# Patient Record
Sex: FEMALE | Race: WHITE | NOT HISPANIC OR LATINO | Employment: FULL TIME | URBAN - METROPOLITAN AREA
[De-identification: names, ages, dates, MRNs, and addresses within clinical notes are randomized per-mention and may not be internally consistent; named-entity substitution may affect disease eponyms.]

---

## 2017-01-03 RX ORDER — DIAZEPAM 5 MG/1
TABLET ORAL
Qty: 30 TABLET | Refills: 0 | Status: SHIPPED | OUTPATIENT
Start: 2017-01-03 | End: 2019-03-28

## 2017-01-09 ENCOUNTER — PATIENT MESSAGE (OUTPATIENT)
Dept: INTERNAL MEDICINE | Facility: CLINIC | Age: 27
End: 2017-01-09

## 2019-03-28 ENCOUNTER — LAB VISIT (OUTPATIENT)
Dept: LAB | Facility: HOSPITAL | Age: 29
End: 2019-03-28
Attending: ALLERGY & IMMUNOLOGY
Payer: COMMERCIAL

## 2019-03-28 ENCOUNTER — OFFICE VISIT (OUTPATIENT)
Dept: ALLERGY | Facility: CLINIC | Age: 29
End: 2019-03-28
Payer: COMMERCIAL

## 2019-03-28 VITALS
DIASTOLIC BLOOD PRESSURE: 74 MMHG | SYSTOLIC BLOOD PRESSURE: 108 MMHG | BODY MASS INDEX: 30.81 KG/M2 | WEIGHT: 184.94 LBS | HEIGHT: 65 IN

## 2019-03-28 DIAGNOSIS — L50.8 CHRONIC URTICARIA: Primary | ICD-10-CM

## 2019-03-28 DIAGNOSIS — L50.8 CHRONIC URTICARIA: ICD-10-CM

## 2019-03-28 LAB
ALBUMIN SERPL BCP-MCNC: 4.2 G/DL (ref 3.5–5.2)
ALP SERPL-CCNC: 60 U/L (ref 55–135)
ALT SERPL W/O P-5'-P-CCNC: 12 U/L (ref 10–44)
ANION GAP SERPL CALC-SCNC: 8 MMOL/L (ref 8–16)
AST SERPL-CCNC: 18 U/L (ref 10–40)
BASOPHILS # BLD AUTO: 0.01 K/UL (ref 0–0.2)
BASOPHILS NFR BLD: 0.2 % (ref 0–1.9)
BILIRUB SERPL-MCNC: 0.4 MG/DL (ref 0.1–1)
BUN SERPL-MCNC: 12 MG/DL (ref 6–20)
CALCIUM SERPL-MCNC: 9.6 MG/DL (ref 8.7–10.5)
CHLORIDE SERPL-SCNC: 103 MMOL/L (ref 95–110)
CO2 SERPL-SCNC: 25 MMOL/L (ref 23–29)
CREAT SERPL-MCNC: 0.8 MG/DL (ref 0.5–1.4)
DIFFERENTIAL METHOD: ABNORMAL
EOSINOPHIL # BLD AUTO: 0.1 K/UL (ref 0–0.5)
EOSINOPHIL NFR BLD: 1.8 % (ref 0–8)
ERYTHROCYTE [DISTWIDTH] IN BLOOD BY AUTOMATED COUNT: 12.3 % (ref 11.5–14.5)
EST. GFR  (AFRICAN AMERICAN): >60 ML/MIN/1.73 M^2
EST. GFR  (NON AFRICAN AMERICAN): >60 ML/MIN/1.73 M^2
GLUCOSE SERPL-MCNC: 76 MG/DL (ref 70–110)
HCT VFR BLD AUTO: 38.7 % (ref 37–48.5)
HGB BLD-MCNC: 13.2 G/DL (ref 12–16)
LYMPHOCYTES # BLD AUTO: 1.5 K/UL (ref 1–4.8)
LYMPHOCYTES NFR BLD: 30.8 % (ref 18–48)
MCH RBC QN AUTO: 33.2 PG (ref 27–31)
MCHC RBC AUTO-ENTMCNC: 34.1 G/DL (ref 32–36)
MCV RBC AUTO: 98 FL (ref 82–98)
MONOCYTES # BLD AUTO: 0.3 K/UL (ref 0.3–1)
MONOCYTES NFR BLD: 6.2 % (ref 4–15)
NEUTROPHILS # BLD AUTO: 3 K/UL (ref 1.8–7.7)
NEUTROPHILS NFR BLD: 60.8 % (ref 38–73)
PLATELET # BLD AUTO: 207 K/UL (ref 150–350)
PMV BLD AUTO: 9.7 FL (ref 9.2–12.9)
POTASSIUM SERPL-SCNC: 3.9 MMOL/L (ref 3.5–5.1)
PROT SERPL-MCNC: 7.5 G/DL (ref 6–8.4)
RBC # BLD AUTO: 3.97 M/UL (ref 4–5.4)
SODIUM SERPL-SCNC: 136 MMOL/L (ref 136–145)
THYROGLOB AB SERPL IA-ACNC: <4 IU/ML (ref 0–3.9)
THYROPEROXIDASE IGG SERPL-ACNC: <6 IU/ML
TSH SERPL DL<=0.005 MIU/L-ACNC: 1.19 UIU/ML (ref 0.4–4)
WBC # BLD AUTO: 5 K/UL (ref 3.9–12.7)

## 2019-03-28 PROCEDURE — 3008F PR BODY MASS INDEX (BMI) DOCUMENTED: ICD-10-PCS | Mod: CPTII,S$GLB,, | Performed by: ALLERGY & IMMUNOLOGY

## 2019-03-28 PROCEDURE — 36415 COLL VENOUS BLD VENIPUNCTURE: CPT

## 2019-03-28 PROCEDURE — 86376 MICROSOMAL ANTIBODY EACH: CPT

## 2019-03-28 PROCEDURE — 99999 PR PBB SHADOW E&M-EST. PATIENT-LVL III: ICD-10-PCS | Mod: PBBFAC,,, | Performed by: ALLERGY & IMMUNOLOGY

## 2019-03-28 PROCEDURE — 86800 THYROGLOBULIN ANTIBODY: CPT

## 2019-03-28 PROCEDURE — 80053 COMPREHEN METABOLIC PANEL: CPT

## 2019-03-28 PROCEDURE — 86003 ALLG SPEC IGE CRUDE XTRC EA: CPT | Mod: 59

## 2019-03-28 PROCEDURE — 84443 ASSAY THYROID STIM HORMONE: CPT

## 2019-03-28 PROCEDURE — 3008F BODY MASS INDEX DOCD: CPT | Mod: CPTII,S$GLB,, | Performed by: ALLERGY & IMMUNOLOGY

## 2019-03-28 PROCEDURE — 99204 PR OFFICE/OUTPT VISIT, NEW, LEVL IV, 45-59 MIN: ICD-10-PCS | Mod: S$GLB,,, | Performed by: ALLERGY & IMMUNOLOGY

## 2019-03-28 PROCEDURE — 86003 ALLG SPEC IGE CRUDE XTRC EA: CPT

## 2019-03-28 PROCEDURE — 99999 PR PBB SHADOW E&M-EST. PATIENT-LVL III: CPT | Mod: PBBFAC,,, | Performed by: ALLERGY & IMMUNOLOGY

## 2019-03-28 PROCEDURE — 99204 OFFICE O/P NEW MOD 45 MIN: CPT | Mod: S$GLB,,, | Performed by: ALLERGY & IMMUNOLOGY

## 2019-03-28 PROCEDURE — 85025 COMPLETE CBC W/AUTO DIFF WBC: CPT

## 2019-03-28 RX ORDER — EPINEPHRINE 0.3 MG/.3ML
1 INJECTION SUBCUTANEOUS ONCE
Qty: 2 DEVICE | Refills: 2 | Status: SHIPPED | OUTPATIENT
Start: 2019-03-28 | End: 2019-03-28

## 2019-03-28 RX ORDER — EPINEPHRINE 0.3 MG/.3ML
1 INJECTION SUBCUTANEOUS ONCE
Qty: 2 DEVICE | Refills: 2 | Status: SHIPPED | OUTPATIENT
Start: 2019-03-28 | End: 2019-11-11

## 2019-03-28 RX ORDER — CETIRIZINE HYDROCHLORIDE 10 MG/1
20 TABLET ORAL 2 TIMES DAILY
Qty: 120 TABLET | Refills: 6 | Status: ON HOLD | OUTPATIENT
Start: 2019-03-28 | End: 2019-11-01

## 2019-03-28 NOTE — PROGRESS NOTES
Subjective:       Patient ID: Antoinette Love is a 28 y.o. female.    Chief Complaint:  Urticaria (10 years)      HPI:   Antoinette Love is here for evaluation of chronic urticaria. Patient's symptoms include urticaria and angioedema. Hives are described as a red, raised, itchy and spreading skin rash that occurs on the entire body. The patient has had these symptoms for 10 years. Possible triggers include maybe worse in bed. Is also dermatographic. alleve w cramps. Each individual hive lasts less than 24 hours. These lesions are pruritic and not painful. They heal without scarring. The patient has tried the following medications for control of these symptoms: over-the-counter antihistamines. These medications offer fair relief of symptoms.  There was a distant episode w concern of poss throat tightness while in hosp w c diff infection. She had eaten beef broth a few minutes prior. Had similar sx's later w sausage. Tolerates beef hamburger and ground beef w/o problem though. Has been rx'd epipen The patient has not required Emergency Room evaluation and treatment for these symptoms. Skin biopsy has not been performed.   No AR. Other than episodic w cat exposure  Hx RAD vs asthma    Family Atopy History: no history of atopy      No SLE or thyroid dysfunction      Environmental History: Pets in the home: dogs (2).  [place]: hardwood floors  + smokes out of house x 10 yrs. 1 pack x 3 weeks      Past Medical History:   Diagnosis Date    DVT (deep venous thrombosis)     Factor V Leiden     Pulmonary embolus     Recurrent upper respiratory infection (URI)     TIA (transient ischemic attack)        Family History   Problem Relation Age of Onset    Allergies Mother         azithromycin       Review of Systems   Constitutional: Negative for activity change, fatigue and fever.   HENT: Negative for congestion, postnasal drip, rhinorrhea, sinus pressure and sneezing.    Eyes: Negative for discharge, redness  and itching.   Respiratory: Negative for cough, shortness of breath and wheezing.    Cardiovascular: Negative for chest pain.   Gastrointestinal: Negative for diarrhea, nausea and vomiting.   Genitourinary: Negative for dysuria.   Musculoskeletal: Negative for arthralgias and joint swelling.   Skin: Negative for rash.        urticaria   Neurological: Negative for headaches.   Hematological: Does not bruise/bleed easily.   Psychiatric/Behavioral: Negative for behavioral problems and sleep disturbance.          Objective:   Physical Exam   Constitutional: She is oriented to person, place, and time. She appears well-developed and well-nourished. No distress.   HENT:   Head: Normocephalic.   Right Ear: Tympanic membrane and external ear normal.   Left Ear: Tympanic membrane and external ear normal.   Nose: No mucosal edema, rhinorrhea, sinus tenderness or septal deviation. Right sinus exhibits no maxillary sinus tenderness and no frontal sinus tenderness. Left sinus exhibits no maxillary sinus tenderness and no frontal sinus tenderness.   Mouth/Throat: Uvula is midline, oropharynx is clear and moist and mucous membranes are normal. No uvula swelling.   Eyes: Conjunctivae are normal. Right eye exhibits no discharge. Left eye exhibits no discharge.   Neck: Normal range of motion. Neck supple.   Cardiovascular: Normal rate and regular rhythm.   Pulmonary/Chest: Effort normal and breath sounds normal. No respiratory distress. She has no wheezes.   Abdominal: Soft. Bowel sounds are normal. There is no tenderness.   Musculoskeletal: Normal range of motion. She exhibits no edema or tenderness.   Lymphadenopathy:     She has no cervical adenopathy.   Neurological: She is alert and oriented to person, place, and time.   Skin: Skin is warm. No rash noted. No erythema.   Few urticarial lesions on neck   Psychiatric: She has a normal mood and affect. Her behavior is normal. Judgment and thought content normal.   Nursing note and  vitals reviewed.          Assessment:       1. Chronic urticaria         Plan:       Antoinette was seen today for urticaria.    Diagnoses and all orders for this visit:    Chronic urticaria  -     D. farinae IgE; Future  -     D. pteronyssinus IgE; Future  -     CBC auto differential; Future  -     Anti-thyroglobulin antibody; Future  -     Thyroid peroxidase antibody; Future  -     TSH; Future  -     Comprehensive metabolic panel; Future    Other orders  -     EPINEPHrine (EPIPEN 2-VAN) 0.3 mg/0.3 mL AtIn; Inject 0.3 mLs (0.3 mg total) into the muscle once. for 1 dose. Low suspicion of anaphylaxis risk, though reports 2 poss prev episodes w some suspicion  -     cetirizine (ZYRTEC) 10 MG tablet; Take 2 tablets (20 mg total) by mouth 2 (two) times daily. For chronic urticaria  -     ranitidine (ZANTAC) 150 MG tablet; Take 1 tablet (150 mg total) by mouth 2 (two) times daily.    fu 3 weeks. If no improvement, consider omalizumab

## 2019-04-01 LAB
D FARINAE IGE QN: <0.35 KU/L
D PTERONYSS IGE QN: <0.35 KU/L
DEPRECATED D FARINAE IGE RAST QL: NORMAL
DEPRECATED D PTERONYSS IGE RAST QL: NORMAL

## 2019-06-05 DIAGNOSIS — M79.89 PAIN AND SWELLING OF LOWER EXTREMITY, RIGHT: Primary | ICD-10-CM

## 2019-06-05 DIAGNOSIS — M79.604 PAIN AND SWELLING OF LOWER EXTREMITY, RIGHT: Primary | ICD-10-CM

## 2019-06-27 DIAGNOSIS — L03.114 CELLULITIS OF LEFT UPPER EXTREMITY: Primary | ICD-10-CM

## 2019-06-27 RX ORDER — SULFAMETHOXAZOLE AND TRIMETHOPRIM 800; 160 MG/1; MG/1
1 TABLET ORAL 2 TIMES DAILY
Qty: 20 TABLET | Refills: 0 | Status: SHIPPED | OUTPATIENT
Start: 2019-06-27 | End: 2019-07-07

## 2019-06-29 ENCOUNTER — HOSPITAL ENCOUNTER (EMERGENCY)
Facility: HOSPITAL | Age: 29
Discharge: HOME | End: 2019-06-29
Attending: EMERGENCY MEDICINE
Payer: COMMERCIAL

## 2019-06-29 VITALS
RESPIRATION RATE: 18 BRPM | DIASTOLIC BLOOD PRESSURE: 83 MMHG | BODY MASS INDEX: 28.32 KG/M2 | TEMPERATURE: 99 F | SYSTOLIC BLOOD PRESSURE: 131 MMHG | WEIGHT: 170 LBS | HEIGHT: 65 IN | OXYGEN SATURATION: 98 % | HEART RATE: 89 BPM

## 2019-06-29 DIAGNOSIS — L03.114 CELLULITIS OF LEFT UPPER EXTREMITY: ICD-10-CM

## 2019-06-29 DIAGNOSIS — J11.1 INFLUENZA: Primary | ICD-10-CM

## 2019-06-29 LAB
B-HCG UR QL: NEGATIVE
CTP QC/QA: YES

## 2019-06-29 PROCEDURE — 25000003 PHARM REV CODE 250: Performed by: NURSE PRACTITIONER

## 2019-06-29 PROCEDURE — 81025 URINE PREGNANCY TEST: CPT | Performed by: NURSE PRACTITIONER

## 2019-06-29 PROCEDURE — 99284 EMERGENCY DEPT VISIT MOD MDM: CPT

## 2019-06-29 RX ORDER — CEPHALEXIN 500 MG/1
500 CAPSULE ORAL
Status: COMPLETED | OUTPATIENT
Start: 2019-06-29 | End: 2019-06-29

## 2019-06-29 RX ORDER — CEPHALEXIN 250 MG/1
250 CAPSULE ORAL EVERY 6 HOURS
Qty: 28 CAPSULE | Refills: 0 | Status: SHIPPED | OUTPATIENT
Start: 2019-06-29 | End: 2019-07-06

## 2019-06-29 RX ORDER — CEPHALEXIN 250 MG/1
500 CAPSULE ORAL EVERY 6 HOURS
Qty: 56 CAPSULE | Refills: 0 | Status: SHIPPED | OUTPATIENT
Start: 2019-06-29 | End: 2019-06-29 | Stop reason: ALTCHOICE

## 2019-06-29 RX ORDER — HYDROCODONE BITARTRATE AND ACETAMINOPHEN 5; 325 MG/1; MG/1
1 TABLET ORAL EVERY 6 HOURS PRN
Qty: 12 TABLET | Refills: 0 | Status: ON HOLD | OUTPATIENT
Start: 2019-06-29 | End: 2019-07-21 | Stop reason: SDUPTHER

## 2019-06-29 RX ADMIN — CEPHALEXIN 500 MG: 500 CAPSULE ORAL at 05:06

## 2019-06-29 NOTE — ED TRIAGE NOTES
"Pt arrived to ER with complaints of " I went to urgent care yesterday and I got a shot of Rocephin and I have been on Bactrim since Thursday (abscess left elbow crease)". Pt was advised to return for further evaluation at urgent care and was sent here due to "it grew outside the lines they buddy and the antibiotics must not be working". Pt rates the pain as 6/10with movement of the LUE.   "

## 2019-06-29 NOTE — DISCHARGE INSTRUCTIONS
Return for pain with joint movement, increase in redness, warmth or pus drainage.  Take antibiotics as prescribed.  Continue all other treatments and medications.

## 2019-06-30 NOTE — ED PROVIDER NOTES
Encounter Date: 6/29/2019       History     Chief Complaint   Patient presents with    Abscess     Abscess to left arm.  Started on bactrim, but redness and swelling are worse today.  Diagnosed with flu yesterday.     Chief complaint:  Abscess    History of present illness:  Patient is a 29-year-old female with a recent diagnosis of influenza who states that she had a small area of redness on her left arm that is worsening.  She is currently taking Bactrim DS for this area which was believed to be an abscess.  Patient denies fever or chills. She states she was tested for influenza secondary to a cough only.  The area is circled with ink and patient reports that the redness has decreased but the firmness around it has increased.    The history is provided by the patient and medical records. No  was used.     Review of patient's allergies indicates:   Allergen Reactions    Azithromycin Hives    Toradol [ketorolac] Hives     Itching and hives    Xarelto [rivaroxaban]      Gallbladder swelling and disfunction     Past Medical History:   Diagnosis Date    DVT (deep venous thrombosis)     Factor V Leiden     Pulmonary embolus     Recurrent upper respiratory infection (URI)     TIA (transient ischemic attack)      Past Surgical History:   Procedure Laterality Date    COLONOSCOPY N/A 12/18/2015    Performed by Lefty Lee [facility] at [facility] ([facility] F[facility])    IVC FILTER RETRIEVAL      tumor from breast      left    WISDOM TOOTH EXTRACTION       Family History   Problem Relation Age of Onset    Allergies Mother         azithromycin     Social History     Tobacco Use    Smoking status: Never Smoker   Substance Use Topics    Alcohol use: No    Drug use: No     Review of Systems   Constitutional: Negative for chills, fatigue and fever.   HENT: Negative for congestion, ear discharge, ear pain, postnasal drip, rhinorrhea, sinus pressure, sneezing, sore throat and voice change.    Eyes: Negative for  discharge and itching.   Respiratory: Negative for cough, shortness of breath and wheezing.    Cardiovascular: Negative for chest pain, palpitations and leg swelling.   Gastrointestinal: Negative for abdominal pain, constipation, diarrhea, nausea and vomiting.   Endocrine: Negative for polydipsia, polyphagia and polyuria.   Genitourinary: Negative for dysuria, frequency, hematuria, urgency, vaginal bleeding, vaginal discharge and vaginal pain.   Musculoskeletal: Negative for arthralgias and myalgias.   Skin: Negative for rash and wound.        abscess   Neurological: Negative for dizziness, seizures, syncope, weakness and numbness.   Hematological: Negative for adenopathy. Does not bruise/bleed easily.   Psychiatric/Behavioral: Negative for self-injury and suicidal ideas. The patient is not nervous/anxious.        Physical Exam     Initial Vitals [06/29/19 1720]   BP Pulse Resp Temp SpO2   131/83 89 18 99 °F (37.2 °C) 98 %      MAP       --         Physical Exam    Nursing note and vitals reviewed.  Constitutional: She appears well-developed and well-nourished.   HENT:   Head: Normocephalic and atraumatic.   Right Ear: External ear normal.   Left Ear: External ear normal.   Nose: Nose normal.   Eyes: Conjunctivae and EOM are normal. Pupils are equal, round, and reactive to light. Right eye exhibits no discharge. Left eye exhibits no discharge.   Neck: Normal range of motion.   Abdominal: She exhibits no distension.   Musculoskeletal: Normal range of motion.        Arms:  NO pain with movement of the left elbow, distal psm intact   Neurological: She is alert and oriented to person, place, and time.   Skin: Skin is dry. Capillary refill takes less than 2 seconds.             ED Course   Procedures  Labs Reviewed   POCT URINE PREGNANCY          Imaging Results    None          Medical Decision Making:   Initial Assessment:   29-year-old female with reddened indurated painful spot to the left lateral elbow  Differential  Diagnosis:   Abscess, cellulitis, necrotizing fasciitis.  ED Management:  Following physical examination I used a bedside ultrasound to identify collection or drainable fluid.  There was none.  Dr. Burns was consulted, he looked at the photograph of the affected area and recommended the use of Keflex.  Pt was given 500mg in ED and will take 250mg po qid x7d. Norco was provided for pain.  Pt to return for worsening otherwise f/u with pcp.   Medication choices were made after reviewing allergies, medications, history, available laboratories.                    ED Course as of Jun 30 0029   Sat Jun 29, 2019   1752 Preg Test, Ur: Negative [VC]      ED Course User Index  [VC] John Taveras DNP     Clinical Impression:       ICD-10-CM ICD-9-CM   1. Influenza J11.1 487.1   2. Cellulitis of left upper extremity L03.114 682.3         Disposition:   Disposition: Discharged  Condition: Stable                        John Taveras DNP  06/30/19 0034

## 2019-07-20 ENCOUNTER — HOSPITAL ENCOUNTER (OUTPATIENT)
Facility: HOSPITAL | Age: 29
Discharge: HOME | End: 2019-07-21
Attending: EMERGENCY MEDICINE | Admitting: EMERGENCY MEDICINE
Payer: COMMERCIAL

## 2019-07-20 DIAGNOSIS — D68.51 FACTOR V LEIDEN MUTATION: Primary | ICD-10-CM

## 2019-07-20 DIAGNOSIS — I82.409 DVT (DEEP VENOUS THROMBOSIS): ICD-10-CM

## 2019-07-20 DIAGNOSIS — Z86.718 HISTORY OF DVT (DEEP VEIN THROMBOSIS): ICD-10-CM

## 2019-07-20 LAB
ALBUMIN SERPL BCP-MCNC: 4.4 G/DL (ref 3.5–5.2)
ALP SERPL-CCNC: 84 U/L (ref 55–135)
ALT SERPL W/O P-5'-P-CCNC: 14 U/L (ref 10–44)
ANION GAP SERPL CALC-SCNC: 12 MMOL/L (ref 8–16)
APTT BLDCRRT: 24.2 SEC (ref 21–32)
AST SERPL-CCNC: 23 U/L (ref 10–40)
B-HCG UR QL: NEGATIVE
BACTERIA #/AREA URNS AUTO: NORMAL /HPF
BASOPHILS # BLD AUTO: 0.02 K/UL (ref 0–0.2)
BASOPHILS NFR BLD: 0.6 % (ref 0–1.9)
BILIRUB SERPL-MCNC: 0.4 MG/DL (ref 0.1–1)
BILIRUB UR QL STRIP: ABNORMAL
BUN SERPL-MCNC: 18 MG/DL (ref 6–20)
CALCIUM SERPL-MCNC: 9.3 MG/DL (ref 8.7–10.5)
CHLORIDE SERPL-SCNC: 104 MMOL/L (ref 95–110)
CLARITY UR REFRACT.AUTO: ABNORMAL
CO2 SERPL-SCNC: 22 MMOL/L (ref 23–29)
COLOR UR AUTO: ABNORMAL
CREAT SERPL-MCNC: 0.8 MG/DL (ref 0.5–1.4)
CTP QC/QA: YES
DIFFERENTIAL METHOD: ABNORMAL
EOSINOPHIL # BLD AUTO: 0 K/UL (ref 0–0.5)
EOSINOPHIL NFR BLD: 0.6 % (ref 0–8)
ERYTHROCYTE [DISTWIDTH] IN BLOOD BY AUTOMATED COUNT: 12.6 % (ref 11.5–14.5)
EST. GFR  (AFRICAN AMERICAN): >60 ML/MIN/1.73 M^2
EST. GFR  (NON AFRICAN AMERICAN): >60 ML/MIN/1.73 M^2
GLUCOSE SERPL-MCNC: 71 MG/DL (ref 70–110)
GLUCOSE UR QL STRIP: NEGATIVE
HCT VFR BLD AUTO: 38.3 % (ref 37–48.5)
HGB BLD-MCNC: 13.1 G/DL (ref 12–16)
HGB UR QL STRIP: NEGATIVE
HYALINE CASTS UR QL AUTO: 0 /LPF
IMM GRANULOCYTES # BLD AUTO: 0 K/UL (ref 0–0.04)
IMM GRANULOCYTES NFR BLD AUTO: 0 % (ref 0–0.5)
INR PPP: 0.9 (ref 0.8–1.2)
KETONES UR QL STRIP: ABNORMAL
LEUKOCYTE ESTERASE UR QL STRIP: NEGATIVE
LYMPHOCYTES # BLD AUTO: 1.1 K/UL (ref 1–4.8)
LYMPHOCYTES NFR BLD: 36 % (ref 18–48)
MCH RBC QN AUTO: 31.7 PG (ref 27–31)
MCHC RBC AUTO-ENTMCNC: 34.2 G/DL (ref 32–36)
MCV RBC AUTO: 93 FL (ref 82–98)
MICROSCOPIC COMMENT: NORMAL
MONOCYTES # BLD AUTO: 0.2 K/UL (ref 0.3–1)
MONOCYTES NFR BLD: 7.3 % (ref 4–15)
NEUTROPHILS # BLD AUTO: 1.7 K/UL (ref 1.8–7.7)
NEUTROPHILS NFR BLD: 55.5 % (ref 38–73)
NITRITE UR QL STRIP: NEGATIVE
NRBC BLD-RTO: 0 /100 WBC
PH UR STRIP: 5 [PH] (ref 5–8)
PLATELET # BLD AUTO: 145 K/UL (ref 150–350)
PMV BLD AUTO: 9.5 FL (ref 9.2–12.9)
POTASSIUM SERPL-SCNC: 4 MMOL/L (ref 3.5–5.1)
PROT SERPL-MCNC: 8.2 G/DL (ref 6–8.4)
PROT UR QL STRIP: ABNORMAL
PROTHROMBIN TIME: 9.9 SEC (ref 9–12.5)
RBC # BLD AUTO: 4.13 M/UL (ref 4–5.4)
RBC #/AREA URNS AUTO: 3 /HPF (ref 0–4)
SODIUM SERPL-SCNC: 138 MMOL/L (ref 136–145)
SP GR UR STRIP: >=1.03 (ref 1–1.03)
SQUAMOUS #/AREA URNS AUTO: 2 /HPF
URN SPEC COLLECT METH UR: ABNORMAL
WBC # BLD AUTO: 3.14 K/UL (ref 3.9–12.7)
WBC #/AREA URNS AUTO: 0 /HPF (ref 0–5)

## 2019-07-20 PROCEDURE — 99285 EMERGENCY DEPT VISIT HI MDM: CPT | Mod: 25

## 2019-07-20 PROCEDURE — 63600175 PHARM REV CODE 636 W HCPCS: Performed by: STUDENT IN AN ORGANIZED HEALTH CARE EDUCATION/TRAINING PROGRAM

## 2019-07-20 PROCEDURE — 81025 URINE PREGNANCY TEST: CPT | Performed by: EMERGENCY MEDICINE

## 2019-07-20 PROCEDURE — 85730 THROMBOPLASTIN TIME PARTIAL: CPT

## 2019-07-20 PROCEDURE — 99284 EMERGENCY DEPT VISIT MOD MDM: CPT | Mod: ,,, | Performed by: EMERGENCY MEDICINE

## 2019-07-20 PROCEDURE — 99284 PR EMERGENCY DEPT VISIT,LEVEL IV: ICD-10-PCS | Mod: ,,, | Performed by: EMERGENCY MEDICINE

## 2019-07-20 PROCEDURE — 80053 COMPREHEN METABOLIC PANEL: CPT

## 2019-07-20 PROCEDURE — 81001 URINALYSIS AUTO W/SCOPE: CPT

## 2019-07-20 PROCEDURE — 96375 TX/PRO/DX INJ NEW DRUG ADDON: CPT

## 2019-07-20 PROCEDURE — 85025 COMPLETE CBC W/AUTO DIFF WBC: CPT

## 2019-07-20 PROCEDURE — 96374 THER/PROPH/DIAG INJ IV PUSH: CPT

## 2019-07-20 PROCEDURE — 85610 PROTHROMBIN TIME: CPT

## 2019-07-20 RX ORDER — ONDANSETRON 2 MG/ML
4 INJECTION INTRAMUSCULAR; INTRAVENOUS
Status: COMPLETED | OUTPATIENT
Start: 2019-07-20 | End: 2019-07-20

## 2019-07-20 RX ORDER — MORPHINE SULFATE 4 MG/ML
4 INJECTION, SOLUTION INTRAMUSCULAR; INTRAVENOUS
Status: COMPLETED | OUTPATIENT
Start: 2019-07-20 | End: 2019-07-20

## 2019-07-20 RX ADMIN — MORPHINE SULFATE 4 MG: 4 INJECTION INTRAVENOUS at 08:07

## 2019-07-20 RX ADMIN — ONDANSETRON 4 MG: 2 INJECTION INTRAMUSCULAR; INTRAVENOUS at 08:07

## 2019-07-21 VITALS
OXYGEN SATURATION: 99 % | BODY MASS INDEX: 28.87 KG/M2 | WEIGHT: 173.31 LBS | HEART RATE: 63 BPM | SYSTOLIC BLOOD PRESSURE: 106 MMHG | TEMPERATURE: 98 F | RESPIRATION RATE: 18 BRPM | DIASTOLIC BLOOD PRESSURE: 65 MMHG | HEIGHT: 65 IN

## 2019-07-21 PROBLEM — I82.419 ACUTE DEEP VEIN THROMBOSIS (DVT) OF FEMORAL VEIN: Status: ACTIVE | Noted: 2019-07-21

## 2019-07-21 PROBLEM — I82.409 DVT (DEEP VENOUS THROMBOSIS): Status: ACTIVE | Noted: 2019-07-21

## 2019-07-21 PROCEDURE — 99205 OFFICE O/P NEW HI 60 MIN: CPT | Mod: ,,, | Performed by: INTERNAL MEDICINE

## 2019-07-21 PROCEDURE — G0378 HOSPITAL OBSERVATION PER HR: HCPCS

## 2019-07-21 PROCEDURE — 25000003 PHARM REV CODE 250: Performed by: HOSPITALIST

## 2019-07-21 PROCEDURE — 99219 PR INITIAL OBSERVATION CARE,LEVL II: CPT | Mod: ,,, | Performed by: HOSPITALIST

## 2019-07-21 PROCEDURE — 99217 PR OBSERVATION CARE DISCHARGE: CPT | Mod: ,,, | Performed by: HOSPITALIST

## 2019-07-21 PROCEDURE — 63600175 PHARM REV CODE 636 W HCPCS: Performed by: HOSPITALIST

## 2019-07-21 PROCEDURE — 99217 PR OBSERVATION CARE DISCHARGE: ICD-10-PCS | Mod: ,,, | Performed by: HOSPITALIST

## 2019-07-21 PROCEDURE — 99219 PR INITIAL OBSERVATION CARE,LEVL II: ICD-10-PCS | Mod: ,,, | Performed by: HOSPITALIST

## 2019-07-21 PROCEDURE — 99205 PR OFFICE/OUTPT VISIT, NEW, LEVL V, 60-74 MIN: ICD-10-PCS | Mod: ,,, | Performed by: INTERNAL MEDICINE

## 2019-07-21 RX ORDER — ENOXAPARIN SODIUM 150 MG/ML
1.5 INJECTION SUBCUTANEOUS
Status: DISCONTINUED | OUTPATIENT
Start: 2019-07-21 | End: 2019-07-21 | Stop reason: HOSPADM

## 2019-07-21 RX ORDER — MORPHINE SULFATE 2 MG/ML
4 INJECTION, SOLUTION INTRAMUSCULAR; INTRAVENOUS EVERY 4 HOURS PRN
Status: DISCONTINUED | OUTPATIENT
Start: 2019-07-21 | End: 2019-07-21 | Stop reason: HOSPADM

## 2019-07-21 RX ORDER — CETIRIZINE HYDROCHLORIDE 10 MG/1
20 TABLET ORAL 2 TIMES DAILY
Status: DISCONTINUED | OUTPATIENT
Start: 2019-07-21 | End: 2019-07-21 | Stop reason: HOSPADM

## 2019-07-21 RX ORDER — ENOXAPARIN SODIUM 150 MG/ML
1.5 INJECTION SUBCUTANEOUS DAILY
Qty: 24 ML | Refills: 3 | Status: SHIPPED | OUTPATIENT
Start: 2019-07-21 | End: 2019-08-20

## 2019-07-21 RX ORDER — ZOLPIDEM TARTRATE 5 MG/1
5 TABLET ORAL NIGHTLY PRN
Status: DISCONTINUED | OUTPATIENT
Start: 2019-07-21 | End: 2019-07-21 | Stop reason: HOSPADM

## 2019-07-21 RX ORDER — HYDROCODONE BITARTRATE AND ACETAMINOPHEN 5; 325 MG/1; MG/1
1 TABLET ORAL EVERY 6 HOURS PRN
Status: DISCONTINUED | OUTPATIENT
Start: 2019-07-21 | End: 2019-07-21 | Stop reason: HOSPADM

## 2019-07-21 RX ORDER — SODIUM CHLORIDE 0.9 % (FLUSH) 0.9 %
10 SYRINGE (ML) INJECTION
Status: DISCONTINUED | OUTPATIENT
Start: 2019-07-21 | End: 2019-07-21 | Stop reason: HOSPADM

## 2019-07-21 RX ORDER — WARFARIN 2.5 MG/1
5 TABLET ORAL DAILY
Status: DISCONTINUED | OUTPATIENT
Start: 2019-07-21 | End: 2019-07-21 | Stop reason: HOSPADM

## 2019-07-21 RX ORDER — HYDROCODONE BITARTRATE AND ACETAMINOPHEN 5; 325 MG/1; MG/1
1 TABLET ORAL EVERY 6 HOURS PRN
Qty: 25 TABLET | Refills: 0 | Status: SHIPPED | OUTPATIENT
Start: 2019-07-21 | End: 2019-07-24

## 2019-07-21 RX ORDER — MORPHINE SULFATE 2 MG/ML
2 INJECTION, SOLUTION INTRAMUSCULAR; INTRAVENOUS EVERY 4 HOURS PRN
Status: DISCONTINUED | OUTPATIENT
Start: 2019-07-21 | End: 2019-07-21

## 2019-07-21 RX ORDER — MORPHINE SULFATE 4 MG/ML
4 INJECTION, SOLUTION INTRAMUSCULAR; INTRAVENOUS EVERY 4 HOURS PRN
Status: DISCONTINUED | OUTPATIENT
Start: 2019-07-21 | End: 2019-07-21

## 2019-07-21 RX ORDER — GUAIFENESIN 600 MG/1
600 TABLET, EXTENDED RELEASE ORAL 2 TIMES DAILY
Status: DISCONTINUED | OUTPATIENT
Start: 2019-07-21 | End: 2019-07-21 | Stop reason: HOSPADM

## 2019-07-21 RX ORDER — POLYETHYLENE GLYCOL 3350 17 G/17G
17 POWDER, FOR SOLUTION ORAL 2 TIMES DAILY PRN
Status: DISCONTINUED | OUTPATIENT
Start: 2019-07-21 | End: 2019-07-21 | Stop reason: HOSPADM

## 2019-07-21 RX ORDER — FAMOTIDINE 20 MG/1
20 TABLET, FILM COATED ORAL 2 TIMES DAILY
Status: DISCONTINUED | OUTPATIENT
Start: 2019-07-21 | End: 2019-07-21 | Stop reason: HOSPADM

## 2019-07-21 RX ADMIN — ENOXAPARIN SODIUM 120 MG: 150 INJECTION SUBCUTANEOUS at 03:07

## 2019-07-21 RX ADMIN — HYDROCODONE BITARTRATE AND ACETAMINOPHEN 1 TABLET: 5; 325 TABLET ORAL at 02:07

## 2019-07-21 RX ADMIN — HYDROCODONE BITARTRATE AND ACETAMINOPHEN 1 TABLET: 5; 325 TABLET ORAL at 05:07

## 2019-07-21 RX ADMIN — GUAIFENESIN 600 MG: 600 TABLET, EXTENDED RELEASE ORAL at 09:07

## 2019-07-21 RX ADMIN — WARFARIN SODIUM 5 MG: 2.5 TABLET ORAL at 04:07

## 2019-07-21 RX ADMIN — FAMOTIDINE 20 MG: 20 TABLET, FILM COATED ORAL at 08:07

## 2019-07-21 RX ADMIN — MORPHINE SULFATE 4 MG: 2 INJECTION, SOLUTION INTRAMUSCULAR; INTRAVENOUS at 01:07

## 2019-07-21 RX ADMIN — WARFARIN SODIUM 5 MG: 2.5 TABLET ORAL at 02:07

## 2019-07-21 RX ADMIN — CETIRIZINE HYDROCHLORIDE 20 MG: 10 TABLET, FILM COATED ORAL at 08:07

## 2019-07-21 RX ADMIN — MORPHINE SULFATE 4 MG: 4 INJECTION INTRAVENOUS at 05:07

## 2019-07-21 RX ADMIN — HYDROCODONE BITARTRATE AND ACETAMINOPHEN 1 TABLET: 5; 325 TABLET ORAL at 08:07

## 2019-07-21 NOTE — PLAN OF CARE
Problem: Adult Inpatient Plan of Care  Goal: Plan of Care Review  Outcome: Ongoing (interventions implemented as appropriate)  Pt is AAOx4 and ambulates independently. VSS and pt is afebrile. Pt c/o pain and had IV Morphine and PO Norco in the E.D. Bilateral LE US showed dvt's to both LE. Warfarin and Lovenox administered in the E.D. Additional RLE US to occur in a.m. Significant other @ bedside. Pt remained safe and free of injury through the night. Bed in low and locked position, bed rails up x2, call bell in reach, non skid socks worn out of bed. Will continue to monitor.

## 2019-07-21 NOTE — HPI
29F with Factor V Leiden and recurrent DVTs despite various anticoagulants presents c/o pain and swelling in BLE.  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  She has an IVC filter.  She has apparently had clots on coumadin, Lovenox, and did not tolerate Xarelto, which she claims gave her biliary colic.  Since moving here she has not established with a hematologist.  Her present symptoms began about 4 days ago and have progressively worsened to the point that they have become unbearable.  The pain is worse in the inguinal area and is burning in quality, worsened by movement.  She denies any violaceous discoloration of the lower extremities but does have a bruise on her left knee from bumping against a surface a few days ago.  She does note a petechial-appearing rash on her forelegs which is new.  She reports compliance with her Eliquis and her last dose was Friday evening.

## 2019-07-21 NOTE — ED TRIAGE NOTES
Antoinette Love, a 29 y.o. female presents to the ED w/ complaint of pain to right groin. Pt reports having right leg pain similar to previous DVTs started a few weeks ago but has since subsided. Pt was scheduled for ultrasound but did not go because pain went away. Pt reports pain and swelling to groin that began earlier today.    Triage note:  Chief Complaint   Patient presents with    Leg Pain     Patient reports right leg pain. Reports swelling, discoloration, and pain in leg. States that she is concerned for possible blood clot. States that she has history of blood closts.      Review of patient's allergies indicates:   Allergen Reactions    Azithromycin Hives    Toradol [ketorolac] Hives     Itching and hives    Xarelto [rivaroxaban]      Gallbladder swelling and disfunction     Past Medical History:   Diagnosis Date    DVT (deep venous thrombosis)     Factor V Leiden     Pulmonary embolus     Recurrent upper respiratory infection (URI)     TIA (transient ischemic attack)       Adult Physical Assessment  LOC: Antoinette Love, 29 y.o. female verified via two identifiers.  The patient is awake, alert, oriented and speaking appropriately at this time.  APPEARANCE: Patient resting comfortably and appears to be in no acute distress at this time. Patient is clean and well groomed, patient's clothing is properly fastened.  SKIN:The skin is warm and dry, color consistent with ethnicity, patient has normal skin turgor and moist mucus membranes, skin intact, no breakdown or brusing noted.  MUSCULOSKELETAL: Patient moving all extremities well, no obvious deformities noted. Swelling noted to right thigh.  RESPIRATORY: Airway is open and patent, respirations are spontaneous, patient has a normal effort and rate, no accessory muscle use noted.  CARDIAC: Patient has a normal rate and rhythm, no periphreal edema noted in any extremity, capillary refill < 3 seconds in all extremities  ABDOMEN: Soft and  non tender to palpation, no abdominal distention noted. Bowel sounds present in all four quadrants.  NEUROLOGIC: Eyes open spontaneously, behavior appropriate to situation, follows commands, facial expression symmetrical, bilateral hand grasp equal and even, purposeful motor response noted, normal sensation in all extremities when touched with a finger.

## 2019-07-21 NOTE — CONSULTS
Consult Note    Inpatient consult to Hematology/Oncology  Consult performed by: Earnestine Garcia MD  Consult ordered by: Ruiz Linder MD        SUBJECTIVE:     History of Present Illness:  Antoinette Love is a 29-year-old female with Homoxygous Factor V Leiden mutation, and recurrent DVTs, IVC filter placement, miscarriage in 2013 at 11 weeks, TIA in 2013, who presented 7/21/19 c/o pain and swelling in BLE while on Eliquis.  Found to have 'partial, nonocclusive thrombus in the right common femoral vein, left common femoral vein, and left external iliac vein. Superficial thrombophlebitis of the bilateral greater saphenous veins.' Hematology consulted for further recommendations on anti-coagulation.    Hematologic History  The patient has had at least 11 DVTs and 3-4 PEs.  -She was on Coumadin for years and stated she came off when she had a DVT despite a therapeutic INR.  -She was then placed on Xarelto buts states she only took 1 dose and shortly after developed gallbladder pain. When she was evaluated she was told she had gallbladder inflammation.  -IVC filter was placed in 2014. 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 She had a DVT in 2016 while on Eliquis  She was followed by a Hematologist in [place], and he opted to take her off of anti-coagulation in 2018 because stated that she had an IVC filter and did not need to be anti-coagulated.  -She noted symptoms of DVT last week, so she self resumed a previous Eliquis prescription that she had    The patient was seen today. She endorses right groin pain. No significant swelling. No dyspnea or chest pain. Her mother had breast cancer and VTE. He father had PE and DVT after traumatic hit to leg by shopping cart. Her sister is also homozygous for factor V Leiden but has never had VTE. The patient had  miscarriage at 11-12 weeks while on Coumadin in 2013. She had a TIA in 2013, no history of coronary artery disease. She has regular menstruations, is not on OCPs. No bleeding, but does bruise easily. No recent surgery or prolonged travel. She is a nurse at [employer], her  works in the [employer], and they may be moving to [place]. She smokes occasionally.    Review of patient's allergies indicates:   Allergen Reactions    Azithromycin Hives    Toradol [ketorolac] Hives     Itching and hives    Xarelto [rivaroxaban]      Gallbladder swelling and disfunction     Past Medical History:   Diagnosis Date    DVT (deep venous thrombosis)     Factor V Leiden     Pulmonary embolus     Recurrent upper respiratory infection (URI)     TIA (transient ischemic attack)      Past Surgical History:   Procedure Laterality Date    COLONOSCOPY N/A 12/18/2015    [facility]formed [facility] at [facility] ([facility])    IVC FILTER RETRIEVAL      tumor from breast      left    WISDOM TOOTH EXTRACTION       Family History   Problem Relation Age of Onset    Allergies Mother         azithromycin     Social History     Tobacco Use    Smoking status: Never Smoker   Substance Use Topics    Alcohol use: No    Drug use: No     Review of Systems   Constitutional: Negative for chills, fever, malaise/fatigue and weight loss.   HENT: Negative for nosebleeds and sore throat.    Eyes: Negative for blurred vision and double vision.   Respiratory: Negative for cough, hemoptysis and shortness of breath.    Cardiovascular: Negative for chest pain and leg swelling.   Gastrointestinal: Negative for abdominal pain, blood in stool, melena, nausea and vomiting.   Genitourinary: Negative for hematuria.   Musculoskeletal: Positive for joint pain and myalgias.   Skin: Negative for rash.   Neurological: Negative for dizziness, sensory change and headaches.   Endo/Heme/Allergies: Bruises/bleeds easily.     OBJECTIVE:     Vital Signs:  Temp:  [97.9 °F (36.6  °C)]   Pulse:  [57-76]   Resp:  [14-19]   BP: ()/(54-84)   SpO2:  [98 %-100 %]     Physical Exam   Constitutional: She is oriented to person, place, and time. She appears well-developed and well-nourished.   Eyes: EOM are normal.   Neck: Normal range of motion.   Cardiovascular: Normal rate and regular rhythm.   Pulmonary/Chest: Effort normal. No respiratory distress.   Abdominal: Soft. She exhibits no distension. There is no tenderness.   Musculoskeletal: She exhibits edema (non-pitting edema bilaterally).   Neurological: She is alert and oriented to person, place, and time.   Skin: Skin is warm and dry.   Psychiatric: She has a normal mood and affect. Her behavior is normal.     Laboratory:  CBC:   Recent Labs   Lab 07/20/19 1925   WBC 3.14*   RBC 4.13   HGB 13.1   HCT 38.3   *   MCV 93   MCH 31.7*   MCHC 34.2     CMP:   Recent Labs   Lab 07/20/19 1925   GLU 71   CALCIUM 9.3   ALBUMIN 4.4   PROT 8.2      K 4.0   CO2 22*      BUN 18   CREATININE 0.8   ALKPHOS 84   ALT 14   AST 23   BILITOT 0.4       Diagnostic Results:  EXAMINATION:  US LOWER EXTREMITY VEINS BILATERAL    CLINICAL HISTORY:  Personal history of other venous thrombosis and embolism    TECHNIQUE:  Duplex and color flow Doppler and dynamic compression was performed of the bilateral lower extremity veins was performed.    COMPARISON:  Ultrasound lower extremity 03/18/2016    FINDINGS:  Right thigh veins: There is partial, nonocclusive thrombus in the right common femoral vein. There is thrombus in the right greater saphenous vein.  The femoral, popliteal, and deep femoral veins are patent and free of thrombus. The veins are normally compressible and have normal phasic flow and augmentation response.    Right calf veins: The visualized calf veins are patent.    Left thigh veins: There is partial, nonocclusive thrombus in the left common femoral vein, as well as the distal left external iliac vein.  There is thrombus in the left  greater saphenous vein.  The femoral, popliteal, and deep femoral veins are patent and free of thrombus. The veins are normally compressible and have normal phasic flow and augmentation response.    Left calf veins: The visualized calf veins are patent.    Miscellaneous: None      Impression       Partial, nonocclusive thrombus in the right common femoral vein, left common femoral vein, and left external iliac vein.    Superficial thrombophlebitis of the bilateral greater saphenous veins.         ASSESSMENT/PLAN:1)   1) Homozygous factor V Leiden  -Patient has had at least 10 DVTs and 3 PEs. She was on Coumadin for years and stated she came off when she had a dvt despite a therapeutic INR. She was then placed on Xarelto buts states she only took 1 dose and shortly after developed gallbladder pain. When she was evaluated she was told she had gallbladder inflammation.  -From 2015 to 2018 patient was on Eliquis, had DVT in 2016 wile on Eliquis. She self resumed Eliquis last week (from old script) d/t new DVT symptoms, but had been off all anti-coagulation since 2018 prior.    2) Partial, nonocclusive thrombus in the right common femoral vein, left common femoral vein, and left external iliac vein. Superficial thrombophlebitis of the bilateral greater saphenous veins.    3) History of IVC filter (2014)    4) H/o TIA in 2013    5) H/o miscarriage at 11 weeks in 2013    Recommendations  -Recommend Lovenox 1.5mg Qd given patient's reported history of VTE while on Coumadin (therapeutic INR), and while on Eliquis  -Check Xa level 4-6 hours after 4th Lovenox dose  -Recommend IR evaluation to see if IVC filter can be removed. Consider repeat CT chest/abdomen to evaluate, but would discuss with IR what imaging is best.  -Will arrange follow-up in Hematology clinic    The following was staffed and discussed with supervising physician Dr. Yeboah. Please contact Hematology Consult Fellow with any additional questions.    Earnestine Garcia  MD  Hematology/Oncology fellow

## 2019-07-21 NOTE — ED PROVIDER NOTES
Encounter Date: 7/20/2019    SCRIBE #1 NOTE: I, Emma Contreras, am scribing for, and in the presence of,  Dr. Lyons. I have scribed the following portions of the note - the Resident attestation.       History     Chief Complaint   Patient presents with    Leg Pain     Patient reports right leg pain. Reports swelling, discoloration, and pain in leg. States that she is concerned for possible blood clot. States that she has history of blood closts.      HPI   28 y/o female presents to the ED with C.C of RLE pain and swelling. Pt states that she has noticed the pain over the last week however it is getting more severe and is consistent with Pt she has had when she had DVT. She denies any SOB, hemoptysis or chest pain.   Review of patient's allergies indicates:   Allergen Reactions    Azithromycin Hives    Toradol [ketorolac] Hives     Itching and hives    Xarelto [rivaroxaban]      Gallbladder swelling and disfunction     Past Medical History:   Diagnosis Date    DVT (deep venous thrombosis)     Factor V Leiden     Pulmonary embolus     Recurrent upper respiratory infection (URI)     TIA (transient ischemic attack)      Past Surgical History:   Procedure Laterality Date    COLONOSCOPY N/A 12/18/2015    Performed by Lefty Lee MD at [facility] (4TH FLR)    IVC FILTER RETRIEVAL      tumor from breast      left    WISDOM TOOTH EXTRACTION       Family History   Problem Relation Age of Onset    Allergies Mother         azithromycin     Social History     Tobacco Use    Smoking status: Never Smoker   Substance Use Topics    Alcohol use: No    Drug use: No     Review of Systems   Constitutional: Negative for fever.   HENT: Negative for sore throat.    Respiratory: Negative for cough, shortness of breath and stridor.    Cardiovascular: Negative for chest pain.   Gastrointestinal: Negative for nausea.   Genitourinary: Negative for dysuria.   Musculoskeletal: Negative for back pain.   Skin: Positive for color  change. Negative for rash.   Neurological: Negative for weakness and headaches.   Hematological: Does not bruise/bleed easily.       Physical Exam     Initial Vitals [07/20/19 1903]   BP Pulse Resp Temp SpO2   122/81 81 18 98 °F (36.7 °C) 100 %      MAP       --         Physical Exam    Nursing note and vitals reviewed.  Constitutional: She appears well-developed and well-nourished. No distress.   HENT:   Head: Normocephalic and atraumatic.   Mouth/Throat: Oropharynx is clear and moist. No oropharyngeal exudate.   Eyes: Conjunctivae and EOM are normal. Pupils are equal, round, and reactive to light.   Neck: Normal range of motion. Neck supple. No tracheal deviation present. No JVD present.   Cardiovascular: Normal rate, regular rhythm, normal heart sounds and intact distal pulses.   Pulmonary/Chest: Breath sounds normal. No stridor. No respiratory distress. She has no wheezes. She has no rales. She exhibits no tenderness.   Abdominal: Soft. Bowel sounds are normal. She exhibits no distension. There is no tenderness. There is no rebound and no guarding.   Musculoskeletal: Normal range of motion. She exhibits edema (b/l lower extremities). She exhibits no tenderness.   Neurological: She is alert and oriented to person, place, and time. She has normal strength. No cranial nerve deficit.   Skin: Skin is warm and dry.         ED Course   Procedures  Labs Reviewed   POCT URINE PREGNANCY - Abnormal; Notable for the following components:       Result Value    POC Preg Test, Ur Negative (*)     All other components within normal limits   CBC W/ AUTO DIFFERENTIAL   COMPREHENSIVE METABOLIC PANEL   PROTIME-INR   APTT   URINALYSIS          Imaging Results    None          Medical Decision Making:   History:   Old Records Summarized: records from previous admission(s).  Triage vitals: temp 98, P 81, RR 18, /81, Spo2 100%.   Physical exam findings:b/l lower extremity swelling and redness.   DDx: DVT, cellulitis, muscle  "strain, dependent edema.   Workup: CBC, CMP, UPT, aPTT, INR and DVT US  Dispo pending work up.    Tiago Feliz MD PGY2  "7/20/2019" 8:09PM    Update:  CBC and CMP showed no acute abnormalities. DVT showed b/l femoral DVTs. IM agreed with admission. Pt VSS.     Tiago Feliz MD PGY2  "7/21/2019" 3:00AM                 Scribe Attestation:   Scribe #1: I performed the above scribed service and the documentation accurately describes the services I performed. I attest to the accuracy of the note.    Attending Attestation:   Physician Attestation Statement for Resident:  As the supervising MD   Physician Attestation Statement: I have personally seen and examined this patient.   I agree with the above history. -: Right thigh pain. Hx of Factor V Leiden mutation. Multiple DVTs in the past. Pain feels similar to DVT in the past. Could also be a thigh strain. Will conduct an ultra sound and obtain lab work.   As the supervising MD I agree with the above PE.    As the supervising MD I agree with the above treatment, course, plan, and disposition.                       Clinical Impression:       ICD-10-CM ICD-9-CM   1. History of DVT (deep vein thrombosis) Z86.718 V12.51                                Tiago Feliz MD  Resident  07/21/19 0543    "

## 2019-07-21 NOTE — ASSESSMENT & PLAN NOTE
I discussed with patient that the DOACs are not approved for use in congenital coagulopathies and no studies exist on their efficacy, but they are used off-label due to their ease of use, and occasionally result in treatment failures.  Given her history of multiple recurrent thrombi on anticoagulation her best options for success will likely be either coumadin or lovenox, each with its pros and cons which we briefly discussed and she was already largely familiar with.  She opted to go back to coumadin largely based on the cost of Lovenox.  Since she has an IVC filter she doesn't need to necessarily be on bridging therapy with Lovenox until at goal INR, but I suggested probably giving her a few days of it just to get things going.  I ordered 1.5mg/kg/day which can be dispensed for 5 days after discharge or so.  She will need referral to Coumadin Clinic for INR monitoring and dose titration.  I also discussed having a referral to Hematology put in for her at discharge for long-term f/u of her anticoagulation and she is pleased with this.  Coumadin started at 5mg daily; first doses of this and the lovenox given early this am.

## 2019-07-21 NOTE — H&P
[facility] Hospital Medicine  History & Physical    Patient Name: Antoinette Love  MRN: [id number]  Admission Date: 7/20/2019  Attending Physician: Ruiz Linder MD   Primary Care Provider: Alberto Holguin MD    [facility] Medicine Team: [facility] HOSP MED X Adriel Pedraza MD     Patient information was obtained from patient, past medical records and ER records.     Subjective:     Principal Problem:Acute deep vein thrombosis (DVT) of femoral vein    Chief Complaint:   Chief Complaint   Patient presents with    Leg Pain     Patient reports right leg pain. Reports swelling, discoloration, and pain in leg. States that she is concerned for possible blood clot. States that she has history of blood closts.         HPI: 29F with Factor V Leiden and recurrent DVTs despite various anticoagulants presents c/o pain and swelling in BLE.  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  She has an IVC filter.  She has apparently had clots on coumadin, Lovenox, and did not tolerate Xarelto, which she claims gave her biliary colic.  Since moving here she has not established with a hematologist.  Her present symptoms began about 4 days ago and have progressively worsened to the point that they have become unbearable.  The pain is worse in the inguinal area and is burning in quality, worsened by movement.  She denies any violaceous discoloration of the lower extremities but does have a bruise on her left knee from bumping against a surface a few days ago.  She does note a petechial-appearing rash on her forelegs which is new.  She reports compliance with her Eliquis and her last dose was Friday evening.    Past Medical History:   Diagnosis Date    DVT (deep venous thrombosis)     Factor V Leiden     Pulmonary embolus     Recurrent upper respiratory infection (URI)     TIA (transient ischemic attack)        Past Surgical  History:   Procedure Laterality Date    COLONOSCOPY N/A 12/18/2015    Performed by Lefty Lee MD at [facility] (4TH FLR)    IVC FILTER RETRIEVAL      tumor from breast      left    WISDOM TOOTH EXTRACTION         Review of patient's allergies indicates:   Allergen Reactions    Azithromycin Hives    Toradol [ketorolac] Hives     Itching and hives    Xarelto [rivaroxaban]      Gallbladder swelling and disfunction       No current facility-administered medications on file prior to encounter.      Current Outpatient Medications on File Prior to Encounter   Medication Sig    cetirizine (ZYRTEC) 10 MG tablet Take 2 tablets (20 mg total) by mouth 2 (two) times daily. For chronic urticaria    EPINEPHrine (EPIPEN 2-VAN) 0.3 mg/0.3 mL AtIn Inject 0.3 mLs (0.3 mg total) into the muscle once. for 1 dose    HYDROcodone-acetaminophen (NORCO) 5-325 mg per tablet Take 1 tablet by mouth every 6 (six) hours as needed.    m-vit,tx,iron,mins/calc/folic (MULTIVITAMIN) Tab     ranitidine (ZANTAC) 150 MG tablet Take 1 tablet (150 mg total) by mouth 2 (two) times daily.     Family History     Problem Relation (Age of Onset)    Allergies Mother        Tobacco Use    Smoking status: Never Smoker   Substance and Sexual Activity    Alcohol use: No    Drug use: No    Sexual activity: Not on file     Review of Systems   Constitutional: Negative for fatigue and fever.   HENT: Negative for congestion and nosebleeds.    Eyes: Negative for photophobia and visual disturbance.   Respiratory: Negative for cough and shortness of breath.    Cardiovascular: Positive for leg swelling. Negative for chest pain.   Gastrointestinal: Negative for abdominal pain, nausea and vomiting.   Endocrine: Negative for polydipsia and polyuria.   Genitourinary: Negative for dysuria and hematuria.   Musculoskeletal: Negative for arthralgias and myalgias.   Skin: Positive for color change and rash.   Neurological: Negative for dizziness, syncope and  light-headedness.   Hematological: Bruises/bleeds easily.     Objective:     Vital Signs (Most Recent):  Temp: 97.9 °F (36.6 °C) (07/21/19 0537)  Pulse: 61 (07/21/19 0537)  Resp: 18 (07/21/19 0537)  BP: (!) 108/56 (07/21/19 0537)  SpO2: 100 % (07/21/19 0537) Vital Signs (24h Range):  Temp:  [97.9 °F (36.6 °C)-98 °F (36.7 °C)] 97.9 °F (36.6 °C)  Pulse:  [57-81] 61  Resp:  [10-19] 18  SpO2:  [98 %-100 %] 100 %  BP: ()/(54-84) 108/56     Weight: 78.6 kg (173 lb 4.5 oz)  Body mass index is 28.84 kg/m².    Physical Exam   Constitutional: She is oriented to person, place, and time. She appears well-developed and well-nourished.   Appears moderately uncomfortable   HENT:   Head: Normocephalic and atraumatic.   Mouth/Throat: Oropharynx is clear and moist.   Eyes: Pupils are equal, round, and reactive to light. Conjunctivae and EOM are normal. No scleral icterus.   Neck: Normal range of motion. Neck supple. No JVD present.   Cardiovascular: Normal rate, regular rhythm, normal heart sounds and intact distal pulses. Exam reveals no gallop and no friction rub.   No murmur heard.  Pulmonary/Chest: Effort normal and breath sounds normal.   Abdominal: Soft. Bowel sounds are normal. She exhibits no distension. There is no tenderness. There is no guarding.   Musculoskeletal: She exhibits edema (moderate BLE nonpitting edema) and tenderness.   Linda's sign positive   Lymphadenopathy:     She has no cervical adenopathy.   Neurological: She is alert and oriented to person, place, and time. No cranial nerve deficit.   Skin: Skin is warm and dry. Rash noted.   Petechial rash on bilateral forelegs suggestive of razor folliculitis, more apparent with venous congestion.  No phlegmasia cerulea dolens.   Nursing note and vitals reviewed.        CRANIAL NERVES     CN III, IV, VI   Pupils are equal, round, and reactive to light.  Extraocular motions are normal.        Significant Labs:   CBC:   Recent Labs   Lab 07/20/19  1925   WBC 3.14*    HGB 13.1   HCT 38.3   *     CMP:   Recent Labs   Lab 07/20/19 1925      K 4.0      CO2 22*   GLU 71   BUN 18   CREATININE 0.8   CALCIUM 9.3   PROT 8.2   ALBUMIN 4.4   BILITOT 0.4   ALKPHOS 84   AST 23   ALT 14   ANIONGAP 12   EGFRNONAA >60.0     Coagulation:   Recent Labs   Lab 07/20/19 1925   INR 0.9   APTT 24.2       Significant Imaging: Right thigh veins: There is partial, nonocclusive thrombus in the right common femoral vein. There is thrombus in the right greater saphenous vein.  The femoral, popliteal, and deep femoral veins are patent and free of thrombus. The veins are normally compressible and have normal phasic flow and augmentation response.    Assessment/Plan:     * Acute deep vein thrombosis (DVT) of femoral vein  I discussed with patient that the DOACs are not approved for use in congenital coagulopathies and no studies exist on their efficacy, but they are used off-label due to their ease of use, and occasionally result in treatment failures.  Given her history of multiple recurrent thrombi on anticoagulation her best options for success will likely be either coumadin or lovenox, each with its pros and cons which we briefly discussed and she was already largely familiar with.  She opted to go back to coumadin largely based on the cost of Lovenox.  Since she has an IVC filter she doesn't need to necessarily be on bridging therapy with Lovenox until at goal INR, but I suggested probably giving her a few days of it just to get things going.  I ordered 1.5mg/kg/day which can be dispensed for 5 days after discharge or so.  She will need referral to Coumadin Clinic for INR monitoring and dose titration.  I also discussed having a referral to Hematology put in for her at discharge for long-term f/u of her anticoagulation and she is pleased with this.  Coumadin started at 5mg daily; first doses of this and the lovenox given early this am.      Factor V Leiden  mutation  Hematology/Oncology referral at discharge.        VTE Risk Mitigation (From admission, onward)        Ordered     enoxaparin injection 120 mg  Every 24 hours (non-standard times)      07/21/19 0218     warfarin (COUMADIN) tablet 5 mg  Daily      07/21/19 0218     IP VTE HIGH RISK PATIENT  Once      07/21/19 0218     Reason for No Pharmacological VTE Prophylaxis  Once      07/21/19 0218             Adriel Pedraza MD  Department of Hospital Medicine   [facility]

## 2019-07-21 NOTE — PLAN OF CARE
Problem: Adult Inpatient Plan of Care  Goal: Plan of Care Review  Plan of care reviewed with patient and family.  FAll precautions maintained, side rails up x2, call light in reach, bed in low position and locked, nonskid socks on.  Patient requesting pain meds during the shift and getting some relief.  Patient complains of cough and getting guaifenesin  2 times daily.  Friend at the bedside.

## 2019-07-21 NOTE — ED NOTES
Patient states she would like medicine for her pain. MD notified. Bed placed in low/locked position, side rails up x 2, call light within reach, plan of care provided to patient/family, alarms set and turned on for monitoring.  Awaiting additional orders and/or results; will continue to monitor.

## 2019-07-21 NOTE — SUBJECTIVE & OBJECTIVE
Past Medical History:   Diagnosis Date    DVT (deep venous thrombosis)     Factor V Leiden     Pulmonary embolus     Recurrent upper respiratory infection (URI)     TIA (transient ischemic attack)        Past Surgical History:   Procedure Laterality Date    COLONOSCOPY N/A 12/18/2015    Performed by [facility] at [facility] (4TH FLR)    IVC FILTER RETRIEVAL      tumor from breast      left    WISDOM TOOTH EXTRACTION         Review of patient's allergies indicates:   Allergen Reactions    Azithromycin Hives    Toradol [ketorolac] Hives     Itching and hives    Xarelto [rivaroxaban]      Gallbladder swelling and disfunction       No current facility-administered medications on file prior to encounter.      Current Outpatient Medications on File Prior to Encounter   Medication Sig    cetirizine (ZYRTEC) 10 MG tablet Take 2 tablets (20 mg total) by mouth 2 (two) times daily. For chronic urticaria    EPINEPHrine (EPIPEN 2-VAN) 0.3 mg/0.3 mL AtIn Inject 0.3 mLs (0.3 mg total) into the muscle once. for 1 dose    HYDROcodone-acetaminophen (NORCO) 5-325 mg per tablet Take 1 tablet by mouth every 6 (six) hours as needed.    m-vit,tx,iron,mins/calc/folic (MULTIVITAMIN) Tab     ranitidine (ZANTAC) 150 MG tablet Take 1 tablet (150 mg total) by mouth 2 (two) times daily.     Family History     Problem Relation (Age of Onset)    Allergies Mother        Tobacco Use    Smoking status: Never Smoker   Substance and Sexual Activity    Alcohol use: No    Drug use: No    Sexual activity: Not on file     Review of Systems   Constitutional: Negative for fatigue and fever.   HENT: Negative for congestion and nosebleeds.    Eyes: Negative for photophobia and visual disturbance.   Respiratory: Negative for cough and shortness of breath.    Cardiovascular: Positive for leg swelling. Negative for chest pain.   Gastrointestinal: Negative for abdominal pain, nausea and vomiting.   Endocrine: Negative for polydipsia and  polyuria.   Genitourinary: Negative for dysuria and hematuria.   Musculoskeletal: Negative for arthralgias and myalgias.   Skin: Positive for color change and rash.   Neurological: Negative for dizziness, syncope and light-headedness.   Hematological: Bruises/bleeds easily.     Objective:     Vital Signs (Most Recent):  Temp: 97.9 °F (36.6 °C) (07/21/19 0537)  Pulse: 61 (07/21/19 0537)  Resp: 18 (07/21/19 0537)  BP: (!) 108/56 (07/21/19 0537)  SpO2: 100 % (07/21/19 0537) Vital Signs (24h Range):  Temp:  [97.9 °F (36.6 °C)-98 °F (36.7 °C)] 97.9 °F (36.6 °C)  Pulse:  [57-81] 61  Resp:  [10-19] 18  SpO2:  [98 %-100 %] 100 %  BP: ()/(54-84) 108/56     Weight: 78.6 kg (173 lb 4.5 oz)  Body mass index is 28.84 kg/m².    Physical Exam   Constitutional: She is oriented to person, place, and time. She appears well-developed and well-nourished.   Appears moderately uncomfortable   HENT:   Head: Normocephalic and atraumatic.   Mouth/Throat: Oropharynx is clear and moist.   Eyes: Pupils are equal, round, and reactive to light. Conjunctivae and EOM are normal. No scleral icterus.   Neck: Normal range of motion. Neck supple. No JVD present.   Cardiovascular: Normal rate, regular rhythm, normal heart sounds and intact distal pulses. Exam reveals no gallop and no friction rub.   No murmur heard.  Pulmonary/Chest: Effort normal and breath sounds normal.   Abdominal: Soft. Bowel sounds are normal. She exhibits no distension. There is no tenderness. There is no guarding.   Musculoskeletal: She exhibits edema (moderate BLE nonpitting edema) and tenderness.   Linda's sign positive   Lymphadenopathy:     She has no cervical adenopathy.   Neurological: She is alert and oriented to person, place, and time. No cranial nerve deficit.   Skin: Skin is warm and dry. Rash noted.   Petechial rash on bilateral forelegs suggestive of razor folliculitis, more apparent with venous congestion.  No phlegmasia cerulea dolens.   Nursing note and  vitals reviewed.        CRANIAL NERVES     CN III, IV, VI   Pupils are equal, round, and reactive to light.  Extraocular motions are normal.        Significant Labs:   CBC:   Recent Labs   Lab 07/20/19 1925   WBC 3.14*   HGB 13.1   HCT 38.3   *     CMP:   Recent Labs   Lab 07/20/19 1925      K 4.0      CO2 22*   GLU 71   BUN 18   CREATININE 0.8   CALCIUM 9.3   PROT 8.2   ALBUMIN 4.4   BILITOT 0.4   ALKPHOS 84   AST 23   ALT 14   ANIONGAP 12   EGFRNONAA >60.0     Coagulation:   Recent Labs   Lab 07/20/19 1925   INR 0.9   APTT 24.2       Significant Imaging: Right thigh veins: There is partial, nonocclusive thrombus in the right common femoral vein. There is thrombus in the right greater saphenous vein.  The femoral, popliteal, and deep femoral veins are patent and free of thrombus. The veins are normally compressible and have normal phasic flow and augmentation response.

## 2019-07-21 NOTE — NURSING
Patient is discharged home, all discharge orders given, verbalized understanding of all discharge orders.  Road Test:  O;  No oxygen in use pulse ox 100%, A;  Ambulates without difficulty, D:  Left hand iv discontinued, T;  Last bowel movement 7/20/19, voiding without difficulty, E:  Tolerating a regular diet without difficulty, S:  Need minimal assistance with adl's , T:  Lovenox teaching done, patient verbalized understanding of how to administer the injections.

## 2019-07-22 NOTE — DISCHARGE SUMMARY
Discharge Summary  Hospital Medicine    Patient Name: Antoinette Love  MRN:  [id number]  Hospital Medicine Team: [facility] Ruiz Linder MD  Date of Admission:  7/20/2019     Date of Discharge:  07/22/2019  Length of Stay:  LOS: 0 days   Principal Problem:  Acute deep vein thrombosis (DVT) of femoral vein     Active Hospital Problems    Diagnosis  POA    *Acute deep vein thrombosis (DVT) of femoral vein [I82.419]  Yes    Factor V Leiden mutation [D68.51]  Yes      Resolved Hospital Problems   No resolved problems to display.       History of Present Illness:      29F with Factor V Leiden and recurrent DVTs despite various anticoagulants presents c/o pain and swelling in BLE.  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  She has an IVC filter.  She has apparently had clots on coumadin, Lovenox, and did not tolerate Xarelto, which she claims gave her biliary colic.  Since moving here she has not established with a hematologist.  Her present symptoms began about 4 days ago and have progressively worsened to the point that they have become unbearable.  The pain is worse in the inguinal area and is burning in quality, worsened by movement.  She denies any violaceous discoloration of the lower extremities but does have a bruise on her left knee from bumping against a surface a few days ago.  She does note a petechial-appearing rash on her forelegs which is new.  She reports compliance with her Eliquis and her last dose was Friday evening.      Hospital Course of Principle Problem Addressed:       Acute deep vein thrombosis (DVT) of femoral vein  H/o IVC filter placement  B/L DVTs; started on lovenox BID and coumadin; heme/onc was consulted and suggest just lovenox 1.5 mg/kg daily; patient to follow up with them as outpatient and needs to work on removing IVC filter at some point        Factor V Leiden mutation  Hematology/Oncology  referral at discharge.          Other Medical Problems Addressed in the Hospital:         Significant Diagnostic Tests/Imaging:     Recent Labs   Lab 07/20/19 1925   WBC 3.14*   HGB 13.1   HCT 38.3   *     Recent Labs   Lab 07/20/19 1925      K 4.0      CO2 22*   BUN 18   CREATININE 0.8   CALCIUM 9.3   PROT 8.2   BILITOT 0.4   ALKPHOS 84   ALT 14   AST 23         Special Treatments/Procedures:         Discharge Medications:      Discharge Medication List as of 7/21/2019  4:21 PM      START taking these medications    Details   enoxaparin (LOVENOX) 120 mg/0.8 mL Syrg Inject 0.8 mLs (120 mg total) into the skin once daily., Starting Sun 7/21/2019, Until Tue 8/20/2019, Normal         CONTINUE these medications which have CHANGED    Details   HYDROcodone-acetaminophen (NORCO) 5-325 mg per tablet Take 1 tablet by mouth every 6 (six) hours as needed., Starting Sun 7/21/2019, Print         CONTINUE these medications which have NOT CHANGED    Details   cetirizine (ZYRTEC) 10 MG tablet Take 2 tablets (20 mg total) by mouth 2 (two) times daily. For chronic urticaria, Starting u 3/28/2019, Until Fri 3/27/2020, Normal      EPINEPHrine (EPIPEN 2-VAN) 0.3 mg/0.3 mL AtIn Inject 0.3 mLs (0.3 mg total) into the muscle once. for 1 dose, Starting Thu 3/28/2019, Normal      m-vit,tx,iron,mins/calc/folic (MULTIVITAMIN) Tab Historical Med      ranitidine (ZANTAC) 150 MG tablet Take 1 tablet (150 mg total) by mouth 2 (two) times daily., Starting Thu 3/28/2019, Until Fri 3/27/2020, Normal             Discharge Diet:2 gram sodium diet    Activity: activity as tolerated    Discharge Condition: Good    Disposition: Home or Self Care    Time spent on the discharge of the patient including review of hospital course with the patient. reviewing discharge medications and arranging follow-up care 35 minutes.  Patient was seen and examined on the date of discharge and determined to be suitable for discharge.    Future  Appointments   Date Time Provider Department Center   7/24/2019  1:20 PM Alberto Holguin MD [facility]   8/22/2019  3:00 PM Earnestine Garcia MD [facility]       Ruiz Linder MD  Medical Director [facility]  Spectra:  [phone]  Pager: [phone]

## 2019-07-24 ENCOUNTER — OFFICE VISIT (OUTPATIENT)
Dept: INTERNAL MEDICINE | Facility: CLINIC | Age: 29
End: 2019-07-24
Attending: INTERNAL MEDICINE
Payer: COMMERCIAL

## 2019-07-24 VITALS
OXYGEN SATURATION: 99 % | BODY MASS INDEX: 29.2 KG/M2 | HEIGHT: 65 IN | HEART RATE: 100 BPM | SYSTOLIC BLOOD PRESSURE: 114 MMHG | WEIGHT: 175.25 LBS | DIASTOLIC BLOOD PRESSURE: 80 MMHG

## 2019-07-24 DIAGNOSIS — D68.51 FACTOR V LEIDEN MUTATION: ICD-10-CM

## 2019-07-24 DIAGNOSIS — R91.8 MULTIPLE PULMONARY NODULES: ICD-10-CM

## 2019-07-24 DIAGNOSIS — Z00.00 ANNUAL PHYSICAL EXAM: Primary | ICD-10-CM

## 2019-07-24 DIAGNOSIS — I82.402: ICD-10-CM

## 2019-07-24 PROCEDURE — 3008F BODY MASS INDEX DOCD: CPT | Mod: CPTII,S$GLB,, | Performed by: INTERNAL MEDICINE

## 2019-07-24 PROCEDURE — 99214 PR OFFICE/OUTPT VISIT, EST, LEVL IV, 30-39 MIN: ICD-10-PCS | Mod: S$GLB,,, | Performed by: INTERNAL MEDICINE

## 2019-07-24 PROCEDURE — 3008F PR BODY MASS INDEX (BMI) DOCUMENTED: ICD-10-PCS | Mod: CPTII,S$GLB,, | Performed by: INTERNAL MEDICINE

## 2019-07-24 PROCEDURE — 99999 PR PBB SHADOW E&M-EST. PATIENT-LVL III: CPT | Mod: PBBFAC,,, | Performed by: INTERNAL MEDICINE

## 2019-07-24 PROCEDURE — 99999 PR PBB SHADOW E&M-EST. PATIENT-LVL III: ICD-10-PCS | Mod: PBBFAC,,, | Performed by: INTERNAL MEDICINE

## 2019-07-24 PROCEDURE — 99214 OFFICE O/P EST MOD 30 MIN: CPT | Mod: S$GLB,,, | Performed by: INTERNAL MEDICINE

## 2019-07-24 RX ORDER — DICLOFENAC SODIUM 10 MG/G
GEL TOPICAL
COMMUNITY
Start: 2017-05-04 | End: 2019-08-30

## 2019-07-24 NOTE — PROGRESS NOTES
Subjective:       Patient ID: Antoinette Love is a 29 y.o. female.    Chief Complaint: Hospital Follow Up and Deep Vein Thrombosis    Here for hospital f/u    28 yo F with PMHx of factor V Leiden wit recurrent DVT/PE was recently hospitalized with   Recently diagnosed with bilateral DVT of LE. She reports moving back to [place] 01/2019 after moving from [place] to [place]. She states there she was taken off all meds and did not have any problems with recurrent DVT or PE. She reports several year hx of intermittent CP and FERNANDO. Some days she has symptoms and sometimes she does not. This pattern has continued. She was placed on lovenox injection and has been adherent with this. She reports intermittent LE swelling since recent recurrence of DVT. Improves overnight but reports gets three times the size if on her feet a lot. Hx of IVC placed in 2013. She is concerned her filter could be impeding the blood flow contributing to her bilateral femoral clots. She reports the filter continues to cause pain when she bends in certain positions. She is no longer on chronic opiate regimen.           Review of Systems   Constitutional: Positive for activity change. Negative for unexpected weight change.   HENT: Negative for hearing loss, rhinorrhea and trouble swallowing.    Eyes: Negative for discharge and visual disturbance.   Respiratory: Positive for chest tightness. Negative for wheezing.    Cardiovascular: Positive for chest pain. Negative for palpitations.   Gastrointestinal: Negative for blood in stool, constipation, diarrhea and vomiting.   Endocrine: Negative for polydipsia and polyuria.   Genitourinary: Negative for difficulty urinating, dysuria, hematuria and menstrual problem.   Musculoskeletal: Negative for arthralgias, joint swelling and neck pain.   Neurological: Negative for weakness and headaches.   Psychiatric/Behavioral: Negative for confusion and dysphoric mood.       Objective:      Vitals:     "07/24/19 1341   BP: 114/80   Pulse: 100   SpO2: 99%   Weight: 79.5 kg (175 lb 4.3 oz)   Height: 5' 5" (1.651 m)      Physical Exam   Constitutional: She is oriented to person, place, and time. She appears well-developed and well-nourished. No distress.   HENT:   Head: Normocephalic and atraumatic.   Mouth/Throat: Oropharynx is clear and moist. No oropharyngeal exudate.   Eyes: Pupils are equal, round, and reactive to light. Conjunctivae and EOM are normal. No scleral icterus.   Neck: No thyromegaly present.   Cardiovascular: Normal rate, regular rhythm and normal heart sounds.   No murmur heard.  Pulmonary/Chest: Effort normal and breath sounds normal. She has no wheezes. She has no rales.   Abdominal: Soft. She exhibits no distension. There is no tenderness.   Musculoskeletal: She exhibits no edema or tenderness.   Lymphadenopathy:     She has no cervical adenopathy.   Neurological: She is alert and oriented to person, place, and time.   Skin: Skin is warm and dry.   Psychiatric: She has a normal mood and affect. Her behavior is normal.       Assessment:       1. Annual physical exam    2. Recurrent acute deep vein thrombosis of left lower extremity    3. Factor V Leiden mutation    4. Multiple pulmonary nodules        Plan:       Antoinette was seen today for hospital follow up and deep vein thrombosis.    Diagnoses and all orders for this visit:    Annual physical exam  -     Lipid panel; Future    Recurrent acute deep vein thrombosis of left lower extremity   Has f/u with heme. We discussed her potential for PE and her chronic intermittent CP and SOB and her HR of 100 today. Filter in place to prevent PE but discussed low threshold to go to ED     Factor V Leiden mutation   Per above    Multiple pulmonary nodules   Will get old records to see if these were viewed in [place] before we get her scan [place].         Alberto Thornton MD  Internal Medicine-[facility]        Side effects of medication(s) were discussed " in detail and patient voiced understanding.  Patient will call back for any issues or complications.

## 2019-07-30 ENCOUNTER — HOSPITAL ENCOUNTER (OUTPATIENT)
Dept: PULMONOLOGY | Facility: OTHER | Age: 29
Discharge: HOME | End: 2019-07-30
Attending: INTERNAL MEDICINE
Payer: COMMERCIAL

## 2019-07-30 ENCOUNTER — OFFICE VISIT (OUTPATIENT)
Dept: INTERNAL MEDICINE | Facility: CLINIC | Age: 29
End: 2019-07-30
Attending: INTERNAL MEDICINE
Payer: COMMERCIAL

## 2019-07-30 VITALS
HEART RATE: 76 BPM | BODY MASS INDEX: 29.05 KG/M2 | DIASTOLIC BLOOD PRESSURE: 66 MMHG | OXYGEN SATURATION: 99 % | WEIGHT: 174.38 LBS | SYSTOLIC BLOOD PRESSURE: 120 MMHG | HEIGHT: 65 IN

## 2019-07-30 VITALS — BODY MASS INDEX: 28.66 KG/M2 | WEIGHT: 172 LBS | HEIGHT: 65 IN

## 2019-07-30 DIAGNOSIS — R91.8 MULTIPLE PULMONARY NODULES: ICD-10-CM

## 2019-07-30 DIAGNOSIS — I82.402: ICD-10-CM

## 2019-07-30 DIAGNOSIS — R09.02 OXYGEN DESATURATION: ICD-10-CM

## 2019-07-30 DIAGNOSIS — D68.51 FACTOR V LEIDEN MUTATION: ICD-10-CM

## 2019-07-30 DIAGNOSIS — F41.9 ANXIETY: Primary | ICD-10-CM

## 2019-07-30 PROCEDURE — 3008F PR BODY MASS INDEX (BMI) DOCUMENTED: ICD-10-PCS | Mod: CPTII,S$GLB,, | Performed by: INTERNAL MEDICINE

## 2019-07-30 PROCEDURE — 99214 PR OFFICE/OUTPT VISIT, EST, LEVL IV, 30-39 MIN: ICD-10-PCS | Mod: S$GLB,,, | Performed by: INTERNAL MEDICINE

## 2019-07-30 PROCEDURE — 99999 PR PBB SHADOW E&M-EST. PATIENT-LVL IV: CPT | Mod: PBBFAC,,, | Performed by: INTERNAL MEDICINE

## 2019-07-30 PROCEDURE — 3008F BODY MASS INDEX DOCD: CPT | Mod: CPTII,S$GLB,, | Performed by: INTERNAL MEDICINE

## 2019-07-30 PROCEDURE — 99999 PR PBB SHADOW E&M-EST. PATIENT-LVL IV: ICD-10-PCS | Mod: PBBFAC,,, | Performed by: INTERNAL MEDICINE

## 2019-07-30 PROCEDURE — 94618 PULMONARY STRESS TESTING: CPT

## 2019-07-30 PROCEDURE — 99214 OFFICE O/P EST MOD 30 MIN: CPT | Mod: S$GLB,,, | Performed by: INTERNAL MEDICINE

## 2019-07-30 NOTE — PROGRESS NOTES
"Subjective:       Patient ID: Antoinette Love is a 29 y.o. female.    Chief Complaint: Anxiety    Here for follow up    Continued intermittent Cp, chest tightness, SOB, FERNANDO, PND, orthopnea. She reports symptoms are not consistent, can occur at rest and with exertion. She admits to increase stress and anxiety and difficulty sleeping due to breathing symptoms as above as well as excessive worry and ruminating thoughts. She needs a work note to return to work. She is not having and problems with anticoagulation or bleeding.      Review of Systems   Constitutional: Negative for chills, fatigue, fever and unexpected weight change.   HENT: Negative for ear pain, hearing loss, postnasal drip, tinnitus, trouble swallowing and voice change.    Respiratory: Negative for cough, chest tightness, shortness of breath and wheezing.    Cardiovascular: Negative for chest pain, palpitations and leg swelling.   Gastrointestinal: Negative for abdominal pain, blood in stool, diarrhea, nausea and vomiting.   Endocrine: Negative for polydipsia, polyphagia and polyuria.   Genitourinary: Negative for difficulty urinating, dysuria, hematuria and vaginal bleeding.   Skin: Negative for rash.   Allergic/Immunologic: Negative for food allergies.   Neurological: Negative for dizziness, numbness and headaches.   Hematological: Does not bruise/bleed easily.   Psychiatric/Behavioral: The patient is not nervous/anxious.        Objective:      Vitals:    07/30/19 1327   BP: 120/66   Pulse: 76   SpO2: 99%   Weight: 79.1 kg (174 lb 6.1 oz)   Height: 5' 5" (1.651 m)      Physical Exam   Constitutional: She is oriented to person, place, and time. She appears well-developed and well-nourished. No distress.   HENT:   Head: Normocephalic and atraumatic.   Mouth/Throat: Oropharynx is clear and moist. No oropharyngeal exudate.   Eyes: Pupils are equal, round, and reactive to light. Conjunctivae and EOM are normal. No scleral icterus.   Neck: No " thyromegaly present.   Cardiovascular: Normal rate, regular rhythm and normal heart sounds.   No murmur heard.  Pulmonary/Chest: Effort normal and breath sounds normal. She has no wheezes. She has no rales.   Abdominal: Soft. She exhibits no distension. There is no tenderness.   Musculoskeletal: She exhibits no edema or tenderness.   Lymphadenopathy:     She has no cervical adenopathy.   Neurological: She is alert and oriented to person, place, and time.   Skin: Skin is warm and dry.   Psychiatric: She has a normal mood and affect. Her behavior is normal.       Assessment:       1. Anxiety    2. Recurrent acute deep vein thrombosis of left lower extremity    3. Factor V Leiden mutation    4. Multiple pulmonary nodules    5. Oxygen desaturation        Plan:       Antoinette was seen today for anxiety.    Diagnoses and all orders for this visit:    Anxiety  -     Ambulatory Referral to Psychiatry  -     Ambulatory referral to Psychiatry  -     Ambulatory consult to Psychiatry  -     Ambulatory referral to Psychiatry  -     Ambulatory referral to Psychiatry    Recurrent acute deep vein thrombosis of left lower extremity  -     Six Minute Walk Test to qualify for Home Oxygen; Future    Factor V Leiden mutation  -     Six Minute Walk Test to qualify for Home Oxygen; Future    Multiple pulmonary nodules   She walked 600-700 ft in office with increase in pulse from resting HR 65 to 103 with desaturation from 98% to 82%. She is scheduled for formal 6 minute walk this afternoon. Will get oxygen, needs FMLA, and pulmonary f/u  -     Six Minute Walk Test to qualify for Home Oxygen; Future           Alberto Thornton MD  Internal Medicine-[facility]        Side effects of medication(s) were discussed in detail and patient voiced understanding.  Patient will call back for any issues or complications.

## 2019-07-31 ENCOUNTER — TELEPHONE (OUTPATIENT)
Dept: INTERNAL MEDICINE | Facility: CLINIC | Age: 29
End: 2019-07-31

## 2019-07-31 ENCOUNTER — PATIENT MESSAGE (OUTPATIENT)
Dept: INTERNAL MEDICINE | Facility: CLINIC | Age: 29
End: 2019-07-31

## 2019-07-31 RX ORDER — DIAZEPAM 5 MG/1
5 TABLET ORAL EVERY 12 HOURS PRN
Qty: 30 TABLET | Refills: 0 | Status: SHIPPED | OUTPATIENT
Start: 2019-07-31 | End: 2019-09-20 | Stop reason: SDUPTHER

## 2019-08-06 ENCOUNTER — PATIENT OUTREACH (OUTPATIENT)
Dept: ADMINISTRATIVE | Facility: OTHER | Age: 29
End: 2019-08-06

## 2019-08-08 ENCOUNTER — OFFICE VISIT (OUTPATIENT)
Dept: PULMONOLOGY | Facility: CLINIC | Age: 29
End: 2019-08-08
Payer: COMMERCIAL

## 2019-08-08 VITALS
TEMPERATURE: 98 F | HEART RATE: 72 BPM | OXYGEN SATURATION: 100 % | DIASTOLIC BLOOD PRESSURE: 82 MMHG | SYSTOLIC BLOOD PRESSURE: 116 MMHG | WEIGHT: 178.13 LBS | HEIGHT: 65 IN | BODY MASS INDEX: 29.68 KG/M2

## 2019-08-08 DIAGNOSIS — L50.8 RECURRENT URTICARIA: ICD-10-CM

## 2019-08-08 DIAGNOSIS — D68.51 FACTOR V LEIDEN MUTATION: ICD-10-CM

## 2019-08-08 DIAGNOSIS — R91.1 PULMONARY NODULE: ICD-10-CM

## 2019-08-08 DIAGNOSIS — J96.11 CHRONIC RESPIRATORY FAILURE WITH HYPOXIA: ICD-10-CM

## 2019-08-08 DIAGNOSIS — I82.409 RECURRENT DEEP VEIN THROMBOSIS (DVT): ICD-10-CM

## 2019-08-08 DIAGNOSIS — J45.30 MILD PERSISTENT REACTIVE AIRWAY DISEASE WITHOUT COMPLICATION: ICD-10-CM

## 2019-08-08 DIAGNOSIS — R06.09 DYSPNEA ON EXERTION: Primary | ICD-10-CM

## 2019-08-08 DIAGNOSIS — Z86.711 HISTORY OF PULMONARY EMBOLISM: ICD-10-CM

## 2019-08-08 PROCEDURE — 3008F BODY MASS INDEX DOCD: CPT | Mod: CPTII,S$GLB,, | Performed by: NURSE PRACTITIONER

## 2019-08-08 PROCEDURE — 99999 PR PBB SHADOW E&M-EST. PATIENT-LVL III: CPT | Mod: PBBFAC,,, | Performed by: NURSE PRACTITIONER

## 2019-08-08 PROCEDURE — 99203 OFFICE O/P NEW LOW 30 MIN: CPT | Mod: S$GLB,,, | Performed by: NURSE PRACTITIONER

## 2019-08-08 PROCEDURE — 99999 PR PBB SHADOW E&M-EST. PATIENT-LVL III: ICD-10-PCS | Mod: PBBFAC,,, | Performed by: NURSE PRACTITIONER

## 2019-08-08 PROCEDURE — 99203 PR OFFICE/OUTPT VISIT, NEW, LEVL III, 30-44 MIN: ICD-10-PCS | Mod: S$GLB,,, | Performed by: NURSE PRACTITIONER

## 2019-08-08 PROCEDURE — 3008F PR BODY MASS INDEX (BMI) DOCUMENTED: ICD-10-PCS | Mod: CPTII,S$GLB,, | Performed by: NURSE PRACTITIONER

## 2019-08-08 RX ORDER — MONTELUKAST SODIUM 10 MG/1
10 TABLET ORAL NIGHTLY
Qty: 90 TABLET | Refills: 1 | Status: SHIPPED | OUTPATIENT
Start: 2019-08-08 | End: 2019-09-07

## 2019-08-08 RX ORDER — ALBUTEROL SULFATE 90 UG/1
2 AEROSOL, METERED RESPIRATORY (INHALATION) EVERY 6 HOURS PRN
Qty: 18 G | Refills: 0 | Status: SHIPPED | OUTPATIENT
Start: 2019-08-08 | End: 2019-09-06 | Stop reason: SDUPTHER

## 2019-08-08 NOTE — LETTER
August 9, 2019      Alberto Holguin MD  [address]  [address]  [facility] [address]           [facility]  [address] [facility] [address]  [place] [address]  Phone: [phone]  Fax: [phone]          Patient: Antoinette Love   MR Number: [id number]   YOB: 1990   Date of Visit: 8/8/2019       Dear Dr. Alberto Holguin:    Thank you for referring Antoinette Love to me for evaluation. Attached you will find relevant portions of my assessment and plan of care.    If you have questions, please do not hesitate to call me. I look forward to following Antoinette Love along with you.    Sincerely,    Magi Blood, NP    Enclosure  CC:  No Recipients    If you would like to receive this communication electronically, please contact [email or web address] or [phone] to request more information on iris[facility] Link access.    For providers and/or their staff who would like to refer a patient to [facility], please contact us through our one-stop-shop provider referral line, [facility] [place], at [phone].    If you feel you have received this communication in error or would no longer like to receive these types of communications, please e-mail [email or web address]

## 2019-08-08 NOTE — PROGRESS NOTES
Antoinette Love  was seen as a new patient at the request of Alberto Amaya MD for the evaluation of  Dyspnea on exertion.    CHIEF COMPLAINT:    Chief Complaint   Patient presents with    Shortness of Breath       HISTORY OF PRESENT ILLNESS: Antoinette Love is a 29 y.o. female with Factor V Leiden deficiency, recurrent DVT's and PE's since age 20 yo following ankle fracture s/p IVC filter March 2013 with recurrent DVT after discontinuation of her anticoagulant  is here for pulmonary evaluation. Patient with symptoms of dyspnea with findings of hypoxia on exertion SpO2 80s. She has not yet received her oxygen. She is using albuterol about once daily which helps relieve dyspnea. No history of childhood asthma. Has pet dogs. No wheezing, usually does not have a cough, no reflux. Hx recurrent urticaria, dermatographia.     Past treatments: coumadin, xarelto and eliquis, now currently on enoxaparin plan for life      PAST MEDICAL HISTORY:    Active Ambulatory Problems     Diagnosis Date Noted    Factor V Leiden mutation 03/18/2016    Recurrent acute deep vein thrombosis of left lower extremity 03/18/2016    Multiple pulmonary nodules 03/22/2016    Acute deep vein thrombosis (DVT) of femoral vein 07/21/2019     Resolved Ambulatory Problems     Diagnosis Date Noted    BRBPR (bright red blood per rectum) 12/18/2015     Past Medical History:   Diagnosis Date    DVT (deep venous thrombosis)     Factor V Leiden     Pulmonary embolus     Recurrent upper respiratory infection (URI)     TIA (transient ischemic attack)                 PAST SURGICAL HISTORY:    Past Surgical History:   Procedure Laterality Date    COLONOSCOPY N/A 12/18/2015    Performed by [facility]ef[facility] at [facility] (4[facility])    IVC FILTER RETRIEVAL      tumor from breast      left    WISDOM TOOTH EXTRACTION           FAMILY HISTORY:                Family History   Problem Relation Age of Onset    Allergies Mother          azithromycin       SOCIAL HISTORY:          Tobacco:   Social History     Tobacco Use   Smoking Status Never Smoker       alcohol use:    Social History     Substance and Sexual Activity   Alcohol Use No    Frequency: Monthly or less    Drinks per session: 3 or 4    Binge frequency: Never               Occupation: works telemetry [employer]    ALLERGIES:    Review of patient's allergies indicates:   Allergen Reactions    Azithromycin Hives    Toradol [ketorolac] Hives     Itching and hives    Xarelto [rivaroxaban]      Gallbladder swelling and disfunction       CURRENT MEDICATIONS:    Current Outpatient Medications   Medication Sig Dispense Refill    cetirizine (ZYRTEC) 10 MG tablet Take 2 tablets (20 mg total) by mouth 2 (two) times daily. For chronic urticaria 120 tablet 6    diazePAM (VALIUM) 5 MG tablet Take 1 tablet (5 mg total) by mouth every 12 (twelve) hours as needed for Anxiety. 30 tablet 0    diclofenac sodium (VOLTAREN) 1 % Gel Apply topically.      enoxaparin (LOVENOX) 120 mg/0.8 mL Syrg Inject 0.8 mLs (120 mg total) into the skin once daily. 24 mL 3    m-vit,tx,iron,mins/calc/folic (MULTIVITAMIN) Tab       albuterol (VENTOLIN HFA) 90 mcg/actuation inhaler Inhale 2 puffs into the lungs every 6 (six) hours as needed for Wheezing. Rescue 18 g 0    EPINEPHrine (EPIPEN 2-VAN) 0.3 mg/0.3 mL AtIn Inject 0.3 mLs (0.3 mg total) into the muscle once. for 1 dose 2 Device 2    montelukast (SINGULAIR) 10 mg tablet Take 1 tablet (10 mg total) by mouth every evening. 90 tablet 1     No current facility-administered medications for this visit.                   REVIEW OF SYSTEMS:   Review of Systems   Constitutional: Negative for fever and chills.   HENT: Positive for postnasal drip (x 1 week) and congestion (x 1 week).    Respiratory: Positive for cough (recently within a week associate with nasal congestion and PND, no chronic cough), chest tightness (albuterol helps) and dyspnea on extertion. Negative  "for wheezing.    Cardiovascular: Positive for chest pain ("sore", has had workup, appears chest wall, improve with applying pressure to area started after placement IVC filter), palpitations (when dyspneic) and leg swelling (ankle swell after standing prolong time per pt).   Musculoskeletal: Negative for gait problem.   Skin:        Recurrent hives, dermatographia   Gastrointestinal: Negative for acid reflux.   Neurological: Negative for dizziness and headaches.        PHYSICAL EXAM:  Vitals:    08/08/19 0918   BP: 116/82   Pulse: 72   Temp: 97.9 °F (36.6 °C)   SpO2: 100%   Weight: 80.8 kg (178 lb 2.1 oz)   Height: 5' 5" (1.651 m)   PainSc:   5   PainLoc: Chest     Body mass index is 29.64 kg/m².   Physical Exam   Constitutional: She is oriented to person, place, and time. She appears well-developed. No distress.   HENT:   Head: Normocephalic.   Mouth/Throat: Oropharynx is clear and moist. Mallampati Score: I.   Nasal congestion   Neck: Neck supple.   Cardiovascular: Normal rate, regular rhythm and normal heart sounds.   Pulmonary/Chest: Normal expansion, effort normal and breath sounds normal.   Chest wall mid-sternal pain relieved with applying pressure   Abdominal: Soft. There is no tenderness.   Reports history spleenomegally?    Musculoskeletal: She exhibits no edema.   Neurological: She is alert and oriented to person, place, and time. Gait normal.   Skin: Skin is warm. She is not diaphoretic.   Dermatographia, recurrent hives per patient, has been seen by allergy   Psychiatric: She has a normal mood and affect. Her behavior is normal.          DATA :    PFT: NA    VQ scan 4/27/2016: no PE    CXR:none available since 2015    CT chest 3/17/2016:1.  No evidence for pulmonary embolism or other acute CT findings in the chest to explain this patient's symptoms.  2.  Stable pleural based nodule in the lateral basal segment of the left lower lobe, for which a single 1 year followup is recommended.  3. "  Splenomegaly.      ECHO: NA    Walk test 7/30/2019: 100%/85%/99% 647 m    Labs:  Eos:0  IgE:NA  Lab Results   Component Value Date    TSH 1.185 03/28/2019     Lab Results   Component Value Date    WBC 3.14 (L) 07/20/2019    HGB 13.1 07/20/2019    HCT 38.3 07/20/2019    MCV 93 07/20/2019     (L) 07/20/2019     BMP  Lab Results   Component Value Date     07/20/2019    K 4.0 07/20/2019     07/20/2019    CO2 22 (L) 07/20/2019    BUN 18 07/20/2019    CREATININE 0.8 07/20/2019    CALCIUM 9.3 07/20/2019    ANIONGAP 12 07/20/2019    ESTGFRAFRICA >60.0 07/20/2019    EGFRNONAA >60.0 07/20/2019       ASSESSMENT    ICD-10-CM ICD-9-CM    1. Dyspnea on exertion R06.09 786.09 Transthoracic echo (TTE) 2D with Color Flow      Complete PFT with bronchodilator      IgE      Six Minute Walk Test to qualify for Home Oxygen   2. Factor V Leiden mutation D68.51 289.81    3. Pulmonary nodule R91.1 793.11    4. Recurrent deep vein thrombosis (DVT) I82.409 453.40 IgE      Six Minute Walk Test to qualify for Home Oxygen   5. History of pulmonary embolism Z86.711 V12.55 IgE      Six Minute Walk Test to qualify for Home Oxygen   6. Chronic respiratory failure with hypoxia J96.11 518.83 Six Minute Walk Test to qualify for Home Oxygen     799.02    7. Mild persistent reactive airway disease without complication J45.30 493.90 albuterol (VENTOLIN HFA) 90 mcg/actuation inhaler      montelukast (SINGULAIR) 10 mg tablet   8. Recurrent urticaria L50.8 708.8 montelukast (SINGULAIR) 10 mg tablet       PLAN:    Problem List Items Addressed This Visit     Unprioritized              Recurrent urticaria    Overview     Trial singulair, assess for atopy           Relevant Medications    montelukast (SINGULAIR) 10 mg tablet    Recurrent deep vein thrombosis (DVT)    Overview     Provoked and unprovoked, on lovenox           Relevant Orders    IgE    Six Minute Walk Test to qualify for Home Oxygen    Reactive airway disease without  complication    Overview     On albuterol prn, addition singulair           Relevant Medications    albuterol (VENTOLIN HFA) 90 mcg/actuation inhaler    montelukast (SINGULAIR) 10 mg tablet    Multiple pulmonary nodules    Overview     Small sub- centimeter- likely benign           History of pulmonary embolism    Overview     Provoked and unprovoked, on lovenox           Relevant Orders    IgE    Six Minute Walk Test to qualify for Home Oxygen    Factor V Leiden mutation    Overview     Follow by Hematology           Dyspnea on exertion - Primary    Overview     Possibly multifactorial- PE?, pulmonary HTN secondary to PE?, RAD, obtain ECHO and pulmonary studies           Relevant Orders    Transthoracic echo (TTE) 2D with Color Flow    Complete PFT with bronchodilator    IgE    Six Minute Walk Test to qualify for Home Oxygen    Chronic respiratory failure with hypoxia    Overview     Repeat walk test to qualify for oxygen           Relevant Orders    Six Minute Walk Test to qualify for Home Oxygen                Thank you for allowing me the opportunity to participate in the care of your patient.    Patient will Follow up follow up pending test results .     Please cc note to  Alberto Amaya MD.

## 2019-08-09 PROBLEM — I82.409 RECURRENT DEEP VEIN THROMBOSIS (DVT): Status: ACTIVE | Noted: 2019-08-09

## 2019-08-09 PROBLEM — J45.909 REACTIVE AIRWAY DISEASE WITHOUT COMPLICATION: Status: ACTIVE | Noted: 2019-08-09

## 2019-08-09 PROBLEM — J96.11 CHRONIC RESPIRATORY FAILURE WITH HYPOXIA: Status: ACTIVE | Noted: 2019-08-09

## 2019-08-09 PROBLEM — Z86.711 HISTORY OF PULMONARY EMBOLISM: Status: ACTIVE | Noted: 2019-08-09

## 2019-08-09 PROBLEM — R06.09 DYSPNEA ON EXERTION: Status: ACTIVE | Noted: 2019-08-09

## 2019-08-09 PROBLEM — L50.8 RECURRENT URTICARIA: Status: ACTIVE | Noted: 2019-08-09

## 2019-08-12 ENCOUNTER — PATIENT MESSAGE (OUTPATIENT)
Dept: INTERNAL MEDICINE | Facility: CLINIC | Age: 29
End: 2019-08-12

## 2019-08-13 ENCOUNTER — PATIENT MESSAGE (OUTPATIENT)
Dept: PULMONOLOGY | Facility: CLINIC | Age: 29
End: 2019-08-13

## 2019-08-16 ENCOUNTER — HOSPITAL ENCOUNTER (OUTPATIENT)
Dept: RESPIRATORY THERAPY | Facility: HOSPITAL | Age: 29
Discharge: HOME | End: 2019-08-16
Attending: NURSE PRACTITIONER
Payer: COMMERCIAL

## 2019-08-16 ENCOUNTER — TELEPHONE (OUTPATIENT)
Dept: PULMONOLOGY | Facility: CLINIC | Age: 29
End: 2019-08-16

## 2019-08-16 VITALS — HEART RATE: 77 BPM | RESPIRATION RATE: 16 BRPM | OXYGEN SATURATION: 100 %

## 2019-08-16 DIAGNOSIS — R09.02 HYPOXIA: Primary | ICD-10-CM

## 2019-08-16 DIAGNOSIS — R09.02 HYPOXIA: ICD-10-CM

## 2019-08-16 DIAGNOSIS — R06.09 DYSPNEA ON EXERTION: ICD-10-CM

## 2019-08-16 LAB
BRPFT: ABNORMAL
DLCO ADJ PRE: 20.43 ML/(MIN*MMHG) (ref 21.85–33.32)
DLCO SINGLE BREATH LLN: 21.85
DLCO SINGLE BREATH PRE REF: 74 %
DLCO SINGLE BREATH REF: 27.58
DLCOC SBVA LLN: 3.87
DLCOC SBVA PRE REF: 78.6 %
DLCOC SBVA REF: 5.41
DLCOC SINGLE BREATH LLN: 21.85
DLCOC SINGLE BREATH PRE REF: 74 %
DLCOC SINGLE BREATH REF: 27.58
DLCOVA LLN: 3.87
DLCOVA PRE REF: 78.6 %
DLCOVA PRE: 4.25 ML/(MIN*MMHG*L) (ref 3.87–6.95)
DLCOVA REF: 5.41
DLVAADJ PRE: 4.25 ML/(MIN*MMHG*L) (ref 3.87–6.95)
FEF 25 75 CHG: 26.3 %
FEF 25 75 LLN: 2.32
FEF 25 75 POST REF: 111.1 %
FEF 25 75 PRE REF: 88 %
FEF 25 75 REF: 3.61
FET100 CHG: -3.1 %
FEV1 CHG: 3.8 %
FEV1 FVC CHG: 10.1 %
FEV1 FVC LLN: 73
FEV1 FVC POST REF: 103.7 %
FEV1 FVC PRE REF: 94.2 %
FEV1 FVC REF: 85
FEV1 LLN: 2.65
FEV1 POST REF: 105.2 %
FEV1 PRE REF: 101.3 %
FEV1 REF: 3.3
FVC CHG: -5.7 %
FVC LLN: 3.14
FVC POST REF: 100.9 %
FVC PRE REF: 107 %
FVC REF: 3.92
IVC PRE: 3.72 L (ref 3.14–4.71)
IVC SINGLE BREATH LLN: 3.14
IVC SINGLE BREATH PRE REF: 94.9 %
IVC SINGLE BREATH REF: 3.92
MVV LLN: 107
MVV REF: 126
PEF CHG: 2.7 %
PEF LLN: 5.39
PEF POST REF: 98.8 %
PEF PRE REF: 96.2 %
PEF REF: 7.15
POST FEF 25 75: 4.01 L/S (ref 2.32–4.9)
POST FET 100: 7.07 SEC
POST FEV1 FVC: 87.69 % (ref 73.07–96.01)
POST FEV1: 3.47 L (ref 2.65–3.95)
POST FVC: 3.96 L (ref 3.14–4.71)
POST PEF: 7.07 L/S (ref 5.39–8.92)
PRE DLCO: 20.43 ML/(MIN*MMHG) (ref 21.85–33.32)
PRE FEF 25 75: 3.18 L/S (ref 2.32–4.9)
PRE FET 100: 7.3 SEC
PRE FEV1 FVC: 79.65 % (ref 73.07–96.01)
PRE FEV1: 3.34 L (ref 2.65–3.95)
PRE FVC: 4.2 L (ref 3.14–4.71)
PRE PEF: 6.88 L/S (ref 5.39–8.92)
VA PRE: 4.81 L (ref 4.95–4.95)
VA SINGLE BREATH LLN: 4.95
VA SINGLE BREATH PRE REF: 97.1 %
VA SINGLE BREATH REF: 4.95

## 2019-08-16 PROCEDURE — 94729 DIFFUSING CAPACITY: CPT | Mod: 26,,, | Performed by: INTERNAL MEDICINE

## 2019-08-16 PROCEDURE — 94060 PR EVAL OF BRONCHOSPASM: ICD-10-PCS | Mod: 26,59,, | Performed by: INTERNAL MEDICINE

## 2019-08-16 PROCEDURE — 94618 PULMONARY STRESS TESTING: CPT

## 2019-08-16 PROCEDURE — 94060 EVALUATION OF WHEEZING: CPT | Mod: 26,59,, | Performed by: INTERNAL MEDICINE

## 2019-08-16 PROCEDURE — 94729 PR C02/MEMBANE DIFFUSE CAPACITY: ICD-10-PCS | Mod: 26,,, | Performed by: INTERNAL MEDICINE

## 2019-08-16 PROCEDURE — 94375 RESPIRATORY FLOW VOLUME LOOP: CPT

## 2019-08-16 PROCEDURE — 94729 DIFFUSING CAPACITY: CPT

## 2019-08-16 PROCEDURE — 94618 PULMONARY STRESS TESTING: CPT | Mod: 26,,, | Performed by: INTERNAL MEDICINE

## 2019-08-16 PROCEDURE — 94618 PULMONARY STRESS TESTING: ICD-10-PCS | Mod: 26,,, | Performed by: INTERNAL MEDICINE

## 2019-08-16 PROCEDURE — 25000242 PHARM REV CODE 250 ALT 637 W/ HCPCS: Performed by: NURSE PRACTITIONER

## 2019-08-16 RX ORDER — ALBUTEROL SULFATE 2.5 MG/.5ML
2.5 SOLUTION RESPIRATORY (INHALATION) EVERY 4 HOURS PRN
Status: DISCONTINUED | OUTPATIENT
Start: 2019-08-16 | End: 2019-08-17 | Stop reason: HOSPADM

## 2019-08-16 RX ADMIN — ALBUTEROL SULFATE 2.5 MG: 2.5 SOLUTION RESPIRATORY (INHALATION) at 11:08

## 2019-08-20 NOTE — TELEPHONE ENCOUNTER
Please ask pt which DME company she is working with. Our documented desaturation with minimal exertion on RA should be enough to get her her oxygen. Please check on this.

## 2019-08-21 NOTE — PROGRESS NOTES
PATIENT: Antoinette Love  MRN: [id number]  DATE: 8/22/2019      Diagnosis:   1. Recurrent deep vein thrombosis (DVT)    2. Acute deep vein thrombosis (DVT) of femoral vein of both lower extremities    3. Factor V Leiden mutation        Chief Complaint: Edema (pt has swelling in left leg)    Subjective:   Antoinette Love is a 29-year-old female with Homozygous Factor V Leiden mutation, and recurrent DVTs, IVC filter placement, miscarriage in 2013 at 11 weeks, TIA in 2013, who presents to Hematology Clinic for follow-up of VTE.     Hematologic History  The patient has had at least 11 DVTs and 3-4 PEs.  -She was on Coumadin for years and stated she came off when she had a DVT despite a therapeutic INR.  -She was then placed on Xarelto buts states she only took 1 dose and shortly after developed gallbladder pain. When she was evaluated she was told she had gallbladder inflammation.  -IVC filter was placed in 2014. 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 She had a DVT in 2016 while on Eliquis  She was followed by a Hematologist in [place], and he opted to take her off of anti-coagulation in 2018 because stated that she had an IVC filter and did not need to be anti-coagulated.  -She noted symptoms of DVT last week, so she self resumed a previous Eliquis prescription that she had  -Presented 7/21/19 c/o pain and swelling found to have 'partial, nonocclusive thrombus in the right common femoral vein, left common femoral vein, and left external iliac vein. Superficial thrombophlebitis of the bilateral greater saphenous veins.' Was started on Lovenox.     Interval History  Patient seen today, worsening swelling and pain of left lower extremity, worse with ambulation. Right lower extremity pain and swelling improved. No dyspnea. Has chronic chest pain,  unchanged. Occasional nosebleeds. Having regular menstruations. Not missed any doses of Lovenox. Has been on it since 7/21/19. Spends most of day in bed d/t pain.    Past Medical History:   Past Medical History:   Diagnosis Date    DVT (deep venous thrombosis)     Factor V Leiden     Pulmonary embolus     Recurrent upper respiratory infection (URI)     TIA (transient ischemic attack)        Past Surgical HIstory:   Past Surgical History:   Procedure Laterality Date    COLONOSCOPY N/A 12/18/2015    Performed by Lefty Lee MD at [facility] ([facility] FLR)    IVC FILTER RETRIEVAL      tumor from breast      left    WISDOM TOOTH EXTRACTION         Family History:   Family History   Problem Relation Age of Onset    Allergies Mother         azithromycin       Social History:  reports that she has never smoked. She does not have any smokeless tobacco history on file. She reports that she does not drink alcohol or use drugs.    Allergies:  Review of patient's allergies indicates:   Allergen Reactions    Azithromycin Hives    Toradol [ketorolac] Hives     Itching and hives    Xarelto [rivaroxaban]      Gallbladder swelling and disfunction       Medications:  Current Outpatient Medications   Medication Sig Dispense Refill    albuterol (VENTOLIN HFA) 90 mcg/actuation inhaler Inhale 2 puffs into the lungs every 6 (six) hours as needed for Wheezing. Rescue 18 g 0    cetirizine (ZYRTEC) 10 MG tablet Take 2 tablets (20 mg total) by mouth 2 (two) times daily. For chronic urticaria 120 tablet 6    diazePAM (VALIUM) 5 MG tablet Take 1 tablet (5 mg total) by mouth every 12 (twelve) hours as needed for Anxiety. 30 tablet 0    diclofenac sodium (VOLTAREN) 1 % Gel Apply topically.      m-vit,tx,iron,mins/calc/folic (MULTIVITAMIN) Tab       montelukast (SINGULAIR) 10 mg tablet Take 1 tablet (10 mg total) by mouth every evening. 90 tablet 1    enoxaparin (LOVENOX) 80 mg/0.8 mL Syrg Inject 0.8 mLs (80 mg total) into the skin 2  (two) times daily. 50 mL 6    EPINEPHrine (EPIPEN 2-VAN) 0.3 mg/0.3 mL AtIn Inject 0.3 mLs (0.3 mg total) into the muscle once. for 1 dose 2 Device 2     No current facility-administered medications for this visit.        Review of Systems   Constitutional: Negative for chills, fatigue, fever and unexpected weight change.   HENT: Positive for nosebleeds. Negative for mouth sores.    Respiratory: Negative for cough, chest tightness and shortness of breath.    Cardiovascular: Positive for chest pain and leg swelling. Negative for palpitations.   Gastrointestinal: Negative for abdominal pain, diarrhea, nausea and vomiting.   Endocrine: Negative for cold intolerance and heat intolerance.   Genitourinary: Negative for dysuria and hematuria.   Musculoskeletal: Negative for arthralgias, back pain and joint swelling.   Skin: Negative for rash.   Allergic/Immunologic: Negative for environmental allergies.   Neurological: Negative for light-headedness and numbness.   Hematological: Negative for adenopathy. Does not bruise/bleed easily.       ECOG Performance Status: 1   Objective:      Vitals:   Vitals:    08/22/19 1515   BP: 122/76   Pulse: 85   Resp: 18   Temp: 98.8 °F (37.1 °C)   SpO2: 98%   Weight: 78.5 kg (173 lb 1 oz)     BMI: Body mass index is 28.8 kg/m².    Physical Exam   Constitutional: She is oriented to person, place, and time. She appears well-developed and well-nourished.   Eyes: EOM are normal.   Neck: Normal range of motion.   Cardiovascular: Normal rate and regular rhythm.   Pulmonary/Chest: Effort normal. No respiratory distress.   Abdominal: Soft. She exhibits no distension. There is no tenderness.   Musculoskeletal: She exhibits edema (left lower extremity) and tenderness.   Neurological: She is alert and oriented to person, place, and time.   Skin: Skin is warm and dry.   Psychiatric: She has a normal mood and affect. Her behavior is normal.       Laboratory Data:  Hospital Outpatient Visit on  08/16/2019   Component Date Value Ref Range Status    Interpretation 08/16/2019    Final                    Value:1. Spirometry is normal.  There is no significant change in airflow after bronchodilator administration.  2. Diffusion capacity is borderline reduced.  This interpretation of DLCO assumes normal hemoglobin level.      Post FVC 08/16/2019 3.96  3.14 - 4.71 L Final    Post FEV1 08/16/2019 3.47  2.65 - 3.95 L Final    Post FEV1 FVC 08/16/2019 87.69  73.07 - 96.01 % Final    Post FEF 25 75 08/16/2019 4.01  2.32 - 4.90 L/s Final    Post PEF 08/16/2019 7.07  5.39 - 8.92 L/s Final    Post  08/16/2019 7.07  sec Final    Pre DLCO 08/16/2019 20.43* 21.85 - 33.32 ml/(min*mmHg) Final    DLCO ADJ PRE 08/16/2019 20.43* 21.85 - 33.32 ml/(min*mmHg) Final    DLCOVA PRE 08/16/2019 4.25  3.87 - 6.95 ml/(min*mmHg*L) Final    DLVAAdj PRE 08/16/2019 4.25  3.87 - 6.95 ml/(min*mmHg*L) Final    VA PRE 08/16/2019 4.81* 4.95 - 4.95 L Final    IVC PRE 08/16/2019 3.72  3.14 - 4.71 L Final    Pre FVC 08/16/2019 4.20  3.14 - 4.71 L Final    Pre FEV1 08/16/2019 3.34  2.65 - 3.95 L Final    Pre FEV1 FVC 08/16/2019 79.65  73.07 - 96.01 % Final    Pre FEF 25 75 08/16/2019 3.18  2.32 - 4.90 L/s Final    Pre PEF 08/16/2019 6.88  5.39 - 8.92 L/s Final    Pre  08/16/2019 7.30  sec Final    FVC Ref 08/16/2019 3.92   Final    FVC LLN 08/16/2019 3.14   Final    FVC Pre Ref 08/16/2019 107.0  % Final    FVC Post Ref 08/16/2019 100.9  % Final    FVC Chg 08/16/2019 -5.7  % Final    FEV1 Ref 08/16/2019 3.30   Final    FEV1 LLN 08/16/2019 2.65   Final    FEV1 Pre Ref 08/16/2019 101.3  % Final    FEV1 Post Ref 08/16/2019 105.2  % Final    FEV1 Chg 08/16/2019 3.8  % Final    FEV1 FVC Ref 08/16/2019 85   Final    FEV1 FVC LLN 08/16/2019 73   Final    FEV1 FVC Pre Ref 08/16/2019 94.2  % Final    FEV1 FVC Post Ref 08/16/2019 103.7  % Final    FEV1 FVC Chg 08/16/2019 10.1  % Final    FEF 25 75 Ref 08/16/2019  3.61   Final    FEF 25 75 LLN 08/16/2019 2.32   Final    FEF 25 75 Pre Ref 08/16/2019 88.0  % Final    FEF 25 75 Post Ref 08/16/2019 111.1  % Final    FEF 25 75 Chg 08/16/2019 26.3  % Final    PEF Ref 08/16/2019 7.15   Final    PEF LLN 08/16/2019 5.39   Final    PEF Pre Ref 08/16/2019 96.2  % Final    PEF Post Ref 08/16/2019 98.8  % Final    PEF Chg 08/16/2019 2.7  % Final    BNL862 Chg 08/16/2019 -3.1  % Final    MVV Ref 08/16/2019 126   Final    MVV LLN 08/16/2019 107   Final    DLCO Single Breath Ref 08/16/2019 27.58   Final    DLCO Single Breath LLN 08/16/2019 21.85   Final    DLCO Single Breath Pre Ref 08/16/2019 74.0  % Final    DLCOc Single Breath Ref 08/16/2019 27.58   Final    DLCOc Single Breath LLN 08/16/2019 21.85   Final    DLCOc Single Breath Pre Ref 08/16/2019 74.0  % Final    DLCOVA Ref 08/16/2019 5.41   Final    DLCOVA LLN 08/16/2019 3.87   Final    DLCOVA Pre Ref 08/16/2019 78.6  % Final    DLCOc SBVA Ref 08/16/2019 5.41   Final    DLCOc SBVA LLN 08/16/2019 3.87   Final    DLCOc SBVA Pre Ref 08/16/2019 78.6  % Final    VA Single Breath Ref 08/16/2019 4.95   Final    VA Single Breath LLN 08/16/2019 4.95   Final    VA Single Breath Pre Ref 08/16/2019 97.1  % Final    IVC Single Breath Ref 08/16/2019 3.92   Final    IVC Single Breath LLN 08/16/2019 3.14   Final    IVC Single Breath Pre Ref 08/16/2019 94.9  % Final     Assessment:       1. Recurrent deep vein thrombosis (DVT)    2. Acute deep vein thrombosis (DVT) of femoral vein of both lower extremities    3. Factor V Leiden mutation           Plan:   Homozygous Factor V Leiden mutation, and recurrent DVTs, IVC filter placement  -Continue Lovenox, change to BID dosing  -Patient to bring in factor V record  -Check APLS labs: glycoprotein and anticardiolipin now, lupus AC/DRVVT when acute VTE resolved  -CBC with labs  -Xa level 4 hours after 9AM dose to be done at [facility] 8/26    Follow-up: U/S of left lower extremity  ASAP, labs at [facility] a 1PM on 8/26 (CBC, glycoprotein, anticardiolipin, Xa level). RTC for MD visit in 4-6 weeks    The following was staffed and discussed with supervising physician Dr. Billy.    Earnestine Garcia MD  Hematology/Oncology fellow

## 2019-08-22 ENCOUNTER — OFFICE VISIT (OUTPATIENT)
Dept: HEMATOLOGY/ONCOLOGY | Facility: CLINIC | Age: 29
End: 2019-08-22
Payer: COMMERCIAL

## 2019-08-22 VITALS
TEMPERATURE: 99 F | HEART RATE: 85 BPM | BODY MASS INDEX: 28.8 KG/M2 | RESPIRATION RATE: 18 BRPM | WEIGHT: 173.06 LBS | OXYGEN SATURATION: 98 % | DIASTOLIC BLOOD PRESSURE: 76 MMHG | SYSTOLIC BLOOD PRESSURE: 122 MMHG

## 2019-08-22 DIAGNOSIS — I82.413 ACUTE DEEP VEIN THROMBOSIS (DVT) OF FEMORAL VEIN OF BOTH LOWER EXTREMITIES: ICD-10-CM

## 2019-08-22 DIAGNOSIS — I82.409 RECURRENT DEEP VEIN THROMBOSIS (DVT): Primary | ICD-10-CM

## 2019-08-22 DIAGNOSIS — D68.51 FACTOR V LEIDEN MUTATION: ICD-10-CM

## 2019-08-22 PROCEDURE — 3008F BODY MASS INDEX DOCD: CPT | Mod: CPTII,S$GLB,, | Performed by: STUDENT IN AN ORGANIZED HEALTH CARE EDUCATION/TRAINING PROGRAM

## 2019-08-22 PROCEDURE — 3008F PR BODY MASS INDEX (BMI) DOCUMENTED: ICD-10-PCS | Mod: CPTII,S$GLB,, | Performed by: STUDENT IN AN ORGANIZED HEALTH CARE EDUCATION/TRAINING PROGRAM

## 2019-08-22 PROCEDURE — 99999 PR PBB SHADOW E&M-EST. PATIENT-LVL III: CPT | Mod: PBBFAC,,, | Performed by: STUDENT IN AN ORGANIZED HEALTH CARE EDUCATION/TRAINING PROGRAM

## 2019-08-22 PROCEDURE — 99214 PR OFFICE/OUTPT VISIT, EST, LEVL IV, 30-39 MIN: ICD-10-PCS | Mod: S$GLB,,, | Performed by: STUDENT IN AN ORGANIZED HEALTH CARE EDUCATION/TRAINING PROGRAM

## 2019-08-22 PROCEDURE — 99999 PR PBB SHADOW E&M-EST. PATIENT-LVL III: ICD-10-PCS | Mod: PBBFAC,,, | Performed by: STUDENT IN AN ORGANIZED HEALTH CARE EDUCATION/TRAINING PROGRAM

## 2019-08-22 PROCEDURE — 99214 OFFICE O/P EST MOD 30 MIN: CPT | Mod: S$GLB,,, | Performed by: STUDENT IN AN ORGANIZED HEALTH CARE EDUCATION/TRAINING PROGRAM

## 2019-08-22 RX ORDER — ENOXAPARIN SODIUM 100 MG/ML
1 INJECTION SUBCUTANEOUS 2 TIMES DAILY
Qty: 50 ML | Refills: 6 | Status: ON HOLD | OUTPATIENT
Start: 2019-08-22 | End: 2019-09-04 | Stop reason: SDUPTHER

## 2019-08-22 RX ORDER — ENOXAPARIN SODIUM 150 MG/ML
INJECTION SUBCUTANEOUS
COMMUNITY
Start: 2019-07-21 | End: 2019-08-22

## 2019-08-22 NOTE — Clinical Note
U/S of left lower extremity ASAP, [place] [facility] [place] (CBC, glycoprotein, anticardiolipin, Xa level). RTC for MD visit in 6 weeks 10/3/19. Thanks.

## 2019-08-25 ENCOUNTER — PATIENT MESSAGE (OUTPATIENT)
Dept: INTERNAL MEDICINE | Facility: CLINIC | Age: 29
End: 2019-08-25

## 2019-08-26 ENCOUNTER — PATIENT MESSAGE (OUTPATIENT)
Dept: HEMATOLOGY/ONCOLOGY | Facility: CLINIC | Age: 29
End: 2019-08-26

## 2019-08-27 DIAGNOSIS — I82.413 ACUTE DEEP VEIN THROMBOSIS (DVT) OF FEMORAL VEIN OF BOTH LOWER EXTREMITIES: Primary | ICD-10-CM

## 2019-08-30 ENCOUNTER — HOSPITAL ENCOUNTER (OUTPATIENT)
Dept: RADIOLOGY | Facility: HOSPITAL | Age: 29
Discharge: HOME | End: 2019-08-30
Attending: STUDENT IN AN ORGANIZED HEALTH CARE EDUCATION/TRAINING PROGRAM
Payer: COMMERCIAL

## 2019-08-30 ENCOUNTER — HOSPITAL ENCOUNTER (INPATIENT)
Facility: HOSPITAL | Age: 29
LOS: 5 days | Discharge: HOME | DRG: 300 | End: 2019-09-04
Attending: EMERGENCY MEDICINE | Admitting: EMERGENCY MEDICINE
Payer: COMMERCIAL

## 2019-08-30 DIAGNOSIS — I82.409 DVT (DEEP VENOUS THROMBOSIS): ICD-10-CM

## 2019-08-30 DIAGNOSIS — I82.413 ACUTE DEEP VEIN THROMBOSIS (DVT) OF FEMORAL VEIN OF BOTH LOWER EXTREMITIES: ICD-10-CM

## 2019-08-30 DIAGNOSIS — I82.402: ICD-10-CM

## 2019-08-30 DIAGNOSIS — D68.51 FACTOR V LEIDEN MUTATION: Primary | ICD-10-CM

## 2019-08-30 DIAGNOSIS — I82.409 RECURRENT DEEP VEIN THROMBOSIS (DVT): ICD-10-CM

## 2019-08-30 LAB
ALBUMIN SERPL BCP-MCNC: 4 G/DL (ref 3.5–5.2)
ALP SERPL-CCNC: 67 U/L (ref 55–135)
ALT SERPL W/O P-5'-P-CCNC: 9 U/L (ref 10–44)
ANION GAP SERPL CALC-SCNC: 11 MMOL/L (ref 8–16)
APTT BLDCRRT: 30.8 SEC (ref 21–32)
APTT BLDCRRT: 51.5 SEC (ref 21–32)
AST SERPL-CCNC: 11 U/L (ref 10–40)
BASOPHILS # BLD AUTO: 0.01 K/UL (ref 0–0.2)
BASOPHILS # BLD AUTO: 0.01 K/UL (ref 0–0.2)
BASOPHILS NFR BLD: 0.2 % (ref 0–1.9)
BASOPHILS NFR BLD: 0.2 % (ref 0–1.9)
BILIRUB SERPL-MCNC: 0.4 MG/DL (ref 0.1–1)
BUN SERPL-MCNC: 10 MG/DL (ref 6–20)
CALCIUM SERPL-MCNC: 9.7 MG/DL (ref 8.7–10.5)
CHLORIDE SERPL-SCNC: 105 MMOL/L (ref 95–110)
CO2 SERPL-SCNC: 22 MMOL/L (ref 23–29)
CREAT SERPL-MCNC: 0.7 MG/DL (ref 0.5–1.4)
DIFFERENTIAL METHOD: ABNORMAL
DIFFERENTIAL METHOD: ABNORMAL
EOSINOPHIL # BLD AUTO: 0.2 K/UL (ref 0–0.5)
EOSINOPHIL # BLD AUTO: 0.2 K/UL (ref 0–0.5)
EOSINOPHIL NFR BLD: 4.5 % (ref 0–8)
EOSINOPHIL NFR BLD: 4.6 % (ref 0–8)
ERYTHROCYTE [DISTWIDTH] IN BLOOD BY AUTOMATED COUNT: 12.6 % (ref 11.5–14.5)
ERYTHROCYTE [DISTWIDTH] IN BLOOD BY AUTOMATED COUNT: 12.6 % (ref 11.5–14.5)
EST. GFR  (AFRICAN AMERICAN): >60 ML/MIN/1.73 M^2
EST. GFR  (NON AFRICAN AMERICAN): >60 ML/MIN/1.73 M^2
GLUCOSE SERPL-MCNC: 76 MG/DL (ref 70–110)
HCT VFR BLD AUTO: 33.3 % (ref 37–48.5)
HCT VFR BLD AUTO: 34.6 % (ref 37–48.5)
HGB BLD-MCNC: 11.6 G/DL (ref 12–16)
HGB BLD-MCNC: 11.7 G/DL (ref 12–16)
INR PPP: 1 (ref 0.8–1.2)
INR PPP: 1 (ref 0.8–1.2)
LYMPHOCYTES # BLD AUTO: 1.4 K/UL (ref 1–4.8)
LYMPHOCYTES # BLD AUTO: 1.4 K/UL (ref 1–4.8)
LYMPHOCYTES NFR BLD: 30.6 % (ref 18–48)
LYMPHOCYTES NFR BLD: 31 % (ref 18–48)
MCH RBC QN AUTO: 31.6 PG (ref 27–31)
MCH RBC QN AUTO: 32.4 PG (ref 27–31)
MCHC RBC AUTO-ENTMCNC: 33.8 G/DL (ref 32–36)
MCHC RBC AUTO-ENTMCNC: 34.8 G/DL (ref 32–36)
MCV RBC AUTO: 93 FL (ref 82–98)
MCV RBC AUTO: 94 FL (ref 82–98)
MONOCYTES # BLD AUTO: 0.3 K/UL (ref 0.3–1)
MONOCYTES # BLD AUTO: 0.3 K/UL (ref 0.3–1)
MONOCYTES NFR BLD: 6.1 % (ref 4–15)
MONOCYTES NFR BLD: 6.7 % (ref 4–15)
NEUTROPHILS # BLD AUTO: 2.6 K/UL (ref 1.8–7.7)
NEUTROPHILS # BLD AUTO: 2.7 K/UL (ref 1.8–7.7)
NEUTROPHILS NFR BLD: 57.8 % (ref 38–73)
NEUTROPHILS NFR BLD: 58.7 % (ref 38–73)
PLATELET # BLD AUTO: 167 K/UL (ref 150–350)
PLATELET # BLD AUTO: 183 K/UL (ref 150–350)
PMV BLD AUTO: 8.9 FL (ref 9.2–12.9)
PMV BLD AUTO: 9.1 FL (ref 9.2–12.9)
POTASSIUM SERPL-SCNC: 4.1 MMOL/L (ref 3.5–5.1)
PROT SERPL-MCNC: 7.8 G/DL (ref 6–8.4)
PROTHROMBIN TIME: 10.2 SEC (ref 9–12.5)
PROTHROMBIN TIME: 10.5 SEC (ref 9–12.5)
RBC # BLD AUTO: 3.58 M/UL (ref 4–5.4)
RBC # BLD AUTO: 3.7 M/UL (ref 4–5.4)
SODIUM SERPL-SCNC: 138 MMOL/L (ref 136–145)
WBC # BLD AUTO: 4.48 K/UL (ref 3.9–12.7)
WBC # BLD AUTO: 4.61 K/UL (ref 3.9–12.7)

## 2019-08-30 PROCEDURE — 93971 EXTREMITY STUDY: CPT | Mod: 26,LT,, | Performed by: RADIOLOGY

## 2019-08-30 PROCEDURE — 85520 HEPARIN ASSAY: CPT

## 2019-08-30 PROCEDURE — 93005 ELECTROCARDIOGRAM TRACING: CPT

## 2019-08-30 PROCEDURE — 11000001 HC ACUTE MED/SURG PRIVATE ROOM

## 2019-08-30 PROCEDURE — 85025 COMPLETE CBC W/AUTO DIFF WBC: CPT

## 2019-08-30 PROCEDURE — 96374 THER/PROPH/DIAG INJ IV PUSH: CPT

## 2019-08-30 PROCEDURE — 85610 PROTHROMBIN TIME: CPT | Mod: 91

## 2019-08-30 PROCEDURE — 93971 EXTREMITY STUDY: CPT | Mod: TC,LT

## 2019-08-30 PROCEDURE — 93010 EKG 12-LEAD: ICD-10-PCS | Mod: ,,, | Performed by: INTERNAL MEDICINE

## 2019-08-30 PROCEDURE — 63600175 PHARM REV CODE 636 W HCPCS: Performed by: EMERGENCY MEDICINE

## 2019-08-30 PROCEDURE — 93971 US LOWER EXTREMITY VEINS LEFT: ICD-10-PCS | Mod: 26,LT,, | Performed by: RADIOLOGY

## 2019-08-30 PROCEDURE — 93010 ELECTROCARDIOGRAM REPORT: CPT | Mod: ,,, | Performed by: INTERNAL MEDICINE

## 2019-08-30 PROCEDURE — 85730 THROMBOPLASTIN TIME PARTIAL: CPT | Mod: 91

## 2019-08-30 PROCEDURE — 96375 TX/PRO/DX INJ NEW DRUG ADDON: CPT

## 2019-08-30 PROCEDURE — 99291 CRITICAL CARE FIRST HOUR: CPT | Mod: 25

## 2019-08-30 PROCEDURE — 86147 CARDIOLIPIN ANTIBODY EA IG: CPT | Mod: 59

## 2019-08-30 PROCEDURE — 80053 COMPREHEN METABOLIC PANEL: CPT

## 2019-08-30 PROCEDURE — 36415 COLL VENOUS BLD VENIPUNCTURE: CPT

## 2019-08-30 RX ORDER — ONDANSETRON 2 MG/ML
8 INJECTION INTRAMUSCULAR; INTRAVENOUS EVERY 8 HOURS PRN
Status: DISCONTINUED | OUTPATIENT
Start: 2019-08-30 | End: 2019-09-04 | Stop reason: HOSPADM

## 2019-08-30 RX ORDER — FENTANYL CITRATE 50 UG/ML
25 INJECTION, SOLUTION INTRAMUSCULAR; INTRAVENOUS
Status: COMPLETED | OUTPATIENT
Start: 2019-08-30 | End: 2019-08-30

## 2019-08-30 RX ORDER — SODIUM CHLORIDE 0.9 % (FLUSH) 0.9 %
10 SYRINGE (ML) INJECTION
Status: DISCONTINUED | OUTPATIENT
Start: 2019-08-30 | End: 2019-09-04 | Stop reason: HOSPADM

## 2019-08-30 RX ORDER — SODIUM CHLORIDE 9 MG/ML
INJECTION, SOLUTION INTRAVENOUS CONTINUOUS
Status: DISCONTINUED | OUTPATIENT
Start: 2019-08-30 | End: 2019-08-31

## 2019-08-30 RX ORDER — MORPHINE SULFATE 10 MG/ML
5 INJECTION INTRAMUSCULAR; INTRAVENOUS; SUBCUTANEOUS EVERY 4 HOURS PRN
Status: COMPLETED | OUTPATIENT
Start: 2019-08-31 | End: 2019-09-01

## 2019-08-30 RX ORDER — OXYCODONE AND ACETAMINOPHEN 5; 325 MG/1; MG/1
1 TABLET ORAL EVERY 6 HOURS PRN
Status: DISCONTINUED | OUTPATIENT
Start: 2019-08-30 | End: 2019-09-02

## 2019-08-30 RX ORDER — OXYCODONE AND ACETAMINOPHEN 10; 325 MG/1; MG/1
1 TABLET ORAL EVERY 6 HOURS PRN
Status: DISCONTINUED | OUTPATIENT
Start: 2019-08-31 | End: 2019-09-02

## 2019-08-30 RX ORDER — ONDANSETRON 2 MG/ML
4 INJECTION INTRAMUSCULAR; INTRAVENOUS EVERY 8 HOURS PRN
Status: DISCONTINUED | OUTPATIENT
Start: 2019-08-30 | End: 2019-08-30

## 2019-08-30 RX ORDER — HEPARIN SODIUM,PORCINE/D5W 25000/250
18 INTRAVENOUS SOLUTION INTRAVENOUS CONTINUOUS
Status: DISCONTINUED | OUTPATIENT
Start: 2019-08-30 | End: 2019-08-30 | Stop reason: SDUPTHER

## 2019-08-30 RX ORDER — FAMOTIDINE 20 MG/1
20 TABLET, FILM COATED ORAL DAILY
Status: DISCONTINUED | OUTPATIENT
Start: 2019-08-31 | End: 2019-09-04 | Stop reason: HOSPADM

## 2019-08-30 RX ORDER — HEPARIN SODIUM,PORCINE/D5W 25000/250
18 INTRAVENOUS SOLUTION INTRAVENOUS CONTINUOUS
Status: DISCONTINUED | OUTPATIENT
Start: 2019-08-30 | End: 2019-09-04

## 2019-08-30 RX ORDER — ACETAMINOPHEN 325 MG/1
650 TABLET ORAL EVERY 8 HOURS PRN
Status: DISCONTINUED | OUTPATIENT
Start: 2019-08-30 | End: 2019-09-04 | Stop reason: HOSPADM

## 2019-08-30 RX ADMIN — HEPARIN SODIUM 21.39 UNITS/KG/HR: 10000 INJECTION, SOLUTION INTRAVENOUS at 11:08

## 2019-08-30 RX ADMIN — HEPARIN SODIUM 18 UNITS/KG/HR: 10000 INJECTION, SOLUTION INTRAVENOUS at 09:08

## 2019-08-30 RX ADMIN — FENTANYL CITRATE 25 MCG: 50 INJECTION, SOLUTION INTRAMUSCULAR; INTRAVENOUS at 08:08

## 2019-08-30 NOTE — ED PROVIDER NOTES
"Encounter Date: 8/30/2019    SCRIBE #1 NOTE: I, Keila Alatorre, am scribing for, and in the presence of,  Shante Carter MD. I have scribed the following portions of the note - Other sections scribed: HPI, ROS, PE.       History     Chief Complaint   Patient presents with    Leg Swelling     Pt c/o left leg pain/swelling x3 days. Reports had ultrasound done today and was told she has a dvt from "illiac down to ankle." Hx DVT takes Lovonox daily. Denies CP and SOB     Ms. Antoinette Love is a 29 year old female with a PMHx of DVT, Pulmonary Embolism, and Factor V Leiden who presents to the ED with c/o left leg swelling that started 3 or 4 days ago. The patient states that she had an US done today and she was told that she has a DVT from her iliac down to her ankle. The patient reports that her hematologist (Dr. Jeremy Billy) referred her to come to the ED for reevaluation. The patient states that she can't ambulate. The patient notes that she's been taking 120 mg Lovenox every night since 7/20/2019. The patient adds that her last dose of Lovenox was last night. The patient reports that her Lovenox prescription is about to change to 80 mg twice daily. The patient states that she hasn't had a clot in 2 or 3 years. The patient reports that her first blood clot was when she was 21 years olds and it was a DVT behind her knee. The patient adds that she was taking Coumadin for 6 months after her first DVT and then experienced a Pulmonary Embolism. The patient states that she took Xarelto in 2013 and experienced a problem with her gallbladder. The patient notes that her last blood clot was in March 2015 and she was taking Eloquis at the time. The patient reports that she's been on a Heparin drip in the past for a Pulmonary Embolism, but was never sent home with it. The patient reports a FMHx of blood clots (Mother and Father). 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 The patient denies chest " pain and SOB. No other associated symptoms. No alleviating factors.    The history is provided by the patient. No  was used.     Review of patient's allergies indicates:   Allergen Reactions    Azithromycin Hives    Toradol [ketorolac] Hives     Itching and hives    Xarelto [rivaroxaban]      Gallbladder swelling and disfunction     Past Medical History:   Diagnosis Date    DVT (deep venous thrombosis)     Factor V Leiden     Pulmonary embolus     Recurrent upper respiratory infection (URI)     TIA (transient ischemic attack)      Past Surgical History:   Procedure Laterality Date    COLONOSCOPY N/A 12/18/2015    Performed [facility] at [facility] ([facility] F[facility])    IVC FILTER RETRIEVAL      tumor from breast      left    WISDOM TOOTH EXTRACTION       Family History   Problem Relation Age of Onset    Allergies Mother         azithromycin     Social History     Tobacco Use    Smoking status: Never Smoker   Substance Use Topics    Alcohol use: No     Frequency: Monthly or less     Drinks per session: 3 or 4     Binge frequency: Never    Drug use: No     Review of Systems   Cardiovascular: Positive for leg swelling.   All other systems reviewed and are negative.      Physical Exam     Initial Vitals [08/30/19 1837]   BP Pulse Resp Temp SpO2   123/75 77 16 98.7 °F (37.1 °C) 100 %      MAP       --         Physical Exam    Nursing note and vitals reviewed.  Constitutional: She is not diaphoretic. No distress.   HENT:   Head: Normocephalic and atraumatic.   Mouth/Throat: Oropharynx is clear and moist.   Eyes: EOM are normal. Pupils are equal, round, and reactive to light. No scleral icterus.   Neck: Normal range of motion. Neck supple. No JVD present.   Cardiovascular: Normal rate, regular rhythm and intact distal pulses.   Pulses:       Dorsalis pedis pulses are 2+ on the right side, and 2+ on the left side.   Pulmonary/Chest: Breath sounds normal. No stridor. No respiratory distress.    Abdominal: Soft. Bowel sounds are normal. She exhibits no distension. There is no tenderness.   Musculoskeletal: She exhibits no edema or tenderness.   Entire left lower extremity is moderately and diffusely swollen compared to right lower extremity.   Good perfusion.   Neurological: She is alert and oriented to person, place, and time. She has normal strength. No cranial nerve deficit or sensory deficit.   Skin: Skin is warm and dry.   Psychiatric: She has a normal mood and affect.         ED Course   ED US Procedure Guidance  Date/Time: 8/30/2019 8:43 PM  Performed by: Caitlyn Villaseñor MD  Authorized by: Shante Carter MD   Comments: RUE 18g to AC placed under sono guidance.    Critical Care  Date/Time: 8/30/2019 9:18 PM  Performed by: Shante Carter MD  Authorized by: Shante Carter MD   Direct patient critical care time: 10 minutes  Additional history critical care time: 5 minutes  Ordering / reviewing critical care time: 5 minutes  Documentation critical care time: 5 minutes  Consulting other physicians critical care time: 5 minutes  Total critical care time (exclusive of procedural time) : 30 minutes        Labs Reviewed   CBC W/ AUTO DIFFERENTIAL - Abnormal; Notable for the following components:       Result Value    RBC 3.58 (*)     Hemoglobin 11.6 (*)     Hematocrit 33.3 (*)     Mean Corpuscular Hemoglobin 32.4 (*)     MPV 9.1 (*)     All other components within normal limits    Narrative:     (if patient is on warfarin prior to heparin therapy)   COMPREHENSIVE METABOLIC PANEL - Abnormal; Notable for the following components:    CO2 22 (*)     ALT 9 (*)     All other components within normal limits    Narrative:     (if patient is on warfarin prior to heparin therapy)   APTT    Narrative:     (if patient is on warfarin prior to heparin therapy)   PROTIME-INR    Narrative:     (if patient is on warfarin prior to heparin therapy)   ANTIPHOSPHOLIPID AB (ANTICARDIOLIPIN)   ANTI-XA HEPARIN  MONITORING          Imaging Results          X-Ray Chest AP Portable (Final result)  Result time 08/30/19 19:35:05    Final result by Kyle Rojas MD (08/30/19 19:35:05)                 Impression:      No acute intrathoracic process identified.      Electronically signed by: Kyle Rojas MD  Date:    08/30/2019  Time:    19:35             Narrative:    EXAMINATION:  XR CHEST AP PORTABLE    CLINICAL HISTORY:  Acute embolism and thrombosis of unspecified deep veins of unspecified lower extremity    TECHNIQUE:  Single frontal view of the chest was performed.    COMPARISON:  September 2015.    FINDINGS:  Cardiac silhouette is normal in size.  Lungs are symmetrically expanded.  No evidence of focal consolidative process, pneumothorax, or significant effusion.  No acute osseous abnormality identified.                                 Medical Decision Making:   Clinical Tests:   Lab Tests: Ordered and Reviewed  Radiological Study: Ordered and Reviewed  Medical Tests: Ordered and Reviewed  ED Management:  Previous records reviewed.  Patient is hemodynamically stable. Multiple attempts required for successful peripheral IV placement. ultrasound-guided IV placed by Dr. Villaseñor.   Labs pending.   Given DVT despite Lovenox 120 mg at night, decision made to start heparin infusion.  Discussed with admitting hospital medicine physician, Dr. Mendoza. Also discussed with Dr. Tomlin, Heme/Onc on call. He will see pt in am. Agrees w plan for heparin infusion now and may plan to restart lovenox bid after symptoms improve/resolve. Anti x-a ordered, discussed order w lab.   Ms Love has been stable during her timei n the ER. Is stable for admission to the floor                       Clinical Impression:       ICD-10-CM ICD-9-CM   1. DVT (deep venous thrombosis) I82.409 453.40       Scribe attestation: Shante MARTINEZ MD, personally performed the services described in this documentation. All medical record entries made by  the scribe were at my direction and in my presence.  I have reviewed the chart and agree that the record reflects my personal performance and is accurate and complete.                         Shante Carter MD  08/30/19 2124

## 2019-08-30 NOTE — ED TRIAGE NOTES
Pt arrived to the ED via POV from home with c/o leg swelling. Pt reports for approx the past x4 days been experiencing left leg swelling and pain. Has hx of DVTs and factor five clotting disorder . Denies chest pain/discomfort, SOB, dizziness/weakness, numbness/tingling, headaches, blurry vision. On admit to ED bed, pt AAOx4, NAD noted, VSS. Pt placed on cardiac monitor, pulse ox and BP cuff. Swelling noted to left leg.

## 2019-08-31 LAB
ALBUMIN SERPL BCP-MCNC: 3 G/DL (ref 3.5–5.2)
ALP SERPL-CCNC: 57 U/L (ref 55–135)
ALT SERPL W/O P-5'-P-CCNC: 7 U/L (ref 10–44)
ANION GAP SERPL CALC-SCNC: 8 MMOL/L (ref 8–16)
APTT BLDCRRT: 55.2 SEC (ref 21–32)
APTT BLDCRRT: 58.7 SEC (ref 21–32)
AST SERPL-CCNC: 11 U/L (ref 10–40)
BASOPHILS # BLD AUTO: 0.01 K/UL (ref 0–0.2)
BASOPHILS # BLD AUTO: 0.01 K/UL (ref 0–0.2)
BASOPHILS NFR BLD: 0.3 % (ref 0–1.9)
BASOPHILS NFR BLD: 0.3 % (ref 0–1.9)
BILIRUB SERPL-MCNC: 0.3 MG/DL (ref 0.1–1)
BUN SERPL-MCNC: 11 MG/DL (ref 6–20)
CALCIUM SERPL-MCNC: 8.6 MG/DL (ref 8.7–10.5)
CHLORIDE SERPL-SCNC: 107 MMOL/L (ref 95–110)
CO2 SERPL-SCNC: 21 MMOL/L (ref 23–29)
CREAT SERPL-MCNC: 0.6 MG/DL (ref 0.5–1.4)
DIFFERENTIAL METHOD: ABNORMAL
DIFFERENTIAL METHOD: ABNORMAL
EOSINOPHIL # BLD AUTO: 0.2 K/UL (ref 0–0.5)
EOSINOPHIL # BLD AUTO: 0.2 K/UL (ref 0–0.5)
EOSINOPHIL NFR BLD: 5.1 % (ref 0–8)
EOSINOPHIL NFR BLD: 5.1 % (ref 0–8)
ERYTHROCYTE [DISTWIDTH] IN BLOOD BY AUTOMATED COUNT: 12.5 % (ref 11.5–14.5)
ERYTHROCYTE [DISTWIDTH] IN BLOOD BY AUTOMATED COUNT: 12.5 % (ref 11.5–14.5)
EST. GFR  (AFRICAN AMERICAN): >60 ML/MIN/1.73 M^2
EST. GFR  (NON AFRICAN AMERICAN): >60 ML/MIN/1.73 M^2
GLUCOSE SERPL-MCNC: 81 MG/DL (ref 70–110)
HCT VFR BLD AUTO: 31.3 % (ref 37–48.5)
HCT VFR BLD AUTO: 31.3 % (ref 37–48.5)
HGB BLD-MCNC: 10.4 G/DL (ref 12–16)
HGB BLD-MCNC: 10.4 G/DL (ref 12–16)
LYMPHOCYTES # BLD AUTO: 1.3 K/UL (ref 1–4.8)
LYMPHOCYTES # BLD AUTO: 1.3 K/UL (ref 1–4.8)
LYMPHOCYTES NFR BLD: 33.9 % (ref 18–48)
LYMPHOCYTES NFR BLD: 33.9 % (ref 18–48)
MCH RBC QN AUTO: 31.3 PG (ref 27–31)
MCH RBC QN AUTO: 31.3 PG (ref 27–31)
MCHC RBC AUTO-ENTMCNC: 33.2 G/DL (ref 32–36)
MCHC RBC AUTO-ENTMCNC: 33.2 G/DL (ref 32–36)
MCV RBC AUTO: 94 FL (ref 82–98)
MCV RBC AUTO: 94 FL (ref 82–98)
MONOCYTES # BLD AUTO: 0.3 K/UL (ref 0.3–1)
MONOCYTES # BLD AUTO: 0.3 K/UL (ref 0.3–1)
MONOCYTES NFR BLD: 7.5 % (ref 4–15)
MONOCYTES NFR BLD: 7.5 % (ref 4–15)
NEUTROPHILS # BLD AUTO: 2 K/UL (ref 1.8–7.7)
NEUTROPHILS # BLD AUTO: 2 K/UL (ref 1.8–7.7)
NEUTROPHILS NFR BLD: 53.5 % (ref 38–73)
NEUTROPHILS NFR BLD: 53.5 % (ref 38–73)
PLATELET # BLD AUTO: 161 K/UL (ref 150–350)
PLATELET # BLD AUTO: 161 K/UL (ref 150–350)
PMV BLD AUTO: 9.2 FL (ref 9.2–12.9)
PMV BLD AUTO: 9.2 FL (ref 9.2–12.9)
POTASSIUM SERPL-SCNC: 4 MMOL/L (ref 3.5–5.1)
PROT SERPL-MCNC: 6.3 G/DL (ref 6–8.4)
RBC # BLD AUTO: 3.32 M/UL (ref 4–5.4)
RBC # BLD AUTO: 3.32 M/UL (ref 4–5.4)
SODIUM SERPL-SCNC: 136 MMOL/L (ref 136–145)
WBC # BLD AUTO: 3.75 K/UL (ref 3.9–12.7)
WBC # BLD AUTO: 3.75 K/UL (ref 3.9–12.7)

## 2019-08-31 PROCEDURE — 99223 PR INITIAL HOSPITAL CARE,LEVL III: ICD-10-PCS | Mod: ,,, | Performed by: INTERNAL MEDICINE

## 2019-08-31 PROCEDURE — 11000001 HC ACUTE MED/SURG PRIVATE ROOM

## 2019-08-31 PROCEDURE — 36415 COLL VENOUS BLD VENIPUNCTURE: CPT

## 2019-08-31 PROCEDURE — 80053 COMPREHEN METABOLIC PANEL: CPT

## 2019-08-31 PROCEDURE — 25000003 PHARM REV CODE 250: Performed by: HOSPITALIST

## 2019-08-31 PROCEDURE — 63600175 PHARM REV CODE 636 W HCPCS: Performed by: HOSPITALIST

## 2019-08-31 PROCEDURE — 99223 1ST HOSP IP/OBS HIGH 75: CPT | Mod: ,,, | Performed by: INTERNAL MEDICINE

## 2019-08-31 PROCEDURE — 85730 THROMBOPLASTIN TIME PARTIAL: CPT

## 2019-08-31 PROCEDURE — 94760 N-INVAS EAR/PLS OXIMETRY 1: CPT

## 2019-08-31 PROCEDURE — 85730 THROMBOPLASTIN TIME PARTIAL: CPT | Mod: 91

## 2019-08-31 PROCEDURE — 85025 COMPLETE CBC W/AUTO DIFF WBC: CPT

## 2019-08-31 RX ADMIN — MORPHINE SULFATE 5 MG: 10 INJECTION INTRAVENOUS at 05:08

## 2019-08-31 RX ADMIN — MORPHINE SULFATE 5 MG: 10 INJECTION INTRAVENOUS at 08:08

## 2019-08-31 RX ADMIN — FAMOTIDINE 20 MG: 20 TABLET ORAL at 08:08

## 2019-08-31 RX ADMIN — OXYCODONE HYDROCHLORIDE AND ACETAMINOPHEN 1 TABLET: 10; 325 TABLET ORAL at 12:08

## 2019-08-31 RX ADMIN — OXYCODONE HYDROCHLORIDE AND ACETAMINOPHEN 1 TABLET: 10; 325 TABLET ORAL at 06:08

## 2019-08-31 RX ADMIN — OXYCODONE HYDROCHLORIDE AND ACETAMINOPHEN 1 TABLET: 10; 325 TABLET ORAL at 11:08

## 2019-08-31 RX ADMIN — HEPARIN SODIUM 18 UNITS/KG/HR: 10000 INJECTION, SOLUTION INTRAVENOUS at 06:08

## 2019-08-31 RX ADMIN — OXYCODONE HYDROCHLORIDE AND ACETAMINOPHEN 1 TABLET: 10; 325 TABLET ORAL at 04:08

## 2019-08-31 NOTE — PROGRESS NOTES
Patient in room with safety in place. No s/s of distress. AOx4. [place] to room care.  at bedside. Call light in reach. Alarms active and audible.

## 2019-08-31 NOTE — H&P
"[facility] Hospital Medicine  History & Physical    Patient Name: Antoinette Love  MRN: [id number]  Admission Date: 08/30/2019  Attending Physician: Bk Mendoza MD, MPH      PCP:     Alberto Holguin MD    CC:     Chief Complaint   Patient presents with    Leg Swelling     Pt c/o left leg pain/swelling x3 days. Reports had ultrasound done today and was told she has a dvt from "illiac down to ankle." Hx DVT takes Lovonox daily. Denies CP and SOB       HISTORY OF PRESENT ILLNESS:     Antoinette Love is a 29 y.o. female that (in part)  has a past medical history of Anticoagulant long-term use, DVT (deep venous thrombosis), Factor V Leiden, Pulmonary embolus, Recurrent upper respiratory infection (URI), and TIA (transient ischemic attack).  has a past surgical history that includes IVC filter retrieval; tumor from breast; [place] tooth extraction; and Colonoscopy (N/A, 12/18/2015). Presents to [facility] Emergency Department complaining of left leg swelling. This is a recurrent problem for her.  Three days duration.  Was sent for ultrasound for evaluation and was positive for extensive DVT from the iliac distal to the ankle.  Patient follows with Hematology at [facility].  She is compliant with home medication regimen including daily Lovenox.  She has failed oral anticoagulants including Xarelto, warfarin, and Eliquis for various reasons.  Edema located in left lower extremity.  Constant duration.  Daily frequency.  Moderate severely.  No relieving factors.  Exacerbated by mutation of factor 5 Leiden gene obtained from both parents, per the patient.  No cough, shortness of breath, hemoptysis, stridor, wheezing, or night sweats.    In the emergency department he heparin infusion was initiated.  Hematology was consulted.  Pending further evaluation and recommendations.    Hospital medicine has been asked to admit for further evaluation and " treatment.       REVIEW OF SYSTEMS:     -- Constitutional: No fever or chills.  -- Eyes: No visual changes, diplopia, pain, tearing, blind spots, or discharge.   -- Ears, nose, mouth, throat, and face: No congestion, sore throat, epistaxis, d/c, bleeding gums, neck stiffness masses, or dental issues.  -- Respiratory:  As above in the HPI  -- Cardiovascular:  Edema of left lower extremity.  No chest pain, FERNANDO, syncope, PND, cyanosis, or palpitations.   -- Gastrointestinal: No vomiting, abdominal pain, hematemesis, melena, dyspepsia, or change in bowel habits.  -- Genitourinary: No hematuria, dysuria, frequency, urgency, nocturia, polyuria, stones, or incontinence.  -- Integument/breast: No rash, pruritis, pigmentation changes, dryness, or changes in hair  -- Hematologic/lymphatic: No easy bruising or lymphadenopathy.   -- Musculoskeletal:  Left lower extremity edema.  No acute arthralgias, acute myalgias, joint swelling, acute limitations of ROM, or acute muscular weakness.  -- Neurological: No seizures, headaches, incoordination, paraesthesias, ataxia, vertigo, or tremors.  -- Behavioral/Psych: No auditory or visual hallucinations, depression, or suicidal/homicidal ideations.  -- Endocrine: No heat or cold intolerance, polydipsia, or unintentional weight gain / loss.  -- Allergy/Immunologic: No recurrent infections or adverse reaction to food, insects, or difficulty breathing.      PAST MEDICAL / SURGICAL HISTORY:     Past Medical History:   Diagnosis Date    Anticoagulant long-term use     DVT (deep venous thrombosis)     Factor V Leiden     Pulmonary embolus     Recurrent upper respiratory infection (URI)     TIA (transient ischemic attack)      Past Surgical History:   Procedure Laterality Date    COLONOSCOPY N/A 12/18/2015    Performed by Lefty Lee MD at [facility] ENDO (4TH FLR)    IVC FILTER RETRIEVAL      tumor from breast      left    WISDOM TOOTH EXTRACTION           FAMILY HISTORY:     Family History    Problem Relation Age of Onset    Allergies Mother         azithromycin         SOCIAL HISTORY:     Social History     Socioeconomic History    Marital status: Single     Spouse name: Not on file    Number of children: Not on file    Years of education: Not on file    Highest education level: Not on file   Occupational History    Not on file   Social Needs    Financial resource strain: Not very hard    Food insecurity:     Worry: Never true     Inability: Never true    Transportation needs:     Medical: No     Non-medical: No   Tobacco Use    Smoking status: Never Smoker    Smokeless tobacco: Never Used   Substance and Sexual Activity    Alcohol use: No     Frequency: Monthly or less     Drinks per session: 3 or 4     Binge frequency: Never    Drug use: No    Sexual activity: Yes     Partners: Male   Lifestyle    Physical activity:     Days per week: 4 days     Minutes per session: 150+ min    Stress: Rather much   Relationships    Social connections:     Talks on phone: More than three times a week     Gets together: More than three times a week     Attends Spiritism service: Not on file     Active member of club or organization: Yes     Attends meetings of clubs or organizations: 1 to 4 times per year     Relationship status: Living with partner   Other Topics Concern    Not on file   Social History Narrative    Not on file         ALLERGIES:       Review of patient's allergies indicates:   Allergen Reactions    Azithromycin Hives    Toradol [ketorolac] Hives     Itching and hives    Xarelto [rivaroxaban]      Gallbladder swelling and disfunction         HOME MEDICATIONS:     Prior to Admission medications    Medication Sig Start Date End Date Taking? Authorizing Provider   cetirizine (ZYRTEC) 10 MG tablet Take 2 tablets (20 mg total) by mouth 2 (two) times daily. For chronic urticaria 3/28/19 3/27/20 Yes Luis A Junior MD   enoxaparin (LOVENOX) 80 mg/0.8 mL Syrg Inject 0.8 mLs (80  "mg total) into the skin 2 (two) times daily. 8/22/19  Yes Earnestine Garcia MD   m-vit,tx,iron,mins/calc/folic (MULTIVITAMIN) Tab    Yes Historical Provider, MD   albuterol (VENTOLIN HFA) 90 mcg/actuation inhaler Inhale 2 puffs into the lungs every 6 (six) hours as needed for Wheezing. Rescue 8/8/19 8/7/20  Magi Blood NP   diazePAM (VALIUM) 5 MG tablet Take 1 tablet (5 mg total) by mouth every 12 (twelve) hours as needed for Anxiety. 7/31/19 8/30/19  Alberto Holguin MD   EPINEPHrine (EPIPEN 2-AVN) 0.3 mg/0.3 mL AtIn Inject 0.3 mLs (0.3 mg total) into the muscle once. for 1 dose 3/28/19 3/28/19  Luis A Junior MD   montelukast (SINGULAIR) 10 mg tablet Take 1 tablet (10 mg total) by mouth every evening. 8/8/19 9/7/19  Magi Blood NP   diclofenac sodium (VOLTAREN) 1 % Gel Apply topically. 5/4/17 8/30/19  Historical Provider, MD          [facility] MEDICATIONS:     Scheduled Meds:    [START ON 8/31/2019] famotidine  20 mg Oral Daily     Continuous Infusions:    sodium chloride 0.9% Stopped (08/30/19 2300)    heparin (porcine) in D5W 18 Units/kg/hr (08/30/19 2109)     PRN Meds: acetaminophen, heparin (PORCINE), heparin (PORCINE), ondansetron, sodium chloride 0.9%      PHYSICAL EXAM:     Wt Readings from Last 1 Encounters:   08/30/19 2259 78.9 kg (174 lb)   08/30/19 1837 77.1 kg (170 lb)     Body mass index is 28.96 kg/m².  Vitals:    08/30/19 2133 08/30/19 2159 08/30/19 2250 08/30/19 2259   BP: (!) 128/58 128/71 126/77    BP Location:  Left arm Left arm    Patient Position:  Lying Lying    Pulse: 71 71 74    Resp:  18 18    Temp:  98.1 °F (36.7 °C) 97.6 °F (36.4 °C)    TempSrc:   Oral    SpO2: 100% 100% 100%    Weight:    78.9 kg (174 lb)   Height:    5' 5" (1.651 m)          -- General appearance: well developed. appears stated age   -- Head: normocephalic, atraumatic   -- Eyes: conjunctivae clear. Extraocular muscles intact  -- Nose: Nares normal. Septum midline.   -- Mouth/Throat: lips, " mucosa, and tongue normal. no throat erythema.   -- Neck: supple, symmetrical, trachea midline, no JVD and thyroid not grossly enlarged, appears symmetric  -- Lungs: clear to auscultation bilaterally. normal respiratory effort. No use of accessory muscles.   -- Chest wall: no tenderness. equal bilateral chest rise   -- Heart: regular rate and regular rhythm. S1, S2 normal.  no click, rub or gallop   -- Abdomen:  Overweight, soft, non-tender, non-distended, non-tympanic; bowel sounds normal; no masses  -- Extremities:  Left lower extremity edema. Palpable pulses.  -- Pulses: 2+ and symmetric   -- Skin:  Turgor normal. Color normal. Texture normal. No rashes or lesions.   -- Neurologic: Normal strength and tone. No focal numbness or weakness. CNII-XII intact. Ceres coma scale: eyes open spontaneously-4, oriented & converses-5, obeys commands-6.      LABORATORY STUDIES:     Recent Results (from the past 36 hour(s))   CBC auto differential    Collection Time: 08/30/19  8:46 PM   Result Value Ref Range    WBC 4.61 3.90 - 12.70 K/uL    RBC 3.58 (L) 4.00 - 5.40 M/uL    Hemoglobin 11.6 (L) 12.0 - 16.0 g/dL    Hematocrit 33.3 (L) 37.0 - 48.5 %    Mean Corpuscular Volume 93 82 - 98 fL    Mean Corpuscular Hemoglobin 32.4 (H) 27.0 - 31.0 pg    Mean Corpuscular Hemoglobin Conc 34.8 32.0 - 36.0 g/dL    RDW 12.6 11.5 - 14.5 %    Platelets 183 150 - 350 K/uL    MPV 9.1 (L) 9.2 - 12.9 fL    Gran # (ANC) 2.7 1.8 - 7.7 K/uL    Lymph # 1.4 1.0 - 4.8 K/uL    Mono # 0.3 0.3 - 1.0 K/uL    Eos # 0.2 0.0 - 0.5 K/uL    Baso # 0.01 0.00 - 0.20 K/uL    Gran% 58.7 38.0 - 73.0 %    Lymph% 30.6 18.0 - 48.0 %    Mono% 6.1 4.0 - 15.0 %    Eosinophil% 4.6 0.0 - 8.0 %    Basophil% 0.2 0.0 - 1.9 %    Differential Method Automated    Comprehensive metabolic panel    Collection Time: 08/30/19  8:46 PM   Result Value Ref Range    Sodium 138 136 - 145 mmol/L    Potassium 4.1 3.5 - 5.1 mmol/L    Chloride 105 95 - 110 mmol/L    CO2 22 (L) 23 - 29 mmol/L     Glucose 76 70 - 110 mg/dL    BUN, Bld 10 6 - 20 mg/dL    Creatinine 0.7 0.5 - 1.4 mg/dL    Calcium 9.7 8.7 - 10.5 mg/dL    Total Protein 7.8 6.0 - 8.4 g/dL    Albumin 4.0 3.5 - 5.2 g/dL    Total Bilirubin 0.4 0.1 - 1.0 mg/dL    Alkaline Phosphatase 67 55 - 135 U/L    AST 11 10 - 40 U/L    ALT 9 (L) 10 - 44 U/L    Anion Gap 11 8 - 16 mmol/L    eGFR if African American >60 >60 mL/min/1.73 m^2    eGFR if non African American >60 >60 mL/min/1.73 m^2   APTT    Collection Time: 08/30/19  8:46 PM   Result Value Ref Range    aPTT 30.8 21.0 - 32.0 sec   Protime-INR    Collection Time: 08/30/19  8:46 PM   Result Value Ref Range    Prothrombin Time 10.2 9.0 - 12.5 sec    INR 1.0 0.8 - 1.2   CBC auto differential    Collection Time: 08/30/19 11:14 PM   Result Value Ref Range    WBC 4.48 3.90 - 12.70 K/uL    RBC 3.70 (L) 4.00 - 5.40 M/uL    Hemoglobin 11.7 (L) 12.0 - 16.0 g/dL    Hematocrit 34.6 (L) 37.0 - 48.5 %    Mean Corpuscular Volume 94 82 - 98 fL    Mean Corpuscular Hemoglobin 31.6 (H) 27.0 - 31.0 pg    Mean Corpuscular Hemoglobin Conc 33.8 32.0 - 36.0 g/dL    RDW 12.6 11.5 - 14.5 %    Platelets 167 150 - 350 K/uL    MPV 8.9 (L) 9.2 - 12.9 fL    Gran # (ANC) 2.6 1.8 - 7.7 K/uL    Lymph # 1.4 1.0 - 4.8 K/uL    Mono # 0.3 0.3 - 1.0 K/uL    Eos # 0.2 0.0 - 0.5 K/uL    Baso # 0.01 0.00 - 0.20 K/uL    Gran% 57.8 38.0 - 73.0 %    Lymph% 31.0 18.0 - 48.0 %    Mono% 6.7 4.0 - 15.0 %    Eosinophil% 4.5 0.0 - 8.0 %    Basophil% 0.2 0.0 - 1.9 %    Differential Method Automated        Lab Results   Component Value Date    INR 1.0 08/30/2019    INR 0.9 07/20/2019    INR 1.3 (H) 03/17/2016     No results found for: HGBA1C  No results for input(s): POCTGLUCOSE in the last 72 hours.            IMAGING:     Imaging Results          X-Ray Chest AP Portable (Final result)  Result time 08/30/19 19:35:05    Final result by Kyle Rojas MD (08/30/19 19:35:05)                 Impression:      No acute intrathoracic process  identified.      Electronically signed by: Kyle Rojas MD  Date:    08/30/2019  Time:    19:35             Narrative:    EXAMINATION:  XR CHEST AP PORTABLE    CLINICAL HISTORY:  Acute embolism and thrombosis of unspecified deep veins of unspecified lower extremity    TECHNIQUE:  Single frontal view of the chest was performed.    COMPARISON:  September 2015.    FINDINGS:  Cardiac silhouette is normal in size.  Lungs are symmetrically expanded.  No evidence of focal consolidative process, pneumothorax, or significant effusion.  No acute osseous abnormality identified.                                  CONSULTS:     IP CONSULT TO HEMATOLOGY       ASSESSMENT & PLAN:     Primary Diagnosis:  Recurrent acute deep vein thrombosis of left lower extremity    Active Hospital Problems    Diagnosis  POA    *Recurrent acute deep vein thrombosis of left lower extremity [I82.402]  Yes     Priority: 1 - High     Unprovoked.  On Lovenox.      Factor V Leiden mutation [D68.51]  Yes     Priority: 2      Follow by Hematology      DVT (deep venous thrombosis) [I82.409]  Yes    History of pulmonary embolism [Z86.711]  Yes     Provoked and unprovoked, on lovenox        Resolved Hospital Problems   No resolved problems to display.         Recurrent acute deep venous thrombosis of left lower extremity  · As evidence by history, physical exam, and ultrasound imaging  · Underlying history of factor 5 Leiden mutation.    · Previous history of pulmonary embolism and lower extremity DVTs.  · She has failed oral anticoagulants including Xarelto, warfarin, and Eliquis for various reasons.  · Currently on Lovenox  · Heparin infusion initiated  · Pain control  · Hematology consult for further evaluation and recommendations        VTE Risk Mitigation (From admission, onward)        Ordered     IP VTE HIGH RISK PATIENT  Once      08/30/19 2258     Reason for no Mechanical VTE Prophylaxis  Once      08/30/19 2258     heparin 25,000 units in  dextrose 5% (100 units/ml) IV bolus from bag - ADDITIONAL PRN BOLUS - 60 units/kg  As needed (PRN)      08/30/19 1907     heparin 25,000 units in dextrose 5% (100 units/ml) IV bolus from bag - ADDITIONAL PRN BOLUS - 30 units/kg  As needed (PRN)      08/30/19 1907     heparin 25,000 units in dextrose 5% 250 mL (100 units/mL) infusion HIGH INTENSITY nomogram - [facility]  Continuous      08/30/19 1907            Adult PRN medications available   DVT prophylaxis given       DISPOSITION:     Will admit to the Hospital Medicine service for further evaluation and treatment.    Chart reviewed and updated where applicable.    High Risk Conditions:  Patient has a condition that poses threat to life and bodily function:  Recurrent DVT of left lower extremity      ===============================================================    Bk Mendoza MD, MPH  Department of Hospital Medicine   [facility]  [phone] pg  (7pm - 6am)          This note is dictated using ADOR voice recognition software.  There are word recognition mistakes that are occasionally missed on review.

## 2019-08-31 NOTE — PLAN OF CARE
Problem: Adult Inpatient Plan of Care  Goal: Plan of Care Review  Patient has no s/s of bleeding tolerates continued  Heparin drip and understands need for titrating meds. Patient Bp drop while resting and states her BP and HR drops during her rest periods and she is fully aware. Patient has no s/s of distress. No changes to LLE. Calm and accepting of care. No cough. No SOB. Pain requires PRN oral meds. Comfort met no IV pain meds during shift.

## 2019-08-31 NOTE — PLAN OF CARE
Problem: Adult Inpatient Plan of Care  Goal: Plan of Care Review  Outcome: Ongoing (interventions implemented as appropriate)  Pt free from falls, injury or any further trauma throughout shift. Pt AAOx4. Continued medications as ordered. Complaints of pain to LLE, continuous heparin infusing, monitoring aPTT. Pt up to BSC with assistance. Pt in no distress. Will cont to monitor.    08/31/19 [facility]   Plan of Care Review   Plan of Care Reviewed With patient

## 2019-08-31 NOTE — NURSING
Repeat PTT 58.7. According to nomogram, PTT is therapeutic so no change is needed. Will recheck with AM labs on 9/1. Will cont to monitor.

## 2019-08-31 NOTE — PROGRESS NOTES
RN spoke with pharmacy for clarification of  heparin drip calculation and order. Pharmacy states patient should remain on 11.7ml/hr. RN will inform charge and adjust.

## 2019-08-31 NOTE — ASSESSMENT & PLAN NOTE
Recent u/s shows progression of clot to left thigh  Pain control  Heparin infusion  Hematology consulted for further recs  IR ?mechanical thrombolysis

## 2019-08-31 NOTE — HPI
Antoinette Love is a 29 y.o. female that (in part)  has a past medical history of DVT (deep venous thrombosis), Factor V Leiden, Pulmonary embolus, Recurrent upper respiratory infection (URI), and TIA (transient ischemic attack).  has a past surgical history that includes IVC filter retrieval; tumor from breast; [place] tooth extraction; and Colonoscopy (N/A, 12/18/2015). Presents to [facility] Emergency Department complaining of left leg swelling. This is a recurrent problem for her.  Three days duration.  Was sent for ultrasound for evaluation and was positive for extensive DVT from the iliac distal to the ankle.  Patient follows with Hematology at [facility].  She is compliant with home medication regimen including daily Lovenox.  She has failed oral anticoagulants including Xarelto, warfarin, and Eliquis for various reasons.  Edema located in left lower extremity.  Constant duration.  Daily frequency.  Moderate severely.  No relieving factors.  Exacerbated by mutation of factor 5 Leiden gene obtained from both parents, per the patient.  No cough, shortness of breath, hemoptysis, stridor, wheezing, or night sweats.    In the emergency department he heparin infusion was initiated.  Hematology was consulted.  Pending further evaluation and recommendations.    Hospital medicine has been asked to admit for further evaluation and treatment.

## 2019-08-31 NOTE — PROGRESS NOTES
[facility] Hospital Medicine  Progress Note    Patient Name: Antoinette Love  MRN: [id number]  Patient Class: IP- Inpatient   Admission Date: 8/30/2019  Length of Stay: 1 days  Attending Physician: Sheri Louis MD  Primary Care Provider: Alberto Holguin MD        Subjective:     Principal Problem:Recurrent acute deep vein thrombosis of left lower extremity        HPI:  Antoinette Love is a 29 y.o. female that (in part)  has a past medical history of DVT (deep venous thrombosis), Factor V Leiden, Pulmonary embolus, Recurrent upper respiratory infection (URI), and TIA (transient ischemic attack).  has a past surgical history that includes IVC filter retrieval; tumor from breast; [place] tooth extraction; and Colonoscopy (N/A, 12/18/2015). Presents to [facility] Emergency Department complaining of left leg swelling. This is a recurrent problem for her.  Three days duration.  Was sent for ultrasound for evaluation and was positive for extensive DVT from the iliac distal to the ankle.  Patient follows with Hematology at [facility].  She is compliant with home medication regimen including daily Lovenox.  She has failed oral anticoagulants including Xarelto, warfarin, and Eliquis for various reasons.  Edema located in left lower extremity.  Constant duration.  Daily frequency.  Moderate severely.  No relieving factors.  Exacerbated by mutation of factor 5 Leiden gene obtained from both parents, per the patient.  No cough, shortness of breath, hemoptysis, stridor, wheezing, or night sweats.    In the emergency department he heparin infusion was initiated.  Hematology was consulted.  Pending further evaluation and recommendations.    Hospital medicine has been asked to admit for further evaluation and treatment.     Overview/Hospital Course:  Pt admitted with recurrent DVT. Seen last week in hematology clinic and told to increase lovenox to BID. Pt left  leg is swollen and tender to touch. Limited range of motion due to pain and swelling. H/o Factor 5 defic.   Hematology consulted. Currently on heparin infusion. Consult IR for possible mechanical thrombolysis.    Interval History: c/o intractable pain to left thigh and hip     Review of Systems   Constitutional: Positive for fatigue.   HENT: Negative.    Eyes: Negative.    Respiratory: Negative.    Cardiovascular: Positive for leg swelling.   Gastrointestinal: Negative.    Endocrine: Negative.    Genitourinary: Negative.    Musculoskeletal: Positive for gait problem and joint swelling.   Psychiatric/Behavioral: Negative.      Objective:     Vital Signs (Most Recent):  Temp: 99 °F (37.2 °C) (08/31/19 1628)  Pulse: 91 (08/31/19 1628)  Resp: 18 (08/31/19 1628)  BP: (!) 111/55 (08/31/19 1628)  SpO2: 100 % (08/31/19 1628) Vital Signs (24h Range):  Temp:  [97.6 °F (36.4 °C)-99 °F (37.2 °C)] 99 °F (37.2 °C)  Pulse:  [66-91] 91  Resp:  [16-18] 18  SpO2:  [99 %-100 %] 100 %  BP: ()/(53-77) 111/55     Weight: 78.9 kg (174 lb)  Body mass index is 28.96 kg/m².    Intake/Output Summary (Last 24 hours) at 8/31/2019 1649  Last data filed at 8/31/2019 1231  Gross per 24 hour   Intake 240 ml   Output --   Net 240 ml      Physical Exam   Constitutional: She is oriented to person, place, and time. She appears well-developed and well-nourished. She appears distressed.   Eyes: Pupils are equal, round, and reactive to light. EOM are normal.   Neck: Normal range of motion. Neck supple.   Cardiovascular: Normal rate, regular rhythm, normal heart sounds and intact distal pulses.   Pulmonary/Chest: Effort normal and breath sounds normal. No respiratory distress. She has no rales.   Abdominal: Soft. Bowel sounds are normal. She exhibits no distension.   Musculoskeletal: She exhibits edema and tenderness.   Neurological: She is alert and oriented to person, place, and time. No sensory deficit.   Skin: Skin is warm and dry. Capillary  refill takes less than 2 seconds.   Psychiatric: She has a normal mood and affect. Her behavior is normal.       Significant Labs: All pertinent labs within the past 24 hours have been reviewed.    Significant Imaging: I have reviewed and interpreted all pertinent imaging results/findings within the past 24 hours.      Assessment/Plan:      * Recurrent acute deep vein thrombosis of left lower extremity  Recent u/s shows progression of clot to left thigh  Pain control  Heparin infusion  Hematology consulted for further recs  IR ?mechanical thrombolysis       DVT (deep venous thrombosis)        History of pulmonary embolism  No acute issues       Factor V Leiden mutation  Failed or complications of eliquis, xarelto and coumadin  Hematology consulted for further recs        VTE Risk Mitigation (From admission, onward)        Ordered     IP VTE HIGH RISK PATIENT  Once      08/30/19 2258     Reason for no Mechanical VTE Prophylaxis  Once      08/30/19 2258     heparin 25,000 units in dextrose 5% (100 units/ml) IV bolus from bag - ADDITIONAL PRN BOLUS - 60 units/kg  As needed (PRN)      08/30/19 1907     heparin 25,000 units in dextrose 5% (100 units/ml) IV bolus from bag - ADDITIONAL PRN BOLUS - 30 units/kg  As needed (PRN)      08/30/19 1907     heparin 25,000 units in dextrose 5% 250 mL (100 units/mL) infusion HIGH INTENSITY nomogram - [facility]  Continuous      08/30/19 1907                Sheri Louis MD  Department of Hospital Medicine   [facility]

## 2019-08-31 NOTE — HOSPITAL COURSE
Pt admitted with recurrent DVT. Seen last week in hematology clinic and told to increase lovenox to BID. Pt left leg is swollen and tender to touch. Limited range of motion due to pain and swelling. H/o Factor 5 defic. Hematology consulted. Started on heparin infusion. Consulted IR for possible mechanical thrombolysis.  Pt is s/p thrombolysis of left leg extensive DVT on 9/2.  Some bleeding after procedure.  Continued on heparin gtt.    Patient with apparent May Thurner Syndrome (recurrent ileofemoral DVT where right common iliac artery overlies and compresses left common iliac vein against lumbar spine).  IR planning to retrieve IVC filter and possible iliac vein stent placement in next couple of weeks.  She has remained afebrile and hemodynamically stable.  She will be placed back on therapeutic Lovenox bid and discharged home.  Patient to wear thigh high compression stalking during waking hours.  She will be contacted for procedure appointment.

## 2019-08-31 NOTE — SUBJECTIVE & OBJECTIVE
Interval History: c/o intractable pain to left thigh and hip     Review of Systems   Constitutional: Positive for fatigue.   HENT: Negative.    Eyes: Negative.    Respiratory: Negative.    Cardiovascular: Positive for leg swelling.   Gastrointestinal: Negative.    Endocrine: Negative.    Genitourinary: Negative.    Musculoskeletal: Positive for gait problem and joint swelling.   Psychiatric/Behavioral: Negative.      Objective:     Vital Signs (Most Recent):  Temp: 99 °F (37.2 °C) (08/31/19 1628)  Pulse: 91 (08/31/19 1628)  Resp: 18 (08/31/19 1628)  BP: (!) 111/55 (08/31/19 1628)  SpO2: 100 % (08/31/19 1628) Vital Signs (24h Range):  Temp:  [97.6 °F (36.4 °C)-99 °F (37.2 °C)] 99 °F (37.2 °C)  Pulse:  [66-91] 91  Resp:  [16-18] 18  SpO2:  [99 %-100 %] 100 %  BP: ()/(53-77) 111/55     Weight: 78.9 kg (174 lb)  Body mass index is 28.96 kg/m².    Intake/Output Summary (Last 24 hours) at 8/31/2019 1649  Last data filed at 8/31/2019 1231  Gross per 24 hour   Intake 240 ml   Output --   Net 240 ml      Physical Exam   Constitutional: She is oriented to person, place, and time. She appears well-developed and well-nourished. She appears distressed.   Eyes: Pupils are equal, round, and reactive to light. EOM are normal.   Neck: Normal range of motion. Neck supple.   Cardiovascular: Normal rate, regular rhythm, normal heart sounds and intact distal pulses.   Pulmonary/Chest: Effort normal and breath sounds normal. No respiratory distress. She has no rales.   Abdominal: Soft. Bowel sounds are normal. She exhibits no distension.   Musculoskeletal: She exhibits edema and tenderness.   Neurological: She is alert and oriented to person, place, and time. No sensory deficit.   Skin: Skin is warm and dry. Capillary refill takes less than 2 seconds.   Psychiatric: She has a normal mood and affect. Her behavior is normal.       Significant Labs: All pertinent labs within the past 24 hours have been reviewed.    Significant  Imaging: I have reviewed and interpreted all pertinent imaging results/findings within the past 24 hours.

## 2019-09-01 LAB
ALBUMIN SERPL BCP-MCNC: 3.2 G/DL (ref 3.5–5.2)
ALP SERPL-CCNC: 70 U/L (ref 55–135)
ALT SERPL W/O P-5'-P-CCNC: 11 U/L (ref 10–44)
ANION GAP SERPL CALC-SCNC: 8 MMOL/L (ref 8–16)
APTT BLDCRRT: 47.5 SEC (ref 21–32)
APTT BLDCRRT: 63.9 SEC (ref 21–32)
AST SERPL-CCNC: 12 U/L (ref 10–40)
BASOPHILS # BLD AUTO: 0.01 K/UL (ref 0–0.2)
BASOPHILS NFR BLD: 0.3 % (ref 0–1.9)
BILIRUB SERPL-MCNC: 0.3 MG/DL (ref 0.1–1)
BUN SERPL-MCNC: 7 MG/DL (ref 6–20)
CALCIUM SERPL-MCNC: 9.2 MG/DL (ref 8.7–10.5)
CHLORIDE SERPL-SCNC: 104 MMOL/L (ref 95–110)
CO2 SERPL-SCNC: 25 MMOL/L (ref 23–29)
CREAT SERPL-MCNC: 0.7 MG/DL (ref 0.5–1.4)
DIFFERENTIAL METHOD: ABNORMAL
EOSINOPHIL # BLD AUTO: 0.1 K/UL (ref 0–0.5)
EOSINOPHIL NFR BLD: 3.8 % (ref 0–8)
ERYTHROCYTE [DISTWIDTH] IN BLOOD BY AUTOMATED COUNT: 12.5 % (ref 11.5–14.5)
EST. GFR  (AFRICAN AMERICAN): >60 ML/MIN/1.73 M^2
EST. GFR  (NON AFRICAN AMERICAN): >60 ML/MIN/1.73 M^2
GLUCOSE SERPL-MCNC: 88 MG/DL (ref 70–110)
HCT VFR BLD AUTO: 32.3 % (ref 37–48.5)
HGB BLD-MCNC: 10.9 G/DL (ref 12–16)
LYMPHOCYTES # BLD AUTO: 1.3 K/UL (ref 1–4.8)
LYMPHOCYTES NFR BLD: 35.4 % (ref 18–48)
MCH RBC QN AUTO: 31.3 PG (ref 27–31)
MCHC RBC AUTO-ENTMCNC: 33.7 G/DL (ref 32–36)
MCV RBC AUTO: 93 FL (ref 82–98)
MONOCYTES # BLD AUTO: 0.2 K/UL (ref 0.3–1)
MONOCYTES NFR BLD: 6.3 % (ref 4–15)
NEUTROPHILS # BLD AUTO: 2 K/UL (ref 1.8–7.7)
NEUTROPHILS NFR BLD: 54.5 % (ref 38–73)
PLATELET # BLD AUTO: 169 K/UL (ref 150–350)
PMV BLD AUTO: 8.9 FL (ref 9.2–12.9)
POTASSIUM SERPL-SCNC: 3.9 MMOL/L (ref 3.5–5.1)
PROT SERPL-MCNC: 6.9 G/DL (ref 6–8.4)
RBC # BLD AUTO: 3.48 M/UL (ref 4–5.4)
SODIUM SERPL-SCNC: 137 MMOL/L (ref 136–145)
WBC # BLD AUTO: 3.64 K/UL (ref 3.9–12.7)

## 2019-09-01 PROCEDURE — 36415 COLL VENOUS BLD VENIPUNCTURE: CPT

## 2019-09-01 PROCEDURE — 63600175 PHARM REV CODE 636 W HCPCS: Performed by: HOSPITALIST

## 2019-09-01 PROCEDURE — 80053 COMPREHEN METABOLIC PANEL: CPT

## 2019-09-01 PROCEDURE — 85025 COMPLETE CBC W/AUTO DIFF WBC: CPT

## 2019-09-01 PROCEDURE — 99223 1ST HOSP IP/OBS HIGH 75: CPT | Mod: ,,, | Performed by: INTERNAL MEDICINE

## 2019-09-01 PROCEDURE — 99223 PR INITIAL HOSPITAL CARE,LEVL III: ICD-10-PCS | Mod: ,,, | Performed by: INTERNAL MEDICINE

## 2019-09-01 PROCEDURE — 85730 THROMBOPLASTIN TIME PARTIAL: CPT | Mod: 91

## 2019-09-01 PROCEDURE — 85730 THROMBOPLASTIN TIME PARTIAL: CPT

## 2019-09-01 PROCEDURE — 94761 N-INVAS EAR/PLS OXIMETRY MLT: CPT

## 2019-09-01 PROCEDURE — 25000003 PHARM REV CODE 250: Performed by: HOSPITALIST

## 2019-09-01 PROCEDURE — 11000001 HC ACUTE MED/SURG PRIVATE ROOM

## 2019-09-01 RX ADMIN — OXYCODONE HYDROCHLORIDE AND ACETAMINOPHEN 1 TABLET: 10; 325 TABLET ORAL at 09:09

## 2019-09-01 RX ADMIN — MORPHINE SULFATE 5 MG: 10 INJECTION INTRAVENOUS at 08:09

## 2019-09-01 RX ADMIN — FAMOTIDINE 20 MG: 20 TABLET ORAL at 08:09

## 2019-09-01 RX ADMIN — OXYCODONE HYDROCHLORIDE AND ACETAMINOPHEN 1 TABLET: 10; 325 TABLET ORAL at 04:09

## 2019-09-01 RX ADMIN — MORPHINE SULFATE 5 MG: 10 INJECTION INTRAVENOUS at 11:09

## 2019-09-01 RX ADMIN — MORPHINE SULFATE 5 MG: 10 INJECTION INTRAVENOUS at 03:09

## 2019-09-01 RX ADMIN — HEPARIN SODIUM 18 UNITS/KG/HR: 10000 INJECTION, SOLUTION INTRAVENOUS at 07:09

## 2019-09-01 NOTE — SUBJECTIVE & OBJECTIVE
Interval History: pt feels the pain is not better    Review of Systems   Constitutional: Negative for fever and unexpected weight change.   HENT: Negative for nosebleeds and sore throat.    Eyes: Negative for pain and redness.   Respiratory: Negative for cough and shortness of breath.    Cardiovascular: Negative for chest pain and palpitations.   Gastrointestinal: Negative for abdominal pain and nausea.   Genitourinary: Negative for dysuria and hematuria.   Musculoskeletal: Negative for arthralgias and back pain.   Skin: Negative for rash and wound.        Pain and swelling in left leg   Neurological: Negative for tremors and headaches.   Hematological: Negative for adenopathy. Does not bruise/bleed easily.   Psychiatric/Behavioral: Negative for behavioral problems and sleep disturbance. The patient is nervous/anxious.      Objective:     Vital Signs (Most Recent):  Temp: 99.2 °F (37.3 °C) (09/01/19 1134)  Pulse: 78 (09/01/19 1134)  Resp: 18 (09/01/19 1134)  BP: 129/64 (09/01/19 1134)  SpO2: 100 % (09/01/19 1134) Vital Signs (24h Range):  Temp:  [98.7 °F (37.1 °C)-99.9 °F (37.7 °C)] 99.2 °F (37.3 °C)  Pulse:  [72-91] 78  Resp:  [18] 18  SpO2:  [99 %-100 %] 100 %  BP: ()/(55-64) 129/64     Weight: 78.9 kg (174 lb)  Body mass index is 28.96 kg/m².    Intake/Output Summary (Last 24 hours) at 9/1/2019 1519  Last data filed at 9/1/2019 0842  Gross per 24 hour   Intake 120 ml   Output --   Net 120 ml      Physical Exam   Constitutional: She is oriented to person, place, and time. She appears well-developed and well-nourished. She appears distressed.   Eyes: Pupils are equal, round, and reactive to light. EOM are normal.   Neck: Normal range of motion. Neck supple.   Cardiovascular: Normal rate, regular rhythm, normal heart sounds and intact distal pulses.   Pulmonary/Chest: Effort normal and breath sounds normal. No respiratory distress. She has no rales.   Abdominal: Soft. Bowel sounds are normal. She exhibits no  distension.   Musculoskeletal: She exhibits edema and tenderness.   Neurological: She is alert and oriented to person, place, and time. No sensory deficit.   Skin: Skin is warm and dry. Capillary refill takes less than 2 seconds.   Psychiatric: She has a normal mood and affect. Her behavior is normal.       Significant Labs: All pertinent labs within the past 24 hours have been reviewed.    Significant Imaging: I have reviewed and interpreted all pertinent imaging results/findings within the past 24 hours.

## 2019-09-01 NOTE — NURSING
PTT rechecked with result of 47.5. PTT in therapeutic range, no change is needed. Will recheck with AM vitals.

## 2019-09-01 NOTE — CONSULTS
Consult received for Venogram and thrombolysis.  CTA from 2015 demonstrates Optease/Trapease IVC filter in place as well as possible compression of the left common iliac vein by the overlying right common iliac artery, suspicious for underlying May-Thurner lesion and may also explain patient's recurrent issues with left leg thrombosis.  Will plan for venogram and thrombolysis tomorrow am to improve clot burden in the left leg.  If IVC filter is still present, patient will likely need outpatient IVC filter retrieval and possible iliac vein stent placement based on IVUS.    Please make NPO at midnight.  Case tentatively planned for tomorrow at 9.    Demetris Parnell MD  Diagnostic and Interventional Radiologist  Department of Radiology  Pager: [phone]

## 2019-09-01 NOTE — CONSULTS
[facility]  Hematology/Oncology  Consult Note    Patient Name: Antoinette Love  MRN: [id number]  Admission Date: 8/30/2019  Hospital Length of Stay: 1 days  Code Status: Full Code   Attending Provider: Sheri Louis MD  Consulting Provider: Tyson Tomlin MD  Primary Care Physician: Alberto Holguin MD  Principal Problem:Recurrent acute deep vein thrombosis of left lower extremity    Consults  Subjective:     HPI:  Antoinette Love is a 29-year-old female with Homozygous Factor V Leiden mutation, and recurrent DVTs, IVC filter placement, miscarriage in 2013 at 11 weeks, TIA in 2013, who  got admitted 8/30/19 with worsening pain in left leg x 3 days and noted to have worsening extensive DVT in left leg.    She was recently seen in the hematology clinic and the plan was to switch Lovenox to BID. She has been waiting for her prescription and was on Lovenox 120 mg SQ QD when worsening DVT was noted.     Hematologic History  The patient has had at least 11 DVTs and 3-4 PEs.  -She was on Coumadin for years and stated she came off when she had a DVT despite a therapeutic INR.  -She was then placed on Xarelto buts states she only took 1 dose and shortly after developed gallbladder pain. When she was evaluated she was told she had gallbladder inflammation.  -IVC filter was placed in 2014. 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 She had a DVT in 2016 while on Eliquis  She was followed by a Hematologist in [place], and he opted to take her off of anti-coagulation in 2018 because stated that she had an IVC filter and did not need to be anti-coagulated.  -She noted symptoms of DVT last week, so she self resumed a previous Eliquis prescription that she had  -Presented 7/21/19 c/o pain and swelling found to have 'partial, nonocclusive  thrombus in the right common femoral vein, left common femoral vein, and left external iliac vein. Superficial thrombophlebitis of the bilateral greater saphenous veins.' Was started on Lovenox.          Oncology Treatment Plan:   [No treatment plan]    Medications:  Continuous Infusions:   heparin (porcine) in D5W 18 Units/kg/hr (08/31/19 1809)     Scheduled Meds:   famotidine  20 mg Oral Daily     PRN Meds:acetaminophen, heparin (PORCINE), heparin (PORCINE), morphine, ondansetron, oxyCODONE-acetaminophen, oxyCODONE-acetaminophen, sodium chloride 0.9%     Review of patient's allergies indicates:   Allergen Reactions    Azithromycin Hives    Toradol [ketorolac] Hives     Itching and hives    Xarelto [rivaroxaban]      Gallbladder swelling and disfunction        Past Medical History:   Diagnosis Date    Anticoagulant long-term use     DVT (deep venous thrombosis)     Factor V Leiden     Pulmonary embolus     Recurrent upper respiratory infection (URI)     TIA (transient ischemic attack)      Past Surgical History:   Procedure Laterality Date    COLONOSCOPY N/A 12/18/2015    Performed by Lefty Lee MD at [facility] (4TH FLR)    IVC FILTER RETRIEVAL      tumor from breast      left    WISDOM TOOTH EXTRACTION       Family History     Problem Relation (Age of Onset)    Allergies Mother        Tobacco Use    Smoking status: Never Smoker    Smokeless tobacco: Never Used   Substance and Sexual Activity    Alcohol use: No     Frequency: Monthly or less     Drinks per session: 3 or 4     Binge frequency: Never    Drug use: No    Sexual activity: Yes     Partners: Male       Review of Systems   Constitutional: Negative for fever and unexpected weight change.   HENT: Negative for nosebleeds and sore throat.    Eyes: Negative for pain and redness.   Respiratory: Negative for cough and shortness of breath.    Cardiovascular: Negative for chest pain and palpitations.   Gastrointestinal: Negative for abdominal  pain and nausea.   Genitourinary: Negative for dysuria and hematuria.   Musculoskeletal: Negative for arthralgias and back pain.   Skin: Negative for rash and wound.        Pain and swelling in left leg   Neurological: Negative for tremors and headaches.   Hematological: Negative for adenopathy. Does not bruise/bleed easily.   Psychiatric/Behavioral: Negative for behavioral problems and sleep disturbance. The patient is nervous/anxious.      Objective:     Vital Signs (Most Recent):  Temp: 99 °F (37.2 °C) (08/31/19 1628)  Pulse: 91 (08/31/19 1628)  Resp: 18 (08/31/19 1628)  BP: (!) 111/55 (08/31/19 1628)  SpO2: 100 % (08/31/19 1628) Vital Signs (24h Range):  Temp:  [97.6 °F (36.4 °C)-99 °F (37.2 °C)] 99 °F (37.2 °C)  Pulse:  [66-91] 91  Resp:  [18] 18  SpO2:  [99 %-100 %] 100 %  BP: ()/(53-77) 111/55     Weight: 78.9 kg (174 lb)  Body mass index is 28.96 kg/m².  Body surface area is 1.9 meters squared.      Intake/Output Summary (Last 24 hours) at 8/31/2019 1959  Last data filed at 8/31/2019 1726  Gross per 24 hour   Intake 360 ml   Output --   Net 360 ml       Physical Exam   Constitutional: She is cooperative. She does not appear ill. No distress.   HENT:   Head: Head is without laceration, without right periorbital erythema and without left periorbital erythema.   Nose: No epistaxis.   Mouth/Throat: Oropharynx is clear and moist. No oropharyngeal exudate. No tonsillar exudate.   Eyes: Conjunctivae are normal.   Neck: Neck supple. No thyroid mass and no thyromegaly present.   Cardiovascular: Normal rate and regular rhythm. Exam reveals no friction rub.   Pulmonary/Chest: Breath sounds normal. No accessory muscle usage or stridor. No tachypnea. No respiratory distress. Chest wall is not dull to percussion. She exhibits no tenderness.   Abdominal: Soft. She exhibits no distension, no ascites and no mass. There is no hepatosplenomegaly.   Musculoskeletal: She exhibits no edema.   Lymphadenopathy:        Head  (right side): No submental and no submandibular adenopathy present.        Head (left side): No submental and no submandibular adenopathy present.     She has no cervical adenopathy.     She has no axillary adenopathy.   Neurological: She is alert. She has normal strength. No cranial nerve deficit.   Skin: No bruising, no petechiae and no rash noted. She is not diaphoretic.        Psychiatric: Her behavior is normal. Thought content normal. Cognition and memory are normal. She does not exhibit a depressed mood.   Vitals reviewed.      Significant Labs:   All pertinent labs from the last 24 hours have been reviewed.    Diagnostic Results:  I have reviewed all pertinent imaging results/findings within the past 24 hours.    Assessment/Plan:   1. Extensive symptomatic DVT LLE  2. Homozygous Factor V Leiden causing primary hypercoagulable state    Her Left Leg DVT is very extensive. She is symptomatic. The swelling has not gone down despite being on heparin drip since last night.    Will consult Interventional cardiology to evaluate her for thrombolytic therapy.    Tyson Tomlin MD  Hematology/Oncology  [facility]

## 2019-09-01 NOTE — PLAN OF CARE
Problem: Adult Inpatient Plan of Care  Goal: Plan of Care Review  Outcome: Ongoing (interventions implemented as appropriate)  Pt free from falls, injury or any further trauma throughout shift. Pt AAOx4. Continued medications as ordered. Complaints of pain to LLE, continuous heparin infusing, monitoring aPTT. Pt up to BSC with assistance. NPO @ midnight, for IR 9/2. Pt in no distress. Will cont to monitor.    09/01/19 1190   Plan of Care Review   Plan of Care Reviewed With patient

## 2019-09-01 NOTE — CONSULTS
[facility]  Cardiology  Consult Note    Patient Name: Antoinette Love  MRN: [id number]  Admission Date: 8/30/2019  Hospital Length of Stay: 2 days  Code Status: Full Code   Attending Provider: Sheri Louis MD   Consulting Provider: Tiago Garg MD  Primary Care Physician: Alberto Holguin MD  Principal Problem:Recurrent acute deep vein thrombosis of left lower extremity    Patient information was obtained from patient and ER records.     Consults  Subjective:     Chief Complaint:  DVT     HPI:   Antoinette Love is a 29 y.o. female that (in part)  has a past medical history of DVT (deep venous thrombosis), Factor V Leiden, Pulmonary embolus, Recurrent upper respiratory infection (URI), and TIA (transient ischemic attack).  has a past surgical history that includes IVC filter retrieval; tumor from breast; [place] tooth extraction; and Colonoscopy (N/A, 12/18/2015). Presents to [facility] complaining of left leg swelling. This is a recurrent problem for her.  Three days duration.  Was sent for ultrasound for evaluation and was positive for extensive DVT from the iliac distal to the ankle.  Patient follows with Hematology at [facility].  She is compliant with home medication regimen including daily Lovenox.  She has failed oral anticoagulants including Xarelto, warfarin, and Eliquis for various reasons.  Edema located in left lower extremity.  Constant duration.  Daily frequency.  Moderate severely.  No relieving factors.  Exacerbated by mutation of factor 5 Leiden gene obtained from both parents, per the patient.  No cough, shortness of breath, hemoptysis, stridor, wheezing, or night sweats.     In the emergency department he heparin infusion was initiated.  Hematology was consulted.  Pending further evaluation and recommendations.     Hospital medicine has been asked to admit for further evaluation and treatment.       Overview/Hospital Course:  Pt admitted with recurrent DVT. Seen last week in hematology clinic and told to increase lovenox to BID. Pt left leg is swollen and tender to touch. Limited range of motion due to pain and swelling. H/o Factor 5 defic.   Hematology consulted. Currently on heparin infusion. Consult IR for possible mechanical thrombolysis.    Still with left leg swelling  Denies CP or SOB  EKG NSR - ok    Past Medical History:   Diagnosis Date    Anticoagulant long-term use     DVT (deep venous thrombosis)     Factor V Leiden     Pulmonary embolus     Recurrent upper respiratory infection (URI)     TIA (transient ischemic attack)        Past Surgical History:   Procedure Laterality Date    COLONOSCOPY N/A 12/18/2015    [facility] at [facility] ([facility]    IVC FILTER RETRIEVAL      tumor from breast      left    WISDOM TOOTH EXTRACTION         Review of patient's allergies indicates:   Allergen Reactions    Azithromycin Hives    Toradol [ketorolac] Hives     Itching and hives    Xarelto [rivaroxaban]      Gallbladder swelling and disfunction       No current facility-administered medications on file prior to encounter.      Current Outpatient Medications on File Prior to Encounter   Medication Sig    cetirizine (ZYRTEC) 10 MG tablet Take 2 tablets (20 mg total) by mouth 2 (two) times daily. For chronic urticaria    enoxaparin (LOVENOX) 80 mg/0.8 mL Syrg Inject 0.8 mLs (80 mg total) into the skin 2 (two) times daily.    m-vit,tx,iron,mins/calc/folic (MULTIVITAMIN) Tab     albuterol (VENTOLIN HFA) 90 mcg/actuation inhaler Inhale 2 puffs into the lungs every 6 (six) hours as needed for Wheezing. Rescue    diazePAM (VALIUM) 5 MG tablet Take 1 tablet (5 mg total) by mouth every 12 (twelve) hours as needed for Anxiety.    EPINEPHrine (EPIPEN 2-VAN) 0.3 mg/0.3 mL AtIn Inject 0.3 mLs (0.3 mg total) into the muscle once. for 1 dose    montelukast (SINGULAIR) 10 mg tablet Take 1  tablet (10 mg total) by mouth every evening.     Family History     Problem Relation (Age of Onset)    Allergies Mother        Tobacco Use    Smoking status: Never Smoker    Smokeless tobacco: Never Used   Substance and Sexual Activity    Alcohol use: No     Frequency: Monthly or less     Drinks per session: 3 or 4     Binge frequency: Never    Drug use: No    Sexual activity: Yes     Partners: Male     Review of Systems   Constitution: Negative for decreased appetite.   HENT: Negative for ear discharge.    Eyes: Negative for blurred vision.   Respiratory: Negative for hemoptysis.    Endocrine: Negative for polyphagia.   Hematologic/Lymphatic: Negative for adenopathy.   Skin: Negative for color change.   Musculoskeletal: Negative for joint swelling.   Genitourinary: Negative for bladder incontinence.   Neurological: Negative for brief paralysis.   Psychiatric/Behavioral: Negative for hallucinations.   Allergic/Immunologic: Negative for hives.     Objective:     Vital Signs (Most Recent):  Temp: 98.7 °F (37.1 °C) (09/01/19 0749)  Pulse: 75 (09/01/19 0749)  Resp: 18 (09/01/19 0749)  BP: (!) 99/55 (09/01/19 0749)  SpO2: 99 % (09/01/19 0800) Vital Signs (24h Range):  Temp:  [98.3 °F (36.8 °C)-99.9 °F (37.7 °C)] 98.7 °F (37.1 °C)  Pulse:  [69-91] 75  Resp:  [18] 18  SpO2:  [99 %-100 %] 99 %  BP: ()/(55-64) 99/55     Weight: 78.9 kg (174 lb)  Body mass index is 28.96 kg/m².    SpO2: 99 %  O2 Device (Oxygen Therapy): room air      Intake/Output Summary (Last 24 hours) at 9/1/2019 1104  Last data filed at 9/1/2019 0842  Gross per 24 hour   Intake 240 ml   Output --   Net 240 ml       Lines/Drains/Airways     Peripheral Intravenous Line                 Peripheral IV - Single Lumen 08/31/19 18 G Right Antecubital 1 day                Physical Exam   Constitutional: She is oriented to person, place, and time. She appears well-developed and well-nourished.   HENT:   Head: Normocephalic and atraumatic.   Eyes: Pupils  are equal, round, and reactive to light. Conjunctivae are normal.   Neck: Normal range of motion. Neck supple.   Cardiovascular: Normal rate, normal heart sounds and intact distal pulses.   Pulmonary/Chest: Effort normal and breath sounds normal.   Abdominal: Soft. Bowel sounds are normal.   Musculoskeletal: Normal range of motion. She exhibits edema.   Neurological: She is alert and oriented to person, place, and time.   Skin: Skin is warm and dry.       Significant Labs: All pertinent lab results from the last 24 hours have been reviewed.    Significant Imaging: Echocardiogram: 2D echo with color flow doppler: No results found for this or any previous visit.    Assessment and Plan:     * Recurrent acute deep vein thrombosis of left lower extremity  Discussed with Dr Branden Subramainan - interventional cardiology. If IR unavailable to do DVT thrombolysis would recommend [facility]al cardiology consult    Factor V Leiden mutation           VTE Risk Mitigation (From admission, onward)        Ordered     IP VTE HIGH RISK PATIENT  Once      08/30/19 2258     Reason for no Mechanical VTE Prophylaxis  Once      08/30/19 2258     heparin 25,000 units in dextrose 5% (100 units/ml) IV bolus from bag - ADDITIONAL PRN BOLUS - 60 units/kg  As needed (PRN)      08/30/19 1907     heparin 25,000 units in dextrose 5% (100 units/ml) IV bolus from bag - ADDITIONAL PRN BOLUS - 30 units/kg  As needed (PRN)      08/30/19 1907     heparin 25,000 units in dextrose 5% 250 mL (100 units/mL) infusion HIGH INTENSITY nomogram - [facility]  Continuous      08/30/19 1907          Thank you for your consult. I will sign off. Please contact us if you have any additional questions.    Tiago Garg MD  Cardiology   [facility]

## 2019-09-01 NOTE — PLAN OF CARE
Problem: Adult Inpatient Plan of Care  Goal: Plan of Care Review  Outcome: Ongoing (interventions implemented as appropriate)  Patient remained free from falls and injuries this shift. Pain controlled with PRN pain medication. No s/s of pain or distress at this time. Heparin infusing. Monitoring aPTT.  Will continue to monitor.

## 2019-09-01 NOTE — HPI
Antoinette Love is a 29-year-old female with Homozygous Factor V Leiden mutation, and recurrent DVTs, IVC filter placement, miscarriage in 2013 at 11 weeks, TIA in 2013, who got admitted 8/30/19 with worsening pain in left leg x 3 days and noted to have worsening extensive DVT in left leg.    She was recently seen in the hematology clinic and the plan was to switch Lovenox to BID. She has been waiting for her prescription and was on Lovenox 120 mg SQ QD when worsening DVT was noted.     Hematologic History  The patient has had at least 11 DVTs and 3-4 PEs.  -She was on Coumadin for years and stated she came off when she had a DVT despite a therapeutic INR.  -She was then placed on Xarelto buts states she only took 1 dose and shortly after developed gallbladder pain. When she was evaluated she was told she had gallbladder inflammation.  -IVC filter was placed in 2014. 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 She had a DVT in 2016 while on Eliquis  She was followed by a Hematologist in [place], and he opted to take her off of anti-coagulation in 2018 because stated that she had an IVC filter and did not need to be anti-coagulated.  -She noted symptoms of DVT last week, so she self resumed a previous Eliquis prescription that she had  -Presented 7/21/19 c/o pain and swelling found to have 'partial, nonocclusive thrombus in the right common femoral vein, left common femoral vein, and left external iliac vein. Superficial thrombophlebitis of the bilateral greater saphenous veins.' Was started on Lovenox.

## 2019-09-01 NOTE — SUBJECTIVE & OBJECTIVE
Past Medical History:   Diagnosis Date    Anticoagulant long-term use     DVT (deep venous thrombosis)     Factor V Leiden     Pulmonary embolus     Recurrent upper respiratory infection (URI)     TIA (transient ischemic attack)        Past Surgical History:   Procedure Laterality Date    COLONOSCOPY N/A 12/18/2015    Performed by Lefty Lee MD at [facility] (4TH FLR)    IVC FILTER RETRIEVAL      tumor from breast      left    WISDOM TOOTH EXTRACTION         Review of patient's allergies indicates:   Allergen Reactions    Azithromycin Hives    Toradol [ketorolac] Hives     Itching and hives    Xarelto [rivaroxaban]      Gallbladder swelling and disfunction       No current facility-administered medications on file prior to encounter.      Current Outpatient Medications on File Prior to Encounter   Medication Sig    cetirizine (ZYRTEC) 10 MG tablet Take 2 tablets (20 mg total) by mouth 2 (two) times daily. For chronic urticaria    enoxaparin (LOVENOX) 80 mg/0.8 mL Syrg Inject 0.8 mLs (80 mg total) into the skin 2 (two) times daily.    m-vit,tx,iron,mins/calc/folic (MULTIVITAMIN) Tab     albuterol (VENTOLIN HFA) 90 mcg/actuation inhaler Inhale 2 puffs into the lungs every 6 (six) hours as needed for Wheezing. Rescue    diazePAM (VALIUM) 5 MG tablet Take 1 tablet (5 mg total) by mouth every 12 (twelve) hours as needed for Anxiety.    EPINEPHrine (EPIPEN 2-VAN) 0.3 mg/0.3 mL AtIn Inject 0.3 mLs (0.3 mg total) into the muscle once. for 1 dose    montelukast (SINGULAIR) 10 mg tablet Take 1 tablet (10 mg total) by mouth every evening.     Family History     Problem Relation (Age of Onset)    Allergies Mother        Tobacco Use    Smoking status: Never Smoker    Smokeless tobacco: Never Used   Substance and Sexual Activity    Alcohol use: No     Frequency: Monthly or less     Drinks per session: 3 or 4     Binge frequency: Never    Drug use: No    Sexual activity: Yes     Partners: Male     Review of  Systems   Constitution: Negative for decreased appetite.   HENT: Negative for ear discharge.    Eyes: Negative for blurred vision.   Respiratory: Negative for hemoptysis.    Endocrine: Negative for polyphagia.   Hematologic/Lymphatic: Negative for adenopathy.   Skin: Negative for color change.   Musculoskeletal: Negative for joint swelling.   Genitourinary: Negative for bladder incontinence.   Neurological: Negative for brief paralysis.   Psychiatric/Behavioral: Negative for hallucinations.   Allergic/Immunologic: Negative for hives.     Objective:     Vital Signs (Most Recent):  Temp: 98.7 °F (37.1 °C) (09/01/19 0749)  Pulse: 75 (09/01/19 0749)  Resp: 18 (09/01/19 0749)  BP: (!) 99/55 (09/01/19 0749)  SpO2: 99 % (09/01/19 0800) Vital Signs (24h Range):  Temp:  [98.3 °F (36.8 °C)-99.9 °F (37.7 °C)] 98.7 °F (37.1 °C)  Pulse:  [69-91] 75  Resp:  [18] 18  SpO2:  [99 %-100 %] 99 %  BP: ()/(55-64) 99/55     Weight: 78.9 kg (174 lb)  Body mass index is 28.96 kg/m².    SpO2: 99 %  O2 Device (Oxygen Therapy): room air      Intake/Output Summary (Last 24 hours) at 9/1/2019 1104  Last data filed at 9/1/2019 0842  Gross per 24 hour   Intake 240 ml   Output --   Net 240 ml       Lines/Drains/Airways     Peripheral Intravenous Line                 Peripheral IV - Single Lumen 08/31/19 18 G Right Antecubital 1 day                Physical Exam   Constitutional: She is oriented to person, place, and time. She appears well-developed and well-nourished.   HENT:   Head: Normocephalic and atraumatic.   Eyes: Pupils are equal, round, and reactive to light. Conjunctivae are normal.   Neck: Normal range of motion. Neck supple.   Cardiovascular: Normal rate, normal heart sounds and intact distal pulses.   Pulmonary/Chest: Effort normal and breath sounds normal.   Abdominal: Soft. Bowel sounds are normal.   Musculoskeletal: Normal range of motion. She exhibits edema.   Neurological: She is alert and oriented to person, place, and  time.   Skin: Skin is warm and dry.       Significant Labs: All pertinent lab results from the last 24 hours have been reviewed.    Significant Imaging: Echocardiogram: 2D echo with color flow doppler: No results found for this or any previous visit.

## 2019-09-01 NOTE — ASSESSMENT & PLAN NOTE
Discussed with Dr Branden Subramanian - interventional cardiology. If IR unavailable to do DVT thrombolysis would recommend transfer to [facility] for interventional cardiology consult

## 2019-09-01 NOTE — SUBJECTIVE & OBJECTIVE
Oncology Treatment Plan:   [No treatment plan]    Medications:  Continuous Infusions:   heparin (porcine) in D5W 18 Units/kg/hr (08/31/19 1809)     Scheduled Meds:   famotidine  20 mg Oral Daily     PRN Meds:acetaminophen, heparin (PORCINE), heparin (PORCINE), morphine, ondansetron, oxyCODONE-acetaminophen, oxyCODONE-acetaminophen, sodium chloride 0.9%     Review of patient's allergies indicates:   Allergen Reactions    Azithromycin Hives    Toradol [ketorolac] Hives     Itching and hives    Xarelto [rivaroxaban]      Gallbladder swelling and disfunction        Past Medical History:   Diagnosis Date    Anticoagulant long-term use     DVT (deep venous thrombosis)     Factor V Leiden     Pulmonary embolus     Recurrent upper respiratory infection (URI)     TIA (transient ischemic attack)      Past Surgical History:   Procedure Laterality Date    COLONOSCOPY N/A 12/18/2015    Performed by Lefty Lee MD at [facility] (4TH FLR)    IVC FILTER RETRIEVAL      tumor from breast      left    WISDOM TOOTH EXTRACTION       Family History     Problem Relation (Age of Onset)    Allergies Mother        Tobacco Use    Smoking status: Never Smoker    Smokeless tobacco: Never Used   Substance and Sexual Activity    Alcohol use: No     Frequency: Monthly or less     Drinks per session: 3 or 4     Binge frequency: Never    Drug use: No    Sexual activity: Yes     Partners: Male       Review of Systems   Constitutional: Negative for fever and unexpected weight change.   HENT: Negative for nosebleeds and sore throat.    Eyes: Negative for pain and redness.   Respiratory: Negative for cough and shortness of breath.    Cardiovascular: Negative for chest pain and palpitations.   Gastrointestinal: Negative for abdominal pain and nausea.   Genitourinary: Negative for dysuria and hematuria.   Musculoskeletal: Negative for arthralgias and back pain.   Skin: Negative for rash and wound.        Pain and swelling in left leg    Neurological: Negative for tremors and headaches.   Hematological: Negative for adenopathy. Does not bruise/bleed easily.   Psychiatric/Behavioral: Negative for behavioral problems and sleep disturbance. The patient is nervous/anxious.      Objective:     Vital Signs (Most Recent):  Temp: 99 °F (37.2 °C) (08/31/19 1628)  Pulse: 91 (08/31/19 1628)  Resp: 18 (08/31/19 1628)  BP: (!) 111/55 (08/31/19 1628)  SpO2: 100 % (08/31/19 1628) Vital Signs (24h Range):  Temp:  [97.6 °F (36.4 °C)-99 °F (37.2 °C)] 99 °F (37.2 °C)  Pulse:  [66-91] 91  Resp:  [18] 18  SpO2:  [99 %-100 %] 100 %  BP: ()/(53-77) 111/55     Weight: 78.9 kg (174 lb)  Body mass index is 28.96 kg/m².  Body surface area is 1.9 meters squared.      Intake/Output Summary (Last 24 hours) at 8/31/2019 1959  Last data filed at 8/31/2019 1726  Gross per 24 hour   Intake 360 ml   Output --   Net 360 ml       Physical Exam   Constitutional: She is cooperative. She does not appear ill. No distress.   HENT:   Head: Head is without laceration, without right periorbital erythema and without left periorbital erythema.   Nose: No epistaxis.   Mouth/Throat: Oropharynx is clear and moist. No oropharyngeal exudate. No tonsillar exudate.   Eyes: Conjunctivae are normal.   Neck: Neck supple. No thyroid mass and no thyromegaly present.   Cardiovascular: Normal rate and regular rhythm. Exam reveals no friction rub.   Pulmonary/Chest: Breath sounds normal. No accessory muscle usage or stridor. No tachypnea. No respiratory distress. Chest wall is not dull to percussion. She exhibits no tenderness.   Abdominal: Soft. She exhibits no distension, no ascites and no mass. There is no hepatosplenomegaly.   Musculoskeletal: She exhibits no edema.   Lymphadenopathy:        Head (right side): No submental and no submandibular adenopathy present.        Head (left side): No submental and no submandibular adenopathy present.     She has no cervical adenopathy.     She has no  axillary adenopathy.   Neurological: She is alert. She has normal strength. No cranial nerve deficit.   Skin: No bruising, no petechiae and no rash noted. She is not diaphoretic.        Psychiatric: Her behavior is normal. Thought content normal. Cognition and memory are normal. She does not exhibit a depressed mood.   Vitals reviewed.      Significant Labs:   All pertinent labs from the last 24 hours have been reviewed.    Diagnostic Results:  I have reviewed all pertinent imaging results/findings within the past 24 hours.

## 2019-09-01 NOTE — HPI
Antoinette Love is a 29 y.o. female that (in part)  has a past medical history of DVT (deep venous thrombosis), Factor V Leiden, Pulmonary embolus, Recurrent upper respiratory infection (URI), and TIA (transient ischemic attack).  has a past surgical history that includes IVC filter retrieval; tumor from breast; [place] tooth extraction; and Colonoscopy (N/A, 12/18/2015). Presents to [facility] Emergency Department complaining of left leg swelling. This is a recurrent problem for her.  Three days duration.  Was sent for ultrasound for evaluation and was positive for extensive DVT from the iliac distal to the ankle.  Patient follows with Hematology at [facility].  She is compliant with home medication regimen including daily Lovenox.  She has failed oral anticoagulants including Xarelto, warfarin, and Eliquis for various reasons.  Edema located in left lower extremity.  Constant duration.  Daily frequency.  Moderate severely.  No relieving factors.  Exacerbated by mutation of factor 5 Leiden gene obtained from both parents, per the patient.  No cough, shortness of breath, hemoptysis, stridor, wheezing, or night sweats.     In the emergency department he heparin infusion was initiated.  Hematology was consulted.  Pending further evaluation and recommendations.     Hospital medicine has been asked to admit for further evaluation and treatment.      Overview/Hospital Course:  Pt admitted with recurrent DVT. Seen last week in hematology clinic and told to increase lovenox to BID. Pt left leg is swollen and tender to touch. Limited range of motion due to pain and swelling. H/o Factor 5 defic.   Hematology consulted. Currently on heparin infusion. Consult IR for possible mechanical thrombolysis.    Still with left leg swelling  Denies CP or SOB  EKG NSR - ok

## 2019-09-01 NOTE — ASSESSMENT & PLAN NOTE
Recent u/s shows progression of clot to left thigh  Pain control  Heparin infusion  Hematology consulted for further recs  IR ?mechanical thrombolysis - plan for 9/2 - d/w Dr. Parnell.

## 2019-09-01 NOTE — PLAN OF CARE
"To patient's room to discuss patient managing her care at home.      TN Role Explained.  Patient identified by using 2 identifiers:  Name and date of birth    Patient stated that her  WILL HELP AT HOME WITH her RECOVERY.       TN reviewed with patient contents of "[employer] Packet".      TN name and contact info placed on the communication board    Preferred Pharmacy:  [id number] - [place] - [address]  [address]  [place] [address]  Phone: [phone] Fax: [phone]      Preferred appointment time:  Afternoon     08/31/19 1919   Discharge Assessment   Assessment Type Discharge Planning Assessment   Confirmed/corrected address and phone number on facesheet? Yes   Assessment information obtained from? Patient   Expected Length of Stay (days) 3   Communicated expected length of stay with patient/caregiver yes   Prior to hospitilization cognitive status: Alert/Oriented   Prior to hospitalization functional status: Independent   Current cognitive status: Alert/Oriented   Current Functional Status: Independent   Lives With spouse   Able to Return to Prior Arrangements yes   Is patient able to care for self after discharge? Yes   Patient's perception of discharge disposition home or selfcare   Readmission Within the Last 30 Days no previous admission in last 30 days   Patient currently being followed by outpatient case management? No   Patient currently receives any other outside agency services? No   Equipment Currently Used at Home none   Do you have any problems affording any of your prescribed medications? No   Is the patient taking medications as prescribed? yes   Does the patient have transportation home? Yes   Transportation Anticipated family or friend will provide   Does the patient receive services at the [facility]mad[facility] Clinic? No   Discharge Plan A Home with family   Discharge Plan B   (tbd)   DME Needed Upon Discharge  none   Patient/Family in Agreement with Plan yes     "

## 2019-09-01 NOTE — PROGRESS NOTES
[facility] Hospital Medicine  Progress Note    Patient Name: Antoinette Love  MRN: [id number]  Patient Class: IP- Inpatient   Admission Date: 8/30/2019  Length of Stay: 2 days  Attending Physician: Sheri Louis MD  Primary Care Provider: Alberto Holguin MD        Subjective:     Principal Problem:Recurrent acute deep vein thrombosis of left lower extremity        HPI:  Antoinette Love is a 29 y.o. female that (in part)  has a past medical history of DVT (deep venous thrombosis), Factor V Leiden, Pulmonary embolus, Recurrent upper respiratory infection (URI), and TIA (transient ischemic attack).  has a past surgical history that includes IVC filter retrieval; tumor from breast; [place] tooth extraction; and Colonoscopy (N/A, 12/18/2015). Presents to [facility] Emergency Department complaining of left leg swelling. This is a recurrent problem for her.  Three days duration.  Was sent for ultrasound for evaluation and was positive for extensive DVT from the iliac distal to the ankle.  Patient follows with Hematology at [facility].  She is compliant with home medication regimen including daily Lovenox.  She has failed oral anticoagulants including Xarelto, warfarin, and Eliquis for various reasons.  Edema located in left lower extremity.  Constant duration.  Daily frequency.  Moderate severely.  No relieving factors.  Exacerbated by mutation of factor 5 Leiden gene obtained from both parents, per the patient.  No cough, shortness of breath, hemoptysis, stridor, wheezing, or night sweats.    In the emergency department he heparin infusion was initiated.  Hematology was consulted.  Pending further evaluation and recommendations.    Hospital medicine has been asked to admit for further evaluation and treatment.     Overview/Hospital Course:  Pt admitted with recurrent DVT. Seen last week in hematology clinic and told to increase lovenox to BID. Pt left  leg is swollen and tender to touch. Limited range of motion due to pain and swelling. H/o Factor 5 defic.   Hematology consulted. Currently on heparin infusion. Consult IR for possible mechanical thrombolysis.  PLan for IR intervention 9/2.  Pain and swelling not improved.    Interval History: pt feels the pain is not better    Review of Systems   Constitutional: Negative for fever and unexpected weight change.   HENT: Negative for nosebleeds and sore throat.    Eyes: Negative for pain and redness.   Respiratory: Negative for cough and shortness of breath.    Cardiovascular: Negative for chest pain and palpitations.   Gastrointestinal: Negative for abdominal pain and nausea.   Genitourinary: Negative for dysuria and hematuria.   Musculoskeletal: Negative for arthralgias and back pain.   Skin: Negative for rash and wound.        Pain and swelling in left leg   Neurological: Negative for tremors and headaches.   Hematological: Negative for adenopathy. Does not bruise/bleed easily.   Psychiatric/Behavioral: Negative for behavioral problems and sleep disturbance. The patient is nervous/anxious.      Objective:     Vital Signs (Most Recent):  Temp: 99.2 °F (37.3 °C) (09/01/19 1134)  Pulse: 78 (09/01/19 1134)  Resp: 18 (09/01/19 1134)  BP: 129/64 (09/01/19 1134)  SpO2: 100 % (09/01/19 1134) Vital Signs (24h Range):  Temp:  [98.7 °F (37.1 °C)-99.9 °F (37.7 °C)] 99.2 °F (37.3 °C)  Pulse:  [72-91] 78  Resp:  [18] 18  SpO2:  [99 %-100 %] 100 %  BP: ()/(55-64) 129/64     Weight: 78.9 kg (174 lb)  Body mass index is 28.96 kg/m².    Intake/Output Summary (Last 24 hours) at 9/1/2019 1519  Last data filed at 9/1/2019 0842  Gross per 24 hour   Intake 120 ml   Output --   Net 120 ml      Physical Exam   Constitutional: She is oriented to person, place, and time. She appears well-developed and well-nourished. She appears distressed.   Eyes: Pupils are equal, round, and reactive to light. EOM are normal.   Neck: Normal range of  motion. Neck supple.   Cardiovascular: Normal rate, regular rhythm, normal heart sounds and intact distal pulses.   Pulmonary/Chest: Effort normal and breath sounds normal. No respiratory distress. She has no rales.   Abdominal: Soft. Bowel sounds are normal. She exhibits no distension.   Musculoskeletal: She exhibits edema and tenderness.   Neurological: She is alert and oriented to person, place, and time. No sensory deficit.   Skin: Skin is warm and dry. Capillary refill takes less than 2 seconds.   Psychiatric: She has a normal mood and affect. Her behavior is normal.       Significant Labs: All pertinent labs within the past 24 hours have been reviewed.    Significant Imaging: I have reviewed and interpreted all pertinent imaging results/findings within the past 24 hours.      Assessment/Plan:      * Recurrent acute deep vein thrombosis of left lower extremity  Recent u/s shows progression of clot to left thigh  Pain control  Heparin infusion  Hematology consulted for further recs  IR ?mechanical thrombolysis - plan for 9/2 - d/w Dr. Parnell.       DVT (deep venous thrombosis)        History of pulmonary embolism  No acute issues       Factor V Leiden mutation  Failed or complications of eliquis, xarelto and coumadin  Hematology consulted for further recs        VTE Risk Mitigation (From admission, onward)        Ordered     IP VTE HIGH RISK PATIENT  Once      08/30/19 2258     Reason for no Mechanical VTE Prophylaxis  Once      08/30/19 2258     heparin 25,000 units in dextrose 5% (100 units/ml) IV bolus from bag - ADDITIONAL PRN BOLUS - 60 units/kg  As needed (PRN)      08/30/19 1907     heparin 25,000 units in dextrose 5% (100 units/ml) IV bolus from bag - ADDITIONAL PRN BOLUS - 30 units/kg  As needed (PRN)      08/30/19 1907     heparin 25,000 units in dextrose 5% 250 mL (100 units/mL) infusion HIGH INTENSITY nomogram - [facility]  Continuous      08/30/19 1907                Sheri Louis MD  Department of  Hospital Medicine [facility]

## 2019-09-02 LAB
ALBUMIN SERPL BCP-MCNC: 3.1 G/DL (ref 3.5–5.2)
ALP SERPL-CCNC: 86 U/L (ref 55–135)
ALT SERPL W/O P-5'-P-CCNC: 18 U/L (ref 10–44)
ANION GAP SERPL CALC-SCNC: 10 MMOL/L (ref 8–16)
APTT BLDCRRT: 45.2 SEC (ref 21–32)
APTT BLDCRRT: 57.8 SEC (ref 21–32)
AST SERPL-CCNC: 20 U/L (ref 10–40)
B-HCG UR QL: NEGATIVE
BASOPHILS # BLD AUTO: 0.01 K/UL (ref 0–0.2)
BASOPHILS # BLD AUTO: 0.01 K/UL (ref 0–0.2)
BASOPHILS NFR BLD: 0.2 % (ref 0–1.9)
BASOPHILS NFR BLD: 0.3 % (ref 0–1.9)
BILIRUB SERPL-MCNC: 0.3 MG/DL (ref 0.1–1)
BUN SERPL-MCNC: 8 MG/DL (ref 6–20)
CALCIUM SERPL-MCNC: 9.2 MG/DL (ref 8.7–10.5)
CHLORIDE SERPL-SCNC: 106 MMOL/L (ref 95–110)
CO2 SERPL-SCNC: 19 MMOL/L (ref 23–29)
CREAT SERPL-MCNC: 0.7 MG/DL (ref 0.5–1.4)
DIFFERENTIAL METHOD: ABNORMAL
DIFFERENTIAL METHOD: ABNORMAL
EOSINOPHIL # BLD AUTO: 0.1 K/UL (ref 0–0.5)
EOSINOPHIL # BLD AUTO: 0.1 K/UL (ref 0–0.5)
EOSINOPHIL NFR BLD: 1 % (ref 0–8)
EOSINOPHIL NFR BLD: 3.6 % (ref 0–8)
ERYTHROCYTE [DISTWIDTH] IN BLOOD BY AUTOMATED COUNT: 12.1 % (ref 11.5–14.5)
ERYTHROCYTE [DISTWIDTH] IN BLOOD BY AUTOMATED COUNT: 12.4 % (ref 11.5–14.5)
EST. GFR  (AFRICAN AMERICAN): >60 ML/MIN/1.73 M^2
EST. GFR  (NON AFRICAN AMERICAN): >60 ML/MIN/1.73 M^2
GLUCOSE SERPL-MCNC: 76 MG/DL (ref 70–110)
HCG INTACT+B SERPL-ACNC: <1.2 MIU/ML
HCT VFR BLD AUTO: 29 % (ref 37–48.5)
HCT VFR BLD AUTO: 33.6 % (ref 37–48.5)
HGB BLD-MCNC: 11 G/DL (ref 12–16)
HGB BLD-MCNC: 9.9 G/DL (ref 12–16)
LYMPHOCYTES # BLD AUTO: 1.1 K/UL (ref 1–4.8)
LYMPHOCYTES # BLD AUTO: 1.4 K/UL (ref 1–4.8)
LYMPHOCYTES NFR BLD: 20.3 % (ref 18–48)
LYMPHOCYTES NFR BLD: 38.3 % (ref 18–48)
MCH RBC QN AUTO: 30.9 PG (ref 27–31)
MCH RBC QN AUTO: 32 PG (ref 27–31)
MCHC RBC AUTO-ENTMCNC: 32.7 G/DL (ref 32–36)
MCHC RBC AUTO-ENTMCNC: 34.1 G/DL (ref 32–36)
MCV RBC AUTO: 94 FL (ref 82–98)
MCV RBC AUTO: 94 FL (ref 82–98)
MONOCYTES # BLD AUTO: 0.3 K/UL (ref 0.3–1)
MONOCYTES # BLD AUTO: 0.4 K/UL (ref 0.3–1)
MONOCYTES NFR BLD: 7.2 % (ref 4–15)
MONOCYTES NFR BLD: 8.2 % (ref 4–15)
NEUTROPHILS # BLD AUTO: 1.8 K/UL (ref 1.8–7.7)
NEUTROPHILS # BLD AUTO: 3.7 K/UL (ref 1.8–7.7)
NEUTROPHILS NFR BLD: 50.9 % (ref 38–73)
NEUTROPHILS NFR BLD: 70.3 % (ref 38–73)
PLATELET # BLD AUTO: 173 K/UL (ref 150–350)
PLATELET # BLD AUTO: 184 K/UL (ref 150–350)
PMV BLD AUTO: 8.6 FL (ref 9.2–12.9)
PMV BLD AUTO: 9.5 FL (ref 9.2–12.9)
POTASSIUM SERPL-SCNC: 4.3 MMOL/L (ref 3.5–5.1)
PROT SERPL-MCNC: 6.9 G/DL (ref 6–8.4)
RBC # BLD AUTO: 3.09 M/UL (ref 4–5.4)
RBC # BLD AUTO: 3.56 M/UL (ref 4–5.4)
SODIUM SERPL-SCNC: 135 MMOL/L (ref 136–145)
WBC # BLD AUTO: 3.63 K/UL (ref 3.9–12.7)
WBC # BLD AUTO: 5.22 K/UL (ref 3.9–12.7)

## 2019-09-02 PROCEDURE — 11000001 HC ACUTE MED/SURG PRIVATE ROOM

## 2019-09-02 PROCEDURE — 99232 PR SUBSEQUENT HOSPITAL CARE,LEVL II: ICD-10-PCS | Mod: ,,, | Performed by: INTERNAL MEDICINE

## 2019-09-02 PROCEDURE — 85730 THROMBOPLASTIN TIME PARTIAL: CPT | Mod: 91

## 2019-09-02 PROCEDURE — 94761 N-INVAS EAR/PLS OXIMETRY MLT: CPT

## 2019-09-02 PROCEDURE — 63600175 PHARM REV CODE 636 W HCPCS: Performed by: HOSPITALIST

## 2019-09-02 PROCEDURE — 63600175 PHARM REV CODE 636 W HCPCS: Performed by: INTERNAL MEDICINE

## 2019-09-02 PROCEDURE — 25500020 PHARM REV CODE 255: Performed by: HOSPITALIST

## 2019-09-02 PROCEDURE — 36415 COLL VENOUS BLD VENIPUNCTURE: CPT

## 2019-09-02 PROCEDURE — 81025 URINE PREGNANCY TEST: CPT

## 2019-09-02 PROCEDURE — 25000003 PHARM REV CODE 250: Performed by: HOSPITALIST

## 2019-09-02 PROCEDURE — 80053 COMPREHEN METABOLIC PANEL: CPT

## 2019-09-02 PROCEDURE — 63600175 PHARM REV CODE 636 W HCPCS: Performed by: RADIOLOGY

## 2019-09-02 PROCEDURE — 85025 COMPLETE CBC W/AUTO DIFF WBC: CPT

## 2019-09-02 PROCEDURE — 85730 THROMBOPLASTIN TIME PARTIAL: CPT

## 2019-09-02 PROCEDURE — 84702 CHORIONIC GONADOTROPIN TEST: CPT

## 2019-09-02 PROCEDURE — 99232 SBSQ HOSP IP/OBS MODERATE 35: CPT | Mod: ,,, | Performed by: INTERNAL MEDICINE

## 2019-09-02 RX ORDER — OXYCODONE AND ACETAMINOPHEN 10; 325 MG/1; MG/1
1 TABLET ORAL EVERY 4 HOURS PRN
Status: DISCONTINUED | OUTPATIENT
Start: 2019-09-02 | End: 2019-09-04 | Stop reason: HOSPADM

## 2019-09-02 RX ORDER — DIPHENHYDRAMINE HYDROCHLORIDE 50 MG/ML
INJECTION INTRAMUSCULAR; INTRAVENOUS CODE/TRAUMA/SEDATION MEDICATION
Status: COMPLETED | OUTPATIENT
Start: 2019-09-02 | End: 2019-09-02

## 2019-09-02 RX ORDER — MORPHINE SULFATE 10 MG/ML
5 INJECTION INTRAMUSCULAR; INTRAVENOUS; SUBCUTANEOUS EVERY 4 HOURS PRN
Status: DISCONTINUED | OUTPATIENT
Start: 2019-09-02 | End: 2019-09-02

## 2019-09-02 RX ORDER — MORPHINE SULFATE 10 MG/ML
5 INJECTION INTRAMUSCULAR; INTRAVENOUS; SUBCUTANEOUS
Status: DISCONTINUED | OUTPATIENT
Start: 2019-09-02 | End: 2019-09-04 | Stop reason: HOSPADM

## 2019-09-02 RX ORDER — OXYCODONE AND ACETAMINOPHEN 5; 325 MG/1; MG/1
1 TABLET ORAL EVERY 4 HOURS PRN
Status: DISCONTINUED | OUTPATIENT
Start: 2019-09-02 | End: 2019-09-04 | Stop reason: HOSPADM

## 2019-09-02 RX ORDER — MIDAZOLAM HYDROCHLORIDE 1 MG/ML
INJECTION INTRAMUSCULAR; INTRAVENOUS CODE/TRAUMA/SEDATION MEDICATION
Status: COMPLETED | OUTPATIENT
Start: 2019-09-02 | End: 2019-09-02

## 2019-09-02 RX ORDER — FENTANYL CITRATE 50 UG/ML
INJECTION, SOLUTION INTRAMUSCULAR; INTRAVENOUS CODE/TRAUMA/SEDATION MEDICATION
Status: COMPLETED | OUTPATIENT
Start: 2019-09-02 | End: 2019-09-02

## 2019-09-02 RX ORDER — SODIUM CHLORIDE 9 MG/ML
INJECTION, SOLUTION INTRAVENOUS CONTINUOUS
Status: ACTIVE | OUTPATIENT
Start: 2019-09-02 | End: 2019-09-02

## 2019-09-02 RX ADMIN — HEPARIN SODIUM 15.19 UNITS/KG/HR: 10000 INJECTION, SOLUTION INTRAVENOUS at 11:09

## 2019-09-02 RX ADMIN — SODIUM CHLORIDE: 0.9 INJECTION, SOLUTION INTRAVENOUS at 12:09

## 2019-09-02 RX ADMIN — ONDANSETRON 8 MG: 2 INJECTION INTRAMUSCULAR; INTRAVENOUS at 01:09

## 2019-09-02 RX ADMIN — MORPHINE SULFATE 5 MG: 10 INJECTION INTRAVENOUS at 12:09

## 2019-09-02 RX ADMIN — MIDAZOLAM HYDROCHLORIDE 1 MG: 1 INJECTION, SOLUTION INTRAMUSCULAR; INTRAVENOUS at 10:09

## 2019-09-02 RX ADMIN — DIPHENHYDRAMINE HYDROCHLORIDE 25 MG: 50 INJECTION INTRAMUSCULAR; INTRAVENOUS at 10:09

## 2019-09-02 RX ADMIN — IOHEXOL 75 ML: 300 INJECTION, SOLUTION INTRAVENOUS at 11:09

## 2019-09-02 RX ADMIN — OXYCODONE HYDROCHLORIDE AND ACETAMINOPHEN 1 TABLET: 10; 325 TABLET ORAL at 08:09

## 2019-09-02 RX ADMIN — DIPHENHYDRAMINE HYDROCHLORIDE 25 MG: 50 INJECTION INTRAMUSCULAR; INTRAVENOUS at 09:09

## 2019-09-02 RX ADMIN — MORPHINE SULFATE 5 MG: 10 INJECTION INTRAVENOUS at 05:09

## 2019-09-02 RX ADMIN — OXYCODONE HYDROCHLORIDE AND ACETAMINOPHEN 1 TABLET: 10; 325 TABLET ORAL at 12:09

## 2019-09-02 RX ADMIN — FENTANYL CITRATE 50 MCG: 50 INJECTION, SOLUTION INTRAMUSCULAR; INTRAVENOUS at 09:09

## 2019-09-02 RX ADMIN — FENTANYL CITRATE 25 MCG: 50 INJECTION, SOLUTION INTRAMUSCULAR; INTRAVENOUS at 10:09

## 2019-09-02 RX ADMIN — MIDAZOLAM HYDROCHLORIDE 1 MG: 1 INJECTION, SOLUTION INTRAMUSCULAR; INTRAVENOUS at 09:09

## 2019-09-02 RX ADMIN — MORPHINE SULFATE 5 MG: 10 INJECTION INTRAVENOUS at 11:09

## 2019-09-02 RX ADMIN — OXYCODONE HYDROCHLORIDE AND ACETAMINOPHEN 1 TABLET: 10; 325 TABLET ORAL at 02:09

## 2019-09-02 RX ADMIN — FENTANYL CITRATE 50 MCG: 50 INJECTION, SOLUTION INTRAMUSCULAR; INTRAVENOUS at 10:09

## 2019-09-02 NOTE — H&P
Inpatient Radiology Pre-procedure Note    History of Present Illness:  Antoinette Love is a 29 y.o. female with recurrent LLE DVT that has progressed despite anticoagulation who presents for venogram and thrombolysis.    Admission H&P reviewed.  Past Medical History:   Diagnosis Date    Anticoagulant long-term use     DVT (deep venous thrombosis)     Factor V Leiden     Pulmonary embolus     Recurrent upper respiratory infection (URI)     TIA (transient ischemic attack)      Past Surgical History:   Procedure Laterality Date    COLONOSCOPY N/A 12/18/2015    Performed by [facility] at [facility] (4TH F[facility])    IVC FILTER RETRIEVAL      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 Authorizing Provider   cetirizine (ZYRTEC) 10 MG tablet Take 2 tablets (20 mg total) by mouth 2 (two) times daily. For chronic urticaria 3/28/19 3/27/20 Yes Luis A Junior MD   enoxaparin (LOVENOX) 80 mg/0.8 mL Syrg Inject 0.8 mLs (80 mg total) into the skin 2 (two) times daily. 8/22/19  Yes Earnestine Garcia MD   m-vit,tx,iron,mins/calc/folic (MULTIVITAMIN) Tab    Yes Historical Provider, MD   albuterol (VENTOLIN HFA) 90 mcg/actuation inhaler Inhale 2 puffs into the lungs every 6 (six) hours as needed for Wheezing. Rescue 8/8/19 8/7/20  Magi Blood NP   diazePAM (VALIUM) 5 MG tablet Take 1 tablet (5 mg total) by mouth every 12 (twelve) hours as needed for Anxiety. 7/31/19 8/30/19  Alberto Holguin MD   EPINEPHrine (EPIPEN 2-VAN) 0.3 mg/0.3 mL AtIn Inject 0.3 mLs (0.3 mg total) into the muscle once. for 1 dose 3/28/19 3/28/19  Luis A Junior MD   montelukast (SINGULAIR) 10 mg tablet Take 1 tablet (10 mg total) by mouth every evening. 8/8/19 9/7/19  Magi Blood NP     Scheduled Meds:    alteplase  12 mg Intra-Catheter Once     famotidine  20 mg Oral Daily     Continuous Infusions:    heparin (porcine) in D5W 18 Units/kg/hr (09/01/19 1913)     PRN Meds:acetaminophen, heparin (PORCINE), heparin (PORCINE), morphine, ondansetron, oxyCODONE-acetaminophen, oxyCODONE-acetaminophen, sodium chloride 0.9%  Anticoagulants/Antiplatelets: Heparin    Allergies:   Review of patient's allergies indicates:   Allergen Reactions    Azithromycin Hives    Toradol [ketorolac] Hives     Itching and hives    Xarelto [rivaroxaban]      Gallbladder swelling and disfunction     Sedation Hx: have not been any systemic reactions    Labs:  Recent Labs   Lab 08/30/19  2314   INR 1.0       Recent Labs   Lab 09/02/19 0455   WBC 3.63*   HGB 11.0*   HCT 33.6*   MCV 94         Recent Labs   Lab 09/02/19 0455   GLU 76   *   K 4.3      CO2 19*   BUN 8   CREATININE 0.7   CALCIUM 9.2   ALT 18   AST 20   ALBUMIN 3.1*   BILITOT 0.3         Vitals:  Temp: 98.4 °F (36.9 °C) (09/02/19 0743)  Pulse: 77 (09/02/19 0743)  Resp: 17 (09/02/19 0743)  BP: (!) 103/55 (09/02/19 0743)  SpO2: 99 % (09/02/19 0743)     Physical Exam:  ASA: 2  Mallampati: 2    General: no acute distress  Mental Status: alert and oriented to person, place and time  HEENT: normocephalic, atraumatic  Chest: unlabored breathing  Heart: regular heart rate  Abdomen: nondistended  Extremity: moves all extremities, mild LLE edema    Plan: Proceed with venogram and thrombolysis.  Sedation Plan: Moderate.    Demetris Parnell MD  Diagnostic and Interventional Radiologist  Department of Radiology  Pager: [phone]

## 2019-09-02 NOTE — PLAN OF CARE
Problem: Adult Inpatient Plan of Care  Goal: Plan of Care Review  Outcome: Ongoing (interventions implemented as appropriate)  Patient remained free from falls and injuries this shift. Heparin therapy continues. APTT monitored. Pain controlled with PRN pain medication. Surgical bath taken in prep for IR today. Will continue to monitor.

## 2019-09-02 NOTE — PROGRESS NOTES
[facility] [place]  Hematology/Oncology  Progress Note    Patient Name: Antoinette Love  Admission Date: 8/30/2019  Hospital Length of Stay: 3 days  Code Status: Full Code     Subjective:     HPI:  Antoinette Love is a 29-year-old female with Homozygous Factor V Leiden mutation, and recurrent DVTs, IVC filter placement, miscarriage in 2013 at 11 weeks, TIA in 2013, who  got admitted 8/30/19 with worsening pain in left leg x 3 days and noted to have worsening extensive DVT in left leg.    She was recently seen in the hematology clinic and the plan was to switch Lovenox to BID. She has been waiting for her prescription and was on Lovenox 120 mg SQ QD when worsening DVT was noted.     Hematologic History  The patient has had at least 11 DVTs and 3-4 PEs.  -She was on Coumadin for years and stated she came off when she had a DVT despite a therapeutic INR.  -She was then placed on Xarelto buts states she only took 1 dose and shortly after developed gallbladder pain. When she was evaluated she was told she had gallbladder inflammation.  -IVC filter was placed in 2014. 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 She had a DVT in 2016 while on Eliquis  She was followed by a Hematologist in [place], and he opted to take her off of anti-coagulation in 2018 because stated that she had an IVC filter and did not need to be anti-coagulated.  -She noted symptoms of DVT last week, so she self resumed a previous Eliquis prescription that she had  -Presented 7/21/19 c/o pain and swelling found to have 'partial, nonocclusive thrombus in the right common femoral vein, left common femoral vein, and left external iliac vein. Superficial thrombophlebitis of the bilateral greater saphenous veins.' Was started on Lovenox.          Interval History:   9/2/19 -  underwent thrombolytic therapy    Oncology Treatment Plan:   [No treatment plan]    Medications:  Continuous Infusions:   sodium chloride 0.9%      heparin (porcine) in D5W 18 Units/kg/hr (09/01/19 1913)     Scheduled Meds:   alteplase  12 mg Intra-Catheter Once    famotidine  20 mg Oral Daily     PRN Meds:acetaminophen, heparin (PORCINE), heparin (PORCINE), morphine, ondansetron, oxyCODONE-acetaminophen, oxyCODONE-acetaminophen, sodium chloride 0.9%     Review of Systems   Constitutional: Negative for fever and unexpected weight change.   HENT: Negative for nosebleeds and sore throat.    Eyes: Negative for pain and redness.   Respiratory: Negative for cough and shortness of breath.    Cardiovascular: Negative for chest pain and palpitations.   Gastrointestinal: Negative for abdominal pain and nausea.   Genitourinary: Negative for dysuria and hematuria.   Musculoskeletal: Negative for arthralgias and back pain.   Skin: Negative for rash and wound.        Pain and swelling in left leg   Neurological: Negative for tremors and headaches.   Hematological: Negative for adenopathy. Does not bruise/bleed easily.   Psychiatric/Behavioral: Negative for behavioral problems and sleep disturbance. The patient is nervous/anxious.      Objective:     Vital Signs (Most Recent):  Temp: 98.4 °F (36.9 °C) (09/02/19 0743)  Pulse: 77 (09/02/19 0743)  Resp: 17 (09/02/19 0743)  BP: (!) 103/55 (09/02/19 0743)  SpO2: 99 % (09/02/19 0743) Vital Signs (24h Range):  Temp:  [98.4 °F (36.9 °C)-99.5 °F (37.5 °C)] 98.4 °F (36.9 °C)  Pulse:  [68-84] 77  Resp:  [17-18] 17  SpO2:  [99 %-100 %] 99 %  BP: ()/(55-64) 103/55     Weight: 78.9 kg (174 lb)  Body mass index is 28.96 kg/m².  Body surface area is 1.9 meters squared.      Intake/Output Summary (Last 24 hours) at 9/2/2019 1200  Last data filed at 9/1/2019 1734  Gross per 24 hour   Intake 360 ml   Output --   Net 360 ml       Physical Exam   Constitutional: She is oriented to person,  place, and time.  Non-toxic appearance. No distress.   HENT:   Mouth/Throat: No oropharyngeal exudate.   Eyes: No scleral icterus.   Neck: Neck supple. No thyroid mass and no thyromegaly present.   Cardiovascular: Normal rate, regular rhythm, S1 normal and normal heart sounds.   Pulmonary/Chest: Effort normal and breath sounds normal. No accessory muscle usage. No respiratory distress. She has no wheezes. She has no rales.   Abdominal: Soft. She exhibits no ascites and no mass. There is no hepatosplenomegaly. There is no tenderness.   Musculoskeletal: She exhibits no edema.   Lymphadenopathy:     She has no cervical adenopathy.     She has no axillary adenopathy.   Neurological: She is alert and oriented to person, place, and time. She has normal strength. Gait normal.   Skin: No bruising, no petechiae and no rash noted. She is not diaphoretic.   Psychiatric: Her behavior is normal. Cognition and memory are normal.   Vitals reviewed.      Significant Labs:   All pertinent labs from the last 24 hours have been reviewed.    Diagnostic Results:  I have reviewed all pertinent imaging results/findings within the past 24 hours.    Assessment/Plan:   1. Extensive symptomatic DVT LLE  2. Homozygous Factor V Leiden causing primary hypercoagulable state    Just completed thrombolytic therapy successfully.    In lot of pain after the procedure - continue morphine 5 mg prn pain for now.    Restart heparin gtt.     to f/u in am.    D/w nursing staff.    Tyson Tomlin MD  Hematology/Oncology  [facility]

## 2019-09-02 NOTE — SUBJECTIVE & OBJECTIVE
Interval History:   9/2/19 - underwent thrombolytic therapy    Oncology Treatment Plan:   [No treatment plan]    Medications:  Continuous Infusions:   sodium chloride 0.9%      heparin (porcine) in D5W 18 Units/kg/hr (09/01/19 1913)     Scheduled Meds:   alteplase  12 mg Intra-Catheter Once    famotidine  20 mg Oral Daily     PRN Meds:acetaminophen, heparin (PORCINE), heparin (PORCINE), morphine, ondansetron, oxyCODONE-acetaminophen, oxyCODONE-acetaminophen, sodium chloride 0.9%     Review of Systems   Constitutional: Negative for fever and unexpected weight change.   HENT: Negative for nosebleeds and sore throat.    Eyes: Negative for pain and redness.   Respiratory: Negative for cough and shortness of breath.    Cardiovascular: Negative for chest pain and palpitations.   Gastrointestinal: Negative for abdominal pain and nausea.   Genitourinary: Negative for dysuria and hematuria.   Musculoskeletal: Negative for arthralgias and back pain.   Skin: Negative for rash and wound.        Pain and swelling in left leg   Neurological: Negative for tremors and headaches.   Hematological: Negative for adenopathy. Does not bruise/bleed easily.   Psychiatric/Behavioral: Negative for behavioral problems and sleep disturbance. The patient is nervous/anxious.      Objective:     Vital Signs (Most Recent):  Temp: 98.4 °F (36.9 °C) (09/02/19 0743)  Pulse: 77 (09/02/19 0743)  Resp: 17 (09/02/19 0743)  BP: (!) 103/55 (09/02/19 0743)  SpO2: 99 % (09/02/19 0743) Vital Signs (24h Range):  Temp:  [98.4 °F (36.9 °C)-99.5 °F (37.5 °C)] 98.4 °F (36.9 °C)  Pulse:  [68-84] 77  Resp:  [17-18] 17  SpO2:  [99 %-100 %] 99 %  BP: ()/(55-64) 103/55     Weight: 78.9 kg (174 lb)  Body mass index is 28.96 kg/m².  Body surface area is 1.9 meters squared.      Intake/Output Summary (Last 24 hours) at 9/2/2019 1200  Last data filed at 9/1/2019 1734  Gross per 24 hour   Intake 360 ml   Output --   Net 360 ml       Physical Exam   Constitutional:  She is oriented to person, place, and time.  Non-toxic appearance. No distress.   HENT:   Mouth/Throat: No oropharyngeal exudate.   Eyes: No scleral icterus.   Neck: Neck supple. No thyroid mass and no thyromegaly present.   Cardiovascular: Normal rate, regular rhythm, S1 normal and normal heart sounds.   Pulmonary/Chest: Effort normal and breath sounds normal. No accessory muscle usage. No respiratory distress. She has no wheezes. She has no rales.   Abdominal: Soft. She exhibits no ascites and no mass. There is no hepatosplenomegaly. There is no tenderness.   Musculoskeletal: She exhibits no edema.   Lymphadenopathy:     She has no cervical adenopathy.     She has no axillary adenopathy.   Neurological: She is alert and oriented to person, place, and time. She has normal strength. Gait normal.   Skin: No bruising, no petechiae and no rash noted. She is not diaphoretic.   Psychiatric: Her behavior is normal. Cognition and memory are normal.   Vitals reviewed.      Significant Labs:   All pertinent labs from the last 24 hours have been reviewed.    Diagnostic Results:  I have reviewed all pertinent imaging results/findings within the past 24 hours.

## 2019-09-02 NOTE — NURSING
Pt called stating her leg was bleeding. Pt noted to be laying in bed with a pillow saturated in blood. Pressure held to insertion site. Applied more gauze and replaced compression stockings. Dr. Louis notified, new orders given. Will cont to monitor.

## 2019-09-02 NOTE — PLAN OF CARE
Problem: Adult Inpatient Plan of Care  Goal: Plan of Care Review  Outcome: Ongoing (interventions implemented as appropriate)  Pt free from falls, injury or any further trauma throughout shift. Pt AAOx4. Continued medications as ordered. Complaints of pain to LLE, continuous heparin infusing, monitoring aPTT. Pt up to BSC with assistance. Thrombolysis 9/2. Thigh high compression stocking in use to LLE. Pressure dressing in place to insertion site on posterior calf.  Pt in no distress. Will cont to monitor.    09/02/19 1600   Plan of Care Review   Plan of Care Reviewed With patient

## 2019-09-02 NOTE — NURSING
Dr. Louis notified of pt return to floor, ok to resume diet and check APTT now. Will cont to monitor.

## 2019-09-02 NOTE — PROCEDURES
Radiology Post-Procedure Note    Pre Op Diagnosis: LLE DVT  Post Op Diagnosis: Same    Procedure: Venogram, venoplasty, thrombolysis, and mechanical thrombectomy    Procedure performed by: Demetris Parnell MD    Written Informed Consent Obtained: Yes  Specimen Removed: NO  Estimated Blood Loss: Minimal    Findings:   LLE venogram demonstrates significant clot burden including the popliteal, femoral, CFV, and iliac veins.  Multistation venoplasty performed with 8mm x 6cm Carney balloon.  Thrombolysis with tPA infusion throughout the LLE.  Rheolytic thrombectomy performed for 470 seconds.  Significant improvement in LLE thrombus noted with near-complete resolution and appropriate antegrade flow.  Focal narrowing still noted at the superior left iliac vein, suggesting underlying May-Thurner lesion.  Venoplasty of the left CFV and iliac vein performed.  Repeat venogram shows improved flow throughout the left lower extremity.    Patient will need IVF.  Recommend that she maintain thigh high compression stocking in the LLE.  Will need IVC filter retrieval and stent placement as outpatient.  Will coordinate with schedule at main campus.    Patient tolerated procedure well.    Demetris Parnell MD  Diagnostic and Interventional Radiologist  Department of Radiology  Pager: [phone]

## 2019-09-02 NOTE — ASSESSMENT & PLAN NOTE
Recent u/s shows progression of clot to left thigh  Pain control  Heparin infusion until tomorrow and then switch to lovenox BID  Hematology consulted for further recs  S/p thrombolysis of left leg DVT  Thigh high flavia hose   Follow up for IVC filter retrieval and possible stent placement

## 2019-09-02 NOTE — NURSING
post thrombolysis APTT 57.8, no change needed to heparin infusion, will recheck with AM labs. Will cont to monitor.

## 2019-09-02 NOTE — CONSULTS
See H&P and Procedure Note from same date.    Demetris Parnell MD  Diagnostic and Interventional Radiologist  Department of Radiology  Pager: [phone]

## 2019-09-02 NOTE — SUBJECTIVE & OBJECTIVE
Interval History: pt reports pain to left leg s/p procedure but relief with morphine and oxycodone    Review of Systems   Constitutional: Negative for fever and unexpected weight change.   HENT: Negative for nosebleeds and sore throat.    Eyes: Negative for pain and redness.   Respiratory: Negative for cough and shortness of breath.    Cardiovascular: Negative for chest pain and palpitations.   Gastrointestinal: Negative for abdominal pain and nausea.   Genitourinary: Negative for dysuria and hematuria.   Musculoskeletal: Negative for arthralgias and back pain.   Skin: Negative for rash and wound.        Pain and swelling in left leg   Neurological: Negative for tremors and headaches.   Hematological: Negative for adenopathy. Does not bruise/bleed easily.   Psychiatric/Behavioral: Negative for behavioral problems and sleep disturbance. The patient is nervous/anxious.      Objective:     Vital Signs (Most Recent):  Temp: 98 °F (36.7 °C) (09/02/19 1205)  Pulse: 66 (09/02/19 1205)  Resp: 19 (09/02/19 1205)  BP: 122/63 (09/02/19 1205)  SpO2: 100 % (09/02/19 1205) Vital Signs (24h Range):  Temp:  [98 °F (36.7 °C)-99.5 °F (37.5 °C)] 98 °F (36.7 °C)  Pulse:  [65-96] 66  Resp:  [14-19] 19  SpO2:  [99 %-100 %] 100 %  BP: ()/(41-79) 122/63     Weight: 78.9 kg (174 lb)  Body mass index is 28.96 kg/m².    Intake/Output Summary (Last 24 hours) at 9/2/2019 1458  Last data filed at 9/1/2019 1734  Gross per 24 hour   Intake 120 ml   Output --   Net 120 ml      Physical Exam   Constitutional: She is oriented to person, place, and time. She appears well-developed and well-nourished. She appears distressed.   Eyes: Pupils are equal, round, and reactive to light. EOM are normal.   Neck: Normal range of motion. Neck supple.   Cardiovascular: Normal rate, regular rhythm, normal heart sounds and intact distal pulses.   Pulmonary/Chest: Effort normal and breath sounds normal. No respiratory distress. She has no rales.   Abdominal:  Soft. Bowel sounds are normal. She exhibits no distension.   Musculoskeletal: She exhibits edema and tenderness.   Neurological: She is alert and oriented to person, place, and time. No sensory deficit.   Skin: Skin is warm and dry. Capillary refill takes less than 2 seconds.   Psychiatric: She has a normal mood and affect. Her behavior is normal.       Significant Labs:   CBC:   Recent Labs   Lab 09/01/19  0550 09/02/19  0455   WBC 3.64* 3.63*   HGB 10.9* 11.0*   HCT 32.3* 33.6*    184     Coagulation:   Recent Labs   Lab 09/02/19  1411   APTT 57.8*       Significant Imaging: I have reviewed and interpreted all pertinent imaging results/findings within the past 24 hours.

## 2019-09-02 NOTE — PROGRESS NOTES
[facility] Hospital Medicine  Progress Note    Patient Name: Antoinette Love  MRN: [id number]  Patient Class: IP- Inpatient   Admission Date: 8/30/2019  Length of Stay: 3 days  Attending Physician: Sheri Louis MD  Primary Care Provider: Alberto Holguin MD        Subjective:     Principal Problem:Recurrent acute deep vein thrombosis of left lower extremity        HPI:  Antoinette Love is a 29 y.o. female that (in part)  has a past medical history of DVT (deep venous thrombosis), Factor V Leiden, Pulmonary embolus, Recurrent upper respiratory infection (URI), and TIA (transient ischemic attack).  has a past surgical history that includes IVC filter retrieval; tumor from breast; [place] tooth extraction; and Colonoscopy (N/A, 12/18/2015). Presents to [facility] complaining of left leg swelling. This is a recurrent problem for her.  Three days duration.  Was sent for ultrasound for evaluation and was positive for extensive DVT from the iliac distal to the ankle.  Patient follows with H[facility]ology at [facility].  She is compliant with home medication regimen including daily Lovenox.  She has failed oral anticoagulants including Xarelto, warfarin, and Eliquis for various reasons.  Edema located in left lower extremity.  Constant duration.  Daily frequency.  Moderate severely.  No relieving factors.  Exacerbated by mutation of factor 5 Leiden gene obtained from both parents, per the patient.  No cough, shortness of breath, hemoptysis, stridor, wheezing, or night sweats.    In the emergency department he heparin infusion was initiated.  Hematology was consulted.  Pending further evaluation and recommendations.    Hospital medicine has been asked to admit for further evaluation and treatment.     Overview/Hospital Course:  Pt admitted with recurrent DVT. Seen last week in hematology clinic and told to increase lovenox to BID. Pt left  leg is swollen and tender to touch. Limited range of motion due to pain and swelling. H/o Factor 5 defic.   Hematology consulted. Currently on heparin infusion. Consult IR for possible mechanical thrombolysis.  PLan for IR intervention 9/2.  Pain and swelling not improved.    Pt is s/p thrombolysis of left leg extensive DVT. D/w Dr. Parnell. Continue heparin infusion today and change to lovenox BID tomorrow. If stable, can dc home with follow up plan to retrieve IVC filter and possible iliac vein stent placement. Pt is to wear thigh high flavia hose now and on dc home     Interval History: pt reports pain to left leg s/p procedure but relief with morphine and oxycodone    Review of Systems   Constitutional: Negative for fever and unexpected weight change.   HENT: Negative for nosebleeds and sore throat.    Eyes: Negative for pain and redness.   Respiratory: Negative for cough and shortness of breath.    Cardiovascular: Negative for chest pain and palpitations.   Gastrointestinal: Negative for abdominal pain and nausea.   Genitourinary: Negative for dysuria and hematuria.   Musculoskeletal: Negative for arthralgias and back pain.   Skin: Negative for rash and wound.        Pain and swelling in left leg   Neurological: Negative for tremors and headaches.   Hematological: Negative for adenopathy. Does not bruise/bleed easily.   Psychiatric/Behavioral: Negative for behavioral problems and sleep disturbance. The patient is nervous/anxious.      Objective:     Vital Signs (Most Recent):  Temp: 98 °F (36.7 °C) (09/02/19 1205)  Pulse: 66 (09/02/19 1205)  Resp: 19 (09/02/19 1205)  BP: 122/63 (09/02/19 1205)  SpO2: 100 % (09/02/19 1205) Vital Signs (24h Range):  Temp:  [98 °F (36.7 °C)-99.5 °F (37.5 °C)] 98 °F (36.7 °C)  Pulse:  [65-96] 66  Resp:  [14-19] 19  SpO2:  [99 %-100 %] 100 %  BP: ()/(41-79) 122/63     Weight: 78.9 kg (174 lb)  Body mass index is 28.96 kg/m².    Intake/Output Summary (Last 24 hours) at 9/2/2019  1458  Last data filed at 9/1/2019 1734  Gross per 24 hour   Intake 120 ml   Output --   Net 120 ml      Physical Exam   Constitutional: She is oriented to person, place, and time. She appears well-developed and well-nourished. She appears distressed.   Eyes: Pupils are equal, round, and reactive to light. EOM are normal.   Neck: Normal range of motion. Neck supple.   Cardiovascular: Normal rate, regular rhythm, normal heart sounds and intact distal pulses.   Pulmonary/Chest: Effort normal and breath sounds normal. No respiratory distress. She has no rales.   Abdominal: Soft. Bowel sounds are normal. She exhibits no distension.   Musculoskeletal: She exhibits edema and tenderness.   Neurological: She is alert and oriented to person, place, and time. No sensory deficit.   Skin: Skin is warm and dry. Capillary refill takes less than 2 seconds.   Psychiatric: She has a normal mood and affect. Her behavior is normal.       Significant Labs:   CBC:   Recent Labs   Lab 09/01/19  0550 09/02/19  0455   WBC 3.64* 3.63*   HGB 10.9* 11.0*   HCT 32.3* 33.6*    184     Coagulation:   Recent Labs   Lab 09/02/19  1411   APTT 57.8*       Significant Imaging: I have reviewed and interpreted all pertinent imaging results/findings within the past 24 hours.      Assessment/Plan:      * Recurrent acute deep vein thrombosis of left lower extremity  Recent u/s shows progression of clot to left thigh  Pain control  Heparin infusion until tomorrow and then switch to lovenox BID  Hematology consulted for further recs  S/p thrombolysis of left leg DVT  Thigh high flavia hose   Follow up for IVC filter retrieval and possible stent placement       DVT (deep venous thrombosis)        History of pulmonary embolism  No acute issues       Factor V Leiden mutation  Failed or complications of eliquis, xarelto and coumadin  Hematology consulted for further recs        VTE Risk Mitigation (From admission, onward)        Ordered     IP VTE HIGH RISK  PATIENT  Once      08/30/19 2258     Reason for no Mechanical VTE Prophylaxis  Once      08/30/19 2258     heparin 25,000 units in dextrose 5% (100 units/ml) IV bolus from bag - ADDITIONAL PRN BOLUS - 60 units/kg  As needed (PRN)      08/30/19 1907     heparin 25,000 units in dextrose 5% (100 units/ml) IV bolus from bag - ADDITIONAL PRN BOLUS - 30 units/kg  As needed (PRN)      08/30/19 1907     heparin 25,000 units in dextrose 5% 250 mL (100 units/mL) infusion HIGH INTENSITY nomogram - [facility]  Continuous      08/30/19 1907                Sheri Louis MD  Department of Hospital Medicine   [facility]

## 2019-09-02 NOTE — NURSING
Pt transferred back to floor via bed. Pain 9/10, morphine administered per orders. Dressing to posterior L knee intact with moderate drainage. Compression stocking placed as per Dr. Parnell orders, place knee high compression stocking to L leg (pt sent with one from IR). IV fluids started, heparin infusing per orders, ok per pharmacy to Y-site heparin to fluids. Pt educated she is on bedrest until 2 pm. Will cont to monitor.

## 2019-09-03 LAB
ALBUMIN SERPL BCP-MCNC: 2.8 G/DL (ref 3.5–5.2)
ALP SERPL-CCNC: 95 U/L (ref 55–135)
ALT SERPL W/O P-5'-P-CCNC: 24 U/L (ref 10–44)
ANION GAP SERPL CALC-SCNC: 5 MMOL/L (ref 8–16)
APTT BLDCRRT: 48.8 SEC (ref 21–32)
APTT BLDCRRT: 48.8 SEC (ref 21–32)
APTT BLDCRRT: 71.1 SEC (ref 21–32)
AST SERPL-CCNC: 32 U/L (ref 10–40)
BASOPHILS # BLD AUTO: 0.01 K/UL (ref 0–0.2)
BASOPHILS NFR BLD: 0.3 % (ref 0–1.9)
BILIRUB SERPL-MCNC: 0.3 MG/DL (ref 0.1–1)
BUN SERPL-MCNC: 9 MG/DL (ref 6–20)
CALCIUM SERPL-MCNC: 8.4 MG/DL (ref 8.7–10.5)
CARDIOLIPIN IGG SER IA-ACNC: <9.4 GPL (ref 0–14.99)
CARDIOLIPIN IGM SER IA-ACNC: 15.92 MPL (ref 0–12.49)
CHLORIDE SERPL-SCNC: 106 MMOL/L (ref 95–110)
CO2 SERPL-SCNC: 25 MMOL/L (ref 23–29)
CREAT SERPL-MCNC: 0.7 MG/DL (ref 0.5–1.4)
DIFFERENTIAL METHOD: ABNORMAL
EOSINOPHIL # BLD AUTO: 0.1 K/UL (ref 0–0.5)
EOSINOPHIL NFR BLD: 3.9 % (ref 0–8)
ERYTHROCYTE [DISTWIDTH] IN BLOOD BY AUTOMATED COUNT: 12.3 % (ref 11.5–14.5)
EST. GFR  (AFRICAN AMERICAN): >60 ML/MIN/1.73 M^2
EST. GFR  (NON AFRICAN AMERICAN): >60 ML/MIN/1.73 M^2
FACT X PPP CHRO-ACNC: 0.59 IU/ML (ref 0.3–0.7)
GLUCOSE SERPL-MCNC: 102 MG/DL (ref 70–110)
HCT VFR BLD AUTO: 29 % (ref 37–48.5)
HGB BLD-MCNC: 9.7 G/DL (ref 12–16)
LYMPHOCYTES # BLD AUTO: 1.2 K/UL (ref 1–4.8)
LYMPHOCYTES NFR BLD: 33.5 % (ref 18–48)
MCH RBC QN AUTO: 31.5 PG (ref 27–31)
MCHC RBC AUTO-ENTMCNC: 33.4 G/DL (ref 32–36)
MCV RBC AUTO: 94 FL (ref 82–98)
MONOCYTES # BLD AUTO: 0.3 K/UL (ref 0.3–1)
MONOCYTES NFR BLD: 9.2 % (ref 4–15)
NEUTROPHILS # BLD AUTO: 1.9 K/UL (ref 1.8–7.7)
NEUTROPHILS NFR BLD: 53.1 % (ref 38–73)
PLATELET # BLD AUTO: 146 K/UL (ref 150–350)
PMV BLD AUTO: 8.5 FL (ref 9.2–12.9)
POTASSIUM SERPL-SCNC: 3.8 MMOL/L (ref 3.5–5.1)
PROT SERPL-MCNC: 6.1 G/DL (ref 6–8.4)
RBC # BLD AUTO: 3.08 M/UL (ref 4–5.4)
SODIUM SERPL-SCNC: 136 MMOL/L (ref 136–145)
WBC # BLD AUTO: 3.58 K/UL (ref 3.9–12.7)

## 2019-09-03 PROCEDURE — 99233 PR SUBSEQUENT HOSPITAL CARE,LEVL III: ICD-10-PCS | Mod: ,,, | Performed by: INTERNAL MEDICINE

## 2019-09-03 PROCEDURE — 63600175 PHARM REV CODE 636 W HCPCS: Performed by: INTERNAL MEDICINE

## 2019-09-03 PROCEDURE — 99233 SBSQ HOSP IP/OBS HIGH 50: CPT | Mod: ,,, | Performed by: INTERNAL MEDICINE

## 2019-09-03 PROCEDURE — 85730 THROMBOPLASTIN TIME PARTIAL: CPT | Mod: 91

## 2019-09-03 PROCEDURE — 36415 COLL VENOUS BLD VENIPUNCTURE: CPT

## 2019-09-03 PROCEDURE — 25000003 PHARM REV CODE 250: Performed by: HOSPITALIST

## 2019-09-03 PROCEDURE — 11000001 HC ACUTE MED/SURG PRIVATE ROOM

## 2019-09-03 PROCEDURE — 80053 COMPREHEN METABOLIC PANEL: CPT

## 2019-09-03 PROCEDURE — 85025 COMPLETE CBC W/AUTO DIFF WBC: CPT

## 2019-09-03 RX ADMIN — HEPARIN SODIUM 13.18 UNITS/KG/HR: 10000 INJECTION, SOLUTION INTRAVENOUS at 06:09

## 2019-09-03 RX ADMIN — MORPHINE SULFATE 5 MG: 10 INJECTION INTRAVENOUS at 01:09

## 2019-09-03 RX ADMIN — MORPHINE SULFATE 5 MG: 10 INJECTION INTRAVENOUS at 06:09

## 2019-09-03 RX ADMIN — OXYCODONE HYDROCHLORIDE AND ACETAMINOPHEN 1 TABLET: 10; 325 TABLET ORAL at 05:09

## 2019-09-03 RX ADMIN — OXYCODONE HYDROCHLORIDE AND ACETAMINOPHEN 1 TABLET: 10; 325 TABLET ORAL at 09:09

## 2019-09-03 RX ADMIN — FAMOTIDINE 20 MG: 20 TABLET ORAL at 09:09

## 2019-09-03 RX ADMIN — MORPHINE SULFATE 5 MG: 10 INJECTION INTRAVENOUS at 08:09

## 2019-09-03 RX ADMIN — OXYCODONE HYDROCHLORIDE AND ACETAMINOPHEN 1 TABLET: 10; 325 TABLET ORAL at 03:09

## 2019-09-03 NOTE — ASSESSMENT & PLAN NOTE
Pt with extensive symptomatic DVT LLE  Continue  heparin gtt  Pt is s/p thrombolysis of left leg extensive DVT by IR 9/2/19  It has been determined that patient has May thurner Disease   She will need to undergo IVC filter retrieval and common iliac vein stent placement per IR     Possible transfer to [facility] for procedure  Pt will remain on heparin gtt

## 2019-09-03 NOTE — PROGRESS NOTES
[facility] Hospital Medicine  Progress Note    Patient Name: Antoinette Love  MRN: [id number]  Patient Class: IP- Inpatient   Admission Date: 8/30/2019  Length of Stay: 4 days  Attending Physician: Samir Edwards MD  Primary Care Provider: Alberto Holguin MD        Subjective:     Principal Problem:Recurrent acute deep vein thrombosis of left lower extremity        HPI:  Antoinette Love is a 29 y.o. female that (in part)  has a past medical history of DVT (deep venous thrombosis), Factor V Leiden, Pulmonary embolus, Recurrent upper respiratory infection (URI), and TIA (transient ischemic attack).  has a past surgical history that includes IVC filter retrieval; tumor from breast; [place] tooth extraction; and Colonoscopy (N/A, 12/18/2015). Presents to [facility] Emergency Department complaining of left leg swelling. This is a recurrent problem for her.  Three days duration.  Was sent for ultrasound for evaluation and was positive for extensive DVT from the iliac distal to the ankle.  Patient follows with Hematology at [facility].  She is compliant with home medication regimen including daily Lovenox.  She has failed oral anticoagulants including Xarelto, warfarin, and Eliquis for various reasons.  Edema located in left lower extremity.  Constant duration.  Daily frequency.  Moderate severely.  No relieving factors.  Exacerbated by mutation of factor 5 Leiden gene obtained from both parents, per the patient.  No cough, shortness of breath, hemoptysis, stridor, wheezing, or night sweats.    In the emergency department he heparin infusion was initiated.  Hematology was consulted.  Pending further evaluation and recommendations.    Hospital medicine has been asked to admit for further evaluation and treatment.     Overview/Hospital Course:  Pt admitted with recurrent DVT. Seen last week in hematology clinic and told to increase lovenox to BID. Pt left  leg is swollen and tender to touch. Limited range of motion due to pain and swelling. H/o Factor 5 defic.   Hematology consulted. Currently on heparin infusion. Consult IR for possible mechanical thrombolysis.  PLan for IR intervention 9/2.  Pain and swelling not improved.  Pt is s/p thrombolysis of left leg extensive DVT on 9/2.  Some bleeding after procedure.  Continues on heparin gtt.  Plan to retrieve IVC filter and possible iliac vein stent placement. Pt is to wear thigh high flavia hose now and on dc home     Interval History: No new complaints.    Review of Systems   HENT: Negative for ear discharge and ear pain.    Eyes: Negative for pain and itching.   Endocrine: Negative for polyphagia and polyuria.   Neurological: Negative for seizures and syncope.     Objective:     Vital Signs (Most Recent):  Temp: 98.3 °F (36.8 °C) (09/03/19 1627)  Pulse: 72 (09/03/19 1627)  Resp: 18 (09/03/19 1627)  BP: (!) 95/52 (09/03/19 1627)  SpO2: 100 % (09/03/19 1627) Vital Signs (24h Range):  Temp:  [98.3 °F (36.8 °C)-99.5 °F (37.5 °C)] 98.3 °F (36.8 °C)  Pulse:  [66-94] 72  Resp:  [16-18] 18  SpO2:  [98 %-100 %] 100 %  BP: ()/(52-66) 95/52     Weight: 78.9 kg (174 lb)  Body mass index is 28.96 kg/m².    Intake/Output Summary (Last 24 hours) at 9/3/2019 1628  Last data filed at 9/3/2019 1214  Gross per 24 hour   Intake 969.17 ml   Output --   Net 969.17 ml      Physical Exam   Constitutional: She is oriented to person, place, and time. She appears well-developed and well-nourished.   Eyes: Pupils are equal, round, and reactive to light. EOM are normal.   Neck: Normal range of motion. Neck supple.   Cardiovascular: Normal rate, regular rhythm, normal heart sounds and intact distal pulses.   Pulmonary/Chest: Effort normal and breath sounds normal. No respiratory distress. She has no rales.   Abdominal: Soft. Bowel sounds are normal. She exhibits no distension.   Musculoskeletal: She exhibits edema and tenderness.    Neurological: She is alert and oriented to person, place, and time. No sensory deficit.   Skin: Skin is warm and dry. Capillary refill takes less than 2 seconds.   Psychiatric: She has a normal mood and affect. Her behavior is normal.       Significant Labs:   BMP:   Recent Labs   Lab 09/03/19  0350         K 3.8      CO2 25   BUN 9   CREATININE 0.7   CALCIUM 8.4*     CBC:   Recent Labs   Lab 09/02/19  0455 09/02/19  1843 09/03/19  0350   WBC 3.63* 5.22 3.58*   HGB 11.0* 9.9* 9.7*   HCT 33.6* 29.0* 29.0*    173 146*           Assessment/Plan:      * Recurrent acute deep vein thrombosis of left lower extremity  Recent u/s shows progression of clot to left thigh  S/p thrombolysis of left leg DVT on 9/2.  Thigh high flavia hose   Plan for IVC filter retrieval and possible stent placement.  Discussed with Dr. Mendoza.  Will need to discuss with Dr. Parnell timing of planned procedure to determine plan with anticoagulation.  On Heparin gtt.      DVT (deep venous thrombosis)        History of pulmonary embolism  No acute issues       Factor V Leiden mutation  Failed or complications of eliquis, xarelto and coumadin        VTE Risk Mitigation (From admission, onward)        Ordered     IP VTE HIGH RISK PATIENT  Once      08/30/19 2258     Reason for no Mechanical VTE Prophylaxis  Once      08/30/19 2258     heparin 25,000 units in dextrose 5% (100 units/ml) IV bolus from bag - ADDITIONAL PRN BOLUS - 60 units/kg  As needed (PRN)      08/30/19 1907     heparin 25,000 units in dextrose 5% (100 units/ml) IV bolus from bag - ADDITIONAL PRN BOLUS - 30 units/kg  As needed (PRN)      08/30/19 1907     heparin 25,000 units in dextrose 5% 250 mL (100 units/mL) infusion HIGH INTENSITY nomogram - [facility]  Continuous      08/30/19 1907                Samir Edwards MD  Department of Hospital Medicine   [facility]

## 2019-09-03 NOTE — PROGRESS NOTES
[facility]  Hematology/Oncology  Progress Note    Patient Name: Antoinette Love  Admission Date: 8/30/2019  Hospital Length of Stay: 4 days  Code Status: Full Code     Subjective:     HPI:  Antoinette Love is a 29-year-old female with Homozygous Factor V Leiden mutation, and recurrent DVTs, IVC filter placement, miscarriage in 2013 at 11 weeks, TIA in 2013, who  got admitted 8/30/19 with worsening pain in left leg x 3 days and noted to have worsening extensive DVT in left leg.    She was recently seen in the hematology clinic and the plan was to switch Lovenox to BID. She has been waiting for her prescription and was on Lovenox 120 mg SQ QD when worsening DVT was noted.     Hematologic History  The patient has had at least 11 DVTs and 3-4 PEs.  -She was on Coumadin for years and stated she came off when she had a DVT despite a therapeutic INR.  -She was then placed on Xarelto buts states she only took 1 dose and shortly after developed gallbladder pain. When she was evaluated she was told she had gallbladder inflammation.  -IVC filter was placed in 2014. 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 She had a DVT in 2016 while on Eliquis  She was followed by a Hematologist in [place], and he opted to take her off of anti-coagulation in 2018 because stated that she had an IVC filter and did not need to be anti-coagulated.  -She noted symptoms of DVT last week, so she self resumed a previous Eliquis prescription that she had  -Presented 7/21/19 c/o pain and swelling found to have 'partial, nonocclusive thrombus in the right common femoral vein, left common femoral vein, and left external iliac vein. Superficial thrombophlebitis of the bilateral greater saphenous veins.' Was started on Lovenox.          Interval History: Pt continues  with pain and swelling in left leg. She remains on heparin therapy. Pt s/p iliofemoral pharmacomechanical thrombectomy by IR 9/2/19.    Oncology Treatment Plan:   [No treatment plan]    Medications:  Continuous Infusions:   heparin (porcine) in D5W 13.18 Units/kg/hr (09/03/19 0656)     Scheduled Meds:   alteplase  12 mg Intra-Catheter Once    famotidine  20 mg Oral Daily     PRN Meds:acetaminophen, heparin (PORCINE), heparin (PORCINE), morphine, ondansetron, oxyCODONE-acetaminophen, oxyCODONE-acetaminophen, sodium chloride 0.9%     Review of Systems   Constitutional: Negative for fever and unexpected weight change.   HENT: Negative for nosebleeds.    Eyes: Positive for pain. Negative for redness.   Respiratory: Negative for cough and shortness of breath.    Cardiovascular: Positive for leg swelling. Negative for chest pain and palpitations.   Gastrointestinal: Negative for abdominal pain and nausea.   Genitourinary: Negative for dysuria and hematuria.   Musculoskeletal: Negative for arthralgias and back pain.   Skin: Negative for rash and wound.        Pain and swelling in left leg   Neurological: Negative for headaches.   Hematological: Negative for adenopathy. Does not bruise/bleed easily.   Psychiatric/Behavioral: Negative for sleep disturbance. The patient is not nervous/anxious.      Objective:     Vital Signs (Most Recent):  Temp: 98.3 °F (36.8 °C) (09/03/19 1133)  Pulse: 94 (09/03/19 1133)  Resp: 16 (09/03/19 1133)  BP: 104/66 (09/03/19 1133)  SpO2: 99 % (09/03/19 1133) Vital Signs (24h Range):  Temp:  [98.3 °F (36.8 °C)-99.5 °F (37.5 °C)] 98.3 °F (36.8 °C)  Pulse:  [66-94] 94  Resp:  [16-18] 16  SpO2:  [98 %-100 %] 99 %  BP: ()/(54-66) 104/66     Weight: 78.9 kg (174 lb)  Body mass index is 28.96 kg/m².  Body surface area is 1.9 meters squared.      Intake/Output Summary (Last 24 hours) at 9/3/2019 1251  Last data filed at 9/3/2019 0822  Gross per 24 hour   Intake 849.17 ml   Output --   Net 849.17  ml       Physical Exam   Constitutional: She is oriented to person, place, and time. She appears well-developed and well-nourished.   Eyes: Pupils are equal, round, and reactive to light. EOM are normal.   Neck: Normal range of motion. Neck supple.   Cardiovascular: Normal rate, regular rhythm, normal heart sounds and intact distal pulses.   Pulmonary/Chest: Effort normal and breath sounds normal. No respiratory distress. She has no rales.   Abdominal: Soft. Bowel sounds are normal. She exhibits no distension.   Musculoskeletal: She exhibits edema and tenderness.   LLE bandaged   Neurological: She is alert and oriented to person, place, and time. No sensory deficit.   Skin: Skin is warm and dry. Capillary refill takes less than 2 seconds.   Psychiatric: She has a normal mood and affect. Her behavior is normal.       Significant Labs:   All pertinent labs within the past 24 hours have been reviewed    Diagnostic Results:  I have reviewed and interpreted all pertinent imaging results/findings within the past 24 hours    Assessment/Plan:   1.Extensive DVT (deep venous thrombosis) LLE  2. Homozygous Factor V Leiden    Pt with extensive symptomatic DVT LLE  Continue  heparin gtt  Pt is s/p thrombolysis of left leg extensive DVT by IR 9/2/19  It has been determined that patient has May thurner Disease   She will need to undergo IVC filter retrieval and common iliac vein stent placement per IR     Possible transfer to [facility] for planned procedure  Pt will remain on heparin gtt    D/w Dr. Jerry Mendoza MD  Hematology/Oncology  [facility]

## 2019-09-03 NOTE — PLAN OF CARE
Consult received to continue patient's care at [facility].  Pt s/p iliofemoral pharmacomechanical thrombectomy by IR 9/2/19.  IR is planning outpatient IVC filter retrieval and common iliac vein stent placement due to May Thurner Disease at [facility].  Will have patient continue her treatment plan with IR.  Case discussed with care team and consult cancelled.

## 2019-09-03 NOTE — ASSESSMENT & PLAN NOTE
Recent u/s shows progression of clot to left thigh  S/p thrombolysis of left leg DVT on 9/2.  Thigh high flavia hose   Plan for IVC filter retrieval and possible stent placement.  Discussed with Dr. Mendoza.  Will need to discuss with Dr. Parnell timing of planned procedure to determine plan with anticoagulation.  On Heparin gtt.

## 2019-09-03 NOTE — PLAN OF CARE
09/03/19 1403   Discharge Reassessment   Assessment Type Discharge Planning Reassessment   Patient plans to return home with  after dicharge

## 2019-09-03 NOTE — SUBJECTIVE & OBJECTIVE
Interval History: No new complaints.    Review of Systems   HENT: Negative for ear discharge and ear pain.    Eyes: Negative for pain and itching.   Endocrine: Negative for polyphagia and polyuria.   Neurological: Negative for seizures and syncope.     Objective:     Vital Signs (Most Recent):  Temp: 98.3 °F (36.8 °C) (09/03/19 1627)  Pulse: 72 (09/03/19 1627)  Resp: 18 (09/03/19 1627)  BP: (!) 95/52 (09/03/19 1627)  SpO2: 100 % (09/03/19 1627) Vital Signs (24h Range):  Temp:  [98.3 °F (36.8 °C)-99.5 °F (37.5 °C)] 98.3 °F (36.8 °C)  Pulse:  [66-94] 72  Resp:  [16-18] 18  SpO2:  [98 %-100 %] 100 %  BP: ()/(52-66) 95/52     Weight: 78.9 kg (174 lb)  Body mass index is 28.96 kg/m².    Intake/Output Summary (Last 24 hours) at 9/3/2019 1628  Last data filed at 9/3/2019 1214  Gross per 24 hour   Intake 969.17 ml   Output --   Net 969.17 ml      Physical Exam   Constitutional: She is oriented to person, place, and time. She appears well-developed and well-nourished.   Eyes: Pupils are equal, round, and reactive to light. EOM are normal.   Neck: Normal range of motion. Neck supple.   Cardiovascular: Normal rate, regular rhythm, normal heart sounds and intact distal pulses.   Pulmonary/Chest: Effort normal and breath sounds normal. No respiratory distress. She has no rales.   Abdominal: Soft. Bowel sounds are normal. She exhibits no distension.   Musculoskeletal: She exhibits edema and tenderness.   Neurological: She is alert and oriented to person, place, and time. No sensory deficit.   Skin: Skin is warm and dry. Capillary refill takes less than 2 seconds.   Psychiatric: She has a normal mood and affect. Her behavior is normal.       Significant Labs:   BMP:   Recent Labs   Lab 09/03/19  0350         K 3.8      CO2 25   BUN 9   CREATININE 0.7   CALCIUM 8.4*     CBC:   Recent Labs   Lab 09/02/19  0455 09/02/19  1843 09/03/19  0350   WBC 3.63* 5.22 3.58*   HGB 11.0* 9.9* 9.7*   HCT 33.6* 29.0* 29.0*     173 146*

## 2019-09-03 NOTE — SUBJECTIVE & OBJECTIVE
Interval History: Pt continues with pain and swelling in left leg. She remains on heparin therapy. Pt s/p iliofemoral pharmacomechanical thrombectomy by IR 9/2/19.    Oncology Treatment Plan:   [No treatment plan]    Medications:  Continuous Infusions:   heparin (porcine) in D5W 13.18 Units/kg/hr (09/03/19 0656)     Scheduled Meds:   alteplase  12 mg Intra-Catheter Once    famotidine  20 mg Oral Daily     PRN Meds:acetaminophen, heparin (PORCINE), heparin (PORCINE), morphine, ondansetron, oxyCODONE-acetaminophen, oxyCODONE-acetaminophen, sodium chloride 0.9%     Review of Systems   Constitutional: Negative for fever and unexpected weight change.   HENT: Negative for nosebleeds.    Eyes: Positive for pain. Negative for redness.   Respiratory: Negative for cough and shortness of breath.    Cardiovascular: Positive for leg swelling. Negative for chest pain and palpitations.   Gastrointestinal: Negative for abdominal pain and nausea.   Genitourinary: Negative for dysuria and hematuria.   Musculoskeletal: Negative for arthralgias and back pain.   Skin: Negative for rash and wound.        Pain and swelling in left leg   Neurological: Negative for headaches.   Hematological: Negative for adenopathy. Does not bruise/bleed easily.   Psychiatric/Behavioral: Negative for sleep disturbance. The patient is not nervous/anxious.      Objective:     Vital Signs (Most Recent):  Temp: 98.3 °F (36.8 °C) (09/03/19 1133)  Pulse: 94 (09/03/19 1133)  Resp: 16 (09/03/19 1133)  BP: 104/66 (09/03/19 1133)  SpO2: 99 % (09/03/19 1133) Vital Signs (24h Range):  Temp:  [98.3 °F (36.8 °C)-99.5 °F (37.5 °C)] 98.3 °F (36.8 °C)  Pulse:  [66-94] 94  Resp:  [16-18] 16  SpO2:  [98 %-100 %] 99 %  BP: ()/(54-66) 104/66     Weight: 78.9 kg (174 lb)  Body mass index is 28.96 kg/m².  Body surface area is 1.9 meters squared.      Intake/Output Summary (Last 24 hours) at 9/3/2019 1251  Last data filed at 9/3/2019 0822  Gross per 24 hour   Intake  849.17 ml   Output --   Net 849.17 ml       Physical Exam   Constitutional: She is oriented to person, place, and time. She appears well-developed and well-nourished.   Eyes: Pupils are equal, round, and reactive to light. EOM are normal.   Neck: Normal range of motion. Neck supple.   Cardiovascular: Normal rate, regular rhythm, normal heart sounds and intact distal pulses.   Pulmonary/Chest: Effort normal and breath sounds normal. No respiratory distress. She has no rales.   Abdominal: Soft. Bowel sounds are normal. She exhibits no distension.   Musculoskeletal: She exhibits edema and tenderness.   LLE bandaged   Neurological: She is alert and oriented to person, place, and time. No sensory deficit.   Skin: Skin is warm and dry. Capillary refill takes less than 2 seconds.   Psychiatric: She has a normal mood and affect. Her behavior is normal.       Significant Labs:   All pertinent labs within the past 24 hours have been reviewed    Diagnostic Results:  I have reviewed and interpreted all pertinent imaging results/findings within the past 24 hours

## 2019-09-03 NOTE — PLAN OF CARE
Problem: Adult Inpatient Plan of Care  Goal: Plan of Care Review  Outcome: Ongoing (interventions implemented as appropriate)     09/03/19 4249   Plan of Care Review   Plan of Care Reviewed With patient   Patient remains free from fall or injury. Pain managed with PRN pain medication. Remains on continuous heparin drip as ordered. Patient having some bleeding from site of surgery, dressing reinforced and compression stocking in place. Bleeding subsided. Up to BS with assist. Safety measures remain. Will cont to monitor.

## 2019-09-04 VITALS
RESPIRATION RATE: 18 BRPM | TEMPERATURE: 98 F | OXYGEN SATURATION: 98 % | WEIGHT: 174 LBS | BODY MASS INDEX: 28.99 KG/M2 | DIASTOLIC BLOOD PRESSURE: 63 MMHG | HEIGHT: 65 IN | SYSTOLIC BLOOD PRESSURE: 113 MMHG | HEART RATE: 86 BPM

## 2019-09-04 PROBLEM — I87.1 MAY-THURNER SYNDROME: Status: ACTIVE | Noted: 2019-09-04

## 2019-09-04 LAB
ALBUMIN SERPL BCP-MCNC: 3 G/DL (ref 3.5–5.2)
ALP SERPL-CCNC: 90 U/L (ref 55–135)
ALT SERPL W/O P-5'-P-CCNC: 21 U/L (ref 10–44)
ANION GAP SERPL CALC-SCNC: 7 MMOL/L (ref 8–16)
APTT BLDCRRT: 42.6 SEC (ref 21–32)
AST SERPL-CCNC: 17 U/L (ref 10–40)
BASOPHILS # BLD AUTO: 0.01 K/UL (ref 0–0.2)
BASOPHILS NFR BLD: 0.4 % (ref 0–1.9)
BILIRUB SERPL-MCNC: 0.2 MG/DL (ref 0.1–1)
BUN SERPL-MCNC: 9 MG/DL (ref 6–20)
CALCIUM SERPL-MCNC: 8.8 MG/DL (ref 8.7–10.5)
CHLORIDE SERPL-SCNC: 108 MMOL/L (ref 95–110)
CO2 SERPL-SCNC: 23 MMOL/L (ref 23–29)
CREAT SERPL-MCNC: 0.7 MG/DL (ref 0.5–1.4)
DIFFERENTIAL METHOD: ABNORMAL
EOSINOPHIL # BLD AUTO: 0.1 K/UL (ref 0–0.5)
EOSINOPHIL NFR BLD: 5 % (ref 0–8)
ERYTHROCYTE [DISTWIDTH] IN BLOOD BY AUTOMATED COUNT: 12.3 % (ref 11.5–14.5)
EST. GFR  (AFRICAN AMERICAN): >60 ML/MIN/1.73 M^2
EST. GFR  (NON AFRICAN AMERICAN): >60 ML/MIN/1.73 M^2
GLUCOSE SERPL-MCNC: 92 MG/DL (ref 70–110)
HCT VFR BLD AUTO: 28.7 % (ref 37–48.5)
HGB BLD-MCNC: 9.3 G/DL (ref 12–16)
LYMPHOCYTES # BLD AUTO: 1.1 K/UL (ref 1–4.8)
LYMPHOCYTES NFR BLD: 37.6 % (ref 18–48)
MCH RBC QN AUTO: 30.7 PG (ref 27–31)
MCHC RBC AUTO-ENTMCNC: 32.4 G/DL (ref 32–36)
MCV RBC AUTO: 95 FL (ref 82–98)
MONOCYTES # BLD AUTO: 0.2 K/UL (ref 0.3–1)
MONOCYTES NFR BLD: 5.7 % (ref 4–15)
NEUTROPHILS # BLD AUTO: 1.4 K/UL (ref 1.8–7.7)
NEUTROPHILS NFR BLD: 51.3 % (ref 38–73)
PLATELET # BLD AUTO: 164 K/UL (ref 150–350)
PMV BLD AUTO: 9.2 FL (ref 9.2–12.9)
POTASSIUM SERPL-SCNC: 4 MMOL/L (ref 3.5–5.1)
PROT SERPL-MCNC: 6.5 G/DL (ref 6–8.4)
RBC # BLD AUTO: 3.03 M/UL (ref 4–5.4)
SODIUM SERPL-SCNC: 138 MMOL/L (ref 136–145)
WBC # BLD AUTO: 2.79 K/UL (ref 3.9–12.7)

## 2019-09-04 PROCEDURE — 63600175 PHARM REV CODE 636 W HCPCS: Performed by: HOSPITALIST

## 2019-09-04 PROCEDURE — 36415 COLL VENOUS BLD VENIPUNCTURE: CPT

## 2019-09-04 PROCEDURE — 63600175 PHARM REV CODE 636 W HCPCS: Performed by: INTERNAL MEDICINE

## 2019-09-04 PROCEDURE — 85025 COMPLETE CBC W/AUTO DIFF WBC: CPT

## 2019-09-04 PROCEDURE — 80053 COMPREHEN METABOLIC PANEL: CPT

## 2019-09-04 PROCEDURE — 94760 N-INVAS EAR/PLS OXIMETRY 1: CPT

## 2019-09-04 PROCEDURE — 85730 THROMBOPLASTIN TIME PARTIAL: CPT

## 2019-09-04 PROCEDURE — 25000003 PHARM REV CODE 250: Performed by: HOSPITALIST

## 2019-09-04 RX ORDER — OXYCODONE AND ACETAMINOPHEN 5; 325 MG/1; MG/1
1 TABLET ORAL EVERY 6 HOURS PRN
Qty: 20 TABLET | Refills: 0 | Status: SHIPPED | OUTPATIENT
Start: 2019-09-04 | End: 2019-09-20

## 2019-09-04 RX ORDER — ENOXAPARIN SODIUM 100 MG/ML
1 INJECTION SUBCUTANEOUS 2 TIMES DAILY
Qty: 48 ML | Refills: 6 | Status: ON HOLD | OUTPATIENT
Start: 2019-09-04 | End: 2019-11-07 | Stop reason: HOSPADM

## 2019-09-04 RX ORDER — ENOXAPARIN SODIUM 100 MG/ML
1 INJECTION SUBCUTANEOUS 2 TIMES DAILY
Qty: 50 ML | Refills: 6 | Status: SHIPPED | OUTPATIENT
Start: 2019-09-04 | End: 2019-09-04 | Stop reason: SDUPTHER

## 2019-09-04 RX ORDER — ENOXAPARIN SODIUM 100 MG/ML
1 INJECTION SUBCUTANEOUS EVERY 12 HOURS
Status: DISCONTINUED | OUTPATIENT
Start: 2019-09-04 | End: 2019-09-04 | Stop reason: HOSPADM

## 2019-09-04 RX ADMIN — HEPARIN SODIUM 13.18 UNITS/KG/HR: 10000 INJECTION, SOLUTION INTRAVENOUS at 04:09

## 2019-09-04 RX ADMIN — MORPHINE SULFATE 5 MG: 10 INJECTION INTRAVENOUS at 04:09

## 2019-09-04 RX ADMIN — MORPHINE SULFATE 5 MG: 10 INJECTION INTRAVENOUS at 12:09

## 2019-09-04 RX ADMIN — ENOXAPARIN SODIUM 80 MG: 100 INJECTION SUBCUTANEOUS at 09:09

## 2019-09-04 RX ADMIN — OXYCODONE HYDROCHLORIDE AND ACETAMINOPHEN 1 TABLET: 10; 325 TABLET ORAL at 08:09

## 2019-09-04 RX ADMIN — FAMOTIDINE 20 MG: 20 TABLET ORAL at 08:09

## 2019-09-04 RX ADMIN — OXYCODONE HYDROCHLORIDE AND ACETAMINOPHEN 1 TABLET: 10; 325 TABLET ORAL at 03:09

## 2019-09-04 NOTE — PROGRESS NOTES
[facility] CASE MANAGEMENT                  WRITTEN DISCHARGE INFORMATION      APPOINTMENTS AND RESOURCES TO HELP YOU MANAGE YOUR CARE AT HOME BASED ON YOUR PREFERENCES:  Follow-up Information     Alberto Holguin MD On 9/11/2019.    Specialty:  Internal Medicine  Why:  Outpatient Services: Hospital F/U with PCP at 10:20 am.   Contact information:  [address]  [id number]  [facility] [address]  [phone]             Demetris Parnell MD.    Specialty:  Radiology  Why:  You will be contacted by Dr. Parnell for date of procedure  Contact information:  [address]  [facility] [address]  [phone]               Healthy Living Instructions to HELP MANAGE YOUR CARE AT HOME:  Things You are responsible for:  1.    Getting your prescriptions filled   2.    Taking your medications as directed, DO NOT MISS ANY DOSES!  3.    Following the diet and exercise recommended by your doctor  4.    Going to your follow-up doctor appointment. This is important because it allows the doctor to monitor your progress and determine if any changes need to made to your treatment plan.  5. If you have any questions about MANAGING YOUR CARE AT HOME Call the Nurse Care Line for 24/7 Assistance [phone]       Please answer any calls you may receive from [facility]. We want to continue to support you as you manage your healthcare needs. [facility] is happy to have the opportunity to serve you.      Thank you for choosing [facility] for your healthcare needs!  Your [facility] Case Management Team,    Jo Sierra   II  Care Management  [phone]

## 2019-09-04 NOTE — PROGRESS NOTES
Discharge instructions given, pt verbalized understanding of discharge instructions.. RX given. Pt waiting on ride home.

## 2019-09-04 NOTE — PLAN OF CARE
"EDUCATION:  SW provided patient with educational information on DVT/PE .  Information was reviewed and placed in "My Healthcare Packet" to be brought home for use as a resource after discharge.  Information included: signs and symptoms to look for and call the doctor if experiencing, and symptoms that may indicate a medical emergency: CALL 911.  All questions answered.  Teach back method used. Patient stated that he/she will remember the following signs and symptoms: trouble breathing and coughing.JOHN gave patient follow up appointments for PCP and informed that Dr. Parnell's office will call her for date of procedure. JOHN spoke with nurse Lang and informed her that patient was ready for discharge from  standpoint.      09/04/19 1221   Final Note   Assessment Type Final Discharge Note   Anticipated Discharge Disposition Home   Hospital Follow Up  Appt(s) scheduled? Yes   Discharge plans and expectations educations in teach back method with documentation complete? Yes   Right Care Referral Info   Post Acute Recommendation No Care                                                     "

## 2019-09-04 NOTE — PLAN OF CARE
09/04/19 1654   Post-Acute Status   Post-Acute Authorization Other   Other Status No Post-Acute Service Needs

## 2019-09-04 NOTE — PLAN OF CARE
Problem: Adult Inpatient Plan of Care  Goal: Plan of Care Review  Outcome: Ongoing (interventions implemented as appropriate)     09/04/19 0620   Plan of Care Review   Plan of Care Reviewed With patient   Patient remains free from fall or injury. Pain managed with PRN medication. Remains on continuous heparin drip as ordered. Up to chair and BSC during shift. Safety measures maintained. Call light in reach.

## 2019-09-04 NOTE — DISCHARGE SUMMARY
[facility] Hospital Medicine  Discharge Summary      Patient Name: Antoinette Love  MRN: [id number]  Admission Date: 8/30/2019  Hospital Length of Stay: 5 days  Discharge Date and Time:  09/04/2019 12:05 PM  Attending Physician: Samir Edwards MD   Discharging Provider: Samir Edwards MD  Primary Care Provider: Alberto Holguin MD      HPI:   Antoinette Love is a 29 y.o. female that (in part)  has a past medical history of DVT (deep venous thrombosis), Factor V Leiden, Pulmonary embolus, Recurrent upper respiratory infection (URI), and TIA (transient ischemic attack).  has a past surgical history that includes IVC filter retrieval; tumor from breast; [place] tooth extraction; and Colonoscopy (N/A, 12/18/2015). Presents to [facility] Emergency Department complaining of left leg swelling. This is a recurrent problem for her.  Three days duration.  Was sent for ultrasound for evaluation and was positive for extensive DVT from the iliac distal to the ankle.  Patient follows with Hematology at [facility].  She is compliant with home medication regimen including daily Lovenox.  She has failed oral anticoagulants including Xarelto, warfarin, and Eliquis for various reasons.  Edema located in left lower extremity.  Constant duration.  Daily frequency.  Moderate severely.  No relieving factors.  Exacerbated by mutation of factor 5 Leiden gene obtained from both parents, per the patient.  No cough, shortness of breath, hemoptysis, stridor, wheezing, or night sweats.    In the emergency department he heparin infusion was initiated.  Hematology was consulted.  Pending further evaluation and recommendations.    Hospital medicine has been asked to admit for further evaluation and treatment.     * No surgery found *      Hospital Course:   Pt admitted with recurrent DVT. Seen last week in hematology clinic and told to increase lovenox to BID. Pt left leg is  swollen and tender to touch. Limited range of motion due to pain and swelling. H/o Factor 5 defic. Hematology consulted. Started on heparin infusion. Consulted IR for possible mechanical thrombolysis.  Pt is s/p thrombolysis of left leg extensive DVT on 9/2.  Some bleeding after procedure.  Continued on heparin gtt.    Patient with apparent May Thurner Syndrome (recurrent ileofemoral DVT where right common iliac artery overlies and compresses left common iliac vein against lumbar spine).  IR planning to retrieve IVC filter and possible iliac vein stent placement in next couple of weeks.  She has remained afebrile and hemodynamically stable.  She will be placed back on therapeutic Lovenox bid and discharged home.  Patient to wear thigh high compression stalking during waking hours.  She will be contacted for procedure appointment.     Consults:   Consults (From admission, onward)        Status Ordering Provider     Inpatient consult to Cardiology  Once     Provider:  Tiago Garg MD    Acknowledged TYSON TOMLIN     Inpatient consult to Hematology  Once     Provider:  Tyson Tomlin MD    Acknowledged MARIMAR REID     Inpatient consult to Interventional Radiology  Once     Provider:  Demetris Parnell MD    Completed JANA DANG          No new Assessment & Plan notes have been filed under this hospital service since the last note was generated.  Service: Hospital Medicine    Final Active Diagnoses:    Diagnosis Date Noted POA    PRINCIPAL PROBLEM:  Recurrent acute deep vein thrombosis of left lower extremity [I82.402] 03/18/2016 Yes    May-Thurner syndrome [I87.1] 09/04/2019 Yes    History of pulmonary embolism [Z86.711] 08/09/2019 Yes    Factor V Leiden mutation [D68.51] 03/18/2016 Yes      Problems Resolved During this Admission:       Discharged Condition: stable    Disposition: Home or Self Care    Follow Up:  Follow-up Information     Alberto Holguin MD In 1 week.    Specialty:   Internal Medicine  Contact information:  [address]  [address]  [facility] [address]  [phone]             Please follow up.    Why:  You will be contacted by Dr. Parnell for date of procedure               Patient Instructions:      Diet Adult Regular     Remove dressing in 24 hours     Activity as tolerated         Pending Diagnostic Studies:     Procedure Component Value Units Date/Time    IR Thrombectomy Veins Inc Thrombolysis and Fluoro [[id number]] Resulted:  09/02/19 1154    Order Status:  Sent Lab Status:  In process Updated:  09/02/19 1201    IR Ultrasound Guidance [[id number]] Resulted:  09/02/19 0926    Order Status:  Sent Lab Status:  In process Updated:  09/02/19 1201    IR Venogram Lower Extremity Left [[id number]] Resulted:  09/02/19 0926    Order Status:  Sent Lab Status:  In process Updated:  09/02/19 1201         Medications:  Reconciled Home Medications:      Medication List      START taking these medications    oxyCODONE-acetaminophen 5-325 mg per tablet  Commonly known as:  PERCOCET  Take 1 tablet by mouth every 6 (six) hours as needed for Pain.        CONTINUE taking these medications    albuterol 90 mcg/actuation inhaler  Commonly known as:  VENTOLIN HFA  Inhale 2 puffs into the lungs every 6 (six) hours as needed for Wheezing. Rescue     cetirizine 10 MG tablet  Commonly known as:  ZYRTEC  Take 2 tablets (20 mg total) by mouth 2 (two) times daily. For chronic urticaria     diazePAM 5 MG tablet  Commonly known as:  VALIUM  Take 1 tablet (5 mg total) by mouth every 12 (twelve) hours as needed for Anxiety.     enoxaparin 80 mg/0.8 mL Syrg  Commonly known as:  LOVENOX  Inject 0.8 mLs (80 mg total) into the skin 2 (two) times daily.     EPINEPHrine 0.3 mg/0.3 mL Atin  Commonly known as:  EPIPEN 2-VAN  Inject 0.3 mLs (0.3 mg total) into the muscle once. for 1 dose     montelukast 10 mg tablet  Commonly known as:  SINGULAIR  Take 1 tablet (10 mg total) by mouth every evening.      multivitamin Tab        STOP taking these medications    VOLTAREN 1 % Gel  Generic drug:  diclofenac sodium            Indwelling Lines/Drains at time of discharge:   Lines/Drains/Airways          None          Time spent on the discharge of patient: >30 minutes  Patient was seen and examined on the date of discharge and determined to be suitable for discharge.         Samir Edwards MD  Department of Hospital Medicine  [facility]

## 2019-09-05 ENCOUNTER — NURSE TRIAGE (OUTPATIENT)
Dept: ADMINISTRATIVE | Facility: CLINIC | Age: 29
End: 2019-09-05

## 2019-09-05 NOTE — TELEPHONE ENCOUNTER
Patient called to report the following:     -hx of blood clot  in leg  -unable to walk or straighten leg   -not getting worse, but not getting better   -feels like the ligament is pulled at tight   -knee has to be a little bent   -advised to f/u with provider within 24 hours Reason for Disposition   Nursing judgment    Additional Information   Negative: Nursing judgment   Negative: Information only call; adult is not ill or injured   Negative: Nursing judgment   Negative: Nursing judgment   Negative: Nursing judgment   Negative: Nursing judgment   Negative: Nursing judgment   Negative: Nursing judgment    Protocols used: NO GUIDELINE OR REFERENCE [id number]

## 2019-09-06 DIAGNOSIS — J45.30 MILD PERSISTENT REACTIVE AIRWAY DISEASE WITHOUT COMPLICATION: ICD-10-CM

## 2019-09-07 RX ORDER — ALBUTEROL SULFATE 90 UG/1
AEROSOL, METERED RESPIRATORY (INHALATION)
Qty: 18 INHALER | Refills: 0 | Status: ON HOLD | OUTPATIENT
Start: 2019-09-07 | End: 2019-11-01

## 2019-09-10 ENCOUNTER — TELEPHONE (OUTPATIENT)
Dept: INTERVENTIONAL RADIOLOGY/VASCULAR | Facility: HOSPITAL | Age: 29
End: 2019-09-10

## 2019-09-10 DIAGNOSIS — Z95.828 PRESENCE OF IVC FILTER: ICD-10-CM

## 2019-09-10 DIAGNOSIS — I82.402: Primary | ICD-10-CM

## 2019-09-11 ENCOUNTER — TELEPHONE (OUTPATIENT)
Dept: INTERVENTIONAL RADIOLOGY/VASCULAR | Facility: HOSPITAL | Age: 29
End: 2019-09-11

## 2019-09-12 ENCOUNTER — OFFICE VISIT (OUTPATIENT)
Dept: INTERNAL MEDICINE | Facility: CLINIC | Age: 29
End: 2019-09-12
Attending: INTERNAL MEDICINE
Payer: COMMERCIAL

## 2019-09-12 VITALS
OXYGEN SATURATION: 97 % | SYSTOLIC BLOOD PRESSURE: 102 MMHG | HEIGHT: 65 IN | HEART RATE: 100 BPM | DIASTOLIC BLOOD PRESSURE: 74 MMHG | WEIGHT: 173.06 LBS | BODY MASS INDEX: 28.83 KG/M2

## 2019-09-12 DIAGNOSIS — I82.402: Primary | ICD-10-CM

## 2019-09-12 DIAGNOSIS — Z95.828 PRESENCE OF IVC FILTER: ICD-10-CM

## 2019-09-12 DIAGNOSIS — Z95.828 PRESENCE OF IVC FILTER: Primary | ICD-10-CM

## 2019-09-12 DIAGNOSIS — I87.1 MAY-THURNER SYNDROME: ICD-10-CM

## 2019-09-12 DIAGNOSIS — D68.51 FACTOR V LEIDEN MUTATION: ICD-10-CM

## 2019-09-12 PROCEDURE — 99214 OFFICE O/P EST MOD 30 MIN: CPT | Mod: S$GLB,,, | Performed by: INTERNAL MEDICINE

## 2019-09-12 PROCEDURE — 99999 PR PBB SHADOW E&M-EST. PATIENT-LVL III: ICD-10-PCS | Mod: PBBFAC,,, | Performed by: INTERNAL MEDICINE

## 2019-09-12 PROCEDURE — 3008F BODY MASS INDEX DOCD: CPT | Mod: CPTII,S$GLB,, | Performed by: INTERNAL MEDICINE

## 2019-09-12 PROCEDURE — 99214 PR OFFICE/OUTPT VISIT, EST, LEVL IV, 30-39 MIN: ICD-10-PCS | Mod: S$GLB,,, | Performed by: INTERNAL MEDICINE

## 2019-09-12 PROCEDURE — 3008F PR BODY MASS INDEX (BMI) DOCUMENTED: ICD-10-PCS | Mod: CPTII,S$GLB,, | Performed by: INTERNAL MEDICINE

## 2019-09-12 PROCEDURE — 99999 PR PBB SHADOW E&M-EST. PATIENT-LVL III: CPT | Mod: PBBFAC,,, | Performed by: INTERNAL MEDICINE

## 2019-09-12 NOTE — LETTER
September 12, 2019      [facility] [place] [address] [facility] [address]  [address]  [facility] [address]  Phone: [phone]  Fax: [phone]       Patient: Antoinette Love   YOB: 1990  Date of Visit: 09/12/2019    To Whom It May Concern:    Ron Love  was at [facility] on 09/12/2019. She may return to work with no restrictions. If you have any questions or concerns, or if I can be of further assistance, please do not hesitate to contact me.    Sincerely,    Srinivasa King MA

## 2019-09-12 NOTE — PROGRESS NOTES
"Subjective:       Patient ID: Antoinette Love is a 29 y.o. female.    Chief Complaint: Hospital Follow Up and Letter for School/Work    Here for hospital f/u    Diagnosed with recurrent DVT in setting of Facto V Leiden on OAC and due to extent of clot (s/p thrombectomy) further investigation revealed diagnosis of May Thurner syndrome. She has been transitioned to SQ lovenox BID and is adherent. Leg pain and swelling slowly improving. She reports overall her CP and FERNANDO are much improved when compared to prior visits.  She continues to have intermittent,positionally-related right sided abdominal pain. She is scheduled to have her IVC removed by interventional cardiology within next 2-3 weeks.      Review of Systems   Constitutional: Negative for chills, fatigue, fever and unexpected weight change.   HENT: Negative for ear pain, hearing loss, postnasal drip, tinnitus, trouble swallowing and voice change.    Respiratory: Negative for cough, chest tightness, shortness of breath and wheezing.    Cardiovascular: Negative for chest pain, palpitations and leg swelling.   Gastrointestinal: Negative for abdominal pain, blood in stool, diarrhea, nausea and vomiting.   Endocrine: Negative for polydipsia, polyphagia and polyuria.   Genitourinary: Negative for difficulty urinating and hematuria.   Musculoskeletal: Positive for myalgias.   Skin: Negative for rash.   Allergic/Immunologic: Negative for food allergies.   Neurological: Negative for dizziness, numbness and headaches.   Hematological: Does not bruise/bleed easily.       Objective:      Vitals:    09/12/19 1425   BP: 102/74   Pulse: 100   SpO2: 97%   Weight: 78.5 kg (173 lb 1 oz)   Height: 5' 5" (1.651 m)      Physical Exam   Constitutional: She is oriented to person, place, and time. She appears well-developed and well-nourished. No distress.   HENT:   Head: Normocephalic and atraumatic.   Mouth/Throat: Oropharynx is clear and moist. No oropharyngeal exudate. "   Eyes: Pupils are equal, round, and reactive to light. Conjunctivae and EOM are normal. No scleral icterus.   Neck: No thyromegaly present.   Cardiovascular: Normal rate, regular rhythm and normal heart sounds.   No murmur heard.  Pulmonary/Chest: Effort normal and breath sounds normal. She has no wheezes. She has no rales.   Abdominal: Soft. She exhibits no distension. There is no tenderness.   Musculoskeletal: She exhibits no edema or tenderness.   Lymphadenopathy:     She has no cervical adenopathy.   Neurological: She is alert and oriented to person, place, and time.   Skin: Skin is warm and dry.   Psychiatric: She has a normal mood and affect. Her behavior is normal.       Assessment:       1. Recurrent acute deep vein thrombosis of left lower extremity    2. Factor V Leiden mutation    3. May-Thurner syndrome    4. Presence of IVC filter        Plan:       Antoinette was seen today for hospital follow up and letter for school/work.    Diagnoses and all orders for this visit:    Recurrent acute deep vein thrombosis of left lower extremity  Adherent with BID lovenox. Edema much improved. S/p thrombectomy. Having filter removed by interventional cardiology. Pt able to ambulate 600ft in clinic without desaturation.    Factor V Leiden mutation  As above    May-Thurner syndrome    Presence of IVC filter           Alberto Thornton MD  Internal Medicine-[facility]        Side effects of medication(s) were discussed in detail and patient voiced understanding.  Patient will call back for any issues or complications.

## 2019-09-17 ENCOUNTER — PATIENT MESSAGE (OUTPATIENT)
Dept: INTERNAL MEDICINE | Facility: CLINIC | Age: 29
End: 2019-09-17

## 2019-09-20 ENCOUNTER — TELEPHONE (OUTPATIENT)
Dept: INTERVENTIONAL RADIOLOGY/VASCULAR | Facility: HOSPITAL | Age: 29
End: 2019-09-20

## 2019-09-20 DIAGNOSIS — Z95.828 PRESENCE OF IVC FILTER: Primary | ICD-10-CM

## 2019-09-21 ENCOUNTER — ANESTHESIA EVENT (OUTPATIENT)
Dept: ENDOSCOPY | Facility: HOSPITAL | Age: 29
End: 2019-09-21
Payer: COMMERCIAL

## 2019-09-21 RX ORDER — DIAZEPAM 5 MG/1
5 TABLET ORAL EVERY 12 HOURS PRN
Qty: 30 TABLET | Refills: 0 | OUTPATIENT
Start: 2019-09-21 | End: 2019-10-21

## 2019-09-21 RX ORDER — DIAZEPAM 5 MG/1
5 TABLET ORAL EVERY 12 HOURS PRN
Qty: 30 TABLET | Refills: 0 | Status: SHIPPED | OUTPATIENT
Start: 2019-09-21 | End: 2019-11-30 | Stop reason: SDUPTHER

## 2019-09-21 NOTE — ANESTHESIA PREPROCEDURE EVALUATION
"                                                                                                             09/21/2019  Antoinette Love is a 29 y.o., female.    Procedure: REMOVAL-FILTER-IVC (N/A Abdomen) - 188  4 hours Anes   Anesthesia type: General   Diagnosis: Presence of IVC filter [Z95.828]     Pre-operative evaluation for Procedure(s) (LRB):  REMOVAL-FILTER-IVC (N/A)    Encounter Diagnosis   Name Primary?    Presence of IVC filter        Review of patient's allergies indicates:   Allergen Reactions    Azithromycin Hives    Unclassified drug Shortness Of Breath and Other (See Comments)     Is allergic to a beef spice - Causes "throat closing"    Xarelto [rivaroxaban] Other (See Comments)     Gallbladder swelling and disfunction    Toradol [ketorolac] Hives and Itching       No current facility-administered medications on file prior to encounter.      Current Outpatient Medications on File Prior to Encounter   Medication Sig Dispense Refill    albuterol (PROVENTIL/VENTOLIN HFA) 90 mcg/actuation inhaler INHALE 2 PUFFS INTO THE LUNGS EVERY 6 HOURS AS NEEDED FOR WHEEZING. RESCUE 18 Inhaler 0    cetirizine (ZYRTEC) 10 MG tablet Take 2 tablets (20 mg total) by mouth 2 (two) times daily. For chronic urticaria (Patient taking differently: Take 20 mg by mouth 2 (two) times daily as needed. For chronic urticaria) 120 tablet 6    enoxaparin (LOVENOX) 80 mg/0.8 mL Syrg Inject 0.8 mLs (80 mg total) into the skin 2 (two) times daily. 48 mL 6    EPINEPHrine (EPIPEN 2-VAN) 0.3 mg/0.3 mL AtIn Inject 0.3 mLs (0.3 mg total) into the muscle once. for 1 dose 2 Device 2    m-vit,tx,iron,mins/calc/folic (MULTIVITAMIN) Tab Take 1 tablet by mouth every morning.          Social History     Tobacco Use   Smoking Status Never Smoker   Smokeless Tobacco Never Used       Social History     Substance and Sexual Activity   Alcohol Use No    Frequency: Monthly or less    Drinks per session: 3 or 4    Binge frequency: " Never       Patient Active Problem List   Diagnosis    Factor V Leiden mutation    Recurrent acute deep vein thrombosis of left lower extremity    Multiple pulmonary nodules    Acute deep vein thrombosis (DVT) of femoral vein    Dyspnea on exertion    History of pulmonary embolism    Chronic respiratory failure with hypoxia    Reactive airway disease without complication    Recurrent urticaria    DVT (deep venous thrombosis)    May-Thurner syndrome       Past Surgical History:   Procedure Laterality Date    COLONOSCOPY N/A 12/18/2015    Performed by [facility] ([facility])    IVC FILTER RETRIEVAL      tumor from breast      left    WISDOM TOOTH EXTRACTION             No results for input(s): HCT in the last 72 hours.  No results for input(s): PLT in the last 72 hours.  No results for input(s): K in the last 72 hours.  No results for input(s): CREATININE in the last 72 hours.  No results for input(s): GLU in the last 72 hours.  No results for input(s): PT in the last 72 hours.                      Anesthesia Evaluation         Review of Systems  Anesthesia Hx:  No problems with previous Anesthesia Bad experience with pain in last moderate sedation vein procedure. Personal Hx of Anesthesia complications  Unintended Unpleasent Intraoperative Awareness   Hematology/Oncology:  Hematology Normal   Oncology Normal   Hematology Comments: Factor V leiden with multiple PEs.  Last one a month ago by clinical diagnosis, not scan.    On  lovenox 80 mg bid, took her dose last night, none this am    Cardiovascular:   Denies Hypertension.  Denies MI.    Denies Angina. BP tends to run low.   Pulmonary:  Pulmonary Normal Asthma:    Reactive ariway disease thought from PEs.  Tightness that responds to inhaler.    Walks 2 miles and needs a few breaks.   Renal/:   Denies Chronic Renal Disease.     Hepatic/GI:   Denies Liver Disease.    Neurological:   TIA, (2013 no recurrences, ? some residual memory loss)  "Denies CVA. Denies Seizures.    Endocrine:   Denies Diabetes.        Physical Exam  General:  Well nourished    Airway/Jaw/Neck:  Airway Findings: Mouth Opening: Normal Tongue: Normal  General Airway Assessment: Adult, Average  Mallampati: II  TM Distance: Normal, at least 6 cm  Jaw/Neck Findings:  Neck ROM: Normal ROM            Mental Status:  Mental Status Findings:  Cooperative, Alert and Oriented         Anesthesia Plan  Type of Anesthesia, risks & benefits discussed:  Anesthesia Type:  general  Patient's Preference:   Intra-op Monitoring Plan:   Intra-op Monitoring Plan Comments:   Post Op Pain Control Plan:   Post Op Pain Control Plan Comments: As per surgeon's plan  Induction:   IV  Beta Blocker:  Patient is not currently on a Beta-Blocker (No further documentation required).       Informed Consent: Patient understands risks and agrees with Anesthesia plan.  Questions answered. Anesthesia consent signed with patient.  ASA Score: 2     Day of Surgery Review of History & Physical:    H&P update referred to the surgeon.         Ready For Surgery From Anesthesia Perspective.   At her last vein procedure.  Had a drop in her blood pressure "50/20" with Anesthesia for a Thrombectomy on 9-2-19.  Stated that she received 2 mg of Versed and 150 mg of Fentanyl.  Stated "I was wide awake and in pain."   Responded quickly to fluid infusion.        Vitals - 1 value per visit 8/22/2016 3/28/2019 6/29/2019 7/21/2019   SYSTOLIC 108 108 131 106   DIASTOLIC 70 74 83 65   PULSE 111  89 63     Vitals - 1 value per visit 7/24/2019 7/30/2019 7/30/2019 8/8/2019 8/16/2019   SYSTOLIC 114 120  116    DIASTOLIC 80 66  82    PULSE 100 76  72 77     Vitals - 1 value per visit 8/22/2019 8/30/2019 9/4/2019 9/12/2019   SYSTOLIC 122  113 102   DIASTOLIC 76  63 74   PULSE 85  86 100     "

## 2019-09-21 NOTE — PRE-PROCEDURE INSTRUCTIONS
" Spoke with Patient.  NPO, medication, and pre-op instructions reviewed.  Arrival time 1000.  Instructed that she can have clear liquids between 0000 - 0900 and then to remain NPO after 0900.  Had a drop in her blood pressure "50/20" with Anesthesia for a Thrombectomy on 9-2-19.  Stated that she received 2 mg of Versed and 150 mg of Fentanyl.  Stated "I was wide awake and in pain."  "My left leg is huge."  Wears a compression stocking on her left leg.  Stated that she can stand very long due to left leg pain.  Has a tongue ring that she will get a spacer for before surgery.  Stated that her nose ring is plastic.   Verbalized understanding of instructions.  Stated that she did not receive any Lovenox instructions.  Spoke to Radha in Interventional Radiology for Lovenox instructions.  Stated that she would call me back.  Verbalized understanding of instructions.     Received a phone call from Ad in IR.  Stated that he had spoken to the MD who would be performing the case and she did not have to stop Lovenox before the procedure.  Spoke to Patient to give her Lovenox instructions.  She verbalized understanding of instructions.   "

## 2019-09-23 ENCOUNTER — HOSPITAL ENCOUNTER (OUTPATIENT)
Facility: HOSPITAL | Age: 29
Discharge: HOME | End: 2019-09-24
Attending: RADIOLOGY | Admitting: RADIOLOGY
Payer: COMMERCIAL

## 2019-09-23 ENCOUNTER — ANESTHESIA (OUTPATIENT)
Dept: ENDOSCOPY | Facility: HOSPITAL | Age: 29
End: 2019-09-23
Payer: COMMERCIAL

## 2019-09-23 DIAGNOSIS — Z95.828 PRESENCE OF IVC FILTER: ICD-10-CM

## 2019-09-23 DIAGNOSIS — I82.402: ICD-10-CM

## 2019-09-23 LAB
ABO GROUP BLD: NORMAL
BASOPHILS # BLD AUTO: 0.01 K/UL (ref 0–0.2)
BASOPHILS NFR BLD: 0.2 % (ref 0–1.9)
BLD GP AB SCN CELLS X3 SERPL QL: NORMAL
DIFFERENTIAL METHOD: ABNORMAL
EOSINOPHIL # BLD AUTO: 0 K/UL (ref 0–0.5)
EOSINOPHIL NFR BLD: 0.2 % (ref 0–8)
ERYTHROCYTE [DISTWIDTH] IN BLOOD BY AUTOMATED COUNT: 13.2 % (ref 11.5–14.5)
GLUCOSE SERPL-MCNC: 125 MG/DL (ref 70–110)
HCO3 UR-SCNC: 23 MMOL/L (ref 24–28)
HCT VFR BLD AUTO: 28.6 % (ref 37–48.5)
HCT VFR BLD CALC: 27 %PCV (ref 36–54)
HGB BLD-MCNC: 9.1 G/DL (ref 12–16)
IMM GRANULOCYTES # BLD AUTO: 0.01 K/UL (ref 0–0.04)
IMM GRANULOCYTES NFR BLD AUTO: 0.2 % (ref 0–0.5)
LYMPHOCYTES # BLD AUTO: 0.4 K/UL (ref 1–4.8)
LYMPHOCYTES NFR BLD: 7.9 % (ref 18–48)
MCH RBC QN AUTO: 31.7 PG (ref 27–31)
MCHC RBC AUTO-ENTMCNC: 31.8 G/DL (ref 32–36)
MCV RBC AUTO: 100 FL (ref 82–98)
MONOCYTES # BLD AUTO: 0.2 K/UL (ref 0.3–1)
MONOCYTES NFR BLD: 3.5 % (ref 4–15)
NEUTROPHILS # BLD AUTO: 4 K/UL (ref 1.8–7.7)
NEUTROPHILS NFR BLD: 88 % (ref 38–73)
NRBC BLD-RTO: 0 /100 WBC
PCO2 BLDA: 43.7 MMHG (ref 35–45)
PH SMN: 7.33 [PH] (ref 7.35–7.45)
PLATELET # BLD AUTO: 93 K/UL (ref 150–350)
PMV BLD AUTO: 9.6 FL (ref 9.2–12.9)
PO2 BLDA: 40 MMHG (ref 40–60)
POC BE: -3 MMOL/L
POC IONIZED CALCIUM: 1.08 MMOL/L (ref 1.06–1.42)
POC SATURATED O2: 70 % (ref 95–100)
POC TCO2: 24 MMOL/L (ref 24–29)
POTASSIUM BLD-SCNC: 4.5 MMOL/L (ref 3.5–5.1)
RBC # BLD AUTO: 2.87 M/UL (ref 4–5.4)
RH BLD: NORMAL
SAMPLE: ABNORMAL
SODIUM BLD-SCNC: 139 MMOL/L (ref 136–145)
WBC # BLD AUTO: 4.58 K/UL (ref 3.9–12.7)

## 2019-09-23 PROCEDURE — 25000003 PHARM REV CODE 250: Performed by: NURSE ANESTHETIST, CERTIFIED REGISTERED

## 2019-09-23 PROCEDURE — 71000039 HC RECOVERY, EACH ADD'L HOUR

## 2019-09-23 PROCEDURE — 71000033 HC RECOVERY, INTIAL HOUR

## 2019-09-23 PROCEDURE — D9220A PRA ANESTHESIA: ICD-10-PCS | Mod: ANES,,, | Performed by: ANESTHESIOLOGY

## 2019-09-23 PROCEDURE — 37000008 HC ANESTHESIA 1ST 15 MINUTES

## 2019-09-23 PROCEDURE — 63600175 PHARM REV CODE 636 W HCPCS: Performed by: NURSE ANESTHETIST, CERTIFIED REGISTERED

## 2019-09-23 PROCEDURE — G0378 HOSPITAL OBSERVATION PER HR: HCPCS

## 2019-09-23 PROCEDURE — 86900 BLOOD TYPING SEROLOGIC ABO: CPT

## 2019-09-23 PROCEDURE — 85025 COMPLETE CBC W/AUTO DIFF WBC: CPT

## 2019-09-23 PROCEDURE — 25500020 PHARM REV CODE 255: Performed by: RADIOLOGY

## 2019-09-23 PROCEDURE — 25000003 PHARM REV CODE 250: Performed by: STUDENT IN AN ORGANIZED HEALTH CARE EDUCATION/TRAINING PROGRAM

## 2019-09-23 PROCEDURE — A4216 STERILE WATER/SALINE, 10 ML: HCPCS | Performed by: NURSE ANESTHETIST, CERTIFIED REGISTERED

## 2019-09-23 PROCEDURE — 63600175 PHARM REV CODE 636 W HCPCS

## 2019-09-23 PROCEDURE — 94761 N-INVAS EAR/PLS OXIMETRY MLT: CPT

## 2019-09-23 PROCEDURE — 86850 RBC ANTIBODY SCREEN: CPT

## 2019-09-23 PROCEDURE — 37000009 HC ANESTHESIA EA ADD 15 MINS

## 2019-09-23 PROCEDURE — D9220A PRA ANESTHESIA: Mod: CRNA,,, | Performed by: NURSE ANESTHETIST, CERTIFIED REGISTERED

## 2019-09-23 PROCEDURE — D9220A PRA ANESTHESIA: ICD-10-PCS | Mod: CRNA,,, | Performed by: NURSE ANESTHETIST, CERTIFIED REGISTERED

## 2019-09-23 PROCEDURE — 63600175 PHARM REV CODE 636 W HCPCS: Performed by: ANESTHESIOLOGY

## 2019-09-23 PROCEDURE — 86920 COMPATIBILITY TEST SPIN: CPT

## 2019-09-23 PROCEDURE — 63600175 PHARM REV CODE 636 W HCPCS: Performed by: FAMILY MEDICINE

## 2019-09-23 PROCEDURE — 86901 BLOOD TYPING SEROLOGIC RH(D): CPT

## 2019-09-23 PROCEDURE — D9220A PRA ANESTHESIA: Mod: ANES,,, | Performed by: ANESTHESIOLOGY

## 2019-09-23 RX ORDER — FENTANYL CITRATE 50 UG/ML
INJECTION, SOLUTION INTRAMUSCULAR; INTRAVENOUS
Status: DISCONTINUED | OUTPATIENT
Start: 2019-09-23 | End: 2019-09-23

## 2019-09-23 RX ORDER — SODIUM CHLORIDE 0.9 % (FLUSH) 0.9 %
3 SYRINGE (ML) INJECTION
Status: DISCONTINUED | OUTPATIENT
Start: 2019-09-23 | End: 2019-09-24 | Stop reason: HOSPADM

## 2019-09-23 RX ORDER — PROPOFOL 10 MG/ML
VIAL (ML) INTRAVENOUS
Status: DISCONTINUED | OUTPATIENT
Start: 2019-09-23 | End: 2019-09-23

## 2019-09-23 RX ORDER — LORAZEPAM 2 MG/ML
0.5 INJECTION INTRAMUSCULAR ONCE
Status: COMPLETED | OUTPATIENT
Start: 2019-09-23 | End: 2019-09-23

## 2019-09-23 RX ORDER — KETAMINE HCL IN 0.9 % NACL 50 MG/5 ML
SYRINGE (ML) INTRAVENOUS
Status: DISCONTINUED | OUTPATIENT
Start: 2019-09-23 | End: 2019-09-23

## 2019-09-23 RX ORDER — ENOXAPARIN SODIUM 100 MG/ML
80 INJECTION SUBCUTANEOUS
Status: DISCONTINUED | OUTPATIENT
Start: 2019-09-23 | End: 2019-09-24 | Stop reason: HOSPADM

## 2019-09-23 RX ORDER — MORPHINE SULFATE 2 MG/ML
4 INJECTION, SOLUTION INTRAMUSCULAR; INTRAVENOUS EVERY 4 HOURS PRN
Status: DISCONTINUED | OUTPATIENT
Start: 2019-09-23 | End: 2019-09-24 | Stop reason: HOSPADM

## 2019-09-23 RX ORDER — PHENYLEPHRINE HYDROCHLORIDE 10 MG/ML
INJECTION INTRAVENOUS
Status: DISCONTINUED | OUTPATIENT
Start: 2019-09-23 | End: 2019-09-23

## 2019-09-23 RX ORDER — SODIUM CHLORIDE 9 MG/ML
500 INJECTION, SOLUTION INTRAVENOUS ONCE
Status: COMPLETED | OUTPATIENT
Start: 2019-09-23 | End: 2019-09-23

## 2019-09-23 RX ORDER — ONDANSETRON 2 MG/ML
INJECTION INTRAMUSCULAR; INTRAVENOUS
Status: DISCONTINUED | OUTPATIENT
Start: 2019-09-23 | End: 2019-09-23

## 2019-09-23 RX ORDER — HYDROCODONE BITARTRATE AND ACETAMINOPHEN 500; 5 MG/1; MG/1
TABLET ORAL
Status: DISCONTINUED | OUTPATIENT
Start: 2019-09-23 | End: 2019-09-24 | Stop reason: HOSPADM

## 2019-09-23 RX ORDER — ONDANSETRON 2 MG/ML
8 INJECTION INTRAMUSCULAR; INTRAVENOUS EVERY 8 HOURS PRN
Status: DISCONTINUED | OUTPATIENT
Start: 2019-09-23 | End: 2019-09-24 | Stop reason: HOSPADM

## 2019-09-23 RX ORDER — SODIUM CHLORIDE 0.9 % (FLUSH) 0.9 %
10 SYRINGE (ML) INJECTION
Status: DISCONTINUED | OUTPATIENT
Start: 2019-09-23 | End: 2019-09-24 | Stop reason: HOSPADM

## 2019-09-23 RX ORDER — LORAZEPAM 2 MG/ML
INJECTION INTRAMUSCULAR
Status: COMPLETED
Start: 2019-09-23 | End: 2019-09-23

## 2019-09-23 RX ORDER — NEOSTIGMINE METHYLSULFATE 0.5 MG/ML
INJECTION, SOLUTION INTRAVENOUS
Status: DISCONTINUED | OUTPATIENT
Start: 2019-09-23 | End: 2019-09-23

## 2019-09-23 RX ORDER — OXYCODONE HYDROCHLORIDE 5 MG/1
5 TABLET ORAL EVERY 4 HOURS PRN
Status: DISCONTINUED | OUTPATIENT
Start: 2019-09-23 | End: 2019-09-24

## 2019-09-23 RX ORDER — ACETAMINOPHEN 325 MG/1
650 TABLET ORAL EVERY 8 HOURS PRN
Status: DISCONTINUED | OUTPATIENT
Start: 2019-09-23 | End: 2019-09-24 | Stop reason: HOSPADM

## 2019-09-23 RX ORDER — MEPERIDINE HYDROCHLORIDE 50 MG/ML
12.5 INJECTION INTRAMUSCULAR; INTRAVENOUS; SUBCUTANEOUS EVERY 10 MIN PRN
Status: DISCONTINUED | OUTPATIENT
Start: 2019-09-23 | End: 2019-09-23 | Stop reason: HOSPADM

## 2019-09-23 RX ORDER — ONDANSETRON 8 MG/1
8 TABLET, ORALLY DISINTEGRATING ORAL EVERY 8 HOURS PRN
Status: DISCONTINUED | OUTPATIENT
Start: 2019-09-23 | End: 2019-09-24 | Stop reason: HOSPADM

## 2019-09-23 RX ORDER — HYDROMORPHONE HYDROCHLORIDE 1 MG/ML
0.2 INJECTION, SOLUTION INTRAMUSCULAR; INTRAVENOUS; SUBCUTANEOUS EVERY 5 MIN PRN
Status: COMPLETED | OUTPATIENT
Start: 2019-09-23 | End: 2019-09-23

## 2019-09-23 RX ORDER — HEPARIN SODIUM 1000 [USP'U]/ML
INJECTION, SOLUTION INTRAVENOUS; SUBCUTANEOUS
Status: DISCONTINUED | OUTPATIENT
Start: 2019-09-23 | End: 2019-09-23

## 2019-09-23 RX ORDER — MIDAZOLAM HYDROCHLORIDE 1 MG/ML
INJECTION, SOLUTION INTRAMUSCULAR; INTRAVENOUS
Status: DISCONTINUED | OUTPATIENT
Start: 2019-09-23 | End: 2019-09-23

## 2019-09-23 RX ORDER — GLYCOPYRROLATE 0.2 MG/ML
INJECTION INTRAMUSCULAR; INTRAVENOUS
Status: DISCONTINUED | OUTPATIENT
Start: 2019-09-23 | End: 2019-09-23

## 2019-09-23 RX ORDER — ROCURONIUM BROMIDE 10 MG/ML
INJECTION, SOLUTION INTRAVENOUS
Status: DISCONTINUED | OUTPATIENT
Start: 2019-09-23 | End: 2019-09-23

## 2019-09-23 RX ORDER — ALBUTEROL SULFATE 90 UG/1
2 AEROSOL, METERED RESPIRATORY (INHALATION) EVERY 6 HOURS PRN
Status: DISCONTINUED | OUTPATIENT
Start: 2019-09-23 | End: 2019-09-24 | Stop reason: HOSPADM

## 2019-09-23 RX ORDER — DEXAMETHASONE SODIUM PHOSPHATE 4 MG/ML
INJECTION, SOLUTION INTRA-ARTICULAR; INTRALESIONAL; INTRAMUSCULAR; INTRAVENOUS; SOFT TISSUE
Status: DISCONTINUED | OUTPATIENT
Start: 2019-09-23 | End: 2019-09-23

## 2019-09-23 RX ORDER — LIDOCAINE HCL/PF 100 MG/5ML
SYRINGE (ML) INTRAVENOUS
Status: DISCONTINUED | OUTPATIENT
Start: 2019-09-23 | End: 2019-09-23

## 2019-09-23 RX ADMIN — LORAZEPAM 0.5 MG: 2 INJECTION INTRAMUSCULAR at 08:09

## 2019-09-23 RX ADMIN — Medication 20 MG: at 11:09

## 2019-09-23 RX ADMIN — FENTANYL CITRATE 50 MCG: 50 INJECTION, SOLUTION INTRAMUSCULAR; INTRAVENOUS at 07:09

## 2019-09-23 RX ADMIN — HYDROMORPHONE HYDROCHLORIDE 0.2 MG: 1 INJECTION, SOLUTION INTRAMUSCULAR; INTRAVENOUS; SUBCUTANEOUS at 08:09

## 2019-09-23 RX ADMIN — PROPOFOL 50 MG: 10 INJECTION, EMULSION INTRAVENOUS at 12:09

## 2019-09-23 RX ADMIN — Medication 10 MG: at 01:09

## 2019-09-23 RX ADMIN — DEXTROSE 2 G: 50 INJECTION, SOLUTION INTRAVENOUS at 12:09

## 2019-09-23 RX ADMIN — SODIUM CHLORIDE: 0.9 INJECTION, SOLUTION INTRAVENOUS at 12:09

## 2019-09-23 RX ADMIN — FENTANYL CITRATE 25 MCG: 50 INJECTION, SOLUTION INTRAMUSCULAR; INTRAVENOUS at 05:09

## 2019-09-23 RX ADMIN — FENTANYL CITRATE 50 MCG: 50 INJECTION, SOLUTION INTRAMUSCULAR; INTRAVENOUS at 06:09

## 2019-09-23 RX ADMIN — IOHEXOL 180 ML: 300 INJECTION, SOLUTION INTRAVENOUS at 07:09

## 2019-09-23 RX ADMIN — NEOSTIGMINE METHYLSULFATE 4 MG: 0.5 INJECTION INTRAVENOUS at 05:09

## 2019-09-23 RX ADMIN — CALCIUM CHLORIDE 200 MG: 100 INJECTION, SOLUTION INTRAVENOUS at 04:09

## 2019-09-23 RX ADMIN — ROCURONIUM BROMIDE 10 MG: 10 INJECTION, SOLUTION INTRAVENOUS at 04:09

## 2019-09-23 RX ADMIN — OXYCODONE HYDROCHLORIDE 5 MG: 5 TABLET ORAL at 08:09

## 2019-09-23 RX ADMIN — SODIUM CHLORIDE, SODIUM GLUCONATE, SODIUM ACETATE, POTASSIUM CHLORIDE, MAGNESIUM CHLORIDE, SODIUM PHOSPHATE, DIBASIC, AND POTASSIUM PHOSPHATE: .53; .5; .37; .037; .03; .012; .00082 INJECTION, SOLUTION INTRAVENOUS at 04:09

## 2019-09-23 RX ADMIN — ROCURONIUM BROMIDE 20 MG: 10 INJECTION, SOLUTION INTRAVENOUS at 03:09

## 2019-09-23 RX ADMIN — MIDAZOLAM HYDROCHLORIDE 2 MG: 1 INJECTION, SOLUTION INTRAMUSCULAR; INTRAVENOUS at 11:09

## 2019-09-23 RX ADMIN — FENTANYL CITRATE 50 MCG: 50 INJECTION, SOLUTION INTRAMUSCULAR; INTRAVENOUS at 03:09

## 2019-09-23 RX ADMIN — ROCURONIUM BROMIDE 40 MG: 10 INJECTION, SOLUTION INTRAVENOUS at 11:09

## 2019-09-23 RX ADMIN — OXYCODONE HYDROCHLORIDE 5 MG: 5 TABLET ORAL at 11:09

## 2019-09-23 RX ADMIN — PHENYLEPHRINE HYDROCHLORIDE 100 MCG: 10 INJECTION INTRAVENOUS at 02:09

## 2019-09-23 RX ADMIN — FENTANYL CITRATE 25 MCG: 50 INJECTION, SOLUTION INTRAMUSCULAR; INTRAVENOUS at 06:09

## 2019-09-23 RX ADMIN — FENTANYL CITRATE 50 MCG: 50 INJECTION, SOLUTION INTRAMUSCULAR; INTRAVENOUS at 01:09

## 2019-09-23 RX ADMIN — HEPARIN SODIUM 5000 UNITS: 1000 INJECTION, SOLUTION INTRAVENOUS; SUBCUTANEOUS at 04:09

## 2019-09-23 RX ADMIN — ROCURONIUM BROMIDE 10 MG: 10 INJECTION, SOLUTION INTRAVENOUS at 12:09

## 2019-09-23 RX ADMIN — FENTANYL CITRATE 50 MCG: 50 INJECTION, SOLUTION INTRAMUSCULAR; INTRAVENOUS at 11:09

## 2019-09-23 RX ADMIN — LORAZEPAM 0.5 MG: 2 INJECTION INTRAMUSCULAR; INTRAVENOUS at 08:09

## 2019-09-23 RX ADMIN — FENTANYL CITRATE 50 MCG: 50 INJECTION, SOLUTION INTRAMUSCULAR; INTRAVENOUS at 05:09

## 2019-09-23 RX ADMIN — DEXMEDETOMIDINE HYDROCHLORIDE 0.5 MCG/KG/HR: 100 INJECTION, SOLUTION, CONCENTRATE INTRAVENOUS at 06:09

## 2019-09-23 RX ADMIN — SODIUM CHLORIDE, SODIUM GLUCONATE, SODIUM ACETATE, POTASSIUM CHLORIDE, MAGNESIUM CHLORIDE, SODIUM PHOSPHATE, DIBASIC, AND POTASSIUM PHOSPHATE: .53; .5; .37; .037; .03; .012; .00082 INJECTION, SOLUTION INTRAVENOUS at 03:09

## 2019-09-23 RX ADMIN — PROPOFOL 50 MG: 10 INJECTION, EMULSION INTRAVENOUS at 01:09

## 2019-09-23 RX ADMIN — FENTANYL CITRATE 50 MCG: 50 INJECTION, SOLUTION INTRAMUSCULAR; INTRAVENOUS at 02:09

## 2019-09-23 RX ADMIN — ONDANSETRON 4 MG: 2 INJECTION INTRAMUSCULAR; INTRAVENOUS at 03:09

## 2019-09-23 RX ADMIN — DEXAMETHASONE SODIUM PHOSPHATE 8 MG: 4 INJECTION, SOLUTION INTRAMUSCULAR; INTRAVENOUS at 11:09

## 2019-09-23 RX ADMIN — PROPOFOL 50 MG: 10 INJECTION, EMULSION INTRAVENOUS at 06:09

## 2019-09-23 RX ADMIN — SODIUM CHLORIDE: 0.9 INJECTION, SOLUTION INTRAVENOUS at 11:09

## 2019-09-23 RX ADMIN — ROCURONIUM BROMIDE 10 MG: 10 INJECTION, SOLUTION INTRAVENOUS at 02:09

## 2019-09-23 RX ADMIN — Medication 10 MG: at 02:09

## 2019-09-23 RX ADMIN — HEPARIN SODIUM 1000 UNITS: 1000 INJECTION, SOLUTION INTRAVENOUS; SUBCUTANEOUS at 05:09

## 2019-09-23 RX ADMIN — GLYCOPYRROLATE 0.2 MG: 0.2 INJECTION, SOLUTION INTRAMUSCULAR; INTRAVENOUS at 11:09

## 2019-09-23 RX ADMIN — MEPERIDINE HYDROCHLORIDE 12.5 MG: 50 INJECTION INTRAMUSCULAR; INTRAVENOUS; SUBCUTANEOUS at 08:09

## 2019-09-23 RX ADMIN — PROPOFOL 150 MG: 10 INJECTION, EMULSION INTRAVENOUS at 11:09

## 2019-09-23 RX ADMIN — Medication 10 MG: at 12:09

## 2019-09-23 RX ADMIN — PROPOFOL 30 MG: 10 INJECTION, EMULSION INTRAVENOUS at 07:09

## 2019-09-23 RX ADMIN — PROPOFOL 30 MG: 10 INJECTION, EMULSION INTRAVENOUS at 06:09

## 2019-09-23 RX ADMIN — GLYCOPYRROLATE 0.4 MG: 0.2 INJECTION, SOLUTION INTRAMUSCULAR; INTRAVENOUS at 05:09

## 2019-09-23 RX ADMIN — LIDOCAINE HYDROCHLORIDE 100 MG: 20 INJECTION, SOLUTION INTRAVENOUS at 11:09

## 2019-09-23 NOTE — H&P
Radiology History & Physical      SUBJECTIVE:     Chief Complaint: DVT with IVC filter in place    History of Present Illness:  Antoinette Love is a 29 y.o. female with a history of factor V leiden and May Thurner syndrome s/p thrombectomy and venoplasty on 9/2/19 on who presents for venogram with thrombectomy, venoplasty, possible stent placement, and IVC filter removal.  IVC filter is an Optease/Trapease seen on CTA in 2015.  Of note, pt reports new onset LLE swelling and pain x 1 wk.    Past Medical History:   Diagnosis Date    Anticoagulant long-term use     Anticoagulant long-term use     FERNANDO (dyspnea on exertion)     DVT (deep venous thrombosis)     Factor V Leiden     Leg edema, left     May-Thurner syndrome     Multiple pulmonary nodules     Pulmonary embolus     RAD (reactive airway disease)     Recurrent upper respiratory infection (URI)     Recurrent urticaria     TIA (transient ischemic attack)      Past Surgical History:   Procedure Laterality Date    COLONOSCOPY N/A 12/18/2015    Performed by Lefty Lee MD at [facility] ENDO (4TH FLR)    IVC FILTER RETRIEVAL      tumor from breast      left    WISDOM TOOTH EXTRACTION         Home Meds:   Prior to Admission medications    Medication Sig Start Date End Date Taking? Authorizing Provider   diazePAM (VALIUM) 5 MG tablet TAKE 1 TABLET (5 MG TOTAL) BY MOUTH EVERY 12 (TWELVE) HOURS AS NEEDED FOR ANXIETY. 9/21/19 10/21/19 Yes Alberto Holguin MD   enoxaparin (LOVENOX) 80 mg/0.8 mL Syrg Inject 0.8 mLs (80 mg total) into the skin 2 (two) times daily. 9/4/19  Yes Samir Edwards MD   m-vit,tx,iron,mins/calc/folic (MULTIVITAMIN) Tab Take 1 tablet by mouth every morning.    Yes Historical Provider, MD   albuterol (PROVENTIL/VENTOLIN HFA) 90 mcg/actuation inhaler INHALE 2 PUFFS INTO THE LUNGS EVERY 6 HOURS AS NEEDED FOR WHEEZING. RESCUE 9/7/19   Magi Blood NP   cetirizine (ZYRTEC) 10 MG tablet Take 2 tablets (20 mg total) by mouth 2  "(two) times daily. For chronic urticaria  Patient taking differently: Take 20 mg by mouth 2 (two) times daily as needed. For chronic urticaria 3/28/19 3/27/20  Luis A Junior MD   EPINEPHrine (EPIPEN 2-VAN) 0.3 mg/0.3 mL AtIn Inject 0.3 mLs (0.3 mg total) into the muscle once. for 1 dose 3/28/19 3/28/19  Luis A Junior MD     Anticoagulants/Antiplatelets: Lovenox, last dose yesterday evening    Allergies:   Review of patient's allergies indicates:   Allergen Reactions    Azithromycin Hives    Unclassified drug Shortness Of Breath and Other (See Comments)     Is allergic to a beef spice - Causes "throat closing"    Xarelto [rivaroxaban] Other (See Comments)     Gallbladder swelling and disfunction    Toradol [ketorolac] Hives and Itching     Sedation History:  no adverse reactions    Review of Systems:   Hematological: +factor V leiden  Respiratory: no shortness of breath  Cardiovascular: +chronic chest pain  Gastrointestinal: no abdominal pain  Genito-Urinary: no dysuria  Musculoskeletal: +new left lower extremity swelling, developed post thrombectomy  Neurological: no TIA or stroke symptoms         OBJECTIVE:     Vital Signs (Most Recent)  Temp: 98.2 °F (36.8 °C) (09/23/19 1055)  Pulse: 76 (09/23/19 1055)  Resp: 18 (09/23/19 1055)  BP: (!) 117/58 (09/23/19 1055)  SpO2: 100 % (09/23/19 1055)    Physical Exam:  ASA: per anesthesia  Mallampati: per anesthesia    General: no acute distress  Mental Status: alert and oriented to person, place and time  HEENT: normocephalic, atraumatic  Chest: unlabored breathing  Abdomen: nondistended  Extremity: moves all extremities, +LLE swelling    Laboratory  Lab Results   Component Value Date    INR 0.9 09/23/2019       Lab Results   Component Value Date    WBC 2.79 (L) 09/04/2019    HGB 9.3 (L) 09/04/2019    HCT 28.7 (L) 09/04/2019    MCV 95 09/04/2019     09/04/2019      Lab Results   Component Value Date    GLU 92 09/04/2019     09/04/2019    " K 4.0 09/04/2019     09/04/2019    CO2 23 09/04/2019    BUN 9 09/04/2019    CREATININE 0.7 09/04/2019    CALCIUM 8.8 09/04/2019    ALT 21 09/04/2019    AST 17 09/04/2019    ALBUMIN 3.0 (L) 09/04/2019    BILITOT 0.2 09/04/2019       ASSESSMENT/PLAN:     Sedation Plan: per anesthesia  Patient will undergo venogram with thrombectomy, venoplasty, IVC filter removal, and possible stent placement.    Radha Raines MD  Resident  Department of Radiology  Pager: [phone]

## 2019-09-23 NOTE — LETTER
September 24, 2019                   [address]  [facility] [address]  Phone: [phone]  Fax: [phone]   September 24, 2019     Patient: Antoinette Love   YOB: 1990   Date of Visit: 9/23/2019       To Whom it May Concern:    Antoinette Love was under my care from 09/23/19- 09/24/19 for a procedure with interventional radiology.  She may return to work on 09/27/19 with no restrictions..    Please excuse her from any work missed.    If you have any questions or concerns, please don't hesitate to call.    Sincerely,       Yazmin Gutierrez MD

## 2019-09-23 NOTE — PLAN OF CARE
Pt to IR-188. Order,consents and labs reviewed. Pt. Moved self to procedure table. Anesthesia per CRNA and MD. Pt. safe and comfortable on table.

## 2019-09-23 NOTE — LETTER
September 24, 2019                     [address]  [facility] [address]  Phone: [phone]  Fax: [phone]   September 24, 2019     Patient: Antoinette Love   YOB: 1990   Date of Visit: 9/12/2019       To Whom it May Concern:    Antoinette Love was under my care from 09/23/19- 09/24/19 for a procedure with interventional radiology.  She may return to work on 09/27/19 with no restrictions..    Please excuse her from any work missed.    If you have any questions or concerns, please don't hesitate to call.    Sincerely,       Yazmin Gutierrez MD

## 2019-09-23 NOTE — PLAN OF CARE
Right groin continuing to actively bleed. Dr. Parnell notified and in to assess patient. Holding pressure X 32 minutes.  1840 Right groin vein site sutured by Dr. Parnell. Dr. Saldivar here to hold pressure.

## 2019-09-23 NOTE — PLAN OF CARE
Angiojet started @ 1:27- Completed @ 1:29 - Started @1:31 Completed @1:33 (common and left external iliac veins)

## 2019-09-24 VITALS
BODY MASS INDEX: 28.65 KG/M2 | HEART RATE: 107 BPM | RESPIRATION RATE: 18 BRPM | DIASTOLIC BLOOD PRESSURE: 58 MMHG | WEIGHT: 171.94 LBS | SYSTOLIC BLOOD PRESSURE: 122 MMHG | OXYGEN SATURATION: 93 % | TEMPERATURE: 99 F | HEIGHT: 65 IN

## 2019-09-24 LAB
BASOPHILS # BLD AUTO: 0.01 K/UL (ref 0–0.2)
BASOPHILS NFR BLD: 0.1 % (ref 0–1.9)
DIFFERENTIAL METHOD: ABNORMAL
EOSINOPHIL # BLD AUTO: 0 K/UL (ref 0–0.5)
EOSINOPHIL NFR BLD: 0 % (ref 0–8)
ERYTHROCYTE [DISTWIDTH] IN BLOOD BY AUTOMATED COUNT: 13.4 % (ref 11.5–14.5)
HCT VFR BLD AUTO: 24.4 % (ref 37–48.5)
HGB BLD-MCNC: 8.6 G/DL (ref 12–16)
IMM GRANULOCYTES # BLD AUTO: 0.05 K/UL (ref 0–0.04)
IMM GRANULOCYTES NFR BLD AUTO: 0.7 % (ref 0–0.5)
LYMPHOCYTES # BLD AUTO: 0.9 K/UL (ref 1–4.8)
LYMPHOCYTES NFR BLD: 12.4 % (ref 18–48)
MCH RBC QN AUTO: 32.6 PG (ref 27–31)
MCHC RBC AUTO-ENTMCNC: 35.2 G/DL (ref 32–36)
MCV RBC AUTO: 92 FL (ref 82–98)
MONOCYTES # BLD AUTO: 0.5 K/UL (ref 0.3–1)
MONOCYTES NFR BLD: 6.5 % (ref 4–15)
NEUTROPHILS # BLD AUTO: 5.7 K/UL (ref 1.8–7.7)
NEUTROPHILS NFR BLD: 80.3 % (ref 38–73)
NRBC BLD-RTO: 0 /100 WBC
PLATELET # BLD AUTO: 124 K/UL (ref 150–350)
PMV BLD AUTO: 10.1 FL (ref 9.2–12.9)
RBC # BLD AUTO: 2.64 M/UL (ref 4–5.4)
WBC # BLD AUTO: 7.04 K/UL (ref 3.9–12.7)

## 2019-09-24 PROCEDURE — 25000003 PHARM REV CODE 250: Performed by: RADIOLOGY

## 2019-09-24 PROCEDURE — 36415 COLL VENOUS BLD VENIPUNCTURE: CPT

## 2019-09-24 PROCEDURE — 63600175 PHARM REV CODE 636 W HCPCS: Performed by: RADIOLOGY

## 2019-09-24 PROCEDURE — G0378 HOSPITAL OBSERVATION PER HR: HCPCS

## 2019-09-24 PROCEDURE — 63600175 PHARM REV CODE 636 W HCPCS: Performed by: STUDENT IN AN ORGANIZED HEALTH CARE EDUCATION/TRAINING PROGRAM

## 2019-09-24 PROCEDURE — 25000003 PHARM REV CODE 250: Performed by: STUDENT IN AN ORGANIZED HEALTH CARE EDUCATION/TRAINING PROGRAM

## 2019-09-24 PROCEDURE — 85025 COMPLETE CBC W/AUTO DIFF WBC: CPT

## 2019-09-24 RX ORDER — ONDANSETRON 8 MG/1
8 TABLET, ORALLY DISINTEGRATING ORAL EVERY 8 HOURS PRN
Qty: 15 TABLET | Refills: 0 | Status: SHIPPED | OUTPATIENT
Start: 2019-09-24 | End: 2020-02-13

## 2019-09-24 RX ORDER — OXYCODONE AND ACETAMINOPHEN 7.5; 325 MG/1; MG/1
1 TABLET ORAL EVERY 6 HOURS PRN
Qty: 28 TABLET | Refills: 0 | Status: ON HOLD | OUTPATIENT
Start: 2019-09-24 | End: 2019-11-07 | Stop reason: SDUPTHER

## 2019-09-24 RX ORDER — HYDROMORPHONE HYDROCHLORIDE 1 MG/ML
1 INJECTION, SOLUTION INTRAMUSCULAR; INTRAVENOUS; SUBCUTANEOUS ONCE
Status: COMPLETED | OUTPATIENT
Start: 2019-09-24 | End: 2019-09-24

## 2019-09-24 RX ORDER — TRAMADOL HYDROCHLORIDE 50 MG/1
50 TABLET ORAL EVERY 6 HOURS PRN
Status: DISCONTINUED | OUTPATIENT
Start: 2019-09-24 | End: 2019-09-24 | Stop reason: HOSPADM

## 2019-09-24 RX ORDER — OXYCODONE HYDROCHLORIDE 10 MG/1
10 TABLET ORAL EVERY 6 HOURS PRN
Status: DISCONTINUED | OUTPATIENT
Start: 2019-09-24 | End: 2019-09-24 | Stop reason: HOSPADM

## 2019-09-24 RX ORDER — SODIUM CHLORIDE 9 MG/ML
INJECTION, SOLUTION INTRAVENOUS CONTINUOUS
Status: DISCONTINUED | OUTPATIENT
Start: 2019-09-24 | End: 2019-09-24 | Stop reason: HOSPADM

## 2019-09-24 RX ADMIN — MORPHINE SULFATE 4 MG: 2 INJECTION, SOLUTION INTRAMUSCULAR; INTRAVENOUS at 06:09

## 2019-09-24 RX ADMIN — OXYCODONE HYDROCHLORIDE 5 MG: 5 TABLET ORAL at 12:09

## 2019-09-24 RX ADMIN — MORPHINE SULFATE 4 MG: 2 INJECTION, SOLUTION INTRAMUSCULAR; INTRAVENOUS at 11:09

## 2019-09-24 RX ADMIN — OXYCODONE HYDROCHLORIDE 10 MG: 10 TABLET ORAL at 05:09

## 2019-09-24 RX ADMIN — OXYCODONE HYDROCHLORIDE 5 MG: 5 TABLET ORAL at 08:09

## 2019-09-24 RX ADMIN — ENOXAPARIN SODIUM 80 MG: 100 INJECTION SUBCUTANEOUS at 01:09

## 2019-09-24 RX ADMIN — HYDROMORPHONE HYDROCHLORIDE 1 MG: 1 INJECTION, SOLUTION INTRAMUSCULAR; INTRAVENOUS; SUBCUTANEOUS at 03:09

## 2019-09-24 RX ADMIN — MORPHINE SULFATE 4 MG: 2 INJECTION, SOLUTION INTRAMUSCULAR; INTRAVENOUS at 01:09

## 2019-09-24 RX ADMIN — OXYCODONE HYDROCHLORIDE 5 MG: 5 TABLET ORAL at 04:09

## 2019-09-24 RX ADMIN — SODIUM CHLORIDE: 0.9 INJECTION, SOLUTION INTRAVENOUS at 09:09

## 2019-09-24 NOTE — TRANSFER OF CARE
"Anesthesia Transfer of Care Note    Patient: Anotinette Love    Procedure(s) Performed: Procedure(s) (LRB):  REMOVAL-FILTER-IVC (N/A)    Patient location: PACU    Anesthesia Type: general    Transport from OR: Transported from OR on 6-10 L/min O2 by face mask with adequate spontaneous ventilation    Post pain: adequate analgesia    Post assessment: no apparent anesthetic complications and tolerated procedure well    Post vital signs: stable    Level of consciousness: awake    Nausea/Vomiting: no nausea/vomiting    Complications: none    Transfer of care protocol was followed      Last vitals:   Visit Vitals  BP (!) 112/57   Pulse (!) 56   Temp 35.6 °C (96.1 °F) (Axillary)   Resp 11   Ht 5' 5" (1.651 m)   Wt 77.1 kg (170 lb)   LMP 09/09/2019 (Approximate)   SpO2 100%   Breastfeeding? No   BMI 28.29 kg/m²     "

## 2019-09-24 NOTE — PLAN OF CARE
Pt remain free from falls, injury, trauma. VS stable. No gtts initiated/maintained. Fall precautions reviewed and maintained. Plan to monitor for S/S of bleeding overnight. POC reviewed with pt. Pt verbalize understanding. Will continue to monitor.

## 2019-09-24 NOTE — PROCEDURES
Radiology Post-Procedure Note    Pre Op Diagnosis: IVC filter, LLE DVT, May-Thurner  Post Op Diagnosis: Same    Procedure: Venogram, Thrombolysis, Venoplasty, Attempted Filter retrieval, Stent placement, Venoplasty    Procedure performed by: Demetris Parnell MD    Written Informed Consent Obtained: Yes  Specimen Removed: NO  Estimated Blood Loss: 200     Findings:   L popliteal access obtained with 10F sheath.  Venogram demonstrates thrombosis of the popliteal, femoral, CFV, as well as significant stenosis of the L CIV.  Rheolytic thrombectomy performed for 420 seconds.  Follow-up venogram demonstrates improved patency of the LLE.  However, multiple residual collaterals noted along the course of the L EIV and CIV.    R CFV and R IJ access obtained.  16F sheaths placed.  Both cones of the filter were snared with the assistance of a Sos catheter and glidewire.  Attempts at filter retrieval were unsuccessful.  Therefore, Venoplasty of the IVC performed to displace the filter and allow stent placement.  Bilateral 18mm x 90mm Wall-Stents placed in the infrarenal IVC followed bilateral 16mm x 90mm Wall-Stents.  Venoplasty performed with 16mm and 14mm balloons.  Repeat venogram demonstrates improved antegrade flow without collateral vessels seen.    Sheaths removed and hemostasis achieved with manual compression.    Patient tolerated procedure well.    Demetris Parnell MD  Diagnostic and Interventional Radiologist  Department of Radiology  Pager: [phone]

## 2019-09-24 NOTE — NURSING TRANSFER
Nursing Transfer Note      9/24/2019     Transfer From: PACU    Transfer via bed    Transfer with cardiac monitoring    Transported by PACU RN    Medicines sent: YES    Chart send with patient: Yes    Notified: FAMILY AT BEDSIDE    Patient reassessed at: 2200, 9/23/19    Upon arrival to floor: cardiac monitor applied, patient oriented to room, call bell in reach, and bed in lowest position

## 2019-09-24 NOTE — NURSING
Patient DC. IV and telemetry removed. DC instructions reviewed. All questions answered. All belongings accounted for. Patient left unit via transportation in wheelchair.

## 2019-09-24 NOTE — PLAN OF CARE
A A O x 4,. Free of falls, traumas and injuries. Puncture sites to L popliteal, R neck, and R groin. Dressings monitored for bleeding. Denies shortness of breath, chest pain, nausea, vomiting. Pain being managed per MAR. Plan of care reviewed with patient. Vital signs stable. Pain monitored. Will continue to monitor.

## 2019-09-24 NOTE — NURSING TRANSFER
Nursing Transfer Note      9/23/2019     Transfer To: 326    Transfer via stretcher    Transfer with sandbag to Right groin site    Transported by RN    Medicines sent: n/a    Chart send with patient: Yes    Notified: spouse, parent    Patient reassessed at: 9/23/19 @ 3623

## 2019-09-24 NOTE — ANESTHESIA POSTPROCEDURE EVALUATION
Anesthesia Post Evaluation    Patient: Antoinette Love    Procedure(s) Performed: Procedure(s) (LRB):  REMOVAL-FILTER-IVC (N/A)    Final Anesthesia Type: general  Patient location during evaluation: med/surg floor  Patient participation: Yes- Able to Participate  Level of consciousness: awake and alert  Post-procedure vital signs: reviewed and stable  Pain management: adequate  Airway patency: patent  PONV status at discharge: No PONV  Anesthetic complications: no      Cardiovascular status: hemodynamically stable  Respiratory status: unassisted  Hydration status: euvolemic  Follow-up not needed.          Vitals Value Taken Time   /53 9/24/2019  8:02 AM   Temp 37.3 °C (99.1 °F) 9/24/2019  8:02 AM   Pulse 101 9/24/2019  8:02 AM   Resp 18 9/24/2019  8:02 AM   SpO2 98 % 9/24/2019  8:02 AM         Event Time     Out of Recovery 21:50:01          Pain/Rosaura Score: Pain Rating Prior to Med Admin: 7 (9/24/2019  8:34 AM)  Pain Rating Post Med Admin: 5 (9/24/2019  6:30 AM)

## 2019-09-24 NOTE — ANESTHESIA RELEASE NOTE
"Anesthesia Release from PACU Note    Patient: Antoinette Love    Procedure(s) Performed: Procedure(s) (LRB):  REMOVAL-FILTER-IVC (N/A)    Anesthesia type: general    Post pain: Adequate analgesia    Post assessment: no apparent anesthetic complications    Last Vitals:   Visit Vitals  BP (!) 106/58   Pulse 63   Temp 36.3 °C (97.4 °F) (Temporal)   Resp 15   Ht 5' 5" (1.651 m)   Wt 77.1 kg (170 lb)   LMP 09/09/2019 (Approximate)   SpO2 100%   Breastfeeding? No   BMI 28.29 kg/m²       Post vital signs: stable    Level of consciousness: awake    Nausea/Vomiting: no nausea/no vomiting    Complications: none    Airway Patency: patent    Respiratory: unassisted    Cardiovascular: stable and blood pressure at baseline    Hydration: euvolemic  "

## 2019-09-25 NOTE — DISCHARGE SUMMARY
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

## 2019-09-27 ENCOUNTER — TELEPHONE (OUTPATIENT)
Dept: INTERVENTIONAL RADIOLOGY/VASCULAR | Facility: CLINIC | Age: 29
End: 2019-09-27

## 2019-09-27 LAB
BLD PROD TYP BPU: NORMAL
BLD PROD TYP BPU: NORMAL
BLOOD UNIT EXPIRATION DATE: NORMAL
BLOOD UNIT EXPIRATION DATE: NORMAL
BLOOD UNIT TYPE CODE: 6200
BLOOD UNIT TYPE CODE: 6200
BLOOD UNIT TYPE: NORMAL
BLOOD UNIT TYPE: NORMAL
CODING SYSTEM: NORMAL
CODING SYSTEM: NORMAL
DISPENSE STATUS: NORMAL
DISPENSE STATUS: NORMAL
TRANS ERYTHROCYTES VOL PATIENT: NORMAL ML
TRANS ERYTHROCYTES VOL PATIENT: NORMAL ML

## 2019-09-27 NOTE — TELEPHONE ENCOUNTER
Left message for pt to return my call. Need to schedule IR follow up. Please forward call to [phone]. Thanks

## 2019-10-22 ENCOUNTER — TELEPHONE (OUTPATIENT)
Dept: INTERVENTIONAL RADIOLOGY/VASCULAR | Facility: HOSPITAL | Age: 29
End: 2019-10-22

## 2019-10-22 NOTE — TELEPHONE ENCOUNTER
Left message for pt to return my call. Need to schedule IR follow up. Please forward call to [phone]. Thanks

## 2019-11-01 ENCOUNTER — HOSPITAL ENCOUNTER (INPATIENT)
Facility: HOSPITAL | Age: 29
LOS: 6 days | Discharge: HOME | DRG: 271 | End: 2019-11-07
Attending: EMERGENCY MEDICINE | Admitting: INTERNAL MEDICINE
Payer: COMMERCIAL

## 2019-11-01 ENCOUNTER — PATIENT OUTREACH (OUTPATIENT)
Dept: ADMINISTRATIVE | Facility: OTHER | Age: 29
End: 2019-11-01

## 2019-11-01 DIAGNOSIS — R00.1 BRADYCARDIA: ICD-10-CM

## 2019-11-01 DIAGNOSIS — I87.8 VENOUS CONGESTION: ICD-10-CM

## 2019-11-01 DIAGNOSIS — I82.401 DVT, RECURRENT, LOWER EXTREMITY, ACUTE, RIGHT: Primary | ICD-10-CM

## 2019-11-01 PROBLEM — D72.819 LEUKOPENIA: Status: ACTIVE | Noted: 2019-11-01

## 2019-11-01 PROBLEM — Z86.718 HISTORY OF DEEP VENOUS THROMBOSIS (DVT) OF DISTAL VEIN OF LEFT LOWER EXTREMITY: Status: ACTIVE | Noted: 2019-11-01

## 2019-11-01 PROBLEM — D64.9 NORMOCYTIC ANEMIA: Status: ACTIVE | Noted: 2019-11-01

## 2019-11-01 PROBLEM — R20.2 PARESTHESIA OF RIGHT FOOT: Status: ACTIVE | Noted: 2019-11-01

## 2019-11-01 LAB
ABO + RH BLD: NORMAL
ALBUMIN SERPL BCP-MCNC: 3.1 G/DL (ref 3.5–5.2)
ALP SERPL-CCNC: 82 U/L (ref 55–135)
ALT SERPL W/O P-5'-P-CCNC: 10 U/L (ref 10–44)
ANION GAP SERPL CALC-SCNC: 9 MMOL/L (ref 8–16)
APTT BLDCRRT: 141.7 SEC (ref 21–32)
APTT BLDCRRT: 33.5 SEC (ref 21–32)
APTT BLDCRRT: 33.5 SEC (ref 21–32)
APTT BLDCRRT: 55.3 SEC (ref 21–32)
AST SERPL-CCNC: 11 U/L (ref 10–40)
B-HCG UR QL: NEGATIVE
BACTERIA #/AREA URNS HPF: ABNORMAL /HPF
BASOPHILS # BLD AUTO: 0.01 K/UL (ref 0–0.2)
BASOPHILS # BLD AUTO: 0.02 K/UL (ref 0–0.2)
BASOPHILS # BLD AUTO: 0.02 K/UL (ref 0–0.2)
BASOPHILS NFR BLD: 0.3 % (ref 0–1.9)
BASOPHILS NFR BLD: 0.7 % (ref 0–1.9)
BASOPHILS NFR BLD: 0.7 % (ref 0–1.9)
BILIRUB SERPL-MCNC: 0.3 MG/DL (ref 0.1–1)
BILIRUB UR QL STRIP: ABNORMAL
BLD GP AB SCN CELLS X3 SERPL QL: NORMAL
BNP SERPL-MCNC: 61 PG/ML (ref 0–99)
BUN SERPL-MCNC: 9 MG/DL (ref 6–20)
CALCIUM SERPL-MCNC: 9.2 MG/DL (ref 8.7–10.5)
CHLORIDE SERPL-SCNC: 104 MMOL/L (ref 95–110)
CLARITY UR: CLEAR
CO2 SERPL-SCNC: 24 MMOL/L (ref 23–29)
COLOR UR: ABNORMAL
CREAT SERPL-MCNC: 0.7 MG/DL (ref 0.5–1.4)
CTP QC/QA: YES
DIFFERENTIAL METHOD: ABNORMAL
EOSINOPHIL # BLD AUTO: 0.1 K/UL (ref 0–0.5)
EOSINOPHIL NFR BLD: 1.7 % (ref 0–8)
EOSINOPHIL NFR BLD: 2.1 % (ref 0–8)
EOSINOPHIL NFR BLD: 2.5 % (ref 0–8)
ERYTHROCYTE [DISTWIDTH] IN BLOOD BY AUTOMATED COUNT: 13.4 % (ref 11.5–14.5)
EST. GFR  (AFRICAN AMERICAN): >60 ML/MIN/1.73 M^2
EST. GFR  (NON AFRICAN AMERICAN): >60 ML/MIN/1.73 M^2
FERRITIN SERPL-MCNC: 92 NG/ML (ref 20–300)
GLUCOSE SERPL-MCNC: 85 MG/DL (ref 70–110)
GLUCOSE UR QL STRIP: NEGATIVE
HAPTOGLOB SERPL-MCNC: 192 MG/DL (ref 30–250)
HCT VFR BLD AUTO: 25 % (ref 37–48.5)
HCT VFR BLD AUTO: 26.6 % (ref 37–48.5)
HCT VFR BLD AUTO: 26.8 % (ref 37–48.5)
HGB BLD-MCNC: 7.6 G/DL (ref 12–16)
HGB BLD-MCNC: 8.1 G/DL (ref 12–16)
HGB BLD-MCNC: 8.3 G/DL (ref 12–16)
HGB UR QL STRIP: NEGATIVE
HYALINE CASTS #/AREA URNS LPF: 0 /LPF
IGA SERPL-MCNC: 169 MG/DL (ref 40–350)
IGG SERPL-MCNC: 930 MG/DL (ref 650–1600)
IGM SERPL-MCNC: 451 MG/DL (ref 50–300)
IMM GRANULOCYTES # BLD AUTO: 0 K/UL (ref 0–0.04)
IMM GRANULOCYTES # BLD AUTO: 0.01 K/UL (ref 0–0.04)
IMM GRANULOCYTES # BLD AUTO: 0.01 K/UL (ref 0–0.04)
IMM GRANULOCYTES NFR BLD AUTO: 0 % (ref 0–0.5)
IMM GRANULOCYTES NFR BLD AUTO: 0.3 % (ref 0–0.5)
IMM GRANULOCYTES NFR BLD AUTO: 0.4 % (ref 0–0.5)
INR PPP: 1 (ref 0.8–1.2)
INR PPP: 1 (ref 0.8–1.2)
IRON SERPL-MCNC: 24 UG/DL (ref 30–160)
KETONES UR QL STRIP: ABNORMAL
LDH SERPL L TO P-CCNC: 232 U/L (ref 110–260)
LEUKOCYTE ESTERASE UR QL STRIP: NEGATIVE
LYMPHOCYTES # BLD AUTO: 0.9 K/UL (ref 1–4.8)
LYMPHOCYTES # BLD AUTO: 1 K/UL (ref 1–4.8)
LYMPHOCYTES # BLD AUTO: 1 K/UL (ref 1–4.8)
LYMPHOCYTES NFR BLD: 33.6 % (ref 18–48)
LYMPHOCYTES NFR BLD: 33.7 % (ref 18–48)
LYMPHOCYTES NFR BLD: 35.6 % (ref 18–48)
MCH RBC QN AUTO: 27.9 PG (ref 27–31)
MCH RBC QN AUTO: 28.2 PG (ref 27–31)
MCH RBC QN AUTO: 28.4 PG (ref 27–31)
MCHC RBC AUTO-ENTMCNC: 30.2 G/DL (ref 32–36)
MCHC RBC AUTO-ENTMCNC: 30.4 G/DL (ref 32–36)
MCHC RBC AUTO-ENTMCNC: 31.2 G/DL (ref 32–36)
MCV RBC AUTO: 91 FL (ref 82–98)
MCV RBC AUTO: 92 FL (ref 82–98)
MCV RBC AUTO: 93 FL (ref 82–98)
MICROSCOPIC COMMENT: ABNORMAL
MONOCYTES # BLD AUTO: 0.2 K/UL (ref 0.3–1)
MONOCYTES # BLD AUTO: 0.2 K/UL (ref 0.3–1)
MONOCYTES # BLD AUTO: 0.3 K/UL (ref 0.3–1)
MONOCYTES NFR BLD: 11.4 % (ref 4–15)
MONOCYTES NFR BLD: 7.5 % (ref 4–15)
MONOCYTES NFR BLD: 8.2 % (ref 4–15)
NEUTROPHILS # BLD AUTO: 1.4 K/UL (ref 1.8–7.7)
NEUTROPHILS # BLD AUTO: 1.6 K/UL (ref 1.8–7.7)
NEUTROPHILS # BLD AUTO: 1.6 K/UL (ref 1.8–7.7)
NEUTROPHILS NFR BLD: 49.8 % (ref 38–73)
NEUTROPHILS NFR BLD: 55.7 % (ref 38–73)
NEUTROPHILS NFR BLD: 55.8 % (ref 38–73)
NITRITE UR QL STRIP: NEGATIVE
NRBC BLD-RTO: 0 /100 WBC
PH UR STRIP: 5 [PH] (ref 5–8)
PLATELET # BLD AUTO: 144 K/UL (ref 150–350)
PLATELET # BLD AUTO: 154 K/UL (ref 150–350)
PLATELET # BLD AUTO: 97 K/UL (ref 150–350)
PMV BLD AUTO: 10 FL (ref 9.2–12.9)
PMV BLD AUTO: 9.4 FL (ref 9.2–12.9)
PMV BLD AUTO: 9.7 FL (ref 9.2–12.9)
POTASSIUM SERPL-SCNC: 4.1 MMOL/L (ref 3.5–5.1)
PROT SERPL-MCNC: 6.6 G/DL (ref 6–8.4)
PROT UR QL STRIP: ABNORMAL
PROTHROMBIN TIME: 10.7 SEC (ref 9–12.5)
PROTHROMBIN TIME: 10.7 SEC (ref 9–12.5)
RBC # BLD AUTO: 2.68 M/UL (ref 4–5.4)
RBC # BLD AUTO: 2.9 M/UL (ref 4–5.4)
RBC # BLD AUTO: 2.94 M/UL (ref 4–5.4)
RBC #/AREA URNS HPF: 1 /HPF (ref 0–4)
RETICS/RBC NFR AUTO: 2.1 % (ref 0.5–2.5)
RETICS/RBC NFR AUTO: 2.1 % (ref 0.5–2.5)
SATURATED IRON: 9 % (ref 20–50)
SODIUM SERPL-SCNC: 137 MMOL/L (ref 136–145)
SP GR UR STRIP: >1.03 (ref 1–1.03)
TOTAL IRON BINDING CAPACITY: 274 UG/DL (ref 250–450)
TRANSFERRIN SERPL-MCNC: 185 MG/DL (ref 200–375)
TROPONIN I SERPL DL<=0.01 NG/ML-MCNC: 0.01 NG/ML (ref 0–0.03)
TSH SERPL DL<=0.005 MIU/L-ACNC: 0.78 UIU/ML (ref 0.4–4)
URN SPEC COLLECT METH UR: ABNORMAL
UROBILINOGEN UR STRIP-ACNC: ABNORMAL EU/DL
VIT B12 SERPL-MCNC: 753 PG/ML (ref 210–950)
WBC # BLD AUTO: 2.8 K/UL (ref 3.9–12.7)
WBC # BLD AUTO: 2.81 K/UL (ref 3.9–12.7)
WBC # BLD AUTO: 2.91 K/UL (ref 3.9–12.7)
WBC #/AREA URNS HPF: 4 /HPF (ref 0–5)

## 2019-11-01 PROCEDURE — 81000 URINALYSIS NONAUTO W/SCOPE: CPT

## 2019-11-01 PROCEDURE — 81241 F5 GENE: CPT

## 2019-11-01 PROCEDURE — 25000003 PHARM REV CODE 250: Performed by: INTERNAL MEDICINE

## 2019-11-01 PROCEDURE — 84484 ASSAY OF TROPONIN QUANT: CPT

## 2019-11-01 PROCEDURE — 86901 BLOOD TYPING SEROLOGIC RH(D): CPT

## 2019-11-01 PROCEDURE — 82784 ASSAY IGA/IGD/IGG/IGM EACH: CPT

## 2019-11-01 PROCEDURE — 21400001 HC TELEMETRY ROOM

## 2019-11-01 PROCEDURE — 99223 PR INITIAL HOSPITAL CARE,LEVL III: ICD-10-PCS | Mod: ,,, | Performed by: INTERNAL MEDICINE

## 2019-11-01 PROCEDURE — 83615 LACTATE (LD) (LDH) ENZYME: CPT

## 2019-11-01 PROCEDURE — 25500020 PHARM REV CODE 255: Performed by: EMERGENCY MEDICINE

## 2019-11-01 PROCEDURE — 82607 VITAMIN B-12: CPT

## 2019-11-01 PROCEDURE — 85045 AUTOMATED RETICULOCYTE COUNT: CPT

## 2019-11-01 PROCEDURE — 86038 ANTINUCLEAR ANTIBODIES: CPT

## 2019-11-01 PROCEDURE — 85610 PROTHROMBIN TIME: CPT

## 2019-11-01 PROCEDURE — 85300 ANTITHROMBIN III ACTIVITY: CPT

## 2019-11-01 PROCEDURE — 99223 1ST HOSP IP/OBS HIGH 75: CPT | Mod: ,,, | Performed by: INTERNAL MEDICINE

## 2019-11-01 PROCEDURE — 63600175 PHARM REV CODE 636 W HCPCS: Performed by: RADIOLOGY

## 2019-11-01 PROCEDURE — 86334 IMMUNOFIX E-PHORESIS SERUM: CPT | Mod: 26,,, | Performed by: PATHOLOGY

## 2019-11-01 PROCEDURE — 96365 THER/PROPH/DIAG IV INF INIT: CPT

## 2019-11-01 PROCEDURE — 86334 IMMUNOFIX E-PHORESIS SERUM: CPT

## 2019-11-01 PROCEDURE — 83540 ASSAY OF IRON: CPT

## 2019-11-01 PROCEDURE — 83010 ASSAY OF HAPTOGLOBIN QUANT: CPT

## 2019-11-01 PROCEDURE — 36415 COLL VENOUS BLD VENIPUNCTURE: CPT

## 2019-11-01 PROCEDURE — 63600175 PHARM REV CODE 636 W HCPCS: Performed by: INTERNAL MEDICINE

## 2019-11-01 PROCEDURE — 99285 EMERGENCY DEPT VISIT HI MDM: CPT | Mod: 25

## 2019-11-01 PROCEDURE — 83880 ASSAY OF NATRIURETIC PEPTIDE: CPT

## 2019-11-01 PROCEDURE — 84443 ASSAY THYROID STIM HORMONE: CPT

## 2019-11-01 PROCEDURE — 81025 URINE PREGNANCY TEST: CPT | Performed by: EMERGENCY MEDICINE

## 2019-11-01 PROCEDURE — 85730 THROMBOPLASTIN TIME PARTIAL: CPT | Mod: 91

## 2019-11-01 PROCEDURE — 63600175 PHARM REV CODE 636 W HCPCS: Performed by: EMERGENCY MEDICINE

## 2019-11-01 PROCEDURE — 86334 PATHOLOGIST INTERPRETATION IFE: ICD-10-PCS | Mod: 26,,, | Performed by: PATHOLOGY

## 2019-11-01 PROCEDURE — 85730 THROMBOPLASTIN TIME PARTIAL: CPT

## 2019-11-01 PROCEDURE — 82728 ASSAY OF FERRITIN: CPT

## 2019-11-01 PROCEDURE — 96375 TX/PRO/DX INJ NEW DRUG ADDON: CPT

## 2019-11-01 PROCEDURE — 96376 TX/PRO/DX INJ SAME DRUG ADON: CPT

## 2019-11-01 PROCEDURE — 85025 COMPLETE CBC W/AUTO DIFF WBC: CPT

## 2019-11-01 PROCEDURE — 80053 COMPREHEN METABOLIC PANEL: CPT

## 2019-11-01 RX ORDER — DIAZEPAM 5 MG/1
5 TABLET ORAL EVERY 12 HOURS PRN
Status: DISCONTINUED | OUTPATIENT
Start: 2019-11-01 | End: 2019-11-07 | Stop reason: HOSPADM

## 2019-11-01 RX ORDER — FENTANYL CITRATE 50 UG/ML
INJECTION, SOLUTION INTRAMUSCULAR; INTRAVENOUS CODE/TRAUMA/SEDATION MEDICATION
Status: COMPLETED | OUTPATIENT
Start: 2019-11-01 | End: 2019-11-01

## 2019-11-01 RX ORDER — HEPARIN SODIUM,PORCINE/D5W 25000/250
18 INTRAVENOUS SOLUTION INTRAVENOUS CONTINUOUS
Status: DISCONTINUED | OUTPATIENT
Start: 2019-11-01 | End: 2019-11-07

## 2019-11-01 RX ORDER — POLYETHYLENE GLYCOL 3350 17 G/17G
17 POWDER, FOR SOLUTION ORAL DAILY
Status: DISCONTINUED | OUTPATIENT
Start: 2019-11-01 | End: 2019-11-07 | Stop reason: HOSPADM

## 2019-11-01 RX ORDER — HYDROMORPHONE HYDROCHLORIDE 2 MG/ML
1 INJECTION, SOLUTION INTRAMUSCULAR; INTRAVENOUS; SUBCUTANEOUS EVERY 4 HOURS PRN
Status: DISCONTINUED | OUTPATIENT
Start: 2019-11-01 | End: 2019-11-03

## 2019-11-01 RX ORDER — SODIUM CHLORIDE 0.9 % (FLUSH) 0.9 %
10 SYRINGE (ML) INJECTION
Status: DISCONTINUED | OUTPATIENT
Start: 2019-11-01 | End: 2019-11-07 | Stop reason: HOSPADM

## 2019-11-01 RX ORDER — OXYCODONE AND ACETAMINOPHEN 7.5; 325 MG/1; MG/1
1 TABLET ORAL EVERY 4 HOURS PRN
Status: DISCONTINUED | OUTPATIENT
Start: 2019-11-01 | End: 2019-11-07 | Stop reason: HOSPADM

## 2019-11-01 RX ORDER — IBUPROFEN 400 MG/1
800 TABLET ORAL ONCE
Status: DISCONTINUED | OUTPATIENT
Start: 2019-11-01 | End: 2019-11-04

## 2019-11-01 RX ORDER — FENTANYL CITRATE 50 UG/ML
50 INJECTION, SOLUTION INTRAMUSCULAR; INTRAVENOUS
Status: COMPLETED | OUTPATIENT
Start: 2019-11-01 | End: 2019-11-01

## 2019-11-01 RX ORDER — MIDAZOLAM HYDROCHLORIDE 1 MG/ML
INJECTION INTRAMUSCULAR; INTRAVENOUS CODE/TRAUMA/SEDATION MEDICATION
Status: COMPLETED | OUTPATIENT
Start: 2019-11-01 | End: 2019-11-01

## 2019-11-01 RX ORDER — DIPHENHYDRAMINE HYDROCHLORIDE 50 MG/ML
INJECTION INTRAMUSCULAR; INTRAVENOUS CODE/TRAUMA/SEDATION MEDICATION
Status: COMPLETED | OUTPATIENT
Start: 2019-11-01 | End: 2019-11-01

## 2019-11-01 RX ADMIN — OXYCODONE HYDROCHLORIDE AND ACETAMINOPHEN 1 TABLET: 7.5; 325 TABLET ORAL at 08:11

## 2019-11-01 RX ADMIN — FENTANYL CITRATE 50 MCG: 50 INJECTION INTRAMUSCULAR; INTRAVENOUS at 04:11

## 2019-11-01 RX ADMIN — HEPARIN SODIUM 18 UNITS/KG/HR: 10000 INJECTION, SOLUTION INTRAVENOUS at 04:11

## 2019-11-01 RX ADMIN — ALTEPLASE 10 MG: 2.2 INJECTION, POWDER, LYOPHILIZED, FOR SOLUTION INTRAVENOUS at 05:11

## 2019-11-01 RX ADMIN — FENTANYL CITRATE 50 MCG: 50 INJECTION, SOLUTION INTRAMUSCULAR; INTRAVENOUS at 05:11

## 2019-11-01 RX ADMIN — IOHEXOL 70 ML: 350 INJECTION, SOLUTION INTRAVENOUS at 05:11

## 2019-11-01 RX ADMIN — HEPARIN SODIUM 18 UNITS/KG/HR: 10000 INJECTION, SOLUTION INTRAVENOUS at 08:11

## 2019-11-01 RX ADMIN — FENTANYL CITRATE 50 MCG: 50 INJECTION, SOLUTION INTRAMUSCULAR; INTRAVENOUS at 04:11

## 2019-11-01 RX ADMIN — FENTANYL CITRATE 50 MCG: 50 INJECTION INTRAMUSCULAR; INTRAVENOUS at 02:11

## 2019-11-01 RX ADMIN — ALTEPLASE 4 MG: 2.2 INJECTION, POWDER, LYOPHILIZED, FOR SOLUTION INTRAVENOUS at 05:11

## 2019-11-01 RX ADMIN — OXYCODONE HYDROCHLORIDE AND ACETAMINOPHEN 1 TABLET: 7.5; 325 TABLET ORAL at 07:11

## 2019-11-01 RX ADMIN — DIPHENHYDRAMINE HYDROCHLORIDE 25 MG: 50 INJECTION INTRAMUSCULAR; INTRAVENOUS at 04:11

## 2019-11-01 RX ADMIN — DIPHENHYDRAMINE HYDROCHLORIDE 25 MG: 50 INJECTION INTRAMUSCULAR; INTRAVENOUS at 05:11

## 2019-11-01 RX ADMIN — MIDAZOLAM HYDROCHLORIDE 1 MG: 1 INJECTION, SOLUTION INTRAMUSCULAR; INTRAVENOUS at 04:11

## 2019-11-01 NOTE — CONSULTS
[facility]  Hematology/Oncology  Consult Note    Patient Name: Antoinette Love  MRN: [id number]  Admission Date: 11/1/2019  Hospital Length of Stay: 0 days  Code Status: Full Code   Attending Provider: Marzena Gambino MD  Consulting Provider: Dmitri Wright MD  Primary Care Physician: Alberto Holguin MD  Principal Problem:DVT, recurrent, lower extremity, acute, right    Inpatient consult to Hematology/Oncology - [facility]  Consult performed by: Dmitri Wright MD  Consult ordered by: Marzena Gambino MD  Reason for consult: Recurrent DVT        Subjective:     HPI:  Antoinette Love is a 29-year-old female with Homozygous Factor V Leiden mutation, recurrent DVTs, IVC filter placement, miscarriage in 2013 at 11 weeks, TIA in 2013, May-Thurner Syndrome who presents to the hospital with complaint of swelling in her legs for the last 3-4 days.  The patient recently had a procedure done at [facility] [place] under the care of IR.  On 9/23/19 the patient underwent left popliteal access with a venogram showing thrombosis of the popliteal, femoral, common femoral vein, as well as significant stenosis of the left common iliac vein.   She underwent rheolytic thrombectomy with follow-up venogram demonstrating improved patency of the LLE.   She then underwent right common femoral access and right internal jugular access with attempted retrieval of an IVC filter placed in 2014.  The filter could not be retrived so a venoplasty of the IVC was performed to displace the filter and allow stent placement.  Bilateral 18mm x 90mm Wall-Stents were placed in the infrarenal IVC followed bilateral 16mm x 90mm Wall-Stents.  Venoplasty  was performed with 16mm and 14mm balloons.  Repeat venogram demonstrated improved antegrade flow without collateral vessels seen.  Since before the procedure and after the procedure, she complains of pain in the right lower abdomen and groin with the sensation of something sticking her from  "the inside.  She denies N/V, constipation, diarrhea.  3-4 days ago she developed swelling in her legs and came to the hospital when the pain was unbearable and swelling had gotten worse.  Currently she complains of redness in her right leg with difficulty moving her foot due to swelling.  She denies CP, SOB.      Hematologic History The patient has had at least 11 DVTs and 3-4 PEs.  She was on Coumadin for years and stated she came off when she had a DVT despite a therapeutic INR.  She was then placed on Xarelto buts states she only took 1 dose and shortly after developed gallbladder pain. When she was evaluated she was told she had gallbladder inflammation.  An IVC filter was placed in 2014. She was told that it was sideways, and it has caused her significant discomfort  The patient was on Eliquis in 2015 and developed a DVT in 2016 while on Eliquis  She was followed by a Hematologist in [place], and he opted to take her off of anti-coagulation in 2018 because stated that she had an IVC filter and did not need to be anti-coagulated.  The patient was subsequently started on lovenox 1mg/kg BID on 8/22/19.    Oncology Treatment Plan:   [No treatment plan]    Medications:  Continuous Infusions:   heparin (porcine) in D5W 18 Units/kg/hr (11/01/19 0446)     Scheduled Meds:   polyethylene glycol  17 g Oral Daily     PRN Meds:diazePAM, heparin (PORCINE), heparin (PORCINE), oxyCODONE-acetaminophen, sodium chloride 0.9%     Review of patient's allergies indicates:   Allergen Reactions    Azithromycin Hives    Beef containing products Anaphylaxis     Patient cannot eat BEEF BROTH  STATES IT WILL MAKE HER THROAT CLOSE UP .     Pork derived (porcine) Anaphylaxis     Throat swells up cannot eat pork sausage or pork patties ,     Unclassified drug Shortness Of Breath and Other (See Comments)     Is allergic to a beef spice - Causes "throat closing"    Xarelto [rivaroxaban] Other (See Comments)     Gallbladder swelling and " disfunction    Toradol [ketorolac] Hives and Itching        Past Medical History:   Diagnosis Date    Anticoagulant long-term use     Anticoagulant long-term use     FERNANDO (dyspnea on exertion)     DVT (deep venous thrombosis)     Factor V Leiden     Leg edema, left     May-Thurner syndrome     Multiple pulmonary nodules     Pulmonary embolus     RAD (reactive airway disease)     Recurrent upper respiratory infection (URI)     Recurrent urticaria     TIA (transient ischemic attack)      Past Surgical History:   Procedure Laterality Date    COLONOSCOPY N/A 12/18/2015    Procedure: COLONOSCOPY;  Surgeon: eLfty Lee MD;  Location: [facility] (4TH FLR);  Service: Endoscopy;  Laterality: N/A;  schedule with Jesus    IVC FILTER RETRIEVAL      IVC FILTER RETRIEVAL N/A 9/23/2019    Procedure: REMOVAL-FILTER-IVC;  Surgeon: Claudia Surgeon;  Location: [facility];  Service: Anesthesiology;  Laterality: N/A;  188  4 hours Anes    tumor from breast      left    WISDOM TOOTH EXTRACTION       Family History     Problem Relation (Age of Onset)    Allergies Mother        Tobacco Use    Smoking status: Never Smoker    Smokeless tobacco: Never Used   Substance and Sexual Activity    Alcohol use: No     Frequency: Monthly or less     Drinks per session: 3 or 4     Binge frequency: Never    Drug use: No    Sexual activity: Yes     Partners: Male       Review of Systems   Constitutional: Negative for chills, fatigue and fever.   HENT: Negative for sore throat and trouble swallowing.    Eyes: Negative for photophobia, pain and visual disturbance.   Respiratory: Negative for chest tightness and shortness of breath.    Cardiovascular: Positive for leg swelling (right and left leg). Negative for chest pain and palpitations.   Gastrointestinal: Positive for abdominal pain (RLQ). Negative for constipation, diarrhea, nausea and vomiting.   Genitourinary: Negative for difficulty urinating and dysuria.   Musculoskeletal: Negative  for arthralgias and back pain.   Skin: Negative for color change and rash.   Neurological: Negative for weakness, numbness and headaches.   Hematological: Negative for adenopathy. Does not bruise/bleed easily.     Objective:     Vital Signs (Most Recent):  Temp: 97.2 °F (36.2 °C) (11/01/19 0800)  Pulse: 72 (11/01/19 0800)  Resp: 18 (11/01/19 0800)  BP: 114/61 (11/01/19 0800)  SpO2: 100 % (11/01/19 0800) Vital Signs (24h Range):  Temp:  [97.2 °F (36.2 °C)-98.2 °F (36.8 °C)] 97.2 °F (36.2 °C)  Pulse:  [66-97] 72  Resp:  [16-18] 18  SpO2:  [98 %-100 %] 100 %  BP: (101-130)/(52-67) 114/61     Weight: 74.8 kg (165 lb)  Body mass index is 27.46 kg/m².  Body surface area is 1.85 meters squared.    No intake or output data in the 24 hours ending 11/01/19 1126    Physical Exam   Constitutional: She is oriented to person, place, and time. She appears well-developed and well-nourished. No distress.   HENT:   Head: Normocephalic and atraumatic.   Mouth/Throat: No oropharyngeal exudate.   Eyes: EOM are normal. Right eye exhibits no discharge. Left eye exhibits no discharge. No scleral icterus.   Cardiovascular: Normal rate, regular rhythm, normal heart sounds and intact distal pulses. Exam reveals no gallop and no friction rub.   No murmur heard.  Pulmonary/Chest: Effort normal and breath sounds normal. No respiratory distress. She has no wheezes. She has no rales. She exhibits no tenderness.   Abdominal: Soft. Bowel sounds are normal. She exhibits no distension and no mass. There is no tenderness. There is no rebound and no guarding.   Musculoskeletal: Normal range of motion. She exhibits edema (right and left leg). She exhibits no tenderness.   Neurological: She is alert and oriented to person, place, and time.   Skin: No rash noted. She is not diaphoretic. No erythema.   Red mottling of the right leg   Psychiatric: She has a normal mood and affect. Her behavior is normal.       Significant Labs:   Recent Results (from the  past 24 hour(s))   Urinalysis, Reflex to Urine Culture Urine, Clean Catch    Collection Time: 11/01/19  1:18 AM   Result Value Ref Range    Specimen UA Urine, Clean Catch     Color, UA Sonja Yellow, Straw, Sonja    Appearance, UA Clear Clear    pH, UA 5.0 5.0 - 8.0    Specific Gravity, UA >1.030 (A) 1.005 - 1.030    Protein, UA 1+ (A) Negative    Glucose, UA Negative Negative    Ketones, UA Trace (A) Negative    Bilirubin (UA) 2+ (A) Negative    Occult Blood UA Negative Negative    Nitrite, UA Negative Negative    Urobilinogen, UA 2.0-3.0 (A) <2.0 EU/dL    Leukocytes, UA Negative Negative   Urinalysis Microscopic    Collection Time: 11/01/19  1:18 AM   Result Value Ref Range    RBC, UA 1 0 - 4 /hpf    WBC, UA 4 0 - 5 /hpf    Bacteria Few (A) None-Occ /hpf    Hyaline Casts, UA 0 0-1/lpf /lpf    Microscopic Comment SEE COMMENT    POCT urine pregnancy    Collection Time: 11/01/19  1:23 AM   Result Value Ref Range    POC Preg Test, Ur Negative Negative     Acceptable Yes    CBC auto differential    Collection Time: 11/01/19  3:55 AM   Result Value Ref Range    WBC 2.91 (L) 3.90 - 12.70 K/uL    RBC 2.90 (L) 4.00 - 5.40 M/uL    Hemoglobin 8.1 (L) 12.0 - 16.0 g/dL    Hematocrit 26.8 (L) 37.0 - 48.5 %    Mean Corpuscular Volume 92 82 - 98 fL    Mean Corpuscular Hemoglobin 27.9 27.0 - 31.0 pg    Mean Corpuscular Hemoglobin Conc 30.2 (L) 32.0 - 36.0 g/dL    RDW 13.4 11.5 - 14.5 %    Platelets 144 (L) 150 - 350 K/uL    MPV 9.4 9.2 - 12.9 fL    Immature Granulocytes 0.3 0.0 - 0.5 %    Gran # (ANC) 1.6 (L) 1.8 - 7.7 K/uL    Immature Grans (Abs) 0.01 0.00 - 0.04 K/uL    Lymph # 1.0 1.0 - 4.8 K/uL    Mono # 0.2 (L) 0.3 - 1.0 K/uL    Eos # 0.1 0.0 - 0.5 K/uL    Baso # 0.01 0.00 - 0.20 K/uL    nRBC 0 0 /100 WBC    Gran% 55.8 38.0 - 73.0 %    Lymph% 33.7 18.0 - 48.0 %    Mono% 8.2 4.0 - 15.0 %    Eosinophil% 1.7 0.0 - 8.0 %    Basophil% 0.3 0.0 - 1.9 %    Differential Method Automated    Comprehensive metabolic panel     Collection Time: 11/01/19  3:55 AM   Result Value Ref Range    Sodium 137 136 - 145 mmol/L    Potassium 4.1 3.5 - 5.1 mmol/L    Chloride 104 95 - 110 mmol/L    CO2 24 23 - 29 mmol/L    Glucose 85 70 - 110 mg/dL    BUN, Bld 9 6 - 20 mg/dL    Creatinine 0.7 0.5 - 1.4 mg/dL    Calcium 9.2 8.7 - 10.5 mg/dL    Total Protein 6.6 6.0 - 8.4 g/dL    Albumin 3.1 (L) 3.5 - 5.2 g/dL    Total Bilirubin 0.3 0.1 - 1.0 mg/dL    Alkaline Phosphatase 82 55 - 135 U/L    AST 11 10 - 40 U/L    ALT 10 10 - 44 U/L    Anion Gap 9 8 - 16 mmol/L    eGFR if African American >60 >60 mL/min/1.73 m^2    eGFR if non African American >60 >60 mL/min/1.73 m^2   Protime-INR    Collection Time: 11/01/19  3:55 AM   Result Value Ref Range    Prothrombin Time 10.7 9.0 - 12.5 sec    INR 1.0 0.8 - 1.2   APTT    Collection Time: 11/01/19  3:55 AM   Result Value Ref Range    aPTT 33.5 (H) 21.0 - 32.0 sec   APTT    Collection Time: 11/01/19  3:55 AM   Result Value Ref Range    aPTT 33.5 (H) 21.0 - 32.0 sec   Protime-INR    Collection Time: 11/01/19  3:55 AM   Result Value Ref Range    Prothrombin Time 10.7 9.0 - 12.5 sec    INR 1.0 0.8 - 1.2   Troponin I    Collection Time: 11/01/19  3:55 AM   Result Value Ref Range    Troponin I 0.011 0.000 - 0.026 ng/mL   Brain natriuretic peptide    Collection Time: 11/01/19  3:55 AM   Result Value Ref Range    BNP 61 0 - 99 pg/mL   APTT    Collection Time: 11/01/19  6:40 AM   Result Value Ref Range    aPTT 141.7 (H) 21.0 - 32.0 sec   CBC auto differential    Collection Time: 11/01/19  6:40 AM   Result Value Ref Range    WBC 2.81 (L) 3.90 - 12.70 K/uL    RBC 2.68 (L) 4.00 - 5.40 M/uL    Hemoglobin 7.6 (L) 12.0 - 16.0 g/dL    Hematocrit 25.0 (L) 37.0 - 48.5 %    Mean Corpuscular Volume 93 82 - 98 fL    Mean Corpuscular Hemoglobin 28.4 27.0 - 31.0 pg    Mean Corpuscular Hemoglobin Conc 30.4 (L) 32.0 - 36.0 g/dL    RDW 13.4 11.5 - 14.5 %    Platelets 97 (L) 150 - 350 K/uL    MPV 10.0 9.2 - 12.9 fL    Immature Granulocytes  0.4 0.0 - 0.5 %    Gran # (ANC) 1.4 (L) 1.8 - 7.7 K/uL    Immature Grans (Abs) 0.01 0.00 - 0.04 K/uL    Lymph # 1.0 1.0 - 4.8 K/uL    Mono # 0.3 0.3 - 1.0 K/uL    Eos # 0.1 0.0 - 0.5 K/uL    Baso # 0.02 0.00 - 0.20 K/uL    nRBC 0 0 /100 WBC    Gran% 49.8 38.0 - 73.0 %    Lymph% 35.6 18.0 - 48.0 %    Mono% 11.4 4.0 - 15.0 %    Eosinophil% 2.1 0.0 - 8.0 %    Basophil% 0.7 0.0 - 1.9 %    Differential Method Automated    CBC auto differential    Collection Time: 11/01/19  8:13 AM   Result Value Ref Range    WBC 2.80 (L) 3.90 - 12.70 K/uL    RBC 2.94 (L) 4.00 - 5.40 M/uL    Hemoglobin 8.3 (L) 12.0 - 16.0 g/dL    Hematocrit 26.6 (L) 37.0 - 48.5 %    Mean Corpuscular Volume 91 82 - 98 fL    Mean Corpuscular Hemoglobin 28.2 27.0 - 31.0 pg    Mean Corpuscular Hemoglobin Conc 31.2 (L) 32.0 - 36.0 g/dL    RDW 13.4 11.5 - 14.5 %    Platelets 154 150 - 350 K/uL    MPV 9.7 9.2 - 12.9 fL    Immature Granulocytes 0.0 0.0 - 0.5 %    Gran # (ANC) 1.6 (L) 1.8 - 7.7 K/uL    Immature Grans (Abs) 0.00 0.00 - 0.04 K/uL    Lymph # 0.9 (L) 1.0 - 4.8 K/uL    Mono # 0.2 (L) 0.3 - 1.0 K/uL    Eos # 0.1 0.0 - 0.5 K/uL    Baso # 0.02 0.00 - 0.20 K/uL    nRBC 0 0 /100 WBC    Gran% 55.7 38.0 - 73.0 %    Lymph% 33.6 18.0 - 48.0 %    Mono% 7.5 4.0 - 15.0 %    Eosinophil% 2.5 0.0 - 8.0 %    Basophil% 0.7 0.0 - 1.9 %    Differential Method Automated    Type & Screen    Collection Time: 11/01/19  8:13 AM   Result Value Ref Range    Group & Rh A POS     Indirect Biju NEG        Diagnostic Results:    US abdomen 11/01/19  Liver: The visualized portion appears within normal limits.    Gallbladder: No calculi, wall thickening, or pericholecystic fluid.  No sonographic Marie's sign.    Biliary system: The common duct is not dilated, measuring 3 mm.  No intrahepatic ductal dilatation.    Right kidney: No evidence of hydronephrosis.    Miscellaneous: No upper abdominal ascites.      US LE 11/01/19    Bilateral DVT ultrasound 07/20/2019, left lower  extremity DVT ultrasound 08/30/2019    FINDINGS:  Right thigh veins: There is nearly occlusive thrombus identified within the right common femoral vein.  There is thrombus in the right greater saphenous vein.  The right femoral and popliteal veins appear patent.    Right calf veins: The visualized calf veins are patent.    Left thigh veins: The left common femoral vein appears patent and compressible.  There is occlusive thrombosis of the left femoral vein and popliteal veins.  There is partial thrombosis of the left greater saphenous vein.    Left calf veins: The visualized calf veins appear patent.  There is possible reversal of flow within one of the paired anterior tibial veins.    Miscellaneous: The visualized bilateral iliac veins appear grossly patent.    CT abdomen and pelvis 11/01/19    The lung bases are unremarkable.  There is no pleural fluid present.  The visualized portions of the heart appear normal.    The liver is normal in size and attenuation with no focal hepatic abnormality.  The portal vein is patent.  The gallbladder shows no evidence of stones or pericholecystic fluid.  There is no intra-or extrahepatic biliary ductal dilatation.    The stomach, pancreas, and adrenal glands are unremarkable.  The spleen is enlarged.    The kidneys enhance symmetrically.  There is no evidence of hydronephrosis. The urinary bladder is unremarkable. The uterus appears within normal limits.  There is mild asymmetric enlargement of the left ovary.  There is small volume of free fluid within the pelvis.    The abdominal aorta is normal in course and caliber without significant atherosclerotic calcification along its course.  There is an infrarenal IVC filter in place.  There is a venous stent in place transversing the bilateral common iliac veins and infrarenal IVC.  Assessment for patency is limited secondary to poor contrast opacification.  There is an apparent filling defect noted within the right common femoral  vein and proximal greater saphenous vein in keeping with deep venous thrombosis.    The visualized loops of small and large bowel show no evidence of obstruction or inflammation.  The proximal mid appendix is normal caliber and air-filled.  The distal appendiceal tip is mildly enlarged and ill-defined measuring 0.8 cm in diameter (for example axial series 2, images 86 and 87).  Early tip appendicitis is not excluded.  There is scattered retained stool noted throughout the colon in particularly the rectum suggesting fecal impaction.  There is no free intraperitoneal air or portal venous gas.    Osseous structures demonstrate no acute abnormalities.  Incidental note is made of a prominent left-sided L5 transverse process with associated sacral pseudoarticulation.  The extraperitoneal soft tissues demonstrate mild subcutaneous edema of the proximal bilateral thighs.      Assessment/Plan:     * DVT, recurrent, lower extremity, acute, right  -The patient has a history of recurrent DVT's and PE even while on anticoagulation  -The pt is reportedly homozygous for factor V leiden  -She developed clots while on active treatment with warfarin, eliquis and now lovenox  -She has been on Xarelto in the past with subsequent cholecystitis  -Currently the patient has developed a new almost entirely occlusive thrombus in the common right femoral vein  -Concern is for failure of lovenox and/or provoked thrombosis from her recent procedure with LLE thrombectomy and stents placed in IVC after failed filter retrieval   -Agree with heparin drip  -Will consult Vascular Surgery for possible thrombectomy given significant DVT  -Will check antithrombin III level to assess for heparin resistance  -Will check factor V leiden, lupus anticoagulant, anticardiolipin antibodies, and beta-2 glycoprotein levels.  -Pt may be a candidate for rituxan if found to have anticardiolipin antibody syndrome given recurrent clots on anticoagulation.    Factor V  Leiden mutation  -Will check for factor V leiden mutation  -No current laboratory documentation in our system.    Leukopenia  -Pt with leukopenia  -May be related to hypersplenism  -Will check serum immunoglobulins to assess for immunodeficiency  -B12, folate, MELANIA    Normocytic anemia  -Pt with worsening normocytic anemia since 8/30/19 since starting lovenox  -Pt with macrocytosis in the past as well  -Will check retic count, B12, folate, TSH, retic, iron studies, haptoglobin, LDH, OMAR, SPEP, serum immunoglobulins        Thank you for your consult. I will follow-up with patient. Please contact us if you have any additional questions.    Dmitri Wright MD  Hematology/Oncology  [facility]

## 2019-11-01 NOTE — NURSING
[id number]- phone report received from Will Busby RN on status post procedure nurse rosa reports the procedure had to be aborted due to patient unable to tolerated pain during procedure at some point the popliteal vein was accessed by way of right femoral vein . The procedure will be rescheduled for Monday at 12 noon so the patient will need to be NPO Sunday night for Monday am . The call ended awaiting for patient to arrive.

## 2019-11-01 NOTE — SUBJECTIVE & OBJECTIVE
"Oncology Treatment Plan:   [No treatment plan]    Medications:  Continuous Infusions:   heparin (porcine) in D5W 18 Units/kg/hr (11/01/19 1096)     Scheduled Meds:   polyethylene glycol  17 g Oral Daily     PRN Meds:diazePAM, heparin (PORCINE), heparin (PORCINE), oxyCODONE-acetaminophen, sodium chloride 0.9%     Review of patient's allergies indicates:   Allergen Reactions    Azithromycin Hives    Beef containing products Anaphylaxis     Patient cannot eat BEEF BROTH  STATES IT WILL MAKE HER THROAT CLOSE UP .     Pork derived (porcine) Anaphylaxis     Throat swells up cannot eat pork sausage or pork patties ,     Unclassified drug Shortness Of Breath and Other (See Comments)     Is allergic to a beef spice - Causes "throat closing"    Xarelto [rivaroxaban] Other (See Comments)     Gallbladder swelling and disfunction    Toradol [ketorolac] Hives and Itching        Past Medical History:   Diagnosis Date    Anticoagulant long-term use     Anticoagulant long-term use     FERNANDO (dyspnea on exertion)     DVT (deep venous thrombosis)     Factor V Leiden     Leg edema, left     May-Thurner syndrome     Multiple pulmonary nodules     Pulmonary embolus     RAD (reactive airway disease)     Recurrent upper respiratory infection (URI)     Recurrent urticaria     TIA (transient ischemic attack)      Past Surgical History:   Procedure Laterality Date    COLONOSCOPY N/A 12/18/2015    Procedure: COLONOSCOPY;  Surgeon: Lefty Lee MD;  Location: [facility] ([address] [place] [address]);  Service: Endoscopy;  Laterality: N/A;  schedule with Jesus    IVC FILTER RETRIEVAL      IVC FILTER RETRIEVAL N/A 9/23/2019    Procedure: REMOVAL-FILTER-IVC;  Surgeon: Claudia Surgeon;  Location: [facility];  Service: Anesthesiology;  Laterality: N/A;  188  4 hours Anes    tumor from breast      left    WISDOM TOOTH EXTRACTION       Family History     Problem Relation (Age of Onset)    Allergies Mother        Tobacco Use    Smoking status: Never Smoker "    Smokeless tobacco: Never Used   Substance and Sexual Activity    Alcohol use: No     Frequency: Monthly or less     Drinks per session: 3 or 4     Binge frequency: Never    Drug use: No    Sexual activity: Yes     Partners: Male       Review of Systems   Constitutional: Negative for chills, fatigue and fever.   HENT: Negative for sore throat and trouble swallowing.    Eyes: Negative for photophobia, pain and visual disturbance.   Respiratory: Negative for chest tightness and shortness of breath.    Cardiovascular: Positive for leg swelling (right and left leg). Negative for chest pain and palpitations.   Gastrointestinal: Positive for abdominal pain (RLQ). Negative for constipation, diarrhea, nausea and vomiting.   Genitourinary: Negative for difficulty urinating and dysuria.   Musculoskeletal: Negative for arthralgias and back pain.   Skin: Negative for color change and rash.   Neurological: Negative for weakness, numbness and headaches.   Hematological: Negative for adenopathy. Does not bruise/bleed easily.     Objective:     Vital Signs (Most Recent):  Temp: 97.2 °F (36.2 °C) (11/01/19 0800)  Pulse: 72 (11/01/19 0800)  Resp: 18 (11/01/19 0800)  BP: 114/61 (11/01/19 0800)  SpO2: 100 % (11/01/19 0800) Vital Signs (24h Range):  Temp:  [97.2 °F (36.2 °C)-98.2 °F (36.8 °C)] 97.2 °F (36.2 °C)  Pulse:  [66-97] 72  Resp:  [16-18] 18  SpO2:  [98 %-100 %] 100 %  BP: (101-130)/(52-67) 114/61     Weight: 74.8 kg (165 lb)  Body mass index is 27.46 kg/m².  Body surface area is 1.85 meters squared.    No intake or output data in the 24 hours ending 11/01/19 1126    Physical Exam   Constitutional: She is oriented to person, place, and time. She appears well-developed and well-nourished. No distress.   HENT:   Head: Normocephalic and atraumatic.   Mouth/Throat: No oropharyngeal exudate.   Eyes: EOM are normal. Right eye exhibits no discharge. Left eye exhibits no discharge. No scleral icterus.   Cardiovascular: Normal  rate, regular rhythm, normal heart sounds and intact distal pulses. Exam reveals no gallop and no friction rub.   No murmur heard.  Pulmonary/Chest: Effort normal and breath sounds normal. No respiratory distress. She has no wheezes. She has no rales. She exhibits no tenderness.   Abdominal: Soft. Bowel sounds are normal. She exhibits no distension and no mass. There is no tenderness. There is no rebound and no guarding.   Musculoskeletal: Normal range of motion. She exhibits edema (right and left leg). She exhibits no tenderness.   Neurological: She is alert and oriented to person, place, and time.   Skin: No rash noted. She is not diaphoretic. No erythema.   Red mottling of the right leg   Psychiatric: She has a normal mood and affect. Her behavior is normal.       Significant Labs:   Recent Results (from the past 24 hour(s))   Urinalysis, Reflex to Urine Culture Urine, Clean Catch    Collection Time: 11/01/19  1:18 AM   Result Value Ref Range    Specimen UA Urine, Clean Catch     Color, UA Sonja Yellow, Straw, Sonja    Appearance, UA Clear Clear    pH, UA 5.0 5.0 - 8.0    Specific Gravity, UA >1.030 (A) 1.005 - 1.030    Protein, UA 1+ (A) Negative    Glucose, UA Negative Negative    Ketones, UA Trace (A) Negative    Bilirubin (UA) 2+ (A) Negative    Occult Blood UA Negative Negative    Nitrite, UA Negative Negative    Urobilinogen, UA 2.0-3.0 (A) <2.0 EU/dL    Leukocytes, UA Negative Negative   Urinalysis Microscopic    Collection Time: 11/01/19  1:18 AM   Result Value Ref Range    RBC, UA 1 0 - 4 /hpf    WBC, UA 4 0 - 5 /hpf    Bacteria Few (A) None-Occ /hpf    Hyaline Casts, UA 0 0-1/lpf /lpf    Microscopic Comment SEE COMMENT    POCT urine pregnancy    Collection Time: 11/01/19  1:23 AM   Result Value Ref Range    POC Preg Test, Ur Negative Negative     Acceptable Yes    CBC auto differential    Collection Time: 11/01/19  3:55 AM   Result Value Ref Range    WBC 2.91 (L) 3.90 - 12.70 K/uL    RBC  2.90 (L) 4.00 - 5.40 M/uL    Hemoglobin 8.1 (L) 12.0 - 16.0 g/dL    Hematocrit 26.8 (L) 37.0 - 48.5 %    Mean Corpuscular Volume 92 82 - 98 fL    Mean Corpuscular Hemoglobin 27.9 27.0 - 31.0 pg    Mean Corpuscular Hemoglobin Conc 30.2 (L) 32.0 - 36.0 g/dL    RDW 13.4 11.5 - 14.5 %    Platelets 144 (L) 150 - 350 K/uL    MPV 9.4 9.2 - 12.9 fL    Immature Granulocytes 0.3 0.0 - 0.5 %    Gran # (ANC) 1.6 (L) 1.8 - 7.7 K/uL    Immature Grans (Abs) 0.01 0.00 - 0.04 K/uL    Lymph # 1.0 1.0 - 4.8 K/uL    Mono # 0.2 (L) 0.3 - 1.0 K/uL    Eos # 0.1 0.0 - 0.5 K/uL    Baso # 0.01 0.00 - 0.20 K/uL    nRBC 0 0 /100 WBC    Gran% 55.8 38.0 - 73.0 %    Lymph% 33.7 18.0 - 48.0 %    Mono% 8.2 4.0 - 15.0 %    Eosinophil% 1.7 0.0 - 8.0 %    Basophil% 0.3 0.0 - 1.9 %    Differential Method Automated    Comprehensive metabolic panel    Collection Time: 11/01/19  3:55 AM   Result Value Ref Range    Sodium 137 136 - 145 mmol/L    Potassium 4.1 3.5 - 5.1 mmol/L    Chloride 104 95 - 110 mmol/L    CO2 24 23 - 29 mmol/L    Glucose 85 70 - 110 mg/dL    BUN, Bld 9 6 - 20 mg/dL    Creatinine 0.7 0.5 - 1.4 mg/dL    Calcium 9.2 8.7 - 10.5 mg/dL    Total Protein 6.6 6.0 - 8.4 g/dL    Albumin 3.1 (L) 3.5 - 5.2 g/dL    Total Bilirubin 0.3 0.1 - 1.0 mg/dL    Alkaline Phosphatase 82 55 - 135 U/L    AST 11 10 - 40 U/L    ALT 10 10 - 44 U/L    Anion Gap 9 8 - 16 mmol/L    eGFR if African American >60 >60 mL/min/1.73 m^2    eGFR if non African American >60 >60 mL/min/1.73 m^2   Protime-INR    Collection Time: 11/01/19  3:55 AM   Result Value Ref Range    Prothrombin Time 10.7 9.0 - 12.5 sec    INR 1.0 0.8 - 1.2   APTT    Collection Time: 11/01/19  3:55 AM   Result Value Ref Range    aPTT 33.5 (H) 21.0 - 32.0 sec   APTT    Collection Time: 11/01/19  3:55 AM   Result Value Ref Range    aPTT 33.5 (H) 21.0 - 32.0 sec   Protime-INR    Collection Time: 11/01/19  3:55 AM   Result Value Ref Range    Prothrombin Time 10.7 9.0 - 12.5 sec    INR 1.0 0.8 - 1.2    Troponin I    Collection Time: 11/01/19  3:55 AM   Result Value Ref Range    Troponin I 0.011 0.000 - 0.026 ng/mL   Brain natriuretic peptide    Collection Time: 11/01/19  3:55 AM   Result Value Ref Range    BNP 61 0 - 99 pg/mL   APTT    Collection Time: 11/01/19  6:40 AM   Result Value Ref Range    aPTT 141.7 (H) 21.0 - 32.0 sec   CBC auto differential    Collection Time: 11/01/19  6:40 AM   Result Value Ref Range    WBC 2.81 (L) 3.90 - 12.70 K/uL    RBC 2.68 (L) 4.00 - 5.40 M/uL    Hemoglobin 7.6 (L) 12.0 - 16.0 g/dL    Hematocrit 25.0 (L) 37.0 - 48.5 %    Mean Corpuscular Volume 93 82 - 98 fL    Mean Corpuscular Hemoglobin 28.4 27.0 - 31.0 pg    Mean Corpuscular Hemoglobin Conc 30.4 (L) 32.0 - 36.0 g/dL    RDW 13.4 11.5 - 14.5 %    Platelets 97 (L) 150 - 350 K/uL    MPV 10.0 9.2 - 12.9 fL    Immature Granulocytes 0.4 0.0 - 0.5 %    Gran # (ANC) 1.4 (L) 1.8 - 7.7 K/uL    Immature Grans (Abs) 0.01 0.00 - 0.04 K/uL    Lymph # 1.0 1.0 - 4.8 K/uL    Mono # 0.3 0.3 - 1.0 K/uL    Eos # 0.1 0.0 - 0.5 K/uL    Baso # 0.02 0.00 - 0.20 K/uL    nRBC 0 0 /100 WBC    Gran% 49.8 38.0 - 73.0 %    Lymph% 35.6 18.0 - 48.0 %    Mono% 11.4 4.0 - 15.0 %    Eosinophil% 2.1 0.0 - 8.0 %    Basophil% 0.7 0.0 - 1.9 %    Differential Method Automated    CBC auto differential    Collection Time: 11/01/19  8:13 AM   Result Value Ref Range    WBC 2.80 (L) 3.90 - 12.70 K/uL    RBC 2.94 (L) 4.00 - 5.40 M/uL    Hemoglobin 8.3 (L) 12.0 - 16.0 g/dL    Hematocrit 26.6 (L) 37.0 - 48.5 %    Mean Corpuscular Volume 91 82 - 98 fL    Mean Corpuscular Hemoglobin 28.2 27.0 - 31.0 pg    Mean Corpuscular Hemoglobin Conc 31.2 (L) 32.0 - 36.0 g/dL    RDW 13.4 11.5 - 14.5 %    Platelets 154 150 - 350 K/uL    MPV 9.7 9.2 - 12.9 fL    Immature Granulocytes 0.0 0.0 - 0.5 %    Gran # (ANC) 1.6 (L) 1.8 - 7.7 K/uL    Immature Grans (Abs) 0.00 0.00 - 0.04 K/uL    Lymph # 0.9 (L) 1.0 - 4.8 K/uL    Mono # 0.2 (L) 0.3 - 1.0 K/uL    Eos # 0.1 0.0 - 0.5 K/uL    Baso #  0.02 0.00 - 0.20 K/uL    nRBC 0 0 /100 WBC    Gran% 55.7 38.0 - 73.0 %    Lymph% 33.6 18.0 - 48.0 %    Mono% 7.5 4.0 - 15.0 %    Eosinophil% 2.5 0.0 - 8.0 %    Basophil% 0.7 0.0 - 1.9 %    Differential Method Automated    Type & Screen    Collection Time: 11/01/19  8:13 AM   Result Value Ref Range    Group & Rh A POS     Indirect Biju NEG        Diagnostic Results:    US abdomen 11/01/19  Liver: The visualized portion appears within normal limits.    Gallbladder: No calculi, wall thickening, or pericholecystic fluid.  No sonographic Marie's sign.    Biliary system: The common duct is not dilated, measuring 3 mm.  No intrahepatic ductal dilatation.    Right kidney: No evidence of hydronephrosis.    Miscellaneous: No upper abdominal ascites.      US LE 11/01/19    Bilateral DVT ultrasound 07/20/2019, left lower extremity DVT ultrasound 08/30/2019    FINDINGS:  Right thigh veins: There is nearly occlusive thrombus identified within the right common femoral vein.  There is thrombus in the right greater saphenous vein.  The right femoral and popliteal veins appear patent.    Right calf veins: The visualized calf veins are patent.    Left thigh veins: The left common femoral vein appears patent and compressible.  There is occlusive thrombosis of the left femoral vein and popliteal veins.  There is partial thrombosis of the left greater saphenous vein.    Left calf veins: The visualized calf veins appear patent.  There is possible reversal of flow within one of the paired anterior tibial veins.    Miscellaneous: The visualized bilateral iliac veins appear grossly patent.    CT abdomen and pelvis 11/01/19    The lung bases are unremarkable.  There is no pleural fluid present.  The visualized portions of the heart appear normal.    The liver is normal in size and attenuation with no focal hepatic abnormality.  The portal vein is patent.  The gallbladder shows no evidence of stones or pericholecystic fluid.  There is no  intra-or extrahepatic biliary ductal dilatation.    The stomach, pancreas, and adrenal glands are unremarkable.  The spleen is enlarged.    The kidneys enhance symmetrically.  There is no evidence of hydronephrosis. The urinary bladder is unremarkable. The uterus appears within normal limits.  There is mild asymmetric enlargement of the left ovary.  There is small volume of free fluid within the pelvis.    The abdominal aorta is normal in course and caliber without significant atherosclerotic calcification along its course.  There is an infrarenal IVC filter in place.  There is a venous stent in place transversing the bilateral common iliac veins and infrarenal IVC.  Assessment for patency is limited secondary to poor contrast opacification.  There is an apparent filling defect noted within the right common femoral vein and proximal greater saphenous vein in keeping with deep venous thrombosis.    The visualized loops of small and large bowel show no evidence of obstruction or inflammation.  The proximal mid appendix is normal caliber and air-filled.  The distal appendiceal tip is mildly enlarged and ill-defined measuring 0.8 cm in diameter (for example axial series 2, images 86 and 87).  Early tip appendicitis is not excluded.  There is scattered retained stool noted throughout the colon in particularly the rectum suggesting fecal impaction.  There is no free intraperitoneal air or portal venous gas.    Osseous structures demonstrate no acute abnormalities.  Incidental note is made of a prominent left-sided L5 transverse process with associated sacral pseudoarticulation.  The extraperitoneal soft tissues demonstrate mild subcutaneous edema of the proximal bilateral thighs.

## 2019-11-01 NOTE — ED NOTES
Pt placed on telemetry box 8600.  CalledTomeka the Tele tech at [id number] and confirmed pt is on moniter.

## 2019-11-01 NOTE — ED NOTES
Per MD, wait for labs to be drawn before starting heparin drip. Lab called again, states someone will be here to draw blood work soon.

## 2019-11-01 NOTE — HPI
29 year old female with Factor V Leiden, recurrent deep vein thromboses (11 events), history of pulmonary embolisms (3-4 events) and May-Thurner syndrome while on anticoagulation who presented with right leg swelling and pain. Associated symptoms include paresthesia of right foot. Patient stated this feels similar to when she had a deep vein thrombus in left lower extremity requiring direct thrombolysis done by IR (9/2/2019). Was also planned to have IVC filter removed due to associated pain but this was unsuccessful. Patient is currently taking lovenox 80 mg every 12 hours and has been compliant with this medication. Patient denies abdominal pain, melena/bloody stools, extensive bruising, hematuria or hemoptysis.     In the ED, patient was noted to have swollen right leg. Ultrasound was obtained and showed near completely occlusive thrombus of the right common femoral vein, persistent thrombus of the right greater saphenous vein and redemonstration of thrombosis left femoral, greater saphenous and popliteal veins. Patient admitted to hospital medicine for anticoagulation and hematology consultation.

## 2019-11-01 NOTE — ASSESSMENT & PLAN NOTE
-Pt with leukopenia  -May be related to hypersplenism  -Will check serum immunoglobulins to assess for immunodeficiency  -B12, folate, MELANIA

## 2019-11-01 NOTE — CONSULTS
Inpatient Radiology Pre-procedure Note    History of Present Illness:  Antoinette Love is a 29 y.o. female with pertinent PMHx of Factor V Leiden,May-Thurner syndrome, PE x 3-4  And recurrent DVT x 11 with most recent 9/2/19 with subsequent BLE venous thrombectomy and ilieo-caval stent placement.     Pt presents to [facility]- with recurrent BLE swelling and pain  X 3-4 days concerning for recurrent BLE DVT which was confirmed on todays US.     A new inpatient IR consult placed to assess for repeat BLE venography with potential intervention.     Admission H&P reviewed.  Past Medical History:   Diagnosis Date    Anticoagulant long-term use     Anticoagulant long-term use     FERNANDO (dyspnea on exertion)     DVT (deep venous thrombosis)     Factor V Leiden     Leg edema, left     May-Thurner syndrome     Multiple pulmonary nodules     Pulmonary embolus     RAD (reactive airway disease)     Recurrent upper respiratory infection (URI)     Recurrent urticaria     TIA (transient ischemic attack)      Past Surgical History:   Procedure Laterality Date    COLONOSCOPY N/A 12/18/2015    Procedure: COLONOSCOPY;  Surgeon: Lefty Lee MD;  Location: [facility] [address]);  Service: Endoscopy;  Laterality: N/A;  schedule with Jesus    IVC FILTER RETRIEVAL      IVC FILTER RETRIEVAL N/A 9/23/2019    Procedure: REMOVAL-FILTER-IVC;  Surgeon: Claudia Surgeon;  Location: [facility];  Service: Anesthesiology;  Laterality: N/A;  188  4 hours Anes    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 Authorizing Provider   enoxaparin (LOVENOX) 80 mg/0.8 mL Syrg Inject 0.8 mLs (80 mg total) into the skin 2 (two) times daily. 9/4/19  Yes Samir Edwards MD   oxyCODONE-acetaminophen (PERCOCET) 7.5-325 mg per tablet Take 1 tablet by mouth every 6 (six) hours as needed for  Pain. 9/24/19  Yes Yazmin Gutierrez MD   diazePAM (VALIUM) 5 MG tablet TAKE 1 TABLET (5 MG TOTAL) BY MOUTH EVERY 12 (TWELVE) HOURS AS NEEDED FOR ANXIETY. 9/21/19 10/21/19  Alberto Holguin MD   EPINEPHrine (EPIPEN 2-VAN) 0.3 mg/0.3 mL AtIn Inject 0.3 mLs (0.3 mg total) into the muscle once. for 1 dose 3/28/19 3/28/19  Luis A Junior MD   ondansetron (ZOFRAN-ODT) 8 MG TbDL Take 1 tablet (8 mg total) by mouth every 8 (eight) hours as needed (nausea). 9/24/19   Yazmin Gutierrez MD   albuterol (PROVENTIL/VENTOLIN HFA) 90 mcg/actuation inhaler INHALE 2 PUFFS INTO THE LUNGS EVERY 6 HOURS AS NEEDED FOR WHEEZING. RESCUE 9/7/19 11/1/19  Magi Blood NP   cetirizine (ZYRTEC) 10 MG tablet Take 2 tablets (20 mg total) by mouth 2 (two) times daily. For chronic urticaria  Patient taking differently: Take 20 mg by mouth 2 (two) times daily as needed. For chronic urticaria 3/28/19 11/1/19  Luis A Junior MD   m-vit,tx,iron,mins/calc/folic (MULTIVITAMIN) Tab Take 1 tablet by mouth every morning.   11/1/19  Historical Provider, MD     Scheduled Meds:    alteplase (ACTIVASE) 10 mg in 0.9% NaCl 100 mL  10 mg Intra-Catheter Once    alteplase (ACTIVASE) 10 mg in 0.9% NaCl 100 mL  10 mg Intra-Catheter Once    alteplase  4 mg Intra-Catheter Once    polyethylene glycol  17 g Oral Daily     Continuous Infusions:    heparin (porcine) in D5W 18 Units/kg/hr (11/01/19 0446)     PRN Meds:diazePAM, heparin (PORCINE), heparin (PORCINE), oxyCODONE-acetaminophen, sodium chloride 0.9%  Anticoagulants/Antiplatelets: Heparin    Allergies:   Review of patient's allergies indicates:   Allergen Reactions    Azithromycin Hives    Beef containing products Anaphylaxis     Patient cannot eat BEEF BROTH  STATES IT WILL MAKE HER THROAT CLOSE UP .     Pork derived (porcine) Anaphylaxis     Throat swells up cannot eat pork sausage or pork patties ,     Unclassified drug Shortness Of Breath and Other (See Comments)      "Is allergic to a beef spice - Causes "throat closing"    Xarelto [rivaroxaban] Other (See Comments)     Gallbladder swelling and disfunction    Toradol [ketorolac] Hives and Itching     Sedation Hx: have not been any systemic reactions    Labs:  Recent Labs   Lab 11/01/19  0355   INR 1.0  1.0       Recent Labs   Lab 11/01/19  0813   WBC 2.80*   HGB 8.3*   HCT 26.6*   MCV 91         Recent Labs   Lab 11/01/19  0355   GLU 85      K 4.1      CO2 24   BUN 9   CREATININE 0.7   CALCIUM 9.2   ALT 10   AST 11   ALBUMIN 3.1*   BILITOT 0.3         Vitals:  Temp: (off floor ) (11/01/19 1500)  Pulse: 66 (11/01/19 1210)  Resp: 16 (11/01/19 1210)  BP: (!) 95/52 (11/01/19 1210)  SpO2: 100 % (11/01/19 1210)     Physical Exam:  ASA: III  Mallampati: I    General: no acute distress  Mental Status: alert and oriented to person, place and time  HEENT: normocephalic, atraumatic  Chest: unlabored breathing  Heart: regular heart rate  Abdomen: nondistended  Extremity: moves all extremities    A/P:  29 y.o. female with pertinent PMHx of Factor V Leiden,May-Thurner syndrome, PE x 3-4  And recurrent DVT x 11 with most recent 9/2/19 with subsequent BLE venous thrombectomy and ilieo-caval stent placement who presents to [facility] with recurrent BLE swelling and pain  X 3-4 days concerning for recurrent BLE DVT which was confirmed on todays US.     Acute recurrent BLE DVT - Will attempt repeat BLE venography with potential intervention including but not limited to thrombolysis, rheolytic and mechanical thrombectomy and venoplasty.    Risks (including, but not limited to, pain, bleeding, infection, damage to nearby structures, failure to obtain sufficient material for a diagnosis, the need for additional procedures, and death), benefits, and alternatives were discussed with the patient. All questions were answered to the best of my abilities. The patient wishes to proceed with the procedure. Written informed consent was " obtained.    Thank you for considering IR for the care of your patient.     Juan Acosta MD  Interventional Radiology

## 2019-11-01 NOTE — NURSING
Dr Liao here to see patient with new orders for type and screen if needed today or in case if needed.  Patient has s/o at bedside very supportive.

## 2019-11-01 NOTE — NURSING
1005- no acute distress or changes on telemetry , patient is resting , no active bleeding next ptt is at 1500 today , bed alarm on call light in reach . Rails up x 3 call light in reach too.         1155- no acute changes continue to monitor . No changes on telemetry. Call light in reach rails up x 3 bed alarm on . Tolerating heparin gtt no change changes .

## 2019-11-01 NOTE — HPI
Antoinette Love is a 29-year-old female with Homozygous Factor V Leiden mutation, recurrent DVTs, IVC filter placement, miscarriage in 2013 at 11 weeks, TIA in 2013, May-Thurner Syndrome who presents to the hospital with complaint of swelling in her legs for the last 3-4 days.  The patient recently had a procedure done at [facility] under the care of IR.  On 9/23/19 the patient underwent left popliteal access with a venogram showing thrombosis of the popliteal, femoral, common femoral vein, as well as significant stenosis of the left common iliac vein.  She underwent rheolytic thrombectomy with follow-up venogram demonstrating improved patency of the LLE.  She then underwent right common femoral access and right internal jugular access with attempted retrieval of an IVC filter placed in 2014.  The filter could not be retrived so a venoplasty of the IVC was performed to displace the filter and allow stent placement.  Bilateral 18mm x 90mm Wall-Stents were placed in the infrarenal IVC followed bilateral 16mm x 90mm Wall-Stents.  Venoplasty was performed with 16mm and 14mm balloons.  Repeat venogram demonstrated improved antegrade flow without collateral vessels seen.  Since before the procedure and after the procedure, she complains of pain in the right lower abdomen and groin with the sensation of something sticking her from the inside.  She denies N/V, constipation, diarrhea.  3-4 days ago she developed swelling in her legs and came to the hospital when the pain was unbearable and swelling had gotten worse.  Currently she complains of redness in her right leg with difficulty moving her foot due to swelling.  She denies CP, SOB.      Hematologic History The patient has had at least 11 DVTs and 3-4 PEs.  She was on Coumadin for years and stated she came off when she had a DVT despite a therapeutic INR.  She was then placed on Xarelto buts states she only took 1 dose and shortly after developed gallbladder pain. When she  was evaluated she was told she had gallbladder inflammation.  An IVC filter was placed in 2014. She was told that it was sideways, and it has caused her significant discomfort  The patient was on Eliquis in 2015 and developed a DVT in 2016 while on Eliquis  She was followed by a Hematologist in [place], and he opted to take her off of anti-coagulation in 2018 because stated that she had an IVC filter and did not need to be anti-coagulated.  The patient was subsequently started on lovenox 1mg/kg BID on 8/22/19.

## 2019-11-01 NOTE — PROGRESS NOTES
[facility] WRITTEN HEALTHCARE AND DISCHARGE INFORMATION.  Follow-up Information     Alberto Holguin MD On 11/12/2019.    Specialty:  Internal Medicine  Why:  out patient services:  1:20pm follow up from the hospital  Contact information:  [address]  [address]  [facility] [address]  [phone]                   Help at Home           [phone]  After discharge for assistance [facility] On Call Nurse Care Line 24/7  Assistance   Things You are responsible For To Manage Your Care At Home:  1.    Getting your prescriptions filled   2.    Taking your medications as directed, DO NOT MISS ANY DOSES!  3.    Going to your follow-up doctor appointment. This is important because it  allow the doctor to monitor your progress and determine if  any changes need to made to your treatment plan.   Thank you for choosing [facility] for your care.  Please answer any calls you may receive from [facility] we want to continue to support you as you manage your healthcare needs. [facility] is happy to have the opportunity to serve you.    Sincerely,  Your [facility] Team,  Matilde De Anda RN, BSN, STN [facility]  11/1/2019  [phone]

## 2019-11-01 NOTE — OP NOTE
Radiology Post-Procedure Note    Pre Op Diagnosis: Acute on Chronic recurrent BLE DVT  Post Op Diagnosis: Acute on Chronic recurrent BLE DVT and recurrent iliocaval occlusive thrombosis    Procedure: 1. RLE and iliocaval venograms 2. Chemical thrombolysis of RLE and iliocaval thrombus 3. Rheolytic thrombectomy of RLE/iliocaval occlusive thrombus 3. Venoplasty with 16-mm balloon    Procedure performed by: Juan Acosta MD    Written Informed Consent Obtained: Yes  Specimen Removed: NO  Estimated Blood Loss: less than 50     Findings:   1. Non-occlusive thrombus in Right SFV, CFV and EIV and occlusive thrombus in Rt CIV and stents in infra-renal IVC s/p chemical thrombolysis, rheolytic thrombectomy and venoplasty with partial clearance of approximatley 50% thrombus burden and sizable channel with direct flow from RLE to supra-renal IVC. Unfortunately, pt could not tolerate the pain associated with intervention and requested that we terminate procedure with no further intervention to the RLE/IVC and no intervention to the LLE.     D/w pt who agrees to re-attempt with GA on 11/4/19. Scheduled case with anesthesia and myself for that date. Pt should remain on wt. Based heparin gtt until that time.     Patient tolerated the procedure well. No immediate post-procedural complications noted.     Thank you for considering IR for the care of your patient.     Juan Acosta MD  Interventional Radiology

## 2019-11-01 NOTE — H&P
[facility] Hospital Medicine  History & Physical    Patient Name: Antoinette Love  MRN: [id number]  Admission Date: 11/1/2019  Attending Physician: Marzena Gambino MD   Primary Care Provider: Alberto Holguin MD         Patient information was obtained from patient, spouse/SO, past medical records and ER records.     Subjective:     Principal Problem:DVT, recurrent, lower extremity, acute, right    Chief Complaint:   Chief Complaint   Patient presents with    Leg Swelling     bilteral leg swelling. stents to IVC, right side. swelling started two days ago. +shortness of breath.     Abdominal Pain     right upper abdominal pain for 2 days.         HPI: 29 year old female with Factor V Leiden, recurrent deep vein thromboses (11 events), history of pulmonary embolisms (3-4 events) and May-Thurner syndrome while on anticoagulation who presented with right leg swelling and pain. Associated symptoms include paresthesia of right foot. Patient stated this feels similar to when she had a deep vein thrombus in left lower extremity requiring direct thrombolysis done by IR (9/2/2019). Was also planned to have IVC filter removed due to associated pain but this was unsuccessful. Patient is currently taking lovenox 80 mg every 12 hours and has been compliant with this medication. Patient denies abdominal pain, melena/bloody stools, extensive bruising, hematuria or hemoptysis.     In the ED, patient was noted to have swollen right leg. Ultrasound was obtained and showed near completely occlusive thrombus of the right common femoral vein, persistent thrombus of the right greater saphenous vein and redemonstration of thrombosis left femoral, greater saphenous and popliteal veins. Patient admitted to hospital medicine for anticoagulation and hematology consultation.          Past Medical History:   Diagnosis Date    Anticoagulant long-term use     Anticoagulant long-term use     FERNANDO (dyspnea on  "exertion)     DVT (deep venous thrombosis)     Factor V Leiden     Leg edema, left     May-Thurner syndrome     Multiple pulmonary nodules     Pulmonary embolus     RAD (reactive airway disease)     Recurrent upper respiratory infection (URI)     Recurrent urticaria     TIA (transient ischemic attack)        Past Surgical History:   Procedure Laterality Date    COLONOSCOPY N/A 12/18/2015    Procedure: COLONOSCOPY;  Surgeon: Lefty Lee MD;  Location: [facility] ([address] [place] [address]);  Service: Endoscopy;  Laterality: N/A;  schedule with Ray    IVC FILTER RETRIEVAL      IVC FILTER RETRIEVAL N/A 9/23/2019    Procedure: REMOVAL-FILTER-IVC;  Surgeon: Claudia Surgeon;  Location: 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      Current Outpatient Medications on File Prior to Encounter   Medication Sig    enoxaparin (LOVENOX) 80 mg/0.8 mL Syrg Inject 0.8 mLs (80 mg total) into the skin 2 (two) times daily.    oxyCODONE-acetaminophen (PERCOCET) 7.5-325 mg per tablet Take 1 tablet by mouth every 6 (six) hours as needed for Pain.    diazePAM (VALIUM) 5 MG tablet TAKE 1 TABLET (5 MG TOTAL) BY MOUTH EVERY 12 (TWELVE) HOURS AS NEEDED FOR ANXIETY.    EPINEPHrine (EPIPEN 2-VAN) 0.3 mg/0.3 mL AtIn Inject 0.3 mLs (0.3 mg total) into the muscle once. for 1 dose    ondansetron (ZOFRAN-ODT) 8 MG TbDL Take 1 tablet (8 mg total) by mouth every 8 (eight) hours as needed (nausea).    [DISCONTINUED] albuterol (PROVENTIL/VENTOLIN " HFA) 90 mcg/actuation inhaler INHALE 2 PUFFS INTO THE LUNGS EVERY 6 HOURS AS NEEDED FOR WHEEZING. RESCUE    [DISCONTINUED] cetirizine (ZYRTEC) 10 MG tablet Take 2 tablets (20 mg total) by mouth 2 (two) times daily. For chronic urticaria (Patient taking differently: Take 20 mg by mouth 2 (two) times daily as needed. For chronic urticaria)    [DISCONTINUED] m-vit,tx,iron,mins/calc/folic (MULTIVITAMIN) Tab Take 1 tablet by mouth every morning.      Family History     Problem Relation (Age of Onset)    Allergies Mother        Tobacco Use    Smoking status: Never Smoker    Smokeless tobacco: Never Used   Substance and Sexual Activity    Alcohol use: No     Frequency: Monthly or less     Drinks per session: 3 or 4     Binge frequency: Never    Drug use: No    Sexual activity: Yes     Partners: Male     Review of Systems   Constitutional: Negative.    HENT: Negative.    Eyes: Negative.    Respiratory: Negative.    Cardiovascular: Positive for leg swelling.   Gastrointestinal: Negative.    Endocrine: Negative.    Genitourinary: Negative.    Musculoskeletal: Negative.    Skin: Negative.    Neurological: Positive for numbness.   Hematological: Bruises/bleeds easily.   Psychiatric/Behavioral: Negative.      Objective:     Vital Signs (Most Recent):  Temp: 97.9 °F (36.6 °C) (11/01/19 0722)  Pulse: 74 (11/01/19 0722)  Resp: 18 (11/01/19 0722)  BP: 117/66 (11/01/19 0722)  SpO2: 99 % (11/01/19 0722) Vital Signs (24h Range):  Temp:  [97.9 °F (36.6 °C)-98.2 °F (36.8 °C)] 97.9 °F (36.6 °C)  Pulse:  [66-97] 74  Resp:  [16-18] 18  SpO2:  [98 %-100 %] 99 %  BP: (101-130)/(52-67) 117/66     Weight: 74.8 kg (165 lb)  Body mass index is 27.46 kg/m².    Physical Exam   Constitutional: She is oriented to person, place, and time. She appears well-developed. No distress.   HENT:   Head: Normocephalic and atraumatic.   Eyes: Conjunctivae and EOM are normal.   Neck: Normal range of motion.   Cardiovascular: Normal rate and regular rhythm.    Pulmonary/Chest: Effort normal and breath sounds normal.   Abdominal: Soft. Bowel sounds are normal.   Musculoskeletal: Normal range of motion. She exhibits edema (R >> L, not firm).   Neurological: She is alert and oriented to person, place, and time. A sensory deficit (right foot) is present. No cranial nerve deficit.   Skin: Skin is warm and dry. Capillary refill takes less than 2 seconds. She is not diaphoretic.   Some bruising at reported site of lovenox injection. Not extensive   Psychiatric: She has a normal mood and affect. Her behavior is normal. Judgment and thought content normal.   Nursing note and vitals reviewed.        CRANIAL NERVES     CN III, IV, VI   Extraocular motions are normal.        Significant Labs: All pertinent labs within the past 24 hours have been reviewed.    Significant Imaging: I have reviewed all pertinent imaging results/findings within the past 24 hours.  I have reviewed and interpreted all pertinent imaging results/findings within the past 24 hours.    Assessment/Plan:     * DVT, recurrent, lower extremity, acute, right  This is a recurrent issue despite compliance to enoxaparin 80 mg every 12 hours (dose adjusted about a month ago by Dr Billy at [facility]). Agree with heparin infusion pending Hematology evaluation. I am concerned about patient's right foot paresthesia and extensive thrombus (per ultrasound: near completely occlusive thrombus of the right common femoral vein, persistent thrombus of the right greater saphenous vein and redemonstration of thrombosis left femoral, greater saphenous and popliteal veins) but on exam patient has + 1 peripheral pulses and leg is not tight. Will discuss with Hematology if thrombolysis is necessary.       Factor V Leiden mutation  Followed by Hematology  Management for acute event as above      May-Thurner syndrome  noted      Normocytic anemia  No evidence of blood loss at this time but at risk  CBC in AM      Paresthesia of right  foot  Concerning given extensive thrombus  Will discuss with hematology      History of deep venous thrombosis (DVT) of distal vein of left lower extremity  Required thrombolysis for occlusive thrombus      Presence of IVC filter          VTE Risk Mitigation (From admission, onward)         Ordered     Place sequential compression device  Until discontinued      11/01/19 0541     IP VTE HIGH RISK PATIENT  Once      11/01/19 0541     heparin 25,000 units in dextrose 5% 250 mL (100 units/mL) infusion HIGH INTENSITY nomogram - [facility]  Continuous      11/01/19 0249     heparin 25,000 units in dextrose 5% (100 units/ml) IV bolus from bag - ADDITIONAL PRN BOLUS - 60 units/kg  As needed (PRN)      11/01/19 0249     heparin 25,000 units in dextrose 5% (100 units/ml) IV bolus from bag - ADDITIONAL PRN BOLUS - 30 units/kg  As needed (PRN)      11/01/19 0249                   Marzena Liao MD  Department of Hospital Medicine   [facility]

## 2019-11-01 NOTE — ED TRIAGE NOTES
"Pt presents to ED with c/o bilateral lower leg swelling x several days. States she recently had surgery to remove an IVC filter but was told they were unable to remove it so they "crushed it and placed stents around it" instead. Currently taking BID lovenox injections. Pt also c/o upper right quadrant pain when she takes a deep breath. Pt in no acute distress, fiance at bedside.  "

## 2019-11-01 NOTE — NURSING
Patient arrived to floor assessment done , oriented to room and call light system , bed alarm on call light in reach , heparin in fusing handoff verified on mar. No acute distress. Plan of care reviewed with patient and written on board.

## 2019-11-01 NOTE — ASSESSMENT & PLAN NOTE
This is a recurrent issue despite compliance to enoxaparin 80 mg every 12 hours (dose adjusted about a month ago by Dr Billy at [facility]). Agree with heparin infusion pending Hematology evaluation. I am concerned about patient's right foot paresthesia and extensive thrombus (per ultrasound: near completely occlusive thrombus of the right common femoral vein, persistent thrombus of the right greater saphenous vein and redemonstration of thrombosis left femoral, greater saphenous and popliteal veins) but on exam patient has + 1 peripheral pulses and leg is not tight. Will discuss with Hematology if thrombolysis is necessary.

## 2019-11-01 NOTE — CONSULTS
Please see previous consult note dated the same day.    Dmitri Wright MD  Hematology and Oncology  [facility]  Office:[phone]  Fax: [phone]

## 2019-11-01 NOTE — SEDATION DOCUMENTATION
Report called to No Whyte RN. Heparin paused during procedure per MD order. Procedure stopped early d/t patient request after increased pain. Rescheduled for Monday at 12pm with general anesthesia for further intervention. AAO x 4. Bradycardia on monitor in mid-50s. BP and O2 sats stable. NADN.

## 2019-11-01 NOTE — ED PROVIDER NOTES
"Encounter Date: 11/1/2019    SCRIBE #1 NOTE: I, Alexa Baugh, am scribing for, and in the presence of,  Neeraj English MD. I have scribed the following portions of the note - Other sections scribed: HPI/ROS/PE.       History     Chief Complaint   Patient presents with    Leg Swelling     bilteral leg swelling. stents to IVC, right side. swelling started two days ago. +shortness of breath.     Abdominal Pain     right upper abdominal pain for 2 days.      CC: Leg Swelling     HPI: This 29 y.o. female with a medical history of DVT, Factor V Leiden, TIA, and PE presents to the ED for an emergent evaluation of bilateral leg swelling x 4-5 days. Pt reports swelling is progressively worsening with redness to the R leg noted. Pt reports R leg pain with palpation and movement. Pt reports she had a surgery about 1 month ago secondary to a "massive clot" to the L leg. Pt reports she had an IVC filter placed in 2014 secondary to Factor V Leiden. She reports the IVC filter was attempted to be removed without success. Therefore, pt reports "they crushed it" and "stented around it" instead. Pt states, "it's tilted sideways and embedded in the wall." She is currently on Lovenox injection BID. Last injection was at 19:00 this evening. She notes that she has prescribed Norco PRN following the surgery. Pt also reports RUQ, stabbing abdominal pain whenever she inhales. No alleviating factors. Pt is allergic to Toradol and Azithromycin. Otherwise, pt denies fever, chills, n/v, weakness, cough, and any other associated symptoms.    The history is provided by the patient. No  was used.     Review of patient's allergies indicates:   Allergen Reactions    Azithromycin Hives    Beef containing products Anaphylaxis     Patient cannot eat BEEF BROTH  STATES IT WILL MAKE HER THROAT CLOSE UP .     Pork derived (porcine) Anaphylaxis     Throat swells up cannot eat pork sausage or pork patties ,     " "Unclassified drug Shortness Of Breath and Other (See Comments)     Is allergic to a beef spice - Causes "throat closing"    Xarelto [rivaroxaban] Other (See Comments)     Gallbladder swelling and disfunction    Toradol [ketorolac] Hives and Itching     Past Medical History:   Diagnosis Date    Anticoagulant long-term use     Anticoagulant long-term use     FERNANDO (dyspnea on exertion)     DVT (deep venous thrombosis)     Factor V Leiden     Leg edema, left     May-Thurner syndrome     Multiple pulmonary nodules     Pulmonary embolus     RAD (reactive airway disease)     Recurrent upper respiratory infection (URI)     Recurrent urticaria     TIA (transient ischemic attack)      Past Surgical History:   Procedure Laterality Date    COLONOSCOPY N/A 12/18/2015    Procedure: COLONOSCOPY;  Surgeon: Lefty Lee MD;  Location: [facility] ([address] [place] [address]);  Service: Endoscopy;  Laterality: N/A;  schedule with Jesus    IVC FILTER RETRIEVAL      IVC FILTER RETRIEVAL N/A 9/23/2019    Procedure: REMOVAL-FILTER-IVC;  Surgeon: Claudia Surgeon;  Location: [facility];  Service: Anesthesiology;  Laterality: N/A;  188  4 hours Anes    tumor from breast      left    WISDOM TOOTH EXTRACTION       Family History   Problem Relation Age of Onset    Allergies Mother         azithromycin     Social History     Tobacco Use    Smoking status: Never Smoker    Smokeless tobacco: Never Used   Substance Use Topics    Alcohol use: No     Frequency: Monthly or less     Drinks per session: 3 or 4     Binge frequency: Never    Drug use: No     Review of Systems   Constitutional: Negative.    HENT: Negative.    Eyes: Negative.    Respiratory: Negative.    Cardiovascular: Positive for leg swelling.   Gastrointestinal: Positive for abdominal pain.   Genitourinary: Negative.    Musculoskeletal: Positive for myalgias.   Skin: Positive for color change.   Neurological: Negative.        Physical Exam     Initial Vitals [11/01/19 0014]   BP Pulse Resp " Temp SpO2   130/63 97 17 98.2 °F (36.8 °C) 100 %      MAP       --         Physical Exam    Nursing note and vitals reviewed.  Constitutional: She appears well-developed and well-nourished. She is not diaphoretic. No distress.   HENT:   Mouth/Throat: Oropharynx is clear and moist.   Eyes: Pupils are equal, round, and reactive to light.   Neck: Neck supple.   Cardiovascular: Normal rate and regular rhythm.   Pulmonary/Chest: Breath sounds normal. No respiratory distress.   Abdominal: Soft. There is tenderness in the right upper quadrant.   Musculoskeletal:   2+ pitting edema to the bilateral LEs. Erythema to the posterior, R LE and R popliteal region.    Neurological: She is alert and oriented to person, place, and time.   Skin: Skin is warm and dry.   Psychiatric: She has a normal mood and affect.         ED Course   Procedures  Labs Reviewed   CBC W/ AUTO DIFFERENTIAL - Abnormal; Notable for the following components:       Result Value    WBC 2.91 (*)     RBC 2.90 (*)     Hemoglobin 8.1 (*)     Hematocrit 26.8 (*)     Mean Corpuscular Hemoglobin Conc 30.2 (*)     Platelets 144 (*)     Gran # (ANC) 1.6 (*)     Mono # 0.2 (*)     All other components within normal limits    Narrative:     (if patient is on warfarin prior to heparin therapy)   COMPREHENSIVE METABOLIC PANEL - Abnormal; Notable for the following components:    Albumin 3.1 (*)     All other components within normal limits    Narrative:     (if patient is on warfarin prior to heparin therapy)   APTT - Abnormal; Notable for the following components:    aPTT 33.5 (*)     All other components within normal limits    Narrative:     (if patient is on warfarin prior to heparin therapy)   URINALYSIS, REFLEX TO URINE CULTURE - Abnormal; Notable for the following components:    Specific Gravity, UA >1.030 (*)     Protein, UA 1+ (*)     Ketones, UA Trace (*)     Bilirubin (UA) 2+ (*)     Urobilinogen, UA 2.0-3.0 (*)     All other components within normal limits     Narrative:     Preferred Collection Type->Urine, Clean Catch   URINALYSIS MICROSCOPIC - Abnormal; Notable for the following components:    Bacteria Few (*)     All other components within normal limits    Narrative:     Preferred Collection Type->Urine, Clean Catch   APTT - Abnormal; Notable for the following components:    aPTT 33.5 (*)     All other components within normal limits    Narrative:     (if patient is on warfarin prior to heparin therapy)   APTT - Abnormal; Notable for the following components:    aPTT 141.7 (*)     All other components within normal limits    Narrative:     PTT daily if no changes in infusion rate   CBC W/ AUTO DIFFERENTIAL - Abnormal; Notable for the following components:    WBC 2.81 (*)     RBC 2.68 (*)     Hemoglobin 7.6 (*)     Hematocrit 25.0 (*)     Mean Corpuscular Hemoglobin Conc 30.4 (*)     Platelets 97 (*)     Gran # (ANC) 1.4 (*)     All other components within normal limits    Narrative:     PTT daily if no changes in infusion rate   PROTIME-INR    Narrative:     (if patient is on warfarin prior to heparin therapy)   PROTIME-INR    Narrative:     (if patient is on warfarin prior to heparin therapy)   TROPONIN I    Narrative:     (if patient is on warfarin prior to heparin therapy)   B-TYPE NATRIURETIC PEPTIDE    Narrative:     (if patient is on warfarin prior to heparin therapy)   POCT URINE PREGNANCY          Imaging Results           CT Abdomen Pelvis With Contrast (Final result)  Result time 11/01/19 05:36:05    Final result by Adriel Patrick MD (11/01/19 05:36:05)                 Impression:      1.  Infrarenal IVC filter as well as venous stent transversing the infrarenal IVC and bilateral common iliac veins.  Assessment for luminal patency is limited secondary to poor contrast opacification/contrast bolus timing.  A filling defect is noted within the right common femoral vein and proximal greater saphenous vein in keeping with deep venous thrombosis as seen on  prior ultrasound examination.    2.  Normal caliber proximal and mid appendix with nonspecific dilatation of the distal appendiceal tip measuring 0.8 cm with mild adjacent periappendiceal haziness.  Early tip appendicitis is not excluded and clinical correlation with patient symptoms is advised.  Follow-up CT could be performed as clinically indicated.    3.  Asymmetric enlargement of the left ovary.  Small volume of free fluid within the pelvis.  Further evaluation could be performed with pelvic ultrasound as clinically indicated.    4.  Scattered retained stool throughout the colon and most notably the rectum suggesting fecal impaction.    5.  Splenomegaly.    6.  Additional incidental findings as above.    This report was flagged in Epic as abnormal.      Electronically signed by: Adriel Patrick MD  Date:    11/01/2019  Time:    05:36             Narrative:    EXAMINATION:  CT ABDOMEN PELVIS WITH CONTRAST    CLINICAL HISTORY:  Abdominal distension;Please evaluate IVC, with concerning symptoms of right flank pain, worsening BLE swelling, with recent IR procedure;    TECHNIQUE:  Low dose axial images, sagittal and coronal reformations were obtained from the lung bases to the pubic symphysis following the IV administration of 70 mL of Omnipaque 350 .  Oral contrast was not given.    COMPARISON:  CTA 12/11/2015, ultrasound lower extremity 11/01/2019    FINDINGS:  The lung bases are unremarkable.  There is no pleural fluid present.  The visualized portions of the heart appear normal.    The liver is normal in size and attenuation with no focal hepatic abnormality.  The portal vein is patent.  The gallbladder shows no evidence of stones or pericholecystic fluid.  There is no intra-or extrahepatic biliary ductal dilatation.    The stomach, pancreas, and adrenal glands are unremarkable.  The spleen is enlarged.    The kidneys enhance symmetrically.  There is no evidence of hydronephrosis. The urinary bladder is  unremarkable. The uterus appears within normal limits.  There is mild asymmetric enlargement of the left ovary.  There is small volume of free fluid within the pelvis.    The abdominal aorta is normal in course and caliber without significant atherosclerotic calcification along its course.  There is an infrarenal IVC filter in place.  There is a venous stent in place transversing the bilateral common iliac veins and infrarenal IVC.  Assessment for patency is limited secondary to poor contrast opacification.  There is an apparent filling defect noted within the right common femoral vein and proximal greater saphenous vein in keeping with deep venous thrombosis.    The visualized loops of small and large bowel show no evidence of obstruction or inflammation.  The proximal mid appendix is normal caliber and air-filled.  The distal appendiceal tip is mildly enlarged and ill-defined measuring 0.8 cm in diameter (for example axial series 2, images 86 and 87).  Early tip appendicitis is not excluded.  There is scattered retained stool noted throughout the colon in particularly the rectum suggesting fecal impaction.  There is no free intraperitoneal air or portal venous gas.    Osseous structures demonstrate no acute abnormalities.  Incidental note is made of a prominent left-sided L5 transverse process with associated sacral pseudoarticulation.  The extraperitoneal soft tissues demonstrate mild subcutaneous edema of the proximal bilateral thighs.                                US Lower Extremity Veins Bilateral (Final result)  Result time 11/01/19 02:33:42    Final result by Adriel Patrick MD (11/01/19 02:33:42)                 Impression:      1.  Interval development of near completely occlusive thrombus of the right common femoral vein.  Persistent thrombus of the right greater saphenous vein is also noted.    2.  Redemonstration of thrombosis of the left femoral, greater saphenous, and popliteal veins.  Previously  identified thrombosis of the left common femoral vein is no longer definitively appreciated.    This report was flagged in Epic as abnormal.      Electronically signed by: Adriel Patrick MD  Date:    11/01/2019  Time:    02:33             Narrative:    EXAMINATION:  US LOWER EXTREMITY VEINS BILATERAL    CLINICAL HISTORY:  Other specified disorders of veins    TECHNIQUE:  Duplex and color flow Doppler and dynamic compression was performed of the bilateral lower extremity veins was performed.    COMPARISON:  Bilateral DVT ultrasound 07/20/2019, left lower extremity DVT ultrasound 08/30/2019    FINDINGS:  Right thigh veins: There is nearly occlusive thrombus identified within the right common femoral vein.  There is thrombus in the right greater saphenous vein.  The right femoral and popliteal veins appear patent.    Right calf veins: The visualized calf veins are patent.    Left thigh veins: The left common femoral vein appears patent and compressible.  There is occlusive thrombosis of the left femoral vein and popliteal veins.  There is partial thrombosis of the left greater saphenous vein.    Left calf veins: The visualized calf veins appear patent.  There is possible reversal of flow within one of the paired anterior tibial veins.    Miscellaneous: The visualized bilateral iliac veins appear grossly patent.                               US Abdomen Limited (Final result)  Result time 11/01/19 02:36:43    Final result by Adriel Patrick MD (11/01/19 02:36:43)                 Impression:      No significant sonographic abnormality.      Electronically signed by: Adriel Patrick MD  Date:    11/01/2019  Time:    02:36             Narrative:    EXAMINATION:  US ABDOMEN LIMITED    CLINICAL HISTORY:  RUQ pain, h/o IVC filter placement;    TECHNIQUE:  Limited ultrasound of the right upper quadrant of the abdomen (including pancreas, liver, gallbladder, common bile duct, and spleen) was  performed.    COMPARISON:  None.    FINDINGS:  Liver: The visualized portion appears within normal limits.    Gallbladder: No calculi, wall thickening, or pericholecystic fluid.  No sonographic Marie's sign.    Biliary system: The common duct is not dilated, measuring 3 mm.  No intrahepatic ductal dilatation.    Right kidney: No evidence of hydronephrosis.    Miscellaneous: No upper abdominal ascites.                                            Scribe Attestation:   Scribe #1: I performed the above scribed service and the documentation accurately describes the services I performed. I attest to the accuracy of the note.      MDM:    29-year-old female with past medical history as noted above presents with worsening bilateral lower extremity swelling x2 days with increased shortness breath. Ultrasound showing interval occlusive DVT in the right common femoral vein, CBC baseline, CMP baseline, pending CT abdomen with contrast.  With ultrasound findings patient started on heparin drip, failing outpatient b.i.d. Lovenox dosing.  At this time patient is satting 100% on room air, not short of breath, no chest pain reported, doubt PE currently.  Pending CT a/p currently.  Patient admitted to inpatient for further evaluation and management, with Hematology consult placed.    Transition of care to Dr. Winchester, with anticipation for admission on heparin drip. [0500]           I, Neeraj English M.D., personally performed the services described in this documentation. All medical record entries made by the scribe were at my direction and in my presence. I have reviewed the chart and agree that the record reflects my personal performance and is accurate and complete.      Clinical Impression:       ICD-10-CM ICD-9-CM   1. DVT, recurrent, lower extremity, acute, right I82.401 453.40   2. Venous congestion I87.8 459.89                                Neeraj English MD  11/01/19 2829

## 2019-11-01 NOTE — PLAN OF CARE
11/01/19 1518   Discharge Assessment   Assessment Type Discharge Planning Assessment   Assessment information obtained from? Medical Record   Communicated expected length of stay with patient/caregiver no   Prior to hospitilization cognitive status: Alert/Oriented   Prior to hospitalization functional status: Independent   Current cognitive status: Alert/Oriented   Current Functional Status: Independent   Lives With significant other   Able to Return to Prior Arrangements yes   Is patient able to care for self after discharge? Yes   Who are your caregiver(s) and their phone number(s)?   (significant other, Markos Medina [phone])   Patient's perception of discharge disposition admitted as an inpatient   Readmission Within the Last 30 Days no previous admission in last 30 days   Patient currently being followed by outpatient case management? No   Patient currently receives any other outside agency services? No   Equipment Currently Used at Home none   Do you have any problems affording any of your prescribed medications? No   Is the patient taking medications as prescribed? yes   Does the patient have transportation home? Yes   Transportation Anticipated family or friend will provide   Does the patient receive services at the [facility]? No   Discharge Plan A Home with family   Discharge Plan B Home with family   DME Needed Upon Discharge  none   Patient/Family in Agreement with Plan unable to assess     [id number] - [place] - [address] [place] [address]  Phone: [phone] Fax: [phone]

## 2019-11-01 NOTE — PLAN OF CARE
11/01/19 1524   Post-Acute Status   Post-Acute Authorization Other   Other Status Awaiting f/u Appts   Discharge Delays None known at this time

## 2019-11-01 NOTE — CONSULTS
Patient seen and examined.  Pt with decreased R dorsiflexion of foot.  Neuro intact.  Perfusion normal.  No phlegmasia.  Prior interventions done by IR.  Will consult IR for continuity of care and to continue mgmt of patient with possible intervention.  Recommend NPO and continued anticoagulation.  Recommend compression with Rx stockings, elevation, dietary changes associated with water and sodium intake discussed at length with patient to prevent post-thrombotic syndrome.

## 2019-11-01 NOTE — NURSING
1530- patient remains npo status , transported to IR for procedure by bed with heparin infusing . Lab called for next ptt instructed to go down to the IR department for lab draw if possible. Informed tele monitor patient leaving floor .

## 2019-11-01 NOTE — ASSESSMENT & PLAN NOTE
-The patient has a history of recurrent DVT's and PE even while on anticoagulation  -The pt is reportedly homozygous for factor V leiden  -She developed clots while on active treatment with warfarin, eliquis and now lovenox  -She has been on Xarelto in the past with subsequent cholecystitis  -Currently the patient has developed a new almost entirely occlusive thrombus in the common right femoral vein  -Concern is for failure of lovenox and/or provoked thrombosis from her recent procedure with LLE thrombectomy and stents placed in IVC after failed filter retrieval   -Agree with heparin drip  -Will consult Vascular Surgery for possible thrombectomy given significant DVT  -Will check antithrombin III level to assess for heparin resistance  -Will check factor V leiden, lupus anticoagulant, anticardiolipin antibodies, and beta-2 glycoprotein levels.  -Pt may be a candidate for rituxan if found to have anticardiolipin antibody syndrome given recurrent clots on anticoagulation.

## 2019-11-01 NOTE — ASSESSMENT & PLAN NOTE
-Pt with worsening normocytic anemia since 8/30/19 since starting lovenox  -Pt with macrocytosis in the past as well  -Will check retic count, B12, folate, TSH, retic, iron studies, haptoglobin, LDH, OMAR, SPEP, serum immunoglobulins

## 2019-11-01 NOTE — SUBJECTIVE & OBJECTIVE
"Past Medical History:   Diagnosis Date    Anticoagulant long-term use     Anticoagulant long-term use     FERNANDO (dyspnea on exertion)     DVT (deep venous thrombosis)     Factor V Leiden     Leg edema, left     May-Thurner syndrome     Multiple pulmonary nodules     Pulmonary embolus     RAD (reactive airway disease)     Recurrent upper respiratory infection (URI)     Recurrent urticaria     TIA (transient ischemic attack)        Past Surgical History:   Procedure Laterality Date    COLONOSCOPY N/A 12/18/2015    Procedure: COLONOSCOPY;  Surgeon: Lefty Lee MD;  Location: [facility] ([address] [place] [address]);  Service: Endoscopy;  Laterality: N/A;  schedule with Jesus    IVC FILTER RETRIEVAL      IVC FILTER RETRIEVAL N/A 9/23/2019    Procedure: REMOVAL-FILTER-IVC;  Surgeon: Claudia Surgeon;  Location: [facility];  Service: Anesthesiology;  Laterality: N/A;  188  4 hours Anes    tumor from breast      left    WISDOM TOOTH EXTRACTION         Review of patient's allergies indicates:   Allergen Reactions    Azithromycin Hives    Unclassified drug Shortness Of Breath and Other (See Comments)     Is allergic to a beef spice - Causes "throat closing"    Xarelto [rivaroxaban] Other (See Comments)     Gallbladder swelling and disfunction    Toradol [ketorolac] Hives and Itching       No current facility-administered medications on file prior to encounter.      Current Outpatient Medications on File Prior to Encounter   Medication Sig    enoxaparin (LOVENOX) 80 mg/0.8 mL Syrg Inject 0.8 mLs (80 mg total) into the skin 2 (two) times daily.    oxyCODONE-acetaminophen (PERCOCET) 7.5-325 mg per tablet Take 1 tablet by mouth every 6 (six) hours as needed for Pain.    diazePAM (VALIUM) 5 MG tablet TAKE 1 TABLET (5 MG TOTAL) BY MOUTH EVERY 12 (TWELVE) HOURS AS NEEDED FOR ANXIETY.    EPINEPHrine (EPIPEN 2-VAN) 0.3 mg/0.3 mL AtIn Inject 0.3 mLs (0.3 mg total) into the muscle once. for 1 dose    ondansetron (ZOFRAN-ODT) 8 MG TbDL Take " 1 tablet (8 mg total) by mouth every 8 (eight) hours as needed (nausea).    [DISCONTINUED] albuterol (PROVENTIL/VENTOLIN HFA) 90 mcg/actuation inhaler INHALE 2 PUFFS INTO THE LUNGS EVERY 6 HOURS AS NEEDED FOR WHEEZING. RESCUE    [DISCONTINUED] cetirizine (ZYRTEC) 10 MG tablet Take 2 tablets (20 mg total) by mouth 2 (two) times daily. For chronic urticaria (Patient taking differently: Take 20 mg by mouth 2 (two) times daily as needed. For chronic urticaria)    [DISCONTINUED] m-vit,tx,iron,mins/calc/folic (MULTIVITAMIN) Tab Take 1 tablet by mouth every morning.      Family History     Problem Relation (Age of Onset)    Allergies Mother        Tobacco Use    Smoking status: Never Smoker    Smokeless tobacco: Never Used   Substance and Sexual Activity    Alcohol use: No     Frequency: Monthly or less     Drinks per session: 3 or 4     Binge frequency: Never    Drug use: No    Sexual activity: Yes     Partners: Male     Review of Systems   Constitutional: Negative.    HENT: Negative.    Eyes: Negative.    Respiratory: Negative.    Cardiovascular: Positive for leg swelling.   Gastrointestinal: Negative.    Endocrine: Negative.    Genitourinary: Negative.    Musculoskeletal: Negative.    Skin: Negative.    Neurological: Positive for numbness.   Hematological: Bruises/bleeds easily.   Psychiatric/Behavioral: Negative.      Objective:     Vital Signs (Most Recent):  Temp: 97.9 °F (36.6 °C) (11/01/19 0722)  Pulse: 74 (11/01/19 0722)  Resp: 18 (11/01/19 0722)  BP: 117/66 (11/01/19 0722)  SpO2: 99 % (11/01/19 0722) Vital Signs (24h Range):  Temp:  [97.9 °F (36.6 °C)-98.2 °F (36.8 °C)] 97.9 °F (36.6 °C)  Pulse:  [66-97] 74  Resp:  [16-18] 18  SpO2:  [98 %-100 %] 99 %  BP: (101-130)/(52-67) 117/66     Weight: 74.8 kg (165 lb)  Body mass index is 27.46 kg/m².    Physical Exam   Constitutional: She is oriented to person, place, and time. She appears well-developed. No distress.   HENT:   Head: Normocephalic and atraumatic.    Eyes: Conjunctivae and EOM are normal.   Neck: Normal range of motion.   Cardiovascular: Normal rate and regular rhythm.   Pulmonary/Chest: Effort normal and breath sounds normal.   Abdominal: Soft. Bowel sounds are normal.   Musculoskeletal: Normal range of motion. She exhibits edema (R >> L, not firm).   Neurological: She is alert and oriented to person, place, and time. A sensory deficit (right foot) is present. No cranial nerve deficit.   Skin: Skin is warm and dry. Capillary refill takes less than 2 seconds. She is not diaphoretic.   Some bruising at reported site of lovenox injection. Not extensive   Psychiatric: She has a normal mood and affect. Her behavior is normal. Judgment and thought content normal.   Nursing note and vitals reviewed.        CRANIAL NERVES     CN III, IV, VI   Extraocular motions are normal.        Significant Labs: All pertinent labs within the past 24 hours have been reviewed.    Significant Imaging: I have reviewed all pertinent imaging results/findings within the past 24 hours.  I have reviewed and interpreted all pertinent imaging results/findings within the past 24 hours.

## 2019-11-02 PROBLEM — R00.1 BRADYCARDIA: Status: ACTIVE | Noted: 2019-11-02

## 2019-11-02 LAB
ANION GAP SERPL CALC-SCNC: 7 MMOL/L (ref 8–16)
AORTIC ROOT ANNULUS: 2.76 CM
AORTIC VALVE CUSP SEPERATION: 2.22 CM
APTT BLDCRRT: 44.6 SEC (ref 21–32)
APTT BLDCRRT: 44.6 SEC (ref 21–32)
ASCENDING AORTA: 2.82 CM
AV INDEX (PROSTH): 0.68
AV MEAN GRADIENT: 6 MMHG
AV PEAK GRADIENT: 10 MMHG
AV VALVE AREA: 2.05 CM2
AV VELOCITY RATIO: 0.68
BASOPHILS # BLD AUTO: 0.01 K/UL (ref 0–0.2)
BASOPHILS NFR BLD: 0.4 % (ref 0–1.9)
BSA FOR ECHO PROCEDURE: 1.83 M2
BUN SERPL-MCNC: 8 MG/DL (ref 6–20)
CALCIUM SERPL-MCNC: 8.9 MG/DL (ref 8.7–10.5)
CHLORIDE SERPL-SCNC: 106 MMOL/L (ref 95–110)
CO2 SERPL-SCNC: 25 MMOL/L (ref 23–29)
CREAT SERPL-MCNC: 0.7 MG/DL (ref 0.5–1.4)
CV ECHO LV RWT: 0.49 CM
DIFFERENTIAL METHOD: ABNORMAL
DOP CALC AO PEAK VEL: 1.6 M/S
DOP CALC AO VTI: 37.47 CM
DOP CALC LVOT AREA: 3 CM2
DOP CALC LVOT DIAMETER: 1.96 CM
DOP CALC LVOT PEAK VEL: 1.08 M/S
DOP CALC LVOT STROKE VOLUME: 76.63 CM3
DOP CALCLVOT PEAK VEL VTI: 25.41 CM
E WAVE DECELERATION TIME: 232.62 MSEC
E/A RATIO: 1.95
E/E' RATIO: 7.38 M/S
ECHO LV POSTERIOR WALL: 1.11 CM (ref 0.6–1.1)
EOSINOPHIL # BLD AUTO: 0 K/UL (ref 0–0.5)
EOSINOPHIL NFR BLD: 1.6 % (ref 0–8)
ERYTHROCYTE [DISTWIDTH] IN BLOOD BY AUTOMATED COUNT: 13.6 % (ref 11.5–14.5)
EST. GFR  (AFRICAN AMERICAN): >60 ML/MIN/1.73 M^2
EST. GFR  (NON AFRICAN AMERICAN): >60 ML/MIN/1.73 M^2
FRACTIONAL SHORTENING: 41 % (ref 28–44)
GLUCOSE SERPL-MCNC: 90 MG/DL (ref 70–110)
HCT VFR BLD AUTO: 23.2 % (ref 37–48.5)
HGB BLD-MCNC: 7.1 G/DL (ref 12–16)
IMM GRANULOCYTES # BLD AUTO: 0.01 K/UL (ref 0–0.04)
IMM GRANULOCYTES NFR BLD AUTO: 0.4 % (ref 0–0.5)
INTERVENTRICULAR SEPTUM: 1 CM (ref 0.6–1.1)
IVRT: 0.11 MSEC
LA MAJOR: 4.33 CM
LA MINOR: 4.18 CM
LA WIDTH: 3.51 CM
LEFT ATRIUM SIZE: 3.8 CM
LEFT ATRIUM VOLUME INDEX: 27.3 ML/M2
LEFT ATRIUM VOLUME: 48.23 CM3
LEFT INTERNAL DIMENSION IN SYSTOLE: 2.65 CM (ref 2.1–4)
LEFT VENTRICLE DIASTOLIC VOLUME INDEX: 52.64 ML/M2
LEFT VENTRICLE DIASTOLIC VOLUME: 92.84 ML
LEFT VENTRICLE MASS INDEX: 94 G/M2
LEFT VENTRICLE SYSTOLIC VOLUME INDEX: 14.6 ML/M2
LEFT VENTRICLE SYSTOLIC VOLUME: 25.79 ML
LEFT VENTRICULAR INTERNAL DIMENSION IN DIASTOLE: 4.51 CM (ref 3.5–6)
LEFT VENTRICULAR MASS: 165.66 G
LV LATERAL E/E' RATIO: 6.69 M/S
LV SEPTAL E/E' RATIO: 8.23 M/S
LYMPHOCYTES # BLD AUTO: 0.7 K/UL (ref 1–4.8)
LYMPHOCYTES NFR BLD: 28 % (ref 18–48)
MCH RBC QN AUTO: 28.5 PG (ref 27–31)
MCHC RBC AUTO-ENTMCNC: 30.6 G/DL (ref 32–36)
MCV RBC AUTO: 93 FL (ref 82–98)
MONOCYTES # BLD AUTO: 0.2 K/UL (ref 0.3–1)
MONOCYTES NFR BLD: 7.8 % (ref 4–15)
MV PEAK A VEL: 0.55 M/S
MV PEAK E VEL: 1.07 M/S
NEUTROPHILS # BLD AUTO: 1.6 K/UL (ref 1.8–7.7)
NEUTROPHILS NFR BLD: 61.8 % (ref 38–73)
NRBC BLD-RTO: 0 /100 WBC
PISA TR MAX VEL: 1.83 M/S
PLATELET # BLD AUTO: 131 K/UL (ref 150–350)
PMV BLD AUTO: 9.6 FL (ref 9.2–12.9)
POTASSIUM SERPL-SCNC: 4.1 MMOL/L (ref 3.5–5.1)
PULM VEIN S/D RATIO: 0.98
PV PEAK D VEL: 0.55 M/S
PV PEAK S VEL: 0.54 M/S
PV PEAK VELOCITY: 1.15 CM/S
RA MAJOR: 4.49 CM
RA PRESSURE: 3 MMHG
RA WIDTH: 2.96 CM
RBC # BLD AUTO: 2.49 M/UL (ref 4–5.4)
RIGHT VENTRICULAR END-DIASTOLIC DIMENSION: 2.68 CM
RV TISSUE DOPPLER FREE WALL SYSTOLIC VELOCITY 1 (APICAL 4 CHAMBER VIEW): 11.25 CM/S
SINUS: 2.81 CM
SODIUM SERPL-SCNC: 138 MMOL/L (ref 136–145)
STJ: 2.28 CM
TDI LATERAL: 0.16 M/S
TDI SEPTAL: 0.13 M/S
TDI: 0.15 M/S
TR MAX PG: 13 MMHG
TRICUSPID ANNULAR PLANE SYSTOLIC EXCURSION: 2.41 CM
TV REST PULMONARY ARTERY PRESSURE: 16 MMHG
WBC # BLD AUTO: 2.57 K/UL (ref 3.9–12.7)

## 2019-11-02 PROCEDURE — 25000003 PHARM REV CODE 250: Performed by: INTERNAL MEDICINE

## 2019-11-02 PROCEDURE — 85730 THROMBOPLASTIN TIME PARTIAL: CPT

## 2019-11-02 PROCEDURE — 85613 RUSSELL VIPER VENOM DILUTED: CPT

## 2019-11-02 PROCEDURE — 86146 BETA-2 GLYCOPROTEIN ANTIBODY: CPT | Mod: 59

## 2019-11-02 PROCEDURE — 99223 PR INITIAL HOSPITAL CARE,LEVL III: ICD-10-PCS | Mod: 25,,, | Performed by: INTERNAL MEDICINE

## 2019-11-02 PROCEDURE — 80048 BASIC METABOLIC PNL TOTAL CA: CPT

## 2019-11-02 PROCEDURE — 86147 CARDIOLIPIN ANTIBODY EA IG: CPT

## 2019-11-02 PROCEDURE — 99223 1ST HOSP IP/OBS HIGH 75: CPT | Mod: 25,,, | Performed by: INTERNAL MEDICINE

## 2019-11-02 PROCEDURE — 93005 ELECTROCARDIOGRAM TRACING: CPT

## 2019-11-02 PROCEDURE — 36415 COLL VENOUS BLD VENIPUNCTURE: CPT

## 2019-11-02 PROCEDURE — 93010 EKG 12-LEAD: ICD-10-PCS | Mod: ,,, | Performed by: INTERNAL MEDICINE

## 2019-11-02 PROCEDURE — 85730 THROMBOPLASTIN TIME PARTIAL: CPT | Mod: 91

## 2019-11-02 PROCEDURE — 85598 HEXAGNAL PHOSPH PLTLT NEUTRL: CPT

## 2019-11-02 PROCEDURE — 63600175 PHARM REV CODE 636 W HCPCS: Performed by: INTERNAL MEDICINE

## 2019-11-02 PROCEDURE — 93010 ELECTROCARDIOGRAM REPORT: CPT | Mod: ,,, | Performed by: INTERNAL MEDICINE

## 2019-11-02 PROCEDURE — 63600175 PHARM REV CODE 636 W HCPCS: Performed by: HOSPITALIST

## 2019-11-02 PROCEDURE — 21400001 HC TELEMETRY ROOM

## 2019-11-02 PROCEDURE — 85025 COMPLETE CBC W/AUTO DIFF WBC: CPT

## 2019-11-02 RX ORDER — ACETAMINOPHEN 325 MG/1
650 TABLET ORAL EVERY 6 HOURS PRN
Status: DISCONTINUED | OUTPATIENT
Start: 2019-11-03 | End: 2019-11-07 | Stop reason: HOSPADM

## 2019-11-02 RX ORDER — ONDANSETRON 2 MG/ML
8 INJECTION INTRAMUSCULAR; INTRAVENOUS EVERY 8 HOURS PRN
Status: DISCONTINUED | OUTPATIENT
Start: 2019-11-02 | End: 2019-11-07 | Stop reason: HOSPADM

## 2019-11-02 RX ORDER — ONDANSETRON 2 MG/ML
4 INJECTION INTRAMUSCULAR; INTRAVENOUS EVERY 8 HOURS PRN
Status: DISCONTINUED | OUTPATIENT
Start: 2019-11-02 | End: 2019-11-02

## 2019-11-02 RX ADMIN — HYDROMORPHONE HYDROCHLORIDE 1 MG: 2 INJECTION, SOLUTION INTRAMUSCULAR; INTRAVENOUS; SUBCUTANEOUS at 09:11

## 2019-11-02 RX ADMIN — HYDROMORPHONE HYDROCHLORIDE 1 MG: 2 INJECTION, SOLUTION INTRAMUSCULAR; INTRAVENOUS; SUBCUTANEOUS at 12:11

## 2019-11-02 RX ADMIN — HEPARIN SODIUM 18 UNITS/KG/HR: 10000 INJECTION, SOLUTION INTRAVENOUS at 05:11

## 2019-11-02 RX ADMIN — ONDANSETRON HYDROCHLORIDE 8 MG: 2 SOLUTION INTRAMUSCULAR; INTRAVENOUS at 10:11

## 2019-11-02 RX ADMIN — ONDANSETRON HYDROCHLORIDE 4 MG: 2 SOLUTION INTRAMUSCULAR; INTRAVENOUS at 02:11

## 2019-11-02 RX ADMIN — OXYCODONE HYDROCHLORIDE AND ACETAMINOPHEN 1 TABLET: 7.5; 325 TABLET ORAL at 06:11

## 2019-11-02 NOTE — SUBJECTIVE & OBJECTIVE
Interval History: feels better today. No longer with paresthesia and swelling improved.     Review of Systems   Respiratory: Negative.    Cardiovascular: Negative.    Gastrointestinal: Negative.      Objective:     Vital Signs (Most Recent):  Temp: 98.4 °F (36.9 °C) (11/02/19 1558)  Pulse: 68 (11/02/19 1558)  Resp: 18 (11/02/19 1558)  BP: (!) 107/51 (11/02/19 1558)  SpO2: 98 % (11/02/19 1558) Vital Signs (24h Range):  Temp:  [98 °F (36.7 °C)-99.7 °F (37.6 °C)] 98.4 °F (36.9 °C)  Pulse:  [42-91] 68  Resp:  [11-20] 18  SpO2:  [97 %-100 %] 98 %  BP: (106-144)/() 107/51     Weight: 79.4 kg (175 lb)  Body mass index is 34.18 kg/m².    Intake/Output Summary (Last 24 hours) at 11/2/2019 1623  Last data filed at 11/2/2019 1230  Gross per 24 hour   Intake 360 ml   Output 345 ml   Net 15 ml      Physical Exam   Constitutional: She is oriented to person, place, and time. She appears well-developed. No distress.   Cardiovascular: Normal rate and regular rhythm.   Pulmonary/Chest: Effort normal and breath sounds normal.   Abdominal: Soft. Bowel sounds are normal.   Musculoskeletal: Normal range of motion. She exhibits edema (much improved).   Neurological: She is alert and oriented to person, place, and time. No cranial nerve deficit or sensory deficit.   Skin: Skin is warm and dry. Capillary refill takes less than 2 seconds. She is not diaphoretic.   Some bruising at reported site of lovenox injection. Not extensive   Psychiatric: She has a normal mood and affect. Her behavior is normal. Judgment and thought content normal.   Nursing note and vitals reviewed.      Significant Labs: All pertinent labs within the past 24 hours have been reviewed.    Significant Imaging: I have reviewed all pertinent imaging results/findings within the past 24 hours.  I have reviewed and interpreted all pertinent imaging results/findings within the past 24 hours.

## 2019-11-02 NOTE — PROGRESS NOTES
[facility] Hospital Medicine  Progress Note    Patient Name: Antoinette Love  MRN: [id number]  Patient Class: IP- Inpatient   Admission Date: 11/1/2019  Length of Stay: 1 days  Attending Physician: Marzena Gambino MD  Primary Care Provider: Alberto Holguin MD        Subjective:     Principal Problem:DVT, recurrent, lower extremity, acute, right        HPI:  29 year old female with Factor V Leiden, recurrent deep vein thromboses (11 events), history of pulmonary embolisms (3-4 events) and May-Thurner syndrome while on anticoagulation who presented with right leg swelling and pain. Associated symptoms include paresthesia of right foot. Patient stated this feels similar to when she had a deep vein thrombus in left lower extremity requiring direct thrombolysis done by IR (9/2/2019). Was also planned to have IVC filter removed due to associated pain but this was unsuccessful. Patient is currently taking lovenox 80 mg every 12 hours and has been compliant with this medication. Patient denies abdominal pain, melena/bloody stools, extensive bruising, hematuria or hemoptysis.     In the ED, patient was noted to have swollen right leg. Ultrasound was obtained and showed near completely occlusive thrombus of the right common femoral vein, persistent thrombus of the right greater saphenous vein and redemonstration of thrombosis left femoral, greater saphenous and popliteal veins. Patient admitted to hospital medicine for anticoagulation and hematology consultation.          Overview/Hospital Course:  No notes on file    Interval History: feels better today. No longer with paresthesia and swelling improved.     Review of Systems   Respiratory: Negative.    Cardiovascular: Negative.    Gastrointestinal: Negative.      Objective:     Vital Signs (Most Recent):  Temp: 98.4 °F (36.9 °C) (11/02/19 1558)  Pulse: 68 (11/02/19 1558)  Resp: 18 (11/02/19 1558)  BP: (!) 107/51 (11/02/19 1558)  SpO2: 98 %  (11/02/19 1688) Vital Signs (24h Range):  Temp:  [98 °F (36.7 °C)-99.7 °F (37.6 °C)] 98.4 °F (36.9 °C)  Pulse:  [42-91] 68  Resp:  [11-20] 18  SpO2:  [97 %-100 %] 98 %  BP: (106-144)/() 107/51     Weight: 79.4 kg (175 lb)  Body mass index is 34.18 kg/m².    Intake/Output Summary (Last 24 hours) at 11/2/2019 1623  Last data filed at 11/2/2019 1230  Gross per 24 hour   Intake 360 ml   Output 345 ml   Net 15 ml      Physical Exam   Constitutional: She is oriented to person, place, and time. She appears well-developed. No distress.   Cardiovascular: Normal rate and regular rhythm.   Pulmonary/Chest: Effort normal and breath sounds normal.   Abdominal: Soft. Bowel sounds are normal.   Musculoskeletal: Normal range of motion. She exhibits edema (much improved).   Neurological: She is alert and oriented to person, place, and time. No cranial nerve deficit or sensory deficit.   Skin: Skin is warm and dry. Capillary refill takes less than 2 seconds. She is not diaphoretic.   Some bruising at reported site of lovenox injection. Not extensive   Psychiatric: She has a normal mood and affect. Her behavior is normal. Judgment and thought content normal.   Nursing note and vitals reviewed.      Significant Labs: All pertinent labs within the past 24 hours have been reviewed.    Significant Imaging: I have reviewed all pertinent imaging results/findings within the past 24 hours.  I have reviewed and interpreted all pertinent imaging results/findings within the past 24 hours.      Assessment/Plan:      * DVT, recurrent, lower extremity, acute, right  This is a recurrent issue despite compliance to enoxaparin 80 mg every 12 hours (dose adjusted about a month ago by Dr Billy at [facility]). Agree with heparin infusion pending Hematology evaluation. I am concerned about patient's right foot paresthesia and extensive thrombus (per ultrasound: near completely occlusive thrombus of the right common femoral vein, persistent thrombus  of the right greater saphenous vein and redemonstration of thrombosis left femoral, greater saphenous and popliteal veins) but on exam patient had + 1 peripheral pulses and leg is not tight.   Is s/p thrombolysis but was aborted due to pain associated with this. Patient's leg edema and tenderness has improved and no longer with paresthesia. Is to resume thrombolysis on Monday. Continue heparin infusion    Factor V Leiden mutation  Followed by Hematology  Management for acute event as above      May-Thurner syndrome  noted      Normocytic anemia  Lower to 7.1 g/dL. Had gross hematuria yesterday that resolved  Patient has no symptoms of anemia at this time  Will hold off on transfusion but consider if < 7 g/dL tomorrow and/or signs/Sx of bleeding      Paresthesia of right foot  2/2 occlusive thrombus  Paresthesia resolved       History of deep venous thrombosis (DVT) of distal vein of left lower extremity  Required thrombolysis for occlusive thrombus      Presence of IVC filter        VTE Risk Mitigation (From admission, onward)         Ordered     Place sequential compression device  Until discontinued      11/01/19 0541     IP VTE HIGH RISK PATIENT  Once      11/01/19 0541     heparin 25,000 units in dextrose 5% 250 mL (100 units/mL) infusion HIGH INTENSITY nomogram - [facility]  Continuous      11/01/19 0249     heparin 25,000 units in dextrose 5% (100 units/ml) IV bolus from bag - ADDITIONAL PRN BOLUS - 60 units/kg  As needed (PRN)      11/01/19 0249     heparin 25,000 units in dextrose 5% (100 units/ml) IV bolus from bag - ADDITIONAL PRN BOLUS - 30 units/kg  As needed (PRN)      11/01/19 0249                      Marzena Liao MD  Department of Hospital Medicine   [facility]

## 2019-11-02 NOTE — PLAN OF CARE
Problem: Fall Injury Risk  Goal: Absence of Fall and Fall-Related Injury  Outcome: Ongoing, Progressing  Intervention: Identify and Manage Contributors to Fall Injury Risk  Flowsheets (Taken 11/2/2019 0900)  Self-Care Promotion: independence encouraged; BADL personal objects within reach; safe use of adaptive equipment encouraged  Medication Review/Management: medications reviewed; high risk medications identified     Problem: Adult Inpatient Plan of Care  Goal: Plan of Care Review  Outcome: Ongoing, Progressing  Flowsheets (Taken 11/2/2019 0900)  Plan of Care Reviewed With: patient; significant other     Problem: Fall Injury Risk  Goal: Absence of Fall and Fall-Related Injury  Outcome: Ongoing, Progressing  Intervention: Identify and Manage Contributors to Fall Injury Risk  Flowsheets (Taken 11/2/2019 0900)  Self-Care Promotion: independence encouraged; BADL personal objects within reach; safe use of adaptive equipment encouraged  Medication Review/Management: medications reviewed; high risk medications identified     Problem: Fall Injury Risk  Goal: Absence of Fall and Fall-Related Injury  Outcome: Ongoing, Progressing     Problem: Fall Injury Risk  Goal: Absence of Fall and Fall-Related Injury  Intervention: Identify and Manage Contributors to Fall Injury Risk  Flowsheets (Taken 11/2/2019 0900)  Self-Care Promotion: independence encouraged; BADL personal objects within reach; safe use of adaptive equipment encouraged  Medication Review/Management: medications reviewed; high risk medications identified     Problem: Fall Injury Risk  Goal: Absence of Fall and Fall-Related Injury  Intervention: Identify and Manage Contributors to Fall Injury Risk  Flowsheets (Taken 11/2/2019 0900)  Self-Care Promotion: independence encouraged; BADL personal objects within reach; safe use of adaptive equipment encouraged  Medication Review/Management: medications reviewed; high risk medications identified     Problem: Adult  Inpatient Plan of Care  Goal: Plan of Care Review  Outcome: Ongoing, Progressing  Flowsheets (Taken 11/2/2019 0900)  Plan of Care Reviewed With: patient; significant other     Problem: Adult Inpatient Plan of Care  Goal: Plan of Care Review  Outcome: Ongoing, Progressing  Flowsheets (Taken 11/2/2019 0900)  Plan of Care Reviewed With: patient; significant other

## 2019-11-02 NOTE — NURSING
Patient's heart rate is irregular SA in mid 40's to 50's. Patient states her pain is 7/10. Percocet given for pain at this time although patient requested IV dilaudid. I dicussed her options at this time due to concern for her heart rate dropping.

## 2019-11-02 NOTE — SUBJECTIVE & OBJECTIVE
"Past Medical History:   Diagnosis Date    Anticoagulant long-term use     Anticoagulant long-term use     FERNANDO (dyspnea on exertion)     DVT (deep venous thrombosis)     Factor V Leiden     Leg edema, left     May-Thurner syndrome     Multiple pulmonary nodules     Pulmonary embolus     RAD (reactive airway disease)     Recurrent upper respiratory infection (URI)     Recurrent urticaria     TIA (transient ischemic attack)        Past Surgical History:   Procedure Laterality Date    COLONOSCOPY N/A 12/18/2015    Procedure: COLONOSCOPY;  Surgeon: Lefty Lee MD;  Location: [facility] ([address] [place] [address]);  Service: Endoscopy;  Laterality: N/A;  schedule with Jesus    IVC FILTER RETRIEVAL      IVC FILTER RETRIEVAL N/A 9/23/2019    Procedure: REMOVAL-FILTER-IVC;  Surgeon: Claudia Surgeon;  Location: [facility];  Service: Anesthesiology;  Laterality: N/A;  188  4 hours Anes    tumor from breast      left    WISDOM TOOTH EXTRACTION         Review of patient's allergies indicates:   Allergen Reactions    Azithromycin Hives    Beef containing products Anaphylaxis     Patient cannot eat BEEF BROTH  STATES IT WILL MAKE HER THROAT CLOSE UP .     Pork derived (porcine) Anaphylaxis     Throat swells up cannot eat pork sausage or pork patties ,     Unclassified drug Shortness Of Breath and Other (See Comments)     Is allergic to a beef spice - Causes "throat closing"    Xarelto [rivaroxaban] Other (See Comments)     Gallbladder swelling and disfunction    Toradol [ketorolac] Hives and Itching       No current facility-administered medications on file prior to encounter.      Current Outpatient Medications on File Prior to Encounter   Medication Sig    enoxaparin (LOVENOX) 80 mg/0.8 mL Syrg Inject 0.8 mLs (80 mg total) into the skin 2 (two) times daily.    oxyCODONE-acetaminophen (PERCOCET) 7.5-325 mg per tablet Take 1 tablet by mouth every 6 (six) hours as needed for Pain.    diazePAM (VALIUM) 5 MG tablet TAKE 1 TABLET (5 " MG TOTAL) BY MOUTH EVERY 12 (TWELVE) HOURS AS NEEDED FOR ANXIETY.    EPINEPHrine (EPIPEN 2-VAN) 0.3 mg/0.3 mL AtIn Inject 0.3 mLs (0.3 mg total) into the muscle once. for 1 dose    ondansetron (ZOFRAN-ODT) 8 MG TbDL Take 1 tablet (8 mg total) by mouth every 8 (eight) hours as needed (nausea).     Family History     Problem Relation (Age of Onset)    Allergies Mother        Tobacco Use    Smoking status: Never Smoker    Smokeless tobacco: Never Used   Substance and Sexual Activity    Alcohol use: No     Frequency: Monthly or less     Drinks per session: 3 or 4     Binge frequency: Never    Drug use: No    Sexual activity: Yes     Partners: Male     Review of Systems   Constitution: Negative for decreased appetite.   HENT: Negative for ear discharge.    Eyes: Negative for blurred vision.   Respiratory: Negative for hemoptysis.    Endocrine: Negative for polyphagia.   Hematologic/Lymphatic: Negative for adenopathy.   Skin: Negative for color change.   Musculoskeletal: Negative for joint swelling.   Genitourinary: Negative for bladder incontinence.   Neurological: Negative for brief paralysis.   Psychiatric/Behavioral: Negative for hallucinations.   Allergic/Immunologic: Negative for hives.     Objective:     Vital Signs (Most Recent):  Temp: 98.9 °F (37.2 °C) (11/02/19 0741)  Pulse: 70 (11/02/19 0741)  Resp: 16 (11/02/19 0741)  BP: (!) 110/55 (11/02/19 0741)  SpO2: 99 % (11/02/19 0741) Vital Signs (24h Range):  Temp:  [97.8 °F (36.6 °C)-99.7 °F (37.6 °C)] 98.9 °F (37.2 °C)  Pulse:  [42-91] 70  Resp:  [11-20] 16  SpO2:  [97 %-100 %] 99 %  BP: ()/() 110/55     Weight: 79.6 kg (175 lb 7.8 oz)  Body mass index is 34.27 kg/m².    SpO2: 99 %  O2 Device (Oxygen Therapy): room air      Intake/Output Summary (Last 24 hours) at 11/2/2019 1035  Last data filed at 11/2/2019 0812  Gross per 24 hour   Intake 237.68 ml   Output 545 ml   Net -307.32 ml       Lines/Drains/Airways     Peripheral Intravenous Line                  Peripheral IV - Single Lumen 11/01/19 0130 22 G Right Forearm 1 day                Physical Exam   Constitutional: She is oriented to person, place, and time. She appears well-developed and well-nourished.   HENT:   Head: Normocephalic and atraumatic.   Eyes: Pupils are equal, round, and reactive to light. Conjunctivae are normal.   Neck: Normal range of motion. Neck supple.   Cardiovascular: Normal rate, normal heart sounds and intact distal pulses.   Pulmonary/Chest: Effort normal and breath sounds normal.   Abdominal: Soft. Bowel sounds are normal.   Musculoskeletal: Normal range of motion.   Neurological: She is alert and oriented to person, place, and time.   Skin: Skin is warm and dry.       Significant Labs: All pertinent lab results from the last 24 hours have been reviewed.    Significant Imaging: Echocardiogram: 2D echo with color flow doppler: No results found for this or any previous visit.

## 2019-11-02 NOTE — NURSING
Patient SB, HR ranges 38-43  asymptomatic BP stable. Discussed with Charge RN.  Hospitalist notified: orders to consult cards and stat EKG. Patient SB per EKG.

## 2019-11-02 NOTE — NURSING
1853- secure chatted dr darden informed patient in severe pain right leg procedure was aborted due to patient not able to tolerate due to the pain in her leg. Patient requested something stronger than percocet for pain . New order for dilaudid 1 mg every 4 hours as needed prn . Tele strip post procedure sinus [id number] and sinus bradycardia with arrhythmia 41. Vitals stable see flowsheet. Rails up x 3 , bed alarm on heparin infusing no rate or dosage changes next ptt at 12  Midnight.       1916- telemetry monitor adelina reports patients heart rate sustaining sinus arrhythmia 40's patient not symptomatic . Secure chatted dr darden informed her she secured chatted me back asked me to call dr layton as he is now taking calls. I paged him awaiting call back .     1931- dr layton called back reported patient admit with acute dvt to right leg status post thrombectory  And venoplasty the procedure was aborted this evening due to patient intolerance related to pain . She is on heparin gtt she received benadryl , versed and cathflo during the procedure today her baseline heart rate is sinus bradycardia but this evening she is sinus keesha cardia with arrhythmia not symptomatic but in a lot of pain her vitals are stable her heart rate is between 40-55 I have a new order for dilaudid 1 mg every 4 hour prn but have not given due to heart rate at this time patient has percocet charge nurse has given the percocet now dr darden wanted me to call you . New order received for ibuprofen 800 mg po x one dose . Report on patient given to Bettie MOTTA and updated handoff report sheet given to her too. No enough time has elapsed to reevaluate if the percocet is working or not .

## 2019-11-02 NOTE — ASSESSMENT & PLAN NOTE
Lower to 7.1 g/dL. Had gross hematuria yesterday that resolved  Patient has no symptoms of anemia at this time  Will hold off on transfusion but consider if < 7 g/dL tomorrow and/or signs/Sx of bleeding

## 2019-11-02 NOTE — ASSESSMENT & PLAN NOTE
Asymptomatic sinus bradycardia. Check echo. On no rate lowering medication. No indication for PPM. If echo ok will f/u prn

## 2019-11-02 NOTE — ASSESSMENT & PLAN NOTE
This is a recurrent issue despite compliance to enoxaparin 80 mg every 12 hours (dose adjusted about a month ago by Dr Billy at [facility]). Agree with heparin infusion pending Hematology evaluation. I am concerned about patient's right foot paresthesia and extensive thrombus (per ultrasound: near completely occlusive thrombus of the right common femoral vein, persistent thrombus of the right greater saphenous vein and redemonstration of thrombosis left femoral, greater saphenous and popliteal veins) but on exam patient had + 1 peripheral pulses and leg is not tight.   Is s/p thrombolysis but was aborted due to pain associated with this. Patient's leg edema and tenderness has improved and no longer with paresthesia. Is to resume thrombolysis on Monday. Continue heparin infusion

## 2019-11-02 NOTE — CONSULTS
[facility]  Cardiology  Consult Note    Patient Name: Antoinette Love  MRN: [id number]  Admission Date: 11/1/2019  Hospital Length of Stay: 1 days  Code Status: Full Code   Attending Provider: Marzena Gambino MD   Consulting Provider: Tiago Garg MD  Primary Care Physician: Alberto Holguin MD  Principal Problem:DVT, recurrent, lower extremity, acute, right    Patient information was obtained from patient and ER records.     Consults  Subjective:     Chief Complaint:  Bradycardia     HPI: 29 year old female with Factor V Leiden, recurrent deep vein thromboses (11 events), history of pulmonary embolisms (3-4 events) and May-Thurner syndrome while on anticoagulation who presented with right leg swelling and pain. Associated symptoms include paresthesia of right foot. Patient stated this feels similar to when she had a deep vein thrombus in left lower extremity requiring direct thrombolysis done by IR (9/2/2019). Was also planned to have IVC filter removed due to associated pain but this was unsuccessful. Patient is currently taking lovenox 80 mg every 12 hours and has been compliant with this medication. Patient denies abdominal pain, melena/bloody stools, extensive bruising, hematuria or hemoptysis.      In the ED, patient was noted to have swollen right leg. Ultrasound was obtained and showed near completely occlusive thrombus of the right common femoral vein, persistent thrombus of the right greater saphenous vein and redemonstration of thrombosis left femoral, greater saphenous and popliteal veins. Patient admitted to hospital medicine for anticoagulation and hematology consultation.     IR procedure 11/1  Procedure: 1. RLE and iliocaval venograms 2. Chemical thrombolysis of RLE and iliocaval thrombus 3. Rheolytic thrombectomy of RLE/iliocaval occlusive thrombus 3. Venoplasty with 16-mm balloon     Denies CP or SOB  Overnight had sinus bradycardia as low as 38 - asymptomatic. HR  "60s now  EKG sinus bradycardia 43 otherwise ok    Denies prior cardiac Hx       Past Medical History:   Diagnosis Date    Anticoagulant long-term use     Anticoagulant long-term use     FERNANDO (dyspnea on exertion)     DVT (deep venous thrombosis)     Factor V Leiden     Leg edema, left     May-Thurner syndrome     Multiple pulmonary nodules     Pulmonary embolus     RAD (reactive airway disease)     Recurrent upper respiratory infection (URI)     Recurrent urticaria     TIA (transient ischemic attack)        Past Surgical History:   Procedure Laterality Date    COLONOSCOPY N/A 12/18/2015    Procedure: COLONOSCOPY;  Surgeon: Lefty Lee MD;  Location: [facility] (4TH FLR);  Service: Endoscopy;  Laterality: N/A;  schedule with Jesus    IVC FILTER RETRIEVAL      IVC FILTER RETRIEVAL N/A 9/23/2019    Procedure: REMOVAL-FILTER-IVC;  Surgeon: Claudia Surgeon;  Location: [facility];  Service: Anesthesiology;  Laterality: N/A;  188  4 hours Anes    tumor from breast      left    WISDOM TOOTH EXTRACTION         Review of patient's allergies indicates:   Allergen Reactions    Azithromycin Hives    Beef containing products Anaphylaxis     Patient cannot eat BEEF BROTH  STATES IT WILL MAKE HER THROAT CLOSE UP .     Pork derived (porcine) Anaphylaxis     Throat swells up cannot eat pork sausage or pork patties ,     Unclassified drug Shortness Of Breath and Other (See Comments)     Is allergic to a beef spice - Causes "throat closing"    Xarelto [rivaroxaban] Other (See Comments)     Gallbladder swelling and disfunction    Toradol [ketorolac] Hives and Itching       No current facility-administered medications on file prior to encounter.      Current Outpatient Medications on File Prior to Encounter   Medication Sig    enoxaparin (LOVENOX) 80 mg/0.8 mL Syrg Inject 0.8 mLs (80 mg total) into the skin 2 (two) times daily.    oxyCODONE-acetaminophen (PERCOCET) 7.5-325 mg per tablet Take 1 tablet by mouth every 6 " (six) hours as needed for Pain.    diazePAM (VALIUM) 5 MG tablet TAKE 1 TABLET (5 MG TOTAL) BY MOUTH EVERY 12 (TWELVE) HOURS AS NEEDED FOR ANXIETY.    EPINEPHrine (EPIPEN 2-VAN) 0.3 mg/0.3 mL AtIn Inject 0.3 mLs (0.3 mg total) into the muscle once. for 1 dose    ondansetron (ZOFRAN-ODT) 8 MG TbDL Take 1 tablet (8 mg total) by mouth every 8 (eight) hours as needed (nausea).     Family History     Problem Relation (Age of Onset)    Allergies Mother        Tobacco Use    Smoking status: Never Smoker    Smokeless tobacco: Never Used   Substance and Sexual Activity    Alcohol use: No     Frequency: Monthly or less     Drinks per session: 3 or 4     Binge frequency: Never    Drug use: No    Sexual activity: Yes     Partners: Male     Review of Systems   Constitution: Negative for decreased appetite.   HENT: Negative for ear discharge.    Eyes: Negative for blurred vision.   Respiratory: Negative for hemoptysis.    Endocrine: Negative for polyphagia.   Hematologic/Lymphatic: Negative for adenopathy.   Skin: Negative for color change.   Musculoskeletal: Negative for joint swelling.   Genitourinary: Negative for bladder incontinence.   Neurological: Negative for brief paralysis.   Psychiatric/Behavioral: Negative for hallucinations.   Allergic/Immunologic: Negative for hives.     Objective:     Vital Signs (Most Recent):  Temp: 98.9 °F (37.2 °C) (11/02/19 0741)  Pulse: 70 (11/02/19 0741)  Resp: 16 (11/02/19 0741)  BP: (!) 110/55 (11/02/19 0741)  SpO2: 99 % (11/02/19 0741) Vital Signs (24h Range):  Temp:  [97.8 °F (36.6 °C)-99.7 °F (37.6 °C)] 98.9 °F (37.2 °C)  Pulse:  [42-91] 70  Resp:  [11-20] 16  SpO2:  [97 %-100 %] 99 %  BP: ()/() 110/55     Weight: 79.6 kg (175 lb 7.8 oz)  Body mass index is 34.27 kg/m².    SpO2: 99 %  O2 Device (Oxygen Therapy): room air      Intake/Output Summary (Last 24 hours) at 11/2/2019 1035  Last data filed at 11/2/2019 0812  Gross per 24 hour   Intake 237.68 ml   Output 545  ml   Net -307.32 ml       Lines/Drains/Airways     Peripheral Intravenous Line                 Peripheral IV - Single Lumen 11/01/19 0130 22 G Right Forearm 1 day                Physical Exam   Constitutional: She is oriented to person, place, and time. She appears well-developed and well-nourished.   HENT:   Head: Normocephalic and atraumatic.   Eyes: Pupils are equal, round, and reactive to light. Conjunctivae are normal.   Neck: Normal range of motion. Neck supple.   Cardiovascular: Normal rate, normal heart sounds and intact distal pulses.   Pulmonary/Chest: Effort normal and breath sounds normal.   Abdominal: Soft. Bowel sounds are normal.   Musculoskeletal: Normal range of motion.   Neurological: She is alert and oriented to person, place, and time.   Skin: Skin is warm and dry.       Significant Labs: All pertinent lab results from the last 24 hours have been reviewed.    Significant Imaging: Echocardiogram: 2D echo with color flow doppler: No results found for this or any previous visit.    Assessment and Plan:     * DVT, recurrent, lower extremity, acute, right  Per IR    Bradycardia  Asymptomatic sinus bradycardia. Check echo. On no rate lowering medication. No indication for PPM. If echo ok will f/u prn    Factor V Leiden mutation  On anticoagulation        VTE Risk Mitigation (From admission, onward)         Ordered     Place sequential compression device  Until discontinued      11/01/19 0541     IP VTE HIGH RISK PATIENT  Once      11/01/19 0541     heparin 25,000 units in dextrose 5% 250 mL (100 units/mL) infusion HIGH INTENSITY nomogram - [facility]  Continuous      11/01/19 0249     heparin 25,000 units in dextrose 5% (100 units/ml) IV bolus from bag - ADDITIONAL PRN BOLUS - 60 units/kg  As needed (PRN)      11/01/19 0249     heparin 25,000 units in dextrose 5% (100 units/ml) IV bolus from bag - ADDITIONAL PRN BOLUS - 30 units/kg  As needed (PRN)      11/01/19 0249                Thank you for your consult.  I will sign off. Please contact us if you have any additional questions.    Tiago Garg MD  Cardiology   [facility]

## 2019-11-02 NOTE — HPI
HPI: 29 year old female with Factor V Leiden, recurrent deep vein thromboses (11 events), history of pulmonary embolisms (3-4 events) and May-Thurner syndrome while on anticoagulation who presented with right leg swelling and pain. Associated symptoms include paresthesia of right foot. Patient stated this feels similar to when she had a deep vein thrombus in left lower extremity requiring direct thrombolysis done by IR (9/2/2019). Was also planned to have IVC filter removed due to associated pain but this was unsuccessful. Patient is currently taking lovenox 80 mg every 12 hours and has been compliant with this medication. Patient denies abdominal pain, melena/bloody stools, extensive bruising, hematuria or hemoptysis.      In the ED, patient was noted to have swollen right leg. Ultrasound was obtained and showed near completely occlusive thrombus of the right common femoral vein, persistent thrombus of the right greater saphenous vein and redemonstration of thrombosis left femoral, greater saphenous and popliteal veins. Patient admitted to hospital medicine for anticoagulation and hematology consultation.     IR procedure 11/1  Procedure: 1. RLE and iliocaval venograms 2. Chemical thrombolysis of RLE and iliocaval thrombus 3. Rheolytic thrombectomy of RLE/iliocaval occlusive thrombus 3. Venoplasty with 16-mm balloon     Denies CP or SOB  Overnight had sinus bradycardia as low as 38 - asymptomatic. HR 60s now  EKG sinus bradycardia 43 otherwise ok    Denies prior cardiac Hx

## 2019-11-03 ENCOUNTER — ANESTHESIA EVENT (OUTPATIENT)
Dept: ANESTHESIOLOGY | Facility: HOSPITAL | Age: 29
DRG: 271 | End: 2019-11-03
Payer: COMMERCIAL

## 2019-11-03 LAB
ANION GAP SERPL CALC-SCNC: 8 MMOL/L (ref 8–16)
APTT BLDCRRT: 48.1 SEC (ref 21–32)
BASOPHILS # BLD AUTO: 0.02 K/UL (ref 0–0.2)
BASOPHILS NFR BLD: 0.3 % (ref 0–1.9)
BUN SERPL-MCNC: 5 MG/DL (ref 6–20)
CALCIUM SERPL-MCNC: 8.4 MG/DL (ref 8.7–10.5)
CHLORIDE SERPL-SCNC: 105 MMOL/L (ref 95–110)
CO2 SERPL-SCNC: 24 MMOL/L (ref 23–29)
CREAT SERPL-MCNC: 0.7 MG/DL (ref 0.5–1.4)
DIFFERENTIAL METHOD: ABNORMAL
EOSINOPHIL # BLD AUTO: 0 K/UL (ref 0–0.5)
EOSINOPHIL NFR BLD: 0.3 % (ref 0–8)
ERYTHROCYTE [DISTWIDTH] IN BLOOD BY AUTOMATED COUNT: 13.9 % (ref 11.5–14.5)
EST. GFR  (AFRICAN AMERICAN): >60 ML/MIN/1.73 M^2
EST. GFR  (NON AFRICAN AMERICAN): >60 ML/MIN/1.73 M^2
GLUCOSE SERPL-MCNC: 95 MG/DL (ref 70–110)
HCT VFR BLD AUTO: 25.6 % (ref 37–48.5)
HGB BLD-MCNC: 7.7 G/DL (ref 12–16)
IMM GRANULOCYTES # BLD AUTO: 0.02 K/UL (ref 0–0.04)
IMM GRANULOCYTES NFR BLD AUTO: 0.3 % (ref 0–0.5)
LYMPHOCYTES # BLD AUTO: 0.7 K/UL (ref 1–4.8)
LYMPHOCYTES NFR BLD: 10.8 % (ref 18–48)
MCH RBC QN AUTO: 28.2 PG (ref 27–31)
MCHC RBC AUTO-ENTMCNC: 30.1 G/DL (ref 32–36)
MCV RBC AUTO: 94 FL (ref 82–98)
MONOCYTES # BLD AUTO: 0.2 K/UL (ref 0.3–1)
MONOCYTES NFR BLD: 3.9 % (ref 4–15)
NEUTROPHILS # BLD AUTO: 5.3 K/UL (ref 1.8–7.7)
NEUTROPHILS NFR BLD: 84.4 % (ref 38–73)
NRBC BLD-RTO: 0 /100 WBC
PLATELET # BLD AUTO: 175 K/UL (ref 150–350)
PMV BLD AUTO: 9.6 FL (ref 9.2–12.9)
POTASSIUM SERPL-SCNC: 4.1 MMOL/L (ref 3.5–5.1)
RBC # BLD AUTO: 2.73 M/UL (ref 4–5.4)
SODIUM SERPL-SCNC: 137 MMOL/L (ref 136–145)
WBC # BLD AUTO: 6.23 K/UL (ref 3.9–12.7)

## 2019-11-03 PROCEDURE — 63600175 PHARM REV CODE 636 W HCPCS: Performed by: INTERNAL MEDICINE

## 2019-11-03 PROCEDURE — 99233 SBSQ HOSP IP/OBS HIGH 50: CPT | Mod: ,,, | Performed by: INTERNAL MEDICINE

## 2019-11-03 PROCEDURE — 21400001 HC TELEMETRY ROOM

## 2019-11-03 PROCEDURE — 99233 PR SUBSEQUENT HOSPITAL CARE,LEVL III: ICD-10-PCS | Mod: ,,, | Performed by: INTERNAL MEDICINE

## 2019-11-03 PROCEDURE — 80048 BASIC METABOLIC PNL TOTAL CA: CPT

## 2019-11-03 PROCEDURE — 85730 THROMBOPLASTIN TIME PARTIAL: CPT

## 2019-11-03 PROCEDURE — 25000003 PHARM REV CODE 250: Performed by: INTERNAL MEDICINE

## 2019-11-03 PROCEDURE — 36415 COLL VENOUS BLD VENIPUNCTURE: CPT

## 2019-11-03 PROCEDURE — 85025 COMPLETE CBC W/AUTO DIFF WBC: CPT

## 2019-11-03 PROCEDURE — 25000003 PHARM REV CODE 250: Performed by: HOSPITALIST

## 2019-11-03 RX ORDER — SODIUM CHLORIDE 9 MG/ML
INJECTION, SOLUTION INTRAVENOUS CONTINUOUS
Status: DISCONTINUED | OUTPATIENT
Start: 2019-11-03 | End: 2019-11-05

## 2019-11-03 RX ORDER — HYDROMORPHONE HYDROCHLORIDE 2 MG/ML
1 INJECTION, SOLUTION INTRAMUSCULAR; INTRAVENOUS; SUBCUTANEOUS
Status: DISCONTINUED | OUTPATIENT
Start: 2019-11-03 | End: 2019-11-07 | Stop reason: HOSPADM

## 2019-11-03 RX ADMIN — HYDROMORPHONE HYDROCHLORIDE 1 MG: 2 INJECTION, SOLUTION INTRAMUSCULAR; INTRAVENOUS; SUBCUTANEOUS at 09:11

## 2019-11-03 RX ADMIN — HYDROMORPHONE HYDROCHLORIDE 1 MG: 2 INJECTION, SOLUTION INTRAMUSCULAR; INTRAVENOUS; SUBCUTANEOUS at 12:11

## 2019-11-03 RX ADMIN — ACETAMINOPHEN 650 MG: 325 TABLET, FILM COATED ORAL at 12:11

## 2019-11-03 RX ADMIN — OXYCODONE HYDROCHLORIDE AND ACETAMINOPHEN 1 TABLET: 7.5; 325 TABLET ORAL at 04:11

## 2019-11-03 RX ADMIN — SODIUM CHLORIDE: 0.9 INJECTION, SOLUTION INTRAVENOUS at 12:11

## 2019-11-03 RX ADMIN — POLYETHYLENE GLYCOL 3350 17 G: 17 POWDER, FOR SOLUTION ORAL at 09:11

## 2019-11-03 RX ADMIN — HYDROMORPHONE HYDROCHLORIDE 1 MG: 2 INJECTION, SOLUTION INTRAMUSCULAR; INTRAVENOUS; SUBCUTANEOUS at 10:11

## 2019-11-03 RX ADMIN — SODIUM CHLORIDE: 0.9 INJECTION, SOLUTION INTRAVENOUS at 08:11

## 2019-11-03 RX ADMIN — HEPARIN SODIUM 18 UNITS/KG/HR: 10000 INJECTION, SOLUTION INTRAVENOUS at 03:11

## 2019-11-03 RX ADMIN — HYDROMORPHONE HYDROCHLORIDE 1 MG: 2 INJECTION, SOLUTION INTRAMUSCULAR; INTRAVENOUS; SUBCUTANEOUS at 06:11

## 2019-11-03 RX ADMIN — HYDROMORPHONE HYDROCHLORIDE 1 MG: 2 INJECTION, SOLUTION INTRAMUSCULAR; INTRAVENOUS; SUBCUTANEOUS at 03:11

## 2019-11-03 NOTE — ASSESSMENT & PLAN NOTE
Low but stable  Will hold off on transfusion but consider if < 7 g/dL tomorrow and/or signs/Sx of bleeding  CBC in AM

## 2019-11-03 NOTE — SUBJECTIVE & OBJECTIVE
Interval History: with more swelling and pain again today. Paresthesia also recurred.     Review of Systems   Respiratory: Negative.    Cardiovascular: Negative.    Gastrointestinal: Negative.    Musculoskeletal:        Right leg pain   Neurological:        Paresthesia     Objective:     Vital Signs (Most Recent):  Temp: 98.5 °F (36.9 °C) (11/03/19 1106)  Pulse: 79 (11/03/19 1106)  Resp: 18 (11/03/19 1106)  BP: (!) 97/54 (11/03/19 1106)  SpO2: 100 % (11/03/19 1106) Vital Signs (24h Range):  Temp:  [97.5 °F (36.4 °C)-100.6 °F (38.1 °C)] 98.5 °F (36.9 °C)  Pulse:  [] 79  Resp:  [18] 18  SpO2:  [97 %-100 %] 100 %  BP: ()/(49-56) 97/54     Weight: 76.4 kg (168 lb 6.9 oz)  Body mass index is 32.89 kg/m².    Intake/Output Summary (Last 24 hours) at 11/3/2019 1214  Last data filed at 11/3/2019 0427  Gross per 24 hour   Intake 1160.4 ml   Output 250 ml   Net 910.4 ml      Physical Exam   Constitutional: She is oriented to person, place, and time. She appears well-developed. No distress.   Cardiovascular: Normal rate and regular rhythm.   Pulmonary/Chest: Effort normal and breath sounds normal.   Abdominal: Soft. Bowel sounds are normal.   Musculoskeletal: Normal range of motion. She exhibits edema (worse to R leg when compared to yesterday. not tight but with firmness to calf).   Neurological: She is alert and oriented to person, place, and time. A sensory deficit is present. No cranial nerve deficit.   Skin: Skin is warm and dry. Capillary refill takes less than 2 seconds. She is not diaphoretic.   Some bruising at reported site of lovenox injection. Not extensive   Psychiatric: She has a normal mood and affect. Her behavior is normal. Judgment and thought content normal.   Nursing note and vitals reviewed.      Significant Labs: All pertinent labs within the past 24 hours have been reviewed.    Significant Imaging: I have reviewed all pertinent imaging results/findings within the past 24 hours.  I have  reviewed and interpreted all pertinent imaging results/findings within the past 24 hours.

## 2019-11-03 NOTE — PROGRESS NOTES
Vomited before getting to room and c/o nausea. Dr. Mendoza increased Zofran to 8 mg Q 8 hrs IV. Also c/o of generalized pain 7/10 and chills. Fever of 100.4 noted. Removed 3 blankets from pt. Administered PRN Zofran and ice chips at bedside. Will re-assess nausea before administering PO medication.

## 2019-11-03 NOTE — ASSESSMENT & PLAN NOTE
"- The patient has a history of recurrent DVT's and PE even while on anticoagulation  - The pt is reportedly homozygous for factor V leiden  - She has been on Xarelto in the past with subsequent cholecystitis  - Currently the patient has developed a new almost entirely occlusive thrombus in the common right femoral vein  - Concern is for failure of lovenox and/or provoked thrombosis from her recent procedure with LLE thrombectomy and stents placed in IVC after failed filter retrieval    - Agree with heparin drip  - follow up antithrombin III level, factor V leiden, lupus anticoagulant, anticardiolipin antibodies, and beta-2 glycoprotein levels.  - per patient history, she states that she did the "best" with apixaban (Eliquis). She states she never had clots while on apixaban, but it was stopped by the hematologist in [place]. Perhaps once the interventional-radiology procedure has been performed (scheduled for 11/4/19), and perioperative heparin has been given, she can be transitioned back to apixaban.  "

## 2019-11-03 NOTE — ASSESSMENT & PLAN NOTE
- Pt with worsening normocytic anemia since 8/30/19 since starting lovenox  - Pt with macrocytosis in the past as well  - reticulocyte count is decreased, suggestive of a hypoproliferative marrow.  - s/p 1uprbc   - Hb 8g/dl   - Cont to monitor counts

## 2019-11-03 NOTE — PLAN OF CARE
Heparin continues to infuse. Awaiting morning labs. Handoff done at bedside. Pain managed moderately, due to blood pressure issues.

## 2019-11-03 NOTE — ASSESSMENT & PLAN NOTE
This is a recurrent issue despite compliance to enoxaparin 80 mg every 12 hours (dose adjusted about a month ago by Dr Billy at [facility]). Agree with heparin infusion pending Hematology evaluation. I am concerned about patient's right foot paresthesia and extensive thrombus (per ultrasound: near completely occlusive thrombus of the right common femoral vein, persistent thrombus of the right greater saphenous vein and redemonstration of thrombosis left femoral, greater saphenous and popliteal veins) but on exam patient had + 1 peripheral pulses and leg is not tight.   Is s/p thrombolysis on 11/1 but was aborted due to pain associated with this. Patient's leg edema and tenderness has improved and no longer with paresthesia yesterday, but again with pain, more swelling and paresthesia today. Also with some firmness to the calf which wasn't present on admission. Plan is to resume thrombolysis on Monday but will discuss with Hematology to see if this must be done sooner. Continue heparin infusion

## 2019-11-03 NOTE — PROGRESS NOTES
[facility] Hospital Medicine  Progress Note    Patient Name: Antoinette Love  MRN: [id number]  Patient Class: IP- Inpatient   Admission Date: 11/1/2019  Length of Stay: 2 days  Attending Physician: Marzena Gambino MD  Primary Care Provider: Alberto Holguin MD        Subjective:     Principal Problem:DVT, recurrent, lower extremity, acute, right        HPI:  29 year old female with Factor V Leiden, recurrent deep vein thromboses (11 events), history of pulmonary embolisms (3-4 events) and May-Thurner syndrome while on anticoagulation who presented with right leg swelling and pain. Associated symptoms include paresthesia of right foot. Patient stated this feels similar to when she had a deep vein thrombus in left lower extremity requiring direct thrombolysis done by IR (9/2/2019). Was also planned to have IVC filter removed due to associated pain but this was unsuccessful. Patient is currently taking lovenox 80 mg every 12 hours and has been compliant with this medication. Patient denies abdominal pain, melena/bloody stools, extensive bruising, hematuria or hemoptysis.     In the ED, patient was noted to have swollen right leg. Ultrasound was obtained and showed near completely occlusive thrombus of the right common femoral vein, persistent thrombus of the right greater saphenous vein and redemonstration of thrombosis left femoral, greater saphenous and popliteal veins. Patient admitted to hospital medicine for anticoagulation and hematology consultation.          Overview/Hospital Course:  No notes on file    Interval History: with more swelling and pain again today. Paresthesia also recurred.     Review of Systems   Respiratory: Negative.    Cardiovascular: Negative.    Gastrointestinal: Negative.    Musculoskeletal:        Right leg pain   Neurological:        Paresthesia     Objective:     Vital Signs (Most Recent):  Temp: 98.5 °F (36.9 °C) (11/03/19 1106)  Pulse: 79 (11/03/19  1106)  Resp: 18 (11/03/19 1106)  BP: (!) 97/54 (11/03/19 1106)  SpO2: 100 % (11/03/19 1106) Vital Signs (24h Range):  Temp:  [97.5 °F (36.4 °C)-100.6 °F (38.1 °C)] 98.5 °F (36.9 °C)  Pulse:  [] 79  Resp:  [18] 18  SpO2:  [97 %-100 %] 100 %  BP: ()/(49-56) 97/54     Weight: 76.4 kg (168 lb 6.9 oz)  Body mass index is 32.89 kg/m².    Intake/Output Summary (Last 24 hours) at 11/3/2019 1214  Last data filed at 11/3/2019 0427  Gross per 24 hour   Intake 1160.4 ml   Output 250 ml   Net 910.4 ml      Physical Exam   Constitutional: She is oriented to person, place, and time. She appears well-developed. No distress.   Cardiovascular: Normal rate and regular rhythm.   Pulmonary/Chest: Effort normal and breath sounds normal.   Abdominal: Soft. Bowel sounds are normal.   Musculoskeletal: Normal range of motion. She exhibits edema (worse to R leg when compared to yesterday. not tight but with firmness to calf).   Neurological: She is alert and oriented to person, place, and time. A sensory deficit is present. No cranial nerve deficit.   Skin: Skin is warm and dry. Capillary refill takes less than 2 seconds. She is not diaphoretic.   Some bruising at reported site of lovenox injection. Not extensive   Psychiatric: She has a normal mood and affect. Her behavior is normal. Judgment and thought content normal.   Nursing note and vitals reviewed.      Significant Labs: All pertinent labs within the past 24 hours have been reviewed.    Significant Imaging: I have reviewed all pertinent imaging results/findings within the past 24 hours.  I have reviewed and interpreted all pertinent imaging results/findings within the past 24 hours.      Assessment/Plan:      * DVT, recurrent, lower extremity, acute, right  This is a recurrent issue despite compliance to enoxaparin 80 mg every 12 hours (dose adjusted about a month ago by Dr Billy at [facility]). Agree with heparin infusion pending Hematology evaluation. I am concerned  about patient's right foot paresthesia and extensive thrombus (per ultrasound: near completely occlusive thrombus of the right common femoral vein, persistent thrombus of the right greater saphenous vein and redemonstration of thrombosis left femoral, greater saphenous and popliteal veins) but on exam patient had + 1 peripheral pulses and leg is not tight.   Is s/p thrombolysis on 11/1 but was aborted due to pain associated with this. Patient's leg edema and tenderness has improved and no longer with paresthesia yesterday, but again with pain, more swelling and paresthesia today. Also with some firmness to the calf which wasn't present on admission. Plan is to resume thrombolysis on Monday but will discuss with Hematology to see if this must be done sooner. Continue heparin infusion    Factor V Leiden mutation  Followed by Hematology  Management for acute event as above      May-Thurner syndrome  noted      Bradycardia  Resolved       Normocytic anemia  Lower to 7.1 g/dL on 11/2. Had gross hematuria yesterday that resolved  Patient has no symptoms of anemia at this time  Hgb improved today  Will hold off on transfusion but consider if < 7 g/dL tomorrow and/or signs/Sx of bleeding      Paresthesia of right foot  2/2 occlusive thrombus  Paresthesia resolved       History of deep venous thrombosis (DVT) of distal vein of left lower extremity  Required thrombolysis for occlusive thrombus      Presence of IVC filter        VTE Risk Mitigation (From admission, onward)         Ordered     Place sequential compression device  Until discontinued      11/01/19 0541     IP VTE HIGH RISK PATIENT  Once      11/01/19 0541     heparin 25,000 units in dextrose 5% 250 mL (100 units/mL) infusion HIGH INTENSITY nomogram - [facility]  Continuous      11/01/19 0249     heparin 25,000 units in dextrose 5% (100 units/ml) IV bolus from bag - ADDITIONAL PRN BOLUS - 60 units/kg  As needed (PRN)      11/01/19 0249     heparin 25,000 units in  dextrose 5% (100 units/ml) IV bolus from bag - ADDITIONAL PRN BOLUS - 30 units/kg  As needed (PRN)      11/01/19 9361                      Marzena Liao MD  Department of Hospital Medicine   [facility]

## 2019-11-03 NOTE — SUBJECTIVE & OBJECTIVE
"Interval History:   - she notes slightly worsened swelling in right lower extremity. She is still able to move her toes. She notes "an unusual sensation" when her leg is palpated, but she still has feeling in her leg.    Oncology Treatment Plan:   [No treatment plan]    Medications:  Continuous Infusions:   sodium chloride 0.9% 125 mL/hr at 11/03/19 1244    heparin (porcine) in D5W 18 Units/kg/hr (11/02/19 1733)     Scheduled Meds:   alteplase (ACTIVASE) 10 mg in 0.9% NaCl 100 mL  10 mg Intra-Catheter Once    alteplase (ACTIVASE) 10 mg in 0.9% NaCl 100 mL  10 mg Intra-Catheter Once    alteplase  4 mg Intra-Catheter Once    ibuprofen  800 mg Oral Once    polyethylene glycol  17 g Oral Daily     PRN Meds:acetaminophen, diazePAM, heparin (PORCINE), heparin (PORCINE), HYDROmorphone, ondansetron, oxyCODONE-acetaminophen, sodium chloride 0.9%     Review of Systems   Constitutional: Negative for chills, fatigue and fever.   HENT: Negative for sore throat and trouble swallowing.    Eyes: Negative for photophobia, pain and visual disturbance.   Respiratory: Negative for chest tightness and shortness of breath.    Cardiovascular: Positive for leg swelling (right and left leg). Negative for chest pain and palpitations.   Gastrointestinal: Positive for abdominal pain (RLQ). Negative for constipation, diarrhea, nausea and vomiting.   Genitourinary: Negative for difficulty urinating and dysuria.   Musculoskeletal: Negative for arthralgias and back pain.   Skin: Negative for color change and rash.   Neurological: Negative for weakness, numbness and headaches.   Hematological: Negative for adenopathy. Does not bruise/bleed easily.     Objective:     Vital Signs (Most Recent):  Temp: 98.5 °F (36.9 °C) (11/03/19 1106)  Pulse: 79 (11/03/19 1106)  Resp: 18 (11/03/19 1106)  BP: (!) 97/54 (11/03/19 1106)  SpO2: 100 % (11/03/19 1106) Vital Signs (24h Range):  Temp:  [97.5 °F (36.4 °C)-100.6 °F (38.1 °C)] 98.5 °F (36.9 °C)  Pulse:  " [] 79  Resp:  [18] 18  SpO2:  [97 %-100 %] 100 %  BP: ()/(49-56) 97/54     Weight: 76.4 kg (168 lb 6.9 oz)  Body mass index is 32.89 kg/m².  Body surface area is 1.8 meters squared.      Intake/Output Summary (Last 24 hours) at 11/3/2019 1433  Last data filed at 11/3/2019 0427  Gross per 24 hour   Intake 920.4 ml   Output --   Net 920.4 ml       Physical Exam   Constitutional: She is oriented to person, place, and time. She appears well-developed and well-nourished. No distress.   HENT:   Head: Normocephalic and atraumatic.   Mouth/Throat: No oropharyngeal exudate.   Eyes: EOM are normal. Right eye exhibits no discharge. Left eye exhibits no discharge. No scleral icterus.   Cardiovascular: Normal rate, regular rhythm, normal heart sounds and intact distal pulses. Exam reveals no gallop and no friction rub.   No murmur heard.  Pulmonary/Chest: Effort normal and breath sounds normal. No respiratory distress. She has no wheezes. She has no rales. She exhibits no tenderness.   Abdominal: Soft. Bowel sounds are normal. She exhibits no distension and no mass. There is no tenderness. There is no rebound and no guarding.   Musculoskeletal: Normal range of motion. She exhibits edema (right and left leg). She exhibits no tenderness.   Neurological: She is alert and oriented to person, place, and time.   Skin: No rash noted. She is not diaphoretic. No erythema.   Psychiatric: She has a normal mood and affect. Her behavior is normal.       Significant Labs:   Labs have been reviewed.    Lab Results   Component Value Date    WBC 6.23 11/03/2019    HGB 7.7 (L) 11/03/2019    HCT 25.6 (L) 11/03/2019    MCV 94 11/03/2019     11/03/2019

## 2019-11-03 NOTE — PLAN OF CARE
Problem: Fall Injury Risk  Goal: Absence of Fall and Fall-Related Injury  Outcome: Ongoing, Progressing  Intervention: Identify and Manage Contributors to Fall Injury Risk  Flowsheets (Taken 11/3/2019 1539)  Self-Care Promotion: independence encouraged; BADL personal objects within reach; meal setup provided  Medication Review/Management: medications reviewed; high risk medications identified; infusion initiated; provider consulted     Problem: Adult Inpatient Plan of Care  Goal: Plan of Care Review  Outcome: Ongoing, Progressing  Flowsheets (Taken 11/3/2019 1539)  Plan of Care Reviewed With: patient     Problem: Fall Injury Risk  Goal: Absence of Fall and Fall-Related Injury  Outcome: Ongoing, Progressing  Intervention: Identify and Manage Contributors to Fall Injury Risk  Flowsheets (Taken 11/3/2019 1539)  Self-Care Promotion: independence encouraged; BADL personal objects within reach; meal setup provided  Medication Review/Management: medications reviewed; high risk medications identified; infusion initiated; provider consulted     Problem: Fall Injury Risk  Goal: Absence of Fall and Fall-Related Injury  Outcome: Ongoing, Progressing     Problem: Fall Injury Risk  Goal: Absence of Fall and Fall-Related Injury  Intervention: Identify and Manage Contributors to Fall Injury Risk  Flowsheets (Taken 11/3/2019 1539)  Self-Care Promotion: independence encouraged; BADL personal objects within reach; meal setup provided  Medication Review/Management: medications reviewed; high risk medications identified; infusion initiated; provider consulted     Problem: Fall Injury Risk  Goal: Absence of Fall and Fall-Related Injury  Intervention: Identify and Manage Contributors to Fall Injury Risk  Flowsheets (Taken 11/3/2019 1539)  Self-Care Promotion: independence encouraged; BADL personal objects within reach; meal setup provided  Medication Review/Management: medications reviewed; high risk medications identified; infusion  initiated; provider consulted     Problem: Adult Inpatient Plan of Care  Goal: Plan of Care Review  Outcome: Ongoing, Progressing  Flowsheets (Taken 11/3/2019 1539)  Plan of Care Reviewed With: patient     Problem: Adult Inpatient Plan of Care  Goal: Plan of Care Review  Outcome: Ongoing, Progressing  Flowsheets (Taken 11/3/2019 1539)  Plan of Care Reviewed With: patient

## 2019-11-04 LAB
ANA SER QL IF: NORMAL
APTT BLDCRRT: 48.7 SEC (ref 21–32)
AT III ACT/NOR PPP CHRO: 95 % (ref 83–118)
BASOPHILS # BLD AUTO: 0 K/UL (ref 0–0.2)
BASOPHILS NFR BLD: 0 % (ref 0–1.9)
DIFFERENTIAL METHOD: ABNORMAL
EOSINOPHIL # BLD AUTO: 0 K/UL (ref 0–0.5)
EOSINOPHIL NFR BLD: 0.5 % (ref 0–8)
ERYTHROCYTE [DISTWIDTH] IN BLOOD BY AUTOMATED COUNT: 14 % (ref 11.5–14.5)
HCT VFR BLD AUTO: 24.8 % (ref 37–48.5)
HGB BLD-MCNC: 7.6 G/DL (ref 12–16)
IMM GRANULOCYTES # BLD AUTO: 0.06 K/UL (ref 0–0.04)
IMM GRANULOCYTES NFR BLD AUTO: 1.5 % (ref 0–0.5)
INTERPRETATION SERPL IFE-IMP: NORMAL
LYMPHOCYTES # BLD AUTO: 0.6 K/UL (ref 1–4.8)
LYMPHOCYTES NFR BLD: 15.9 % (ref 18–48)
MCH RBC QN AUTO: 28.9 PG (ref 27–31)
MCHC RBC AUTO-ENTMCNC: 30.6 G/DL (ref 32–36)
MCV RBC AUTO: 94 FL (ref 82–98)
MONOCYTES # BLD AUTO: 0.3 K/UL (ref 0.3–1)
MONOCYTES NFR BLD: 8.2 % (ref 4–15)
NEUTROPHILS # BLD AUTO: 2.9 K/UL (ref 1.8–7.7)
NEUTROPHILS NFR BLD: 73.9 % (ref 38–73)
NRBC BLD-RTO: 0 /100 WBC
PATHOLOGIST INTERPRETATION IFE: NORMAL
PLATELET # BLD AUTO: 135 K/UL (ref 150–350)
PMV BLD AUTO: 9.9 FL (ref 9.2–12.9)
RBC # BLD AUTO: 2.63 M/UL (ref 4–5.4)
WBC # BLD AUTO: 3.9 K/UL (ref 3.9–12.7)

## 2019-11-04 PROCEDURE — 63600175 PHARM REV CODE 636 W HCPCS: Performed by: RADIOLOGY

## 2019-11-04 PROCEDURE — D9220A PRA ANESTHESIA: Mod: ANES,,, | Performed by: ANESTHESIOLOGY

## 2019-11-04 PROCEDURE — 85610 PROTHROMBIN TIME: CPT

## 2019-11-04 PROCEDURE — 25000003 PHARM REV CODE 250: Performed by: INTERNAL MEDICINE

## 2019-11-04 PROCEDURE — 63600175 PHARM REV CODE 636 W HCPCS: Performed by: NURSE ANESTHETIST, CERTIFIED REGISTERED

## 2019-11-04 PROCEDURE — 25500020 PHARM REV CODE 255: Performed by: INTERNAL MEDICINE

## 2019-11-04 PROCEDURE — 21400001 HC TELEMETRY ROOM

## 2019-11-04 PROCEDURE — 63600175 PHARM REV CODE 636 W HCPCS

## 2019-11-04 PROCEDURE — 85730 THROMBOPLASTIN TIME PARTIAL: CPT

## 2019-11-04 PROCEDURE — D9220A PRA ANESTHESIA: ICD-10-PCS | Mod: CRNA,,, | Performed by: NURSE ANESTHETIST, CERTIFIED REGISTERED

## 2019-11-04 PROCEDURE — 63600175 PHARM REV CODE 636 W HCPCS: Performed by: INTERNAL MEDICINE

## 2019-11-04 PROCEDURE — 71000033 HC RECOVERY, INTIAL HOUR

## 2019-11-04 PROCEDURE — 63600175 PHARM REV CODE 636 W HCPCS: Performed by: ANESTHESIOLOGY

## 2019-11-04 PROCEDURE — 85025 COMPLETE CBC W/AUTO DIFF WBC: CPT

## 2019-11-04 PROCEDURE — 63600175 PHARM REV CODE 636 W HCPCS: Performed by: HOSPITALIST

## 2019-11-04 PROCEDURE — 25000003 PHARM REV CODE 250: Performed by: NURSE ANESTHETIST, CERTIFIED REGISTERED

## 2019-11-04 PROCEDURE — D9220A PRA ANESTHESIA: ICD-10-PCS | Mod: ANES,,, | Performed by: ANESTHESIOLOGY

## 2019-11-04 PROCEDURE — 36415 COLL VENOUS BLD VENIPUNCTURE: CPT

## 2019-11-04 PROCEDURE — D9220A PRA ANESTHESIA: Mod: CRNA,,, | Performed by: NURSE ANESTHETIST, CERTIFIED REGISTERED

## 2019-11-04 PROCEDURE — 85730 THROMBOPLASTIN TIME PARTIAL: CPT | Mod: 91

## 2019-11-04 PROCEDURE — 37000009 HC ANESTHESIA EA ADD 15 MINS

## 2019-11-04 PROCEDURE — 37000008 HC ANESTHESIA 1ST 15 MINUTES

## 2019-11-04 PROCEDURE — 82747 ASSAY OF FOLIC ACID RBC: CPT

## 2019-11-04 PROCEDURE — 71000039 HC RECOVERY, EACH ADD'L HOUR

## 2019-11-04 RX ORDER — FENTANYL CITRATE 50 UG/ML
INJECTION, SOLUTION INTRAMUSCULAR; INTRAVENOUS
Status: COMPLETED
Start: 2019-11-04 | End: 2019-11-04

## 2019-11-04 RX ORDER — PROPOFOL 10 MG/ML
VIAL (ML) INTRAVENOUS
Status: DISCONTINUED | OUTPATIENT
Start: 2019-11-04 | End: 2019-11-04

## 2019-11-04 RX ORDER — LIDOCAINE HCL/PF 100 MG/5ML
SYRINGE (ML) INTRAVENOUS
Status: DISCONTINUED | OUTPATIENT
Start: 2019-11-04 | End: 2019-11-04

## 2019-11-04 RX ORDER — HEPARIN SODIUM 10000 [USP'U]/100ML
18 INJECTION, SOLUTION INTRAVENOUS CONTINUOUS
Status: DISCONTINUED | OUTPATIENT
Start: 2019-11-04 | End: 2019-11-07

## 2019-11-04 RX ORDER — ROCURONIUM BROMIDE 10 MG/ML
INJECTION, SOLUTION INTRAVENOUS
Status: DISCONTINUED | OUTPATIENT
Start: 2019-11-04 | End: 2019-11-04

## 2019-11-04 RX ORDER — SUCCINYLCHOLINE CHLORIDE 20 MG/ML
INJECTION INTRAMUSCULAR; INTRAVENOUS
Status: DISCONTINUED | OUTPATIENT
Start: 2019-11-04 | End: 2019-11-04

## 2019-11-04 RX ORDER — FENTANYL CITRATE 50 UG/ML
INJECTION, SOLUTION INTRAMUSCULAR; INTRAVENOUS
Status: DISCONTINUED | OUTPATIENT
Start: 2019-11-04 | End: 2019-11-04

## 2019-11-04 RX ORDER — SODIUM CHLORIDE, SODIUM LACTATE, POTASSIUM CHLORIDE, CALCIUM CHLORIDE 600; 310; 30; 20 MG/100ML; MG/100ML; MG/100ML; MG/100ML
INJECTION, SOLUTION INTRAVENOUS CONTINUOUS PRN
Status: DISCONTINUED | OUTPATIENT
Start: 2019-11-04 | End: 2019-11-04

## 2019-11-04 RX ORDER — HYDROMORPHONE HYDROCHLORIDE 2 MG/ML
0.2 INJECTION, SOLUTION INTRAMUSCULAR; INTRAVENOUS; SUBCUTANEOUS EVERY 5 MIN PRN
Status: COMPLETED | OUTPATIENT
Start: 2019-11-04 | End: 2019-11-04

## 2019-11-04 RX ORDER — HEPARIN SODIUM,PORCINE/D5W 25000/250
18 INTRAVENOUS SOLUTION INTRAVENOUS CONTINUOUS
Status: DISCONTINUED | OUTPATIENT
Start: 2019-11-04 | End: 2019-11-07

## 2019-11-04 RX ORDER — PHENYLEPHRINE HYDROCHLORIDE 10 MG/ML
INJECTION INTRAVENOUS
Status: DISCONTINUED | OUTPATIENT
Start: 2019-11-04 | End: 2019-11-04

## 2019-11-04 RX ORDER — FENTANYL CITRATE 50 UG/ML
25 INJECTION, SOLUTION INTRAMUSCULAR; INTRAVENOUS EVERY 5 MIN PRN
Status: COMPLETED | OUTPATIENT
Start: 2019-11-04 | End: 2019-11-04

## 2019-11-04 RX ORDER — MIDAZOLAM HYDROCHLORIDE 1 MG/ML
INJECTION, SOLUTION INTRAMUSCULAR; INTRAVENOUS
Status: DISCONTINUED | OUTPATIENT
Start: 2019-11-04 | End: 2019-11-04

## 2019-11-04 RX ADMIN — PHENYLEPHRINE HYDROCHLORIDE 200 MCG: 10 INJECTION INTRAVENOUS at 04:11

## 2019-11-04 RX ADMIN — FENTANYL CITRATE 25 MCG: 50 INJECTION INTRAMUSCULAR; INTRAVENOUS at 07:11

## 2019-11-04 RX ADMIN — ROCURONIUM BROMIDE 45 MG: 10 INJECTION, SOLUTION INTRAVENOUS at 02:11

## 2019-11-04 RX ADMIN — SUCCINYLCHOLINE CHLORIDE 100 MG: 20 INJECTION, SOLUTION INTRAMUSCULAR; INTRAVENOUS at 02:11

## 2019-11-04 RX ADMIN — SODIUM CHLORIDE: 0.9 INJECTION, SOLUTION INTRAVENOUS at 05:11

## 2019-11-04 RX ADMIN — PHENYLEPHRINE HYDROCHLORIDE 100 MCG: 10 INJECTION INTRAVENOUS at 05:11

## 2019-11-04 RX ADMIN — SODIUM CHLORIDE: 0.9 INJECTION, SOLUTION INTRAVENOUS at 01:11

## 2019-11-04 RX ADMIN — OXYCODONE HYDROCHLORIDE AND ACETAMINOPHEN 1 TABLET: 7.5; 325 TABLET ORAL at 07:11

## 2019-11-04 RX ADMIN — HEPARIN SODIUM 18 UNITS/KG/HR: 10000 INJECTION, SOLUTION INTRAVENOUS at 03:11

## 2019-11-04 RX ADMIN — MIDAZOLAM HYDROCHLORIDE 2 MG: 1 INJECTION, SOLUTION INTRAMUSCULAR; INTRAVENOUS at 02:11

## 2019-11-04 RX ADMIN — IOHEXOL 90 ML: 300 INJECTION, SOLUTION INTRAVENOUS at 05:11

## 2019-11-04 RX ADMIN — PROPOFOL 200 MG: 10 INJECTION, EMULSION INTRAVENOUS at 02:11

## 2019-11-04 RX ADMIN — HYDROMORPHONE HYDROCHLORIDE 0.2 MG: 2 INJECTION, SOLUTION INTRAMUSCULAR; INTRAVENOUS; SUBCUTANEOUS at 06:11

## 2019-11-04 RX ADMIN — ALTEPLASE 10 MG: 2.2 INJECTION, POWDER, LYOPHILIZED, FOR SOLUTION INTRAVENOUS at 04:11

## 2019-11-04 RX ADMIN — PHENYLEPHRINE HYDROCHLORIDE 200 MCG: 10 INJECTION INTRAVENOUS at 05:11

## 2019-11-04 RX ADMIN — HYDROMORPHONE HYDROCHLORIDE 0.2 MG: 2 INJECTION, SOLUTION INTRAMUSCULAR; INTRAVENOUS; SUBCUTANEOUS at 07:11

## 2019-11-04 RX ADMIN — ONDANSETRON HYDROCHLORIDE 8 MG: 2 SOLUTION INTRAMUSCULAR; INTRAVENOUS at 09:11

## 2019-11-04 RX ADMIN — LIDOCAINE HYDROCHLORIDE 75 MG: 20 INJECTION, SOLUTION INTRAVENOUS at 02:11

## 2019-11-04 RX ADMIN — SODIUM CHLORIDE: 0.9 INJECTION, SOLUTION INTRAVENOUS at 02:11

## 2019-11-04 RX ADMIN — HEPARIN SODIUM 18 UNITS/KG/HR: 10000 INJECTION, SOLUTION INTRAVENOUS at 09:11

## 2019-11-04 RX ADMIN — SODIUM CHLORIDE: 0.9 INJECTION, SOLUTION INTRAVENOUS at 09:11

## 2019-11-04 RX ADMIN — HYDROMORPHONE HYDROCHLORIDE 1 MG: 2 INJECTION, SOLUTION INTRAMUSCULAR; INTRAVENOUS; SUBCUTANEOUS at 10:11

## 2019-11-04 RX ADMIN — SODIUM CHLORIDE, SODIUM LACTATE, POTASSIUM CHLORIDE, AND CALCIUM CHLORIDE: .6; .31; .03; .02 INJECTION, SOLUTION INTRAVENOUS at 06:11

## 2019-11-04 RX ADMIN — ALTEPLASE 4 MG: 2.2 INJECTION, POWDER, LYOPHILIZED, FOR SOLUTION INTRAVENOUS at 03:11

## 2019-11-04 RX ADMIN — PHENYLEPHRINE HYDROCHLORIDE 100 MCG: 10 INJECTION INTRAVENOUS at 03:11

## 2019-11-04 RX ADMIN — HYDROMORPHONE HYDROCHLORIDE 1 MG: 2 INJECTION, SOLUTION INTRAMUSCULAR; INTRAVENOUS; SUBCUTANEOUS at 09:11

## 2019-11-04 RX ADMIN — PHENYLEPHRINE HYDROCHLORIDE 200 MCG: 10 INJECTION INTRAVENOUS at 03:11

## 2019-11-04 RX ADMIN — HYDROMORPHONE HYDROCHLORIDE 1 MG: 2 INJECTION, SOLUTION INTRAMUSCULAR; INTRAVENOUS; SUBCUTANEOUS at 01:11

## 2019-11-04 RX ADMIN — FENTANYL CITRATE 100 MCG: 50 INJECTION INTRAMUSCULAR; INTRAVENOUS at 02:11

## 2019-11-04 RX ADMIN — HYDROMORPHONE HYDROCHLORIDE 1 MG: 2 INJECTION, SOLUTION INTRAMUSCULAR; INTRAVENOUS; SUBCUTANEOUS at 05:11

## 2019-11-04 RX ADMIN — ROCURONIUM BROMIDE 5 MG: 10 INJECTION, SOLUTION INTRAVENOUS at 02:11

## 2019-11-04 NOTE — HOSPITAL COURSE
"Ms Love presented with acute deep vein thrombosis of right lower extremity. Ultrasound significant for " Interval development of near completely occlusive thrombus of the right common femoral vein.  Persistent thrombus of the right greater saphenous vein is also noted." Given swelling, tenderness and acute paresthesia, thrombolysis recommended. Started on heparin infusion in the meantime. Underwent thrombolysis on 11/1 but aborted due to pain. Paresthesia and swelling had subsided temporarily but recurred soon after. Re-attempting thrombolysis 11/4 ,S/P  Chemical thrombolysis and rheolytic thrombectomy bilateral iliofemoral veins and IVC 2. Mechanical thrombectomy and venoplasty of IVC and bilateral iliac veins with 14-mm and 16-mm balloons and bilateral femoral veins with 8-mm balloons,  HH is dropped with no sign of bleeding,transfused one pack RBC.HH is improved.leg swelling is much improved,hematology was following,since patient failed in the past Lovenox,and had good response to Eliquis,hematology recommended discharge home with eliquis,patient remains stable on RA during hospitalization,she has been discharged home with eliquis and close follow up with hematology.  "

## 2019-11-04 NOTE — ASSESSMENT & PLAN NOTE
This is a recurrent issue despite compliance to enoxaparin 80 mg every 12 hours (dose adjusted about a month ago by Dr Billy at [facility]). Agree with heparin infusion pending Hematology evaluation. I am concerned about patient's right foot paresthesia and extensive thrombus (per ultrasound: near completely occlusive thrombus of the right common femoral vein, persistent thrombus of the right greater saphenous vein and redemonstration of thrombosis left femoral, greater saphenous and popliteal veins) but on exam patient had + 1 peripheral pulses and leg is not tight.   Is s/p thrombolysis on 11/1 but was aborted due to pain associated with this. Reattempting thrombolysis today under general anesthesia. Plan for restarting eliquis at discharge once cleared by Hematology. CBC/BMP in AM

## 2019-11-04 NOTE — PLAN OF CARE
11/04/19 1441   Post-Acute Status   Post-Acute Authorization Other  (Home )   Other Status No Post-Acute Service Needs   Discharge Delays None known at this time     SW to pt's room to discuss d/c planning. Pt's fiance, Lui, at bedside and confirms her helps at home. SW name and number placed on white board. SW encouraged pt to contact SW with questions.

## 2019-11-04 NOTE — PROGRESS NOTES
"[facility] Medicine  Progress Note    Patient Name: Antoinette Love  MRN: [id number]  Patient Class: IP- Inpatient   Admission Date: 11/1/2019  Length of Stay: 3 days  Attending Physician: Marzena Gambino MD  Primary Care Provider: Alberto Holguin MD        Subjective:     Principal Problem:DVT, recurrent, lower extremity, acute, right        HPI:  29 year old female with Factor V Leiden, recurrent deep vein thromboses (11 events), history of pulmonary embolisms (3-4 events) and May-Thurner syndrome while on anticoagulation who presented with right leg swelling and pain. Associated symptoms include paresthesia of right foot. Patient stated this feels similar to when she had a deep vein thrombus in left lower extremity requiring direct thrombolysis done by IR (9/2/2019). Was also planned to have IVC filter removed due to associated pain but this was unsuccessful. Patient is currently taking lovenox 80 mg every 12 hours and has been compliant with this medication. Patient denies abdominal pain, melena/bloody stools, extensive bruising, hematuria or hemoptysis.     In the ED, patient was noted to have swollen right leg. Ultrasound was obtained and showed near completely occlusive thrombus of the right common femoral vein, persistent thrombus of the right greater saphenous vein and redemonstration of thrombosis left femoral, greater saphenous and popliteal veins. Patient admitted to hospital medicine for anticoagulation and hematology consultation.          Overview/Hospital Course:  Ms Love presented with acute deep vein thrombosis of right lower extremity. Ultrasound significant for " Interval development of near completely occlusive thrombus of the right common femoral vein.  Persistent thrombus of the right greater saphenous vein is also noted." Given swelling, tenderness and acute paresthesia, thrombolysis recommended. Started on heparin infusion in the meantime. " Underwent thrombolysis on 11/1 but aborted due to pain. Paresthesia and swelling had subsided temporarily but recurred soon after. Re-attempting thrombolysis 11/4 under general anesthesia.     Interval History: clinically unchanged    Review of Systems   Respiratory: Negative.    Cardiovascular: Negative.    Gastrointestinal: Negative.    Musculoskeletal:        Right leg pain   Neurological:        Paresthesia     Objective:     Vital Signs (Most Recent):  Temp: 98.4 °F (36.9 °C) (11/04/19 1122)  Pulse: 70 (11/04/19 1122)  Resp: 18 (11/04/19 1122)  BP: (!) 104/53 (11/04/19 1122)  SpO2: 100 % (11/04/19 1122) Vital Signs (24h Range):  Temp:  [97.8 °F (36.6 °C)-98.4 °F (36.9 °C)] 98.4 °F (36.9 °C)  Pulse:  [63-82] 70  Resp:  [18-19] 18  SpO2:  [99 %-100 %] 100 %  BP: (100-109)/(52-58) 104/53     Weight: 76.4 kg (168 lb 6.9 oz)  Body mass index is 32.89 kg/m².    Intake/Output Summary (Last 24 hours) at 11/4/2019 1706  Last data filed at 11/3/2019 1900  Gross per 24 hour   Intake 921.33 ml   Output --   Net 921.33 ml      Physical Exam   Constitutional: She is oriented to person, place, and time. She appears well-developed. No distress.   Cardiovascular: Normal rate and regular rhythm.   Pulmonary/Chest: Effort normal and breath sounds normal.   Abdominal: Soft. Bowel sounds are normal.   Musculoskeletal: Normal range of motion. She exhibits edema (worse to R leg when compared to yesterday. not tight but with firmness to calf).   Neurological: She is alert and oriented to person, place, and time. A sensory deficit is present. No cranial nerve deficit.   Skin: Skin is warm and dry. Capillary refill takes less than 2 seconds. She is not diaphoretic.   Some bruising at reported site of lovenox injection. Not extensive   Psychiatric: She has a normal mood and affect. Her behavior is normal. Judgment and thought content normal.   Nursing note and vitals reviewed.      Significant Labs: All pertinent labs within the past 24  hours have been reviewed.    Significant Imaging: I have reviewed all pertinent imaging results/findings within the past 24 hours.  I have reviewed and interpreted all pertinent imaging results/findings within the past 24 hours.      Assessment/Plan:      * DVT, recurrent, lower extremity, acute, right  This is a recurrent issue despite compliance to enoxaparin 80 mg every 12 hours (dose adjusted about a month ago by Dr Billy at [facility] [place]). Agree with heparin infusion pending Hematology evaluation. I am concerned about patient's right foot paresthesia and extensive thrombus (per ultrasound: near completely occlusive thrombus of the right common femoral vein, persistent thrombus of the right greater saphenous vein and redemonstration of thrombosis left femoral, greater saphenous and popliteal veins) but on exam patient had + 1 peripheral pulses and leg is not tight.   Is s/p thrombolysis on 11/1 but was aborted due to pain associated with this. Reattempting thrombolysis today under general anesthesia. Plan for restarting eliquis at discharge once cleared by Hematology. CBC/BMP in AM    Factor V Leiden mutation  Followed by Hematology  Management for acute event as above      May-Thurner syndrome  noted      Bradycardia  Resolved       Normocytic anemia  Low but stable  Will hold off on transfusion but consider if < 7 g/dL tomorrow and/or signs/Sx of bleeding  CBC in AM      Paresthesia of right foot  2/2 occlusive thrombus  Paresthesia resolved       History of deep venous thrombosis (DVT) of distal vein of left lower extremity  Required thrombolysis for occlusive thrombus      Presence of IVC filter        VTE Risk Mitigation (From admission, onward)         Ordered     Place sequential compression device  Until discontinued      11/01/19 0541     IP VTE HIGH RISK PATIENT  Once      11/01/19 0541     heparin 25,000 units in dextrose 5% 250 mL (100 units/mL) infusion HIGH INTENSITY nomogram - [facility]  Continuous       11/01/19 0249     heparin 25,000 units in dextrose 5% (100 units/ml) IV bolus from bag - ADDITIONAL PRN BOLUS - 60 units/kg  As needed (PRN)      11/01/19 0249     heparin 25,000 units in dextrose 5% (100 units/ml) IV bolus from bag - ADDITIONAL PRN BOLUS - 30 units/kg  As needed (PRN)      11/01/19 0249                      Marzena Liao MD  Department of Hospital Medicine   [facility]

## 2019-11-04 NOTE — SUBJECTIVE & OBJECTIVE
Interval History: clinically unchanged    Review of Systems   Respiratory: Negative.    Cardiovascular: Negative.    Gastrointestinal: Negative.    Musculoskeletal:        Right leg pain   Neurological:        Paresthesia     Objective:     Vital Signs (Most Recent):  Temp: 98.4 °F (36.9 °C) (11/04/19 1122)  Pulse: 70 (11/04/19 1122)  Resp: 18 (11/04/19 1122)  BP: (!) 104/53 (11/04/19 1122)  SpO2: 100 % (11/04/19 1122) Vital Signs (24h Range):  Temp:  [97.8 °F (36.6 °C)-98.4 °F (36.9 °C)] 98.4 °F (36.9 °C)  Pulse:  [63-82] 70  Resp:  [18-19] 18  SpO2:  [99 %-100 %] 100 %  BP: (100-109)/(52-58) 104/53     Weight: 76.4 kg (168 lb 6.9 oz)  Body mass index is 32.89 kg/m².    Intake/Output Summary (Last 24 hours) at 11/4/2019 1706  Last data filed at 11/3/2019 1900  Gross per 24 hour   Intake 921.33 ml   Output --   Net 921.33 ml      Physical Exam   Constitutional: She is oriented to person, place, and time. She appears well-developed. No distress.   Cardiovascular: Normal rate and regular rhythm.   Pulmonary/Chest: Effort normal and breath sounds normal.   Abdominal: Soft. Bowel sounds are normal.   Musculoskeletal: Normal range of motion. She exhibits edema (worse to R leg when compared to yesterday. not tight but with firmness to calf).   Neurological: She is alert and oriented to person, place, and time. A sensory deficit is present. No cranial nerve deficit.   Skin: Skin is warm and dry. Capillary refill takes less than 2 seconds. She is not diaphoretic.   Some bruising at reported site of lovenox injection. Not extensive   Psychiatric: She has a normal mood and affect. Her behavior is normal. Judgment and thought content normal.   Nursing note and vitals reviewed.      Significant Labs: All pertinent labs within the past 24 hours have been reviewed.    Significant Imaging: I have reviewed all pertinent imaging results/findings within the past 24 hours.  I have reviewed and interpreted all pertinent imaging  results/findings within the past 24 hours.

## 2019-11-04 NOTE — NURSING
Pt c/o of right extremity pain. Around the clock dosing utilized. PRN pain medication given as ordered. Mild relief obtained.     0732- Report given to Gerardo MOTTA. Pt awake in bed. NAD noted. Heparin infusing, handoff completed. Spouse at the bedside. Chart checked completed.

## 2019-11-04 NOTE — PROGRESS NOTES
Pt in IR procedure room. Consents signed. Intubated per anesthesia and placed prone. Anesthesia in room to manage case, chart vitals. IR RNs in room to assist MD and anesthesia throughout case.

## 2019-11-04 NOTE — PLAN OF CARE
Problem: Fall Injury Risk  Goal: Absence of Fall and Fall-Related Injury  Outcome: Ongoing, Progressing     Problem: Adult Inpatient Plan of Care  Goal: Plan of Care Review  Outcome: Ongoing, Progressing  Goal: Patient-Specific Goal (Individualization)  Outcome: Ongoing, Progressing  Goal: Absence of Hospital-Acquired Illness or Injury  Outcome: Ongoing, Progressing  Goal: Optimal Comfort and Wellbeing  Outcome: Ongoing, Progressing  Goal: Readiness for Transition of Care  Outcome: Ongoing, Progressing  Goal: Rounds/Family Conference  Outcome: Ongoing, Progressing     Problem: Venous Thromboembolism  Goal: VTE (Venous Thromboembolism) Symptom Resolution  Outcome: Ongoing, Progressing

## 2019-11-04 NOTE — NURSING
Patient to scheduled procedure as ordered. Tele monitoring in progress. Patient awake alert and oriented. No apparent distress noted. Patient went via bed.

## 2019-11-04 NOTE — ANESTHESIA PREPROCEDURE EVALUATION
"                                                                                                             11/04/2019  Antoinette Love is a 29 y.o., female.    Pre-operative evaluation for Procedure(s) (LRB):  thrombectomy (N/A)    Antoinette Love is a 29 y.o. female     Denies CP/SOB/GERD/MI/CVA/URI symptoms.  METS > 4  NPO > 8    Patient Active Problem List   Diagnosis    Factor V Leiden mutation    Recurrent acute deep vein thrombosis of left lower extremity    Multiple pulmonary nodules    Acute deep vein thrombosis (DVT) of femoral vein    Dyspnea on exertion    History of pulmonary embolism    Chronic respiratory failure with hypoxia    Reactive airway disease without complication    Recurrent urticaria    DVT (deep venous thrombosis)    May-Thurner syndrome    Presence of IVC filter    DVT, recurrent, lower extremity, acute, right    History of deep venous thrombosis (DVT) of distal vein of left lower extremity    Paresthesia of right foot    Normocytic anemia    Leukopenia    Bradycardia       Review of patient's allergies indicates:   Allergen Reactions    Azithromycin Hives    Beef containing products Anaphylaxis     Patient cannot eat BEEF BROTH  STATES IT WILL MAKE HER THROAT CLOSE UP .     Pork derived (porcine) Anaphylaxis     Throat swells up cannot eat pork sausage or pork patties ,     Unclassified drug Shortness Of Breath and Other (See Comments)     Is allergic to a beef spice - Causes "throat closing"    Xarelto [rivaroxaban] Other (See Comments)     Gallbladder swelling and disfunction    Toradol [ketorolac] Hives and Itching       No current facility-administered medications on file prior to encounter.      Current Outpatient Medications on File Prior to Encounter   Medication Sig Dispense Refill    enoxaparin (LOVENOX) 80 mg/0.8 mL Syrg Inject 0.8 mLs (80 mg total) into the skin 2 (two) times daily. 48 mL 6    oxyCODONE-acetaminophen (PERCOCET) 7.5-325 " mg per tablet Take 1 tablet by mouth every 6 (six) hours as needed for Pain. 28 tablet 0    diazePAM (VALIUM) 5 MG tablet TAKE 1 TABLET (5 MG TOTAL) BY MOUTH EVERY 12 (TWELVE) HOURS AS NEEDED FOR ANXIETY. 30 tablet 0    EPINEPHrine (EPIPEN 2-VAN) 0.3 mg/0.3 mL AtIn Inject 0.3 mLs (0.3 mg total) into the muscle once. for 1 dose 2 Device 2    ondansetron (ZOFRAN-ODT) 8 MG TbDL Take 1 tablet (8 mg total) by mouth every 8 (eight) hours as needed (nausea). 15 tablet 0       Past Surgical History:   Procedure Laterality Date    COLONOSCOPY N/A 12/18/2015    Procedure: COLONOSCOPY;  Surgeon: Lefty Lee MD;  Location: [facility] ([address] [place] [address]);  Service: Endoscopy;  Laterality: N/A;  schedule with Jesus    IVC FILTER RETRIEVAL      IVC FILTER RETRIEVAL N/A 9/23/2019    Procedure: REMOVAL-FILTER-IVC;  Surgeon: Claudia Surgeon;  Location: [facility];  Service: Anesthesiology;  Laterality: N/A;  188  4 hours Anes    tumor from breast      left    WISDOM TOOTH EXTRACTION         Social History     Socioeconomic History    Marital status: Single     Spouse name: Not on file    Number of children: Not on file    Years of education: Not on file    Highest education level: Not on file   Occupational History    Not on file   Social Needs    Financial resource strain: Not very hard    Food insecurity:     Worry: Never true     Inability: Never true    Transportation needs:     Medical: No     Non-medical: No   Tobacco Use    Smoking status: Never Smoker    Smokeless tobacco: Never Used   Substance and Sexual Activity    Alcohol use: No     Frequency: Monthly or less     Drinks per session: 3 or 4     Binge frequency: Never    Drug use: No    Sexual activity: Yes     Partners: Male   Lifestyle    Physical activity:     Days per week: 4 days     Minutes per session: 150+ min    Stress: Rather much   Relationships    Social connections:     Talks on phone: More than three times a week     Gets together: More than three times  a week     Attends [employer] service: Not on file     Active member of club or organization: Yes     Attends meetings of clubs or organizations: 1 to 4 times per year     Relationship status: Living with partner   Other Topics Concern    Not on file   Social History Narrative    Not on file         CBC:   Recent Labs     11/02/19  0616 11/03/19  0609   WBC 2.57* 6.23   RBC 2.49* 2.73*   HGB 7.1* 7.7*   HCT 23.2* 25.6*   * 175   MCV 93 94   MCH 28.5 28.2   MCHC 30.6* 30.1*       CMP:   Recent Labs     11/02/19  0617 11/03/19  0609    137   K 4.1 4.1    105   CO2 25 24   BUN 8 5*   CREATININE 0.7 0.7   GLU 90 95   CALCIUM 8.9 8.4*       INR  Recent Labs     11/02/19  0616 11/03/19  0609 11/04/19  0332   APTT 44.6* 48.1* 48.7*         Vitals:    11/04/19 1122   BP: (!) 104/53   Pulse: 70   Resp: 18   Temp: 36.9 °C (98.4 °F)     See nursing charting for additional vital signs      Diagnostic Studies:  Results for MAK GRACE (MRN [id number]) as of 11/4/2019 12:57   Ref. Range 11/3/2019 06:09   WBC Latest Ref Range: 3.90 - 12.70 K/uL 6.23   RBC Latest Ref Range: 4.00 - 5.40 M/uL 2.73 (L)   Hemoglobin Latest Ref Range: 12.0 - 16.0 g/dL 7.7 (L)   Hematocrit Latest Ref Range: 37.0 - 48.5 % 25.6 (L)   MCV Latest Ref Range: 82 - 98 fL 94   MCH Latest Ref Range: 27.0 - 31.0 pg 28.2   MCHC Latest Ref Range: 32.0 - 36.0 g/dL 30.1 (L)   RDW Latest Ref Range: 11.5 - 14.5 % 13.9   Platelets Latest Ref Range: 150 - 350 K/uL 175   MPV Latest Ref Range: 9.2 - 12.9 fL 9.6   Gran% Latest Ref Range: 38.0 - 73.0 % 84.4 (H)   Gran # (ANC) Latest Ref Range: 1.8 - 7.7 K/uL 5.3   Lymph% Latest Ref Range: 18.0 - 48.0 % 10.8 (L)   Lymph # Latest Ref Range: 1.0 - 4.8 K/uL 0.7 (L)   Mono% Latest Ref Range: 4.0 - 15.0 % 3.9 (L)   Mono # Latest Ref Range: 0.3 - 1.0 K/uL 0.2 (L)   Eosinophil% Latest Ref Range: 0.0 - 8.0 % 0.3   Eos # Latest Ref Range: 0.0 - 0.5 K/uL 0.0   Basophil% Latest Ref Range: 0.0 - 1.9 % 0.3    Baso # Latest Ref Range: 0.00 - 0.20 K/uL 0.02   nRBC Latest Ref Range: 0 /100 WBC 0   Differential Method Unknown Automated   Immature Grans (Abs) Latest Ref Range: 0.00 - 0.04 K/uL 0.02   Immature Granulocytes Latest Ref Range: 0.0 - 0.5 % 0.3     Results for MAK GRACE (MRN [id number]) as of 11/4/2019 12:57   Ref. Range 11/3/2019 06:09   Sodium Latest Ref Range: 136 - 145 mmol/L 137   Potassium Latest Ref Range: 3.5 - 5.1 mmol/L 4.1   Chloride Latest Ref Range: 95 - 110 mmol/L 105   CO2 Latest Ref Range: 23 - 29 mmol/L 24   Anion Gap Latest Ref Range: 8 - 16 mmol/L 8   BUN, Bld Latest Ref Range: 6 - 20 mg/dL 5 (L)   Creatinine Latest Ref Range: 0.5 - 1.4 mg/dL 0.7   eGFR if non African American Latest Ref Range: >60 mL/min/1.73 m^2 >60   eGFR if  Latest Ref Range: >60 mL/min/1.73 m^2 >60   Glucose Latest Ref Range: 70 - 110 mg/dL 95   Calcium Latest Ref Range: 8.7 - 10.5 mg/dL 8.4 (L)     EKG (11/2/19):  SB    TTE (11/2/19):  · Normal left ventricular systolic function. The estimated ejection fraction is 70%  · Normal LV diastolic function.  · Normal right ventricular systolic function.  · Normal central venous pressure (3 mm Hg).  · The estimated PA systolic pressure is 16 mm Hg    Anesthesia Evaluation    I have reviewed the Patient Summary Reports.    I have reviewed the Nursing Notes.      Review of Systems  Anesthesia Hx:  No problems with previous Anesthesia   Denies Personal Hx of Anesthesia complications.   Social:  Non-Smoker, Social Alcohol Use    Hematology/Oncology:         -- Anemia: Hematology Comments: facto5 Leiden, recurrent DVTs with h/o PE    Cardiovascular:   Exercise tolerance: good Denies Pacemaker.  Denies Hypertension.  Denies Valvular problems/Murmurs.  Denies MI.  Denies CAD.    Denies CABG/stent.  Denies Dysrhythmias.   Denies Angina.             denies PVD no hyperlipidemia Pt w/ hx PE, now w/ fatoumata filter (attempt to remove it last month was  unsuccessful so it was crushed and left in place, per pt) and with DVT.   Pulmonary:   Denies Pneumonia Denies COPD.  Denies Asthma.  Denies Shortness of breath.  Denies Recent URI.  Denies Sleep Apnea. Pt denies any reactive airway dz. When she had PE, but prior to its being correctly dx'd, she was given inhalers to try.   Her breathing is at baseline now.   Hepatic/GI:  Hepatic/GI Normal    Musculoskeletal:   Paresthesias of right foot   Neurological:   TIA, Denies CVA. Denies Seizures.        Physical Exam  General:  Obesity    Airway/Jaw/Neck:  AIRWAY FINDINGS: Normal M2  Good mouth opening  Plastic piercing (small post) in bottom lip  Tongue ring removed  No loose dentition  FROM     Chest/Lungs:  Chest/Lungs Clear    Heart/Vascular:  Heart Findings: Normal       Mental Status:  Mental Status Findings: Normal        Anesthesia Plan  Type of Anesthesia, risks & benefits discussed:  Anesthesia Type:  general  Patient's Preference:   Intra-op Monitoring Plan: standard ASA monitors  Intra-op Monitoring Plan Comments:   Post Op Pain Control Plan:   Post Op Pain Control Plan Comments:   Induction:   IV  Beta Blocker:  Patient is not currently on a Beta-Blocker (No further documentation required).       Informed Consent: Patient understands risks and agrees with Anesthesia plan.  Questions answered. Anesthesia consent signed with patient.  ASA Score: 2     Day of Surgery Review of History & Physical:  There are no significant changes.  H&P update referred to the provider.         Ready For Surgery From Anesthesia Perspective.

## 2019-11-05 LAB
ABO + RH BLD: NORMAL
ANION GAP SERPL CALC-SCNC: 4 MMOL/L (ref 8–16)
APTT BLDCRRT: 47.1 SEC (ref 21–32)
APTT BLDCRRT: 51.3 SEC (ref 21–32)
BASOPHILS # BLD AUTO: 0.01 K/UL (ref 0–0.2)
BASOPHILS # BLD AUTO: 0.01 K/UL (ref 0–0.2)
BASOPHILS NFR BLD: 0.4 % (ref 0–1.9)
BASOPHILS NFR BLD: 0.4 % (ref 0–1.9)
BLD GP AB SCN CELLS X3 SERPL QL: NORMAL
BLD PROD TYP BPU: NORMAL
BLOOD UNIT EXPIRATION DATE: NORMAL
BLOOD UNIT TYPE CODE: 6200
BLOOD UNIT TYPE: NORMAL
BUN SERPL-MCNC: 8 MG/DL (ref 6–20)
CALCIUM SERPL-MCNC: 8.3 MG/DL (ref 8.7–10.5)
CARDIOLIPIN IGG SER IA-ACNC: <9.4 GPL (ref 0–14.99)
CARDIOLIPIN IGM SER IA-ACNC: 25.75 MPL (ref 0–12.49)
CHLORIDE SERPL-SCNC: 111 MMOL/L (ref 95–110)
CO2 SERPL-SCNC: 26 MMOL/L (ref 23–29)
CODING SYSTEM: NORMAL
CREAT SERPL-MCNC: 0.8 MG/DL (ref 0.5–1.4)
DIFFERENTIAL METHOD: ABNORMAL
DIFFERENTIAL METHOD: ABNORMAL
DISPENSE STATUS: NORMAL
EOSINOPHIL # BLD AUTO: 0 K/UL (ref 0–0.5)
EOSINOPHIL # BLD AUTO: 0 K/UL (ref 0–0.5)
EOSINOPHIL NFR BLD: 1.1 % (ref 0–8)
EOSINOPHIL NFR BLD: 1.1 % (ref 0–8)
ERYTHROCYTE [DISTWIDTH] IN BLOOD BY AUTOMATED COUNT: 14.3 % (ref 11.5–14.5)
ERYTHROCYTE [DISTWIDTH] IN BLOOD BY AUTOMATED COUNT: 14.3 % (ref 11.5–14.5)
EST. GFR  (AFRICAN AMERICAN): >60 ML/MIN/1.73 M^2
EST. GFR  (NON AFRICAN AMERICAN): >60 ML/MIN/1.73 M^2
GLUCOSE SERPL-MCNC: 92 MG/DL (ref 70–110)
HCT VFR BLD AUTO: 20.9 % (ref 37–48.5)
HCT VFR BLD AUTO: 22.7 % (ref 37–48.5)
HCT VFR BLD AUTO: 22.7 % (ref 37–48.5)
HCT VFR BLD AUTO: 26 % (ref 37–48.5)
HGB BLD-MCNC: 6.9 G/DL (ref 12–16)
HGB BLD-MCNC: 6.9 G/DL (ref 12–16)
HGB BLD-MCNC: 7 G/DL (ref 12–16)
IMM GRANULOCYTES # BLD AUTO: 0.04 K/UL (ref 0–0.04)
IMM GRANULOCYTES # BLD AUTO: 0.04 K/UL (ref 0–0.04)
IMM GRANULOCYTES NFR BLD AUTO: 1.4 % (ref 0–0.5)
IMM GRANULOCYTES NFR BLD AUTO: 1.4 % (ref 0–0.5)
INR PPP: 1.8 (ref 0.8–1.2)
LYMPHOCYTES # BLD AUTO: 0.8 K/UL (ref 1–4.8)
LYMPHOCYTES # BLD AUTO: 0.8 K/UL (ref 1–4.8)
LYMPHOCYTES NFR BLD: 27.1 % (ref 18–48)
LYMPHOCYTES NFR BLD: 27.1 % (ref 18–48)
MCH RBC QN AUTO: 28.9 PG (ref 27–31)
MCH RBC QN AUTO: 28.9 PG (ref 27–31)
MCHC RBC AUTO-ENTMCNC: 30.4 G/DL (ref 32–36)
MCHC RBC AUTO-ENTMCNC: 30.4 G/DL (ref 32–36)
MCV RBC AUTO: 95 FL (ref 82–98)
MCV RBC AUTO: 95 FL (ref 82–98)
MONOCYTES # BLD AUTO: 0.3 K/UL (ref 0.3–1)
MONOCYTES # BLD AUTO: 0.3 K/UL (ref 0.3–1)
MONOCYTES NFR BLD: 9.3 % (ref 4–15)
MONOCYTES NFR BLD: 9.3 % (ref 4–15)
NEUTROPHILS # BLD AUTO: 1.7 K/UL (ref 1.8–7.7)
NEUTROPHILS # BLD AUTO: 1.7 K/UL (ref 1.8–7.7)
NEUTROPHILS NFR BLD: 60.7 % (ref 38–73)
NEUTROPHILS NFR BLD: 60.7 % (ref 38–73)
NRBC BLD-RTO: 0 /100 WBC
NRBC BLD-RTO: 0 /100 WBC
PLATELET # BLD AUTO: 113 K/UL (ref 150–350)
PLATELET # BLD AUTO: 113 K/UL (ref 150–350)
PMV BLD AUTO: 9.7 FL (ref 9.2–12.9)
PMV BLD AUTO: 9.7 FL (ref 9.2–12.9)
POTASSIUM SERPL-SCNC: 4 MMOL/L (ref 3.5–5.1)
PROTHROMBIN TIME: 19.1 SEC (ref 9–12.5)
RBC # BLD AUTO: 2.39 M/UL (ref 4–5.4)
RBC # BLD AUTO: 2.39 M/UL (ref 4–5.4)
SODIUM SERPL-SCNC: ABNORMAL MMOL/L
TRANS ERYTHROCYTES VOL PATIENT: NORMAL ML
WBC # BLD AUTO: 2.8 K/UL (ref 3.9–12.7)
WBC # BLD AUTO: 2.8 K/UL (ref 3.9–12.7)

## 2019-11-05 PROCEDURE — 36415 COLL VENOUS BLD VENIPUNCTURE: CPT

## 2019-11-05 PROCEDURE — 63600175 PHARM REV CODE 636 W HCPCS: Performed by: HOSPITALIST

## 2019-11-05 PROCEDURE — 85018 HEMOGLOBIN: CPT

## 2019-11-05 PROCEDURE — 86850 RBC ANTIBODY SCREEN: CPT

## 2019-11-05 PROCEDURE — 86920 COMPATIBILITY TEST SPIN: CPT

## 2019-11-05 PROCEDURE — 25000003 PHARM REV CODE 250: Performed by: INTERNAL MEDICINE

## 2019-11-05 PROCEDURE — 63600175 PHARM REV CODE 636 W HCPCS: Performed by: INTERNAL MEDICINE

## 2019-11-05 PROCEDURE — 80048 BASIC METABOLIC PNL TOTAL CA: CPT

## 2019-11-05 PROCEDURE — 85014 HEMATOCRIT: CPT

## 2019-11-05 PROCEDURE — 85025 COMPLETE CBC W/AUTO DIFF WBC: CPT

## 2019-11-05 PROCEDURE — 36430 TRANSFUSION BLD/BLD COMPNT: CPT

## 2019-11-05 PROCEDURE — P9021 RED BLOOD CELLS UNIT: HCPCS

## 2019-11-05 PROCEDURE — 21400001 HC TELEMETRY ROOM

## 2019-11-05 PROCEDURE — 85730 THROMBOPLASTIN TIME PARTIAL: CPT

## 2019-11-05 RX ORDER — HYDROCODONE BITARTRATE AND ACETAMINOPHEN 500; 5 MG/1; MG/1
TABLET ORAL
Status: DISCONTINUED | OUTPATIENT
Start: 2019-11-05 | End: 2019-11-07 | Stop reason: HOSPADM

## 2019-11-05 RX ADMIN — HYDROMORPHONE HYDROCHLORIDE 1 MG: 2 INJECTION, SOLUTION INTRAMUSCULAR; INTRAVENOUS; SUBCUTANEOUS at 11:11

## 2019-11-05 RX ADMIN — HYDROMORPHONE HYDROCHLORIDE 1 MG: 2 INJECTION, SOLUTION INTRAMUSCULAR; INTRAVENOUS; SUBCUTANEOUS at 01:11

## 2019-11-05 RX ADMIN — HYDROMORPHONE HYDROCHLORIDE 1 MG: 2 INJECTION, SOLUTION INTRAMUSCULAR; INTRAVENOUS; SUBCUTANEOUS at 08:11

## 2019-11-05 RX ADMIN — HEPARIN SODIUM 18 UNITS/KG/HR: 10000 INJECTION, SOLUTION INTRAVENOUS at 07:11

## 2019-11-05 RX ADMIN — POLYETHYLENE GLYCOL 3350 17 G: 17 POWDER, FOR SOLUTION ORAL at 08:11

## 2019-11-05 RX ADMIN — HYDROMORPHONE HYDROCHLORIDE 1 MG: 2 INJECTION, SOLUTION INTRAMUSCULAR; INTRAVENOUS; SUBCUTANEOUS at 10:11

## 2019-11-05 RX ADMIN — OXYCODONE HYDROCHLORIDE AND ACETAMINOPHEN 1 TABLET: 7.5; 325 TABLET ORAL at 08:11

## 2019-11-05 RX ADMIN — HYDROMORPHONE HYDROCHLORIDE 1 MG: 2 INJECTION, SOLUTION INTRAMUSCULAR; INTRAVENOUS; SUBCUTANEOUS at 06:11

## 2019-11-05 RX ADMIN — HYDROMORPHONE HYDROCHLORIDE 1 MG: 2 INJECTION, SOLUTION INTRAMUSCULAR; INTRAVENOUS; SUBCUTANEOUS at 02:11

## 2019-11-05 RX ADMIN — HYDROMORPHONE HYDROCHLORIDE 1 MG: 2 INJECTION, SOLUTION INTRAMUSCULAR; INTRAVENOUS; SUBCUTANEOUS at 05:11

## 2019-11-05 RX ADMIN — DIAZEPAM 5 MG: 5 TABLET ORAL at 08:11

## 2019-11-05 RX ADMIN — SODIUM CHLORIDE: 0.9 INJECTION, SOLUTION INTRAVENOUS at 05:11

## 2019-11-05 NOTE — NURSING
Report given to receiving nurse Starla. Pt awake in bed. Heparin infusing to right arm. Walking rounds completed. Pt assessed, NAD noted.     24hr chart check completed.

## 2019-11-05 NOTE — NURSING
Pt arrived to unit by stretcher x1 assist. NAD noted. Cardiac monitor in use, alarm set. VSS.     8:30pm- 350cc blood noted in bedpan. Spouse at the bedside. Call light within reach.     8:40pm- Lab in room to draw APTT. Will continue to monitor.

## 2019-11-05 NOTE — TRANSFER OF CARE
Anesthesia Transfer of Care Note    Patient: Antoinette Love    Procedure(s) Performed: Procedure(s) (LRB):  thrombectomy (N/A)    Patient location: PACU    Anesthesia Type: general    Post pain: adequate analgesia    Post assessment: no apparent anesthetic complications and tolerated procedure well    Post vital signs: stable    Level of consciousness: alert, oriented and awake    Nausea/Vomiting: no nausea/vomiting    Transfer of care protocol was followed      Last vitals:   Visit Vitals  BP (!) 125/58   Pulse 60   Temp 36.9 °C (98.5 °F) (Axillary)   Resp 16   Ht 5' (1.524 m)   Wt 76.4 kg (168 lb 6.9 oz)   LMP 10/27/2019 (Approximate)   SpO2 100%   Breastfeeding? No   BMI 32.89 kg/m²

## 2019-11-05 NOTE — PHYSICIAN QUERY
"PT Name: Antoinette Love  MR #: [id number]    Physician Query Form - Hematology Clarification      CDS: Sarah Greene RN   Contact information:[email or web address]    This form is a permanent document in the medical record.      Query Date: November 5, 2019    By submitting this query, we are merely seeking further clarification of documentation. Please utilize your independent clinical judgment when addressing the question(s) below.    The Medical record contains the following:   Indicators  Supporting Clinical Findings Location in Medical Record   x "Anemia" documented Normocytic anemia  Low but stable  Will hold off on transfusion but consider if < 7 g/dL tomorrow and/or signs/Sx of bleeding  CBC in AM  PN 11/4   x H & H =  11/1  03:55 11/1  06:40 11/1  08:13 11/2  06:16 11/3  06:09 11/4  20:49   Hgb 8.1  7.6  8.3  7.1  7.7  7.6    Hct 26.8  25.0  26.6  23.2  25.6  24.8     Lab Results     BP =                     HR=      "GI bleeding" documented      Acute bleeding (Non GI site)      Transfusion(s)      Treatment:     x Other:  29 year old female with Factor V Leiden, recurrent deep vein thromboses (11 events), history of pulmonary embolisms (3-4 events) and May-Thurner syndrome while on anticoagulation who presented with right leg swelling and pain    Patient denies abdominal pain, melena/bloody stools, extensive bruising, hematuria or hemoptysis.    Ultrasound was obtained and showed near completely occlusive thrombus of the right common femoral vein, persistent thrombus of the right greater saphenous vein and redemonstration of thrombosis left femoral, greater saphenous and popliteal veins.  PN 11/4     Provider, please specify diagnosis or diagnoses associated with above clinical findings.    [  ] Chronic blood loss anemia     [x  ] Anemia of chronic disease ( Specify chronic disease)       [  ] Other (Specify):   [  ] Clinically Undetermined       [  ] Other Hematological Diagnosis (please " specify):     [  ] Clinically Undetermined       Please document in your progress notes daily for the duration of treatment, until resolved, and include in your discharge summary.

## 2019-11-05 NOTE — OP NOTE
Radiology Post-Procedure Note    Pre Op Diagnosis: Bilateral iliofemoral and infra-renal IVC thrombus  Post Op Diagnosis: Same    Procedure: 1. Chemical thrombolysis and rheolytic thrombectomy bilateral iliofemoral veins and IVC 2. Mechanical thrombectomy and venoplasty of IVC and bilateral iliac veins with 14-mm and 16-mm balloons and bilateral femoral veins with 8-mm balloons    Procedure performed by: Juan Acosta MD    Written Informed Consent Obtained: Yes  Specimen Removed: NO  Estimated Blood Loss: Minimal    Findings:   Interval worsening of extent and amount of bilateral iliofemoral and IVC DVT despite continuous heparin infusion over wknd with no direct inline flow from either lower extremity through the pelvic veins into the IVC, now with extensive pelvic collaterals noted.     S/p multiple repeated attempts at chemical thrombolysis, rheolytic/mechanical thrombectomy and venoplasty of IVC and bilateral iliofemoral vessels with clearance of a large amount of thrombus from all of these vessels now with sluggish but straight in-line flow from femoral veins to the supra-renal IVC.     Although approximately 70-90% of clot burden has been cleared, there are scattered areas of residual mural thrombus throughout all of these segments that are recalcitrant to repeated interventions despite maximized efforts suggesting a component of chronicity. Given the history of multiple recurrences, recalcitrant nature of some of the residual thrombus is setting of Factor V Leiden, durability of todays intervention or any future intervention is questionable.     Recommend resuming wt-based heparin protocol without bolus 3 hours post (9PM tonight).     Patient tolerated procedure well.    Juan Acosta MD  Interventional Radiology

## 2019-11-05 NOTE — ASSESSMENT & PLAN NOTE
This is a recurrent issue despite compliance to enoxaparin 80 mg every 12 hours (dose adjusted about a month ago by Dr Billy at [facility]). Agree with heparin infusion pending Hematology evaluation. I am concerned about patient's right foot paresthesia and extensive thrombus (per ultrasound: near completely occlusive thrombus of the right common femoral vein, persistent thrombus of the right greater saphenous vein and redemonstration of thrombosis left femoral, greater saphenous and popliteal veins) but on exam patient had + 1 peripheral pulses and leg is not tight.   Is s/p thrombolysis on 11/1 but was aborted due to pain associated with this. Reattempting thrombolysis today under general anesthesia. Plan for restarting eliquis at discharge once cleared by Hematology. CBC/BMP in AM.  On 11.4,S/P  Chemical thrombolysis and rheolytic thrombectomy bilateral iliofemoral veins and IVC 2. Mechanical thrombectomy and venoplasty of IVC and bilateral iliac veins with 14-mm and 16-mm balloons and bilateral femoral veins with 8-mm balloons,  S/P  Chemical thrombolysis and rheolytic thrombectomy bilateral iliofemoral veins and IVC 2. Mechanical thrombectomy and venoplasty of IVC and bilateral iliac veins with 14-mm and 16-mm balloons and bilateral femoral veins with 8-mm balloons,  .

## 2019-11-05 NOTE — NURSING
Blood started at 1442, no changes in temp or respirations noted. Rate changed to 150/hour. Will continue to monitor.

## 2019-11-05 NOTE — PROGRESS NOTES
"[facility]  Progress Note    Patient Name: Antoinette Love  MRN: [id number]  Patient Class: IP- Inpatient   Admission Date: 11/1/2019  Length of Stay: 4 days  Attending Physician: Karri Christianson MD  Primary Care Provider: Alberto Holguin MD        Subjective:     Principal Problem:DVT, recurrent, lower extremity, acute, right        HPI:  29 year old female with Factor V Leiden, recurrent deep vein thromboses (11 events), history of pulmonary embolisms (3-4 events) and May-Thurner syndrome while on anticoagulation who presented with right leg swelling and pain. Associated symptoms include paresthesia of right foot. Patient stated this feels similar to when she had a deep vein thrombus in left lower extremity requiring direct thrombolysis done by IR (9/2/2019). Was also planned to have IVC filter removed due to associated pain but this was unsuccessful. Patient is currently taking lovenox 80 mg every 12 hours and has been compliant with this medication. Patient denies abdominal pain, melena/bloody stools, extensive bruising, hematuria or hemoptysis.     In the ED, patient was noted to have swollen right leg. Ultrasound was obtained and showed near completely occlusive thrombus of the right common femoral vein, persistent thrombus of the right greater saphenous vein and redemonstration of thrombosis left femoral, greater saphenous and popliteal veins. Patient admitted to hospital medicine for anticoagulation and hematology consultation.          Overview/Hospital Course:  Ms Love presented with acute deep vein thrombosis of right lower extremity. Ultrasound significant for " Interval development of near completely occlusive thrombus of the right common femoral vein.  Persistent thrombus of the right greater saphenous vein is also noted." Given swelling, tenderness and acute paresthesia, thrombolysis recommended. Started on heparin infusion in the meantime. " Underwent thrombolysis on 11/1 but aborted due to pain. Paresthesia and swelling had subsided temporarily but recurred soon after. Re-attempting thrombolysis 11/4 ,S/P  Chemical thrombolysis and rheolytic thrombectomy bilateral iliofemoral veins and IVC 2. Mechanical thrombectomy and venoplasty of IVC and bilateral iliac veins with 14-mm and 16-mm balloons and bilateral femoral veins with 8-mm balloons,  HH is dropped with no sign of bleeding,will transfuse one pack RBC.    Interval History: clinically unchanged    Review of Systems   Respiratory: Negative.    Cardiovascular: Negative.    Gastrointestinal: Negative.    Musculoskeletal:        Right leg pain   Neurological:        Paresthesia     Objective:     Vital Signs (Most Recent):  Temp: 98.9 °F (37.2 °C) (11/05/19 0743)  Pulse: 87 (11/05/19 0743)  Resp: 18 (11/05/19 0743)  BP: (!) 113/58 (11/05/19 0743)  SpO2: 99 % (11/05/19 0743) Vital Signs (24h Range):  Temp:  [98.3 °F (36.8 °C)-99.8 °F (37.7 °C)] 98.9 °F (37.2 °C)  Pulse:  [51-92] 87  Resp:  [13-20] 18  SpO2:  [96 %-100 %] 99 %  BP: (104-193)/() 113/58     Weight: 76.4 kg (168 lb 6.9 oz)  Body mass index is 32.89 kg/m².    Intake/Output Summary (Last 24 hours) at 11/5/2019 0949  Last data filed at 11/5/2019 0100  Gross per 24 hour   Intake 1000 ml   Output 650 ml   Net 350 ml      Physical Exam   Constitutional: She is oriented to person, place, and time. She appears well-developed. No distress.   Cardiovascular: Normal rate and regular rhythm.   Pulmonary/Chest: Effort normal and breath sounds normal.   Abdominal: Soft. Bowel sounds are normal.   Musculoskeletal: Normal range of motion. She exhibits edema (worse to R leg when compared to yesterday. not tight but with firmness to calf).   Neurological: She is alert and oriented to person, place, and time. A sensory deficit is present. No cranial nerve deficit.   Skin: Skin is warm and dry. Capillary refill takes less than 2 seconds. She is not  diaphoretic.   Some bruising at reported site of lovenox injection. Not extensive   Psychiatric: She has a normal mood and affect. Her behavior is normal. Judgment and thought content normal.   Nursing note and vitals reviewed.      Significant Labs: All pertinent labs within the past 24 hours have been reviewed.    Significant Imaging: I have reviewed all pertinent imaging results/findings within the past 24 hours.  I have reviewed and interpreted all pertinent imaging results/findings within the past 24 hours.      Assessment/Plan:      * DVT, recurrent, lower extremity, acute, right  This is a recurrent issue despite compliance to enoxaparin 80 mg every 12 hours (dose adjusted about a month ago by Dr Billy at [facility]). Agree with heparin infusion pending Hematology evaluation. I am concerned about patient's right foot paresthesia and extensive thrombus (per ultrasound: near completely occlusive thrombus of the right common femoral vein, persistent thrombus of the right greater saphenous vein and redemonstration of thrombosis left femoral, greater saphenous and popliteal veins) but on exam patient had + 1 peripheral pulses and leg is not tight.   Is s/p thrombolysis on 11/1 but was aborted due to pain associated with this. Reattempting thrombolysis today under general anesthesia. Plan for restarting eliquis at discharge once cleared by Hematology. CBC/BMP in AM.  On 11.4,S/P  Chemical thrombolysis and rheolytic thrombectomy bilateral iliofemoral veins and IVC 2. Mechanical thrombectomy and venoplasty of IVC and bilateral iliac veins with 14-mm and 16-mm balloons and bilateral femoral veins with 8-mm balloons,  S/P  Chemical thrombolysis and rheolytic thrombectomy bilateral iliofemoral veins and IVC 2. Mechanical thrombectomy and venoplasty of IVC and bilateral iliac veins with 14-mm and 16-mm balloons and bilateral femoral veins with 8-mm balloons,  .    Bradycardia  Resolved       Normocytic anemia    HH is  dropped with no sign of bleeding,will transfuse one pack RBC.      Paresthesia of right foot  2/2 occlusive thrombus  Paresthesia resolved       History of deep venous thrombosis (DVT) of distal vein of left lower extremity  Required thrombolysis for occlusive thrombus      Presence of IVC filter        May-Thurner syndrome  noted      Factor V Leiden mutation  Followed by Hematology  Management for acute event as above        VTE Risk Mitigation (From admission, onward)         Ordered     heparin 25,000 units in dextrose 5% 250 mL (100 units/mL) infusion (heparin infusion)  Continuous      11/04/19 2001     heparin 25,000 units in dextrose 5% (100 units/ml) IV bolus from bag INITIAL BOLUS  Once      11/04/19 2001     heparin 25,000 units in dextrose 5% 250 mL (100 units/mL) infusion HIGH INTENSITY nomogram - [facility]  Continuous      11/04/19 2001     heparin 25,000 units in dextrose 5% (100 units/ml) IV bolus from bag - ADDITIONAL PRN BOLUS - 60 units/kg  As needed (PRN)      11/04/19 2001     heparin 25,000 units in dextrose 5% (100 units/ml) IV bolus from bag - ADDITIONAL PRN BOLUS - 30 units/kg  As needed (PRN)      11/04/19 2001     Place sequential compression device  Until discontinued      11/01/19 0541     IP VTE HIGH RISK PATIENT  Once      11/01/19 0541     heparin 25,000 units in dextrose 5% 250 mL (100 units/mL) infusion HIGH INTENSITY nomogram - [facility]  Continuous      11/01/19 0249     heparin 25,000 units in dextrose 5% (100 units/ml) IV bolus from bag - ADDITIONAL PRN BOLUS - 60 units/kg  As needed (PRN)      11/01/19 0249     heparin 25,000 units in dextrose 5% (100 units/ml) IV bolus from bag - ADDITIONAL PRN BOLUS - 30 units/kg  As needed (PRN)      11/01/19 0249                      Karri Christianson MD  Department of Hospital Medicine   [facility]

## 2019-11-05 NOTE — SUBJECTIVE & OBJECTIVE
Interval History: clinically unchanged    Review of Systems   Respiratory: Negative.    Cardiovascular: Negative.    Gastrointestinal: Negative.    Musculoskeletal:        Right leg pain   Neurological:        Paresthesia     Objective:     Vital Signs (Most Recent):  Temp: 98.9 °F (37.2 °C) (11/05/19 0743)  Pulse: 87 (11/05/19 0743)  Resp: 18 (11/05/19 0743)  BP: (!) 113/58 (11/05/19 0743)  SpO2: 99 % (11/05/19 0743) Vital Signs (24h Range):  Temp:  [98.3 °F (36.8 °C)-99.8 °F (37.7 °C)] 98.9 °F (37.2 °C)  Pulse:  [51-92] 87  Resp:  [13-20] 18  SpO2:  [96 %-100 %] 99 %  BP: (104-193)/() 113/58     Weight: 76.4 kg (168 lb 6.9 oz)  Body mass index is 32.89 kg/m².    Intake/Output Summary (Last 24 hours) at 11/5/2019 0949  Last data filed at 11/5/2019 0100  Gross per 24 hour   Intake 1000 ml   Output 650 ml   Net 350 ml      Physical Exam   Constitutional: She is oriented to person, place, and time. She appears well-developed. No distress.   Cardiovascular: Normal rate and regular rhythm.   Pulmonary/Chest: Effort normal and breath sounds normal.   Abdominal: Soft. Bowel sounds are normal.   Musculoskeletal: Normal range of motion. She exhibits edema (worse to R leg when compared to yesterday. not tight but with firmness to calf).   Neurological: She is alert and oriented to person, place, and time. A sensory deficit is present. No cranial nerve deficit.   Skin: Skin is warm and dry. Capillary refill takes less than 2 seconds. She is not diaphoretic.   Some bruising at reported site of lovenox injection. Not extensive   Psychiatric: She has a normal mood and affect. Her behavior is normal. Judgment and thought content normal.   Nursing note and vitals reviewed.      Significant Labs: All pertinent labs within the past 24 hours have been reviewed.    Significant Imaging: I have reviewed all pertinent imaging results/findings within the past 24 hours.  I have reviewed and interpreted all pertinent imaging  results/findings within the past 24 hours.

## 2019-11-06 LAB
ALBUMIN SERPL BCP-MCNC: 2.5 G/DL (ref 3.5–5.2)
ALP SERPL-CCNC: 77 U/L (ref 55–135)
ALT SERPL W/O P-5'-P-CCNC: 10 U/L (ref 10–44)
ANION GAP SERPL CALC-SCNC: 6 MMOL/L (ref 8–16)
APTT BLDCRRT: 51.3 SEC (ref 21–32)
AST SERPL-CCNC: 19 U/L (ref 10–40)
BASOPHILS # BLD AUTO: 0.01 K/UL (ref 0–0.2)
BASOPHILS NFR BLD: 0.3 % (ref 0–1.9)
BILIRUB SERPL-MCNC: 0.3 MG/DL (ref 0.1–1)
BUN SERPL-MCNC: 7 MG/DL (ref 6–20)
CALCIUM SERPL-MCNC: 8.2 MG/DL (ref 8.7–10.5)
CHLORIDE SERPL-SCNC: 110 MMOL/L (ref 95–110)
CO2 SERPL-SCNC: 24 MMOL/L (ref 23–29)
CREAT SERPL-MCNC: 0.8 MG/DL (ref 0.5–1.4)
DIFFERENTIAL METHOD: ABNORMAL
EOSINOPHIL # BLD AUTO: 0.1 K/UL (ref 0–0.5)
EOSINOPHIL NFR BLD: 3.2 % (ref 0–8)
ERYTHROCYTE [DISTWIDTH] IN BLOOD BY AUTOMATED COUNT: 14.7 % (ref 11.5–14.5)
EST. GFR  (AFRICAN AMERICAN): >60 ML/MIN/1.73 M^2
EST. GFR  (NON AFRICAN AMERICAN): >60 ML/MIN/1.73 M^2
FOLATE RBC-MCNC: 848 NG/ML (ref 280–791)
GLUCOSE SERPL-MCNC: 86 MG/DL (ref 70–110)
HCT VFR BLD AUTO: 24.9 % (ref 37–48.5)
HCT VFR BLD AUTO: 26.2 % (ref 37–48.5)
HGB BLD-MCNC: 8 G/DL (ref 12–16)
IMM GRANULOCYTES # BLD AUTO: 0.07 K/UL (ref 0–0.04)
IMM GRANULOCYTES NFR BLD AUTO: 2 % (ref 0–0.5)
LYMPHOCYTES # BLD AUTO: 1.3 K/UL (ref 1–4.8)
LYMPHOCYTES NFR BLD: 37 % (ref 18–48)
MCH RBC QN AUTO: 28.8 PG (ref 27–31)
MCHC RBC AUTO-ENTMCNC: 30.5 G/DL (ref 32–36)
MCV RBC AUTO: 94 FL (ref 82–98)
MONOCYTES # BLD AUTO: 0.2 K/UL (ref 0.3–1)
MONOCYTES NFR BLD: 5.8 % (ref 4–15)
NEUTROPHILS # BLD AUTO: 1.8 K/UL (ref 1.8–7.7)
NEUTROPHILS NFR BLD: 51.7 % (ref 38–73)
NRBC BLD-RTO: 0 /100 WBC
PLATELET # BLD AUTO: 139 K/UL (ref 150–350)
PMV BLD AUTO: 10.1 FL (ref 9.2–12.9)
POTASSIUM SERPL-SCNC: 4.3 MMOL/L (ref 3.5–5.1)
PROT SERPL-MCNC: 5.5 G/DL (ref 6–8.4)
RBC # BLD AUTO: 2.78 M/UL (ref 4–5.4)
SODIUM SERPL-SCNC: 140 MMOL/L (ref 136–145)
WBC # BLD AUTO: 3.46 K/UL (ref 3.9–12.7)

## 2019-11-06 PROCEDURE — 21400001 HC TELEMETRY ROOM

## 2019-11-06 PROCEDURE — 99233 PR SUBSEQUENT HOSPITAL CARE,LEVL III: ICD-10-PCS | Mod: ,,, | Performed by: INTERNAL MEDICINE

## 2019-11-06 PROCEDURE — 36415 COLL VENOUS BLD VENIPUNCTURE: CPT

## 2019-11-06 PROCEDURE — 85014 HEMATOCRIT: CPT

## 2019-11-06 PROCEDURE — 63600175 PHARM REV CODE 636 W HCPCS: Performed by: HOSPITALIST

## 2019-11-06 PROCEDURE — 63600175 PHARM REV CODE 636 W HCPCS: Performed by: INTERNAL MEDICINE

## 2019-11-06 PROCEDURE — 99233 SBSQ HOSP IP/OBS HIGH 50: CPT | Mod: ,,, | Performed by: INTERNAL MEDICINE

## 2019-11-06 PROCEDURE — 85730 THROMBOPLASTIN TIME PARTIAL: CPT

## 2019-11-06 PROCEDURE — 85025 COMPLETE CBC W/AUTO DIFF WBC: CPT

## 2019-11-06 PROCEDURE — 80053 COMPREHEN METABOLIC PANEL: CPT

## 2019-11-06 PROCEDURE — 25000003 PHARM REV CODE 250: Performed by: HOSPITALIST

## 2019-11-06 RX ORDER — FUROSEMIDE 10 MG/ML
20 INJECTION INTRAMUSCULAR; INTRAVENOUS ONCE
Status: DISCONTINUED | OUTPATIENT
Start: 2019-11-06 | End: 2019-11-06

## 2019-11-06 RX ORDER — FUROSEMIDE 10 MG/ML
40 INJECTION INTRAMUSCULAR; INTRAVENOUS ONCE
Status: COMPLETED | OUTPATIENT
Start: 2019-11-06 | End: 2019-11-06

## 2019-11-06 RX ADMIN — HYDROMORPHONE HYDROCHLORIDE 1 MG: 2 INJECTION, SOLUTION INTRAMUSCULAR; INTRAVENOUS; SUBCUTANEOUS at 03:11

## 2019-11-06 RX ADMIN — HYDROMORPHONE HYDROCHLORIDE 1 MG: 2 INJECTION, SOLUTION INTRAMUSCULAR; INTRAVENOUS; SUBCUTANEOUS at 11:11

## 2019-11-06 RX ADMIN — HYDROMORPHONE HYDROCHLORIDE 1 MG: 2 INJECTION, SOLUTION INTRAMUSCULAR; INTRAVENOUS; SUBCUTANEOUS at 10:11

## 2019-11-06 RX ADMIN — ACETAMINOPHEN 650 MG: 325 TABLET, FILM COATED ORAL at 08:11

## 2019-11-06 RX ADMIN — ONDANSETRON HYDROCHLORIDE 8 MG: 2 SOLUTION INTRAMUSCULAR; INTRAVENOUS at 03:11

## 2019-11-06 RX ADMIN — HYDROMORPHONE HYDROCHLORIDE 1 MG: 2 INJECTION, SOLUTION INTRAMUSCULAR; INTRAVENOUS; SUBCUTANEOUS at 07:11

## 2019-11-06 RX ADMIN — HYDROMORPHONE HYDROCHLORIDE 1 MG: 2 INJECTION, SOLUTION INTRAMUSCULAR; INTRAVENOUS; SUBCUTANEOUS at 06:11

## 2019-11-06 RX ADMIN — HEPARIN SODIUM 18 UNITS/KG/HR: 10000 INJECTION, SOLUTION INTRAVENOUS at 08:11

## 2019-11-06 RX ADMIN — HYDROMORPHONE HYDROCHLORIDE 1 MG: 2 INJECTION, SOLUTION INTRAMUSCULAR; INTRAVENOUS; SUBCUTANEOUS at 04:11

## 2019-11-06 RX ADMIN — HYDROMORPHONE HYDROCHLORIDE 1 MG: 2 INJECTION, SOLUTION INTRAMUSCULAR; INTRAVENOUS; SUBCUTANEOUS at 01:11

## 2019-11-06 RX ADMIN — FUROSEMIDE 40 MG: 10 INJECTION, SOLUTION INTRAVENOUS at 11:11

## 2019-11-06 NOTE — PROGRESS NOTES
[facility]  Hematology/Oncology  Progress Note    Patient Name: Antoinette Love  Admission Date: 11/1/2019  Hospital Length of Stay: 5 days  Code Status: Full Code     Subjective:     HPI:  Antoinette Love is a 29-year-old female with Homozygous Factor V Leiden mutation, recurrent DVTs, IVC filter placement, miscarriage in 2013 at 11 weeks, TIA in 2013, May-Thurner Syndrome who presents to the hospital with complaint of swelling in her legs for the last 3-4 days.  The patient recently had a procedure done at [facility] under the care of IR.  On 9/23/19 the patient underwent left popliteal access with a venogram showing thrombosis of the popliteal, femoral, common femoral vein, as well as significant stenosis of the left common iliac vein.   She underwent rheolytic thrombectomy with follow-up venogram demonstrating improved patency of the LLE.   She then underwent right common femoral access and right internal jugular access with attempted retrieval of an IVC filter placed in 2014.  The filter could not be retrived so a venoplasty of the IVC was performed to displace the filter and allow stent placement.  Bilateral 18mm x 90mm Wall-Stents were placed in the infrarenal IVC followed bilateral 16mm x 90mm Wall-Stents.  Venoplasty  was performed with 16mm and 14mm balloons.  Repeat venogram demonstrated improved antegrade flow without collateral vessels seen.  Since before the procedure and after the procedure, she complains of pain in the right lower abdomen and groin with the sensation of something sticking her from the inside.  She denies N/V, constipation, diarrhea.  3-4 days ago she developed swelling in her legs and came to the hospital when the pain was unbearable and swelling had gotten worse.  Currently she complains of redness in her right leg with difficulty moving her foot due to swelling.  She denies CP, SOB.      Hematologic History The patient has had at least 11 DVTs and 3-4 PEs.   She was on Coumadin for years and stated she came off when she had a DVT despite a therapeutic INR.  She was then placed on Xarelto buts states she only took 1 dose and shortly after developed gallbladder pain. When she was evaluated she was told she had gallbladder inflammation.  An IVC filter was placed in 2014. She was told that it was sideways, and it has caused her significant discomfort  The patient was on Eliquis in 2015 and developed a DVT in 2016 while on Eliquis  She was followed by a Hematologist in [place], and he opted to take her off of anti-coagulation in 2018 because stated that she had an IVC filter and did not need to be anti-coagulated.  The patient was subsequently started on lovenox 1mg/kg BID on 8/22/19.    Interval History: Pt doing better. She continues with Right leg swelling. Pt s/p 1uprbc. Hb 8g/dl     Oncology Treatment Plan:   [No treatment plan]    Medications:  Continuous Infusions:   heparin (porcine) in 5 % dex 18 Units/kg/hr (11/05/19 1923)    heparin (porcine) in D5W Stopped (11/04/19 1724)    heparin (porcine) in D5W 18 Units/kg/hr (11/04/19 2131)     Scheduled Meds:   alteplase (ACTIVASE) 10 mg in 0.9% NaCl 100 mL  10 mg Intra-Catheter Once    alteplase (ACTIVASE) 10 mg in 0.9% NaCl 100 mL  10 mg Intra-Catheter Once    alteplase  4 mg Intra-Catheter Once    heparin (PORCINE)  80 Units/kg (Adjusted) Intravenous Once    polyethylene glycol  17 g Oral Daily     PRN Meds:sodium chloride, acetaminophen, diazePAM, heparin (PORCINE), heparin (PORCINE), heparin (PORCINE), heparin (PORCINE), HYDROmorphone, ondansetron, oxyCODONE-acetaminophen, sodium chloride 0.9%     Review of Systems   Constitutional: Negative for chills, fatigue and fever.   HENT: Negative for sore throat and trouble swallowing.    Eyes: Negative for visual disturbance.   Respiratory: Negative for shortness of breath.    Cardiovascular: Positive for leg swelling (right and left leg). Negative for chest pain and  palpitations.   Gastrointestinal: Negative for abdominal pain, constipation, diarrhea, nausea and vomiting.   Genitourinary: Negative for dysuria.   Musculoskeletal: Negative for arthralgias and back pain.   Skin: Negative for color change and rash.   Neurological: Negative for weakness, numbness and headaches.   Hematological: Negative for adenopathy. Does not bruise/bleed easily.     Objective:     Vital Signs (Most Recent):  Temp: 99.2 °F (37.3 °C) (11/06/19 1632)  Pulse: 97 (11/06/19 1632)  Resp: 18 (11/06/19 1632)  BP: (!) 104/57 (11/06/19 1632)  SpO2: 100 % (11/06/19 1632) Vital Signs (24h Range):  Temp:  [98.1 °F (36.7 °C)-99.2 °F (37.3 °C)] 99.2 °F (37.3 °C)  Pulse:  [82-97] 97  Resp:  [17-18] 18  SpO2:  [98 %-100 %] 100 %  BP: (102-119)/(54-71) 104/57     Weight: 76.4 kg (168 lb 6.9 oz)  Body mass index is 32.89 kg/m².  Body surface area is 1.8 meters squared.      Intake/Output Summary (Last 24 hours) at 11/6/2019 1722  Last data filed at 11/5/2019 1730  Gross per 24 hour   Intake 387.5 ml   Output --   Net 387.5 ml       Physical Exam   Constitutional: She is oriented to person, place, and time. She appears well-developed. No distress.   HENT:   Head: Normocephalic.   Eyes: Conjunctivae are normal.   Neck: Normal range of motion.   Cardiovascular: Normal rate and regular rhythm.   Pulmonary/Chest: Effort normal and breath sounds normal.   Abdominal: Soft. Bowel sounds are normal.   Musculoskeletal: Normal range of motion. She exhibits edema (Right > left).   Neurological: She is alert and oriented to person, place, and time. No cranial nerve deficit.   Skin: Skin is warm and dry. She is not diaphoretic.   Psychiatric: She has a normal mood and affect. Judgment normal.   Nursing note and vitals reviewed.      Significant Labs:   All pertinent labs within the past 24 hours have been reviewed    Diagnostic Results:  I have reviewed and interpreted all pertinent imaging results/findings within the past 24  hours    Assessment/Plan:     * DVT, recurrent, lower extremity, acute, right  Hematologic History  The patient has had at least 11 DVTs and 3-4 PEs.  -She was on Coumadin for years and stated she came off when she had a DVT despite a therapeutic INR.  -She was then placed on Xarelto buts states she only took 1 dose and shortly after developed gallbladder pain. When she was evaluated she was told she had gallbladder inflammation.  -IVC filter was placed in 2014. 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 She had a DVT in 2016 while on Eliquis  She was followed by a Hematologist in [place], and he opted to take her off of anti-coagulation in 2018 because stated that she had an IVC filter and did not need to be anti-coagulated.  -She noted symptoms of DVT , so she self resumed a previous Eliquis prescription that she had  -Presented 7/21/19 c/o pain and swelling found to have 'partial, nonocclusive thrombus in the right common femoral vein, left common femoral vein, and left external iliac vein. Superficial thrombophlebitis of the bilateral greater saphenous veins.' Was started on Lovenox.     Currently the patient has developed a new almost entirely occlusive thrombus in the common right femoral vein  - Concern is for failure of lovenox and/or provoked thrombosis from her recent procedure with LLE thrombectomy and stents placed in IVC after failed filter retrieval     - The pt is reportedly homozygous for factor V leiden  - follow up antithrombin III level, factor V leiden, lupus anticoagulant, anticardiolipin antibodies, ( IgM 25.75 MPL)and beta-2 glycoprotein levels.  - pt s/p  thrombolysis 11/4 ,  -Cont heparin drip  -Await results of w/u and consider transition to eliquis        Normocytic anemia  - Pt with worsening normocytic anemia since 8/30/19  since starting lovenox  - Pt with macrocytosis in the past as well  - reticulocyte count is decreased, suggestive of a hypoproliferative marrow.  - s/p 1uprbc   - Hb 8g/dl   - Cont to monitor counts    Factor V Leiden mutation  Pt reportedly FV leiden pos homozygous            Tori Mendoza MD  Hematology/Oncology  [facility]

## 2019-11-06 NOTE — ASSESSMENT & PLAN NOTE
HH is dropped with no sign of bleeding,,transfused one pack RBC.HH is improved.leg swelling is better.

## 2019-11-06 NOTE — SUBJECTIVE & OBJECTIVE
Interval History: clinically unchanged    Review of Systems   Respiratory: Negative.    Cardiovascular: Negative.    Gastrointestinal: Negative.    Musculoskeletal:        Right leg pain   Neurological:        Paresthesia     Objective:     Vital Signs (Most Recent):  Temp: 98.5 °F (36.9 °C) (11/06/19 0747)  Pulse: 93 (11/06/19 0747)  Resp: 18 (11/06/19 0747)  BP: 111/66 (11/06/19 0747)  SpO2: 100 % (11/06/19 0747) Vital Signs (24h Range):  Temp:  [97.8 °F (36.6 °C)-99.3 °F (37.4 °C)] 98.5 °F (36.9 °C)  Pulse:  [] 93  Resp:  [16-18] 18  SpO2:  [98 %-100 %] 100 %  BP: (102-115)/(54-66) 111/66     Weight: 76.4 kg (168 lb 6.9 oz)  Body mass index is 32.89 kg/m².    Intake/Output Summary (Last 24 hours) at 11/6/2019 1014  Last data filed at 11/5/2019 1730  Gross per 24 hour   Intake 387.5 ml   Output 750 ml   Net -362.5 ml      Physical Exam   Constitutional: She is oriented to person, place, and time. She appears well-developed. No distress.   Cardiovascular: Normal rate and regular rhythm.   Pulmonary/Chest: Effort normal and breath sounds normal.   Abdominal: Soft. Bowel sounds are normal.   Musculoskeletal: Normal range of motion. She exhibits edema (worse to R leg when compared to yesterday. not tight but with firmness to calf).   Neurological: She is alert and oriented to person, place, and time. A sensory deficit is present. No cranial nerve deficit.   Skin: Skin is warm and dry. Capillary refill takes less than 2 seconds. She is not diaphoretic.   Some bruising at reported site of lovenox injection. Not extensive   Psychiatric: She has a normal mood and affect. Her behavior is normal. Judgment and thought content normal.   Nursing note and vitals reviewed.      Significant Labs: All pertinent labs within the past 24 hours have been reviewed.    Significant Imaging: I have reviewed all pertinent imaging results/findings within the past 24 hours.  I have reviewed and interpreted all pertinent imaging  results/findings within the past 24 hours.

## 2019-11-06 NOTE — NURSING
Patient's ptt 51.3 and within therapeutic range. No changes to heperin drip at 18units/kg and at 10.4 ml per hr. Handoff was not done as the nite nurse left the unit prior to the handoff.

## 2019-11-06 NOTE — ANESTHESIA POSTPROCEDURE EVALUATION
Anesthesia Post Evaluation    Patient: Antoinette Love    Procedure(s) Performed: Procedure(s) (LRB):  thrombectomy (N/A)    Final Anesthesia Type: general  Patient location during evaluation: PACU  Patient participation: Yes- Able to Participate  Level of consciousness: awake and alert  Post-procedure vital signs: reviewed and stable  Pain management: adequate  Airway patency: patent  PONV status at discharge: No PONV  Anesthetic complications: no      Cardiovascular status: blood pressure returned to baseline and hemodynamically stable  Respiratory status: unassisted and spontaneous ventilation  Hydration status: euvolemic  Follow-up not needed.          Vitals Value Taken Time   /57 11/6/2019  4:44 AM   Temp 36.8 °C (98.3 °F) 11/6/2019  4:44 AM   Pulse 82 11/6/2019  4:44 AM   Resp 18 11/6/2019  4:44 AM   SpO2 100 % 11/6/2019  4:44 AM         Event Time     Out of Recovery 11/04/2019 20:00:00          Pain/Rosaura Score: Pain Rating Prior to Med Admin: 10 (11/6/2019  4:13 AM)  Pain Rating Post Med Admin: 4 (11/6/2019  1:55 AM)

## 2019-11-06 NOTE — ASSESSMENT & PLAN NOTE
This is a recurrent issue despite compliance to enoxaparin 80 mg every 12 hours (dose adjusted about a month ago by Dr Billy at [facility]). Agree with heparin infusion pending Hematology evaluation. I am concerned about patient's right foot paresthesia and extensive thrombus (per ultrasound: near completely occlusive thrombus of the right common femoral vein, persistent thrombus of the right greater saphenous vein and redemonstration of thrombosis left femoral, greater saphenous and popliteal veins) but on exam patient had + 1 peripheral pulses and leg is not tight.   Is s/p thrombolysis on 11/1 but was aborted due to pain associated with this. Reattempting thrombolysis today under general anesthesia. Plan for restarting eliquis at discharge once cleared by Hematology. CBC/BMP in AM.  On 11.4,S/P  Chemical thrombolysis and rheolytic thrombectomy bilateral iliofemoral veins and IVC 2. Mechanical thrombectomy and venoplasty of IVC and bilateral iliac veins with 14-mm and 16-mm balloons and bilateral femoral veins with 8-mm balloons,  S/P  Chemical thrombolysis and rheolytic thrombectomy bilateral iliofemoral veins and IVC 2. Mechanical thrombectomy and venoplasty of IVC and bilateral iliac veins with 14-mm and 16-mm balloons and bilateral femoral veins with 8-mm balloons,  Leg swelling is improved,also some fluid overload,one time IV lasix,  .

## 2019-11-06 NOTE — ASSESSMENT & PLAN NOTE
Hematologic History  The patient has had at least 11 DVTs and 3-4 PEs.  -She was on Coumadin for years and stated she came off when she had a DVT despite a therapeutic INR.  -She was then placed on Xarelto buts states she only took 1 dose and shortly after developed gallbladder pain. When she was evaluated she was told she had gallbladder inflammation.  -IVC filter was placed in 2014. 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 She had a DVT in 2016 while on Eliquis  She was followed by a Hematologist in [place], and he opted to take her off of anti-coagulation in 2018 because stated that she had an IVC filter and did not need to be anti-coagulated.  -She noted symptoms of DVT last week, so she self resumed a previous Eliquis prescription that she had  -Presented 7/21/19 c/o pain and swelling found to have 'partial, nonocclusive thrombus in the right common femoral vein, left common femoral vein, and left external iliac vein. Superficial thrombophlebitis of the bilateral greater saphenous veins.' Was started on Lovenox.    - The pt is reportedly homozygous for factor V leiden  - follow up antithrombin III level, factor V leiden, lupus anticoagulant, anticardiolipin antibodies, ( IgM 25.75 MPL)and beta-2 glycoprotein levels.  - pt s/p  thrombolysis 11/4 ,  -Cont heparin drip  -Await results of w/u and consider transition to eliquis

## 2019-11-06 NOTE — PROGRESS NOTES
"[facility] Medicine  Progress Note    Patient Name: Antoinette Love  MRN: [id number]  Patient Class: IP- Inpatient   Admission Date: 11/1/2019  Length of Stay: 5 days  Attending Physician: Karri Christianson MD  Primary Care Provider: Alberto Holguin MD        Subjective:     Principal Problem:DVT, recurrent, lower extremity, acute, right        HPI:  29 year old female with Factor V Leiden, recurrent deep vein thromboses (11 events), history of pulmonary embolisms (3-4 events) and May-Thurner syndrome while on anticoagulation who presented with right leg swelling and pain. Associated symptoms include paresthesia of right foot. Patient stated this feels similar to when she had a deep vein thrombus in left lower extremity requiring direct thrombolysis done by IR (9/2/2019). Was also planned to have IVC filter removed due to associated pain but this was unsuccessful. Patient is currently taking lovenox 80 mg every 12 hours and has been compliant with this medication. Patient denies abdominal pain, melena/bloody stools, extensive bruising, hematuria or hemoptysis.     In the ED, patient was noted to have swollen right leg. Ultrasound was obtained and showed near completely occlusive thrombus of the right common femoral vein, persistent thrombus of the right greater saphenous vein and redemonstration of thrombosis left femoral, greater saphenous and popliteal veins. Patient admitted to hospital medicine for anticoagulation and hematology consultation.          Overview/Hospital Course:  Ms Love presented with acute deep vein thrombosis of right lower extremity. Ultrasound significant for " Interval development of near completely occlusive thrombus of the right common femoral vein.  Persistent thrombus of the right greater saphenous vein is also noted." Given swelling, tenderness and acute paresthesia, thrombolysis recommended. Started on heparin infusion in the meantime. " Underwent thrombolysis on 11/1 but aborted due to pain. Paresthesia and swelling had subsided temporarily but recurred soon after. Re-attempting thrombolysis 11/4 ,S/P  Chemical thrombolysis and rheolytic thrombectomy bilateral iliofemoral veins and IVC 2. Mechanical thrombectomy and venoplasty of IVC and bilateral iliac veins with 14-mm and 16-mm balloons and bilateral femoral veins with 8-mm balloons,  HH is dropped with no sign of bleeding,transfused one pack RBC.HH is improved.leg swelling is better.    Interval History: clinically unchanged    Review of Systems   Respiratory: Negative.    Cardiovascular: Negative.    Gastrointestinal: Negative.    Musculoskeletal:        Right leg pain   Neurological:        Paresthesia     Objective:     Vital Signs (Most Recent):  Temp: 98.5 °F (36.9 °C) (11/06/19 0747)  Pulse: 93 (11/06/19 0747)  Resp: 18 (11/06/19 0747)  BP: 111/66 (11/06/19 0747)  SpO2: 100 % (11/06/19 0747) Vital Signs (24h Range):  Temp:  [97.8 °F (36.6 °C)-99.3 °F (37.4 °C)] 98.5 °F (36.9 °C)  Pulse:  [] 93  Resp:  [16-18] 18  SpO2:  [98 %-100 %] 100 %  BP: (102-115)/(54-66) 111/66     Weight: 76.4 kg (168 lb 6.9 oz)  Body mass index is 32.89 kg/m².    Intake/Output Summary (Last 24 hours) at 11/6/2019 1014  Last data filed at 11/5/2019 1730  Gross per 24 hour   Intake 387.5 ml   Output 750 ml   Net -362.5 ml      Physical Exam   Constitutional: She is oriented to person, place, and time. She appears well-developed. No distress.   Cardiovascular: Normal rate and regular rhythm.   Pulmonary/Chest: Effort normal and breath sounds normal.   Abdominal: Soft. Bowel sounds are normal.   Musculoskeletal: Normal range of motion. She exhibits edema (worse to R leg when compared to yesterday. not tight but with firmness to calf).   Neurological: She is alert and oriented to person, place, and time. A sensory deficit is present. No cranial nerve deficit.   Skin: Skin is warm and dry. Capillary refill takes  less than 2 seconds. She is not diaphoretic.   Some bruising at reported site of lovenox injection. Not extensive   Psychiatric: She has a normal mood and affect. Her behavior is normal. Judgment and thought content normal.   Nursing note and vitals reviewed.      Significant Labs: All pertinent labs within the past 24 hours have been reviewed.    Significant Imaging: I have reviewed all pertinent imaging results/findings within the past 24 hours.  I have reviewed and interpreted all pertinent imaging results/findings within the past 24 hours.      Assessment/Plan:      * DVT, recurrent, lower extremity, acute, right  This is a recurrent issue despite compliance to enoxaparin 80 mg every 12 hours (dose adjusted about a month ago by Dr Billy at [facility]). Agree with heparin infusion pending Hematology evaluation. I am concerned about patient's right foot paresthesia and extensive thrombus (per ultrasound: near completely occlusive thrombus of the right common femoral vein, persistent thrombus of the right greater saphenous vein and redemonstration of thrombosis left femoral, greater saphenous and popliteal veins) but on exam patient had + 1 peripheral pulses and leg is not tight.   Is s/p thrombolysis on 11/1 but was aborted due to pain associated with this. Reattempting thrombolysis today under general anesthesia. Plan for restarting eliquis at discharge once cleared by Hematology. CBC/BMP in AM.  On 11.4,S/P  Chemical thrombolysis and rheolytic thrombectomy bilateral iliofemoral veins and IVC 2. Mechanical thrombectomy and venoplasty of IVC and bilateral iliac veins with 14-mm and 16-mm balloons and bilateral femoral veins with 8-mm balloons,  S/P  Chemical thrombolysis and rheolytic thrombectomy bilateral iliofemoral veins and IVC 2. Mechanical thrombectomy and venoplasty of IVC and bilateral iliac veins with 14-mm and 16-mm balloons and bilateral femoral veins with 8-mm balloons,  Leg swelling is improved,also  some fluid overload,one time IV lasix,  .    Bradycardia  Resolved       Normocytic anemia    HH is dropped with no sign of bleeding,,transfused one pack RBC.HH is improved.leg swelling is better.      Paresthesia of right foot  2/2 occlusive thrombus  Paresthesia resolved       History of deep venous thrombosis (DVT) of distal vein of left lower extremity  Required thrombolysis for occlusive thrombus      Presence of IVC filter        May-Thurner syndrome  noted      Factor V Leiden mutation  Followed by Hematology  Management for acute event as above        VTE Risk Mitigation (From admission, onward)         Ordered     heparin 25,000 units in dextrose 5% 250 mL (100 units/mL) infusion (heparin infusion)  Continuous      11/04/19 2001     heparin 25,000 units in dextrose 5% (100 units/ml) IV bolus from bag INITIAL BOLUS  Once      11/04/19 2001     heparin 25,000 units in dextrose 5% 250 mL (100 units/mL) infusion HIGH INTENSITY nomogram - [facility]  Continuous      11/04/19 2001     heparin 25,000 units in dextrose 5% (100 units/ml) IV bolus from bag - ADDITIONAL PRN BOLUS - 60 units/kg  As needed (PRN)      11/04/19 2001     heparin 25,000 units in dextrose 5% (100 units/ml) IV bolus from bag - ADDITIONAL PRN BOLUS - 30 units/kg  As needed (PRN)      11/04/19 2001     Place sequential compression device  Until discontinued      11/01/19 0541     IP VTE HIGH RISK PATIENT  Once      11/01/19 0541     heparin 25,000 units in dextrose 5% 250 mL (100 units/mL) infusion HIGH INTENSITY nomogram - [facility]  Continuous      11/01/19 0249     heparin 25,000 units in dextrose 5% (100 units/ml) IV bolus from bag - ADDITIONAL PRN BOLUS - 60 units/kg  As needed (PRN)      11/01/19 0249     heparin 25,000 units in dextrose 5% (100 units/ml) IV bolus from bag - ADDITIONAL PRN BOLUS - 30 units/kg  As needed (PRN)      11/01/19 0249                      Karri Christianson MD  Department of Hospital Medicine   [facility]

## 2019-11-06 NOTE — SUBJECTIVE & OBJECTIVE
Interval History: Pt doing better. She continues with Right leg swelling. Pt s/p 1uprbc. Hb 8g/dl     Oncology Treatment Plan:   [No treatment plan]    Medications:  Continuous Infusions:   heparin (porcine) in 5 % dex 18 Units/kg/hr (11/05/19 1923)    heparin (porcine) in D5W Stopped (11/04/19 1724)    heparin (porcine) in D5W 18 Units/kg/hr (11/04/19 2131)     Scheduled Meds:   alteplase (ACTIVASE) 10 mg in 0.9% NaCl 100 mL  10 mg Intra-Catheter Once    alteplase (ACTIVASE) 10 mg in 0.9% NaCl 100 mL  10 mg Intra-Catheter Once    alteplase  4 mg Intra-Catheter Once    heparin (PORCINE)  80 Units/kg (Adjusted) Intravenous Once    polyethylene glycol  17 g Oral Daily     PRN Meds:sodium chloride, acetaminophen, diazePAM, heparin (PORCINE), heparin (PORCINE), heparin (PORCINE), heparin (PORCINE), HYDROmorphone, ondansetron, oxyCODONE-acetaminophen, sodium chloride 0.9%     Review of Systems   Constitutional: Negative for chills, fatigue and fever.   HENT: Negative for sore throat and trouble swallowing.    Eyes: Negative for visual disturbance.   Respiratory: Negative for shortness of breath.    Cardiovascular: Positive for leg swelling (right and left leg). Negative for chest pain and palpitations.   Gastrointestinal: Negative for abdominal pain, constipation, diarrhea, nausea and vomiting.   Genitourinary: Negative for dysuria.   Musculoskeletal: Negative for arthralgias and back pain.   Skin: Negative for color change and rash.   Neurological: Negative for weakness, numbness and headaches.   Hematological: Negative for adenopathy. Does not bruise/bleed easily.     Objective:     Vital Signs (Most Recent):  Temp: 99.2 °F (37.3 °C) (11/06/19 1632)  Pulse: 97 (11/06/19 1632)  Resp: 18 (11/06/19 1632)  BP: (!) 104/57 (11/06/19 1632)  SpO2: 100 % (11/06/19 1632) Vital Signs (24h Range):  Temp:  [98.1 °F (36.7 °C)-99.2 °F (37.3 °C)] 99.2 °F (37.3 °C)  Pulse:  [82-97] 97  Resp:  [17-18] 18  SpO2:  [98 %-100 %] 100  %  BP: (102-119)/(54-71) 104/57     Weight: 76.4 kg (168 lb 6.9 oz)  Body mass index is 32.89 kg/m².  Body surface area is 1.8 meters squared.      Intake/Output Summary (Last 24 hours) at 11/6/2019 1722  Last data filed at 11/5/2019 1730  Gross per 24 hour   Intake 387.5 ml   Output --   Net 387.5 ml       Physical Exam   Constitutional: She is oriented to person, place, and time. She appears well-developed. No distress.   HENT:   Head: Normocephalic.   Eyes: Conjunctivae are normal.   Neck: Normal range of motion.   Cardiovascular: Normal rate and regular rhythm.   Pulmonary/Chest: Effort normal and breath sounds normal.   Abdominal: Soft. Bowel sounds are normal.   Musculoskeletal: Normal range of motion. She exhibits edema (Right > left).   Neurological: She is alert and oriented to person, place, and time. No cranial nerve deficit.   Skin: Skin is warm and dry. She is not diaphoretic.   Psychiatric: She has a normal mood and affect. Judgment normal.   Nursing note and vitals reviewed.      Significant Labs:   All pertinent labs within the past 24 hours have been reviewed    Diagnostic Results:  I have reviewed and interpreted all pertinent imaging results/findings within the past 24 hours

## 2019-11-07 VITALS
TEMPERATURE: 98 F | DIASTOLIC BLOOD PRESSURE: 56 MMHG | HEIGHT: 60 IN | RESPIRATION RATE: 18 BRPM | SYSTOLIC BLOOD PRESSURE: 117 MMHG | OXYGEN SATURATION: 99 % | BODY MASS INDEX: 31.82 KG/M2 | HEART RATE: 71 BPM | WEIGHT: 162.06 LBS

## 2019-11-07 LAB
ALBUMIN SERPL BCP-MCNC: 2.7 G/DL (ref 3.5–5.2)
ALP SERPL-CCNC: 79 U/L (ref 55–135)
ALT SERPL W/O P-5'-P-CCNC: 8 U/L (ref 10–44)
ANION GAP SERPL CALC-SCNC: 9 MMOL/L (ref 8–16)
APTT BLDCRRT: 56.1 SEC (ref 21–32)
APTT HEX PL PPP: POSITIVE S
AST SERPL-CCNC: 10 U/L (ref 10–40)
B2 GLYCOPROT1 IGA SER QL: <9 SAU
B2 GLYCOPROT1 IGG SER QL: <9 SGU
B2 GLYCOPROT1 IGM SER QL: 10 SMU
BASOPHILS # BLD AUTO: 0.01 K/UL (ref 0–0.2)
BASOPHILS NFR BLD: 0.4 % (ref 0–1.9)
BILIRUB SERPL-MCNC: 0.4 MG/DL (ref 0.1–1)
BUN SERPL-MCNC: 8 MG/DL (ref 6–20)
CALCIUM SERPL-MCNC: 9 MG/DL (ref 8.7–10.5)
CHLORIDE SERPL-SCNC: 103 MMOL/L (ref 95–110)
CO2 SERPL-SCNC: 27 MMOL/L (ref 23–29)
CREAT SERPL-MCNC: 0.7 MG/DL (ref 0.5–1.4)
DIFFERENTIAL METHOD: ABNORMAL
EOSINOPHIL # BLD AUTO: 0.1 K/UL (ref 0–0.5)
EOSINOPHIL NFR BLD: 2.8 % (ref 0–8)
ERYTHROCYTE [DISTWIDTH] IN BLOOD BY AUTOMATED COUNT: 14 % (ref 11.5–14.5)
EST. GFR  (AFRICAN AMERICAN): >60 ML/MIN/1.73 M^2
EST. GFR  (NON AFRICAN AMERICAN): >60 ML/MIN/1.73 M^2
GLUCOSE SERPL-MCNC: 92 MG/DL (ref 70–110)
HCT VFR BLD AUTO: 25.9 % (ref 37–48.5)
HGB BLD-MCNC: 8.2 G/DL (ref 12–16)
IMM GRANULOCYTES # BLD AUTO: 0.06 K/UL (ref 0–0.04)
IMM GRANULOCYTES NFR BLD AUTO: 2.4 % (ref 0–0.5)
LYMPHOCYTES # BLD AUTO: 0.8 K/UL (ref 1–4.8)
LYMPHOCYTES NFR BLD: 33.6 % (ref 18–48)
MCH RBC QN AUTO: 28.8 PG (ref 27–31)
MCHC RBC AUTO-ENTMCNC: 31.7 G/DL (ref 32–36)
MCV RBC AUTO: 91 FL (ref 82–98)
MONOCYTES # BLD AUTO: 0.2 K/UL (ref 0.3–1)
MONOCYTES NFR BLD: 7.7 % (ref 4–15)
NEUTROPHILS # BLD AUTO: 1.3 K/UL (ref 1.8–7.7)
NEUTROPHILS NFR BLD: 53.1 % (ref 38–73)
NRBC BLD-RTO: 0 /100 WBC
PLATELET # BLD AUTO: 136 K/UL (ref 150–350)
PLATELET BLD QL SMEAR: ABNORMAL
PMV BLD AUTO: 9.6 FL (ref 9.2–12.9)
POTASSIUM SERPL-SCNC: 3.7 MMOL/L (ref 3.5–5.1)
PROT SERPL-MCNC: 6.4 G/DL (ref 6–8.4)
RBC # BLD AUTO: 2.85 M/UL (ref 4–5.4)
SODIUM SERPL-SCNC: 139 MMOL/L (ref 136–145)
WBC # BLD AUTO: 2.47 K/UL (ref 3.9–12.7)

## 2019-11-07 PROCEDURE — 85025 COMPLETE CBC W/AUTO DIFF WBC: CPT

## 2019-11-07 PROCEDURE — 25000003 PHARM REV CODE 250: Performed by: INTERNAL MEDICINE

## 2019-11-07 PROCEDURE — 36415 COLL VENOUS BLD VENIPUNCTURE: CPT

## 2019-11-07 PROCEDURE — 80053 COMPREHEN METABOLIC PANEL: CPT

## 2019-11-07 PROCEDURE — 63600175 PHARM REV CODE 636 W HCPCS: Performed by: INTERNAL MEDICINE

## 2019-11-07 PROCEDURE — 85730 THROMBOPLASTIN TIME PARTIAL: CPT

## 2019-11-07 RX ORDER — OXYCODONE AND ACETAMINOPHEN 7.5; 325 MG/1; MG/1
1 TABLET ORAL EVERY 6 HOURS PRN
Qty: 20 TABLET | Refills: 0 | Status: SHIPPED | OUTPATIENT
Start: 2019-11-07 | End: 2019-11-14

## 2019-11-07 RX ADMIN — HYDROMORPHONE HYDROCHLORIDE 1 MG: 2 INJECTION, SOLUTION INTRAMUSCULAR; INTRAVENOUS; SUBCUTANEOUS at 09:11

## 2019-11-07 RX ADMIN — HYDROMORPHONE HYDROCHLORIDE 1 MG: 2 INJECTION, SOLUTION INTRAMUSCULAR; INTRAVENOUS; SUBCUTANEOUS at 05:11

## 2019-11-07 RX ADMIN — OXYCODONE HYDROCHLORIDE AND ACETAMINOPHEN 1 TABLET: 7.5; 325 TABLET ORAL at 12:11

## 2019-11-07 RX ADMIN — POLYETHYLENE GLYCOL 3350 17 G: 17 POWDER, FOR SOLUTION ORAL at 09:11

## 2019-11-07 RX ADMIN — HYDROMORPHONE HYDROCHLORIDE 1 MG: 2 INJECTION, SOLUTION INTRAMUSCULAR; INTRAVENOUS; SUBCUTANEOUS at 02:11

## 2019-11-07 NOTE — NURSING
Patient escorted by RN to family vehicle for discharge home. Patient accompanied by moe. No apparent distress noted.

## 2019-11-07 NOTE — PLAN OF CARE
11/07/19 1120   Final Note   Assessment Type Final Discharge Note   Anticipated Discharge Disposition Home   What phone number can be called within the next 1-3 days to see how you are doing after discharge? [phone]   Hospital Follow Up  Appt(s) scheduled? Yes   Discharge plans and expectations educations in teach back method with documentation complete? Yes   Right Care Referral Info   Post Acute Recommendation No Care

## 2019-11-07 NOTE — DISCHARGE SUMMARY
"[facility] Medicine  Discharge Summary      Patient Name: Antoientte Love  MRN: [id number]  Admission Date: 11/1/2019  Hospital Length of Stay: 6 days  Discharge Date and Time:  11/07/2019 5:12 PM  Attending Physician: Grisel att. providers found   Discharging Provider: Karri Christianson MD  Primary Care Provider: Alberto Holguin MD      HPI:   29 year old female with Factor V Leiden, recurrent deep vein thromboses (11 events), history of pulmonary embolisms (3-4 events) and May-Thurner syndrome while on anticoagulation who presented with right leg swelling and pain. Associated symptoms include paresthesia of right foot. Patient stated this feels similar to when she had a deep vein thrombus in left lower extremity requiring direct thrombolysis done by IR (9/2/2019). Was also planned to have IVC filter removed due to associated pain but this was unsuccessful. Patient is currently taking lovenox 80 mg every 12 hours and has been compliant with this medication. Patient denies abdominal pain, melena/bloody stools, extensive bruising, hematuria or hemoptysis.     In the ED, patient was noted to have swollen right leg. Ultrasound was obtained and showed near completely occlusive thrombus of the right common femoral vein, persistent thrombus of the right greater saphenous vein and redemonstration of thrombosis left femoral, greater saphenous and popliteal veins. Patient admitted to hospital medicine for anticoagulation and hematology consultation.          Procedure(s) (LRB):  thrombectomy (N/A)      Hospital Course:   Ms Love presented with acute deep vein thrombosis of right lower extremity. Ultrasound significant for " Interval development of near completely occlusive thrombus of the right common femoral vein.  Persistent thrombus of the right greater saphenous vein is also noted." Given swelling, tenderness and acute paresthesia, thrombolysis recommended. Started on heparin " infusion in the meantime. Underwent thrombolysis on 11/1 but aborted due to pain. Paresthesia and swelling had subsided temporarily but recurred soon after. Re-attempting thrombolysis 11/4 ,S/P  Chemical thrombolysis and rheolytic thrombectomy bilateral iliofemoral veins and IVC 2. Mechanical thrombectomy and venoplasty of IVC and bilateral iliac veins with 14-mm and 16-mm balloons and bilateral femoral veins with 8-mm balloons,  HH is dropped with no sign of bleeding,transfused one pack RBC.HH is improved.leg swelling is much improved,hematology was following,since patient failed in the past Lovenox,and had good response to Eliquis,hematology recommended discharge home with eliquis,patient remains stable on RA during hospitalization,she has been discharged home with eliquis and close follow up with hematology.     Consults:   Consults (From admission, onward)        Status Ordering Provider     Inpatient consult to Cardiology  Once     Provider:  Bk Arroyo MD    Acknowledged BK REID     Inpatient consult to Hematology  Once     Provider:  Tori Mendoza MD    Completed JULIO CESAR BOB     Inpatient [facility]ology/[facility]cology - [facility]  Once     Provider:  Tori Mendoza MD    Completed JULIO CESAR BOB     Inpatient consult to Interventional Radiology  Once     Provider:  Juan Acosta MD    Completed RENATA MABRY     Inpatient consult to Social Work  Once     Provider:  (Not yet assigned)    Completed ABDOULAYE MATAMOROS     Inpatient consult to Vascular Surgery  Once     Provider:  Renata Mabry MD    Completed JULIO CESAR BOB          No new Assessment & Plan notes have been filed under this hospital service since the last note was generated.  Service: Hospital Medicine    Final Active Diagnoses:    Diagnosis Date Noted POA    PRINCIPAL PROBLEM:  DVT, recurrent, lower extremity, acute, right [I82.401] 11/01/2019 Yes    Bradycardia [R00.1] 11/02/2019 Yes     History of deep venous thrombosis (DVT) of distal vein of left lower extremity [Z86.718] 11/01/2019 Not Applicable    Paresthesia of right foot [R20.2] 11/01/2019 Yes    Normocytic anemia [D64.9] 11/01/2019 Yes    Leukopenia [D72.819] 11/01/2019 Yes    Presence of IVC filter [Z95.828] 09/23/2019 Not Applicable    May-Thurner syndrome [I87.1] 09/04/2019 Yes    Factor V Leiden mutation [D68.51] 03/18/2016 Yes      Problems Resolved During this Admission:       Discharged Condition: stable    Disposition: Home or Self Care    Follow Up:  Follow-up Information     Alberto Holguin MD On 11/12/2019.    Specialty:  Internal Medicine  Why:  out patient services:  1:20pm follow up from the hospital  Contact information:  [address]  [address]  [place] [address]  [phone]             Arturo Dorsey MD On 11/18/2019.    Specialties:  Vascular Surgery, Surgery  Why:  Outpatient Services, Vascular, follow-up appointment @ 11:20AM  Contact information:  [address]  [address]  [place] [facility] [address]  [phone]             Earnestine Garcia MD.    Specialty:  Hematology and Oncology  Why:  Office will contact patient to schedule hospital follow-up appointment.   Contact information:  [address]  [facility] [address]  [phone]                 Patient Instructions:      Pelvic Rest       Significant Diagnostic Studies: Labs:   BMP:   Recent Labs   Lab 11/06/19  0410 11/07/19  0442   GLU 86 92    139   K 4.3 3.7    103   CO2 24 27   BUN 7 8   CREATININE 0.8 0.7   CALCIUM 8.2* 9.0   , CMP   Recent Labs   Lab 11/06/19  0410 11/07/19  0442    139   K 4.3 3.7    103   CO2 24 27   GLU 86 92   BUN 7 8   CREATININE 0.8 0.7   CALCIUM 8.2* 9.0   PROT 5.5* 6.4   ALBUMIN 2.5* 2.7*   BILITOT 0.3 0.4   ALKPHOS 77 79   AST 19 10   ALT 10 8*   ANIONGAP 6* 9   ESTGFRAFRICA >60 >60   EGFRNONAA >60 >60    and CBC   Recent Labs   Lab 11/06/19  0410 11/07/19  0442   WBC 3.46* 2.47*   HGB  8.0* 8.2*   HCT 26.2* 25.9*   * 136*     Radiology: X-Ray: CXR: X-Ray Chest 1 View (CXR): No results found for this visit on 11/01/19.    Pending Diagnostic Studies:     Procedure Component Value Units Date/Time    DRVVT [[id number]] Collected:  11/02/19 0617    Order Status:  Sent Lab Status:  In process Updated:  11/04/19 1135    Specimen:  Blood     Factor 5 leiden [[id number]] Collected:  11/01/19 0813    Order Status:  Sent Lab Status:  In process Updated:  11/01/19 1148    Specimen:  Blood     IR Thrombectomy Mechanical Art 1st Vesse [[id number]] Resulted:  11/04/19 1448    Order Status:  Sent Lab Status:  In process Updated:  11/04/19 1758    IR Thrombectomy Mechanical Art 2nd vesse [[id number]] Resulted:  11/04/19 1448    Order Status:  Sent Lab Status:  In process Updated:  11/04/19 1758    IR Thrombectomy Veins Inc Thrombolysis and Fluoro [[id number]] Resulted:  11/04/19 1356    Order Status:  Sent Lab Status:  In process Updated:  11/04/19 1758    IR Ultrasound Guidance [[id number]] Resulted:  11/04/19 1448    Order Status:  Sent Lab Status:  In process Updated:  11/04/19 1758    IR Venocavagram Inferior [[id number]] Resulted:  11/04/19 1746    Order Status:  Sent Lab Status:  In process Updated:  11/04/19 1758         Medications:  Reconciled Home Medications:      Medication List      START taking these medications    apixaban 5 mg Tab  Commonly known as:  ELIQUIS  Take 1 tablet (5 mg total) by mouth 2 (two) times daily.        CONTINUE taking these medications    diazePAM 5 MG tablet  Commonly known as:  VALIUM  TAKE 1 TABLET (5 MG TOTAL) BY MOUTH EVERY 12 (TWELVE) HOURS AS NEEDED FOR ANXIETY.     EPINEPHrine 0.3 mg/0.3 mL Atin  Commonly known as:  EPIPEN 2-VAN  Inject 0.3 mLs (0.3 mg total) into the muscle once. for 1 dose     ondansetron 8 MG Tbdl  Commonly known as:  ZOFRAN-ODT  Take 1 tablet (8 mg total) by mouth every 8 (eight) hours as needed (nausea).     oxyCODONE-acetaminophen 7.5-325 mg per  tablet  Commonly known as:  PERCOCET  Take 1 tablet by mouth every 6 (six) hours as needed for Pain.        STOP taking these medications    enoxaparin 80 mg/0.8 mL Syrg  Commonly known as:  LOVENOX            Indwelling Lines/Drains at time of discharge:   Lines/Drains/Airways     None                 Time spent on the discharge of patient: over 30  minutes  Patient was seen and examined on the date of discharge and determined to be suitable for discharge.         Karri Christianson MD  Department of Hospital Medicine  [facility]

## 2019-11-07 NOTE — PLAN OF CARE
"   11/07/19 1014   Post-Acute Status   Post-Acute Authorization Other  (Home )   Other Status Awaiting f/u Appts   Discharge Delays None known at this time     JOHN verified with pt her hematologist is Dr. Garcia. JOHN attempted to schedule appointment via epic, however, there were no available appointments. JOHN contacted scheduling, JOHN spoke with JOHN Jiménez notified Dr. Garcia's office will contact pt with appointment time and date.     EDUCATION:  Pt provided with educational information on DVT.  Information reviewed and placed in :My Healthcare Packet" to be brought home for  use as resource after discharge.  Information included:  signs and symptoms to look for at discharge. JOHN instructed pt to call the doctor if experiencing symptoms that may indicate a medical emergency: CALL 911 if signs and symptoms worsen. JOHN asked pt to provide SW with two signs and symptoms recently discussed.  Pt stated, "swelling". Reminded pt things she will be responsible for to manage her healthcare at home: getting Rx filled, attending follow up appointments, and taking medication as prescribed were discussed.   Teach back method used.  All questions answered.  Patient verbalized understanding of all information.     JOHN provided pt with a copy of follow-up appointments. JOHN explained/highlighted date, time, and location of each appointment. JOHN provided pt with a blue folder and instructed pt to place all medical documents in blue folder. JOHN explained to pt the nurse will provide an AVS with diagnosis and instructed pt to place in blue folder and bring to follow-up appointment. JOHN notified pt's nurse, Cammy, @ 10:56AM all CM needs have been met.      "

## 2019-11-07 NOTE — SUBJECTIVE & OBJECTIVE
Interval History:leg swelling is much better.    Review of Systems   Constitutional: Negative for activity change and appetite change.   HENT: Negative for congestion, dental problem and drooling.    Genitourinary: Negative for difficulty urinating and dyspareunia.   Musculoskeletal: Negative for arthralgias and back pain.   Allergic/Immunologic: Negative for environmental allergies and food allergies.   Hematological: Negative for adenopathy. Does not bruise/bleed easily.     Objective:     Vital Signs (Most Recent):  Temp: 97.7 °F (36.5 °C) (11/07/19 1119)  Pulse: 71 (11/07/19 1119)  Resp: 18 (11/07/19 1119)  BP: (!) 117/56 (11/07/19 1119)  SpO2: 99 % (11/07/19 1119) Vital Signs (24h Range):  Temp:  [97.3 °F (36.3 °C)-101.8 °F (38.8 °C)] 97.7 °F (36.5 °C)  Pulse:  [] 71  Resp:  [18] 18  SpO2:  [96 %-100 %] 99 %  BP: (103-121)/(55-60) 117/56     Weight: 73.5 kg (162 lb 0.6 oz)  Body mass index is 31.65 kg/m².    Intake/Output Summary (Last 24 hours) at 11/7/2019 1244  Last data filed at 11/7/2019 0356  Gross per 24 hour   Intake 270 ml   Output --   Net 270 ml      Physical Exam   Constitutional: She is oriented to person, place, and time. No distress.   Eyes: EOM are normal.   Neck: Neck supple.   Cardiovascular: Normal rate and regular rhythm.   Pulmonary/Chest: Effort normal and breath sounds normal.   Abdominal: She exhibits no distension. There is no tenderness.   Musculoskeletal: Normal range of motion.   Neurological: She is oriented to person, place, and time. No cranial nerve deficit. Coordination normal.       Significant Labs:   BMP:   Recent Labs   Lab 11/07/19 0442   GLU 92      K 3.7      CO2 27   BUN 8   CREATININE 0.7   CALCIUM 9.0     CBC:   Recent Labs   Lab 11/06/19  0003 11/06/19  0410 11/07/19 0442   WBC  --  3.46* 2.47*   HGB  --  8.0* 8.2*   HCT 24.9* 26.2* 25.9*   PLT  --  139* 136*     CMP:   Recent Labs   Lab 11/06/19  0410 11/07/19  0442    139   K 4.3 3.7   CL  110 103   CO2 24 27   GLU 86 92   BUN 7 8   CREATININE 0.8 0.7   CALCIUM 8.2* 9.0   PROT 5.5* 6.4   ALBUMIN 2.5* 2.7*   BILITOT 0.3 0.4   ALKPHOS 77 79   AST 19 10   ALT 10 8*   ANIONGAP 6* 9   EGFRNONAA >60 >60       Significant Imaging: reviewed.

## 2019-11-07 NOTE — PROGRESS NOTES
"[facility] Medicine  Progress Note    Patient Name: Antoinette Love  MRN: [id number]  Patient Class: IP- Inpatient   Admission Date: 11/1/2019  Length of Stay: 6 days  Attending Physician: Karri Christianson MD  Primary Care Provider: Alberto Holguin MD        Subjective:     Principal Problem:DVT, recurrent, lower extremity, acute, right        HPI:  29 year old female with Factor V Leiden, recurrent deep vein thromboses (11 events), history of pulmonary embolisms (3-4 events) and May-Thurner syndrome while on anticoagulation who presented with right leg swelling and pain. Associated symptoms include paresthesia of right foot. Patient stated this feels similar to when she had a deep vein thrombus in left lower extremity requiring direct thrombolysis done by IR (9/2/2019). Was also planned to have IVC filter removed due to associated pain but this was unsuccessful. Patient is currently taking lovenox 80 mg every 12 hours and has been compliant with this medication. Patient denies abdominal pain, melena/bloody stools, extensive bruising, hematuria or hemoptysis.     In the ED, patient was noted to have swollen right leg. Ultrasound was obtained and showed near completely occlusive thrombus of the right common femoral vein, persistent thrombus of the right greater saphenous vein and redemonstration of thrombosis left femoral, greater saphenous and popliteal veins. Patient admitted to hospital medicine for anticoagulation and hematology consultation.          Overview/Hospital Course:  Ms Love presented with acute deep vein thrombosis of right lower extremity. Ultrasound significant for " Interval development of near completely occlusive thrombus of the right common femoral vein.  Persistent thrombus of the right greater saphenous vein is also noted." Given swelling, tenderness and acute paresthesia, thrombolysis recommended. Started on heparin infusion in the meantime. " Underwent thrombolysis on 11/1 but aborted due to pain. Paresthesia and swelling had subsided temporarily but recurred soon after. Re-attempting thrombolysis 11/4 ,S/P  Chemical thrombolysis and rheolytic thrombectomy bilateral iliofemoral veins and IVC 2. Mechanical thrombectomy and venoplasty of IVC and bilateral iliac veins with 14-mm and 16-mm balloons and bilateral femoral veins with 8-mm balloons,  HH is dropped with no sign of bleeding,transfused one pack RBC.HH is improved.leg swelling is better.plan for going home with eliqu can be arranged.    Interval History:leg swelling is much better.    Review of Systems   Constitutional: Negative for activity change and appetite change.   HENT: Negative for congestion, dental problem and drooling.    Genitourinary: Negative for difficulty urinating and dyspareunia.   Musculoskeletal: Negative for arthralgias and back pain.   Allergic/Immunologic: Negative for environmental allergies and food allergies.   Hematological: Negative for adenopathy. Does not bruise/bleed easily.     Objective:     Vital Signs (Most Recent):  Temp: 97.7 °F (36.5 °C) (11/07/19 1119)  Pulse: 71 (11/07/19 1119)  Resp: 18 (11/07/19 1119)  BP: (!) 117/56 (11/07/19 1119)  SpO2: 99 % (11/07/19 1119) Vital Signs (24h Range):  Temp:  [97.3 °F (36.3 °C)-101.8 °F (38.8 °C)] 97.7 °F (36.5 °C)  Pulse:  [] 71  Resp:  [18] 18  SpO2:  [96 %-100 %] 99 %  BP: (103-121)/(55-60) 117/56     Weight: 73.5 kg (162 lb 0.6 oz)  Body mass index is 31.65 kg/m².    Intake/Output Summary (Last 24 hours) at 11/7/2019 1244  Last data filed at 11/7/2019 0356  Gross per 24 hour   Intake 270 ml   Output --   Net 270 ml      Physical Exam   Constitutional: She is oriented to person, place, and time. No distress.   Eyes: EOM are normal.   Neck: Neck supple.   Cardiovascular: Normal rate and regular rhythm.   Pulmonary/Chest: Effort normal and breath sounds normal.   Abdominal: She exhibits no distension. There is no  tenderness.   Musculoskeletal: Normal range of motion.   Neurological: She is oriented to person, place, and time. No cranial nerve deficit. Coordination normal.       Significant Labs:   BMP:   Recent Labs   Lab 11/07/19  0442   GLU 92      K 3.7      CO2 27   BUN 8   CREATININE 0.7   CALCIUM 9.0     CBC:   Recent Labs   Lab 11/06/19  0003 11/06/19  0410 11/07/19 0442   WBC  --  3.46* 2.47*   HGB  --  8.0* 8.2*   HCT 24.9* 26.2* 25.9*   PLT  --  139* 136*     CMP:   Recent Labs   Lab 11/06/19  0410 11/07/19 0442    139   K 4.3 3.7    103   CO2 24 27   GLU 86 92   BUN 7 8   CREATININE 0.8 0.7   CALCIUM 8.2* 9.0   PROT 5.5* 6.4   ALBUMIN 2.5* 2.7*   BILITOT 0.3 0.4   ALKPHOS 77 79   AST 19 10   ALT 10 8*   ANIONGAP 6* 9   EGFRNONAA >60 >60       Significant Imaging: reviewed.      Assessment/Plan:      * DVT, recurrent, lower extremity, acute, right  This is a recurrent issue despite compliance to enoxaparin 80 mg every 12 hours (dose adjusted about a month ago by Dr Billy at [facility]). Agree with heparin infusion pending Hematology evaluation. I am concerned about patient's right foot paresthesia and extensive thrombus (per ultrasound: near completely occlusive thrombus of the right common femoral vein, persistent thrombus of the right greater saphenous vein and redemonstration of thrombosis left femoral, greater saphenous and popliteal veins) but on exam patient had + 1 peripheral pulses and leg is not tight.   Is s/p thrombolysis on 11/1 but was aborted due to pain associated with this. Reattempting thrombolysis today under general anesthesia. Plan for restarting eliquis at discharge once cleared by Hematology. CBC/BMP in AM.  On 11.4,S/P  Chemical thrombolysis and rheolytic thrombectomy bilateral iliofemoral veins and IVC 2. Mechanical thrombectomy and venoplasty of IVC and bilateral iliac veins with 14-mm and 16-mm balloons and bilateral femoral veins with 8-mm balloons,  S/P   Chemical thrombolysis and rheolytic thrombectomy bilateral iliofemoral veins and IVC 2. Mechanical thrombectomy and venoplasty of IVC and bilateral iliac veins with 14-mm and 16-mm balloons and bilateral femoral veins with 8-mm balloons,  Leg swelling is improved,also some fluid overload,one time IV lasix,plan for going home with eliquis can be arranged.  .    Bradycardia  Resolved       Normocytic anemia    HH is dropped with no sign of bleeding,,transfused one pack RBC.HH is improved.leg swelling is better.      Paresthesia of right foot  2/2 occlusive thrombus  Paresthesia resolved       History of deep venous thrombosis (DVT) of distal vein of left lower extremity  Required thrombolysis for occlusive thrombus,      Presence of IVC filter        May-Thurner syndrome  noted      Factor V Leiden mutation  Followed by Hematology  Management for acute event as above        VTE Risk Mitigation (From admission, onward)         Ordered     Place sequential compression device  Until discontinued      11/01/19 0541     IP VTE HIGH RISK PATIENT  Once      11/01/19 0541                      Karri Christianson MD  Department of Hospital Medicine   [facility]

## 2019-11-07 NOTE — NURSING
Discharge instructions given to patient and niya Poe at bedside. Patient verbalized understanding of instructions. Patient states willingness to comply. Saline lock removed. Tele monitoring removed. Transport requested

## 2019-11-07 NOTE — PLAN OF CARE
11/07/19 1120   Final Note   Assessment Type Final Discharge Note   Anticipated Discharge Disposition Home   What phone number can be called within the next 1-3 days to see how you are doing after discharge? [phone]   Hospital Follow Up  Appt(s) scheduled? Yes   Discharge plans and expectations educations in teach back method with documentation complete? Yes   Right Care Referral Info   Post Acute Recommendation No Care     SW contacted [facility] @ [phone] to determine if discount cards are available.

## 2019-11-07 NOTE — ASSESSMENT & PLAN NOTE
This is a recurrent issue despite compliance to enoxaparin 80 mg every 12 hours (dose adjusted about a month ago by Dr Billy at [facility]). Agree with heparin infusion pending Hematology evaluation. I am concerned about patient's right foot paresthesia and extensive thrombus (per ultrasound: near completely occlusive thrombus of the right common femoral vein, persistent thrombus of the right greater saphenous vein and redemonstration of thrombosis left femoral, greater saphenous and popliteal veins) but on exam patient had + 1 peripheral pulses and leg is not tight.   Is s/p thrombolysis on 11/1 but was aborted due to pain associated with this. Reattempting thrombolysis today under general anesthesia. Plan for restarting eliquis at discharge once cleared by Hematology. CBC/BMP in AM.  On 11.4,S/P  Chemical thrombolysis and rheolytic thrombectomy bilateral iliofemoral veins and IVC 2. Mechanical thrombectomy and venoplasty of IVC and bilateral iliac veins with 14-mm and 16-mm balloons and bilateral femoral veins with 8-mm balloons,  S/P  Chemical thrombolysis and rheolytic thrombectomy bilateral iliofemoral veins and IVC 2. Mechanical thrombectomy and venoplasty of IVC and bilateral iliac veins with 14-mm and 16-mm balloons and bilateral femoral veins with 8-mm balloons,  Leg swelling is improved,also some fluid overload,one time IV lasix,plan for going home with eliquis can be arranged.  .

## 2019-11-07 NOTE — PROGRESS NOTES
HOW TO MANAGE YOUR CARE  AT HOME:  TN taught Symptoms and Problems for DVT home care with pt and S/O BRODY  with teach back:  1. BRUISING, 2. PAIN, SWELLING, 3, BLOOD IN THE URINE, 4. UNCONTROLLED BLEEING. TN placed education sheet in [employer]..     HELP AT HOME TO ASSIST WITH PATIENT'S RECOVERY IS BRODY.      TN taught patient about things she is responsible for when discharged TO HELP WITH HER RECOVERY:   How to Manage Her Care At Home:  1. Getting her prescriptions filled.  2. Taking her medications as directed. DO NOT MISS ANY DOSES!  3. Going to her follow-up doctor appointments.   .  Matilde De Anda, RN, BSN, STN CCM

## 2019-11-07 NOTE — PROGRESS NOTES
WRITTEN HEALTHCARE DISCHARGE INFORMATION     Things that YOU are RESPONSIBLE for to Manage Your Care At Home:    1. Getting your prescriptions filled.  2. Taking you medications as directed. DO NOT MISS ANY DOSES!  3. Going to your follow-up doctor appointments. This is important because it allows the doctor to monitor your progress and to determine if any changes need to be made to your treatment plan.    If you are unable to make your follow up appointments, please call the number listed and reschedule this appointment.     ____________HELP AT HOME____________________    Experiencing any SIGNS or SYMPTOMS: YOU CAN    Schedule a same day appopintment with your Primary Care Doctor or  you can call [facility] On Call Nurse Care Line for 24/7 assistance at [phone]    If you are experience any signs or symptoms that have become severe, Call 911 and come to your nearest Emergency Room.    Thank you for choosing [facility] and allowing us to care for you.   From your care management team: Mayda [facility] [phone]    You should receive a call from [facility] within 48-72 hours to help manage your care after discharge. Please try to make sure that you answer your phone for this important phone call.  Follow-up Information     Alberto Holguin MD On 11/12/2019.    Specialty:  Internal Medicine  Why:  out patient services:  1:20pm follow up from the hospital  Contact information:  [address]  [address]  [facility] [address]  [phone]             Arturo Dorsey MD On 11/18/2019.    Specialties:  Vascular Surgery, Surgery  Why:  Outpatient Services, Vascular, follow-up appointment @ 11:20AM  Contact information:  [address]  [address]  [facility] [address]  [phone]             Earnestine Garcia MD.    Specialty:  Hematology and Oncology  Why:  Office will contact patient to schedule hospital follow-up appointment.   Contact information:  [address]  [facility]  [address]  [phone]

## 2019-11-08 LAB
F5 P.R506Q BLD/T QL: ABNORMAL
POCT GLUCOSE: 141 MG/DL (ref 70–110)

## 2019-11-09 LAB — LA PPP-IMP: POSITIVE

## 2019-11-11 ENCOUNTER — ANESTHESIA (OUTPATIENT)
Dept: ANESTHESIOLOGY | Facility: HOSPITAL | Age: 29
DRG: 271 | End: 2019-11-11
Payer: COMMERCIAL

## 2019-11-11 ENCOUNTER — PATIENT MESSAGE (OUTPATIENT)
Dept: VASCULAR SURGERY | Facility: CLINIC | Age: 29
End: 2019-11-11

## 2019-11-11 ENCOUNTER — PATIENT OUTREACH (OUTPATIENT)
Dept: ADMINISTRATIVE | Facility: CLINIC | Age: 29
End: 2019-11-11

## 2019-11-11 NOTE — PROGRESS NOTES
C3 nurse attempted to contact patient. No answer. The following message was left for the patient to return the call:  Good MORNING   I am a nurse calling on behalf of [facility] from the Care Coordination Center.  This is a Transitional Care Call for Antoinette Love . When you have a moment please contact us at [phone] or [phone] Monday through Friday, between the hours of 8 am to 4 pm. We look forward to speaking with you. On behalf of [facility] have a nice day.    The patient has a scheduled HOSFU appointment with Alberto Holguin MD  on 11 12  @ 120P. Message sent to Physician staff.

## 2019-11-11 NOTE — PATIENT INSTRUCTIONS
Discharge Instructions for Pulmonary Embolism  A pulmonary embolism occurs when an embolus (clot) in the bloodstream travels through the heart and into the lungs. If the embolus becomes lodged in a blood vessel in the lungs, blood flow can be blocked. Symptoms can quickly develop and cause life-threatening heart and lung problems.  Home Care  Take your medications exactly as directed. Dont skip doses.  Ask your doctor about daily aspirin therapy.  Drink 6-8 glasses of water a day, unless directed otherwise.  Learn to take your own pulse. Keep a record of your results. Ask your doctor which readings mean that you need medical attention.  Lifestyle Changes  Avoid sitting, standing, or lying down for long periods without moving your legs and feet.  When traveling by car, stop to get out and move around at least once every three hours. On long airplane, train, or bus rides, get up and move around when possible. If you cant get up, wiggle your toes and tighten your calves to keep your blood moving.  Maintain a healthy weight. Get help to lose any extra pounds.  If you are a smoker, break the smoking habit. Enroll in a stop-smoking program to improve your chances of success.  Be active. Begin an exercise program. Ask your doctor how to get started. You can benefit from simple activities such as walking or gardening.  Follow-Up  Make a follow-up appointment as directed by our staff.    Call the healthcare provider right away if you have any of the following:  Chest pain (call 911)  Trouble breathing (call 911)  Coughing up blood (call 911)  Fainting (call 911)  Skin turns blue (call 911)  Dizziness  Rapid, pounding, or unusual heartbeat  Sweating more than usual  Unusual swelling or pain in your leg   © 2165-4152 [facility], [address] [place] [address], [place] [address]. All rights reserved. This information is not intended as a substitute for professional medical care. Always follow your healthcare professional's  instructions.

## 2019-11-11 NOTE — PROGRESS NOTES
Attempted to contact patient as Dr. Dorsey did not do her surgery to discuss with patient. No answer. Did leave VM with callback number to office.

## 2019-11-13 ENCOUNTER — TELEPHONE (OUTPATIENT)
Dept: INTERNAL MEDICINE | Facility: CLINIC | Age: 29
End: 2019-11-13

## 2019-12-01 RX ORDER — DIAZEPAM 5 MG/1
5 TABLET ORAL EVERY 12 HOURS PRN
Qty: 30 TABLET | Refills: 0 | Status: SHIPPED | OUTPATIENT
Start: 2019-12-01 | End: 2020-01-13

## 2019-12-18 ENCOUNTER — HOSPITAL ENCOUNTER (INPATIENT)
Facility: HOSPITAL | Age: 29
LOS: 4 days | Discharge: HOME | DRG: 253 | End: 2019-12-22
Attending: EMERGENCY MEDICINE | Admitting: EMERGENCY MEDICINE
Payer: MEDICAID

## 2019-12-18 DIAGNOSIS — R60.9 EDEMA: ICD-10-CM

## 2019-12-18 DIAGNOSIS — M79.605 PAIN IN BOTH LOWER EXTREMITIES: ICD-10-CM

## 2019-12-18 DIAGNOSIS — I82.413 DEEP VEIN THROMBOSIS (DVT) OF FEMORAL VEIN OF BOTH LOWER EXTREMITIES, UNSPECIFIED CHRONICITY: Primary | ICD-10-CM

## 2019-12-18 DIAGNOSIS — I82.402: ICD-10-CM

## 2019-12-18 DIAGNOSIS — D68.51 FACTOR V LEIDEN MUTATION: ICD-10-CM

## 2019-12-18 DIAGNOSIS — D63.8 ANEMIA OF CHRONIC DISEASE: ICD-10-CM

## 2019-12-18 DIAGNOSIS — I87.1 MAY-THURNER SYNDROME: ICD-10-CM

## 2019-12-18 DIAGNOSIS — Z95.828 PRESENCE OF IVC FILTER: ICD-10-CM

## 2019-12-18 DIAGNOSIS — M79.604 PAIN IN BOTH LOWER EXTREMITIES: ICD-10-CM

## 2019-12-18 DIAGNOSIS — I82.4Z3 ACUTE DEEP VEIN THROMBOSIS (DVT) OF DISTAL VEIN OF BOTH LOWER EXTREMITIES: ICD-10-CM

## 2019-12-18 LAB
ALBUMIN SERPL BCP-MCNC: 3.3 G/DL (ref 3.5–5.2)
ALP SERPL-CCNC: 76 U/L (ref 55–135)
ALT SERPL W/O P-5'-P-CCNC: 8 U/L (ref 10–44)
ANION GAP SERPL CALC-SCNC: 7 MMOL/L (ref 8–16)
APTT BLDCRRT: 30.8 SEC (ref 21–32)
AST SERPL-CCNC: 12 U/L (ref 10–40)
B-HCG UR QL: NEGATIVE
BASOPHILS # BLD AUTO: 0.01 K/UL (ref 0–0.2)
BASOPHILS NFR BLD: 0.3 % (ref 0–1.9)
BILIRUB SERPL-MCNC: 0.4 MG/DL (ref 0.1–1)
BUN SERPL-MCNC: 5 MG/DL (ref 6–20)
CALCIUM SERPL-MCNC: 9.2 MG/DL (ref 8.7–10.5)
CHLORIDE SERPL-SCNC: 106 MMOL/L (ref 95–110)
CO2 SERPL-SCNC: 26 MMOL/L (ref 23–29)
CREAT SERPL-MCNC: 0.7 MG/DL (ref 0.5–1.4)
CTP QC/QA: YES
DIFFERENTIAL METHOD: ABNORMAL
EOSINOPHIL # BLD AUTO: 0 K/UL (ref 0–0.5)
EOSINOPHIL NFR BLD: 1.3 % (ref 0–8)
ERYTHROCYTE [DISTWIDTH] IN BLOOD BY AUTOMATED COUNT: 14.7 % (ref 11.5–14.5)
EST. GFR  (AFRICAN AMERICAN): >60 ML/MIN/1.73 M^2
EST. GFR  (NON AFRICAN AMERICAN): >60 ML/MIN/1.73 M^2
GLUCOSE SERPL-MCNC: 103 MG/DL (ref 70–110)
HCT VFR BLD AUTO: 27.4 % (ref 37–48.5)
HGB BLD-MCNC: 8.3 G/DL (ref 12–16)
IMM GRANULOCYTES # BLD AUTO: 0.01 K/UL (ref 0–0.04)
IMM GRANULOCYTES NFR BLD AUTO: 0.3 % (ref 0–0.5)
INR PPP: 1 (ref 0.8–1.2)
LYMPHOCYTES # BLD AUTO: 0.5 K/UL (ref 1–4.8)
LYMPHOCYTES NFR BLD: 14.7 % (ref 18–48)
MAGNESIUM SERPL-MCNC: 2.1 MG/DL (ref 1.6–2.6)
MCH RBC QN AUTO: 27.3 PG (ref 27–31)
MCHC RBC AUTO-ENTMCNC: 30.3 G/DL (ref 32–36)
MCV RBC AUTO: 90 FL (ref 82–98)
MONOCYTES # BLD AUTO: 0.1 K/UL (ref 0.3–1)
MONOCYTES NFR BLD: 4.5 % (ref 4–15)
NEUTROPHILS # BLD AUTO: 2.5 K/UL (ref 1.8–7.7)
NEUTROPHILS NFR BLD: 78.9 % (ref 38–73)
NRBC BLD-RTO: 0 /100 WBC
PLATELET # BLD AUTO: 200 K/UL (ref 150–350)
PMV BLD AUTO: 9.2 FL (ref 9.2–12.9)
POTASSIUM SERPL-SCNC: 3.8 MMOL/L (ref 3.5–5.1)
PROT SERPL-MCNC: 7.3 G/DL (ref 6–8.4)
PROTHROMBIN TIME: 10.4 SEC (ref 9–12.5)
RBC # BLD AUTO: 3.04 M/UL (ref 4–5.4)
SODIUM SERPL-SCNC: 139 MMOL/L (ref 136–145)
WBC # BLD AUTO: 3.13 K/UL (ref 3.9–12.7)

## 2019-12-18 PROCEDURE — 96374 THER/PROPH/DIAG INJ IV PUSH: CPT

## 2019-12-18 PROCEDURE — 85610 PROTHROMBIN TIME: CPT

## 2019-12-18 PROCEDURE — 99285 EMERGENCY DEPT VISIT HI MDM: CPT | Mod: 25

## 2019-12-18 PROCEDURE — 85730 THROMBOPLASTIN TIME PARTIAL: CPT

## 2019-12-18 PROCEDURE — 85025 COMPLETE CBC W/AUTO DIFF WBC: CPT

## 2019-12-18 PROCEDURE — 80053 COMPREHEN METABOLIC PANEL: CPT

## 2019-12-18 PROCEDURE — 96376 TX/PRO/DX INJ SAME DRUG ADON: CPT

## 2019-12-18 PROCEDURE — 12000002 HC ACUTE/MED SURGE SEMI-PRIVATE ROOM

## 2019-12-18 PROCEDURE — 81025 URINE PREGNANCY TEST: CPT | Performed by: EMERGENCY MEDICINE

## 2019-12-18 PROCEDURE — 63600175 PHARM REV CODE 636 W HCPCS: Performed by: EMERGENCY MEDICINE

## 2019-12-18 PROCEDURE — 83735 ASSAY OF MAGNESIUM: CPT

## 2019-12-18 PROCEDURE — 25000003 PHARM REV CODE 250: Performed by: EMERGENCY MEDICINE

## 2019-12-18 RX ORDER — HEPARIN SODIUM,PORCINE/D5W 25000/250
18 INTRAVENOUS SOLUTION INTRAVENOUS CONTINUOUS
Status: DISCONTINUED | OUTPATIENT
Start: 2019-12-19 | End: 2019-12-19

## 2019-12-18 RX ORDER — MORPHINE SULFATE 10 MG/ML
4 INJECTION INTRAMUSCULAR; INTRAVENOUS; SUBCUTANEOUS
Status: COMPLETED | OUTPATIENT
Start: 2019-12-18 | End: 2019-12-18

## 2019-12-18 RX ORDER — ONDANSETRON 4 MG/1
4 TABLET, ORALLY DISINTEGRATING ORAL
Status: COMPLETED | OUTPATIENT
Start: 2019-12-18 | End: 2019-12-18

## 2019-12-18 RX ORDER — HYDROMORPHONE HYDROCHLORIDE 2 MG/ML
1 INJECTION, SOLUTION INTRAMUSCULAR; INTRAVENOUS; SUBCUTANEOUS
Status: COMPLETED | OUTPATIENT
Start: 2019-12-18 | End: 2019-12-18

## 2019-12-18 RX ADMIN — ONDANSETRON 4 MG: 4 TABLET, ORALLY DISINTEGRATING ORAL at 08:12

## 2019-12-18 RX ADMIN — HEPARIN SODIUM 18 UNITS/KG/HR: 10000 INJECTION, SOLUTION INTRAVENOUS at 11:12

## 2019-12-18 RX ADMIN — MORPHINE SULFATE 4 MG: 10 INJECTION INTRAVENOUS at 10:12

## 2019-12-18 RX ADMIN — MORPHINE SULFATE 4 MG: 10 INJECTION INTRAVENOUS at 08:12

## 2019-12-18 RX ADMIN — HYDROMORPHONE HYDROCHLORIDE 1 MG: 2 INJECTION, SOLUTION INTRAMUSCULAR; INTRAVENOUS; SUBCUTANEOUS at 11:12

## 2019-12-19 ENCOUNTER — ANESTHESIA (OUTPATIENT)
Dept: ANESTHESIOLOGY | Facility: HOSPITAL | Age: 29
DRG: 253 | End: 2019-12-19
Payer: MEDICAID

## 2019-12-19 ENCOUNTER — ANESTHESIA EVENT (OUTPATIENT)
Dept: ANESTHESIOLOGY | Facility: HOSPITAL | Age: 29
DRG: 253 | End: 2019-12-19
Payer: MEDICAID

## 2019-12-19 PROBLEM — D63.8 ANEMIA OF CHRONIC DISEASE: Status: ACTIVE | Noted: 2019-12-19

## 2019-12-19 PROBLEM — I82.4Z3 ACUTE DEEP VEIN THROMBOSIS (DVT) OF DISTAL VEIN OF BOTH LOWER EXTREMITIES: Status: ACTIVE | Noted: 2019-08-30

## 2019-12-19 LAB
ABO + RH BLD: NORMAL
ANISOCYTOSIS BLD QL SMEAR: SLIGHT
APTT BLDCRRT: 29 SEC (ref 21–32)
APTT BLDCRRT: 43.7 SEC (ref 21–32)
BASOPHILS # BLD AUTO: 0.01 K/UL (ref 0–0.2)
BASOPHILS NFR BLD: 0 % (ref 0–1.9)
BASOPHILS NFR BLD: 0.4 % (ref 0–1.9)
BLD GP AB SCN CELLS X3 SERPL QL: NORMAL
DIFFERENTIAL METHOD: ABNORMAL
DIFFERENTIAL METHOD: ABNORMAL
EOSINOPHIL # BLD AUTO: 0.1 K/UL (ref 0–0.5)
EOSINOPHIL NFR BLD: 2 % (ref 0–8)
EOSINOPHIL NFR BLD: 4 % (ref 0–8)
ERYTHROCYTE [DISTWIDTH] IN BLOOD BY AUTOMATED COUNT: 14.4 % (ref 11.5–14.5)
ERYTHROCYTE [DISTWIDTH] IN BLOOD BY AUTOMATED COUNT: 14.8 % (ref 11.5–14.5)
FIBRINOGEN PPP-MCNC: 410 MG/DL (ref 182–366)
HCT VFR BLD AUTO: 25.2 % (ref 37–48.5)
HCT VFR BLD AUTO: 25.8 % (ref 37–48.5)
HGB BLD-MCNC: 7.6 G/DL (ref 12–16)
HGB BLD-MCNC: 7.9 G/DL (ref 12–16)
HYPOCHROMIA BLD QL SMEAR: ABNORMAL
IMM GRANULOCYTES # BLD AUTO: 0.01 K/UL (ref 0–0.04)
IMM GRANULOCYTES # BLD AUTO: ABNORMAL K/UL (ref 0–0.04)
IMM GRANULOCYTES NFR BLD AUTO: 0.4 % (ref 0–0.5)
IMM GRANULOCYTES NFR BLD AUTO: ABNORMAL % (ref 0–0.5)
LYMPHOCYTES # BLD AUTO: 1.1 K/UL (ref 1–4.8)
LYMPHOCYTES NFR BLD: 44 % (ref 18–48)
LYMPHOCYTES NFR BLD: 44 % (ref 18–48)
MCH RBC QN AUTO: 27.5 PG (ref 27–31)
MCH RBC QN AUTO: 27.7 PG (ref 27–31)
MCHC RBC AUTO-ENTMCNC: 30.2 G/DL (ref 32–36)
MCHC RBC AUTO-ENTMCNC: 30.6 G/DL (ref 32–36)
MCV RBC AUTO: 91 FL (ref 82–98)
MCV RBC AUTO: 91 FL (ref 82–98)
MONOCYTES # BLD AUTO: 0.2 K/UL (ref 0.3–1)
MONOCYTES NFR BLD: 4 % (ref 4–15)
MONOCYTES NFR BLD: 6.8 % (ref 4–15)
NEUTROPHILS # BLD AUTO: 1.2 K/UL (ref 1.8–7.7)
NEUTROPHILS NFR BLD: 46.4 % (ref 38–73)
NEUTROPHILS NFR BLD: 48 % (ref 38–73)
NRBC BLD-RTO: 0 /100 WBC
NRBC BLD-RTO: 0 /100 WBC
PLATELET # BLD AUTO: 136 K/UL (ref 150–350)
PLATELET # BLD AUTO: 177 K/UL (ref 150–350)
PLATELET BLD QL SMEAR: ABNORMAL
PMV BLD AUTO: 8.9 FL (ref 9.2–12.9)
PMV BLD AUTO: 9.6 FL (ref 9.2–12.9)
RBC # BLD AUTO: 2.76 M/UL (ref 4–5.4)
RBC # BLD AUTO: 2.85 M/UL (ref 4–5.4)
WBC # BLD AUTO: 1.81 K/UL (ref 3.9–12.7)
WBC # BLD AUTO: 2.5 K/UL (ref 3.9–12.7)

## 2019-12-19 PROCEDURE — 63600175 PHARM REV CODE 636 W HCPCS: Performed by: RADIOLOGY

## 2019-12-19 PROCEDURE — 85384 FIBRINOGEN ACTIVITY: CPT

## 2019-12-19 PROCEDURE — 37000008 HC ANESTHESIA 1ST 15 MINUTES

## 2019-12-19 PROCEDURE — 85730 THROMBOPLASTIN TIME PARTIAL: CPT | Mod: 91

## 2019-12-19 PROCEDURE — 36415 COLL VENOUS BLD VENIPUNCTURE: CPT

## 2019-12-19 PROCEDURE — 25500020 PHARM REV CODE 255: Performed by: FAMILY MEDICINE

## 2019-12-19 PROCEDURE — 25000003 PHARM REV CODE 250: Performed by: EMERGENCY MEDICINE

## 2019-12-19 PROCEDURE — 20000000 HC ICU ROOM

## 2019-12-19 PROCEDURE — 63600175 PHARM REV CODE 636 W HCPCS: Performed by: EMERGENCY MEDICINE

## 2019-12-19 PROCEDURE — 85007 BL SMEAR W/DIFF WBC COUNT: CPT

## 2019-12-19 PROCEDURE — 63600175 PHARM REV CODE 636 W HCPCS: Performed by: HOSPITALIST

## 2019-12-19 PROCEDURE — 85730 THROMBOPLASTIN TIME PARTIAL: CPT

## 2019-12-19 PROCEDURE — 85025 COMPLETE CBC W/AUTO DIFF WBC: CPT

## 2019-12-19 PROCEDURE — 37000009 HC ANESTHESIA EA ADD 15 MINS

## 2019-12-19 PROCEDURE — 85027 COMPLETE CBC AUTOMATED: CPT

## 2019-12-19 PROCEDURE — 86850 RBC ANTIBODY SCREEN: CPT

## 2019-12-19 PROCEDURE — 25000003 PHARM REV CODE 250: Performed by: HOSPITALIST

## 2019-12-19 RX ORDER — HYDROCODONE BITARTRATE AND ACETAMINOPHEN 5; 325 MG/1; MG/1
1 TABLET ORAL EVERY 4 HOURS PRN
Status: DISCONTINUED | OUTPATIENT
Start: 2019-12-19 | End: 2019-12-22 | Stop reason: HOSPADM

## 2019-12-19 RX ORDER — SODIUM CHLORIDE 0.9 % (FLUSH) 0.9 %
10 SYRINGE (ML) INJECTION
Status: DISCONTINUED | OUTPATIENT
Start: 2019-12-19 | End: 2019-12-22 | Stop reason: HOSPADM

## 2019-12-19 RX ORDER — MIDAZOLAM HYDROCHLORIDE 1 MG/ML
INJECTION INTRAMUSCULAR; INTRAVENOUS CODE/TRAUMA/SEDATION MEDICATION
Status: COMPLETED | OUTPATIENT
Start: 2019-12-19 | End: 2019-12-19

## 2019-12-19 RX ORDER — HEPARIN SODIUM 10000 [USP'U]/100ML
400 INJECTION, SOLUTION INTRAVENOUS CONTINUOUS
Status: DISCONTINUED | OUTPATIENT
Start: 2019-12-19 | End: 2019-12-20

## 2019-12-19 RX ORDER — ONDANSETRON 2 MG/ML
4 INJECTION INTRAMUSCULAR; INTRAVENOUS EVERY 8 HOURS PRN
Status: DISCONTINUED | OUTPATIENT
Start: 2019-12-19 | End: 2019-12-22 | Stop reason: HOSPADM

## 2019-12-19 RX ORDER — FENTANYL CITRATE 50 UG/ML
INJECTION, SOLUTION INTRAMUSCULAR; INTRAVENOUS CODE/TRAUMA/SEDATION MEDICATION
Status: COMPLETED | OUTPATIENT
Start: 2019-12-19 | End: 2019-12-19

## 2019-12-19 RX ORDER — ACETAMINOPHEN 325 MG/1
650 TABLET ORAL EVERY 8 HOURS PRN
Status: DISCONTINUED | OUTPATIENT
Start: 2019-12-19 | End: 2019-12-22 | Stop reason: HOSPADM

## 2019-12-19 RX ORDER — HYDROMORPHONE HYDROCHLORIDE 2 MG/ML
0.2 INJECTION, SOLUTION INTRAMUSCULAR; INTRAVENOUS; SUBCUTANEOUS EVERY 5 MIN PRN
Status: DISCONTINUED | OUTPATIENT
Start: 2019-12-19 | End: 2019-12-19

## 2019-12-19 RX ORDER — OXYCODONE AND ACETAMINOPHEN 10; 325 MG/1; MG/1
1 TABLET ORAL EVERY 6 HOURS PRN
Status: DISCONTINUED | OUTPATIENT
Start: 2019-12-19 | End: 2019-12-22 | Stop reason: HOSPADM

## 2019-12-19 RX ORDER — FAMOTIDINE 20 MG/1
20 TABLET, FILM COATED ORAL 2 TIMES DAILY
Status: DISCONTINUED | OUTPATIENT
Start: 2019-12-19 | End: 2019-12-22 | Stop reason: HOSPADM

## 2019-12-19 RX ORDER — HYDROMORPHONE HYDROCHLORIDE 2 MG/ML
1 INJECTION, SOLUTION INTRAMUSCULAR; INTRAVENOUS; SUBCUTANEOUS EVERY 4 HOURS PRN
Status: DISCONTINUED | OUTPATIENT
Start: 2019-12-19 | End: 2019-12-19

## 2019-12-19 RX ORDER — MORPHINE SULFATE 10 MG/ML
7 INJECTION INTRAMUSCULAR; INTRAVENOUS; SUBCUTANEOUS EVERY 4 HOURS PRN
Status: DISCONTINUED | OUTPATIENT
Start: 2019-12-19 | End: 2019-12-20

## 2019-12-19 RX ADMIN — FENTANYL CITRATE 50 MCG: 50 INJECTION, SOLUTION INTRAMUSCULAR; INTRAVENOUS at 02:12

## 2019-12-19 RX ADMIN — FAMOTIDINE 20 MG: 20 TABLET ORAL at 08:12

## 2019-12-19 RX ADMIN — HEPARIN SODIUM 400 UNITS/HR: 10000 INJECTION, SOLUTION INTRAVENOUS at 04:12

## 2019-12-19 RX ADMIN — HYDROMORPHONE HYDROCHLORIDE 1 MG: 2 INJECTION, SOLUTION INTRAMUSCULAR; INTRAVENOUS; SUBCUTANEOUS at 05:12

## 2019-12-19 RX ADMIN — MIDAZOLAM HYDROCHLORIDE 0.5 MG: 1 INJECTION, SOLUTION INTRAMUSCULAR; INTRAVENOUS at 03:12

## 2019-12-19 RX ADMIN — ALTEPLASE 0.5 MG: KIT at 07:12

## 2019-12-19 RX ADMIN — HYDROMORPHONE HYDROCHLORIDE 1 MG: 2 INJECTION, SOLUTION INTRAMUSCULAR; INTRAVENOUS; SUBCUTANEOUS at 11:12

## 2019-12-19 RX ADMIN — ALTEPLASE 8 MG: 2.2 INJECTION, POWDER, LYOPHILIZED, FOR SOLUTION INTRAVENOUS at 03:12

## 2019-12-19 RX ADMIN — HYDROCODONE BITARTRATE AND ACETAMINOPHEN 1 TABLET: 5; 325 TABLET ORAL at 06:12

## 2019-12-19 RX ADMIN — MORPHINE SULFATE 7 MG: 10 INJECTION INTRAVENOUS at 07:12

## 2019-12-19 RX ADMIN — FENTANYL CITRATE 50 MCG: 50 INJECTION, SOLUTION INTRAMUSCULAR; INTRAVENOUS at 03:12

## 2019-12-19 RX ADMIN — HYDROMORPHONE HYDROCHLORIDE 1 MG: 2 INJECTION, SOLUTION INTRAMUSCULAR; INTRAVENOUS; SUBCUTANEOUS at 02:12

## 2019-12-19 RX ADMIN — HYDROMORPHONE HYDROCHLORIDE 1 MG: 2 INJECTION, SOLUTION INTRAMUSCULAR; INTRAVENOUS; SUBCUTANEOUS at 07:12

## 2019-12-19 RX ADMIN — ALTEPLASE 10 MG: 2.2 INJECTION, POWDER, LYOPHILIZED, FOR SOLUTION INTRAVENOUS at 03:12

## 2019-12-19 RX ADMIN — MIDAZOLAM HYDROCHLORIDE 1 MG: 1 INJECTION, SOLUTION INTRAMUSCULAR; INTRAVENOUS at 02:12

## 2019-12-19 RX ADMIN — IOHEXOL 44 ML: 300 INJECTION, SOLUTION INTRAVENOUS at 04:12

## 2019-12-19 RX ADMIN — HEPARIN SODIUM 18 UNITS/KG/HR: 10000 INJECTION, SOLUTION INTRAVENOUS at 04:12

## 2019-12-19 RX ADMIN — FAMOTIDINE 20 MG: 20 TABLET ORAL at 09:12

## 2019-12-19 RX ADMIN — ALTEPLASE 10 MG: KIT at 06:12

## 2019-12-19 RX ADMIN — OXYCODONE HYDROCHLORIDE AND ACETAMINOPHEN 1 TABLET: 10; 325 TABLET ORAL at 09:12

## 2019-12-19 NOTE — PROGRESS NOTES
1730  Rec'd pt from floor.  Heparin and alteplase running to sheaths but no order exists for alteplase.  Called floor nurse, she was unable to clarify.  Called IR nurse, she was unable to clarify.  Attempted to call IR, Dr. Acosta, no answer.      1750 Paged Dr. Dorsey overhead.    1800  Called on call physician for [place].  Per Dr. Dorsey he has signed off on the case and Dr. Acosta or the on call IR MD is the appropriate contact for order for tPA.  Call  for IR on call MD.  Connected to [employer]; no answer.  Rec'd phoen number from  for [phone] on [facility]; answering machine only.      1809  Called [facility]; per Dom page Dr. Acosta overhead. Done.  Await response. PEr RN Luisana Bubsy RN.    1815 Dr. Acosta in ICU; rec'd orders.    1830  VSS, no s/s of hematoma or bleeding, resting comfortably with significant other at bedside.

## 2019-12-19 NOTE — CONSULTS
Inpatient Radiology Pre-procedure Note     History of Present Illness:  Antoinette Love is a 29 y.o. female with pertinent PMHx of Factor V Leiden seemingly recalcitrant to all anti-platelets/anti-coagulants, May-Thurner syndrome s/p venous stent placement, PE x 3-4  And recurrent DVT x 11 with most recent 11/3/19 with subsequent BLE venous thrombectomy and ilieo-caval stent placement.      Pt presents to [facility]- with recurrent BLE swelling and pain  with weeping of serous fluid from BLE at high-risk for ulceration and infection with recurrent, non-occlusive BLE femoropopliteal DVT confirmed by yesterdays US with bilateral iliac and infra-popliteal veins patent and free of thrombus.     A new inpatient IR consult placed to assess for repeat BLE venography and intervention.       Admission H&P reviewed.  Past Medical History:   Diagnosis Date    Anticoagulant long-term use     Anticoagulant long-term use     FERNANDO (dyspnea on exertion)     DVT (deep venous thrombosis)     Factor V Leiden     Leg edema, left     May-Thurner syndrome     Multiple pulmonary nodules     Pulmonary embolus     RAD (reactive airway disease)     Recurrent upper respiratory infection (URI)     Recurrent urticaria     TIA (transient ischemic attack)      Past Surgical History:   Procedure Laterality Date    COLONOSCOPY N/A 12/18/2015    Procedure: COLONOSCOPY;  Surgeon: Lefty Lee MD;  Location: [facility] ([address] [place] [address]);  Service: Endoscopy;  Laterality: N/A;  schedule with Jesus    IVC FILTER RETRIEVAL      IVC FILTER RETRIEVAL N/A 9/23/2019    Procedure: REMOVAL-FILTER-IVC;  Surgeon: Claudia Surgeon;  Location: [facility];  Service: Anesthesiology;  Laterality: N/A;  188  4 hours Anes    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 Authorizing Provider   apixaban  "(ELIQUIS) 5 mg Tab Take 5 mg by mouth 2 (two) times daily.   Yes Historical Provider, MD   diazePAM (VALIUM) 5 MG tablet TAKE 1 TABLET (5 MG TOTAL) BY MOUTH EVERY 12 (TWELVE) HOURS AS NEEDED FOR ANXIETY. 12/1/19 12/31/19 Yes Alberto Holguin MD   EPINEPHrine (EPIPEN 2-VAN) 0.3 mg/0.3 mL AtIn Inject 0.3 mLs (0.3 mg total) into the muscle once. for 1 dose 3/28/19 11/11/19  Luis A Junior MD   ondansetron (ZOFRAN-ODT) 8 MG TbDL Take 1 tablet (8 mg total) by mouth every 8 (eight) hours as needed (nausea).  Patient not taking: Reported on 11/11/2019 9/24/19   Yazmin Gutierrez MD     Scheduled Meds:    famotidine  20 mg Oral BID     Continuous Infusions:    heparin (porcine) in D5W 18 Units/kg/hr (12/18/19 8053)     PRN Meds:acetaminophen, heparin (PORCINE), heparin (PORCINE), HYDROcodone-acetaminophen, HYDROmorphone, ondansetron, sodium chloride 0.9%  Anticoagulants/Antiplatelets: Heparin and Eliquis    Allergies:   Review of patient's allergies indicates:   Allergen Reactions    Azithromycin Hives    Beef containing products Anaphylaxis     Patient cannot eat BEEF BROTH  STATES IT WILL MAKE HER THROAT CLOSE UP .     Pork derived (porcine) Anaphylaxis     Throat swells up cannot eat pork sausage or pork patties ,     Unclassified drug Shortness Of Breath and Other (See Comments)     Is allergic to a beef spice - Causes "throat closing"    Xarelto [rivaroxaban] Other (See Comments)     Gallbladder swelling and disfunction    Toradol [ketorolac] Hives and Itching     Sedation Hx: have not been any systemic reactions    Labs:  Recent Labs   Lab 12/18/19 2033   INR 1.0       Recent Labs   Lab 12/19/19  0650   WBC 2.50*   HGB 7.6*   HCT 25.2*   MCV 91         Recent Labs   Lab 12/18/19 2033         K 3.8      CO2 26   BUN 5*   CREATININE 0.7   CALCIUM 9.2   MG 2.1   ALT 8*   AST 12   ALBUMIN 3.3*   BILITOT 0.4         Vitals:  Temp: 98.2 °F (36.8 °C) (12/19/19 " 0734)  Pulse: 78 (12/19/19 0734)  Resp: 18 (12/19/19 0734)  BP: (!) 96/53 (12/19/19 0734)  SpO2: 99 % (12/19/19 0734)       A/P:  29 y.o. female with pertinent PMHx of seemingly recalcitrant to all anti-platelets/anti-coagulants, May-Thurner syndrome s/p venous stent placement, PE x 3-4  and recurrent DVT x 12 with most recent 11/3/19 with subsequent BLE venous thrombectomy and ilieo-caval stent placement who presents to [facility] with recurrent BLE swelling and pain  with weeping of serous fluid from BLE at high-risk for ulceration and infection with recurrent, non-occlusive BLE femoropopliteal DVT confirmed by yesterdays US with bilateral iliac and infra-popliteal veins patent and free of thrombus.     1. Acute recurrent, non-occlusive BLE DVT - Will attempt repeat BLE venography with intervention including but not limited to thrombolysis, rheolytic and mechanical thrombectomy and venoplasty. Procedure scheduled with Anesthesia for GA at 1300 today.     Thank you for considering IR for the care of your patient.      Juan Acosta MD  Interventional Radiology

## 2019-12-19 NOTE — ED PROVIDER NOTES
"Encounter Date: 12/18/2019    SCRIBE #1 NOTE: I, Vilma Cotto, am scribing for, and in the presence of,  Asaf Winchester MD. I have scribed the entire note.       History     Chief Complaint   Patient presents with    Leg Swelling     bilateral leg swellling was admitted a month ago for same. patient reports increased edema, "fluid leaking from my legs" Had thrombolysis on bilateral legs. denies fever, chills. +body aches     CC: Leg swelling    HPI: This is a 29 y.o.female patient, with a PMHx of Pulmonary Embolus, Factor V Leiden, May-Thurner Syndrome, and recurrent DVT, presenting to the ED with a complaint of gradual, bilateral leg swelling and pain that began x1 week ago. She states the swelling goes all the way up to her thighs. She states they are leaking fluid. Patient states this is the biggest her legs have ever been. She reports the swelling is worse in her right leg. Patient reports she is unable to walk due to pain. Patient states she is still on Eliquis. She also reports veins are "popping" on her abdomen and chest, leaving bruises. Patient states she has had her IVC filter since 2014. Patient reports associated chronic abdominal pain in the right lower quadrant secondary to the vena cava filter.  This is unchanged. Patient denies any bleeding, fever, shortness of breath, chest pain, back pain, or any other associated symptoms.. She reports having 3 thrombectomies in the past most recent which was in November.    The history is provided by the patient.     Review of patient's allergies indicates:   Allergen Reactions    Azithromycin Hives    Beef containing products Anaphylaxis     Patient cannot eat BEEF BROTH  STATES IT WILL MAKE HER THROAT CLOSE UP .     Pork derived (porcine) Anaphylaxis     Throat swells up cannot eat pork sausage or pork patties ,     Unclassified drug Shortness Of Breath and Other (See Comments)     Is allergic to a beef spice - Causes "throat closing"    Xarelto " [rivaroxaban] Other (See Comments)     Gallbladder swelling and disfunction    Toradol [ketorolac] Hives and Itching     Past Medical History:   Diagnosis Date    Anticoagulant long-term use     Anticoagulant long-term use     FERNANDO (dyspnea on exertion)     DVT (deep venous thrombosis)     Factor V Leiden     Leg edema, left     May-Thurner syndrome     Multiple pulmonary nodules     Pulmonary embolus     RAD (reactive airway disease)     Recurrent upper respiratory infection (URI)     Recurrent urticaria     TIA (transient ischemic attack)      Past Surgical History:   Procedure Laterality Date    COLONOSCOPY N/A 12/18/2015    Procedure: COLONOSCOPY;  Surgeon: Lefty Lee MD;  [facility]  Service: Endoscopy;  Laterality: N/A;  schedule with Jesus    IVC FILTER RETRIEVAL      IVC FILTER RETRIEVAL N/A 9/23/2019    Procedure: REMOVAL-FILTER-IVC;  Surgeon: Claudia Surgeon;  Location: [facility];  Service: Anesthesiology;  Laterality: N/A;  188  4 hours Anes    tumor from breast      left    WISDOM TOOTH EXTRACTION       Family History   Problem Relation Age of Onset    Allergies Mother         azithromycin     Social History     Tobacco Use    Smoking status: Never Smoker    Smokeless tobacco: Never Used   Substance Use Topics    Alcohol use: No     Frequency: Monthly or less     Drinks per session: 3 or 4     Binge frequency: Never    Drug use: No     Review of Systems   Constitutional: Negative for chills, diaphoresis, fatigue and fever.   HENT: Negative for congestion, sinus pain and sore throat.    Respiratory: Negative for cough and shortness of breath.    Cardiovascular: Positive for leg swelling. Negative for chest pain and palpitations.   Gastrointestinal: Positive for abdominal pain (chronic). Negative for blood in stool, nausea and vomiting.   Genitourinary: Negative for dysuria, flank pain, frequency and hematuria.   Musculoskeletal: Positive for arthralgias (bilateral  legs), gait problem and joint swelling (bilateral legs). Negative for back pain and myalgias.        +fluid   Skin: Positive for color change (brusing on chest and abdomen). Negative for rash.   Neurological: Negative for dizziness, syncope, facial asymmetry, speech difficulty, weakness, numbness and headaches.   Psychiatric/Behavioral: Negative for confusion.       Physical Exam     Initial Vitals [12/18/19 1918]   BP Pulse Resp Temp SpO2   121/64 (!) 115 20 97.7 °F (36.5 °C) 99 %      MAP       --         Physical Exam    Nursing note and vitals reviewed.  Constitutional: She appears well-developed and well-nourished. She is not diaphoretic. No distress.   HENT:   Head: Normocephalic and atraumatic.   Right Ear: External ear normal.   Left Ear: External ear normal.   Nose: Nose normal.   Mouth/Throat: Oropharynx is clear and moist.   Eyes: Conjunctivae and EOM are normal. Pupils are equal, round, and reactive to light. Right eye exhibits no discharge. Left eye exhibits no discharge. No scleral icterus.   Neck: Normal range of motion. Neck supple. No tracheal deviation present.   Cardiovascular: Normal rate, regular rhythm and normal heart sounds.   No murmur heard.  Pulmonary/Chest: Breath sounds normal. No stridor. No respiratory distress. She has no wheezes. She has no rhonchi. She has no rales.   Abdominal: Soft. Bowel sounds are normal. She exhibits no distension. There is no tenderness. There is no rebound and no guarding.   Musculoskeletal: Normal range of motion. She exhibits edema.   Pitting edema to proximal thighs in bilateral legs. Right leg is worse than left. 4+ pitting edema is right lower extremity. 3+ pitting edema in left lower extremity.  Warmth and mild erythema to medial right thigh with a pinkish appearance.  Not consistent with cellulitis.  No palpable pulses in bilateral feet due to swelling. Doppler shows good pulses in bilateral feet I dorsalis pedis and posterior tibial arteries.  Sensation intact.   Neurological: She is alert and oriented to person, place, and time. She has normal strength. No cranial nerve deficit or sensory deficit. GCS score is 15. GCS eye subscore is 4. GCS verbal subscore is 5. GCS motor subscore is 6.   Skin: Skin is warm and dry.   Psychiatric: She has a normal mood and affect. Her behavior is normal. Judgment and thought content normal.         ED Course   Procedures  Labs Reviewed   CBC W/ AUTO DIFFERENTIAL - Abnormal; Notable for the following components:       Result Value    WBC 3.13 (*)     RBC 3.04 (*)     Hemoglobin 8.3 (*)     Hematocrit 27.4 (*)     Mean Corpuscular Hemoglobin Conc 30.3 (*)     RDW 14.7 (*)     Lymph # 0.5 (*)     Mono # 0.1 (*)     Gran% 78.9 (*)     Lymph% 14.7 (*)     All other components within normal limits   COMPREHENSIVE METABOLIC PANEL - Abnormal; Notable for the following components:    BUN, Bld 5 (*)     Albumin 3.3 (*)     ALT 8 (*)     Anion Gap 7 (*)     All other components within normal limits   MAGNESIUM   POCT URINE PREGNANCY          Imaging Results           US Lower Extremity Veins Bilateral (Final result)  Result time 12/18/19 22:27:15    Final result by Kyle Rojas MD (12/18/19 22:27:15)                 Impression:      Persistent bilateral nonocclusive deep venous thrombosis as above.    This report was flagged in Epic as abnormal.      Electronically signed by: Kyle Rojas MD  Date:    12/18/2019  Time:    22:27             Narrative:    EXAMINATION:  US LOWER EXTREMITY VEINS BILATERAL    CLINICAL HISTORY:  Edema, unspecified    TECHNIQUE:  Duplex and color flow Doppler evaluation of the bilateral lower extremity veins was performed.    COMPARISON:  11/01/2019.    FINDINGS:  Right lower extremity: Nonocclusive thrombosis is visualized throughout the right common femoral, femoral, greater saphenous, and popliteal veins.  Right posterior tibial, anterior tibial, and peroneal veins are patent.    Left lower  extremity: Nonocclusive thrombus is visualized within the left femoral, greater saphenous, and popliteal veins.  Left common femoral, posterior tibial, anterior tibial, and peroneal veins are patent.    Bilateral iliac veins were imaged and appear patent.                                 Medical Decision Making:   Initial Assessment:   Antoinette Love is a 29 y.o. female presenting for an emergent evaluation of bilateral leg swelling. Exam revealed pitting edema to proximal thighs in bilateral legs, 4+ pitting edema is right lower extremity, 3+ pitting edema in left lower extremity, warmth and mild erythema to medial right thigh with a pinkish appearance.  Patient has history of multiple thrombectomies due to recurrent DVTs despite anticoagulant therapy.  She is currently on Eliquis.  She was last admitted November requiring bilateral thrombectomy.  Patient continues to have an IVC filter that was not able to be removed.  Will obtain labs, and given the extent of pain, imaging. I will treat the pt's symptoms appropriately and reassess.    Differential Diagnosis:   DVT, lymphedema, vena cava thrombosis  Independently Interpreted Test(s):   I have ordered and independently interpreted X-rays - see prior notes.  Clinical Tests:   Lab Tests: Ordered and Reviewed  Radiological Study: Reviewed and Ordered  Medical Tests: Reviewed and Ordered  ED Management:  2315:  Ultrasound shows persistent nonocclusive DVTs bilaterally. Lab work is unchanged.  Discussed with vascular surgery at [facility].  Since patient's edema has return and she is having pain it was recommended that she the admitted and began heparin therapy.  Vascular surgical seen consult the morning.  Discussed plan with patient and family.            Scribe Attestation:   Scribe #1: I performed the above scribed service and the documentation accurately describes the services I performed. I attest to the accuracy of the note.                           Clinical Impression:       ICD-10-CM ICD-9-CM   1. Deep vein thrombosis (DVT) of femoral vein of both lower extremities, unspecified chronicity I82.413 453.41   2. Edema R60.9 782.3   3. Pain in both lower extremities M79.604 729.5    M79.605          Disposition:   Disposition: Admitted  Condition: Stable    Scribe attestation: I, Asaf Winchester MD, personally performed the services described in this documentation. All medical record entries made by the scribe were at my direction and in my presence.  I have reviewed the chart and agree that the record reflects my personal performance and is accurate and complete.                   Asaf Winchester MD  12/18/19 1614

## 2019-12-19 NOTE — SEDATION DOCUMENTATION
Report called to ÁNGELA Mata. Bilateral infusion catheters placed to popliteal areas. Pt tolerated well. Catheters sutured with gauze and tegaderm covering site, CDI, no redness, swelling or hematoma noted. Heparin infusing to each sheath @ 400 units/hour. Cathflo infusing to each catheter @ 0.5 mg/hour. HESHAM GEE.

## 2019-12-19 NOTE — NURSING
Called Estelle with normal pap/HPV. Next pap due in 3 years (11/2021)  Alina Gama MD     Received report from ÁNGELA Montemayor. Patient lying in bed resting, NAD noted, Safety precautions maintained. Heparin drip in progress, handoff completed, family at bedside, will monitor.

## 2019-12-19 NOTE — PLAN OF CARE
12/19/19 1543   Discharge Assessment   Assessment Type Discharge Planning Assessment   Assessment information obtained from? Medical Record   Prior to hospitilization cognitive status: Alert/Oriented   Prior to hospitalization functional status: Independent   Current cognitive status: Alert/Oriented   Current Functional Status: Independent   Facility Arrived From: home   Lives With other (see comments)   Able to Return to Prior Arrangements yes   Is patient able to care for self after discharge? Yes   Who are your caregiver(s) and their phone number(s)? robert- [phone]   Patient's perception of discharge disposition home or selfcare   Readmission Within the Last 30 Days no previous admission in last 30 days   Patient currently being followed by outpatient case management? No   Patient currently receives any other outside agency services? No   Equipment Currently Used at Home none   Do you have any problems affording any of your prescribed medications? No   Is the patient taking medications as prescribed? yes   Does the patient have transportation home? Yes   Transportation Anticipated family or friend will provide   Does the patient receive services at the [facility]? No   Discharge Plan A Home with family  (with follow up )   DME Needed Upon Discharge  none   Patient/Family in Agreement with Plan yes     [id number] - [place] - [address]  [place] [address]  Phone: [phone] Fax: [phone]

## 2019-12-19 NOTE — ED TRIAGE NOTES
"Pt arrives with c/o bilateral swelling and warmth of legs. "Bigger than they have ever been before" Pt has been swelling and worse for a week, Pt states her legs have "Leaked fluid" Pt denies SOB, chest pain. Pt has an extensive h/o clotting disorders. Pt warm to touch. Pt also c/o "hard veins that have busted in her stomach" Pt has multiple bruising on stomach.   "

## 2019-12-19 NOTE — ANESTHESIA PREPROCEDURE EVALUATION
"                                                                                                             12/19/2019  Antoinette Love is a 29 y.o., female.    Pre-operative evaluation for Procedure(s) (LRB):  thrombectomy (N/A)    Antoinette Love is a 29 y.o. female     Denies CP/SOB/GERD/MI/CVA/URI symptoms.  METS > 4  NPO > 8    Patient Active Problem List   Diagnosis    Factor V Leiden mutation    Recurrent acute deep vein thrombosis of left lower extremity    Multiple pulmonary nodules    Acute deep vein thrombosis (DVT) of femoral vein    Dyspnea on exertion    History of pulmonary embolism    Chronic respiratory failure with hypoxia    Reactive airway disease without complication    Recurrent urticaria    DVT (deep venous thrombosis)    May-Thurner syndrome    Presence of IVC filter    DVT, recurrent, lower extremity, acute, right    History of deep venous thrombosis (DVT) of distal vein of left lower extremity    Paresthesia of right foot    Normocytic anemia    Leukopenia    Bradycardia       Review of patient's allergies indicates:   Allergen Reactions    Azithromycin Hives    Beef containing products Anaphylaxis     Patient cannot eat BEEF BROTH  STATES IT WILL MAKE HER THROAT CLOSE UP .     Pork derived (porcine) Anaphylaxis     Throat swells up cannot eat pork sausage or pork patties ,     Unclassified drug Shortness Of Breath and Other (See Comments)     Is allergic to a beef spice - Causes "throat closing"    Xarelto [rivaroxaban] Other (See Comments)     Gallbladder swelling and disfunction    Toradol [ketorolac] Hives and Itching       Current Facility-Administered Medications on File Prior to Visit   Medication Dose Route Frequency Provider Last Rate Last Dose    acetaminophen tablet 650 mg  650 mg Oral Q8H PRN Asaf Winchester MD        famotidine tablet 20 mg  20 mg Oral BID Asaf Winchester MD   20 mg at 12/19/19 0844    heparin 25,000 units in " dextrose 5% (100 units/ml) IV bolus from bag - ADDITIONAL PRN BOLUS - 30 units/kg  30 Units/kg (Adjusted) Intravenous PRN Asaf Winchester MD        heparin 25,000 units in dextrose 5% (100 units/ml) IV bolus from bag - ADDITIONAL PRN BOLUS - 60 units/kg  60 Units/kg (Adjusted) Intravenous PRN Asaf Winchester MD        heparin 25,000 units in dextrose 5% 250 mL (100 units/mL) infusion HIGH INTENSITY nomogram - [facility]  18 Units/kg/hr (Adjusted) Intravenous Continuous Asaf Winchester MD 11.5 mL/hr at 12/18/19 2352 18 Units/kg/hr at 12/18/19 2352    HYDROcodone-acetaminophen 5-325 mg per tablet 1 tablet  1 tablet Oral Q4H PRN Asaf Winchester MD        hydromorphone (PF) injection 1 mg  1 mg Intravenous Q4H PRN Asaf Winchester MD   1 mg at 12/19/19 1143    ondansetron injection 4 mg  4 mg Intravenous Q8H PRN Asaf Winchester MD        sodium chloride 0.9% flush 10 mL  10 mL Intravenous PRN Asaf Winchester MD         Current Outpatient Medications on File Prior to Visit   Medication Sig Dispense Refill    apixaban (ELIQUIS) 5 mg Tab Take 5 mg by mouth 2 (two) times daily.      diazePAM (VALIUM) 5 MG tablet TAKE 1 TABLET (5 MG TOTAL) BY MOUTH EVERY 12 (TWELVE) HOURS AS NEEDED FOR ANXIETY. 30 tablet 0    EPINEPHrine (EPIPEN 2-VAN) 0.3 mg/0.3 mL AtIn Inject 0.3 mLs (0.3 mg total) into the muscle once. for 1 dose 2 Device 2    ondansetron (ZOFRAN-ODT) 8 MG TbDL Take 1 tablet (8 mg total) by mouth every 8 (eight) hours as needed (nausea). (Patient not taking: Reported on 11/11/2019) 15 tablet 0       Past Surgical History:   Procedure Laterality Date    COLONOSCOPY N/A 12/18/2015    Procedure: COLONOSCOPY;  Surgeon: Lefty Lee MD;  Location: [facility] ([address] [place] [address]);  Service: Endoscopy;  Laterality: N/A;  schedule with Jesus    IVC FILTER RETRIEVAL      IVC FILTER RETRIEVAL N/A 9/23/2019    Procedure: REMOVAL-FILTER-IVC;  Surgeon: Claudia Surgeon;  Location: [facility];  Service: Anesthesiology;   Laterality: N/A;  188  4 hours Anes    tumor from breast      left    WISDOM TOOTH EXTRACTION         Social History     Socioeconomic History    Marital status: Single     Spouse name: Not on file    Number of children: Not on file    Years of education: Not on file    Highest education level: Not on file   Occupational History    Not on file   Social Needs    Financial resource strain: Not very hard    Food insecurity:     Worry: Never true     Inability: Never true    Transportation needs:     Medical: No     Non-medical: No   Tobacco Use    Smoking status: Never Smoker    Smokeless tobacco: Never Used   Substance and Sexual Activity    Alcohol use: No     Frequency: Monthly or less     Drinks per session: 3 or 4     Binge frequency: Never    Drug use: No    Sexual activity: Yes     Partners: Male   Lifestyle    Physical activity:     Days per week: 4 days     Minutes per session: 150+ min    Stress: Rather much   Relationships    Social connections:     Talks on phone: More than three times a week     Gets together: More than three times a week     Attends Zoroastrianism service: Not on file     Active member of club or organization: Yes     Attends meetings of clubs or organizations: 1 to 4 times per year     Relationship status: Living with partner   Other Topics Concern    Not on file   Social History Narrative    Not on file         CBC:   Recent Labs     12/18/19 2033 12/19/19  0650   WBC 3.13* 2.50*   RBC 3.04* 2.76*   HGB 8.3* 7.6*   HCT 27.4* 25.2*    177   MCV 90 91   MCH 27.3 27.5   MCHC 30.3* 30.2*       CMP:   Recent Labs     12/18/19 2033      K 3.8      CO2 26   BUN 5*   CREATININE 0.7      MG 2.1   CALCIUM 9.2   ALBUMIN 3.3*   PROT 7.3   ALKPHOS 76   ALT 8*   AST 12   BILITOT 0.4       INR  Recent Labs     12/18/19 2033 12/19/19  0650   INR 1.0  --    APTT 30.8 43.7*         There were no vitals filed for this visit.  See nursing charting for additional  vital signs      Diagnostic Studies:  Results for MAK GRACE (MRN [id number]) as of 11/4/2019 12:57   Ref. Range 11/3/2019 06:09   WBC Latest Ref Range: 3.90 - 12.70 K/uL 6.23   RBC Latest Ref Range: 4.00 - 5.40 M/uL 2.73 (L)   Hemoglobin Latest Ref Range: 12.0 - 16.0 g/dL 7.7 (L)   Hematocrit Latest Ref Range: 37.0 - 48.5 % 25.6 (L)   MCV Latest Ref Range: 82 - 98 fL 94   MCH Latest Ref Range: 27.0 - 31.0 pg 28.2   MCHC Latest Ref Range: 32.0 - 36.0 g/dL 30.1 (L)   RDW Latest Ref Range: 11.5 - 14.5 % 13.9   Platelets Latest Ref Range: 150 - 350 K/uL 175   MPV Latest Ref Range: 9.2 - 12.9 fL 9.6   Gran% Latest Ref Range: 38.0 - 73.0 % 84.4 (H)   Gran # (ANC) Latest Ref Range: 1.8 - 7.7 K/uL 5.3   Lymph% Latest Ref Range: 18.0 - 48.0 % 10.8 (L)   Lymph # Latest Ref Range: 1.0 - 4.8 K/uL 0.7 (L)   Mono% Latest Ref Range: 4.0 - 15.0 % 3.9 (L)   Mono # Latest Ref Range: 0.3 - 1.0 K/uL 0.2 (L)   Eosinophil% Latest Ref Range: 0.0 - 8.0 % 0.3   Eos # Latest Ref Range: 0.0 - 0.5 K/uL 0.0   Basophil% Latest Ref Range: 0.0 - 1.9 % 0.3   Baso # Latest Ref Range: 0.00 - 0.20 K/uL 0.02   nRBC Latest Ref Range: 0 /100 WBC 0   Differential Method Unknown Automated   Immature Grans (Abs) Latest Ref Range: 0.00 - 0.04 K/uL 0.02   Immature Granulocytes Latest Ref Range: 0.0 - 0.5 % 0.3     Results for MAK GRACE (MRN [id number]) as of 11/4/2019 12:57   Ref. Range 11/3/2019 06:09   Sodium Latest Ref Range: 136 - 145 mmol/L 137   Potassium Latest Ref Range: 3.5 - 5.1 mmol/L 4.1   Chloride Latest Ref Range: 95 - 110 mmol/L 105   CO2 Latest Ref Range: 23 - 29 mmol/L 24   Anion Gap Latest Ref Range: 8 - 16 mmol/L 8   BUN, Bld Latest Ref Range: 6 - 20 mg/dL 5 (L)   Creatinine Latest Ref Range: 0.5 - 1.4 mg/dL 0.7   eGFR if non African American Latest Ref Range: >60 mL/min/1.73 m^2 >60   eGFR if  Latest Ref Range: >60 mL/min/1.73 m^2 >60   Glucose Latest Ref Range: 70 - 110 mg/dL 95   Calcium Latest Ref  Range: 8.7 - 10.5 mg/dL 8.4 (L)     EKG (11/2/19):  SB    TTE (11/2/19):  · Normal left ventricular systolic function. The estimated ejection fraction is 70%  · Normal LV diastolic function.  · Normal right ventricular systolic function.  · Normal central venous pressure (3 mm Hg).  · The estimated PA systolic pressure is 16 mm Hg    Pre-op Assessment    I have reviewed the Patient Summary Reports.     I have reviewed the Nursing Notes.      Review of Systems  Anesthesia Hx:  No problems with previous Anesthesia   Denies Personal Hx of Anesthesia complications.   Social:  Non-Smoker, Social Alcohol Use    Hematology/Oncology:         -- Anemia: Hematology Comments: factor5 Leiden, recurrent DVTs with h/o PE    Cardiovascular:   Exercise tolerance: good Denies Pacemaker.  Denies Hypertension.  Denies Valvular problems/Murmurs.  Denies MI.  Denies CAD.    Denies CABG/stent.  Denies Dysrhythmias.   Denies Angina.             denies PVD no hyperlipidemia    Pulmonary:   Denies Pneumonia Denies COPD.  Denies Asthma.  Denies Shortness of breath.  Denies Recent URI.  Denies Sleep Apnea. Pt denies any reactive airway dz. When she had PE, but prior to its being correctly dx'd, she was given inhalers to try.   Her breathing is at baseline now.   Hepatic/GI:  Hepatic/GI Normal    Musculoskeletal:   Paresthesias of right foot   Neurological:   TIA, Denies CVA. Denies Seizures.        Physical Exam  General:  Obesity    Airway/Jaw/Neck:  AIRWAY FINDINGS: Normal M2  Good mouth opening  Plastic piercing (small post) in bottom lip  Tongue ring removed  No loose dentition  FROM     Chest/Lungs:  Chest/Lungs Clear    Heart/Vascular:  Heart Findings: Normal       Mental Status:  Mental Status Findings: Normal        Anesthesia Plan  Type of Anesthesia, risks & benefits discussed:  Anesthesia Type:  general  Patient's Preference:   Intra-op Monitoring Plan: standard ASA monitors  Intra-op Monitoring Plan Comments:   Post Op Pain  Control Plan:   Post Op Pain Control Plan Comments:   Induction:   IV  Beta Blocker:  Patient is not currently on a Beta-Blocker (No further documentation required).       Informed Consent: Patient understands risks and agrees with Anesthesia plan.  Questions answered. Anesthesia consent signed with patient.  ASA Score: 2     Day of Surgery Review of History & Physical:  There are no significant changes.  H&P update referred to the provider.         Ready For Surgery From Anesthesia Perspective.

## 2019-12-19 NOTE — SUBJECTIVE & OBJECTIVE
"Past Medical History:   Diagnosis Date    Anticoagulant long-term use     Anticoagulant long-term use     FERNANDO (dyspnea on exertion)     DVT (deep venous thrombosis)     Factor V Leiden     Leg edema, left     May-Thurner syndrome     Multiple pulmonary nodules     Pulmonary embolus     RAD (reactive airway disease)     Recurrent upper respiratory infection (URI)     Recurrent urticaria     TIA (transient ischemic attack)        Past Surgical History:   Procedure Laterality Date    COLONOSCOPY N/A 12/18/2015    Procedure: COLONOSCOPY;  Surgeon: Lefty Lee MD;  Location: [facility] ([address] [place] [address]);  Service: Endoscopy;  Laterality: N/A;  schedule with Jesus    IVC FILTER RETRIEVAL      IVC FILTER RETRIEVAL N/A 9/23/2019    Procedure: REMOVAL-FILTER-IVC;  Surgeon: Claudia Surgeon;  Location: [facility];  Service: Anesthesiology;  Laterality: N/A;  188  4 hours Anes    tumor from breast      left    WISDOM TOOTH EXTRACTION         Review of patient's allergies indicates:   Allergen Reactions    Azithromycin Hives    Beef containing products Anaphylaxis     Patient cannot eat BEEF BROTH  STATES IT WILL MAKE HER THROAT CLOSE UP .     Pork derived (porcine) Anaphylaxis     Throat swells up cannot eat pork sausage or pork patties ,     Unclassified drug Shortness Of Breath and Other (See Comments)     Is allergic to a beef spice - Causes "throat closing"    Xarelto [rivaroxaban] Other (See Comments)     Gallbladder swelling and disfunction    Toradol [ketorolac] Hives and Itching       No current facility-administered medications on file prior to encounter.      Current Outpatient Medications on File Prior to Encounter   Medication Sig    apixaban (ELIQUIS) 5 mg Tab Take 5 mg by mouth 2 (two) times daily.    diazePAM (VALIUM) 5 MG tablet TAKE 1 TABLET (5 MG TOTAL) BY MOUTH EVERY 12 (TWELVE) HOURS AS NEEDED FOR ANXIETY.    EPINEPHrine (EPIPEN 2-VAN) 0.3 mg/0.3 mL AtIn Inject 0.3 mLs (0.3 mg total) into the " muscle once. for 1 dose    ondansetron (ZOFRAN-ODT) 8 MG TbDL Take 1 tablet (8 mg total) by mouth every 8 (eight) hours as needed (nausea). (Patient not taking: Reported on 11/11/2019)     Family History     Problem Relation (Age of Onset)    Allergies Mother        Tobacco Use    Smoking status: Never Smoker    Smokeless tobacco: Never Used   Substance and Sexual Activity    Alcohol use: No     Frequency: Monthly or less     Drinks per session: 3 or 4     Binge frequency: Never    Drug use: No    Sexual activity: Yes     Partners: Male     Review of Systems   Constitutional: Negative for activity change, appetite change, chills, diaphoresis, fatigue and fever.   HENT: Negative for congestion, sinus pressure, sore throat and tinnitus.    Eyes: Negative for visual disturbance.   Respiratory: Negative for cough, chest tightness, shortness of breath and wheezing.    Cardiovascular: Negative for chest pain, palpitations and leg swelling.   Gastrointestinal: Positive for abdominal pain (chronic, RLQ). Negative for abdominal distention, nausea and vomiting.   Endocrine: Negative for polydipsia, polyphagia and polyuria.   Genitourinary: Negative for dysuria.   Musculoskeletal: Negative for arthralgias and back pain.   Skin: Negative for rash and wound.   Neurological: Negative for dizziness, syncope, light-headedness and headaches.   Psychiatric/Behavioral: Negative for confusion.     Objective:     Vital Signs (Most Recent):  Temp: 98.8 °F (37.1 °C) (12/19/19 1122)  Pulse: 81 (12/19/19 1122)  Resp: 18 (12/19/19 1122)  BP: (!) 105/58 (12/19/19 1122)  SpO2: 99 % (12/19/19 1122) Vital Signs (24h Range):  Temp:  [97.7 °F (36.5 °C)-99.6 °F (37.6 °C)] 98.8 °F (37.1 °C)  Pulse:  [] 81  Resp:  [12-21] 18  SpO2:  [98 %-100 %] 99 %  BP: ()/(51-74) 105/58     Weight: 79 kg (174 lb 2.6 oz)  Body mass index is 28.98 kg/m².    Physical Exam   Constitutional: She appears well-developed and well-nourished. She is  cooperative.  Non-toxic appearance. She does not have a sickly appearance. She does not appear ill. No distress.   HENT:   Head: Normocephalic and atraumatic.   Right Ear: External ear normal.   Left Ear: External ear normal.   Nose: Nose normal.   Mouth/Throat: Uvula is midline, oropharynx is clear and moist and mucous membranes are normal.   Eyes: Pupils are equal, round, and reactive to light. Conjunctivae, EOM and lids are normal.   Neck: Trachea normal, normal range of motion and full passive range of motion without pain. Neck supple. No JVD present. No thyroid mass present.   Cardiovascular: Normal rate, regular rhythm, S1 normal, S2 normal and normal heart sounds.   Pulmonary/Chest: Effort normal and breath sounds normal.   Abdominal: Soft. Normal appearance and bowel sounds are normal. She exhibits no distension. There is no tenderness.   Musculoskeletal:        Right lower leg: She exhibits edema and deformity.        Left lower leg: She exhibits swelling and edema.   B/l 3+ pitting edema up to knees. R>L.    Neurological: She is alert. She has normal strength. No cranial nerve deficit or sensory deficit.   Skin: Skin is intact. No cyanosis.   Psychiatric: She has a normal mood and affect. Her speech is normal and behavior is normal. Thought content normal. Cognition and memory are normal.   Nursing note and vitals reviewed.        CRANIAL NERVES     CN III, IV, VI   Pupils are equal, round, and reactive to light.  Extraocular motions are normal.        Significant Labs:   Recent Lab Results       12/19/19  0650   12/18/19  2049   12/18/19  2033        Albumin     3.3     Alkaline Phosphatase     76     ALT     8     Anion Gap     7     aPTT 43.7  Comment:  aPTT therapeutic range = 39-69 seconds   30.8  Comment:  aPTT therapeutic range = 39-69 seconds     AST     12     Baso # 0.01   0.01     Basophil% 0.4   0.3     BILIRUBIN TOTAL     0.4  Comment:  For infants and newborns, interpretation of results  should be based  on gestational age, weight and in agreement with clinical  observations.  Premature Infant recommended reference ranges:  Up to 24 hours.............<8.0 mg/dL  Up to 48 hours............<12.0 mg/dL  3-5 days..................<15.0 mg/dL  6-29 days.................<15.0 mg/dL       BUN, Bld     5     Calcium     9.2     Chloride     106     CO2     26     Creatinine     0.7     Differential Method Automated   Automated     eGFR if      >60     eGFR if non      >60  Comment:  Calculation used to obtain the estimated glomerular filtration  rate (eGFR) is the CKD-EPI equation.        Eos # 0.1   0.0     Eosinophil% 2.0   1.3     Glucose     103     Gran # (ANC) 1.2   2.5     Gran% 46.4   78.9     Hematocrit 25.2   27.4     Hemoglobin 7.6   8.3     Immature Grans (Abs) 0.01  Comment:  Mild elevation in immature granulocytes is non specific and   can be seen in a variety of conditions including stress response,   acute inflammation, trauma and pregnancy. Correlation with other   laboratory and clinical findings is essential.     0.01  Comment:  Mild elevation in immature granulocytes is non specific and   can be seen in a variety of conditions including stress response,   acute inflammation, trauma and pregnancy. Correlation with other   laboratory and clinical findings is essential.       Immature Granulocytes 0.4   0.3     Coumadin Monitoring INR     1.0  Comment:  Coumadin Therapy:  2.0 - 3.0 for INR for all indicators except mechanical heart valves  and antiphospholipid syndromes which should use 2.5 - 3.5.       Lymph # 1.1   0.5     Lymph% 44.0   14.7     Magnesium     2.1     MCH 27.5   27.3     MCHC 30.2   30.3     MCV 91   90     Mono # 0.2   0.1     Mono% 6.8   4.5     MPV 9.6   9.2     nRBC 0   0     Platelets 177   200     Potassium     3.8     Preg Test, Ur   Negative       PROTEIN TOTAL     7.3     Protime     10.4      Acceptable   Yes        RBC 2.76   3.04     RDW 14.8   14.7     Sodium     139     WBC 2.50   3.13           Significant Imaging: I have reviewed all pertinent imaging results/findings within the past 24 hours.

## 2019-12-19 NOTE — ASSESSMENT & PLAN NOTE
Continue heparin infusion. Interventional radiology will proceed with venography with intervention. Patient has already discussed plan of care with IR. Vascular surgery available to assist prn.

## 2019-12-19 NOTE — H&P
"[facility] Hospital Medicine  History & Physical    Patient Name: Antoinette Love  MRN: [id number]  Admission Date: 12/18/2019  Attending Physician: Cecilia Guzman MD  Primary Care Provider: Alberto Holguin MD         Patient information was obtained from patient, past medical records and ER records.     Subjective:     Principal Problem:Acute deep vein thrombosis (DVT) of distal vein of both lower extremities    Chief Complaint:   Chief Complaint   Patient presents with    Leg Swelling     bilateral leg swellling was admitted a month ago for same. patient reports increased edema, "fluid leaking from my legs" Had thrombolysis on bilateral legs. denies fever, chills. +body aches        HPI: This is a 29 y.o.female patient, with a PMHx of Pulmonary Embolus, Factor V Leiden, May-Thurner Syndrome, and recurrent DVT, presenting to the ED with a complaint of gradual, bilateral leg swelling and pain that began x1 week ago. She states the swelling goes all the way up to her thighs. She states they are leaking fluid. Patient states this is the biggest her legs have ever been. She reports the swelling is worse in her right leg. Patient reports she is unable to walk due to pain. Patient states she is still on Eliquis. She also reports veins are "popping" on her abdomen and chest, leaving bruises. Patient states she has had her IVC filter since 2014. Patient reports associated chronic abdominal pain in the right lower quadrant secondary to the vena cava filter.  This is unchanged. Patient denies any bleeding, fever, shortness of breath, chest pain, back pain, or any other associated symptoms. She reports having 3 thrombectomies in the past most recent which was in November.    In the ER, labs were stable. Dopplers of b/l LEs showed non occlusive DVTs which is improved compared to b/l occlusive DVTs from last month. She has been admitted for IV heparin therapy at the recommendations of vascular " "surgery.    Past Medical History:   Diagnosis Date    Anticoagulant long-term use     Anticoagulant long-term use     FERNANDO (dyspnea on exertion)     DVT (deep venous thrombosis)     Factor V Leiden     Leg edema, left     May-Thurner syndrome     Multiple pulmonary nodules     Pulmonary embolus     RAD (reactive airway disease)     Recurrent upper respiratory infection (URI)     Recurrent urticaria     TIA (transient ischemic attack)        Past Surgical History:   Procedure Laterality Date    COLONOSCOPY N/A 12/18/2015    Procedure: COLONOSCOPY;  Surgeon: Lefty Lee MD;  [facility]  Service: Endoscopy;  Laterality: N/A;  schedule with Jesus    IVC FILTER RETRIEVAL      IVC FILTER RETRIEVAL N/A 9/23/2019    Procedure: REMOVAL-FILTER-IVC;  Surgeon: Claudia Surgeon;  Location: [facility];  Service: Anesthesiology;  Laterality: N/A;  188  4 hours Anes    tumor from breast      left    WISDOM TOOTH EXTRACTION         Review of patient's allergies indicates:   Allergen Reactions    Azithromycin Hives    Beef containing products Anaphylaxis     Patient cannot eat BEEF BROTH  STATES IT WILL MAKE HER THROAT CLOSE UP .     Pork derived (porcine) Anaphylaxis     Throat swells up cannot eat pork sausage or pork patties ,     Unclassified drug Shortness Of Breath and Other (See Comments)     Is allergic to a beef spice - Causes "throat closing"    Xarelto [rivaroxaban] Other (See Comments)     Gallbladder swelling and disfunction    Toradol [ketorolac] Hives and Itching       No current facility-administered medications on file prior to encounter.      Current Outpatient Medications on File Prior to Encounter   Medication Sig    apixaban (ELIQUIS) 5 mg Tab Take 5 mg by mouth 2 (two) times daily.    diazePAM (VALIUM) 5 MG tablet TAKE 1 TABLET (5 MG TOTAL) BY MOUTH EVERY 12 (TWELVE) HOURS AS NEEDED FOR ANXIETY.    EPINEPHrine (EPIPEN 2-VAN) 0.3 mg/0.3 mL AtIn Inject 0.3 mLs (0.3 mg total) " into the muscle once. for 1 dose    ondansetron (ZOFRAN-ODT) 8 MG TbDL Take 1 tablet (8 mg total) by mouth every 8 (eight) hours as needed (nausea). (Patient not taking: Reported on 11/11/2019)     Family History     Problem Relation (Age of Onset)    Allergies Mother        Tobacco Use    Smoking status: Never Smoker    Smokeless tobacco: Never Used   Substance and Sexual Activity    Alcohol use: No     Frequency: Monthly or less     Drinks per session: 3 or 4     Binge frequency: Never    Drug use: No    Sexual activity: Yes     Partners: Male     Review of Systems   Constitutional: Negative for activity change, appetite change, chills, diaphoresis, fatigue and fever.   HENT: Negative for congestion, sinus pressure, sore throat and tinnitus.    Eyes: Negative for visual disturbance.   Respiratory: Negative for cough, chest tightness, shortness of breath and wheezing.    Cardiovascular: Negative for chest pain, palpitations and leg swelling.   Gastrointestinal: Positive for abdominal pain (chronic, RLQ). Negative for abdominal distention, nausea and vomiting.   Endocrine: Negative for polydipsia, polyphagia and polyuria.   Genitourinary: Negative for dysuria.   Musculoskeletal: Negative for arthralgias and back pain.   Skin: Negative for rash and wound.   Neurological: Negative for dizziness, syncope, light-headedness and headaches.   Psychiatric/Behavioral: Negative for confusion.     Objective:     Vital Signs (Most Recent):  Temp: 98.8 °F (37.1 °C) (12/19/19 1122)  Pulse: 81 (12/19/19 1122)  Resp: 18 (12/19/19 1122)  BP: (!) 105/58 (12/19/19 1122)  SpO2: 99 % (12/19/19 1122) Vital Signs (24h Range):  Temp:  [97.7 °F (36.5 °C)-99.6 °F (37.6 °C)] 98.8 °F (37.1 °C)  Pulse:  [] 81  Resp:  [12-21] 18  SpO2:  [98 %-100 %] 99 %  BP: ()/(51-74) 105/58     Weight: 79 kg (174 lb 2.6 oz)  Body mass index is 28.98 kg/m².    Physical Exam   Constitutional: She appears well-developed and well-nourished. She  is cooperative.  Non-toxic appearance. She does not have a sickly appearance. She does not appear ill. No distress.   HENT:   Head: Normocephalic and atraumatic.   Right Ear: External ear normal.   Left Ear: External ear normal.   Nose: Nose normal.   Mouth/Throat: Uvula is midline, oropharynx is clear and moist and mucous membranes are normal.   Eyes: Pupils are equal, round, and reactive to light. Conjunctivae, EOM and lids are normal.   Neck: Trachea normal, normal range of motion and full passive range of motion without pain. Neck supple. No JVD present. No thyroid mass present.   Cardiovascular: Normal rate, regular rhythm, S1 normal, S2 normal and normal heart sounds.   Pulmonary/Chest: Effort normal and breath sounds normal.   Abdominal: Soft. Normal appearance and bowel sounds are normal. She exhibits no distension. There is no tenderness.   Musculoskeletal:        Right lower leg: She exhibits edema and deformity.        Left lower leg: She exhibits swelling and edema.   B/l 3+ pitting edema up to knees. R>L.    Neurological: She is alert. She has normal strength. No cranial nerve deficit or sensory deficit.   Skin: Skin is intact. No cyanosis.   Psychiatric: She has a normal mood and affect. Her speech is normal and behavior is normal. Thought content normal. Cognition and memory are normal.   Nursing note and vitals reviewed.        CRANIAL NERVES     CN III, IV, VI   Pupils are equal, round, and reactive to light.  Extraocular motions are normal.        Significant Labs:   Recent Lab Results       12/19/19  0650   12/18/19 2049 12/18/19 2033        Albumin     3.3     Alkaline Phosphatase     76     ALT     8     Anion Gap     7     aPTT 43.7  Comment:  aPTT therapeutic range = 39-69 seconds   30.8  Comment:  aPTT therapeutic range = 39-69 seconds     AST     12     Baso # 0.01   0.01     Basophil% 0.4   0.3     BILIRUBIN TOTAL     0.4  Comment:  For infants and newborns, interpretation of results  should be based  on gestational age, weight and in agreement with clinical  observations.  Premature Infant recommended reference ranges:  Up to 24 hours.............<8.0 mg/dL  Up to 48 hours............<12.0 mg/dL  3-5 days..................<15.0 mg/dL  6-29 days.................<15.0 mg/dL       BUN, Bld     5     Calcium     9.2     Chloride     106     CO2     26     Creatinine     0.7     Differential Method Automated   Automated     eGFR if      >60     eGFR if non      >60  Comment:  Calculation used to obtain the estimated glomerular filtration  rate (eGFR) is the CKD-EPI equation.        Eos # 0.1   0.0     Eosinophil% 2.0   1.3     Glucose     103     Gran # (ANC) 1.2   2.5     Gran% 46.4   78.9     Hematocrit 25.2   27.4     Hemoglobin 7.6   8.3     Immature Grans (Abs) 0.01  Comment:  Mild elevation in immature granulocytes is non specific and   can be seen in a variety of conditions including stress response,   acute inflammation, trauma and pregnancy. Correlation with other   laboratory and clinical findings is essential.     0.01  Comment:  Mild elevation in immature granulocytes is non specific and   can be seen in a variety of conditions including stress response,   acute inflammation, trauma and pregnancy. Correlation with other   laboratory and clinical findings is essential.       Immature Granulocytes 0.4   0.3     Coumadin Monitoring INR     1.0  Comment:  Coumadin Therapy:  2.0 - 3.0 for INR for all indicators except mechanical heart valves  and antiphospholipid syndromes which should use 2.5 - 3.5.       Lymph # 1.1   0.5     Lymph% 44.0   14.7     Magnesium     2.1     MCH 27.5   27.3     MCHC 30.2   30.3     MCV 91   90     Mono # 0.2   0.1     Mono% 6.8   4.5     MPV 9.6   9.2     nRBC 0   0     Platelets 177   200     Potassium     3.8     Preg Test, Ur   Negative       PROTEIN TOTAL     7.3     Protime     10.4      Acceptable   Yes        RBC 2.76   3.04     RDW 14.8   14.7     Sodium     139     WBC 2.50   3.13           Significant Imaging: I have reviewed all pertinent imaging results/findings within the past 24 hours.    Assessment/Plan:     * Acute deep vein thrombosis (DVT) of distal vein of both lower extremities  Continue heparin infusion. Interventional radiology will proceed with venography with intervention. Patient has already discussed plan of care with IR. Vascular surgery available to assist prn.       Anemia of chronic disease  With iron deficiency anemia (per chart review, iron level checked last month). Transfuse as needed for hgb <7. Monitor.       Presence of IVC filter        May-Thurner syndrome  In patients with DVT, catheter-directed thrombolysis or pharmacomechanical thrombolysis in addition to anticoagulation rather than anticoagulation alone is recommended. IR will proceed with venography with intervention.         Factor V Leiden mutation  Continue chronic anticoagulation.      VTE Risk Mitigation (From admission, onward)         Ordered     heparin 25,000 units in dextrose 5% 250 mL (100 units/mL) infusion (heparin infusion)  Continuous      12/19/19 1608     heparin 25,000 units in dextrose 5% 250 mL (100 units/mL) infusion (heparin infusion)  Continuous      12/19/19 1608     IP VTE HIGH RISK PATIENT  Once      12/19/19 0115     heparin 25,000 units in dextrose 5% 250 mL (100 units/mL) infusion HIGH INTENSITY nomogram - [facility]  Continuous     Question:  Heparin Infusion Adjustment (DO NOT MODIFY ANSWER)  Answer:  \\[email or web address]\epic\Images\Pharmacy\HeparinInfusions\heparin HIGH INTENSITY nomogram for [facility] [id number]    12/18/19 2335     heparin 25,000 units in dextrose 5% (100 units/ml) IV bolus from bag - ADDITIONAL PRN BOLUS - 60 units/kg  As needed (PRN)     Question:  Heparin Infusion Adjustment (DO NOT MODIFY ANSWER)  Answer:  \\[email or web address]\epic\Images\Pharmacy\HeparinInfusions\heparin HIGH INTENSITY nomogram for [facility]  [id number]    12/18/19 4445     heparin 25,000 units in dextrose 5% (100 units/ml) IV bolus from bag - ADDITIONAL PRN BOLUS - 30 units/kg  As needed (PRN)     Question:  Heparin Infusion Adjustment (DO NOT MODIFY ANSWER)  Answer:  \\[email or web address]\epic\Images\Pharmacy\HeparinInfusions\heparin HIGH INTENSITY nomogram for [facility] [id number]    12/18/19 3188                   Cecilia Guzman MD  Department of Hospital Medicine   [facility]

## 2019-12-19 NOTE — PROCEDURES
Radiology Post-Procedure Note    Pre Op Diagnosis: Acute and chronic bilateral lower extremity DVT  Post Op Diagnosis: Same    Procedure: 1. Bilateral lower extremity venograms 2. Placement of 5-Fr unifuse infusion catheters into bilateral femoropopliteal veins for over night thrombolysis    Procedure performed by: Juan Acosta MD    Written Informed Consent Obtained: Yes  Specimen Removed: NO  Estimated Blood Loss: Minimal    Findings:   1. Near occlusive DVT in left femoropopliteal veins with extensive LLE venous collaterals. Unable to assess ipsilateral iliac veins or advance infusion catheter past Lt mid superficial femoral vein. 5-Fr 20-cm Unifuse infusion catheter placed and extends from Lt popliteal to mid superficial femoral vein for overnight infusion of tPA (5-ml/hr = 0.5-mg/hr) via this catheter and 400U Heparin/hr via the Lt pop sheath.      2. Partially occlusive DVT in right femoropopliteal veins with mild RLE venous collaterals. Occlusive DVT in right iliac veins with extensive pelvic collaterals. Unable to cross iliac venous occlusion. 5-Fr 40-cm Unifuse infusion catheter placed and extends from Rt popliteal to external iliac vein for overnight infusion of tPA (5-ml/hr = 0.5-mg/hr) via this catheter and 400U Heparin/hr via the Rt pop sheath.    Pt to return to IR tomorrow tentatively b/t 10AM-noon for further intervention under GA by anesthesiology.    Patient tolerated the procedure well. No immediate post-procedural complications noted.     Thank you for considering IR for the care of your patient.     Juan Acosta MD  Interventional Radiology

## 2019-12-19 NOTE — HPI
"This is a 29 y.o.female patient, with a PMHx of Pulmonary Embolus, Factor V Leiden, May-Thurner Syndrome, and recurrent DVT, presenting to the ED with a complaint of gradual, bilateral leg swelling and pain that began x1 week ago. She states the swelling goes all the way up to her thighs. She states they are leaking fluid. Patient states this is the biggest her legs have ever been. She reports the swelling is worse in her right leg. Patient reports she is unable to walk due to pain. Patient states she is still on Eliquis. She also reports veins are "popping" on her abdomen and chest, leaving bruises. Patient states she has had her IVC filter since 2014. Patient reports associated chronic abdominal pain in the right lower quadrant secondary to the vena cava filter.  This is unchanged. Patient denies any bleeding, fever, shortness of breath, chest pain, back pain, or any other associated symptoms. She reports having 3 thrombectomies in the past most recent which was in November.    In the ER, labs were stable. Dopplers of b/l LEs showed non occlusive DVTs which is improved compared to b/l occlusive DVTs from last month. She has been admitted for IV heparin therapy at the recommendations of vascular surgery.  "

## 2019-12-19 NOTE — ASSESSMENT & PLAN NOTE
With iron deficiency anemia (per chart review, iron level checked last month). Transfuse as needed for hgb <7. Monitor.

## 2019-12-19 NOTE — CONSULTS
Patient treated by Interventional Radiology in past.  I will consult IR for continuity of care.  Please consult Vascular Surgery if assistance is needed.

## 2019-12-19 NOTE — NURSING
Patient arrived to floor by bed from IR, Bilateral infusion catheters placed to popliteal areas. Heparin infusing @ 400 units/hour, Cathflo infusing to each at 0.5 mg/hr. Patient will be transferred to ICU for further monitoring.

## 2019-12-19 NOTE — ASSESSMENT & PLAN NOTE
In patients with DVT, catheter-directed thrombolysis or pharmacomechanical thrombolysis in addition to anticoagulation rather than anticoagulation alone is recommended. IR will proceed with venography with intervention.

## 2019-12-20 LAB
APTT BLDCRRT: 100 SEC (ref 21–32)
APTT BLDCRRT: 28.7 SEC (ref 21–32)
APTT BLDCRRT: 30.6 SEC (ref 21–32)
APTT BLDCRRT: 46.6 SEC (ref 21–32)
BASOPHILS # BLD AUTO: 0.01 K/UL (ref 0–0.2)
BASOPHILS # BLD AUTO: 0.02 K/UL (ref 0–0.2)
BASOPHILS NFR BLD: 0.3 % (ref 0–1.9)
BASOPHILS NFR BLD: 0.3 % (ref 0–1.9)
BASOPHILS NFR BLD: 0.4 % (ref 0–1.9)
BASOPHILS NFR BLD: 0.5 % (ref 0–1.9)
DIFFERENTIAL METHOD: ABNORMAL
EOSINOPHIL # BLD AUTO: 0 K/UL (ref 0–0.5)
EOSINOPHIL # BLD AUTO: 0.1 K/UL (ref 0–0.5)
EOSINOPHIL NFR BLD: 0.6 % (ref 0–8)
EOSINOPHIL NFR BLD: 1.2 % (ref 0–8)
EOSINOPHIL NFR BLD: 1.7 % (ref 0–8)
EOSINOPHIL NFR BLD: 2.3 % (ref 0–8)
ERYTHROCYTE [DISTWIDTH] IN BLOOD BY AUTOMATED COUNT: 14.4 % (ref 11.5–14.5)
FIBRINOGEN PPP-MCNC: 181 MG/DL (ref 182–366)
FIBRINOGEN PPP-MCNC: 312 MG/DL (ref 182–366)
FIBRINOGEN PPP-MCNC: 351 MG/DL (ref 182–366)
HCT VFR BLD AUTO: 24.1 % (ref 37–48.5)
HCT VFR BLD AUTO: 25.4 % (ref 37–48.5)
HCT VFR BLD AUTO: 26.3 % (ref 37–48.5)
HCT VFR BLD AUTO: 26.6 % (ref 37–48.5)
HGB BLD-MCNC: 7.4 G/DL (ref 12–16)
HGB BLD-MCNC: 7.8 G/DL (ref 12–16)
HGB BLD-MCNC: 8.2 G/DL (ref 12–16)
HGB BLD-MCNC: 8.7 G/DL (ref 12–16)
IMM GRANULOCYTES # BLD AUTO: 0.01 K/UL (ref 0–0.04)
IMM GRANULOCYTES # BLD AUTO: 0.01 K/UL (ref 0–0.04)
IMM GRANULOCYTES # BLD AUTO: 0.02 K/UL (ref 0–0.04)
IMM GRANULOCYTES # BLD AUTO: 0.13 K/UL (ref 0–0.04)
IMM GRANULOCYTES NFR BLD AUTO: 0.3 % (ref 0–0.5)
IMM GRANULOCYTES NFR BLD AUTO: 0.4 % (ref 0–0.5)
IMM GRANULOCYTES NFR BLD AUTO: 0.9 % (ref 0–0.5)
IMM GRANULOCYTES NFR BLD AUTO: 1.9 % (ref 0–0.5)
INR PPP: 1.1 (ref 0.8–1.2)
LYMPHOCYTES # BLD AUTO: 0.8 K/UL (ref 1–4.8)
LYMPHOCYTES NFR BLD: 11.6 % (ref 18–48)
LYMPHOCYTES NFR BLD: 24.5 % (ref 18–48)
LYMPHOCYTES NFR BLD: 34.8 % (ref 18–48)
LYMPHOCYTES NFR BLD: 38.8 % (ref 18–48)
MCH RBC QN AUTO: 27.1 PG (ref 27–31)
MCH RBC QN AUTO: 27.4 PG (ref 27–31)
MCH RBC QN AUTO: 27.7 PG (ref 27–31)
MCH RBC QN AUTO: 28.4 PG (ref 27–31)
MCHC RBC AUTO-ENTMCNC: 30.7 G/DL (ref 32–36)
MCHC RBC AUTO-ENTMCNC: 30.7 G/DL (ref 32–36)
MCHC RBC AUTO-ENTMCNC: 31.2 G/DL (ref 32–36)
MCHC RBC AUTO-ENTMCNC: 32.7 G/DL (ref 32–36)
MCV RBC AUTO: 87 FL (ref 82–98)
MCV RBC AUTO: 88 FL (ref 82–98)
MCV RBC AUTO: 89 FL (ref 82–98)
MCV RBC AUTO: 89 FL (ref 82–98)
MONOCYTES # BLD AUTO: 0.1 K/UL (ref 0.3–1)
MONOCYTES # BLD AUTO: 0.2 K/UL (ref 0.3–1)
MONOCYTES # BLD AUTO: 0.3 K/UL (ref 0.3–1)
MONOCYTES # BLD AUTO: 0.3 K/UL (ref 0.3–1)
MONOCYTES NFR BLD: 4.9 % (ref 4–15)
MONOCYTES NFR BLD: 6.5 % (ref 4–15)
MONOCYTES NFR BLD: 7.6 % (ref 4–15)
MONOCYTES NFR BLD: 9.1 % (ref 4–15)
NEUTROPHILS # BLD AUTO: 1.1 K/UL (ref 1.8–7.7)
NEUTROPHILS # BLD AUTO: 1.2 K/UL (ref 1.8–7.7)
NEUTROPHILS # BLD AUTO: 2.2 K/UL (ref 1.8–7.7)
NEUTROPHILS # BLD AUTO: 5.6 K/UL (ref 1.8–7.7)
NEUTROPHILS NFR BLD: 51 % (ref 38–73)
NEUTROPHILS NFR BLD: 53.6 % (ref 38–73)
NEUTROPHILS NFR BLD: 66.1 % (ref 38–73)
NEUTROPHILS NFR BLD: 80.7 % (ref 38–73)
NRBC BLD-RTO: 0 /100 WBC
PLATELET # BLD AUTO: 135 K/UL (ref 150–350)
PLATELET # BLD AUTO: 154 K/UL (ref 150–350)
PLATELET # BLD AUTO: 172 K/UL (ref 150–350)
PLATELET # BLD AUTO: 205 K/UL (ref 150–350)
PMV BLD AUTO: 9.3 FL (ref 9.2–12.9)
PMV BLD AUTO: 9.5 FL (ref 9.2–12.9)
PMV BLD AUTO: 9.6 FL (ref 9.2–12.9)
PMV BLD AUTO: 9.7 FL (ref 9.2–12.9)
PROTHROMBIN TIME: 12 SEC (ref 9–12.5)
RBC # BLD AUTO: 2.73 M/UL (ref 4–5.4)
RBC # BLD AUTO: 2.85 M/UL (ref 4–5.4)
RBC # BLD AUTO: 2.96 M/UL (ref 4–5.4)
RBC # BLD AUTO: 3.06 M/UL (ref 4–5.4)
WBC # BLD AUTO: 2.14 K/UL (ref 3.9–12.7)
WBC # BLD AUTO: 2.3 K/UL (ref 3.9–12.7)
WBC # BLD AUTO: 3.3 K/UL (ref 3.9–12.7)
WBC # BLD AUTO: 6.97 K/UL (ref 3.9–12.7)

## 2019-12-20 PROCEDURE — 25500020 PHARM REV CODE 255: Performed by: HOSPITALIST

## 2019-12-20 PROCEDURE — 20000000 HC ICU ROOM

## 2019-12-20 PROCEDURE — 25000003 PHARM REV CODE 250: Performed by: EMERGENCY MEDICINE

## 2019-12-20 PROCEDURE — 25000003 PHARM REV CODE 250: Performed by: NURSE ANESTHETIST, CERTIFIED REGISTERED

## 2019-12-20 PROCEDURE — 63600175 PHARM REV CODE 636 W HCPCS: Performed by: HOSPITALIST

## 2019-12-20 PROCEDURE — 63600175 PHARM REV CODE 636 W HCPCS: Performed by: RADIOLOGY

## 2019-12-20 PROCEDURE — D9220A PRA ANESTHESIA: ICD-10-PCS | Mod: CRNA,,, | Performed by: NURSE ANESTHETIST, CERTIFIED REGISTERED

## 2019-12-20 PROCEDURE — D9220A PRA ANESTHESIA: ICD-10-PCS | Mod: ANES,,, | Performed by: ANESTHESIOLOGY

## 2019-12-20 PROCEDURE — D9220A PRA ANESTHESIA: Mod: CRNA,,, | Performed by: NURSE ANESTHETIST, CERTIFIED REGISTERED

## 2019-12-20 PROCEDURE — 85025 COMPLETE CBC W/AUTO DIFF WBC: CPT | Mod: 91

## 2019-12-20 PROCEDURE — 63600175 PHARM REV CODE 636 W HCPCS

## 2019-12-20 PROCEDURE — D9220A PRA ANESTHESIA: Mod: ANES,,, | Performed by: ANESTHESIOLOGY

## 2019-12-20 PROCEDURE — 85730 THROMBOPLASTIN TIME PARTIAL: CPT

## 2019-12-20 PROCEDURE — 85610 PROTHROMBIN TIME: CPT

## 2019-12-20 PROCEDURE — 85384 FIBRINOGEN ACTIVITY: CPT | Mod: 91

## 2019-12-20 PROCEDURE — 85730 THROMBOPLASTIN TIME PARTIAL: CPT | Mod: 91

## 2019-12-20 PROCEDURE — 25000003 PHARM REV CODE 250: Performed by: HOSPITALIST

## 2019-12-20 PROCEDURE — 85384 FIBRINOGEN ACTIVITY: CPT

## 2019-12-20 PROCEDURE — 94761 N-INVAS EAR/PLS OXIMETRY MLT: CPT

## 2019-12-20 PROCEDURE — 36415 COLL VENOUS BLD VENIPUNCTURE: CPT

## 2019-12-20 PROCEDURE — 63600175 PHARM REV CODE 636 W HCPCS: Performed by: NURSE ANESTHETIST, CERTIFIED REGISTERED

## 2019-12-20 PROCEDURE — 63600175 PHARM REV CODE 636 W HCPCS: Performed by: EMERGENCY MEDICINE

## 2019-12-20 RX ORDER — PROPOFOL 10 MG/ML
VIAL (ML) INTRAVENOUS
Status: DISCONTINUED | OUTPATIENT
Start: 2019-12-20 | End: 2019-12-20

## 2019-12-20 RX ORDER — NEOSTIGMINE METHYLSULFATE 1 MG/ML
INJECTION, SOLUTION INTRAVENOUS
Status: DISCONTINUED | OUTPATIENT
Start: 2019-12-20 | End: 2019-12-20

## 2019-12-20 RX ORDER — SUCCINYLCHOLINE CHLORIDE 20 MG/ML
INJECTION INTRAMUSCULAR; INTRAVENOUS
Status: DISCONTINUED | OUTPATIENT
Start: 2019-12-20 | End: 2019-12-20

## 2019-12-20 RX ORDER — FENTANYL CITRATE 50 UG/ML
25 INJECTION, SOLUTION INTRAMUSCULAR; INTRAVENOUS ONCE
Status: COMPLETED | OUTPATIENT
Start: 2019-12-20 | End: 2019-12-20

## 2019-12-20 RX ORDER — HYDROMORPHONE HYDROCHLORIDE 2 MG/ML
2 INJECTION, SOLUTION INTRAMUSCULAR; INTRAVENOUS; SUBCUTANEOUS EVERY 4 HOURS PRN
Status: DISCONTINUED | OUTPATIENT
Start: 2019-12-20 | End: 2019-12-20

## 2019-12-20 RX ORDER — HEPARIN SODIUM 1000 [USP'U]/ML
INJECTION, SOLUTION INTRAVENOUS; SUBCUTANEOUS
Status: DISCONTINUED | OUTPATIENT
Start: 2019-12-20 | End: 2019-12-20

## 2019-12-20 RX ORDER — HYDROMORPHONE HYDROCHLORIDE 2 MG/ML
0.5 INJECTION, SOLUTION INTRAMUSCULAR; INTRAVENOUS; SUBCUTANEOUS
Status: DISCONTINUED | OUTPATIENT
Start: 2019-12-20 | End: 2019-12-21

## 2019-12-20 RX ORDER — MIDAZOLAM HYDROCHLORIDE 1 MG/ML
INJECTION, SOLUTION INTRAMUSCULAR; INTRAVENOUS
Status: DISCONTINUED | OUTPATIENT
Start: 2019-12-20 | End: 2019-12-20

## 2019-12-20 RX ORDER — SODIUM CHLORIDE, SODIUM LACTATE, POTASSIUM CHLORIDE, CALCIUM CHLORIDE 600; 310; 30; 20 MG/100ML; MG/100ML; MG/100ML; MG/100ML
INJECTION, SOLUTION INTRAVENOUS CONTINUOUS PRN
Status: DISCONTINUED | OUTPATIENT
Start: 2019-12-20 | End: 2019-12-20

## 2019-12-20 RX ORDER — LIDOCAINE HCL/PF 100 MG/5ML
SYRINGE (ML) INTRAVENOUS
Status: DISCONTINUED | OUTPATIENT
Start: 2019-12-20 | End: 2019-12-20

## 2019-12-20 RX ORDER — GLYCOPYRROLATE 0.2 MG/ML
INJECTION INTRAMUSCULAR; INTRAVENOUS
Status: DISCONTINUED | OUTPATIENT
Start: 2019-12-20 | End: 2019-12-20

## 2019-12-20 RX ORDER — ROCURONIUM BROMIDE 10 MG/ML
INJECTION, SOLUTION INTRAVENOUS
Status: DISCONTINUED | OUTPATIENT
Start: 2019-12-20 | End: 2019-12-20

## 2019-12-20 RX ORDER — HYDROMORPHONE HYDROCHLORIDE 2 MG/ML
INJECTION, SOLUTION INTRAMUSCULAR; INTRAVENOUS; SUBCUTANEOUS
Status: DISCONTINUED | OUTPATIENT
Start: 2019-12-20 | End: 2019-12-20

## 2019-12-20 RX ORDER — FENTANYL CITRATE 50 UG/ML
INJECTION, SOLUTION INTRAMUSCULAR; INTRAVENOUS
Status: DISCONTINUED | OUTPATIENT
Start: 2019-12-20 | End: 2019-12-20

## 2019-12-20 RX ORDER — HEPARIN SODIUM,PORCINE/D5W 25000/250
18 INTRAVENOUS SOLUTION INTRAVENOUS CONTINUOUS
Status: DISCONTINUED | OUTPATIENT
Start: 2019-12-20 | End: 2019-12-21

## 2019-12-20 RX ORDER — PHENYLEPHRINE HYDROCHLORIDE 10 MG/ML
INJECTION INTRAVENOUS
Status: DISCONTINUED | OUTPATIENT
Start: 2019-12-20 | End: 2019-12-20

## 2019-12-20 RX ADMIN — HEPARIN SODIUM 18 UNITS/KG/HR: 10000 INJECTION, SOLUTION INTRAVENOUS at 01:12

## 2019-12-20 RX ADMIN — ALTEPLASE 10 MG: KIT at 09:12

## 2019-12-20 RX ADMIN — MORPHINE SULFATE 7 MG: 10 INJECTION INTRAVENOUS at 07:12

## 2019-12-20 RX ADMIN — SUCCINYLCHOLINE CHLORIDE 100 MG: 20 INJECTION, SOLUTION INTRAMUSCULAR; INTRAVENOUS at 11:12

## 2019-12-20 RX ADMIN — FAMOTIDINE 20 MG: 20 TABLET ORAL at 09:12

## 2019-12-20 RX ADMIN — NEOSTIGMINE METHYLSULFATE 5 MG: 1 INJECTION INTRAVENOUS at 01:12

## 2019-12-20 RX ADMIN — ALTEPLASE 10 MG: KIT at 08:12

## 2019-12-20 RX ADMIN — FENTANYL CITRATE 100 MCG: 50 INJECTION, SOLUTION INTRAMUSCULAR; INTRAVENOUS at 02:12

## 2019-12-20 RX ADMIN — HYDROMORPHONE HYDROCHLORIDE 0.5 MG: 2 INJECTION, SOLUTION INTRAMUSCULAR; INTRAVENOUS; SUBCUTANEOUS at 02:12

## 2019-12-20 RX ADMIN — HYDROMORPHONE HYDROCHLORIDE 1 MG: 2 INJECTION, SOLUTION INTRAMUSCULAR; INTRAVENOUS; SUBCUTANEOUS at 02:12

## 2019-12-20 RX ADMIN — FENTANYL CITRATE 100 MCG: 50 INJECTION, SOLUTION INTRAMUSCULAR; INTRAVENOUS at 11:12

## 2019-12-20 RX ADMIN — ONDANSETRON 4 MG: 2 INJECTION, SOLUTION INTRAMUSCULAR; INTRAVENOUS at 01:12

## 2019-12-20 RX ADMIN — PHENYLEPHRINE HYDROCHLORIDE 100 MCG: 10 INJECTION INTRAVENOUS at 01:12

## 2019-12-20 RX ADMIN — PROPOFOL 200 MG: 10 INJECTION, EMULSION INTRAVENOUS at 11:12

## 2019-12-20 RX ADMIN — ROCURONIUM BROMIDE 20 MG: 10 INJECTION, SOLUTION INTRAVENOUS at 11:12

## 2019-12-20 RX ADMIN — HEPARIN SODIUM 4000 UNITS: 1000 INJECTION, SOLUTION INTRAVENOUS; SUBCUTANEOUS at 12:12

## 2019-12-20 RX ADMIN — MORPHINE SULFATE 7 MG: 10 INJECTION INTRAVENOUS at 12:12

## 2019-12-20 RX ADMIN — FENTANYL CITRATE 50 MCG: 50 INJECTION, SOLUTION INTRAMUSCULAR; INTRAVENOUS at 11:12

## 2019-12-20 RX ADMIN — OXYCODONE HYDROCHLORIDE AND ACETAMINOPHEN 1 TABLET: 10; 325 TABLET ORAL at 03:12

## 2019-12-20 RX ADMIN — HYDROMORPHONE HYDROCHLORIDE 0.5 MG: 2 INJECTION, SOLUTION INTRAMUSCULAR; INTRAVENOUS; SUBCUTANEOUS at 07:12

## 2019-12-20 RX ADMIN — GLYCOPYRROLATE 0.6 MG: 0.2 INJECTION, SOLUTION INTRAMUSCULAR; INTRAVENOUS at 01:12

## 2019-12-20 RX ADMIN — SODIUM CHLORIDE, SODIUM LACTATE, POTASSIUM CHLORIDE, AND CALCIUM CHLORIDE: .6; .31; .03; .02 INJECTION, SOLUTION INTRAVENOUS at 11:12

## 2019-12-20 RX ADMIN — FENTANYL CITRATE 25 MCG: 50 INJECTION INTRAMUSCULAR; INTRAVENOUS at 10:12

## 2019-12-20 RX ADMIN — IOHEXOL 70 ML: 350 INJECTION, SOLUTION INTRAVENOUS at 02:12

## 2019-12-20 RX ADMIN — FENTANYL CITRATE 100 MCG: 50 INJECTION, SOLUTION INTRAMUSCULAR; INTRAVENOUS at 12:12

## 2019-12-20 RX ADMIN — MIDAZOLAM HYDROCHLORIDE 2 MG: 1 INJECTION, SOLUTION INTRAMUSCULAR; INTRAVENOUS at 11:12

## 2019-12-20 RX ADMIN — ALTEPLASE 10 MG: 2.2 INJECTION, POWDER, LYOPHILIZED, FOR SOLUTION INTRAVENOUS at 12:12

## 2019-12-20 RX ADMIN — LIDOCAINE HYDROCHLORIDE 60 MG: 20 INJECTION, SOLUTION INTRAVENOUS at 11:12

## 2019-12-20 NOTE — CARE UPDATE
[facility]  ICU Multidisciplinary Bedside Rounds   SUMMARY     Date: 12/20/2019    Prehospitalization: Home  Admit Date / LOS : 12/18/2019/ 2 days    Diagnosis: Acute deep vein thrombosis (DVT) of distal vein of both lower extremities    Consults:        Active: IR       Needed: N/A     Code Status: Full Code   Advanced Directive: <no information>    LDA: PIV and Purewick       Central Lines/Site/Justification:Patient Does Not Have Central Line       Urinary Cath/Order/Justification:Critically Ill in ICU    Vasopressors/Infusions:    alteplase (ACTIVASE) 10 mg in 0.9% NaCl 100 mL 5 mL/hr at 12/20/19 0600    alteplase (ACTIVASE) 10 mg in 0.9% NaCl 100 mL 5 mL/hr at 12/20/19 0600    heparin (porcine) in 5 % dex 400 Units/hr (12/20/19 0600)    heparin (porcine) in 5 % dex 400 Units/hr (12/20/19 0600)          GOALS: Volume/ Hemodynamic: N/A                     RASS: 0  alert and calm    CAM ICU: Positive  Pain Management: IV       Pain Controlled: yes     Rhythm: NSR    Respiratory Device: Vent                  Most Recent SBT/ SAT: N/A       MOVE Screen:    VTE Prophylaxis: pharm  Mobility: Bedrest  Stress Ulcer Prophylaxis: Yes    Dietary: NPO  Tolerance: yes  /  Advancement: no    Isolation: No active isolations    Restraints: No    Significant Dates:  Post Op Date: N/A  Rescue Date: N/A  Imaging/ Diagnostics: N/A    Noteworthy Labs:     CBC/Anemia Labs: Coags:    Recent Labs   Lab 12/19/19 1844 12/20/19 0010 12/20/19 0616   WBC 1.81* 2.30* 2.14*   HGB 7.9* 7.4* 7.8*   HCT 25.8* 24.1* 25.4*   * 154 172   MCV 91 88 89   RDW 14.4 14.4 14.4    Recent Labs   Lab 12/18/19 2033 12/19/19 1844 12/20/19 0010 12/20/19 0616   INR 1.0  --   --   --   --    APTT 30.8   < > 29.0 30.6 28.7    < > = values in this interval not displayed.        Chemistries:   Recent Labs   Lab 12/18/19 2033      K 3.8      CO2 26   BUN 5*   CREATININE 0.7   CALCIUM 9.2   PROT 7.3   BILITOT 0.4    ALKPHOS 76   ALT 8*   AST 12   MG 2.1        Cardiac Enzymes: Ejection Fractions:    No results for input(s): CPK, CPKMB, MB, TROPONINI in the last 72 hours. No results found for: EF     POCT Glucose: HbA1c:    No results for input(s): POCTGLUCOSE in the last 168 hours. No results found for: HGBA1C     Needs from Care Team: vascular      ICU LOS 14h  Level of Care: Critical Care

## 2019-12-20 NOTE — PROCEDURES
Interventional Radiology Procedure Note    Procedure: ivc, lower extremity thrombolysis    Pre procedure diagnosis: recurrent IVC thrombosis    Post procedure diagnosis: same    : MD Ruth    Specimens removed: none     Estimated Blood Loss: 20 cc    Complications: none     Findings: following overnight infusion significant clot burden within stents and lower extremities remain. Angiojet and angioplasty of both legs and stents was performed with reestablished flow through legs and stents.     Recommend early ambulation and anticoagulation regimen restart with heparin bridge until therapeutic.     Patient should follow up in IR clinic to ensure all treatment options are explored as this is a recurrent issue.

## 2019-12-20 NOTE — PROGRESS NOTES
Pt brought into IR procedure room. Sedated and intubated per DANIELLA Herrera CRNA. Dick placed. Placed prone on procedure table. Tolerated all well. Infusions to BLE sheaths and catheters stopped per MD order for procedure. VSS. NADN. Vitals to be monitored/charted per CRNA. IR RNs in room available to circulate and assist as needed.

## 2019-12-20 NOTE — TRANSFER OF CARE
"Anesthesia Transfer of Care Note    Patient: Antoinette Love    Procedure(s) Performed: Procedure(s) (LRB):  THROMBOLYSIS OR THROMBECTOMY, BLOOD VESSEL, LOWER EXTREMITY (N/A)    Patient location: ICU    Anesthesia Type: general    Transport from OR: Transported from OR on 6-10 L/min O2 by face mask with adequate spontaneous ventilation. Continuous ECG monitoring in transport. Continuous SpO2 monitoring in transport    Post pain: adequate analgesia    Post assessment: no apparent anesthetic complications    Post vital signs: stable    Level of consciousness: awake and alert    Nausea/Vomiting: no nausea/vomiting    Complications: none    Transfer of care protocol was followed      Last vitals:   Visit Vitals  /61   Pulse 67   Temp 37.1 °C (98.7 °F) (Oral)   Resp 15   Ht 5' 5" (1.651 m)   Wt 79 kg (174 lb 2.6 oz)   LMP  (LMP Unknown)   SpO2 99%   Breastfeeding? No   BMI 28.98 kg/m²     "

## 2019-12-20 NOTE — PLAN OF CARE
Problem: Adult Inpatient Plan of Care  Goal: Plan of Care Review  Outcome: Ongoing, Progressing   Patient remain in ICU for acute DVT BLE currently on IV heparin therapy @4ml/hr and Cathoplase @0.5mg/hr. 5-Fr unifuse infusion catheters into bilateral femoropopliteal veins for over night thrombolysis. Schedule to return to IR for further intervention under general anesthesia.  Currently pain management control with IV medication and PO breakthrough. Continue to monitor labs and intervene as needed.

## 2019-12-20 NOTE — PROGRESS NOTES
1415 pt arrived back to the ICU. In significant pain post procedure. Anesthesia at bedside for pain control. Spoke with MD Han, new orders received for pain management. bilat posterior knee pressure drsg's in place no drainage. Pt instructed to not move legs currently. Frequent monitoring preformed.  1440 Dilaudid given 0.5 mg. Pt tolerated and reported pain relief and resting, MD Han came to assess pt. Orders adjusted. Pt's urine bloody since arrival from IR. Md Han visualized. MD states to irrigate pozo x1, pozo irrigated, no clots present, remains with blood drainage. Bladder scan results 11ml prior to irrigation. Pt's heart rate as low as 38, MD Han aware and RN monitoring. Pt's bp stable, pt aaox4. At times seems to hold breath, instructed to breathe normally.    1620 informed MD Han of current aptt 100. Instructed not to adjust heparin gtt dosage currently, will utilize nomogram at 6 hr draw. New orders received for diet. Pt requesting dilaudid specifically at times when other RN's are present in the room, informed we are monitoring heart rate currently. Will administer prn medications when heart rate improved. Pt agrees with plan of care and verbalizes understanding. MD Han updated.  1730 MD Han updated on pt. Pt's urine output remains bloody, no clots present, 30ml/hr. Pt without appetite currently. Heart rate remains fluctuating between 40's-60's. Pt calm and now sleeping reporting pain control. Breathing relaxed.

## 2019-12-21 PROBLEM — D63.8 ANEMIA OF CHRONIC DISEASE: Status: RESOLVED | Noted: 2019-12-19 | Resolved: 2019-12-21

## 2019-12-21 LAB
APTT BLDCRRT: 43.2 SEC (ref 21–32)
BASOPHILS # BLD AUTO: 0.01 K/UL (ref 0–0.2)
BASOPHILS # BLD AUTO: 0.02 K/UL (ref 0–0.2)
BASOPHILS NFR BLD: 0.3 % (ref 0–1.9)
BASOPHILS NFR BLD: 0.7 % (ref 0–1.9)
DIFFERENTIAL METHOD: ABNORMAL
DIFFERENTIAL METHOD: ABNORMAL
EOSINOPHIL # BLD AUTO: 0.1 K/UL (ref 0–0.5)
EOSINOPHIL # BLD AUTO: 0.1 K/UL (ref 0–0.5)
EOSINOPHIL NFR BLD: 2.4 % (ref 0–8)
EOSINOPHIL NFR BLD: 2.7 % (ref 0–8)
ERYTHROCYTE [DISTWIDTH] IN BLOOD BY AUTOMATED COUNT: 14.4 % (ref 11.5–14.5)
ERYTHROCYTE [DISTWIDTH] IN BLOOD BY AUTOMATED COUNT: 14.5 % (ref 11.5–14.5)
FIBRINOGEN PPP-MCNC: 195 MG/DL (ref 182–366)
FIBRINOGEN PPP-MCNC: 214 MG/DL (ref 182–366)
HCT VFR BLD AUTO: 23 % (ref 37–48.5)
HCT VFR BLD AUTO: 23.3 % (ref 37–48.5)
HGB BLD-MCNC: 7.2 G/DL (ref 12–16)
HGB BLD-MCNC: 7.2 G/DL (ref 12–16)
IMM GRANULOCYTES # BLD AUTO: 0.02 K/UL (ref 0–0.04)
IMM GRANULOCYTES # BLD AUTO: 0.03 K/UL (ref 0–0.04)
IMM GRANULOCYTES NFR BLD AUTO: 0.7 % (ref 0–0.5)
IMM GRANULOCYTES NFR BLD AUTO: 0.9 % (ref 0–0.5)
LYMPHOCYTES # BLD AUTO: 0.9 K/UL (ref 1–4.8)
LYMPHOCYTES # BLD AUTO: 0.9 K/UL (ref 1–4.8)
LYMPHOCYTES NFR BLD: 27.3 % (ref 18–48)
LYMPHOCYTES NFR BLD: 29.3 % (ref 18–48)
MCH RBC QN AUTO: 27 PG (ref 27–31)
MCH RBC QN AUTO: 27.2 PG (ref 27–31)
MCHC RBC AUTO-ENTMCNC: 30.9 G/DL (ref 32–36)
MCHC RBC AUTO-ENTMCNC: 31.3 G/DL (ref 32–36)
MCV RBC AUTO: 87 FL (ref 82–98)
MCV RBC AUTO: 87 FL (ref 82–98)
MONOCYTES # BLD AUTO: 0.2 K/UL (ref 0.3–1)
MONOCYTES # BLD AUTO: 0.2 K/UL (ref 0.3–1)
MONOCYTES NFR BLD: 5.7 % (ref 4–15)
MONOCYTES NFR BLD: 6 % (ref 4–15)
NEUTROPHILS # BLD AUTO: 1.8 K/UL (ref 1.8–7.7)
NEUTROPHILS # BLD AUTO: 2.1 K/UL (ref 1.8–7.7)
NEUTROPHILS NFR BLD: 60.9 % (ref 38–73)
NEUTROPHILS NFR BLD: 63.1 % (ref 38–73)
NRBC BLD-RTO: 0 /100 WBC
NRBC BLD-RTO: 0 /100 WBC
PLATELET # BLD AUTO: 184 K/UL (ref 150–350)
PLATELET # BLD AUTO: 197 K/UL (ref 150–350)
PMV BLD AUTO: 9.4 FL (ref 9.2–12.9)
PMV BLD AUTO: 9.6 FL (ref 9.2–12.9)
RBC # BLD AUTO: 2.65 M/UL (ref 4–5.4)
RBC # BLD AUTO: 2.67 M/UL (ref 4–5.4)
WBC # BLD AUTO: 2.97 K/UL (ref 3.9–12.7)
WBC # BLD AUTO: 3.33 K/UL (ref 3.9–12.7)

## 2019-12-21 PROCEDURE — 25000003 PHARM REV CODE 250: Performed by: HOSPITALIST

## 2019-12-21 PROCEDURE — 97165 OT EVAL LOW COMPLEX 30 MIN: CPT

## 2019-12-21 PROCEDURE — 85025 COMPLETE CBC W/AUTO DIFF WBC: CPT | Mod: 91

## 2019-12-21 PROCEDURE — 36415 COLL VENOUS BLD VENIPUNCTURE: CPT

## 2019-12-21 PROCEDURE — 20000000 HC ICU ROOM

## 2019-12-21 PROCEDURE — 25000003 PHARM REV CODE 250: Performed by: EMERGENCY MEDICINE

## 2019-12-21 PROCEDURE — 85730 THROMBOPLASTIN TIME PARTIAL: CPT

## 2019-12-21 PROCEDURE — 85384 FIBRINOGEN ACTIVITY: CPT

## 2019-12-21 PROCEDURE — 85384 FIBRINOGEN ACTIVITY: CPT | Mod: 91

## 2019-12-21 PROCEDURE — 97161 PT EVAL LOW COMPLEX 20 MIN: CPT

## 2019-12-21 PROCEDURE — 94761 N-INVAS EAR/PLS OXIMETRY MLT: CPT

## 2019-12-21 PROCEDURE — 63600175 PHARM REV CODE 636 W HCPCS: Performed by: HOSPITALIST

## 2019-12-21 RX ORDER — HYDROMORPHONE HYDROCHLORIDE 2 MG/ML
0.5 INJECTION, SOLUTION INTRAMUSCULAR; INTRAVENOUS; SUBCUTANEOUS EVERY 4 HOURS PRN
Status: DISCONTINUED | OUTPATIENT
Start: 2019-12-21 | End: 2019-12-22

## 2019-12-21 RX ADMIN — HEPARIN SODIUM 18 UNITS/KG/HR: 10000 INJECTION, SOLUTION INTRAVENOUS at 01:12

## 2019-12-21 RX ADMIN — APIXABAN 5 MG: 5 TABLET, FILM COATED ORAL at 08:12

## 2019-12-21 RX ADMIN — HYDROCODONE BITARTRATE AND ACETAMINOPHEN 1 TABLET: 5; 325 TABLET ORAL at 03:12

## 2019-12-21 RX ADMIN — APIXABAN 5 MG: 5 TABLET, FILM COATED ORAL at 09:12

## 2019-12-21 RX ADMIN — FAMOTIDINE 20 MG: 20 TABLET ORAL at 08:12

## 2019-12-21 RX ADMIN — HYDROMORPHONE HYDROCHLORIDE 0.5 MG: 2 INJECTION, SOLUTION INTRAMUSCULAR; INTRAVENOUS; SUBCUTANEOUS at 08:12

## 2019-12-21 RX ADMIN — HYDROMORPHONE HYDROCHLORIDE 0.5 MG: 2 INJECTION, SOLUTION INTRAMUSCULAR; INTRAVENOUS; SUBCUTANEOUS at 12:12

## 2019-12-21 RX ADMIN — HYDROMORPHONE HYDROCHLORIDE 0.5 MG: 2 INJECTION, SOLUTION INTRAMUSCULAR; INTRAVENOUS; SUBCUTANEOUS at 01:12

## 2019-12-21 RX ADMIN — HYDROMORPHONE HYDROCHLORIDE 0.5 MG: 2 INJECTION, SOLUTION INTRAMUSCULAR; INTRAVENOUS; SUBCUTANEOUS at 04:12

## 2019-12-21 NOTE — PT/OT/SLP EVAL
Occupational Therapy   Evaluation    Name: Antoinette Love  MRN: [id number]  Admitting Diagnosis:  Acute deep vein thrombosis (DVT) of distal vein of both lower extremities 2 Days Post-Op    Recommendations:     Discharge Recommendations: home  Discharge Equipment Recommendations:  none  Barriers to discharge:  None    Assessment:     Antoinette Love is a 29 y.o. female with a medical diagnosis of Acute deep vein thrombosis (DVT) of distal vein of both lower extremities. Performance deficits affecting function: weakness, impaired functional mobilty, impaired endurance, gait instability, impaired self care skills, edema, decreased lower extremity function, impaired skin, pain.      Pt required encouragement to participate with short distance in functional mobility, requiring 2 seated rest breaks.     Rehab Prognosis: Good; patient would benefit from acute skilled OT services to address these deficits and reach maximum level of function.       Plan:     Patient to be seen 2 x/week to address the above listed problems via self-care/home management, therapeutic activities, therapeutic exercises  · Plan of Care Expires: 12/28/19  · Plan of Care Reviewed with: patient, significant other    Subjective     Chief Complaint: ready for the pozo cath to be taken out  Patient/Family Comments/goals: agreeable to ambulate with the chair following, some dizziness following ambulation    Occupational Profile:  Living Environment: Pt lives with her significant other in first floor apt. Bathroom set-up: tub/shower combo.   Previous level of function: independent   Roles and Routines: driving, nursing in school system   Equipment Used at Home:  none  Assistance upon Discharge: significant other     Pain/Comfort:  Pain Rating 1: 7/10  Location - Side 1: Bilateral  Location - Orientation 1: generalized  Location 1: leg  Pain Addressed 1: Pre-medicate for activity, Nurse notified  Pain Rating Post-Intervention 1:  7/10    Patients cultural, spiritual, Uatsdin conflicts given the current situation: no    Objective:     Communicated with: nurseDonna, prior to session.  Patient found HOB elevated with blood pressure cuff, pulse ox (continuous), telemetry, pozo catheter, peripheral IV upon OT entry to room.    General Precautions: Standard, fall   Orthopedic Precautions:N/A   Braces: N/A     Occupational Performance:    Bed Mobility:    · Patient completed Rolling/Turning to Right with stand by assistance  · Patient completed Scooting/Bridging with stand by assistance  · Patient completed Supine to Sit with stand by assistance    Functional Mobility/Transfers:  · Patient completed Sit <> Stand Transfer with SBA/CGA  with  no assistive device   · Patient completed Bed <> Chair Transfer using Step Transfer technique with SBA/CGA with no assistive device  · Functional Mobility: Pt ambulated a few steps from bed>chair then requested a seated rest break d/t fatigue. Pt then ambulated within the hallway short distance with chair following, requiring a seated rest break then ambulated one more trial of functional mobility before needing to sit and assisted back to her room. Pt's significant other present and encouraging her to ambulate.  Please refer to PT note for gait assessment.     Activities of Daily Living:  · Upper Body Dressing: set-up with donning front gown      Cognitive/Visual Perceptual:  Cognitive/Psychosocial Skills:     -       Oriented to: Person, Place, Time and Situation   -       Follows Commands/attention:Follows multistep  commands  -       Communication: clear/fluent  -       Memory: No Deficits noted  -       Safety awareness/insight to disability: intact   -       Mood/Affect/Coping skills/emotional control: Appropriate to situation  Visual/Perceptual:      -Intact  R/L discrimination      Physical Exam:  Balance:    -       seated: MOD I; standing: SBA/CGA  Postural examination/scapula alignment:    -        Rounded shoulders  -       Forward head  Skin integrity: Visible skin intact and dressing to B/L posterior knees intact  Edema:  no BUE edema noted  Sensation:    -       Intact  light/touch BUE  Dominant hand:    -       Right  Upper Extremity Range of Motion:     -       Right Upper Extremity: WFL  -       Left Upper Extremity: WFL  Upper Extremity Strength:    -       Right Upper Extremity: WFL  -       Left Upper Extremity: WFL   Strength:    -       Right Upper Extremity: WFL  -       Left Upper Extremity: WFL  Fine Motor Coordination:    -       Intact  Gross motor coordination:   WFL     Vitals:   BP: prior to ambulation: 108/58; following ambulation reclined in chair: 107/62  HR 80 bpm  O2 on room air: 100%    AMPAC 6 Click ADL:  AMPAC Total Score: 22    Treatment & Education:  · Pt educated on OT role/POC.   · Importance of OOB activity with staff assistance.  · Encouraged pt to sit up in the chair during the day with staff assist  · Safety during functional t/f and mobility   · Encouraged to complete ankle pumps throughout the day, daily   · Self-care tasks/functional mobility completed- assistance level noted above   · All questions/concerns answered within OT scope of practice     Education:    Patient left up in chair with all lines intact, call button in reach, nurseDonna, notified and significant other present    GOALS:   Multidisciplinary Problems     Occupational Therapy Goals        Problem: Occupational Therapy Goal    Goal Priority Disciplines Outcome Interventions   Occupational Therapy Goal     OT, PT/OT Ongoing, Progressing    Description:  Goals to be met by: 12/28/19     Patient will increase functional independence with ADLs by performing:    LE Dressing with Set-up Assistance.  Grooming while standing at sink with Supervision.  Toileting from toilet with Supervision for hygiene and clothing management.   Supine to sit with Modified [place].  Step transfer with  Supervision  Toilet transfer to toilet with Supervision.                      History:     Past Medical History:   Diagnosis Date    Anticoagulant long-term use     Anticoagulant long-term use     FERNANDO (dyspnea on exertion)     DVT (deep venous thrombosis)     Factor V Leiden     Leg edema, left     May-Thurner syndrome     Multiple pulmonary nodules     Pulmonary embolus     RAD (reactive airway disease)     Recurrent upper respiratory infection (URI)     Recurrent urticaria     TIA (transient ischemic attack)          Past Surgical History:   Procedure Laterality Date    COLONOSCOPY N/A 12/18/2015    Procedure: COLONOSCOPY;  Surgeon: Lefty Lee MD;  Location: [facility] (4TH FLR);  Service: Endoscopy;  Laterality: N/A;  schedule with Jesus    IVC FILTER RETRIEVAL      IVC FILTER RETRIEVAL N/A 9/23/2019    Procedure: REMOVAL-FILTER-IVC;  Surgeon: Claudia Surgeon;  Location: [facility];  Service: Anesthesiology;  Laterality: N/A;  188  4 hours Anes    tumor from breast      left    WISDOM TOOTH EXTRACTION         Time Tracking:     OT Date of Treatment: 12/21/19  OT Start Time: 1126  OT Stop Time: 1142  OT Total Time (min): 16 min    Billable Minutes:Evaluation 16 min (co-eval with PT)    Erika Canales OT  12/21/2019

## 2019-12-21 NOTE — PROGRESS NOTES
"[facility] Hospital Medicine  Progress Note    Patient Name: Antoinette Love  MRN: [id number]  Patient Class: IP- Inpatient   Admission Date: 12/18/2019  Length of Stay: 3 days  Attending Physician: Didi Han MD  Primary Care Provider: Alberto Holguin MD        Subjective:     Principal Problem:Acute deep vein thrombosis (DVT) of distal vein of both lower extremities        HPI:  This is a 29 y.o.female patient, with a PMHx of Pulmonary Embolus, Factor V Leiden, May-Thurner Syndrome, and recurrent DVT, presenting to the ED with a complaint of gradual, bilateral leg swelling and pain that began x1 week ago. She states the swelling goes all the way up to her thighs. She states they are leaking fluid. Patient states this is the biggest her legs have ever been. She reports the swelling is worse in her right leg. Patient reports she is unable to walk due to pain. Patient states she is still on Eliquis. She also reports veins are "popping" on her abdomen and chest, leaving bruises. Patient states she has had her IVC filter since 2014. Patient reports associated chronic abdominal pain in the right lower quadrant secondary to the vena cava filter.  This is unchanged. Patient denies any bleeding, fever, shortness of breath, chest pain, back pain, or any other associated symptoms. She reports having 3 thrombectomies in the past most recent which was in November.    In the ER, labs were stable. Dopplers of b/l LEs showed non occlusive DVTs which is improved compared to b/l occlusive DVTs from last month. She has been admitted for IV heparin therapy at the recommendations of vascular surgery.    Overview/Hospital Course:  Ms. Love presented with bilateral leg swelling. She was admitted for acute DVTs of distal veins of bilateral lower extremity.  Due to May-Thurner syndrome with known recurrent DVTs, catheter-directed thrombolysis in addition to anticoagulation rather than anticoagulation " alone  was recommended. Treatment initiated with heparin infusion followed by thrombolysis performed by IR with Angiojet and angioplasty of both legs and stents was performed with reestablished flow through legs and stents on 12/20/2019.  Anticoagulation with heparin infusion immediately started after procedure.  Patient was transferred to ICU for closer monitoring post procedure.     Interval History:  Patient with no acute issues overnight, heparin drip still infusing.  She reports significant pain in her legs.  Nurse reports she calls for pain medications regularly.  Encouraged ambulation, but patient has not been out of bed.    Review of Systems   Constitutional: Negative for chills and fever.   Respiratory: Negative for shortness of breath.    Musculoskeletal: Positive for gait problem.        Bilateral leg pain     Objective:     Vital Signs (Most Recent):  Temp: 98.2 °F (36.8 °C) (12/21/19 1145)  Pulse: 90 (12/21/19 1315)  Resp: (!) 35 (12/21/19 1315)  BP: 108/61 (12/21/19 1315)  SpO2: 100 % (12/21/19 1315) Vital Signs (24h Range):  Temp:  [97.5 °F (36.4 °C)-98.2 °F (36.8 °C)] 98.2 °F (36.8 °C)  Pulse:  [] 90  Resp:  [8-42] 35  SpO2:  [97 %-100 %] 100 %  BP: ()/(51-75) 108/61     Weight: 79 kg (174 lb 2.6 oz)  Body mass index is 28.98 kg/m².    Intake/Output Summary (Last 24 hours) at 12/21/2019 1414  Last data filed at 12/21/2019 1200  Gross per 24 hour   Intake 954.23 ml   Output 2630 ml   Net -1675.77 ml      Physical Exam   Constitutional: She is oriented to person, place, and time. She appears well-developed. No distress.   Appears older than stated age   HENT:   Head: Normocephalic and atraumatic.   Eyes: Pupils are equal, round, and reactive to light. Conjunctivae are normal.   Neck: Normal range of motion. Neck supple.   Cardiovascular: Normal rate and regular rhythm.   Pulmonary/Chest: Effort normal and breath sounds normal.   Abdominal: Soft. Bowel sounds are normal. She exhibits no  distension and no mass. There is no tenderness. There is no rebound and no guarding.   Musculoskeletal: Normal range of motion. She exhibits edema.   3+ edema bilateral lower extremity   Neurological: She is alert and oriented to person, place, and time.   Skin: Skin is warm. Capillary refill takes 2 to 3 seconds. She is not diaphoretic.   Psychiatric: She has a normal mood and affect. Her behavior is normal. Thought content normal.       Significant Labs: All pertinent labs within the past 24 hours have been reviewed.    Significant Imaging: I have reviewed and interpreted all pertinent imaging results/findings within the past 24 hours.      Assessment/Plan:      * Acute deep vein thrombosis (DVT) of distal vein of both lower extremities  Acute on chronic issue for this patient secondary to her May-Thurner syndrome  Treatment initiated with heparin infusion   S/p catheter-directed thrombolysis in addition to anticoagulation by IR on 12/20/2019  Heparin infusion resumed after thrombolysis and patient was encouraged with ambulation  Patient reported significant pain and has been overall in the bed  Will start PT and OT, and remove Dick catheter today  Will resume Eliquis and stop heparin infusion today  Will wean down IV pain medication and encouraged ambulation  Stable for transfer the floor  Possible discharge tomorrow   Patient will need close outpatient follow-up with IR [place] [facility] for further aggressive treatment    Anemia of chronic disease, iron deficiency anemia and acute blood loss anemia  Known iron deficiency anemia (per chart review, iron level checked last month).   H&H with noted drop as expected post thrombectomy, but no further bleeding  Transfuse as needed for hgb <7.  Will continue monitor    Presence of IVC filter  Filter in place    May-Thurner syndrome  Chronic condition  Treatment as discussed above    Factor V Leiden mutation  Resumed chronic anticoagulation.      VTE Risk Mitigation (From  admission, onward)         Ordered     apixaban tablet 5 mg  2 times daily      12/21/19 0834     IP VTE HIGH RISK PATIENT  Once      12/19/19 0115                Critical care time spent on the evaluation and treatment of severe organ dysfunction, review of pertinent labs and imaging studies, discussions with consulting providers and discussions with patient/family: 35 minutes.      Didi Han MD  Department of Hospital Medicine   [facility]

## 2019-12-21 NOTE — HOSPITAL COURSE
Ms. Love presented with bilateral leg swelling. She was admitted for acute DVTs of distal veins of bilateral lower extremity. Due to May-Thurner syndrome with known recurrent DVTs, catheter-directed thrombolysis in addition to anticoagulation rather than anticoagulation alone was recommended. Treatment initiated with heparin infusion followed by thrombolysis performed by IR with Angiojet and angioplasty of both legs and stents was performed with reestablished flow through legs and stents on 12/20/2019.  Anticoagulation with heparin infusion immediately restarted after procedure.  Patient was transferred to ICU for closer monitoring post procedure on heparin infusion.  She was transitioned back to [facility] and stepped down to the floor on 12/21.  She was seen by PT and OT with no needs needed prior to discharge. She was able to ambulate and had sats normal on room air.  Dr. Rola Miranda with IR recommended close outpatient follow-up with IR at [facility] due to patient's need to be re-evaluated for further aggressive treatment.  She was discharged with close follow arranged with her PCP as well as outpatient IR at [facility].  It was confirmed that patient had Eliquis at home to continue anticoagulation.

## 2019-12-21 NOTE — CARE UPDATE
[facility]  ICU Multidisciplinary Bedside Rounds   SUMMARY     Date: 12/21/2019    Prehospitalization: Home  Admit Date / LOS : 12/18/2019/ 3 days    Diagnosis: Acute deep vein thrombosis (DVT) of distal vein of both lower extremities    Consults:        Active: Vascular       Needed: N/A     Code Status: Full Code   Advanced Directive: <no information>    LDA: Dick       Central Lines/Site/Justification:Patient Does Not Have Central Line       Urinary Cath/Order/Justification:Critically Ill in ICU    Vasopressors/Infusions:    heparin (porcine) in D5W 18 Units/kg/hr (12/21/19 0300)          GOALS: Volume/ Hemodynamic: N/A                     RASS: 0  alert and calm    CAM ICU: N/A  Pain Management: IV       Pain Controlled: yes     Rhythm: NSR    Respiratory Device: Nasal Cannula                  Most Recent SBT/ SAT: N/A       MOVE Screen:     VTE Prophylaxis: Pharm  Mobility: Bedrest  Stress Ulcer Prophylaxis: Yes    Dietary: PO  Tolerance: yes  /  Advancement: yes    Isolation: No active isolations    Restraints: No    Significant Dates:  Post Op Date: N/A  Rescue Date: N/A  Imaging/ Diagnostics: N/A    Noteworthy Labs:      CBC/Anemia Labs: Coags:    Recent Labs   Lab 12/20/19  1451 12/20/19 1933 12/21/19  0130   WBC 6.97 3.30* 3.33*   HGB 8.7* 8.2* 7.2*   HCT 26.6* 26.3* 23.3*    135* 197   MCV 87 89 87   RDW 14.4 14.4 14.4    Recent Labs   Lab 12/18/19 2033 12/20/19  1451 12/20/19 1933 12/21/19  0130   INR 1.0  --  1.1  --   --    APTT 30.8   < > 100.0* 46.6* 43.2*    < > = values in this interval not displayed.        Chemistries:   Recent Labs   Lab 12/18/19 2033      K 3.8      CO2 26   BUN 5*   CREATININE 0.7   CALCIUM 9.2   PROT 7.3   BILITOT 0.4   ALKPHOS 76   ALT 8*   AST 12   MG 2.1        Cardiac Enzymes: Ejection Fractions:    No results for input(s): CPK, CPKMB, MB, TROPONINI in the last 72 hours. No results found for: EF     POCT Glucose: HbA1c:    No  results for input(s): POCTGLUCOSE in the last 168 hours. No results found for: HGBA1C     Needs from Care Team: none     ICU LOS 1d 10h  Level of Care: Critical Care

## 2019-12-21 NOTE — SUBJECTIVE & OBJECTIVE
Interval History:  Patient with no acute issues overnight, heparin drip still infusing.  She reports significant pain in her legs.  Nurse reports she calls for pain medications regularly.  Encouraged ambulation, but patient has not been out of bed.    Review of Systems   Constitutional: Negative for chills and fever.   Respiratory: Negative for shortness of breath.    Musculoskeletal: Positive for gait problem.        Bilateral leg pain     Objective:     Vital Signs (Most Recent):  Temp: 98.2 °F (36.8 °C) (12/21/19 1145)  Pulse: 90 (12/21/19 1315)  Resp: (!) 35 (12/21/19 1315)  BP: 108/61 (12/21/19 1315)  SpO2: 100 % (12/21/19 1315) Vital Signs (24h Range):  Temp:  [97.5 °F (36.4 °C)-98.2 °F (36.8 °C)] 98.2 °F (36.8 °C)  Pulse:  [] 90  Resp:  [8-42] 35  SpO2:  [97 %-100 %] 100 %  BP: ()/(51-75) 108/61     Weight: 79 kg (174 lb 2.6 oz)  Body mass index is 28.98 kg/m².    Intake/Output Summary (Last 24 hours) at 12/21/2019 1414  Last data filed at 12/21/2019 1200  Gross per 24 hour   Intake 954.23 ml   Output 2630 ml   Net -1675.77 ml      Physical Exam   Constitutional: She is oriented to person, place, and time. She appears well-developed. No distress.   Appears older than stated age   HENT:   Head: Normocephalic and atraumatic.   Eyes: Pupils are equal, round, and reactive to light. Conjunctivae are normal.   Neck: Normal range of motion. Neck supple.   Cardiovascular: Normal rate and regular rhythm.   Pulmonary/Chest: Effort normal and breath sounds normal.   Abdominal: Soft. Bowel sounds are normal. She exhibits no distension and no mass. There is no tenderness. There is no rebound and no guarding.   Musculoskeletal: Normal range of motion. She exhibits edema.   3+ edema bilateral lower extremity   Neurological: She is alert and oriented to person, place, and time.   Skin: Skin is warm. Capillary refill takes 2 to 3 seconds. She is not diaphoretic.   Psychiatric: She has a normal mood and affect. Her  behavior is normal. Thought content normal.       Significant Labs: All pertinent labs within the past 24 hours have been reviewed.    Significant Imaging: I have reviewed and interpreted all pertinent imaging results/findings within the past 24 hours.

## 2019-12-21 NOTE — PLAN OF CARE
Patient easily awaken to verbal and tactile stimuli. Patient can move all four extremities but lower extremities swollen right greater than left. Lower extremities are warm to touch pink in color and has palpable pulses. Vital signs stable and afebrile. Out of bed today and walked a short distance in hallway with PT. Patient states that right leg much sorter than left and also much more swollen. Dick cath  discontinued and voiding without difficulty. Patient has orders to transfer to [facility] floor but no rooms available.No falls or injuries  this shift no skin breakdown this shift.Plan of care reviewed with patient at bedside.

## 2019-12-21 NOTE — PLAN OF CARE
Problem: Occupational Therapy Goal  Goal: Occupational Therapy Goal  Description  Goals to be met by: 12/28/19     Patient will increase functional independence with ADLs by performing:    LE Dressing with Set-up Assistance.  Grooming while standing at sink with Supervision.  Toileting from toilet with Supervision for hygiene and clothing management.   Supine to sit with Modified [place].  Step transfer with Supervision  Toilet transfer to toilet with Supervision.     Outcome: Ongoing, Progressing  Pt will benefit from acute OT in order to increase pt's functional activity tolerance in order to increase independence with ADLs. OT rec. home with family at d/c with no DME needs.

## 2019-12-21 NOTE — ASSESSMENT & PLAN NOTE
Known iron deficiency anemia (per chart review, iron level checked last month).   H&H with noted drop as expected post thrombectomy, but no further bleeding  Transfuse as needed for hgb <7.  Will continue monitor

## 2019-12-21 NOTE — ASSESSMENT & PLAN NOTE
Acute on chronic issue for this patient secondary to her May-Thurner syndrome  Treatment initiated with heparin infusion   S/p catheter-directed thrombolysis in addition to anticoagulation by IR on 12/20/2019  Heparin infusion resumed after thrombolysis and patient was encouraged with ambulation  Patient reported significant pain and has been overall in the bed  Will start PT and OT, and remove Dick catheter today  Will resume Eliquis and stop heparin infusion today  Will wean down IV pain medication and encouraged ambulation  Stable for transfer the floor  Possible discharge tomorrow   Patient will need close outpatient follow-up with IR at [facility] for further aggressive treatment

## 2019-12-21 NOTE — PLAN OF CARE
Problem: Physical Therapy Goal  Goal: Physical Therapy Goal  Description  Goals to be met by: 20     Patient will increase functional independence with mobility by performin. Supine to sit with [place]  2. Rolling to Left and Right with [place]  3. Sit to stand transfer with [place]  4. Bed to chair transfer with [place]   5. Gait >250 feet with [place] using no AD  6. Lower extremity exercise program 3 sets x10 reps per handout, with independence     Outcome: Ongoing, Progressing    Pt ambulated ~30 ft with CGA using no AD and chair to follow.

## 2019-12-21 NOTE — PT/OT/SLP EVAL
Physical Therapy Evaluation    Patient Name:  Antoinette Love   MRN:  [id number]    Recommendations:     Discharge Recommendations:  home   Discharge Equipment Recommendations: none   Barriers to discharge: None    Assessment:     Antoinette Love is a 29 y.o. female admitted with a medical diagnosis of Acute deep vein thrombosis (DVT) of distal vein of both lower extremities.  She presents with the following impairments/functional limitations:  weakness, impaired endurance, impaired functional mobilty, gait instability, impaired balance, decreased lower extremity function, pain, edema.    Rehab Prognosis: Good; patient would benefit from acute skilled PT services to address these deficits and reach maximum level of function.    Recent Surgery: Procedure(s) (LRB):  THROMBOLYSIS OR THROMBECTOMY, BLOOD VESSEL, LOWER EXTREMITY (N/A) 2 Days Post-Op    Plan:     During this hospitalization, patient to be seen 3 x/week to address the identified rehab impairments via gait training, therapeutic activities, therapeutic exercises and progress toward the following goals:    · Plan of Care Expires:  01/04/20    Subjective     Chief Complaint: weakness and dizziness  Patient/Family Comments/goals: Pt agreeable to ambulation in the hallway.   Pain/Comfort:  · Pain Rating 1: 7/10  · Location - Side 1: Bilateral  · Location 1: leg  · Pain Addressed 1: Nurse notified, Pre-medicate for activity      Living Environment:  Pt lives with her boyfriend on the 1st floor apartment with no concerns.   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  Equipment used at home: none.  Upon discharge, patient will have assistance from boyfriend.    Objective:     Communicated with ICU nurse Donna prior to session.  Patient found HOB elevated with blood pressure cuff, pulse ox (continuous), telemetry, pozo catheter, peripheral IV  upon PT entry to room.    General Precautions: Standard, fall    Orthopedic Precautions:N/A   Braces: N/A     Exams:  · Cognitive Exam:  Patient was able to follow multiple commands.   · Gross Motor Coordination:  WFL  · Postural Exam:  Patient presented with the following abnormalities:    · -       Rounded shoulders  · -       Forward head  · Sensation:    · -       Intact  light/touch BLE  · Skin Integrity/Edema:      · -       Skin integrity: Visible skin intact and dressings to B posterior knee dry/intact  · -       Edema: Moderate BLE  · BLE ROM: WFL  · BLE Strength: WFL    Functional Mobility:  · Bed Mobility:     · Scooting: stand by assistance  · Supine to Sit: stand by assistance with HOB elevated   · Transfers:     · Sit to Stand:  contact guard assistance with no AD/hand-held assist  · Bed to Chair: contact guard assistance with  no AD and hand-held assist  using  Step Transfer  · Gait: Pt ambulated ~6 ft, ~15 ft, ~15 ft with CGA using no AD and chair to follow.  Pt with decreased step length and silvia.  Pt with c/o dizziness and weakness, kept wanting to sit down.    · Balance: Pt with fair dynamic standing balance.     Vitals: /58 and 107/62,  bpm, and spO2 RA 99%    Therapeutic Activities and Exercises:  Pt educated on BLE supine therex: AP, QS, and GS.  Pt able to demonstrate without any difficulty.  Pt was encouraged to perform these LE therex throughout the day.  Pt verbalized good understanding.    AM-PAC 6 CLICK MOBILITY  Total Score:21     Patient left up in chair reclined with BLE elevated on pillow with all lines intact, call button in reach, nurs Dnona notified and boyfriend present.    GOALS:   Multidisciplinary Problems     Physical Therapy Goals        Problem: Physical Therapy Goal    Goal Priority Disciplines Outcome Goal Variances Interventions   Physical Therapy Goal     PT, PT/OT Ongoing, Progressing     Description:  Goals to be met by: 20     Patient will increase functional independence with mobility by performin.  Supine to sit with [place]  2. Rolling to Left and Right with [place]  3. Sit to stand transfer with [place]  4. Bed to chair transfer with [place]   5. Gait >250 feet with [place] using no AD  6. Lower extremity exercise program 3 sets x10 reps per handout, with independence                      History:     Past Medical History:   Diagnosis Date    Anticoagulant long-term use     Anticoagulant long-term use     FERNANDO (dyspnea on exertion)     DVT (deep venous thrombosis)     Factor V Leiden     Leg edema, left     May-Thurner syndrome     Multiple pulmonary nodules     Pulmonary embolus     RAD (reactive airway disease)     Recurrent upper respiratory infection (URI)     Recurrent urticaria     TIA (transient ischemic attack)        Past Surgical History:   Procedure Laterality Date    COLONOSCOPY N/A 12/18/2015    Procedure: COLONOSCOPY;  Surgeon: Lefty Lee MD;  Location: [facility] ([address] [place] [address]);  Service: Endoscopy;  Laterality: N/A;  schedule with Jesus    IVC FILTER RETRIEVAL      IVC FILTER RETRIEVAL N/A 9/23/2019    Procedure: REMOVAL-FILTER-IVC;  Surgeon: Claudia Surgeon;  Location: [facility];  Service: Anesthesiology;  Laterality: N/A;  188  4 hours Anes    tumor from breast      left    WISDOM TOOTH EXTRACTION         Time Tracking:     PT Received On: 12/21/19  PT Start Time: 1126     PT Stop Time: 1141  PT Total Time (min): 15 min     Billable Minutes: Evaluation 15 min co-eval with MARIO Guzman, PT  12/21/2019

## 2019-12-21 NOTE — PLAN OF CARE
Problem: Adult Inpatient Plan of Care  Goal: Plan of Care Review  Outcome: Ongoing, Progressing    Patient remain awake alert oriented very cooperative. Dressing to bilateral posterior leg clean dry and intact. Warm to touch. No discoloration noted and distal pulses all palpable +2 Occasionally will complain of pain. PRN pain meds given as per order. Remain on heparin drip at 18 units/kg/hr aPtt within target limit for 2 consecutive times. Next aPtt will be at 12/22/19 am lab Vital signs stable and within limits No Fall or injury no skin breakdown was noted. Dick was innitially bloody but clears up this morning with adequate urine output

## 2019-12-22 VITALS
OXYGEN SATURATION: 100 % | BODY MASS INDEX: 29.02 KG/M2 | HEART RATE: 93 BPM | SYSTOLIC BLOOD PRESSURE: 114 MMHG | TEMPERATURE: 98 F | HEIGHT: 65 IN | RESPIRATION RATE: 24 BRPM | DIASTOLIC BLOOD PRESSURE: 59 MMHG | WEIGHT: 174.19 LBS

## 2019-12-22 LAB
BASOPHILS # BLD AUTO: 0.02 K/UL (ref 0–0.2)
BASOPHILS NFR BLD: 0.8 % (ref 0–1.9)
DIFFERENTIAL METHOD: ABNORMAL
EOSINOPHIL # BLD AUTO: 0.1 K/UL (ref 0–0.5)
EOSINOPHIL NFR BLD: 5.8 % (ref 0–8)
ERYTHROCYTE [DISTWIDTH] IN BLOOD BY AUTOMATED COUNT: 14.5 % (ref 11.5–14.5)
HCT VFR BLD AUTO: 23.2 % (ref 37–48.5)
HGB BLD-MCNC: 7.2 G/DL (ref 12–16)
IMM GRANULOCYTES # BLD AUTO: 0.01 K/UL (ref 0–0.04)
IMM GRANULOCYTES NFR BLD AUTO: 0.4 % (ref 0–0.5)
LYMPHOCYTES # BLD AUTO: 0.8 K/UL (ref 1–4.8)
LYMPHOCYTES NFR BLD: 32.2 % (ref 18–48)
MCH RBC QN AUTO: 27.3 PG (ref 27–31)
MCHC RBC AUTO-ENTMCNC: 31 G/DL (ref 32–36)
MCV RBC AUTO: 88 FL (ref 82–98)
MONOCYTES # BLD AUTO: 0.1 K/UL (ref 0.3–1)
MONOCYTES NFR BLD: 5 % (ref 4–15)
NEUTROPHILS # BLD AUTO: 1.4 K/UL (ref 1.8–7.7)
NEUTROPHILS NFR BLD: 55.8 % (ref 38–73)
NRBC BLD-RTO: 0 /100 WBC
PLATELET # BLD AUTO: 160 K/UL (ref 150–350)
PMV BLD AUTO: 9.2 FL (ref 9.2–12.9)
RBC # BLD AUTO: 2.64 M/UL (ref 4–5.4)
WBC # BLD AUTO: 2.42 K/UL (ref 3.9–12.7)

## 2019-12-22 PROCEDURE — 36415 COLL VENOUS BLD VENIPUNCTURE: CPT

## 2019-12-22 PROCEDURE — 85025 COMPLETE CBC W/AUTO DIFF WBC: CPT

## 2019-12-22 PROCEDURE — 25000003 PHARM REV CODE 250: Performed by: HOSPITALIST

## 2019-12-22 PROCEDURE — 63600175 PHARM REV CODE 636 W HCPCS: Performed by: HOSPITALIST

## 2019-12-22 RX ORDER — HYDROCODONE BITARTRATE AND ACETAMINOPHEN 5; 325 MG/1; MG/1
1 TABLET ORAL EVERY 6 HOURS PRN
Qty: 20 TABLET | Refills: 0 | Status: SHIPPED | OUTPATIENT
Start: 2019-12-22 | End: 2020-01-14 | Stop reason: CLARIF

## 2019-12-22 RX ADMIN — FAMOTIDINE 20 MG: 20 TABLET ORAL at 08:12

## 2019-12-22 RX ADMIN — OXYCODONE HYDROCHLORIDE AND ACETAMINOPHEN 1 TABLET: 10; 325 TABLET ORAL at 08:12

## 2019-12-22 RX ADMIN — APIXABAN 5 MG: 5 TABLET, FILM COATED ORAL at 08:12

## 2019-12-22 RX ADMIN — HYDROCODONE BITARTRATE AND ACETAMINOPHEN 1 TABLET: 5; 325 TABLET ORAL at 02:12

## 2019-12-22 RX ADMIN — HYDROMORPHONE HYDROCHLORIDE 0.5 MG: 2 INJECTION, SOLUTION INTRAMUSCULAR; INTRAVENOUS; SUBCUTANEOUS at 04:12

## 2019-12-22 NOTE — PLAN OF CARE
Problem: Adult Inpatient Plan of Care  Goal: Plan of Care Review  Outcome: Ongoing, Progressing    Patient is awake alert oriented. Able to ambulate to the restroom without any difficulty. Able to void without any issues and in adequate amount with clear yellow urine. Bilateral lower extremities has good palpable pulses bilaterally. Both are warm to touch and no discoloration was noted. Does complain of pain frequently and was medicated as per order. Awaiting bed placement to the floor.

## 2019-12-22 NOTE — PLAN OF CARE
12/22/19 0806   Final Note   Assessment Type Final Discharge Note   Anticipated Discharge Disposition Home   What phone number can be called within the next 1-3 days to see how you are doing after discharge?   ([phone] )   Hospital Follow Up  Appt(s) scheduled? No  (Unable to schedule; patient will schedule follow-ups)   Discharge plans and expectations educations in teach back method with documentation complete? Yes

## 2019-12-22 NOTE — CARE UPDATE
[facility]  ICU Multidisciplinary Bedside Rounds   SUMMARY     Date: 12/22/2019    Prehospitalization: Home  Admit Date / LOS : 12/18/2019/ 4 days    Diagnosis: Acute deep vein thrombosis (DVT) of distal vein of both lower extremities    Consults:        Active: OT and PT       Needed: N/A     Code Status: Full Code   Advanced Directive: <no information>    LDA: PIV       Central Lines/Site/Justification:Patient Does Not Have Central Line       Urinary Cath/Order/Justification:Patient Does Not Have Urinary Catheter    Vasopressors/Infusions:        GOALS: Volume/ Hemodynamic: N/A                     RASS: 0  alert and calm    CAM ICU: N/A  Pain Management: IV       Pain Controlled: yes     Rhythm: NSR    Respiratory Device: Room Air                  Most Recent SBT/ SAT: N/A       MOVE Screen:     VTE Prophylaxis: Pharm  Mobility: OOB to Chair  Stress Ulcer Prophylaxis: Yes    Dietary: PO  Tolerance: yes  /  Advancement: yes    Isolation: No active isolations    Restraints: No    Significant Dates:  Post Op Date: N/A  Rescue Date: N/A  Imaging/ Diagnostics: N/A    Noteworthy Labs:      CBC/Anemia Labs: Coags:    Recent Labs   Lab 12/21/19  0130 12/21/19  0741 12/22/19  0350   WBC 3.33* 2.97* 2.42*   HGB 7.2* 7.2* 7.2*   HCT 23.3* 23.0* 23.2*    184 160   MCV 87 87 88   RDW 14.4 14.5 14.5    Recent Labs   Lab 12/18/19  2033  12/20/19  1451 12/20/19  1933 12/21/19  0130   INR 1.0  --  1.1  --   --    APTT 30.8   < > 100.0* 46.6* 43.2*    < > = values in this interval not displayed.        Chemistries:   Recent Labs   Lab 12/18/19 2033      K 3.8      CO2 26   BUN 5*   CREATININE 0.7   CALCIUM 9.2   PROT 7.3   BILITOT 0.4   ALKPHOS 76   ALT 8*   AST 12   MG 2.1        Cardiac Enzymes: Ejection Fractions:    No results for input(s): CPK, CPKMB, MB, TROPONINI in the last 72 hours. No results found for: EF     POCT Glucose: HbA1c:    No results for input(s): POCTGLUCOSE in the last  168 hours. No results found for: HGBA1C     Needs from Care Team: none     ICU LOS 2d 12h  Level of Care: OK to Transfer

## 2019-12-22 NOTE — NURSING
[facility]  ICU Multidisciplinary Bedside Rounds     UPDATE     Date: 12/21/2019      Plan of care reviewed with the following, Nurse and Physician.       Needs/ Goals for the day:     Ambulate and d/c heparin drip and get ot and py started so patient can go home  Level of Care: OK to Transfer

## 2019-12-22 NOTE — DISCHARGE SUMMARY
"[facility] Hospital Medicine  Discharge Summary      Patient Name: Antoinette Love  MRN: [id number]  Admission Date: 12/18/2019  Hospital Length of Stay: 4 days  Discharge Date and Time: 12/22/2019  9:05 AM  Attending Physician:Didi Han MD   Discharging Provider: Didi Han MD  Primary Care Provider: Alberto Holguin MD      HPI:   This is a 29 y.o.female patient, with a PMHx of Pulmonary Embolus, Factor V Leiden, May-Thurner Syndrome, and recurrent DVT, presenting to the ED with a complaint of gradual, bilateral leg swelling and pain that began x1 week ago. She states the swelling goes all the way up to her thighs. She states they are leaking fluid. Patient states this is the biggest her legs have ever been. She reports the swelling is worse in her right leg. Patient reports she is unable to walk due to pain. Patient states she is still on Eliquis. She also reports veins are "popping" on her abdomen and chest, leaving bruises. Patient states she has had her IVC filter since 2014. Patient reports associated chronic abdominal pain in the right lower quadrant secondary to the vena cava filter.  This is unchanged. Patient denies any bleeding, fever, shortness of breath, chest pain, back pain, or any other associated symptoms. She reports having 3 thrombectomies in the past most recent which was in November.    In the ER, labs were stable. Dopplers of b/l LEs showed non occlusive DVTs which is improved compared to b/l occlusive DVTs from last month. She has been admitted for IV heparin therapy at the recommendations of vascular surgery.    Procedure(s) (LRB):  THROMBOLYSIS OR THROMBECTOMY, BLOOD VESSEL, LOWER EXTREMITY (N/A)      Hospital Course:   Ms. Love presented with bilateral leg swelling. She was admitted for acute DVTs of distal veins of bilateral lower extremity. Due to May-Thurner syndrome with known recurrent DVTs, catheter-directed thrombolysis in addition to " anticoagulation rather than anticoagulation alone  was recommended. Treatment initiated with heparin infusion followed by thrombolysis performed by IR with Angiojet and angioplasty of both legs and stents was performed with reestablished flow through legs and stents on 12/20/2019.  Anticoagulation with heparin infusion immediately restarted after procedure.  Patient was transferred to ICU for closer monitoring post procedure on heparin infusion.  [facility] and [facility] down to the floor on 12/21.  She was seen by PT and OT with no needs needed prior to discharge. She was able to ambulate and had sats normal on room air.  Dr. Rola Miranda with IR recommended close outpatient follow-up with IR at [facility] due to patient's need to be re-evaluated for further aggressive treatment.  She was discharged with close follow arranged with her PCP as well as outpatient IR at [facility] [place] [place].  It was confirmed that patient had Eliquis at home to continue anticoagulation.     Consults:   Consults (From admission, onward)        Status Ordering Provider     Inpatient consult to Interventional Radiology  Once     Provider:  Juan Acosta MD    Completed RENATA MABRY     Inpatient consult to Social Work/Case Management  Once     Provider:  (Not yet assigned)    ROSCOE Alvarenga     Inpatient consult to Vascular Surgery  Once     Provider:  Renata Mabry MD    Completed LUISA QUINONES        Service: Hospital Medicine    Final Active Diagnoses:    Diagnosis Date Noted POA    PRINCIPAL PROBLEM:  Acute deep vein thrombosis (DVT) of distal vein of both lower extremities [I82.4Z3] 08/30/2019 Yes    Anemia of chronic disease, iron deficiency anemia and acute blood loss anemia [D63.8] 12/19/2019 Yes    Presence of IVC filter [Z95.828] 09/23/2019 Not Applicable    May-Thurner syndrome [I87.1] 09/04/2019 Yes    Factor V Leiden mutation [D68.51] 03/18/2016 Yes      Problems Resolved During  this Admission:       Discharged Condition: good    Disposition: Home or Self Care    Follow Up:  Follow-up Information     Alberto Holguin MD. Schedule an appointment as soon as possible for a visit in 1 week.    Specialty:  Internal Medicine  Why:  Hospital Follow-up: Call Monday to schedule a hospital follow-up for 1 week  Contact information:  [address]  [address]  [facility] [address]  [phone]             Jonah Vasquez - Interventional Rad. Schedule an appointment as soon as possible for a visit in 1 week.    Specialty:  Interventional Radiology  Why:  Hospital Follow-Up: Call on Monday to schedule a IR hospital follow-up for 1 week  Contact information:  [address]  [facility] [address] [phone]  Additional information:  [facility] [place], 9th Floor               Patient Instructions:      Diet Adult Regular     Activity as tolerated       Significant Diagnostic Studies:    Pending Diagnostic Studies:     Procedure Component Value Units Date/Time    IR PTA Venous [[id number]] Resulted:  12/20/19 1419    Order Status:  Sent Lab Status:  In process Updated:  12/20/19 1601    IR Thrombectomy Mechanical Secondary [[id number]] Resulted:  12/20/19 1419    Order Status:  Sent Lab Status:  In process Updated:  12/20/19 1601    IR Thrombolysis Art or Venous inc Imaging and Cessation [[id number]] Resulted:  12/20/19 1419    Order Status:  Sent Lab Status:  In process Updated:  12/20/19 1601    IR Venous Angioplasty Ea Add Vessel [[id number]] Resulted:  12/20/19 1419    Order Status:  Sent Lab Status:  In process Updated:  12/20/19 1601         Medications:  Reconciled Home Medications:      Medication List      START taking these medications    HYDROcodone-acetaminophen 5-325 mg per tablet  Commonly known as:  NORCO  Take 1 tablet by mouth every 6 (six) hours as needed for Pain.        CONTINUE taking these medications    diazePAM 5 MG tablet  Commonly known as:  VALIUM  TAKE 1 TABLET (5 MG  TOTAL) BY MOUTH EVERY 12 (TWELVE) HOURS AS NEEDED FOR ANXIETY.     Eliquis 5 mg Tab  Generic drug:  apixaban  Take 5 mg by mouth 2 (two) times daily.     EPINEPHrine 0.3 mg/0.3 mL Atin  Commonly known as:  EpiPen 2-Hair  Inject 0.3 mLs (0.3 mg total) into the muscle once. for 1 dose     ondansetron 8 MG Tbdl  Commonly known as:  ZOFRAN-ODT  Take 1 tablet (8 mg total) by mouth every 8 (eight) hours as needed (nausea).            Indwelling Lines/Drains at time of discharge:   Lines/Drains/Airways     None                 Time spent on the discharge of patient: 35 minutes  Patient was seen and examined on the date of discharge and determined to be suitable for discharge.      Didi Han MD  Department of Hospital Medicine  [facility]

## 2019-12-22 NOTE — PLAN OF CARE
12/22/19 0805   Post-Acute Status   Post-Acute Authorization Other  (Home; patient will schedule follow-ups)   Other Status No Post-Acute Service Needs   Discharge Delays None known at this time

## 2019-12-22 NOTE — NURSING
Discharge instructions reviewed with patient and fiance.  They verbalize understanding of appointments to be made, medication regimen and to  When to follow up.  Discharged home with fiance.  Prescription for pain medication given to patient.

## 2019-12-23 NOTE — PT/OT/SLP DISCHARGE
Occupational Therapy Discharge Summary    Antoinette Love  MRN: [id number]   Principal Problem: Acute deep vein thrombosis (DVT) of distal vein of both lower extremities      Patient Discharged from acute Occupational Therapy on 12/22/19.  Please refer to prior OT notes for functional status.    Assessment:      Patient appropriate for care in another setting.    Objective:     GOALS:   Multidisciplinary Problems     Occupational Therapy Goals        Problem: Occupational Therapy Goal    Goal Priority Disciplines Outcome Interventions   Occupational Therapy Goal     OT, PT/OT Ongoing, Progressing    Description:  Goals to be met by: 12/28/19     Patient will increase functional independence with ADLs by performing:    LE Dressing with Set-up Assistance.  Grooming while standing at sink with Supervision.  Toileting from toilet with Supervision for hygiene and clothing management.   Supine to sit with Modified [place].  Step transfer with Supervision  Toilet transfer to toilet with Supervision.                      Reasons for Discontinuation of Therapy Services  Transfer to alternate level of care.      Plan:     Patient Discharged to: Home no OT services needed    Erika Canales OT  12/23/2019

## 2019-12-30 NOTE — ANESTHESIA POSTPROCEDURE EVALUATION
Anesthesia Post Evaluation    Patient: Antoinette Love    Procedure(s) Performed: Procedure(s) (LRB):  THROMBOLYSIS OR THROMBECTOMY, BLOOD VESSEL, LOWER EXTREMITY (N/A)    Final Anesthesia Type: general    Patient location during evaluation: PACU  Patient participation: Yes- Able to Participate  Level of consciousness: awake and alert, oriented and awake  Post-procedure vital signs: reviewed and stable  Airway patency: patent    PONV status at discharge: No PONV  Anesthetic complications: no      Cardiovascular status: blood pressure returned to baseline  Respiratory status: unassisted, spontaneous ventilation and room air  Hydration status: euvolemic  Follow-up not needed.            No case tracking events are documented in the log.      Pain/Rosaura Score: No data recorded

## 2020-01-13 RX ORDER — DIAZEPAM 5 MG/1
5 TABLET ORAL EVERY 12 HOURS PRN
Qty: 30 TABLET | Refills: 0 | Status: SHIPPED | OUTPATIENT
Start: 2020-01-13 | End: 2020-03-03

## 2020-01-14 ENCOUNTER — HOSPITAL ENCOUNTER (INPATIENT)
Facility: HOSPITAL | Age: 30
LOS: 7 days | Discharge: HOME | DRG: 271 | End: 2020-01-21
Attending: EMERGENCY MEDICINE | Admitting: EMERGENCY MEDICINE
Payer: COMMERCIAL

## 2020-01-14 DIAGNOSIS — R20.2 PARESTHESIA OF RIGHT FOOT: ICD-10-CM

## 2020-01-14 DIAGNOSIS — D72.819 LEUKOPENIA, UNSPECIFIED TYPE: ICD-10-CM

## 2020-01-14 DIAGNOSIS — D64.9 NORMOCYTIC ANEMIA: ICD-10-CM

## 2020-01-14 DIAGNOSIS — Z86.718 HISTORY OF DEEP VENOUS THROMBOSIS (DVT) OF DISTAL VEIN OF LEFT LOWER EXTREMITY: ICD-10-CM

## 2020-01-14 DIAGNOSIS — I82.4Z3 ACUTE DEEP VEIN THROMBOSIS (DVT) OF DISTAL VEIN OF BOTH LOWER EXTREMITIES: ICD-10-CM

## 2020-01-14 DIAGNOSIS — R06.09 DYSPNEA ON EXERTION: ICD-10-CM

## 2020-01-14 DIAGNOSIS — R10.31 RIGHT LOWER QUADRANT ABDOMINAL PAIN: ICD-10-CM

## 2020-01-14 DIAGNOSIS — I82.4Y3 ACUTE DEEP VEIN THROMBOSIS (DVT) OF PROXIMAL VEIN OF BOTH LOWER EXTREMITIES: Primary | ICD-10-CM

## 2020-01-14 DIAGNOSIS — L50.8 RECURRENT URTICARIA: ICD-10-CM

## 2020-01-14 DIAGNOSIS — I82.401 DVT, RECURRENT, LOWER EXTREMITY, ACUTE, RIGHT: ICD-10-CM

## 2020-01-14 DIAGNOSIS — Z86.711 HISTORY OF PULMONARY EMBOLISM: ICD-10-CM

## 2020-01-14 DIAGNOSIS — R91.8 MULTIPLE PULMONARY NODULES: ICD-10-CM

## 2020-01-14 DIAGNOSIS — Z95.828 PRESENCE OF IVC FILTER: ICD-10-CM

## 2020-01-14 DIAGNOSIS — D63.8 ANEMIA OF CHRONIC DISEASE: ICD-10-CM

## 2020-01-14 DIAGNOSIS — M79.89 LEG SWELLING: ICD-10-CM

## 2020-01-14 DIAGNOSIS — J96.11 CHRONIC RESPIRATORY FAILURE WITH HYPOXIA: ICD-10-CM

## 2020-01-14 DIAGNOSIS — I82.402: ICD-10-CM

## 2020-01-14 DIAGNOSIS — J45.30 MILD PERSISTENT REACTIVE AIRWAY DISEASE WITHOUT COMPLICATION: ICD-10-CM

## 2020-01-14 DIAGNOSIS — I82.413 ACUTE DEEP VEIN THROMBOSIS (DVT) OF FEMORAL VEIN OF BOTH LOWER EXTREMITIES: ICD-10-CM

## 2020-01-14 DIAGNOSIS — D68.51 FACTOR V LEIDEN MUTATION: ICD-10-CM

## 2020-01-14 DIAGNOSIS — R00.1 BRADYCARDIA: ICD-10-CM

## 2020-01-14 DIAGNOSIS — I87.1 MAY-THURNER SYNDROME: ICD-10-CM

## 2020-01-14 LAB
ALBUMIN SERPL BCP-MCNC: 4.2 G/DL (ref 3.5–5.2)
ALP SERPL-CCNC: 126 U/L (ref 55–135)
ALT SERPL W/O P-5'-P-CCNC: 9 U/L (ref 10–44)
ANION GAP SERPL CALC-SCNC: 11 MMOL/L (ref 8–16)
APTT BLDCRRT: 23.8 SEC (ref 21–32)
AST SERPL-CCNC: 26 U/L (ref 10–40)
B-HCG UR QL: NEGATIVE
BACTERIA #/AREA URNS HPF: ABNORMAL /HPF
BASOPHILS # BLD AUTO: 0.02 K/UL (ref 0–0.2)
BASOPHILS NFR BLD: 0.5 % (ref 0–1.9)
BILIRUB SERPL-MCNC: 0.2 MG/DL (ref 0.1–1)
BILIRUB UR QL STRIP: NEGATIVE
BUN SERPL-MCNC: 14 MG/DL (ref 6–20)
CALCIUM SERPL-MCNC: 9.8 MG/DL (ref 8.7–10.5)
CHLORIDE SERPL-SCNC: 104 MMOL/L (ref 95–110)
CLARITY UR: CLEAR
CO2 SERPL-SCNC: 24 MMOL/L (ref 23–29)
COLOR UR: ABNORMAL
CREAT SERPL-MCNC: 0.9 MG/DL (ref 0.5–1.4)
CTP QC/QA: YES
DIFFERENTIAL METHOD: ABNORMAL
EOSINOPHIL # BLD AUTO: 0.2 K/UL (ref 0–0.5)
EOSINOPHIL NFR BLD: 4.1 % (ref 0–8)
ERYTHROCYTE [DISTWIDTH] IN BLOOD BY AUTOMATED COUNT: 15.9 % (ref 11.5–14.5)
EST. GFR  (AFRICAN AMERICAN): >60 ML/MIN/1.73 M^2
EST. GFR  (NON AFRICAN AMERICAN): >60 ML/MIN/1.73 M^2
GLUCOSE SERPL-MCNC: 78 MG/DL (ref 70–110)
GLUCOSE UR QL STRIP: NEGATIVE
HCT VFR BLD AUTO: 33.2 % (ref 37–48.5)
HGB BLD-MCNC: 10.1 G/DL (ref 12–16)
HGB UR QL STRIP: NEGATIVE
HYALINE CASTS #/AREA URNS LPF: 0 /LPF
IMM GRANULOCYTES # BLD AUTO: 0.02 K/UL (ref 0–0.04)
IMM GRANULOCYTES NFR BLD AUTO: 0.5 % (ref 0–0.5)
INR PPP: 1 (ref 0.8–1.2)
KETONES UR QL STRIP: ABNORMAL
LACTATE SERPL-SCNC: 0.9 MMOL/L (ref 0.5–2.2)
LEUKOCYTE ESTERASE UR QL STRIP: ABNORMAL
LIPASE SERPL-CCNC: 7 U/L (ref 4–60)
LYMPHOCYTES # BLD AUTO: 1.1 K/UL (ref 1–4.8)
LYMPHOCYTES NFR BLD: 31 % (ref 18–48)
MCH RBC QN AUTO: 26.9 PG (ref 27–31)
MCHC RBC AUTO-ENTMCNC: 30.4 G/DL (ref 32–36)
MCV RBC AUTO: 89 FL (ref 82–98)
MICROSCOPIC COMMENT: ABNORMAL
MONOCYTES # BLD AUTO: 0.1 K/UL (ref 0.3–1)
MONOCYTES NFR BLD: 3.6 % (ref 4–15)
NEUTROPHILS # BLD AUTO: 2.2 K/UL (ref 1.8–7.7)
NEUTROPHILS NFR BLD: 60.3 % (ref 38–73)
NITRITE UR QL STRIP: NEGATIVE
NRBC BLD-RTO: 0 /100 WBC
PH UR STRIP: 5 [PH] (ref 5–8)
PLATELET # BLD AUTO: 277 K/UL (ref 150–350)
PMV BLD AUTO: 9.5 FL (ref 9.2–12.9)
POTASSIUM SERPL-SCNC: 4.9 MMOL/L (ref 3.5–5.1)
PROT SERPL-MCNC: 8.7 G/DL (ref 6–8.4)
PROT UR QL STRIP: ABNORMAL
PROTHROMBIN TIME: 10.4 SEC (ref 9–12.5)
RBC # BLD AUTO: 3.75 M/UL (ref 4–5.4)
RBC #/AREA URNS HPF: 0 /HPF (ref 0–4)
SODIUM SERPL-SCNC: 139 MMOL/L (ref 136–145)
SP GR UR STRIP: >1.03 (ref 1–1.03)
URN SPEC COLLECT METH UR: ABNORMAL
UROBILINOGEN UR STRIP-ACNC: NEGATIVE EU/DL
WBC # BLD AUTO: 3.64 K/UL (ref 3.9–12.7)
WBC #/AREA URNS HPF: 1 /HPF (ref 0–5)

## 2020-01-14 PROCEDURE — 85025 COMPLETE CBC W/AUTO DIFF WBC: CPT

## 2020-01-14 PROCEDURE — 80053 COMPREHEN METABOLIC PANEL: CPT

## 2020-01-14 PROCEDURE — 85610 PROTHROMBIN TIME: CPT

## 2020-01-14 PROCEDURE — 99285 EMERGENCY DEPT VISIT HI MDM: CPT | Mod: 25

## 2020-01-14 PROCEDURE — 96374 THER/PROPH/DIAG INJ IV PUSH: CPT

## 2020-01-14 PROCEDURE — 25500020 PHARM REV CODE 255: Performed by: EMERGENCY MEDICINE

## 2020-01-14 PROCEDURE — 83605 ASSAY OF LACTIC ACID: CPT

## 2020-01-14 PROCEDURE — 96375 TX/PRO/DX INJ NEW DRUG ADDON: CPT | Mod: 59

## 2020-01-14 PROCEDURE — 86140 C-REACTIVE PROTEIN: CPT

## 2020-01-14 PROCEDURE — 63600175 PHARM REV CODE 636 W HCPCS: Performed by: EMERGENCY MEDICINE

## 2020-01-14 PROCEDURE — 81025 URINE PREGNANCY TEST: CPT | Performed by: PHYSICIAN ASSISTANT

## 2020-01-14 PROCEDURE — 12000002 HC ACUTE/MED SURGE SEMI-PRIVATE ROOM

## 2020-01-14 PROCEDURE — 83690 ASSAY OF LIPASE: CPT

## 2020-01-14 PROCEDURE — 85730 THROMBOPLASTIN TIME PARTIAL: CPT

## 2020-01-14 PROCEDURE — 81000 URINALYSIS NONAUTO W/SCOPE: CPT

## 2020-01-14 RX ORDER — HEPARIN SODIUM,PORCINE/D5W 25000/250
18 INTRAVENOUS SOLUTION INTRAVENOUS CONTINUOUS
Status: DISCONTINUED | OUTPATIENT
Start: 2020-01-15 | End: 2020-01-19

## 2020-01-14 RX ORDER — MORPHINE SULFATE 10 MG/ML
6 INJECTION INTRAMUSCULAR; INTRAVENOUS; SUBCUTANEOUS
Status: COMPLETED | OUTPATIENT
Start: 2020-01-14 | End: 2020-01-14

## 2020-01-14 RX ORDER — HYDROMORPHONE HYDROCHLORIDE 2 MG/ML
1 INJECTION, SOLUTION INTRAMUSCULAR; INTRAVENOUS; SUBCUTANEOUS
Status: COMPLETED | OUTPATIENT
Start: 2020-01-14 | End: 2020-01-14

## 2020-01-14 RX ADMIN — MORPHINE SULFATE 6 MG: 10 INJECTION INTRAVENOUS at 08:01

## 2020-01-14 RX ADMIN — IOHEXOL 75 ML: 350 INJECTION, SOLUTION INTRAVENOUS at 08:01

## 2020-01-14 RX ADMIN — HYDROMORPHONE HYDROCHLORIDE 1 MG: 2 INJECTION, SOLUTION INTRAMUSCULAR; INTRAVENOUS; SUBCUTANEOUS at 10:01

## 2020-01-14 RX ADMIN — SODIUM CHLORIDE 1000 ML: 0.9 INJECTION, SOLUTION INTRAVENOUS at 08:01

## 2020-01-15 PROBLEM — I82.4Y3 ACUTE DEEP VEIN THROMBOSIS (DVT) OF PROXIMAL VEIN OF BOTH LOWER EXTREMITIES: Status: ACTIVE | Noted: 2020-01-15

## 2020-01-15 LAB
APTT BLDCRRT: 47.7 SEC (ref 21–32)
APTT BLDCRRT: 48.7 SEC (ref 21–32)
APTT BLDCRRT: 73.4 SEC (ref 21–32)
BASOPHILS # BLD AUTO: 0.02 K/UL (ref 0–0.2)
BASOPHILS NFR BLD: 0.6 % (ref 0–1.9)
CRP SERPL-MCNC: 15.7 MG/L (ref 0–8.2)
DIFFERENTIAL METHOD: ABNORMAL
EOSINOPHIL # BLD AUTO: 0.2 K/UL (ref 0–0.5)
EOSINOPHIL NFR BLD: 4.8 % (ref 0–8)
ERYTHROCYTE [DISTWIDTH] IN BLOOD BY AUTOMATED COUNT: 15.8 % (ref 11.5–14.5)
HCT VFR BLD AUTO: 26.7 % (ref 37–48.5)
HGB BLD-MCNC: 8 G/DL (ref 12–16)
IMM GRANULOCYTES # BLD AUTO: 0.01 K/UL (ref 0–0.04)
IMM GRANULOCYTES NFR BLD AUTO: 0.3 % (ref 0–0.5)
LYMPHOCYTES # BLD AUTO: 1.3 K/UL (ref 1–4.8)
LYMPHOCYTES NFR BLD: 37.5 % (ref 18–48)
MCH RBC QN AUTO: 26.8 PG (ref 27–31)
MCHC RBC AUTO-ENTMCNC: 30 G/DL (ref 32–36)
MCV RBC AUTO: 90 FL (ref 82–98)
MONOCYTES # BLD AUTO: 0.3 K/UL (ref 0.3–1)
MONOCYTES NFR BLD: 8.6 % (ref 4–15)
NEUTROPHILS # BLD AUTO: 1.6 K/UL (ref 1.8–7.7)
NEUTROPHILS NFR BLD: 48.2 % (ref 38–73)
NRBC BLD-RTO: 0 /100 WBC
PLATELET # BLD AUTO: 182 K/UL (ref 150–350)
PMV BLD AUTO: 9.1 FL (ref 9.2–12.9)
RBC # BLD AUTO: 2.98 M/UL (ref 4–5.4)
WBC # BLD AUTO: 3.36 K/UL (ref 3.9–12.7)

## 2020-01-15 PROCEDURE — 25000003 PHARM REV CODE 250: Performed by: INTERNAL MEDICINE

## 2020-01-15 PROCEDURE — 85025 COMPLETE CBC W/AUTO DIFF WBC: CPT

## 2020-01-15 PROCEDURE — 36415 COLL VENOUS BLD VENIPUNCTURE: CPT

## 2020-01-15 PROCEDURE — 63600175 PHARM REV CODE 636 W HCPCS: Performed by: HOSPITALIST

## 2020-01-15 PROCEDURE — 11000001 HC ACUTE MED/SURG PRIVATE ROOM

## 2020-01-15 PROCEDURE — 94761 N-INVAS EAR/PLS OXIMETRY MLT: CPT

## 2020-01-15 PROCEDURE — 63600175 PHARM REV CODE 636 W HCPCS: Performed by: EMERGENCY MEDICINE

## 2020-01-15 PROCEDURE — 25000003 PHARM REV CODE 250: Performed by: EMERGENCY MEDICINE

## 2020-01-15 PROCEDURE — 85730 THROMBOPLASTIN TIME PARTIAL: CPT | Mod: 91

## 2020-01-15 RX ORDER — HYDROCODONE BITARTRATE AND ACETAMINOPHEN 5; 325 MG/1; MG/1
1 TABLET ORAL EVERY 4 HOURS PRN
Status: DISCONTINUED | OUTPATIENT
Start: 2020-01-15 | End: 2020-01-15

## 2020-01-15 RX ORDER — MORPHINE SULFATE 10 MG/ML
2 INJECTION INTRAMUSCULAR; INTRAVENOUS; SUBCUTANEOUS EVERY 4 HOURS PRN
Status: DISCONTINUED | OUTPATIENT
Start: 2020-01-15 | End: 2020-01-16

## 2020-01-15 RX ORDER — ACETAMINOPHEN 325 MG/1
650 TABLET ORAL EVERY 8 HOURS PRN
Status: DISCONTINUED | OUTPATIENT
Start: 2020-01-15 | End: 2020-01-21 | Stop reason: HOSPADM

## 2020-01-15 RX ORDER — ONDANSETRON 2 MG/ML
4 INJECTION INTRAMUSCULAR; INTRAVENOUS EVERY 8 HOURS PRN
Status: DISCONTINUED | OUTPATIENT
Start: 2020-01-15 | End: 2020-01-21 | Stop reason: HOSPADM

## 2020-01-15 RX ORDER — OXYCODONE HYDROCHLORIDE 10 MG/1
10 TABLET ORAL EVERY 6 HOURS PRN
Status: DISCONTINUED | OUTPATIENT
Start: 2020-01-15 | End: 2020-01-16

## 2020-01-15 RX ORDER — SODIUM CHLORIDE 0.9 % (FLUSH) 0.9 %
10 SYRINGE (ML) INJECTION
Status: DISCONTINUED | OUTPATIENT
Start: 2020-01-15 | End: 2020-01-21 | Stop reason: HOSPADM

## 2020-01-15 RX ADMIN — OXYCODONE HYDROCHLORIDE 10 MG: 10 TABLET ORAL at 04:01

## 2020-01-15 RX ADMIN — OXYCODONE HYDROCHLORIDE 10 MG: 10 TABLET ORAL at 09:01

## 2020-01-15 RX ADMIN — HYDROCODONE BITARTRATE AND ACETAMINOPHEN 1 TABLET: 5; 325 TABLET ORAL at 01:01

## 2020-01-15 RX ADMIN — HEPARIN SODIUM 16 UNITS/KG/HR: 10000 INJECTION, SOLUTION INTRAVENOUS at 08:01

## 2020-01-15 RX ADMIN — MORPHINE SULFATE 2 MG: 10 INJECTION INTRAVENOUS at 12:01

## 2020-01-15 RX ADMIN — OXYCODONE HYDROCHLORIDE 10 MG: 10 TABLET ORAL at 03:01

## 2020-01-15 RX ADMIN — OXYCODONE HYDROCHLORIDE 10 MG: 10 TABLET ORAL at 10:01

## 2020-01-15 RX ADMIN — MORPHINE SULFATE 2 MG: 10 INJECTION INTRAVENOUS at 08:01

## 2020-01-15 RX ADMIN — HEPARIN SODIUM 18 UNITS/KG/HR: 10000 INJECTION, SOLUTION INTRAVENOUS at 12:01

## 2020-01-15 RX ADMIN — HEPARIN SODIUM 16 UNITS/KG/HR: 10000 INJECTION, SOLUTION INTRAVENOUS at 07:01

## 2020-01-15 NOTE — PLAN OF CARE
"[facility] [place] ONLY: Please call extension [phone] upon patient arrival to floor for Hospital Medicine admit team assignment and for additional admit orders for the patient. Do not page the attending, staff physician or Advanced Practice Provider with the patient on arrival (may not be in-house at the time of arrival). Call back or wait to leave beeper number when prompted.    Outside Transfer Acceptance Note       Patients name/MRN: Antoinette Love, MRN: [id number]     Referring Physician or Mid-Level provider giving report: Dr. Didi Han at [facility] inpatient    Referral Facility: [facility]     Date/Time of Acceptance: 1/15/2020 10:50 AM    Accepting Physician for admission to hospital: MICHELLE Merchant MD ()    Accepting facility: [facility]    Consulting Physicians from [facility] involved in case:  Dr. Juan Acosta with IR at [facility]    Reason for transfer request: Needs [facility] thrombectomy device (Their rheolytic device needs service and is not operable) - see consult note from IR    Report from Physician/Mid-Level Provider/HPI: Ms. Love is a 30yo lady with a history of recurrent, bilateral iliofemoral DVT's with PTE related to May-Thurner syndrome and factor V Leiden, despite being on Eliquis.  She has had an IVC filter since 2014.  She has had aggressive thrombolysis with IR at [facility] in the recent past (12/18-12/22).  She now comes with bilateral leg pain and swelling x 2 days.  IR was consulted at  and noted, "IR consult placed to evaluate pt with h/o of recurrent BLE iliofemoral DVT for potential intervention. Unfortunately, rheolytic thrombectomy device required for intervention here at  is currently not operable since 1/15/20 and needs to be serviced. Unsure what timeline is for rep to evaluate and then potential fix or replace the unit. Recommend transfer to [facility] and consult IR there for evaluation and potential intervention."    To Do List upon " arrival:    1) Consult IR   2) Continue IV heparin infusion    VS: Temp:  [97 °F (36.1 °C)-98.6 °F (37 °C)] 97.3 °F (36.3 °C)  Pulse:  [] 67  Resp:  [12-21] 18  SpO2:  [96 %-100 %] 100 %  BP: ()/(53-88) 101/53    Past Medical History/Surgical History:   Past Medical History:   Diagnosis Date    Anemia of chronic disease 12/19/2019    Anticoagulant long-term use     Anticoagulant long-term use     FERNANDO (dyspnea on exertion)     DVT (deep venous thrombosis)     Encounter for blood transfusion     Factor V Leiden     Leg edema, left     May-Thurner syndrome     Multiple pulmonary nodules     Pulmonary embolus     RAD (reactive airway disease)     Recurrent upper respiratory infection (URI)     Recurrent urticaria     Stroke     TIA (transient ischemic attack)      LABS:   Recent Results (from the past 24 hour(s))   Urinalysis, Reflex to Urine Culture Urine, Clean Catch    Collection Time: 01/14/20  5:20 PM   Result Value Ref Range    Specimen UA Urine, Clean Catch     Color, UA Sonja Yellow, Straw, Sonja    Appearance, UA Clear Clear    pH, UA 5.0 5.0 - 8.0    Specific Gravity, UA >1.030 (A) 1.005 - 1.030    Protein, UA 2+ (A) Negative    Glucose, UA Negative Negative    Ketones, UA Trace (A) Negative    Bilirubin (UA) Negative Negative    Occult Blood UA Negative Negative    Nitrite, UA Negative Negative    Urobilinogen, UA Negative <2.0 EU/dL    Leukocytes, UA Trace (A) Negative   Urinalysis Microscopic    Collection Time: 01/14/20  5:20 PM   Result Value Ref Range    RBC, UA 0 0 - 4 /hpf    WBC, UA 1 0 - 5 /hpf    Bacteria Few (A) None-Occ /hpf    Hyaline Casts, UA 0 0-1/lpf /lpf    Microscopic Comment SEE COMMENT    CBC auto differential    Collection Time: 01/14/20  5:35 PM   Result Value Ref Range    WBC 3.64 (L) 3.90 - 12.70 K/uL    RBC 3.75 (L) 4.00 - 5.40 M/uL    Hemoglobin 10.1 (L) 12.0 - 16.0 g/dL    Hematocrit 33.2 (L) 37.0 - 48.5 %    Mean Corpuscular Volume 89 82 - 98 fL    Mean  Corpuscular Hemoglobin 26.9 (L) 27.0 - 31.0 pg    Mean Corpuscular Hemoglobin Conc 30.4 (L) 32.0 - 36.0 g/dL    RDW 15.9 (H) 11.5 - 14.5 %    Platelets 277 150 - 350 K/uL    MPV 9.5 9.2 - 12.9 fL    Immature Granulocytes 0.5 0.0 - 0.5 %    Gran # (ANC) 2.2 1.8 - 7.7 K/uL    Immature Grans (Abs) 0.02 0.00 - 0.04 K/uL    Lymph # 1.1 1.0 - 4.8 K/uL    Mono # 0.1 (L) 0.3 - 1.0 K/uL    Eos # 0.2 0.0 - 0.5 K/uL    Baso # 0.02 0.00 - 0.20 K/uL    nRBC 0 0 /100 WBC    Gran% 60.3 38.0 - 73.0 %    Lymph% 31.0 18.0 - 48.0 %    Mono% 3.6 (L) 4.0 - 15.0 %    Eosinophil% 4.1 0.0 - 8.0 %    Basophil% 0.5 0.0 - 1.9 %    Differential Method Automated    POCT urine pregnancy    Collection Time: 01/14/20  5:51 PM   Result Value Ref Range    POC Preg Test, Ur Negative Negative     Acceptable Yes    Comprehensive metabolic panel    Collection Time: 01/14/20  7:03 PM   Result Value Ref Range    Sodium 139 136 - 145 mmol/L    Potassium 4.9 3.5 - 5.1 mmol/L    Chloride 104 95 - 110 mmol/L    CO2 24 23 - 29 mmol/L    Glucose 78 70 - 110 mg/dL    BUN, Bld 14 6 - 20 mg/dL    Creatinine 0.9 0.5 - 1.4 mg/dL    Calcium 9.8 8.7 - 10.5 mg/dL    Total Protein 8.7 (H) 6.0 - 8.4 g/dL    Albumin 4.2 3.5 - 5.2 g/dL    Total Bilirubin 0.2 0.1 - 1.0 mg/dL    Alkaline Phosphatase 126 55 - 135 U/L    AST 26 10 - 40 U/L    ALT 9 (L) 10 - 44 U/L    Anion Gap 11 8 - 16 mmol/L    eGFR if African American >60 >60 mL/min/1.73 m^2    eGFR if non African American >60 >60 mL/min/1.73 m^2   Lipase    Collection Time: 01/14/20  7:03 PM   Result Value Ref Range    Lipase 7 4 - 60 U/L   C-reactive protein    Collection Time: 01/14/20  7:07 PM   Result Value Ref Range    CRP 15.7 (H) 0.0 - 8.2 mg/L   Lactic acid, plasma    Collection Time: 01/14/20  8:12 PM   Result Value Ref Range    Lactate (Lactic Acid) 0.9 0.5 - 2.2 mmol/L   APTT    Collection Time: 01/14/20  8:12 PM   Result Value Ref Range    aPTT 23.8 21.0 - 32.0 sec   Protime-INR    Collection  Time: 01/14/20  8:12 PM   Result Value Ref Range    Prothrombin Time 10.4 9.0 - 12.5 sec    INR 1.0 0.8 - 1.2   CBC auto differential    Collection Time: 01/15/20  5:47 AM   Result Value Ref Range    WBC 3.36 (L) 3.90 - 12.70 K/uL    RBC 2.98 (L) 4.00 - 5.40 M/uL    Hemoglobin 8.0 (L) 12.0 - 16.0 g/dL    Hematocrit 26.7 (L) 37.0 - 48.5 %    Mean Corpuscular Volume 90 82 - 98 fL    Mean Corpuscular Hemoglobin 26.8 (L) 27.0 - 31.0 pg    Mean Corpuscular Hemoglobin Conc 30.0 (L) 32.0 - 36.0 g/dL    RDW 15.8 (H) 11.5 - 14.5 %    Platelets 182 150 - 350 K/uL    MPV 9.1 (L) 9.2 - 12.9 fL    Immature Granulocytes 0.3 0.0 - 0.5 %    Gran # (ANC) 1.6 (L) 1.8 - 7.7 K/uL    Immature Grans (Abs) 0.01 0.00 - 0.04 K/uL    Lymph # 1.3 1.0 - 4.8 K/uL    Mono # 0.3 0.3 - 1.0 K/uL    Eos # 0.2 0.0 - 0.5 K/uL    Baso # 0.02 0.00 - 0.20 K/uL    nRBC 0 0 /100 WBC    Gran% 48.2 38.0 - 73.0 %    Lymph% 37.5 18.0 - 48.0 %    Mono% 8.6 4.0 - 15.0 %    Eosinophil% 4.8 0.0 - 8.0 %    Basophil% 0.6 0.0 - 1.9 %    Differential Method Automated    APTT    Collection Time: 01/15/20  7:30 AM   Result Value Ref Range    aPTT 73.4 (H) 21.0 - 32.0 sec       Meds:     Current Facility-Administered Medications:     acetaminophen tablet 650 mg, 650 mg, Oral, Q8H PRN, Asaf Winchester MD    heparin 25,000 units in dextrose 5% (100 units/ml) IV bolus from bag - ADDITIONAL PRN BOLUS - 30 units/kg, 30 Units/kg (Adjusted), Intravenous, PRN, Asaf Winchester MD    heparin 25,000 units in dextrose 5% (100 units/ml) IV bolus from bag - ADDITIONAL PRN BOLUS - 60 units/kg, 60 Units/kg (Adjusted), Intravenous, PRN, Asaf Winchester MD    heparin 25,000 units in dextrose 5% 250 mL (100 units/mL) infusion HIGH INTENSITY nomogram - [facility], 18 Units/kg/hr (Adjusted), Intravenous, Continuous, Asaf Winchester MD, Last Rate: 10 mL/hr at 01/15/20 0821, 16 Units/kg/hr at 01/15/20 0821    morphine injection 2 mg, 2 mg, Intravenous, Q4H PRN, Didi Han MD     ondansetron injection 4 mg, 4 mg, Intravenous, Q8H PRN, Asaf Winchester MD    oxyCODONE immediate release tablet 10 mg, 10 mg, Oral, Q6H PRN, Kana Kim MD, 10 mg at 01/15/20 0954    sodium chloride 0.9% flush 10 mL, 10 mL, Intravenous, PRN, Asaf Winchester MD     Imaging:   US doppler of lower legs:  Extensive bilateral deep venous thrombosis as discussed above.  Clot burden is increased from previous exam from 12/18/2019, noting new nonocclusive thrombus within the bilateral external iliac veins.  Electronically signed by: Kyle Rojas MD  Date: 01/14/2020    MICHELLE Merchant MD  Department of Hospital Medicine  Patient Flow Center/   [phone]

## 2020-01-15 NOTE — NURSING
Report given to ÁNGELA Mccarthy. Patient resting comfortably in bed. Denies any complaints or needs at this time. Bed in lowest position, wheels locked and call light within reach. POC reviewed with patient, patient verbalized understanding.

## 2020-01-15 NOTE — SUBJECTIVE & OBJECTIVE
"Past Medical History:   Diagnosis Date    Anemia of chronic disease 12/19/2019    Anticoagulant long-term use     Anticoagulant long-term use     FERNANDO (dyspnea on exertion)     DVT (deep venous thrombosis)     Encounter for blood transfusion     Factor V Leiden     Leg edema, left     May-Thurner syndrome     Multiple pulmonary nodules     Pulmonary embolus     RAD (reactive airway disease)     Recurrent upper respiratory infection (URI)     Recurrent urticaria     Stroke     TIA (transient ischemic attack)        Past Surgical History:   Procedure Laterality Date    COLONOSCOPY N/A 12/18/2015    Procedure: COLONOSCOPY;  Surgeon: Lefty Lee MD;  Location: [facility] (4TH FLR);  Service: Endoscopy;  Laterality: N/A;  schedule with Jesus    IVC FILTER RETRIEVAL      IVC FILTER RETRIEVAL N/A 9/23/2019    Procedure: REMOVAL-FILTER-IVC;  Surgeon: Claudia Surgeon;  Location: [facility];  Service: Anesthesiology;  Laterality: N/A;  188  4 hours Anes    tumor from breast      left    WISDOM TOOTH EXTRACTION         Review of patient's allergies indicates:   Allergen Reactions    Azithromycin Hives    Beef containing products Anaphylaxis     Patient cannot eat BEEF BROTH  STATES IT WILL MAKE HER THROAT CLOSE UP .     Pork derived (porcine) Anaphylaxis     Throat swells up cannot eat pork sausage or pork patties ,     Unclassified drug Shortness Of Breath and Other (See Comments)     Is allergic to a beef spice - Causes "throat closing"    Xarelto [rivaroxaban] Other (See Comments)     Gallbladder swelling and disfunction    Toradol [ketorolac] Hives and Itching       No current facility-administered medications on file prior to encounter.      Current Outpatient Medications on File Prior to Encounter   Medication Sig    apixaban (ELIQUIS) 5 mg Tab Take 5 mg by mouth 2 (two) times daily.    diazePAM (VALIUM) 5 MG tablet TAKE 1 TABLET (5 MG TOTAL) BY MOUTH EVERY 12 (TWELVE) HOURS AS NEEDED FOR ANXIETY.    " EPINEPHrine (EPIPEN 2-VAN) 0.3 mg/0.3 mL AtIn Inject 0.3 mLs (0.3 mg total) into the muscle once. for 1 dose    ondansetron (ZOFRAN-ODT) 8 MG TbDL Take 1 tablet (8 mg total) by mouth every 8 (eight) hours as needed (nausea). (Patient not taking: Reported on 11/11/2019)     Family History     Problem Relation (Age of Onset)    Allergies Mother        Tobacco Use    Smoking status: Never Smoker    Smokeless tobacco: Never Used   Substance and Sexual Activity    Alcohol use: No     Frequency: Monthly or less     Drinks per session: 3 or 4     Binge frequency: Never    Drug use: No    Sexual activity: Yes     Partners: Male     Review of Systems   Constitutional: Negative for chills and fever.   HENT: Negative for sore throat.    Eyes: Negative for visual disturbance.   Respiratory: Negative for shortness of breath.    Cardiovascular: Negative for chest pain.   Gastrointestinal: Positive for abdominal pain. Negative for nausea and vomiting.   Endocrine: Negative for polyuria.   Genitourinary: Negative for dysuria.   Musculoskeletal: Positive for joint swelling.        Right leg pain and swelling   Skin: Negative for rash.   Neurological: Negative for syncope and light-headedness.   Hematological: Negative for adenopathy.   Psychiatric/Behavioral: Negative for confusion.     Objective:     Vital Signs (Most Recent):  Temp: 97.3 °F (36.3 °C) (01/15/20 0742)  Pulse: 67 (01/15/20 0918)  Resp: 18 (01/15/20 0742)  BP: (!) 101/53 (01/15/20 0918)  SpO2: 100 % (01/15/20 0918) Vital Signs (24h Range):  Temp:  [97 °F (36.1 °C)-98.6 °F (37 °C)] 97.3 °F (36.3 °C)  Pulse:  [] 67  Resp:  [12-21] 18  SpO2:  [96 %-100 %] 100 %  BP: ()/(53-88) 101/53     Weight: 73.3 kg (161 lb 9.6 oz)  Body mass index is 26.89 kg/m².    Physical Exam   Constitutional: She is oriented to person, place, and time. She appears well-developed. No distress.   Appears older than stated age   HENT:   Head: Normocephalic and atraumatic.   Eyes:  Pupils are equal, round, and reactive to light. Conjunctivae are normal.   Neck: Normal range of motion. Neck supple.   Cardiovascular: Normal rate and regular rhythm.   Pulmonary/Chest: Effort normal and breath sounds normal.   Abdominal: Soft. Bowel sounds are normal. She exhibits no distension and no mass. There is tenderness. There is no rebound and no guarding.   Mild TTP in RLQ, but no rebound or guarding, bowel sounds normal   Musculoskeletal: Normal range of motion. She exhibits edema.   2+ edema bilateral lower extremities, with right leg slightly increased compared to left leg and with mild TTP around right posterior calf    Neurological: She is alert and oriented to person, place, and time.   Skin: Skin is warm. Capillary refill takes 2 to 3 seconds. She is not diaphoretic.   Psychiatric: She has a normal mood and affect. Her behavior is normal. Thought content normal.         CRANIAL NERVES     CN III, IV, VI   Pupils are equal, round, and reactive to light.       Significant Labs: All pertinent labs within the past 24 hours have been reviewed.    Significant Imaging: I have reviewed and interpreted all pertinent imaging results/findings within the past 24 hours.

## 2020-01-15 NOTE — NURSING
Pt arrived to floor via stretcher.  Pt c/o abdominal pain.  Heparin gtt and NS bolus infusing.  Telemetry monitor placed, pt SR.

## 2020-01-15 NOTE — CONSULTS
IR consult placed to evaluate pt with h/o of recurrent BLE iliofemoral DVT for potential intervention. Unfortunately, rheolytic thrombectomy device required for intervention here at  is currently not operable since 1/15/20 and needs to be serviced. Unsure what timeline is for rep to evaluate and then potential fix or replace the unit.     Recommend transfer to [facility] and consult IR there for evaluation and potential intervention.     Thank you for considering IR for the care of your patient.     Juan Acosta MD  Interventional Radiology

## 2020-01-15 NOTE — PLAN OF CARE
Pt awaiting transfer to [facility] [place].  Pt receiving continuous  heparin gtt.  PTT in therapeutic range.

## 2020-01-15 NOTE — H&P
"[facility] Medicine  History & Physical    Patient Name: Antoinette Love  MRN: [id number]  Admission Date: 1/14/2020  Attending Physician: Didi Han MD   Primary Care Provider: Alberto Holguin MD         Patient information was obtained from patient, past medical records and ER records.     Subjective:     Principal Problem:Acute deep vein thrombosis (DVT) of proximal vein of both lower extremities    Chief Complaint:   Chief Complaint   Patient presents with    Abdominal Pain     x2 days of RLQ abd pain with no relief from aleve, tylenol, or hot bathes. pt still has her appendix. Pt denies pain radiation. pt on blood thinner for hx of blood clots.         HPI: 29-year-old female with Factor V Leiden, May-Thurner Syndrome, and history of PE and recurrent DVTs who presented again with bilateral leg pain and swelling along with right sided abdominal pain for about 2 days. She has an IVC filter since 2014 and has had multiple DVTs due to May-Thurner syndrome despite being fully anticoagulated on Eliquis. On her last admission from 12/18 - 12/22/19, the patient had catheter-directed thrombolysis in addition to anticoagulation - IR performed Angiojet and angioplasty of both legs and stents was performed with reestablished flow through legs and stents on 12/20/2019.  Dr. Rola Miranda with IR recommended close outpatient follow-up with IR at [facility] due to patient's need to be re-evaluated for further aggressive treatment.   Patient never followed up with IR at [facility].  She was discharged on Eliquis for which she reports compliance. In the ER, workup CT of the abd/pelvis with contrast showed "IVC filter with bilateral IVC and external iliac vein stents.  Unable to assess for patency or thrombus within the stent due to contrast timing.  There do however appear to be intraluminal filling defects within the right common femoral vein and possibly left common femoral vein which may " "indicate acute deep venous thrombosis. Mild dilatation of the tip of the appendix measuring 9 mm.  No surrounding inflammatory changes are seen, however potential early acute appendicitis not excluded.  Similar findings were however seen on previous CT from November 2019" and U/S of LE showed "nonocclusive thrombus is visualized within the right external iliac, common femoral, femoral, popliteal, peroneal, and anterior tibial veins. Greater saphenous vein and posterior tibial veins are patent. Left lower extremity: Nonocclusive thrombus is visualized within the left external iliac, common femoral, femoral, popliteal, and 1 of the paired posterior tibial veins. Greater saphenous, anterior tibial, and peroneal veins are patent."    Past Medical History:   Diagnosis Date    Anemia of chronic disease 12/19/2019    Anticoagulant long-term use     Anticoagulant long-term use     FERNANDO (dyspnea on exertion)     DVT (deep venous thrombosis)     Encounter for blood transfusion     Factor V Leiden     Leg edema, left     May-Thurner syndrome     Multiple pulmonary nodules     Pulmonary embolus     RAD (reactive airway disease)     Recurrent upper respiratory infection (URI)     Recurrent urticaria     Stroke     TIA (transient ischemic attack)        Past Surgical History:   Procedure Laterality Date    COLONOSCOPY N/A 12/18/2015    Procedure: COLONOSCOPY;  Surgeon: Lefty Lee MD;  Location: [facility] ([address] [place] [address]);  Service: Endoscopy;  Laterality: N/A;  schedule with Jesus    IVC FILTER RETRIEVAL      IVC FILTER RETRIEVAL N/A 9/23/2019    Procedure: REMOVAL-FILTER-IVC;  Surgeon: Claudia Surgeon;  Location: [facility];  Service: Anesthesiology;  Laterality: N/A;  188  4 hours Anes    tumor from breast      left    WISDOM TOOTH EXTRACTION         Review of patient's allergies indicates:   Allergen Reactions    Azithromycin Hives    Beef containing products Anaphylaxis     Patient cannot eat BEEF BROTH  STATES IT " "WILL MAKE HER THROAT CLOSE UP .     Pork derived (porcine) Anaphylaxis     Throat swells up cannot eat pork sausage or pork patties ,     Unclassified drug Shortness Of Breath and Other (See Comments)     Is allergic to a beef spice - Causes "throat closing"    Xarelto [rivaroxaban] Other (See Comments)     Gallbladder swelling and disfunction    Toradol [ketorolac] Hives and Itching       No current facility-administered medications on file prior to encounter.      Current Outpatient Medications on File Prior to Encounter   Medication Sig    apixaban (ELIQUIS) 5 mg Tab Take 5 mg by mouth 2 (two) times daily.    diazePAM (VALIUM) 5 MG tablet TAKE 1 TABLET (5 MG TOTAL) BY MOUTH EVERY 12 (TWELVE) HOURS AS NEEDED FOR ANXIETY.    EPINEPHrine (EPIPEN 2-VAN) 0.3 mg/0.3 mL AtIn Inject 0.3 mLs (0.3 mg total) into the muscle once. for 1 dose    ondansetron (ZOFRAN-ODT) 8 MG TbDL Take 1 tablet (8 mg total) by mouth every 8 (eight) hours as needed (nausea). (Patient not taking: Reported on 11/11/2019)     Family History     Problem Relation (Age of Onset)    Allergies Mother        Tobacco Use    Smoking status: Never Smoker    Smokeless tobacco: Never Used   Substance and Sexual Activity    Alcohol use: No     Frequency: Monthly or less     Drinks per session: 3 or 4     Binge frequency: Never    Drug use: No    Sexual activity: Yes     Partners: Male     Review of Systems   Constitutional: Negative for chills and fever.   HENT: Negative for sore throat.    Eyes: Negative for visual disturbance.   Respiratory: Negative for shortness of breath.    Cardiovascular: Negative for chest pain.   Gastrointestinal: Positive for abdominal pain. Negative for nausea and vomiting.   Endocrine: Negative for polyuria.   Genitourinary: Negative for dysuria.   Musculoskeletal: Positive for joint swelling.        Right leg pain and swelling   Skin: Negative for rash.   Neurological: Negative for syncope and light-headedness. "   Hematological: Negative for adenopathy.   Psychiatric/Behavioral: Negative for confusion.     Objective:     Vital Signs (Most Recent):  Temp: 97.3 °F (36.3 °C) (01/15/20 0742)  Pulse: 67 (01/15/20 0918)  Resp: 18 (01/15/20 0742)  BP: (!) 101/53 (01/15/20 0918)  SpO2: 100 % (01/15/20 0918) Vital Signs (24h Range):  Temp:  [97 °F (36.1 °C)-98.6 °F (37 °C)] 97.3 °F (36.3 °C)  Pulse:  [] 67  Resp:  [12-21] 18  SpO2:  [96 %-100 %] 100 %  BP: ()/(53-88) 101/53     Weight: 73.3 kg (161 lb 9.6 oz)  Body mass index is 26.89 kg/m².    Physical Exam   Constitutional: She is oriented to person, place, and time. She appears well-developed. No distress.   Appears older than stated age   HENT:   Head: Normocephalic and atraumatic.   Eyes: Pupils are equal, round, and reactive to light. Conjunctivae are normal.   Neck: Normal range of motion. Neck supple.   Cardiovascular: Normal rate and regular rhythm.   Pulmonary/Chest: Effort normal and breath sounds normal.   Abdominal: Soft. Bowel sounds are normal. She exhibits no distension and no mass. There is tenderness. There is no rebound and no guarding.   Mild TTP in RLQ, but no rebound or guarding, bowel sounds normal   Musculoskeletal: Normal range of motion. She exhibits edema.   2+ edema bilateral lower extremities, with right leg slightly increased compared to left leg and with mild TTP around right posterior calf    Neurological: She is alert and oriented to person, place, and time.   Skin: Skin is warm. Capillary refill takes 2 to 3 seconds. She is not diaphoretic.   Psychiatric: She has a normal mood and affect. Her behavior is normal. Thought content normal.         CRANIAL NERVES     CN III, IV, VI   Pupils are equal, round, and reactive to light.       Significant Labs: All pertinent labs within the past 24 hours have been reviewed.    Significant Imaging: I have reviewed and interpreted all pertinent imaging results/findings within the past 24  "hours.    Assessment/Plan:     * Acute deep vein thrombosis (DVT) of proximal vein of both lower extremities  Acute on chronic issue for this patient secondary to her May-Thurner syndrome  Imaging with CT of the abd/pelvis with contrast showed "IVC filter with bilateral IVC and external iliac vein stents.  Unable to assess for patency or thrombus within the stent due to contrast timing.  There do however appear to be intraluminal filling defects within the right common femoral vein and possibly left common femoral vein which may indicate acute deep venous thrombosis. Mild dilatation of the tip of the appendix measuring 9 mm.  No surrounding inflammatory changes are seen, however potential early acute appendicitis not excluded.  Similar findings were however seen on previous CT from November 2019."  U/S of LE showed "nonocclusive thrombus is visualized within the right external iliac, common femoral, femoral, popliteal, peroneal, and anterior tibial veins. Greater saphenous vein and posterior tibial veins are patent. Left lower extremity: Nonocclusive thrombus is visualized within the left external iliac, common femoral, femoral, popliteal, and 1 of the paired posterior tibial veins. Greater saphenous, anterior tibial, and peroneal veins are patent."  Treatment initiated with heparin infusion   On previous admission, Dr. Rola Miranda with IR recommended close outpatient follow-up with IR at [facility] due to patient's need to be re-evaluated for further aggressive treatment not done at this facility  Additionally, IR here reported rheolytic thrombectomy device required for intervention here at  is currently not operable and needs to be serviced  Transfer to [facility] in progress for further directed intervention    Anemia of chronic disease, iron deficiency anemia and acute blood loss anemia  Known iron deficiency anemia (per chart review, iron level checked last month).   H&H with noted drop as expected post " thrombectomy, but no further bleeding  Transfuse as needed for hgb <7.  Will continue monitor    May-Thurner syndrome  Chronic condition  Treatment as discussed above    Factor V Leiden mutation  Followed by Hematology as outpatient  Chronic condition    Presence of IVC filter  Filter in place     History of pulmonary embolism  S/p IVC filter since 2014  Chronically anticoagulated on Eliquis as outpatient  Currently on heparin gtt  Resume Eliquis on discharge      VTE Risk Mitigation (From admission, onward)         Ordered     IP VTE HIGH RISK PATIENT  Once      01/15/20 0141     Place UBALDO hose  Until discontinued      01/15/20 0141     heparin 25,000 units in dextrose 5% 250 mL (100 units/mL) infusion HIGH INTENSITY nomogram - [facility]  Continuous     Question:  Heparin Infusion Adjustment (DO NOT MODIFY ANSWER)  Answer:  [email or web address]\Images\Pharmacy\HeparinInfusions\heparin HIGH INTENSITY nomogram for [facility] [id number]    01/14/20 2342     heparin 25,000 units in dextrose 5% (100 units/ml) IV bolus from bag - ADDITIONAL PRN BOLUS - 60 units/kg  As needed (PRN)     Question:  Heparin Infusion Adjustment (DO NOT MODIFY ANSWER)  Answer:  [email or web address]\Images\Pharmacy\HeparinInfusions\heparin HIGH INTENSITY nomogram for [facility] [id number]    01/14/20 2342     heparin 25,000 units in dextrose 5% (100 units/ml) IV bolus from bag - ADDITIONAL PRN BOLUS - 30 units/kg  As needed (PRN)     Question:  Heparin Infusion Adjustment (DO NOT MODIFY ANSWER)  Answer:  [email or web address]\Images\Pharmacy\HeparinInfusions\heparin HIGH INTENSITY nomogram for [facility] [id number]    01/14/20 2342                   Didi Han MD  Department of Hospital Medicine   [facility]

## 2020-01-15 NOTE — ED TRIAGE NOTES
Pt arrived to the ED due to RLQ pain that initially started Sunday and worsened today. Pt also reports discoloration and swelling to RLE that she noticed today. Pt has a hx of multiple blood clots and clotting factor condition

## 2020-01-15 NOTE — ED PROVIDER NOTES
"Encounter Date: 1/14/2020    SCRIBE #1 NOTE: I, Amy Alvarez, am scribing for, and in the presence of,  Loretta Ortez MD. I have scribed the following portions of the note - Other sections scribed: HPI, ROS and PE.       History     Chief Complaint   Patient presents with    Abdominal Pain     x2 days of RLQ abd pain with no relief from aleve, tylenol, or hot bathes. pt still has her appendix. Pt denies pain radiation. pt on blood thinner for hx of blood clots.      CC: Abdominal Pain    HPI: This 29 y.o female, with a medical history of anemia, Factor V Leiden, May-Thurner syndrome with history of multiple DVTs, multiple pulmonary nodules, pulmonary embolus, and TIA, presents to the ED c/o non-radiating right lower quadrant abdominal pain that began 2x days ago. Pt states, "it feels like somebody is stabbing me and then it just burns". She reports use of Aleve and Ibuprofen for treatment without any relief. The symptoms are acute, constant and moderate (7/10). She denies previous episodes of similar symptoms. Additionally, pt c/o swelling to the right leg.  Patient was admitted to the hospital recently, she had catheter directed thrombolysis with angioplasty of her bilateral lower extremities on December 20, 2019.  She was treated with IV heparin, she was ultimately discharged on her Eliquis.  She states that she has been feeling fine for the last couple of weeks, however, she notes that over the last 1x day the right leg has been more swollen than normal. Pt notes that she is compliant with her Eliquis (last dose taken this morning) and denies missing any doses. She denies dysuria, hematuria, diarrhea, constipation, nausea, emesis, vaginal discharge and vaginal bleeding. Pt also denies any recent trauma. No other associated symptoms.    The history is provided by the patient.     Review of patient's allergies indicates:   Allergen Reactions    Azithromycin Hives    Beef containing products Anaphylaxis     " "Patient cannot eat BEEF BROTH  STATES IT WILL MAKE HER THROAT CLOSE UP .     Pork derived (porcine) Anaphylaxis     Throat swells up cannot eat pork sausage or pork patties ,     Unclassified drug Shortness Of Breath and Other (See Comments)     Is allergic to a beef spice - Causes "throat closing"    Xarelto [rivaroxaban] Other (See Comments)     Gallbladder swelling and disfunction    Toradol [ketorolac] Hives and Itching     Past Medical History:   Diagnosis Date    Anemia of chronic disease 12/19/2019    Anticoagulant long-term use     Anticoagulant long-term use     FERNANDO (dyspnea on exertion)     DVT (deep venous thrombosis)     Factor V Leiden     Leg edema, left     May-Thurner syndrome     Multiple pulmonary nodules     Pulmonary embolus     RAD (reactive airway disease)     Recurrent upper respiratory infection (URI)     Recurrent urticaria     TIA (transient ischemic attack)      Past Surgical History:   Procedure Laterality Date    COLONOSCOPY N/A 12/18/2015    Procedure: COLONOSCOPY;  Surgeon: Lefty Lee MD;  Location: [address]);  Service: Endoscopy;  Laterality: N/A;  schedule with Jesus    IVC FILTER RETRIEVAL      IVC FILTER RETRIEVAL N/A 9/23/2019    Procedure: REMOVAL-FILTER-IVC;  Surgeon: Claudia Surgeon;  Location: [facility];  Service: Anesthesiology;  Laterality: N/A;  188  4 hours Anes    tumor from breast      left    WISDOM TOOTH EXTRACTION       Family History   Problem Relation Age of Onset    Allergies Mother         azithromycin     Social History     Tobacco Use    Smoking status: Never Smoker    Smokeless tobacco: Never Used   Substance Use Topics    Alcohol use: No     Frequency: Monthly or less     Drinks per session: 3 or 4     Binge frequency: Never    Drug use: No     Review of Systems   Constitutional: Negative for chills and fever.   HENT: Negative for congestion and sore throat.    Eyes: Negative for visual disturbance.   Respiratory: Negative for " cough and shortness of breath.    Cardiovascular: Positive for leg swelling (right). Negative for chest pain.   Gastrointestinal: Positive for abdominal pain (right lower quadrant). Negative for constipation, diarrhea, nausea and vomiting.   Genitourinary: Negative for dysuria, hematuria, vaginal bleeding and vaginal discharge.   Skin: Negative for rash.   Allergic/Immunologic: Negative for immunocompromised state.   Neurological: Negative for headaches.       Physical Exam     Initial Vitals [01/14/20 1719]   BP Pulse Resp Temp SpO2   (!) 144/88 110 20 98.6 °F (37 °C) 98 %      MAP       --         Physical Exam    Nursing note and vitals reviewed.  Constitutional: She appears well-developed and well-nourished. She is not diaphoretic. No distress.   HENT:   Mouth/Throat: Oropharynx is clear and moist.   Eyes: Pupils are equal, round, and reactive to light.   Neck: Neck supple.   Cardiovascular: Normal rate and regular rhythm.   Pulses:       Dorsalis pedis pulses are 1+ on the right side, and 1+ on the left side.   Pulmonary/Chest: Breath sounds normal.   Abdominal: Soft. There is tenderness. Hernia confirmed negative in the right inguinal area and confirmed negative in the left inguinal area.   There is tenderness in the right lower quadrant with voluntary guarding.   Musculoskeletal: She exhibits edema.   There is 2+ pitting edema to the right and left legs. The right leg is slightly erythematous compared to the left. Right leg is non tender to touch, there is no bullae    Lymphadenopathy:        Right: No inguinal adenopathy present.        Left: No inguinal adenopathy present.   Neurological: She is alert and oriented to person, place, and time.   Skin: Skin is warm and dry.   Psychiatric: She has a normal mood and affect.         ED Course   Procedures  Labs Reviewed   CBC W/ AUTO DIFFERENTIAL - Abnormal; Notable for the following components:       Result Value    WBC 3.64 (*)     RBC 3.75 (*)     Hemoglobin  10.1 (*)     Hematocrit 33.2 (*)     Mean Corpuscular Hemoglobin 26.9 (*)     Mean Corpuscular Hemoglobin Conc 30.4 (*)     RDW 15.9 (*)     Mono # 0.1 (*)     Mono% 3.6 (*)     All other components within normal limits   COMPREHENSIVE METABOLIC PANEL - Abnormal; Notable for the following components:    Total Protein 8.7 (*)     ALT 9 (*)     All other components within normal limits    Narrative:     Recoll. [id number] by KSL at 01/14/2020 18:47, reason: specimen   grossly hemolyzed 01/14/2020  18:47   URINALYSIS, REFLEX TO URINE CULTURE - Abnormal; Notable for the following components:    Specific Gravity, UA >1.030 (*)     Protein, UA 2+ (*)     Ketones, UA Trace (*)     Leukocytes, UA Trace (*)     All other components within normal limits    Narrative:     Preferred Collection Type->Urine, Clean Catch   URINALYSIS MICROSCOPIC - Abnormal; Notable for the following components:    Bacteria Few (*)     All other components within normal limits    Narrative:     Preferred Collection Type->Urine, Clean Catch   C-REACTIVE PROTEIN - Abnormal; Notable for the following components:    CRP 15.7 (*)     All other components within normal limits   LIPASE    Narrative:     Recoll. [id number] by KSL at 01/14/2020 18:47, reason: specimen   grossly hemolyzed 01/14/2020  18:47   LACTIC ACID, PLASMA   APTT   PROTIME-INR   POCT URINE PREGNANCY          Imaging Results           US Lower Extremity Veins Bilateral (Final result)  Result time 01/14/20 22:26:00    Final result by Kyle Rojas MD (01/14/20 22:26:00)                 Impression:      Extensive bilateral deep venous thrombosis as discussed above.  Clot burden is increased from previous exam from 12/18/2019, noting new nonocclusive thrombus within the bilateral external iliac veins.    This report was flagged in Epic as abnormal.      Electronically signed by: Kyle Rojas MD  Date:    01/14/2020  Time:    22:26             Narrative:    EXAMINATION:  US LOWER  EXTREMITY VEINS BILATERAL    CLINICAL HISTORY:  Other specified soft tissue disorders    TECHNIQUE:  Duplex and color flow Doppler evaluation of the bilateral lower extremity veins was performed.    COMPARISON:  12/18/2019.    FINDINGS:  Right lower extremity:    Nonocclusive thrombus is visualized within the right external iliac, common femoral, femoral, popliteal, peroneal, and anterior tibial veins.  Greater saphenous vein and posterior tibial veins are patent.    Left lower extremity: Nonocclusive thrombus is visualized within the left external iliac, common femoral, femoral, popliteal, and 1 of the paired posterior tibial veins.  Greater saphenous, anterior tibial, and peroneal veins are patent.                                CT Abdomen Pelvis With Contrast (Final result)  Result time 01/14/20 21:17:52    Final result by Kyle Rojas MD (01/14/20 21:17:52)                 Impression:      1. IVC filter with bilateral IVC and external iliac vein stents.  Unable to assess for patency or thrombus within the stent due to contrast timing.  There do however appear to be intraluminal filling defects within the right common femoral vein and possibly left common femoral vein which may indicate acute deep venous thrombosis.  2. Mild dilatation of the tip of the appendix measuring 9 mm.  No surrounding inflammatory changes are seen, however potential early acute appendicitis not excluded.  Similar findings were however seen on previous CT from November 2019.  3. Splenomegaly.  This report was flagged in Epic as abnormal.      Electronically signed by: Kyle Rojas MD  Date:    01/14/2020  Time:    21:17             Narrative:    EXAMINATION:  CT ABDOMEN PELVIS WITH CONTRAST    CLINICAL HISTORY:  RLQ pain, appendicitis suspected;history of May Thurner disease and DVTs;    TECHNIQUE:  Low dose axial images, sagittal and coronal reformations were obtained from the lung bases to the pubic symphysis following the IV  administration of 75 mL of Omnipaque 350 .  Oral contrast was not given.    COMPARISON:  CT abdomen and pelvis from November 2019.    FINDINGS:  The visualized portion of the heart is unremarkable.  The lung bases are clear.    No significant hepatic abnormalities are identified.  There is no intra-or extrahepatic biliary ductal dilatation.  The gallbladder is unremarkable.  The stomach, pancreas, and adrenal glands are unremarkable.  Spleen is enlarged measuring 16 cm.    Kidneys enhance normally with no evidence of hydronephrosis.  No definite abnormalities are seen along the ureteral courses.  Urinary bladder and uterus are unremarkable.  No significant adnexal abnormalities are appreciated.  Trace physiologic free fluid is seen in the pelvis.    Distal tip of the appendix is mildly dilated measuring 9 mm.  No periappendiceal fat stranding or inflammatory change seen.  The visualized loops of small and large bowel show no evidence of obstruction or inflammation.  No free air.    Aorta tapers normally.  IVC filter is visualized.  IVC stents are visualized in similar position extending throughout the bilateral common iliac veins.  Filling defects are visualized within the right common femoral vein and possibly within the left common femoral vein.  Unable to assess for patency within the stents due to timing of contrast.    No acute osseous abnormality identified. Subcutaneous soft tissue show no significant abnormalities.                                 Medical Decision Making:   Initial Assessment:   Three 1-year-old female presents with right lower quadrant pain, right leg swelling.  She has a complicated history with recurrent DVTs.  She is on Eliquis.  She recently underwent thrombolysis and angioplasty of her bilateral lower extremities. Exam notable for tenderness with voluntary guarding in the right lower quadrant, right leg and left leg with 2+ pitting edema, right leg is slightly erythematous.  Dorsalis  pedis pulses 1+ bilaterally. Differential broad and includes but not limited to acute DVT, appendicitis, nephrolithiasis, UTI, ovarian pathology.  Workup initiated with basic labs, will treat with IV fluids and morphine, will get CT abdomen pelvis and ultrasound of the lower extremities venous system.  Clinical Tests:   Lab Tests: Ordered and Reviewed  The following lab test(s) were unremarkable: CBC, CMP and UPT  Radiological Study: Ordered and Reviewed  ED Management:  0015:  Care assumed at shift change dead at 10:00 p.m. from Dr. Ortez.  CT of the abdomen shows evidence of acute clock in the right iliac vein.  There is no other changes when compared to previous.  Tip of the appendix is mildly dilated but this was present on previous exams.  Patient's white blood cell count is within normal limits. CRP is minimally elevated.  Doppler of the lower extremity shows acute thrombosis diffusely in bilateral legs.  Patient admits to 2-3 day history of redness of the lower extremities and mild swelling. She states that her right lower quadrant pain is different than her chronic recurrent abdominal pain that she attributes to her IVC filter because it is more persistent and in a slightly different location.  Repeat abdominal exam is benign.  Suspect that patient's symptoms are due to her acute iliac thrombosis as there are no findings on CT of the abdomen and pelvis to reveal an intra-abdominal surgical etiology.  No guarding or rebound on exam.  No peritoneal signs. Plan to admit on IV heparin.  Review of discharge summary from recent admission reveals that interventional Radiology recommended that patient be followed at [facility].  Discussed this with on-call interventional radiology.  Recommend transfer for further treatment.  Unfortunately bed is not available tonight.  Will admit patient to the floor.  Patient may be transferred once a bed is available tomorrow.  Discussed with Dr. Kim.  Discussed plan with  patient and family.            Scribe Attestation:   Scribe #1: I performed the above scribed service and the documentation accurately describes the services I performed. I attest to the accuracy of the note.                          Clinical Impression:       ICD-10-CM ICD-9-CM   1. Acute deep vein thrombosis (DVT) of proximal vein of both lower extremities I82.4Y3 453.41   2. Leg swelling M79.89 729.81   3. Right lower quadrant abdominal pain R10.31 789.03         Disposition:   Disposition: Admitted  Condition: Stable    I, Loretta Ortez MD, personally performed the services described in this documentation. All medical record entries made by the scribe were at my direction and in my presence. I have reviewed the chart and agree that the record reflects my personal performance and is accurate and complete.                 Asaf Winchester MD  01/15/20 0028

## 2020-01-15 NOTE — ASSESSMENT & PLAN NOTE
S/p IVC filter since 2014  Chronically anticoagulated on Eliquis as outpatient  Currently on heparin gtt  Resume Eliquis on discharge

## 2020-01-15 NOTE — ASSESSMENT & PLAN NOTE
"Acute on chronic issue for this patient secondary to her May-Thurner syndrome  Imaging with CT of the abd/pelvis with contrast showed "IVC filter with bilateral IVC and external iliac vein stents.  Unable to assess for patency or thrombus within the stent due to contrast timing.  There do however appear to be intraluminal filling defects within the right common femoral vein and possibly left common femoral vein which may indicate acute deep venous thrombosis. Mild dilatation of the tip of the appendix measuring 9 mm.  No surrounding inflammatory changes are seen, however potential early acute appendicitis not excluded.  Similar findings were however seen on previous CT from November 2019."  U/S of LE showed "nonocclusive thrombus is visualized within the right external iliac, common femoral, femoral, popliteal, peroneal, and anterior tibial veins. Greater saphenous vein and posterior tibial veins are patent. Left lower extremity: Nonocclusive thrombus is visualized within the left external iliac, common femoral, femoral, popliteal, and 1 of the paired posterior tibial veins. Greater saphenous, anterior tibial, and peroneal veins are patent."  Treatment initiated with heparin infusion   On previous admission, Dr. Rola Miranda with IR recommended close outpatient follow-up with IR at [facility] due to patient's need to be re-evaluated for further aggressive treatment not done at this facility  Additionally, IR here reported rheolytic thrombectomy device required for intervention here at  is currently not operable and needs to be serviced  [place] [facility] [place] for further directed intervention  "

## 2020-01-15 NOTE — PLAN OF CARE
Pt c/o abdominal pain unrelieved by PRN Norco.  Dr. Kim notified, ordered to DC [place] and replace with Oxycodone 10 mg q 6rhs.    Heparin gtt infusing per MAR. Pt's first APTT for 0630.      Pt SR on telemetry.    BSC at bedside for pt to utilize.     Pt compliant with NPO.

## 2020-01-15 NOTE — PLAN OF CARE
01/15/20 1155   Post-Acute Status   Post-Acute Authorization Other   Discharge Delays (!) Procedure Scheduling (IR, OR, Labs, Echo, Cath, Echo, EEG)  (To [facility] FOR TEST)   .Matilde De Anda RN, BSN, STN [facility]  1/15/2020

## 2020-01-15 NOTE — HPI
"29-year-old female with Factor V Leiden, May-Thurner Syndrome, and history of PE and recurrent DVTs who presented again with bilateral leg pain and swelling along with right sided abdominal pain for about 2 days. She has an IVC filter since 2014 and has had multiple DVTs due to May-Thurner syndrome despite being fully anticoagulated on Eliquis. On her last admission from 12/18 - 12/22/19, the patient had catheter-directed thrombolysis in addition to anticoagulation - IR performed Angiojet and angioplasty of both legs and stents was performed with reestablished flow through legs and stents on 12/20/2019.  Dr. Rola Miranda with IR recommended close outpatient follow-up with IR at [facility] due to patient's need to be re-evaluated for further aggressive treatment.  Patient never followed up with IR at [facility].  She was discharged on Eliquis for which she reports compliance. In the ER, workup CT of the abd/pelvis with contrast showed "IVC filter with bilateral IVC and external iliac vein stents.  Unable to assess for patency or thrombus within the stent due to contrast timing.  There do however appear to be intraluminal filling defects within the right common femoral vein and possibly left common femoral vein which may indicate acute deep venous thrombosis. Mild dilatation of the tip of the appendix measuring 9 mm.  No surrounding inflammatory changes are seen, however potential early acute appendicitis not excluded.  Similar findings were however seen on previous CT from November 2019" and U/S of LE showed "nonocclusive thrombus is visualized within the right external iliac, common femoral, femoral, popliteal, peroneal, and anterior tibial veins. Greater saphenous vein and posterior tibial veins are patent. Left lower extremity: Nonocclusive thrombus is visualized within the left external iliac, common femoral, femoral, popliteal, and 1 of the paired posterior tibial veins. Greater saphenous, anterior tibial, and " "peroneal veins are patent."  "

## 2020-01-15 NOTE — PLAN OF CARE
"TN introduced herself and explained the 's role. TN utilized 2 patient identifiers: Name & .  TN placed Name and spectralink number on whiteboard.TN provided and reviewed with patient "[employer] My Health Packet". TN discussed what Help At Home means to the patient and the name of the person Markos, who helps at home. TN discussed with patient the things the patient is responsible for to HELP manage patient's  healthcare at home. TN taught Symptoms and Problems with patient for DVT .Patient verbalized understanding & teachback:1. Swelling of leg, 2.PAIN IN LEG . Patient prefers morning or after noon doctor appointments.     01/15/20 1148   Discharge Assessment   Assessment Type Discharge Planning Assessment   Confirmed/corrected address and phone number on facesheet? No   Assessment information obtained from? Patient   Communicated expected length of stay with patient/caregiver no   Prior to hospitilization cognitive status: Alert/Oriented   Prior to hospitalization functional status: Independent   Current cognitive status: Alert/Oriented   Current Functional Status: Independent   Lives With significant other   Able to Return to Prior Arrangements yes   Is patient able to care for self after discharge? Yes   Who are your caregiver(s) and their phone number(s)?   (friend/Significant Markos Medina [phone])   Patient's perception of discharge disposition admitted as an inpatient   Readmission Within the Last 30 Days no previous admission in last 30 days   Patient currently being followed by outpatient case management? No   Patient currently receives any other outside agency services? No   Equipment Currently Used at Home none   Do you have any problems affording any of your prescribed medications? No   Is the patient taking medications as prescribed? yes   Does the patient have transportation home? Yes   Transportation Anticipated car, drives self   Does the patient receive services at the [facility]? " No   Discharge Plan A Home   Discharge Plan B Home   DME Needed Upon Discharge  none   Patient/Family in Agreement with Plan yes     [id number] [place] - [address]  [address]  [place] [address]  Phone: [phone] Fax: [phone]

## 2020-01-16 ENCOUNTER — ANESTHESIA EVENT (OUTPATIENT)
Dept: ENDOSCOPY | Facility: HOSPITAL | Age: 30
DRG: 271 | End: 2020-01-16
Payer: COMMERCIAL

## 2020-01-16 LAB
ALBUMIN SERPL BCP-MCNC: 3.3 G/DL (ref 3.5–5.2)
ALP SERPL-CCNC: 99 U/L (ref 55–135)
ALT SERPL W/O P-5'-P-CCNC: 5 U/L (ref 10–44)
ANION GAP SERPL CALC-SCNC: 6 MMOL/L (ref 8–16)
APTT BLDCRRT: 33.9 SEC (ref 21–32)
APTT BLDCRRT: 34.5 SEC (ref 21–32)
APTT BLDCRRT: 43.3 SEC (ref 21–32)
AST SERPL-CCNC: 11 U/L (ref 10–40)
BASOPHILS # BLD AUTO: 0.02 K/UL (ref 0–0.2)
BASOPHILS NFR BLD: 0.7 % (ref 0–1.9)
BILIRUB SERPL-MCNC: 0.2 MG/DL (ref 0.1–1)
BUN SERPL-MCNC: 10 MG/DL (ref 6–20)
CALCIUM SERPL-MCNC: 9.5 MG/DL (ref 8.7–10.5)
CHLORIDE SERPL-SCNC: 108 MMOL/L (ref 95–110)
CO2 SERPL-SCNC: 26 MMOL/L (ref 23–29)
CREAT SERPL-MCNC: 0.8 MG/DL (ref 0.5–1.4)
DIFFERENTIAL METHOD: ABNORMAL
EOSINOPHIL # BLD AUTO: 0.1 K/UL (ref 0–0.5)
EOSINOPHIL NFR BLD: 4.8 % (ref 0–8)
ERYTHROCYTE [DISTWIDTH] IN BLOOD BY AUTOMATED COUNT: 15.7 % (ref 11.5–14.5)
EST. GFR  (AFRICAN AMERICAN): >60 ML/MIN/1.73 M^2
EST. GFR  (NON AFRICAN AMERICAN): >60 ML/MIN/1.73 M^2
GLUCOSE SERPL-MCNC: 87 MG/DL (ref 70–110)
HCT VFR BLD AUTO: 28.9 % (ref 37–48.5)
HGB BLD-MCNC: 8.3 G/DL (ref 12–16)
IMM GRANULOCYTES # BLD AUTO: 0.01 K/UL (ref 0–0.04)
IMM GRANULOCYTES NFR BLD AUTO: 0.3 % (ref 0–0.5)
LYMPHOCYTES # BLD AUTO: 1.5 K/UL (ref 1–4.8)
LYMPHOCYTES NFR BLD: 50.2 % (ref 18–48)
MAGNESIUM SERPL-MCNC: 2.2 MG/DL (ref 1.6–2.6)
MCH RBC QN AUTO: 26.3 PG (ref 27–31)
MCHC RBC AUTO-ENTMCNC: 28.7 G/DL (ref 32–36)
MCV RBC AUTO: 92 FL (ref 82–98)
MONOCYTES # BLD AUTO: 0.2 K/UL (ref 0.3–1)
MONOCYTES NFR BLD: 6.5 % (ref 4–15)
NEUTROPHILS # BLD AUTO: 1.1 K/UL (ref 1.8–7.7)
NEUTROPHILS NFR BLD: 37.5 % (ref 38–73)
NRBC BLD-RTO: 0 /100 WBC
PHOSPHATE SERPL-MCNC: 4.6 MG/DL (ref 2.7–4.5)
PLATELET # BLD AUTO: 200 K/UL (ref 150–350)
PMV BLD AUTO: 9.1 FL (ref 9.2–12.9)
POTASSIUM SERPL-SCNC: 4.5 MMOL/L (ref 3.5–5.1)
PROT SERPL-MCNC: 7 G/DL (ref 6–8.4)
RBC # BLD AUTO: 3.15 M/UL (ref 4–5.4)
SODIUM SERPL-SCNC: 140 MMOL/L (ref 136–145)
WBC # BLD AUTO: 2.93 K/UL (ref 3.9–12.7)

## 2020-01-16 PROCEDURE — 99233 PR SUBSEQUENT HOSPITAL CARE,LEVL III: ICD-10-PCS | Mod: ,,, | Performed by: INTERNAL MEDICINE

## 2020-01-16 PROCEDURE — 84100 ASSAY OF PHOSPHORUS: CPT

## 2020-01-16 PROCEDURE — 85730 THROMBOPLASTIN TIME PARTIAL: CPT

## 2020-01-16 PROCEDURE — 99233 SBSQ HOSP IP/OBS HIGH 50: CPT | Mod: ,,, | Performed by: INTERNAL MEDICINE

## 2020-01-16 PROCEDURE — 85730 THROMBOPLASTIN TIME PARTIAL: CPT | Mod: 91

## 2020-01-16 PROCEDURE — 63600175 PHARM REV CODE 636 W HCPCS: Performed by: EMERGENCY MEDICINE

## 2020-01-16 PROCEDURE — 25000003 PHARM REV CODE 250: Performed by: HOSPITALIST

## 2020-01-16 PROCEDURE — 36415 COLL VENOUS BLD VENIPUNCTURE: CPT

## 2020-01-16 PROCEDURE — 83735 ASSAY OF MAGNESIUM: CPT

## 2020-01-16 PROCEDURE — 63600175 PHARM REV CODE 636 W HCPCS: Performed by: HOSPITALIST

## 2020-01-16 PROCEDURE — 11000001 HC ACUTE MED/SURG PRIVATE ROOM

## 2020-01-16 PROCEDURE — 80053 COMPREHEN METABOLIC PANEL: CPT

## 2020-01-16 PROCEDURE — 25000003 PHARM REV CODE 250: Performed by: INTERNAL MEDICINE

## 2020-01-16 PROCEDURE — 85025 COMPLETE CBC W/AUTO DIFF WBC: CPT

## 2020-01-16 RX ORDER — POLYETHYLENE GLYCOL 3350 17 G/17G
17 POWDER, FOR SOLUTION ORAL DAILY
Status: DISCONTINUED | OUTPATIENT
Start: 2020-01-16 | End: 2020-01-18

## 2020-01-16 RX ORDER — OXYCODONE HYDROCHLORIDE 5 MG/1
10 TABLET ORAL EVERY 4 HOURS PRN
Status: DISCONTINUED | OUTPATIENT
Start: 2020-01-16 | End: 2020-01-17

## 2020-01-16 RX ORDER — AMOXICILLIN 250 MG
1 CAPSULE ORAL DAILY
Status: DISCONTINUED | OUTPATIENT
Start: 2020-01-16 | End: 2020-01-18

## 2020-01-16 RX ORDER — HYDROMORPHONE HYDROCHLORIDE 1 MG/ML
1 INJECTION, SOLUTION INTRAMUSCULAR; INTRAVENOUS; SUBCUTANEOUS EVERY 4 HOURS PRN
Status: DISCONTINUED | OUTPATIENT
Start: 2020-01-16 | End: 2020-01-17

## 2020-01-16 RX ADMIN — HYDROMORPHONE HYDROCHLORIDE 1 MG: 1 INJECTION, SOLUTION INTRAMUSCULAR; INTRAVENOUS; SUBCUTANEOUS at 03:01

## 2020-01-16 RX ADMIN — OXYCODONE HYDROCHLORIDE 10 MG: 10 TABLET ORAL at 11:01

## 2020-01-16 RX ADMIN — OXYCODONE HYDROCHLORIDE 10 MG: 10 TABLET ORAL at 07:01

## 2020-01-16 RX ADMIN — HEPARIN SODIUM 18 UNITS/KG/HR: 10000 INJECTION, SOLUTION INTRAVENOUS at 07:01

## 2020-01-16 RX ADMIN — HEPARIN SODIUM 20 UNITS/KG/HR: 10000 INJECTION, SOLUTION INTRAVENOUS at 11:01

## 2020-01-16 RX ADMIN — POLYETHYLENE GLYCOL 3350 17 G: 17 POWDER, FOR SOLUTION ORAL at 11:01

## 2020-01-16 RX ADMIN — HYDROMORPHONE HYDROCHLORIDE 1 MG: 1 INJECTION, SOLUTION INTRAMUSCULAR; INTRAVENOUS; SUBCUTANEOUS at 07:01

## 2020-01-16 RX ADMIN — ONDANSETRON 4 MG: 2 INJECTION INTRAMUSCULAR; INTRAVENOUS at 07:01

## 2020-01-16 RX ADMIN — HYDROMORPHONE HYDROCHLORIDE 1 MG: 1 INJECTION, SOLUTION INTRAMUSCULAR; INTRAVENOUS; SUBCUTANEOUS at 05:01

## 2020-01-16 RX ADMIN — HYDROMORPHONE HYDROCHLORIDE 1 MG: 1 INJECTION, SOLUTION INTRAMUSCULAR; INTRAVENOUS; SUBCUTANEOUS at 12:01

## 2020-01-16 RX ADMIN — HYDROMORPHONE HYDROCHLORIDE 1 MG: 1 INJECTION, SOLUTION INTRAMUSCULAR; INTRAVENOUS; SUBCUTANEOUS at 10:01

## 2020-01-16 RX ADMIN — OXYCODONE HYDROCHLORIDE 10 MG: 10 TABLET ORAL at 06:01

## 2020-01-16 RX ADMIN — SENNOSIDES AND DOCUSATE SODIUM 1 TABLET: 8.6; 5 TABLET ORAL at 11:01

## 2020-01-16 RX ADMIN — OXYCODONE HYDROCHLORIDE 10 MG: 10 TABLET ORAL at 12:01

## 2020-01-16 NOTE — PROGRESS NOTES
"                                                    [facility] Hospital Medicine                                                                     Progress Note     Team: [facility] MED G Willi Anna MD   Admit Date: 1/14/2020   Hospital Day: 1  KADEEM: 1/20/2020   Code status: Full Code   Principal Problem: Acute deep vein thrombosis (DVT) of proximal vein of both lower extremities     SUMMARY:     29-year-old female with Factor V Leiden, May-Thurner Syndrome, and history of PE and recurrent DVTs who presented again with bilateral leg pain and swelling along with right sided abdominal pain for about 2 days. She has an IVC filter since 2014 and has had multiple DVTs due to May-Thurner syndrome despite being fully anticoagulated on Eliquis. On her last admission from 12/18 - 12/22/19, the patient had catheter-directed thrombolysis in addition to anticoagulation - IR performed Angiojet and angioplasty of both legs and stents was performed with reestablished flow through legs and stents on 12/20/2019. Dr. Rola Miranda with IR recommended close outpatient follow-up with IR at [facility] due to patient's need to be re-evaluated for further aggressive treatment. Patient never followed up with IR at [facility].  She was discharged on Eliquis for which she reports compliance. In the ER, workup CT of the abd/pelvis with contrast showed "IVC filter with bilateral IVC and external iliac vein stents.  Unable to assess for patency or thrombus within the stent due to contrast timing.  There do however appear to be intraluminal filling defects within the right common femoral vein and possibly left common femoral vein which may indicate acute deep venous thrombosis. Mild dilatation of the tip of the appendix measuring 9 mm.  No surrounding inflammatory changes are seen, however potential early acute appendicitis not excluded.  Similar " "findings were however seen on previous CT from November 2019" and U/S of LE showed "nonocclusive thrombus is visualized within the right external iliac, common femoral, femoral, popliteal, peroneal, and anterior tibial veins. Greater saphenous vein and posterior tibial veins are patent. Left lower extremity: Nonocclusive thrombus is visualized within the left external iliac, common femoral, femoral, popliteal, and 1 of the paired posterior tibial veins. Greater saphenous, anterior tibial, and peroneal veins are patent."    At OSH, Patient was started on IV heparin. IR was consulted at  and noted, "IR consult placed to evaluate pt with h/o of recurrent BLE iliofemoral DVT for potential intervention. Unfortunately, rheolytic thrombectomy device required for intervention here at  is currently not operable since 1/15/20 and needs to be serviced. Unsure what timeline is for rep to evaluate and then potential fix or replace the unit. Recommend transfer to [facility] and consult IR there for evaluation and potential intervention."     Patient transferred to [facility] for IR consultation for Rheolytic thrombectomy. IR consulted. Remains on heparin infusion. Pain adequately controlled.       SUBJECTIVE:     Pt was seen and examined at bedside. HDS and afebrile. RLQ pain and LE pain uncontrolled post transfer received hydromorphone with relief. This AM patient in good spirit and pain controlled. No new complaints other than RLQ pain, bilateral LE pain and swelling. No color changed noted on LE. Pending IR evaluation at this time. Remains NPO.       ROS (Positive in Bold, otherwise negative)  Pain Scale: 3 /10   Constitutional: fever, chills, night sweats  CV: chest pain, edema, palpitations  Resp: SOB, cough, sputum production  GI: changes in appetite, NVDC, abd pain, melena, hematochezia, GERD, hematemesis  : Dysuria, hematuria, urinary urgency, frequency  MSK: arthralgia/myalgia - Right leg pain and swelling , joint " swelling  SKIN: rashes, pruritis, petechiae   Neuro/Psych: FND, anxiety, depression      OBJECTIVE:     Vitals:  Temp:  [97.6 °F (36.4 °C)-98.3 °F (36.8 °C)]   Pulse:  [48-79]   Resp:  [17-20]   BP: ()/(50-78)   SpO2:  [98 %-100 %]      I & O (Last 24H):     Intake/Output Summary (Last 24 hours) at 1/16/2020 1410  Last data filed at 1/16/2020 1100  Gross per 24 hour   Intake 30 ml   Output --   Net 30 ml         GEN:  female  in no acute distress. Nontoxic. Resting in bed. Cooperative.  HEENT: NCAT. PERRL. EOMI. Conjunctivae/corneas clear, sclera Anicteric.  CVS: RRR. Normal s1 s2 no murmur, click, rub or gallop  LUNG: CTAB. Normal respiratory effort. No wheezes, rhonchi, or crackles.  ABD: Normoactive BS, soft, RLQ mild tenderness, ND, no masses or organomegaly. No peritoneal signs.  EXT: Bilateral asymmetric LE + 2 edema. Calf TTP. No cyanosis or toe color changes. Full ROM.   SKIN: color, texture, turgor normal. No rashes or lesions  NEURO: Alert, oriented x 4, Spont mvt of all extremities with no focal deficits noted.      All recent labs and imaging has been reviewed.     Recent Results (from the past 24 hour(s))   APTT    Collection Time: 01/15/20  8:45 PM   Result Value Ref Range    aPTT 47.7 (H) 21.0 - 32.0 sec   CBC auto differential    Collection Time: 01/16/20  6:38 AM   Result Value Ref Range    WBC 2.93 (L) 3.90 - 12.70 K/uL    RBC 3.15 (L) 4.00 - 5.40 M/uL    Hemoglobin 8.3 (L) 12.0 - 16.0 g/dL    Hematocrit 28.9 (L) 37.0 - 48.5 %    Mean Corpuscular Volume 92 82 - 98 fL    Mean Corpuscular Hemoglobin 26.3 (L) 27.0 - 31.0 pg    Mean Corpuscular Hemoglobin Conc 28.7 (L) 32.0 - 36.0 g/dL    RDW 15.7 (H) 11.5 - 14.5 %    Platelets 200 150 - 350 K/uL    MPV 9.1 (L) 9.2 - 12.9 fL    Immature Granulocytes 0.3 0.0 - 0.5 %    Gran # (ANC) 1.1 (L) 1.8 - 7.7 K/uL    Immature Grans (Abs) 0.01 0.00 - 0.04 K/uL    Lymph # 1.5 1.0 - 4.8 K/uL    Mono # 0.2 (L) 0.3 - 1.0 K/uL    Eos # 0.1 0.0 - 0.5 K/uL     Baso # 0.02 0.00 - 0.20 K/uL    nRBC 0 0 /100 WBC    Gran% 37.5 (L) 38.0 - 73.0 %    Lymph% 50.2 (H) 18.0 - 48.0 %    Mono% 6.5 4.0 - 15.0 %    Eosinophil% 4.8 0.0 - 8.0 %    Basophil% 0.7 0.0 - 1.9 %    Differential Method Automated    Comprehensive metabolic panel    Collection Time: 01/16/20  6:38 AM   Result Value Ref Range    Sodium 140 136 - 145 mmol/L    Potassium 4.5 3.5 - 5.1 mmol/L    Chloride 108 95 - 110 mmol/L    CO2 26 23 - 29 mmol/L    Glucose 87 70 - 110 mg/dL    BUN, Bld 10 6 - 20 mg/dL    Creatinine 0.8 0.5 - 1.4 mg/dL    Calcium 9.5 8.7 - 10.5 mg/dL    Total Protein 7.0 6.0 - 8.4 g/dL    Albumin 3.3 (L) 3.5 - 5.2 g/dL    Total Bilirubin 0.2 0.1 - 1.0 mg/dL    Alkaline Phosphatase 99 55 - 135 U/L    AST 11 10 - 40 U/L    ALT 5 (L) 10 - 44 U/L    Anion Gap 6 (L) 8 - 16 mmol/L    eGFR if African American >60.0 >60 mL/min/1.73 m^2    eGFR if non African American >60.0 >60 mL/min/1.73 m^2   Phosphorus    Collection Time: 01/16/20  6:38 AM   Result Value Ref Range    Phosphorus 4.6 (H) 2.7 - 4.5 mg/dL   Magnesium    Collection Time: 01/16/20  6:38 AM   Result Value Ref Range    Magnesium 2.2 1.6 - 2.6 mg/dL   APTT    Collection Time: 01/16/20  6:41 AM   Result Value Ref Range    aPTT 34.5 (H) 21.0 - 32.0 sec       No results for input(s): POCTGLUCOSE in the last 168 hours.    No results found for: HGBA1C     Active Hospital Problems    Diagnosis  POA    *Acute deep vein thrombosis (DVT) of proximal vein of both lower extremities [I82.4Y3]  Yes    Anemia of chronic disease, iron deficiency anemia and acute blood loss anemia [D63.8]  Yes    Presence of IVC filter [Z95.828]  Not Applicable    May-Thurner syndrome [I87.1]  Yes    History of pulmonary embolism [Z86.711]  Yes     Provoked and unprovoked, on lovenox      Factor V Leiden mutation [D68.51]  Yes     Follow by Hematology        Resolved Hospital Problems   No resolved problems to display.          ASSESSMENT AND PLAN:     Acute deep vein  "thrombosis (DVT) of proximal vein of both lower extremities  Acute on chronic issue for this patient secondary to her May-Thurner syndrome. Imaging with CT of the abd/pelvis with contrast showed "IVC filter with bilateral IVC and external iliac vein stents.  Unable to assess for patency or thrombus within the stent due to contrast timing.  There do however appear to be intraluminal filling defects within the right common femoral vein and possibly left common femoral vein which may indicate acute deep venous thrombosis. Mild dilatation of the tip of the appendix measuring 9 mm.  No surrounding inflammatory changes are seen, however potential early acute appendicitis not excluded.  Similar findings were however seen on previous CT from November 2019." U/S of LE showed "nonocclusive thrombus is visualized within the right external iliac, common femoral, femoral, popliteal, peroneal, and anterior tibial veins. Greater saphenous vein and posterior tibial veins are patent. Left lower extremity: Nonocclusive thrombus is visualized within the left external iliac, common femoral, femoral, popliteal, and 1 of the paired posterior tibial veins. Greater saphenous, anterior tibial, and peroneal veins are patent." Treatment initiated with heparin infusion. On previous admission, Dr. Rola Miranda with IR recommended close outpatient follow-up with IR at [facility] due to patient's need to be re-evaluated for further aggressive treatment not done at this facility. Additionally, IR at  reported rheolytic thrombectomy device required for intervention here at  is currently not operable and needs to be serviced   - Transferred to [facility] 1/16 AM  - Continue heparin infusion  - Judicial pain control with stool softeners/stimulants  - IR consulted, pending eval, remains NPO     Anemia of chronic disease, iron deficiency anemia and acute blood loss anemia  - Known iron deficiency anemia (per chart review, iron level checked last " month).   - H&H with noted drop as expected post thrombectomy, but no further bleeding  - Transfuse as needed for hgb <7.  - Will continue monitor     May-Thurner syndrome  - Chronic condition  - Treatment as discussed above     Factor V Leiden mutation  - Followed by Hematology as outpatient  - Chronic condition     Presence of IVC filter  - Filter in place      History of pulmonary embolism  - S/p IVC filter since 2014, IR attempted to remove this in the past but was unsuccessful and complicated   - Chronically anticoagulated on Eliquis as outpatient, despite this she has clots  - Currently on heparin gtt  - Resume Eliquis on discharge      Prophylaxis- on high dose heparin infusion   Code Status- Full Code   Discharge plan and follow up - d/c home once medically stable with PCP and hematology fu    Willi Anna MD  Hospital Medicine Staff  Pager [phone]

## 2020-01-16 NOTE — CONSULTS
Radiology Consult    Antoinette Love is a 29 y.o. female with a history of Factor V Leiden, May-Thurner Syndrome, and history of PE and recurrent DVTs who presented again with bilateral leg pain and swelling along with right sided abdominal pain for about 2 days. She has an IVC filter since 2014 and has had multiple DVTs due to May-Thurner syndrome despite being fully anticoagulated on Eliquis. On her last admission from 12/18 - 12/22/19, the patient had catheter-directed thrombolysis in addition to anticoagulation - IR performed Angiojet and angioplasty of both legs and stents was performed with reestablished flow through legs and stents on 12/20/2019.    US performed 1/14/20 demonstrated extensive nonocclusive DVTs bilaterally with new nonocclusive DVTs in the external iliac veins.     We are being consulted today for consideration of thrombolysis and further management of patient's worsening thrombosis in lower extremities.       Past Medical History:   Diagnosis Date    Anemia of chronic disease 12/19/2019    Anticoagulant long-term use     Anticoagulant long-term use     FERNANDO (dyspnea on exertion)     DVT (deep venous thrombosis)     Encounter for blood transfusion     Factor V Leiden     Leg edema, left     May-Thurner syndrome     Multiple pulmonary nodules     Pulmonary embolus     RAD (reactive airway disease)     Recurrent upper respiratory infection (URI)     Recurrent urticaria     Stroke     TIA (transient ischemic attack)      Past Surgical History:   Procedure Laterality Date    COLONOSCOPY N/A 12/18/2015    Procedure: COLONOSCOPY;  Surgeon: Lefty Lee MD;  Location: [facility] ([address] [place] [address]);  Service: Endoscopy;  Laterality: N/A;  schedule with Jesus    IVC FILTER RETRIEVAL      IVC FILTER RETRIEVAL N/A 9/23/2019    Procedure: REMOVAL-FILTER-IVC;  Surgeon: Claudia Surgeon;  Location: [facility];  Service: Anesthesiology;  Laterality: N/A;  188  4 hours Anes    tumor from breast       "left    WISDOM TOOTH EXTRACTION         Discussed with primary team, Dr. Anna.    Imaging reviewed with Radiology staff, Dr. Saldivar.       Scheduled Meds:    polyethylene glycol  17 g Oral Daily    senna-docusate 8.6-50 mg  1 tablet Oral Daily     Continuous Infusions:    heparin (porcine) in D5W 18 Units/kg/hr (01/16/20 0713)     PRN Meds:acetaminophen, heparin (PORCINE), heparin (PORCINE), HYDROmorphone, ondansetron, oxyCODONE, sodium chloride 0.9%    Allergies:   Review of patient's allergies indicates:   Allergen Reactions    Azithromycin Hives    Beef containing products Anaphylaxis     Patient cannot eat BEEF BROTH  STATES IT WILL MAKE HER THROAT CLOSE UP .     Pork derived (porcine) Anaphylaxis     Throat swells up cannot eat pork sausage or pork patties ,     Unclassified drug Shortness Of Breath and Other (See Comments)     Is allergic to a beef spice - Causes "throat closing"    Xarelto [rivaroxaban] Other (See Comments)     Gallbladder swelling and disfunction    Toradol [ketorolac] Hives and Itching       Labs:  Recent Labs   Lab 01/14/20 2012   INR 1.0       Recent Labs   Lab 01/16/20 0638   WBC 2.93*   HGB 8.3*   HCT 28.9*   MCV 92         Recent Labs   Lab 01/16/20 0638   GLU 87      K 4.5      CO2 26   BUN 10   CREATININE 0.8   CALCIUM 9.5   MG 2.2   ALT 5*   AST 11   ALBUMIN 3.3*   BILITOT 0.2         Vitals (Most Recent):  Temp: 97.9 °F (36.6 °C) (01/16/20 0744)  Pulse: (!) 48 (01/16/20 1134)  Resp: 18 (01/16/20 0744)  BP: (!) 122/58 (01/16/20 1020)  SpO2: 100 % (01/16/20 0744)    Assessment/plan:   Patient with worsening bilateral lower extremity DVTs now extending with nonocclusive thrombus into the bilateral external iliac veins. Patient was to have thrombo lysis directed treatment at [facility] but due to hardware malfunction was transferred to main [facility] for further evaluation and consideration of thrombo lysis.     On physical exam, there is swelling in " bilateral lower extremities, more prominent on the right side. Patient complained of right lower quadrant pain as well as worsening pain in the upper right lower extremity and difficulty with ambulation because of this pain. She said pain had been better controlled overnight and this morning with pain medication and she is currently on a heparin drip.     Plan:   -we will plan for thrombolysis/thrombectomy tomorrow with anesthesia. She has been added on with the anesthesia team.   -please make patient NPO at midnight. Can continue heparin ggt overnight.   -significant risks of procedure were discussed with patient.         Nakul Dey MD  PGY - 4  Department of Radiology

## 2020-01-16 NOTE — PLAN OF CARE
Patient is currently on Room air at present time with oxygen saturation of 100%.  No apparent distress noted at present time.  Will continue to monitor.

## 2020-01-16 NOTE — NURSING TRANSFER
Nursing Transfer Note      1/15/2020     Transfer To: [id number]    Transfer via stretcher    Transfer with Heparin gtt @ 10ml/hr     Transported by EMS    Medicines sent: None     Chart send with patient: Yes    Notified:     Patient reassessed at: pending arrival    Upon arrival to floor: pending arrival

## 2020-01-16 NOTE — SIGNIFICANT EVENT
Patient assessed after arrival to unit.  She reports inadequate relief of pain with current analgesic regimen, that the oxycodone only lasts about 4 hours but is ordered q6 PRN and the morphine 2mg is completely ineffective.  She requests dilaudid like she received in the ED (which was 1mg per chart review).  She was found curled in the fetal position upon my entry to the room, so these changes seem reasonable.  New consult for Interventional Radiology for thrombectomy placed.  Patient made NPO.

## 2020-01-16 NOTE — H&P
Inpatient Radiology Pre-procedure Note    History of Present Illness:  Antoinette Love is a 29 y.o. female who presents for bilateral lower extremity venogram and likely thrombolysis/thrombectomy. See consult not from today's date for further details.       Admission H&P reviewed.  Past Medical History:   Diagnosis Date    Anemia of chronic disease 12/19/2019    Anticoagulant long-term use     Anticoagulant long-term use     FERNANDO (dyspnea on exertion)     DVT (deep venous thrombosis)     Encounter for blood transfusion     Factor V Leiden     Leg edema, left     May-Thurner syndrome     Multiple pulmonary nodules     Pulmonary embolus     RAD (reactive airway disease)     Recurrent upper respiratory infection (URI)     Recurrent urticaria     Stroke     TIA (transient ischemic attack)      Past Surgical History:   Procedure Laterality Date    COLONOSCOPY N/A 12/18/2015    Procedure: COLONOSCOPY;  Surgeon: Lefty Lee MD;  Location: [facility] (4TH FLR);  Service: Endoscopy;  Laterality: N/A;  schedule with Jesus    IVC FILTER RETRIEVAL      IVC FILTER RETRIEVAL N/A 9/23/2019    Procedure: REMOVAL-FILTER-IVC;  Surgeon: Claudia Surgeon;  Location: [facility];  Service: Anesthesiology;  Laterality: N/A;  188  4 hours Anes    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 Authorizing Provider   apixaban (ELIQUIS) 5 mg Tab Take 5 mg by mouth 2 (two) times daily.   Yes Historical Provider, MD   diazePAM (VALIUM) 5 MG tablet TAKE 1 TABLET (5 MG TOTAL) BY MOUTH EVERY 12 (TWELVE) HOURS AS NEEDED FOR ANXIETY. 1/13/20 2/12/20 Yes Alberto Holguin MD   EPINEPHrine (EPIPEN 2-VAN) 0.3 mg/0.3 mL AtIn Inject 0.3 mLs (0.3 mg total) into the muscle once. for 1 dose 3/28/19 11/11/19  Luis A Junior MD   ondansetron (ZOFRAN-ODT) 8 MG TbDL Take  "1 tablet (8 mg total) by mouth every 8 (eight) hours as needed (nausea).  Patient not taking: Reported on 11/11/2019 9/24/19   Yazmin Gutierrez MD     Scheduled Meds:    polyethylene glycol  17 g Oral Daily    senna-docusate 8.6-50 mg  1 tablet Oral Daily     Continuous Infusions:    heparin (porcine) in D5W 18 Units/kg/hr (01/16/20 0713)     PRN Meds:acetaminophen, heparin (PORCINE), heparin (PORCINE), HYDROmorphone, ondansetron, oxyCODONE, sodium chloride 0.9%  Anticoagulants/Antiplatelets: Heparin - gtt    Allergies:   Review of patient's allergies indicates:   Allergen Reactions    Azithromycin Hives    Beef containing products Anaphylaxis     Patient cannot eat BEEF BROTH  STATES IT WILL MAKE HER THROAT CLOSE UP .     Pork derived (porcine) Anaphylaxis     Throat swells up cannot eat pork sausage or pork patties ,     Unclassified drug Shortness Of Breath and Other (See Comments)     Is allergic to a beef spice - Causes "throat closing"    Xarelto [rivaroxaban] Other (See Comments)     Gallbladder swelling and disfunction    Toradol [ketorolac] Hives and Itching     Sedation Hx: have not been any systemic reactions    Labs:  Recent Labs   Lab 01/14/20 2012   INR 1.0       Recent Labs   Lab 01/16/20  0638   WBC 2.93*   HGB 8.3*   HCT 28.9*   MCV 92         Recent Labs   Lab 01/16/20  0638   GLU 87      K 4.5      CO2 26   BUN 10   CREATININE 0.8   CALCIUM 9.5   MG 2.2   ALT 5*   AST 11   ALBUMIN 3.3*   BILITOT 0.2         Vitals:  Temp: 98.2 °F (36.8 °C) (01/16/20 1221)  Pulse: 61 (01/16/20 1520)  Resp: 18 (01/16/20 1221)  BP: 130/78 (01/16/20 1221)  SpO2: 100 % (01/16/20 1221)     Physical Exam:  ASA: 3  Mallampati: 2    General: no acute distress  Mental Status: alert and oriented to person, place and time  HEENT: normocephalic, atraumatic  Chest: unlabored breathing  Abdomen: nondistended  Extremity: moves all extremities    Plan: Biateral lower extremity venogram and likely " thrombolysis/thrombectomy.   Sedation Plan: up to general with anesthesia.     Nakul Dey MD  PGY - 4  Department of Radiology

## 2020-01-16 NOTE — NURSING
EMS here to transport patient to main campus; aPTT drawn earlier not resulted as of yet; updated reported called to receiving nurse; NAD noted

## 2020-01-16 NOTE — PLAN OF CARE
Met with pt. and completed assessment. Transferred from [facility]. [facility] clotting disorder, on Eliquis. Admitted for heparin gtt. Pt. Lives with SO Markos who was just deployed in the . Pt's family lives in [place]. Will need transportation back to [facility] where her car is located in the ED parking lot. Sw/CM will continue to follow.     Payor: MEDICAID / Plan: [place] [facility] CONNECT / Product Type: Managed Medicaid /      Alberto Holguin MD       [id number] - [place] - [address]  [address]  [place] [address]  Phone: [phone] Fax: [phone]         01/16/20 3248   Discharge Assessment   Assessment Type Discharge Planning Assessment   Confirmed/corrected address and phone number on facesheet? Yes   Assessment information obtained from? Patient   Expected Length of Stay (days) 4   Communicated expected length of stay with patient/caregiver yes   Prior to hospitilization cognitive status: Alert/Oriented   Prior to hospitalization functional status: Independent   Current cognitive status: Alert/Oriented   Current Functional Status: Needs Assistance   Lives With significant other   Able to Return to Prior Arrangements yes   Is patient able to care for self after discharge?   (to be determined)   Who are your caregiver(s) and their phone number(s)? mother Matilde Love [phone]  lives in [facility].    Patient's perception of discharge disposition home or selfcare   Readmission Within the Last 30 Days no previous admission in last 30 days   Patient currently being followed by outpatient case management? No   Patient currently receives any other outside agency services? No   Equipment Currently Used at Home none   Do you have any problems affording any of your prescribed medications? No   Is the patient taking medications as prescribed? yes   Does the patient have transportation home?   (will need transport back to [facility] to her car on D/C)   Does the patient receive services  at the [facility]? No  (on Eliquis)   Discharge Plan A Home   Discharge Plan B Home   DME Needed Upon Discharge  none   Patient/Family in Agreement with Plan yes

## 2020-01-17 ENCOUNTER — ANESTHESIA (OUTPATIENT)
Dept: ENDOSCOPY | Facility: HOSPITAL | Age: 30
DRG: 271 | End: 2020-01-17
Payer: COMMERCIAL

## 2020-01-17 LAB
ALBUMIN SERPL BCP-MCNC: 3.4 G/DL (ref 3.5–5.2)
ALP SERPL-CCNC: 102 U/L (ref 55–135)
ALT SERPL W/O P-5'-P-CCNC: 6 U/L (ref 10–44)
ANION GAP SERPL CALC-SCNC: 12 MMOL/L (ref 8–16)
APTT BLDCRRT: 44.8 SEC (ref 21–32)
AST SERPL-CCNC: 9 U/L (ref 10–40)
BASOPHILS # BLD AUTO: 0.01 K/UL (ref 0–0.2)
BASOPHILS NFR BLD: 0.3 % (ref 0–1.9)
BILIRUB SERPL-MCNC: 0.2 MG/DL (ref 0.1–1)
BUN SERPL-MCNC: 11 MG/DL (ref 6–20)
CALCIUM SERPL-MCNC: 9.9 MG/DL (ref 8.7–10.5)
CHLORIDE SERPL-SCNC: 104 MMOL/L (ref 95–110)
CO2 SERPL-SCNC: 24 MMOL/L (ref 23–29)
CREAT SERPL-MCNC: 0.8 MG/DL (ref 0.5–1.4)
DIFFERENTIAL METHOD: ABNORMAL
EOSINOPHIL # BLD AUTO: 0.1 K/UL (ref 0–0.5)
EOSINOPHIL NFR BLD: 3.8 % (ref 0–8)
ERYTHROCYTE [DISTWIDTH] IN BLOOD BY AUTOMATED COUNT: 15.3 % (ref 11.5–14.5)
EST. GFR  (AFRICAN AMERICAN): >60 ML/MIN/1.73 M^2
EST. GFR  (NON AFRICAN AMERICAN): >60 ML/MIN/1.73 M^2
GLUCOSE SERPL-MCNC: 89 MG/DL (ref 70–110)
HCT VFR BLD AUTO: 29 % (ref 37–48.5)
HGB BLD-MCNC: 8.6 G/DL (ref 12–16)
IMM GRANULOCYTES # BLD AUTO: 0.01 K/UL (ref 0–0.04)
IMM GRANULOCYTES NFR BLD AUTO: 0.3 % (ref 0–0.5)
LYMPHOCYTES # BLD AUTO: 1.3 K/UL (ref 1–4.8)
LYMPHOCYTES NFR BLD: 44.1 % (ref 18–48)
MAGNESIUM SERPL-MCNC: 2.1 MG/DL (ref 1.6–2.6)
MCH RBC QN AUTO: 27 PG (ref 27–31)
MCHC RBC AUTO-ENTMCNC: 29.7 G/DL (ref 32–36)
MCV RBC AUTO: 91 FL (ref 82–98)
MONOCYTES # BLD AUTO: 0.2 K/UL (ref 0.3–1)
MONOCYTES NFR BLD: 8 % (ref 4–15)
NEUTROPHILS # BLD AUTO: 1.3 K/UL (ref 1.8–7.7)
NEUTROPHILS NFR BLD: 43.5 % (ref 38–73)
NRBC BLD-RTO: 0 /100 WBC
PHOSPHATE SERPL-MCNC: 6.2 MG/DL (ref 2.7–4.5)
PLATELET # BLD AUTO: 197 K/UL (ref 150–350)
PMV BLD AUTO: 9.1 FL (ref 9.2–12.9)
POTASSIUM SERPL-SCNC: 4.4 MMOL/L (ref 3.5–5.1)
PROT SERPL-MCNC: 7.2 G/DL (ref 6–8.4)
RBC # BLD AUTO: 3.18 M/UL (ref 4–5.4)
SODIUM SERPL-SCNC: 140 MMOL/L (ref 136–145)
WBC # BLD AUTO: 2.88 K/UL (ref 3.9–12.7)

## 2020-01-17 PROCEDURE — 84100 ASSAY OF PHOSPHORUS: CPT

## 2020-01-17 PROCEDURE — 71000039 HC RECOVERY, EACH ADD'L HOUR

## 2020-01-17 PROCEDURE — D9220A PRA ANESTHESIA: Mod: CRNA,,, | Performed by: NURSE ANESTHETIST, CERTIFIED REGISTERED

## 2020-01-17 PROCEDURE — 37000009 HC ANESTHESIA EA ADD 15 MINS

## 2020-01-17 PROCEDURE — 63600175 PHARM REV CODE 636 W HCPCS: Performed by: NURSE ANESTHETIST, CERTIFIED REGISTERED

## 2020-01-17 PROCEDURE — 99233 SBSQ HOSP IP/OBS HIGH 50: CPT | Mod: ,,, | Performed by: INTERNAL MEDICINE

## 2020-01-17 PROCEDURE — 37000008 HC ANESTHESIA 1ST 15 MINUTES

## 2020-01-17 PROCEDURE — 63600175 PHARM REV CODE 636 W HCPCS: Performed by: STUDENT IN AN ORGANIZED HEALTH CARE EDUCATION/TRAINING PROGRAM

## 2020-01-17 PROCEDURE — 25000003 PHARM REV CODE 250: Performed by: NURSE ANESTHETIST, CERTIFIED REGISTERED

## 2020-01-17 PROCEDURE — 94761 N-INVAS EAR/PLS OXIMETRY MLT: CPT

## 2020-01-17 PROCEDURE — 25000003 PHARM REV CODE 250

## 2020-01-17 PROCEDURE — 99233 PR SUBSEQUENT HOSPITAL CARE,LEVL III: ICD-10-PCS | Mod: ,,, | Performed by: INTERNAL MEDICINE

## 2020-01-17 PROCEDURE — 25000003 PHARM REV CODE 250: Performed by: INTERNAL MEDICINE

## 2020-01-17 PROCEDURE — 25000003 PHARM REV CODE 250: Performed by: HOSPITALIST

## 2020-01-17 PROCEDURE — D9220A PRA ANESTHESIA: ICD-10-PCS | Mod: ANES,,, | Performed by: ANESTHESIOLOGY

## 2020-01-17 PROCEDURE — 27201037 HC PRESSURE MONITORING SET UP

## 2020-01-17 PROCEDURE — 36415 COLL VENOUS BLD VENIPUNCTURE: CPT

## 2020-01-17 PROCEDURE — 27100025 HC TUBING, SET FLUID WARMER: Performed by: ANESTHESIOLOGY

## 2020-01-17 PROCEDURE — 85025 COMPLETE CBC W/AUTO DIFF WBC: CPT

## 2020-01-17 PROCEDURE — 63600175 PHARM REV CODE 636 W HCPCS

## 2020-01-17 PROCEDURE — 71000033 HC RECOVERY, INTIAL HOUR

## 2020-01-17 PROCEDURE — 63600175 PHARM REV CODE 636 W HCPCS: Performed by: PHYSICIAN ASSISTANT

## 2020-01-17 PROCEDURE — 27800505 HC CATH, RADIAL ARTERY KIT: Performed by: ANESTHESIOLOGY

## 2020-01-17 PROCEDURE — 99231 PR SUBSEQUENT HOSPITAL CARE,LEVL I: ICD-10-PCS | Mod: ,,, | Performed by: INTERNAL MEDICINE

## 2020-01-17 PROCEDURE — 83735 ASSAY OF MAGNESIUM: CPT

## 2020-01-17 PROCEDURE — D9220A PRA ANESTHESIA: Mod: ANES,,, | Performed by: ANESTHESIOLOGY

## 2020-01-17 PROCEDURE — 99231 SBSQ HOSP IP/OBS SF/LOW 25: CPT | Mod: ,,, | Performed by: INTERNAL MEDICINE

## 2020-01-17 PROCEDURE — 80053 COMPREHEN METABOLIC PANEL: CPT

## 2020-01-17 PROCEDURE — 85730 THROMBOPLASTIN TIME PARTIAL: CPT

## 2020-01-17 PROCEDURE — D9220A PRA ANESTHESIA: ICD-10-PCS | Mod: CRNA,,, | Performed by: NURSE ANESTHETIST, CERTIFIED REGISTERED

## 2020-01-17 PROCEDURE — 25000003 PHARM REV CODE 250: Performed by: STUDENT IN AN ORGANIZED HEALTH CARE EDUCATION/TRAINING PROGRAM

## 2020-01-17 PROCEDURE — 11000001 HC ACUTE MED/SURG PRIVATE ROOM

## 2020-01-17 PROCEDURE — 63600175 PHARM REV CODE 636 W HCPCS: Performed by: HOSPITALIST

## 2020-01-17 RX ORDER — IPRATROPIUM BROMIDE AND ALBUTEROL SULFATE 2.5; .5 MG/3ML; MG/3ML
3 SOLUTION RESPIRATORY (INHALATION) EVERY 4 HOURS PRN
Status: DISCONTINUED | OUTPATIENT
Start: 2020-01-17 | End: 2020-01-21 | Stop reason: HOSPADM

## 2020-01-17 RX ORDER — HYDROMORPHONE HYDROCHLORIDE 1 MG/ML
0.2 INJECTION, SOLUTION INTRAMUSCULAR; INTRAVENOUS; SUBCUTANEOUS EVERY 5 MIN PRN
Status: COMPLETED | OUTPATIENT
Start: 2020-01-17 | End: 2020-01-17

## 2020-01-17 RX ORDER — PROPOFOL 10 MG/ML
VIAL (ML) INTRAVENOUS
Status: DISCONTINUED | OUTPATIENT
Start: 2020-01-17 | End: 2020-01-17

## 2020-01-17 RX ORDER — FENTANYL CITRATE 50 UG/ML
INJECTION, SOLUTION INTRAMUSCULAR; INTRAVENOUS
Status: DISCONTINUED | OUTPATIENT
Start: 2020-01-17 | End: 2020-01-17

## 2020-01-17 RX ORDER — ONDANSETRON 2 MG/ML
4 INJECTION INTRAMUSCULAR; INTRAVENOUS DAILY PRN
Status: DISCONTINUED | OUTPATIENT
Start: 2020-01-17 | End: 2020-01-17 | Stop reason: HOSPADM

## 2020-01-17 RX ORDER — TALC
6 POWDER (GRAM) TOPICAL NIGHTLY PRN
Status: DISCONTINUED | OUTPATIENT
Start: 2020-01-17 | End: 2020-01-21 | Stop reason: HOSPADM

## 2020-01-17 RX ORDER — ACETAMINOPHEN 500 MG
1000 TABLET ORAL ONCE
Status: COMPLETED | OUTPATIENT
Start: 2020-01-17 | End: 2020-01-17

## 2020-01-17 RX ORDER — DIAZEPAM 5 MG/1
TABLET ORAL
Status: COMPLETED
Start: 2020-01-17 | End: 2020-01-17

## 2020-01-17 RX ORDER — IBUPROFEN 200 MG
24 TABLET ORAL
Status: DISCONTINUED | OUTPATIENT
Start: 2020-01-17 | End: 2020-01-21 | Stop reason: HOSPADM

## 2020-01-17 RX ORDER — ACETAMINOPHEN 500 MG
1000 TABLET ORAL EVERY 8 HOURS
Status: DISCONTINUED | OUTPATIENT
Start: 2020-01-18 | End: 2020-01-17 | Stop reason: HOSPADM

## 2020-01-17 RX ORDER — WARFARIN SODIUM 5 MG/1
15 TABLET ORAL ONCE
Status: COMPLETED | OUTPATIENT
Start: 2020-01-17 | End: 2020-01-17

## 2020-01-17 RX ORDER — GLUCAGON 1 MG
1 KIT INJECTION
Status: DISCONTINUED | OUTPATIENT
Start: 2020-01-17 | End: 2020-01-21 | Stop reason: HOSPADM

## 2020-01-17 RX ORDER — MIDAZOLAM HYDROCHLORIDE 1 MG/ML
INJECTION, SOLUTION INTRAMUSCULAR; INTRAVENOUS
Status: DISCONTINUED | OUTPATIENT
Start: 2020-01-17 | End: 2020-01-17

## 2020-01-17 RX ORDER — BISACODYL 10 MG
10 SUPPOSITORY, RECTAL RECTAL DAILY PRN
Status: DISCONTINUED | OUTPATIENT
Start: 2020-01-17 | End: 2020-01-21 | Stop reason: HOSPADM

## 2020-01-17 RX ORDER — ROCURONIUM BROMIDE 10 MG/ML
INJECTION, SOLUTION INTRAVENOUS
Status: DISCONTINUED | OUTPATIENT
Start: 2020-01-17 | End: 2020-01-17

## 2020-01-17 RX ORDER — LIDOCAINE HCL/PF 100 MG/5ML
SYRINGE (ML) INTRAVENOUS
Status: DISCONTINUED | OUTPATIENT
Start: 2020-01-17 | End: 2020-01-17

## 2020-01-17 RX ORDER — IBUPROFEN 200 MG
16 TABLET ORAL
Status: DISCONTINUED | OUTPATIENT
Start: 2020-01-17 | End: 2020-01-21 | Stop reason: HOSPADM

## 2020-01-17 RX ORDER — HYDROMORPHONE HYDROCHLORIDE 1 MG/ML
0.2 INJECTION, SOLUTION INTRAMUSCULAR; INTRAVENOUS; SUBCUTANEOUS EVERY 5 MIN PRN
Status: DISCONTINUED | OUTPATIENT
Start: 2020-01-17 | End: 2020-01-17 | Stop reason: HOSPADM

## 2020-01-17 RX ORDER — DIAZEPAM 5 MG/1
5 TABLET ORAL ONCE
Status: COMPLETED | OUTPATIENT
Start: 2020-01-17 | End: 2020-01-17

## 2020-01-17 RX ORDER — HYDROMORPHONE HYDROCHLORIDE 1 MG/ML
INJECTION, SOLUTION INTRAMUSCULAR; INTRAVENOUS; SUBCUTANEOUS
Status: COMPLETED
Start: 2020-01-17 | End: 2020-01-17

## 2020-01-17 RX ORDER — SODIUM CHLORIDE 0.9 % (FLUSH) 0.9 %
10 SYRINGE (ML) INJECTION
Status: DISCONTINUED | OUTPATIENT
Start: 2020-01-17 | End: 2020-01-17 | Stop reason: HOSPADM

## 2020-01-17 RX ORDER — HYDROMORPHONE HYDROCHLORIDE 1 MG/ML
1 INJECTION, SOLUTION INTRAMUSCULAR; INTRAVENOUS; SUBCUTANEOUS
Status: DISCONTINUED | OUTPATIENT
Start: 2020-01-17 | End: 2020-01-21 | Stop reason: HOSPADM

## 2020-01-17 RX ORDER — HEPARIN SODIUM 1000 [USP'U]/ML
INJECTION, SOLUTION INTRAVENOUS; SUBCUTANEOUS
Status: DISCONTINUED | OUTPATIENT
Start: 2020-01-17 | End: 2020-01-17

## 2020-01-17 RX ORDER — NAPROXEN SODIUM 220 MG/1
325 TABLET, FILM COATED ORAL DAILY
Status: DISCONTINUED | OUTPATIENT
Start: 2020-01-17 | End: 2020-01-17

## 2020-01-17 RX ORDER — NAPROXEN SODIUM 220 MG/1
324 TABLET, FILM COATED ORAL DAILY
Status: DISCONTINUED | OUTPATIENT
Start: 2020-01-17 | End: 2020-01-18

## 2020-01-17 RX ORDER — SODIUM CHLORIDE 9 MG/ML
INJECTION, SOLUTION INTRAVENOUS CONTINUOUS PRN
Status: DISCONTINUED | OUTPATIENT
Start: 2020-01-17 | End: 2020-01-17

## 2020-01-17 RX ORDER — ONDANSETRON 2 MG/ML
INJECTION INTRAMUSCULAR; INTRAVENOUS
Status: DISCONTINUED | OUTPATIENT
Start: 2020-01-17 | End: 2020-01-17

## 2020-01-17 RX ORDER — HYDROMORPHONE HYDROCHLORIDE 2 MG/ML
INJECTION, SOLUTION INTRAMUSCULAR; INTRAVENOUS; SUBCUTANEOUS
Status: DISCONTINUED | OUTPATIENT
Start: 2020-01-17 | End: 2020-01-17

## 2020-01-17 RX ORDER — CLOPIDOGREL BISULFATE 75 MG/1
75 TABLET ORAL DAILY
Status: DISCONTINUED | OUTPATIENT
Start: 2020-01-17 | End: 2020-01-21 | Stop reason: HOSPADM

## 2020-01-17 RX ORDER — HYDROMORPHONE HYDROCHLORIDE 1 MG/ML
0.5 INJECTION, SOLUTION INTRAMUSCULAR; INTRAVENOUS; SUBCUTANEOUS
Status: DISCONTINUED | OUTPATIENT
Start: 2020-01-17 | End: 2020-01-21 | Stop reason: HOSPADM

## 2020-01-17 RX ORDER — NALOXONE HCL 0.4 MG/ML
0.4 VIAL (ML) INJECTION
Status: DISCONTINUED | OUTPATIENT
Start: 2020-01-17 | End: 2020-01-21 | Stop reason: HOSPADM

## 2020-01-17 RX ADMIN — HYDROMORPHONE HYDROCHLORIDE 0.2 MG: 1 INJECTION, SOLUTION INTRAMUSCULAR; INTRAVENOUS; SUBCUTANEOUS at 06:01

## 2020-01-17 RX ADMIN — FENTANYL CITRATE 50 MCG: 50 INJECTION, SOLUTION INTRAMUSCULAR; INTRAVENOUS at 12:01

## 2020-01-17 RX ADMIN — SODIUM CHLORIDE: 0.9 INJECTION, SOLUTION INTRAVENOUS at 10:01

## 2020-01-17 RX ADMIN — FENTANYL CITRATE 100 MCG: 50 INJECTION, SOLUTION INTRAMUSCULAR; INTRAVENOUS at 10:01

## 2020-01-17 RX ADMIN — ACETAMINOPHEN 1000 MG: 500 TABLET ORAL at 07:01

## 2020-01-17 RX ADMIN — FENTANYL CITRATE 25 MCG: 50 INJECTION, SOLUTION INTRAMUSCULAR; INTRAVENOUS at 04:01

## 2020-01-17 RX ADMIN — SODIUM CHLORIDE: 0.9 INJECTION, SOLUTION INTRAVENOUS at 04:01

## 2020-01-17 RX ADMIN — SUGAMMADEX 200 MG: 100 INJECTION, SOLUTION INTRAVENOUS at 04:01

## 2020-01-17 RX ADMIN — PROPOFOL 10 MG: 10 INJECTION, EMULSION INTRAVENOUS at 04:01

## 2020-01-17 RX ADMIN — Medication 1 MG: at 08:01

## 2020-01-17 RX ADMIN — HYDROMORPHONE HYDROCHLORIDE 0.2 MG: 1 INJECTION, SOLUTION INTRAMUSCULAR; INTRAVENOUS; SUBCUTANEOUS at 07:01

## 2020-01-17 RX ADMIN — HEPARIN SODIUM 5000 UNITS: 1000 INJECTION, SOLUTION INTRAVENOUS; SUBCUTANEOUS at 02:01

## 2020-01-17 RX ADMIN — SODIUM CHLORIDE, SODIUM GLUCONATE, SODIUM ACETATE, POTASSIUM CHLORIDE, MAGNESIUM CHLORIDE, SODIUM PHOSPHATE, DIBASIC, AND POTASSIUM PHOSPHATE: .53; .5; .37; .037; .03; .012; .00082 INJECTION, SOLUTION INTRAVENOUS at 10:01

## 2020-01-17 RX ADMIN — ONDANSETRON 4 MG: 2 INJECTION INTRAMUSCULAR; INTRAVENOUS at 03:01

## 2020-01-17 RX ADMIN — DIAZEPAM 5 MG: 5 TABLET ORAL at 05:01

## 2020-01-17 RX ADMIN — FENTANYL CITRATE 50 MCG: 50 INJECTION, SOLUTION INTRAMUSCULAR; INTRAVENOUS at 04:01

## 2020-01-17 RX ADMIN — HYDROMORPHONE HYDROCHLORIDE 0.2 MG: 1 INJECTION, SOLUTION INTRAMUSCULAR; INTRAVENOUS; SUBCUTANEOUS at 05:01

## 2020-01-17 RX ADMIN — WARFARIN SODIUM 15 MG: 5 TABLET ORAL at 06:01

## 2020-01-17 RX ADMIN — HEPARIN SODIUM 2000 UNITS: 1000 INJECTION, SOLUTION INTRAVENOUS; SUBCUTANEOUS at 03:01

## 2020-01-17 RX ADMIN — ROCURONIUM BROMIDE 10 MG: 10 INJECTION, SOLUTION INTRAVENOUS at 11:01

## 2020-01-17 RX ADMIN — OXYCODONE HYDROCHLORIDE 10 MG: 10 TABLET ORAL at 05:01

## 2020-01-17 RX ADMIN — ROCURONIUM BROMIDE 20 MG: 10 INJECTION, SOLUTION INTRAVENOUS at 01:01

## 2020-01-17 RX ADMIN — ROCURONIUM BROMIDE 30 MG: 10 INJECTION, SOLUTION INTRAVENOUS at 12:01

## 2020-01-17 RX ADMIN — ROCURONIUM BROMIDE 10 MG: 10 INJECTION, SOLUTION INTRAVENOUS at 02:01

## 2020-01-17 RX ADMIN — PROPOFOL 40 MG: 10 INJECTION, EMULSION INTRAVENOUS at 12:01

## 2020-01-17 RX ADMIN — ASPIRIN 81 MG CHEWABLE TABLET 324 MG: 81 TABLET CHEWABLE at 06:01

## 2020-01-17 RX ADMIN — MIDAZOLAM HYDROCHLORIDE 2 MG: 1 INJECTION, SOLUTION INTRAMUSCULAR; INTRAVENOUS at 10:01

## 2020-01-17 RX ADMIN — ROCURONIUM BROMIDE 40 MG: 10 INJECTION, SOLUTION INTRAVENOUS at 10:01

## 2020-01-17 RX ADMIN — PROPOFOL 200 MG: 10 INJECTION, EMULSION INTRAVENOUS at 10:01

## 2020-01-17 RX ADMIN — HYDROMORPHONE HYDROCHLORIDE 0.5 MG: 2 INJECTION, SOLUTION INTRAMUSCULAR; INTRAVENOUS; SUBCUTANEOUS at 04:01

## 2020-01-17 RX ADMIN — HYDROMORPHONE HYDROCHLORIDE 0.5 MG: 1 INJECTION, SOLUTION INTRAMUSCULAR; INTRAVENOUS; SUBCUTANEOUS at 10:01

## 2020-01-17 RX ADMIN — HYDROMORPHONE HYDROCHLORIDE 1 MG: 1 INJECTION, SOLUTION INTRAMUSCULAR; INTRAVENOUS; SUBCUTANEOUS at 07:01

## 2020-01-17 RX ADMIN — FENTANYL CITRATE 50 MCG: 50 INJECTION, SOLUTION INTRAMUSCULAR; INTRAVENOUS at 11:01

## 2020-01-17 RX ADMIN — HYDROMORPHONE HYDROCHLORIDE 1 MG: 1 INJECTION, SOLUTION INTRAMUSCULAR; INTRAVENOUS; SUBCUTANEOUS at 01:01

## 2020-01-17 RX ADMIN — LIDOCAINE HYDROCHLORIDE 75 MG: 20 INJECTION, SOLUTION INTRAVENOUS at 10:01

## 2020-01-17 RX ADMIN — ROCURONIUM BROMIDE 40 MG: 10 INJECTION, SOLUTION INTRAVENOUS at 02:01

## 2020-01-17 RX ADMIN — SODIUM CHLORIDE, SODIUM GLUCONATE, SODIUM ACETATE, POTASSIUM CHLORIDE, MAGNESIUM CHLORIDE, SODIUM PHOSPHATE, DIBASIC, AND POTASSIUM PHOSPHATE: .53; .5; .37; .037; .03; .012; .00082 INJECTION, SOLUTION INTRAVENOUS at 03:01

## 2020-01-17 RX ADMIN — CLOPIDOGREL BISULFATE 75 MG: 75 TABLET ORAL at 05:01

## 2020-01-17 NOTE — PROGRESS NOTES
Pt procedure complete. Incision sites CDI.VSS. CRNA at bedside. Pt to PACU 24.. Report given to PACU RN.

## 2020-01-17 NOTE — TRANSFER OF CARE
"Anesthesia Transfer of Care Note    Patient: Antoinette Love    Procedure(s) Performed: Procedure(s) (LRB):  DECLOTTING-LEG (Bilateral)    Patient location: PACU    Anesthesia Type: general    Transport from OR: Transported from OR on 6-10 L/min O2 by face mask with adequate spontaneous ventilation    Post pain: adequate analgesia    Post assessment: no apparent anesthetic complications and tolerated procedure well    Post vital signs: stable    Level of consciousness: sedated    Nausea/Vomiting: no nausea/vomiting    Complications: none    Transfer of care protocol was followed      Last vitals:   Visit Vitals  /74 (BP Location: Left arm, Patient Position: Lying)   Pulse 75   Temp 36.6 °C (97.9 °F) (Temporal)   Resp 14   Ht 5' 5" (1.651 m)   Wt 73.5 kg (162 lb 0.6 oz)   LMP 11/07/2019   SpO2 99%   Breastfeeding? No   BMI 26.96 kg/m²     "

## 2020-01-17 NOTE — NURSING
Patient is lying in bed AAOX4 with no complaint of pain or discomfort.  Bed is low and locked, and call light is within reach.  Vitals are within normal limits.  New IV has been started to patient's left forearm, in addition to IV in right arm receiving heparin infusion.  Will continue to monitor.

## 2020-01-17 NOTE — PROGRESS NOTES
Report received steve Enrique RN, pt laying in bed, no idstress noted. IR called at 0955 and instructed to stop heparin gtt, done, pt updated and warm blankets provided. Instructed pt that time should be about 30-40 minutes, v/u. No family at BS, she stated they would be by later this afternoon.

## 2020-01-17 NOTE — PROGRESS NOTES
"                                                    [facility] Hospital Medicine                                                                     Progress Note     Team: [facility] MED G Willi Anna MD   Admit Date: 1/14/2020   Hospital Day: 2  KADEEM: 1/20/2020   Code status: Full Code   Principal Problem: Acute deep vein thrombosis (DVT) of proximal vein of both lower extremities     SUMMARY:     29-year-old female with Factor V Leiden, May-Thurner Syndrome, and history of PE and recurrent DVTs who presented again with bilateral leg pain and swelling along with right sided abdominal pain for about 2 days. She has an IVC filter since 2014 and has had multiple DVTs due to May-Thurner syndrome despite being fully anticoagulated on Eliquis. On her last admission from 12/18 - 12/22/19, the patient had catheter-directed thrombolysis in addition to anticoagulation - IR performed Angiojet and angioplasty of both legs and stents was performed with reestablished flow through legs and stents on 12/20/2019. Dr. Rola Miranda with IR recommended close outpatient follow-up with IR at [facility] due to patient's need to be re-evaluated for further aggressive treatment. Patient never followed up with IR at [facility].  She was discharged on Eliquis for which she reports compliance. In the ER, workup CT of the abd/pelvis with contrast showed "IVC filter with bilateral IVC and external iliac vein stents.  Unable to assess for patency or thrombus within the stent due to contrast timing.  There do however appear to be intraluminal filling defects within the right common femoral vein and possibly left common femoral vein which may indicate acute deep venous thrombosis. Mild dilatation of the tip of the appendix measuring 9 mm.  No surrounding inflammatory changes are seen, however potential early acute appendicitis not excluded.  Similar " "findings were however seen on previous CT from November 2019" and U/S of LE showed "nonocclusive thrombus is visualized within the right external iliac, common femoral, femoral, popliteal, peroneal, and anterior tibial veins. Greater saphenous vein and posterior tibial veins are patent. Left lower extremity: Nonocclusive thrombus is visualized within the left external iliac, common femoral, femoral, popliteal, and 1 of the paired posterior tibial veins. Greater saphenous, anterior tibial, and peroneal veins are patent."    At OSH, Patient was started on IV heparin. IR was consulted at  and noted, "IR consult placed to evaluate pt with h/o of recurrent BLE iliofemoral DVT for potential intervention. Unfortunately, rheolytic thrombectomy device required for intervention here at  is currently not operable since 1/15/20 and needs to be serviced. Unsure what timeline is for rep to evaluate and then potential fix or replace the unit. Recommend transfer to [facility] and consult IR there for evaluation and potential intervention."     Patient transferred to [facility] for IR consultation for Rheolytic thrombectomy. IR consulted. Remains on heparin infusion. Pain adequately controlled. Planned for thrombolysis/thrombectomy with anesthesia 1/17.        SUBJECTIVE:     Pt was seen and examined at bedside. HDS and afebrile. No acute events overnight. RLQ pain and LE pain controlled. No new complaints. Planned for IR thrombolysis/thrombectomy today with anesthesia. NPO      ROS (Positive in Bold, otherwise negative)  Pain Scale: 3 /10   Constitutional: fever, chills, night sweats  CV: chest pain, edema, palpitations  Resp: SOB, cough, sputum production  GI: changes in appetite, NVDC, abd pain, melena, hematochezia, GERD, hematemesis  : Dysuria, hematuria, urinary urgency, frequency  MSK: arthralgia/myalgia - Right leg pain and swelling , joint swelling  SKIN: rashes, pruritis, petechiae   Neuro/Psych: FND, anxiety, " depression      OBJECTIVE:     Vitals:  Temp:  [97.8 °F (36.6 °C)-98.9 °F (37.2 °C)]   Pulse:  [48-90]   Resp:  [18-20]   BP: (107-130)/(54-78)   SpO2:  [95 %-100 %]      I & O (Last 24H):     Intake/Output Summary (Last 24 hours) at 1/17/2020 1125  Last data filed at 1/16/2020 1808  Gross per 24 hour   Intake 240 ml   Output --   Net 240 ml         GEN:  female  in no acute distress. Nontoxic. Resting in bed. Cooperative.  HEENT: NCAT. PERRL. EOMI. Conjunctivae/corneas clear, sclera Anicteric.  CVS: RRR. Normal s1 s2 no murmur, click, rub or gallop  LUNG: CTAB. Normal respiratory effort. No wheezes, rhonchi, or crackles.  ABD: Normoactive BS, soft, RLQ mild tenderness, ND, no masses or organomegaly. No peritoneal signs.  EXT: Bilateral asymmetric LE + 2 edema. Calf TTP. No cyanosis or toe color changes. Full ROM.   SKIN: color, texture, turgor normal. No rashes or lesions  NEURO: Alert, oriented x 4, Spont mvt of all extremities with no focal deficits noted.      All recent labs and imaging has been reviewed.     Recent Results (from the past 24 hour(s))   APTT    Collection Time: 01/16/20  1:55 PM   Result Value Ref Range    aPTT 43.3 (H) 21.0 - 32.0 sec   APTT    Collection Time: 01/16/20  9:42 PM   Result Value Ref Range    aPTT 33.9 (H) 21.0 - 32.0 sec   CBC auto differential    Collection Time: 01/17/20  4:06 AM   Result Value Ref Range    WBC 2.88 (L) 3.90 - 12.70 K/uL    RBC 3.18 (L) 4.00 - 5.40 M/uL    Hemoglobin 8.6 (L) 12.0 - 16.0 g/dL    Hematocrit 29.0 (L) 37.0 - 48.5 %    Mean Corpuscular Volume 91 82 - 98 fL    Mean Corpuscular Hemoglobin 27.0 27.0 - 31.0 pg    Mean Corpuscular Hemoglobin Conc 29.7 (L) 32.0 - 36.0 g/dL    RDW 15.3 (H) 11.5 - 14.5 %    Platelets 197 150 - 350 K/uL    MPV 9.1 (L) 9.2 - 12.9 fL    Immature Granulocytes 0.3 0.0 - 0.5 %    Gran # (ANC) 1.3 (L) 1.8 - 7.7 K/uL    Immature Grans (Abs) 0.01 0.00 - 0.04 K/uL    Lymph # 1.3 1.0 - 4.8 K/uL    Mono # 0.2 (L) 0.3 - 1.0  K/uL    Eos # 0.1 0.0 - 0.5 K/uL    Baso # 0.01 0.00 - 0.20 K/uL    nRBC 0 0 /100 WBC    Gran% 43.5 38.0 - 73.0 %    Lymph% 44.1 18.0 - 48.0 %    Mono% 8.0 4.0 - 15.0 %    Eosinophil% 3.8 0.0 - 8.0 %    Basophil% 0.3 0.0 - 1.9 %    Differential Method Automated    Comprehensive metabolic panel    Collection Time: 01/17/20  4:06 AM   Result Value Ref Range    Sodium 140 136 - 145 mmol/L    Potassium 4.4 3.5 - 5.1 mmol/L    Chloride 104 95 - 110 mmol/L    CO2 24 23 - 29 mmol/L    Glucose 89 70 - 110 mg/dL    BUN, Bld 11 6 - 20 mg/dL    Creatinine 0.8 0.5 - 1.4 mg/dL    Calcium 9.9 8.7 - 10.5 mg/dL    Total Protein 7.2 6.0 - 8.4 g/dL    Albumin 3.4 (L) 3.5 - 5.2 g/dL    Total Bilirubin 0.2 0.1 - 1.0 mg/dL    Alkaline Phosphatase 102 55 - 135 U/L    AST 9 (L) 10 - 40 U/L    ALT 6 (L) 10 - 44 U/L    Anion Gap 12 8 - 16 mmol/L    eGFR if African American >60.0 >60 mL/min/1.73 m^2    eGFR if non African American >60.0 >60 mL/min/1.73 m^2   Magnesium    Collection Time: 01/17/20  4:06 AM   Result Value Ref Range    Magnesium 2.1 1.6 - 2.6 mg/dL   Phosphorus    Collection Time: 01/17/20  4:06 AM   Result Value Ref Range    Phosphorus 6.2 (H) 2.7 - 4.5 mg/dL   APTT    Collection Time: 01/17/20  4:06 AM   Result Value Ref Range    aPTT 44.8 (H) 21.0 - 32.0 sec       No results for input(s): POCTGLUCOSE in the last 168 hours.    No results found for: HGBA1C     Active Hospital Problems    Diagnosis  POA    *Acute deep vein thrombosis (DVT) of proximal vein of both lower extremities [I82.4Y3]  Yes    Anemia of chronic disease, iron deficiency anemia and acute blood loss anemia [D63.8]  Yes    Presence of IVC filter [Z95.828]  Not Applicable    May-Thurner syndrome [I87.1]  Yes    History of pulmonary embolism [Z86.711]  Yes     Provoked and unprovoked, on lovenox      Factor V Leiden mutation [D68.51]  Yes     Follow by Hematology        Resolved Hospital Problems   No resolved problems to display.          ASSESSMENT AND  "PLAN:     Acute deep vein thrombosis (DVT) of proximal vein of both lower extremities  Acute on chronic issue for this patient secondary to her May-Thurner syndrome. Imaging with CT of the abd/pelvis with contrast showed "IVC filter with bilateral IVC and external iliac vein stents.  Unable to assess for patency or thrombus within the stent due to contrast timing.  There do however appear to be intraluminal filling defects within the right common femoral vein and possibly left common femoral vein which may indicate acute deep venous thrombosis. Mild dilatation of the tip of the appendix measuring 9 mm.  No surrounding inflammatory changes are seen, however potential early acute appendicitis not excluded.  Similar findings were however seen on previous CT from November 2019." U/S of LE showed "nonocclusive thrombus is visualized within the right external iliac, common femoral, femoral, popliteal, peroneal, and anterior tibial veins. Greater saphenous vein and posterior tibial veins are patent. Left lower extremity: Nonocclusive thrombus is visualized within the left external iliac, common femoral, femoral, popliteal, and 1 of the paired posterior tibial veins. Greater saphenous, anterior tibial, and peroneal veins are patent." Treatment initiated with heparin infusion. On previous admission, Dr. Rola Miranda with IR recommended close outpatient follow-up with IR at [facility] due to patient's need to be re-evaluated for further aggressive treatment not done at this facility. Additionally, IR at  reported rheolytic thrombectomy device required for intervention here at  is currently not operable and needs to be serviced   - Transferred to [facility] 1/16 AM  - Continue heparin infusion  - Judicial pain control with stool softeners/stimulants  - IR consulted, planned for thrombolysis/thrombectomy 1/17 with anesthesia     Anemia of chronic disease, iron deficiency anemia and acute blood loss anemia  - Known iron " deficiency anemia (per chart review, iron level checked last month).   - H&H with noted drop as expected post thrombectomy, but no further bleeding  - Transfuse as needed for hgb <7.  - Will continue monitor     May-Thurner syndrome  - Chronic condition  - Treatment as discussed above     Factor V Leiden mutation  - Followed by Hematology as outpatient  - Chronic condition  - Hem consulted for AC mgmt and recs for dc     Presence of IVC filter  - Filter in place      History of pulmonary embolism  - S/p IVC filter since 2014, IR attempted to remove this in the past but was unsuccessful and complicated   - Chronically anticoagulated on Eliquis as outpatient, despite this she has clots  - Currently on heparin gtt  - Hem consulted for AC recs on discharge      Prophylaxis- on high dose heparin infusion   Code Status- Full Code   Discharge plan and follow up - d/c home once medically stable with PCP and hematology jose g Anna MD  Hospital Medicine Staff  Pager [phone]

## 2020-01-17 NOTE — PLAN OF CARE
Patient is lying in bed AAOX4 with complaint of pain to the right lower abdominal quadrant.  Vitals are within normal limits, and fall precautions are in place.  Bed is low and locked, and call light is within reach.  Oxycodone administered per emar.  Hospital sonali is at patient's bedside per patient request.  Care plan reviewed by nurse.  Will continue to monitor until arrival of day shift.

## 2020-01-17 NOTE — CONSULTS
Consult Note    Inpatient consult to Hematology  Consult performed by: Earnestine Garcia MD  Consult ordered by: Willi Anna MD        SUBJECTIVE:     History of Present Illness:  Antoinette Love is a 29-year-old female with Homozygous Factor V Leiden mutation, recurrent DVTs, IVC filter placement, miscarriage in 2013 at 11 weeks, TIA in 2013, May-Thurner Syndrome who presents to the hospital with bilateral leg pain and swelling along with right sided abdominal pain for about 2 days. CT A/P revealed intraluminal filling defects within the right common femoral vein and possibly left common femoral vein which may indicate acute deep venous thrombosis. U/S of LE 1/14/20 showed increased clot burden from Dec 2019 imaging. Patient seen by IR, planned bilateral lower extremity venogram and likely thrombolysis/thrombectomy. Hematology consulted for anti-coagulation recommendations.     Hematologic History The patient has had at least 11 DVTs and 3-4 PEs.  She was on Coumadin for years and stated she came off when she had a DVT despite a therapeutic INR.  She was then placed on Xarelto buts states she only took 1 dose and shortly after developed gallbladder pain. When she was evaluated she was told she had gallbladder inflammation.  An IVC filter was placed in 2014. She was told that it was sideways, and it has caused her significant discomfort  The patient was on Eliquis in 2015 and developed a DVT in 2016 while on Eliquis  She was followed by a Hematologist in [place], and he opted to take her off of anti-coagulation in 2018 because stated that she had an IVC filter and did not need to be anti-coagulated.  The patient was subsequently started on lovenox 1mg/kg BID on 8/22/19. Subsequently there was concern for Lovenox failure, and patient was transitioned to Eliquis.    Review of patient's allergies indicates:   Allergen Reactions    Azithromycin Hives    Beef containing products Anaphylaxis     Patient cannot eat  "BEEF BROTH  STATES IT WILL MAKE HER THROAT CLOSE UP .     Pork derived (porcine) Anaphylaxis     Throat swells up cannot eat pork sausage or pork patties ,     Unclassified drug Shortness Of Breath and Other (See Comments)     Is allergic to a beef spice - Causes "throat closing"    Xarelto [rivaroxaban] Other (See Comments)     Gallbladder swelling and disfunction    Toradol [ketorolac] Hives and Itching     Past Medical History:   Diagnosis Date    Anemia of chronic disease 12/19/2019    Anticoagulant long-term use     Anticoagulant long-term use     FERNANDO (dyspnea on exertion)     DVT (deep venous thrombosis)     Encounter for blood transfusion     Factor V Leiden     Leg edema, left     May-Thurner syndrome     Multiple pulmonary nodules     Pulmonary embolus     RAD (reactive airway disease)     Recurrent upper respiratory infection (URI)     Recurrent urticaria     Stroke     TIA (transient ischemic attack)      Past Surgical History:   Procedure Laterality Date    COLONOSCOPY N/A 12/18/2015    Procedure: COLONOSCOPY;  Surgeon: Lefty Lee MD;  Location: [facility] ([address] [place] [address]);  Service: Endoscopy;  Laterality: N/A;  schedule with Jesus    IVC FILTER RETRIEVAL      IVC FILTER RETRIEVAL N/A 9/23/2019    Procedure: REMOVAL-FILTER-IVC;  Surgeon: Claudia Surgeon;  Location: [facility];  Service: Anesthesiology;  Laterality: N/A;  188  4 hours Anes    tumor from breast      left    WISDOM TOOTH EXTRACTION       Family History   Problem Relation Age of Onset    Allergies Mother         azithromycin     Social History     Tobacco Use    Smoking status: Never Smoker    Smokeless tobacco: Never Used   Substance Use Topics    Alcohol use: No     Frequency: Monthly or less     Drinks per session: 3 or 4     Binge frequency: Never    Drug use: No     Review of Systems   Constitutional: Negative for chills and fever.   HENT: Negative for nosebleeds and sore throat.    Eyes: Negative for blurred vision and " double vision.   Respiratory: Negative for cough, hemoptysis and shortness of breath.    Cardiovascular: Positive for leg swelling. Negative for chest pain.   Gastrointestinal: Positive for abdominal pain. Negative for blood in stool, melena, nausea and vomiting.   Genitourinary: Negative for hematuria.   Musculoskeletal: Positive for joint pain. Negative for myalgias.   Skin: Negative for rash.   Neurological: Negative for dizziness, sensory change and headaches.   Endo/Heme/Allergies: Does not bruise/bleed easily.     OBJECTIVE:     Vital Signs:  Temp:  [98.2 °F (36.8 °C)-98.9 °F (37.2 °C)]   Pulse:  [52-90]   Resp:  [18-20]   BP: (111-117)/(54-63)   SpO2:  [95 %-99 %]     Physical Exam   Constitutional: She is oriented to person, place, and time. She appears well-developed and well-nourished.   Eyes: EOM are normal.   Neck: Normal range of motion.   Cardiovascular: Normal rate and regular rhythm.   Pulmonary/Chest: Effort normal. No respiratory distress.   Abdominal: Soft. She exhibits no distension. There is no tenderness.   Musculoskeletal: She exhibits edema.   Neurological: She is alert and oriented to person, place, and time.   Skin: Skin is warm and dry.   Psychiatric: She has a normal mood and affect. Her behavior is normal.     Laboratory:  CBC:   Recent Labs   Lab 01/17/20 0406   WBC 2.88*   RBC 3.18*   HGB 8.6*   HCT 29.0*      MCV 91   MCH 27.0   MCHC 29.7*     CMP:   Recent Labs   Lab 01/17/20  0406   GLU 89   CALCIUM 9.9   ALBUMIN 3.4*   PROT 7.2      K 4.4   CO2 24      BUN 11   CREATININE 0.8   ALKPHOS 102   ALT 6*   AST 9*   BILITOT 0.2     Coagulation:   Recent Labs   Lab 01/14/20 2012 01/17/20  0406   LABPROT 10.4  --   --    INR 1.0  --   --    APTT 23.8   < > 44.8*    < > = values in this interval not displayed.       Diagnostic Results:  EXAMINATION:  US LOWER EXTREMITY VEINS BILATERAL    CLINICAL HISTORY:  Other specified soft tissue disorders    TECHNIQUE:  Duplex and  color flow Doppler evaluation of the bilateral lower extremity veins was performed.    COMPARISON:  12/18/2019.    FINDINGS:  Right lower extremity:    Nonocclusive thrombus is visualized within the right external iliac, common femoral, femoral, popliteal, peroneal, and anterior tibial veins.  Greater saphenous vein and posterior tibial veins are patent.    Left lower extremity: Nonocclusive thrombus is visualized within the left external iliac, common femoral, femoral, popliteal, and 1 of the paired posterior tibial veins.  Greater saphenous, anterior tibial, and peroneal veins are patent.      Impression       Extensive bilateral deep venous thrombosis as discussed above.  Clot burden is increased from previous exam from 12/18/2019, noting new nonocclusive thrombus within the bilateral external iliac veins.    This report was flagged in Epic as abnormal.      Electronically signed by: Kyle Rojas MD  Date: 01/14/2020  Time: 22:26       ASSESSMENT/PLAN:   1) Recurrent VTE, homozygous factor V leiden  -U/S 1/14/20: Extensive bilateral deep venous thrombosis as discussed above.  Clot burden is increased from previous exam from 12/18/2019, noting new nonocclusive thrombus within the bilateral external iliac veins.  -CT 1/14/20: intraluminal filling defects within the right common femoral vein and possibly left common femoral vein which may indicate acute deep venous thrombosis    Hematologic History  The patient has had at least 11 DVTs and 3-4 PEs.  -She was on Coumadin for years and stated she came off when she had a DVT despite a therapeutic INR.  -She was then placed on Xarelto buts states she only took 1 dose and shortly after developed gallbladder pain. When she was evaluated she was told she had gallbladder inflammation.  -IVC filter was placed in 2014. 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 She had a DVT in 2016 while on Eliquis  She was followed by a Hematologist in [place], and he opted to take her off of anti-coagulation in 2018 because stated that she had an IVC filter and did not need to be anti-coagulated.  -She noted symptoms of DVT , so she self resumed a previous Eliquis prescription that she had  -July 2019 new thrombus, was started on Lovenox.   -Nov 2019 new thrombus, concern for Lovenox failure, switched to Eliquis   -DRVVT positive 11/2/2019     2) Normocytic anemia  - Reticulocyte count has been decreased, suggestive of a hypoproliferative marrow.  - Splenomegaly noted on CT    Recommendations:    -Hematology follow-up and repeat APLS labs in 2-3 weeks  -Transition to Coumadin goal INR 2.5-3.5 and arrange follow-up in Coumadin clinic  -Appreciate IR recommendations  -Repeat CBC in 2-3 weeks    The following was staffed and discussed with supervising physician Dr. Lucas. Please contact Hematology Consult Fellow with any additional questions.    Earnestine Garcia MD PGY-V  Hematology/Oncology Fellow     Attending Note  I have personally taken the history and examined this patient and agree with the fellow's note as stated above.  Patient currently in procedure  Plan as above appropriate   We will manage coumadin at upper level of therapeutic when ready to be converted from heparin and she will need close follow up

## 2020-01-17 NOTE — PLAN OF CARE
Pt not medically ready for discharge.     CM will continue to follow and assist team.    Shari Hallman RN  [phone]

## 2020-01-17 NOTE — PLAN OF CARE
"Pt is AAOx4, lying down in supine position on bed, HOB elevated. PRN IV dilaudid administered by ÁNGELA Fisher as needed for pain, effective, per pt report. PRN oxycodone PO administered as needed for pain. Heparin drip in progress at a rate of 11.2 ml/hr, pt tolerating well, INO noted. Pt instructed to be NPO by midnight for procedure scheduled tomorrow. Pt verbalized understanding.Tely monitor recorded a heart rate of 48 @ 0800, 46 @ 0900, 48 @ 1100. Pt was asymptomatic at time, per pt report. Pt stated, "When I am sleeping my heart rate drops in the 40s which is normal for me and I remain without any symptoms while sleeping." SN reported this to JADYN Reis With IMG.  Safety Precautions in place at all times. Call light within reach. Will continue to monitor pt closely and respond as needed.   "

## 2020-01-17 NOTE — ANESTHESIA PREPROCEDURE EVALUATION
[facility]  Anesthesia Pre-Operative Evaluation         Patient Name: Antoinette Love  YOB: 1990  MRN: [id number]    SUBJECTIVE:     Pre-operative evaluation for Procedure(s) (LRB):  DECLOTTING-LEG (Bilateral)     01/16/2020    Antoinette Love is a 29 y.o. female w/ a significant PMHx of Factor V Leiden, May-Thurner Syndrome, and history of PE and recurrent DVTs (s/p IVC filter, non-functional).  Pt with multiple DV  Ts of bilateral lower extremities.  Currently on high-intensity heparin gtt.    Pt reports hx of emergence delirium.    Patient now presents for the above procedure(s).       LDA:        Peripheral IV - Single Lumen 01/14/20 1735 20 G Right Antecubital (Active)   Site Assessment Clean;Dry;Intact;No redness;No swelling 1/16/2020  8:00 PM   Line Status Infusing 1/16/2020  8:00 PM   Dressing Status Clean;Dry;Intact 1/16/2020  8:00 PM   Dressing Change Due 01/18/20 1/16/2020  8:00 PM   Site Change Due 01/18/20 1/16/2020  8:00 PM   Reason Not Rotated Not due 1/16/2020  8:00 PM   Number of days: 2       Prev airway:   Placement Date: 12/20/19; Placement Time: 1113; Method of Intubation: Direct laryngoscopy; Inserted by: CRNA; Airway Device: Endotracheal Tube; Mask Ventilation: Easy; Intubated: Postinduction; Airway Device Size: 7.0; Style: Cuffed; Cuff Inflation: Minimal occlusive pressure; Grade: Grade I; Complicating Factors: None; Intubation Findings: Positive EtCO2, Atraumatic/Condition of teeth unchanged, Bilateral breath sounds;  Depth of Insertion: 21; Securment: Teeth; Complications: None; Breath Sounds: Equal Bilateral; Insertion Attempts: 1    Drips:   heparin (porcine) in D5W 18 Units/kg/hr (01/16/20 0713)       Patient Active Problem List   Diagnosis    Factor V Leiden mutation    Recurrent acute deep vein thrombosis of left lower extremity    Multiple pulmonary nodules    Acute deep vein thrombosis (DVT) of femoral vein    Dyspnea on exertion  "   History of pulmonary embolism    Chronic respiratory failure with hypoxia    Reactive airway disease without complication    Recurrent urticaria    Acute deep vein thrombosis (DVT) of distal vein of both lower extremities    May-Thurner syndrome    Presence of IVC filter    DVT, recurrent, lower extremity, acute, right    History of deep venous thrombosis (DVT) of distal vein of left lower extremity    Paresthesia of right foot    Normocytic anemia    Leukopenia    Bradycardia    Anemia of chronic disease, iron deficiency anemia and acute blood loss anemia    Acute deep vein thrombosis (DVT) of proximal vein of both lower extremities       Review of patient's allergies indicates:   Allergen Reactions    Azithromycin Hives    Beef containing products Anaphylaxis     Patient cannot eat BEEF BROTH  STATES IT WILL MAKE HER THROAT CLOSE UP .     Pork derived (porcine) Anaphylaxis     Throat swells up cannot eat pork sausage or pork patties ,     Unclassified drug Shortness Of Breath and Other (See Comments)     Is allergic to a beef spice - Causes "throat closing"    Xarelto [rivaroxaban] Other (See Comments)     Gallbladder swelling and disfunction    Toradol [ketorolac] Hives and Itching       Current Inpatient Medications:   polyethylene glycol  17 g Oral Daily    senna-docusate 8.6-50 mg  1 tablet Oral Daily       No current facility-administered medications on file prior to encounter.      Current Outpatient Medications on File Prior to Encounter   Medication Sig Dispense Refill    apixaban (ELIQUIS) 5 mg Tab Take 5 mg by mouth 2 (two) times daily.      diazePAM (VALIUM) 5 MG tablet TAKE 1 TABLET (5 MG TOTAL) BY MOUTH EVERY 12 (TWELVE) HOURS AS NEEDED FOR ANXIETY. 30 tablet 0    EPINEPHrine (EPIPEN 2-VAN) 0.3 mg/0.3 mL AtIn Inject 0.3 mLs (0.3 mg total) into the muscle once. for 1 dose 2 Device 2    ondansetron (ZOFRAN-ODT) 8 MG TbDL Take 1 tablet (8 mg total) by mouth every 8 (eight) " hours as needed (nausea). (Patient not taking: Reported on 11/11/2019) 15 tablet 0       Past Surgical History:   Procedure Laterality Date    COLONOSCOPY N/A 12/18/2015    Procedure: COLONOSCOPY;  Surgeon: Lefty Lee MD;  Location: [facility] (4TH FLR);  Service: Endoscopy;  Laterality: N/A;  schedule with Ray    IVC FILTER RETRIEVAL      IVC FILTER RETRIEVAL N/A 9/23/2019    Procedure: REMOVAL-FILTER-IVC;  Surgeon: Claudia Surgeon;  Location: [facility];  Service: Anesthesiology;  Laterality: N/A;  188  4 hours Anes    tumor from breast      left    WISDOM TOOTH EXTRACTION         Social History     Socioeconomic History    Marital status: Single     Spouse name: Not on file    Number of children: Not on file    Years of education: Not on file    Highest education level: Not on file   Occupational History    Not on file   Social Needs    Financial resource strain: Not very hard    Food insecurity:     Worry: Never true     Inability: Never true    Transportation needs:     Medical: No     Non-medical: No   Tobacco Use    Smoking status: Never Smoker    Smokeless tobacco: Never Used   Substance and Sexual Activity    Alcohol use: No     Frequency: Monthly or less     Drinks per session: 3 or 4     Binge frequency: Never    Drug use: No    Sexual activity: Yes     Partners: Male   Lifestyle    Physical activity:     Days per week: 4 days     Minutes per session: 150+ min    Stress: Rather much   Relationships    Social connections:     Talks on phone: More than three times a week     Gets together: More than three times a week     Attends Congregation service: Not on file     Active member of club or organization: Yes     Attends meetings of clubs or organizations: 1 to 4 times per year     Relationship status: Living with partner   Other Topics Concern    Not on file   Social History Narrative    Not on file       OBJECTIVE:     Vital Signs Range (Last 24H):  Temp:  [36.5 °C (97.7 °F)-36.8 °C (98.3  °F)]   Pulse:  [48-79]   Resp:  [18-20]   BP: (101-130)/(56-78)   SpO2:  [98 %-100 %]       Significant Labs:  Lab Results   Component Value Date    WBC 2.93 (L) 01/16/2020    HGB 8.3 (L) 01/16/2020    HCT 28.9 (L) 01/16/2020     01/16/2020    ALT 5 (L) 01/16/2020    AST 11 01/16/2020     01/16/2020    K 4.5 01/16/2020     01/16/2020    CREATININE 0.8 01/16/2020    BUN 10 01/16/2020    CO2 26 01/16/2020    TSH 0.783 11/01/2019    INR 1.0 01/14/2020       Diagnostic Studies: No relevant studies.    EKG:   Results for orders placed or performed during the hospital encounter of 11/01/19   EKG 12-lead    Collection Time: 11/02/19  3:01 AM    Narrative    Test Reason : R00.1,    Vent. Rate : 043 BPM     Atrial Rate : 043 BPM     P-R Int : 158 ms          QRS Dur : 090 ms      QT Int : 480 ms       P-R-T Axes : 037 031 030 degrees     QTc Int : 405 ms    Marked sinus bradycardia  Low voltage QRS  Abnormal ECG  When compared with ECG of 30-AUG-2019 22:11,  Significant changes have occurred  Confirmed by Tiago Garg MD ([id number]) on 11/5/2019 8:15:39 PM    Referred By: [email or web address]   SELF           Confirmed By:Tiago Garg MD       2D ECHO:  TTE:  Results for orders placed or performed during the hospital encounter of 11/01/19   Echo Color Flow Doppler? Yes   Result Value Ref Range    BSA 1.83 m2    TDI SEPTAL 0.13 m/s    LV LATERAL E/E' RATIO 6.69 m/s    LV SEPTAL E/E' RATIO 8.23 m/s    LA WIDTH 3.51 cm    AORTIC VALVE CUSP SEPERATION 2.22 cm    TDI LATERAL 0.16 m/s    PV PEAK VELOCITY 1.15 cm/s    LVIDD 4.51 3.5 - 6.0 cm    IVS 1.00 0.6 - 1.1 cm    PW 1.11 (A) 0.6 - 1.1 cm    Ao root annulus 2.76 cm    LVIDS 2.65 2.1 - 4.0 cm    FS 41 28 - 44 %    LA volume 48.23 cm3    Sinus 2.81 cm    STJ 2.28 cm    Ascending aorta 2.82 cm    LV mass 165.66 g    LA size 3.80 cm    RVDD 2.68 cm    TAPSE 2.41 cm    RV S' 11.25 cm/s    Left Ventricle Relative Wall Thickness 0.49 cm    AV mean gradient 6 mmHg    AV valve area  2.05 cm2    AV Velocity Ratio 0.68     AV index (prosthetic) 0.68     E/A ratio 1.95     Mean e' 0.15 m/s    E wave decelartion time 232.62 msec    IVRT 0.11 msec    Pulm vein S/D ratio 0.98     LVOT diameter 1.96 cm    LVOT area 3.0 cm2    LVOT peak tolu 1.08 m/s    LVOT peak VTI 25.41 cm    Ao peak tolu 1.60 m/s    Ao VTI 37.47 cm    LVOT stroke volume 76.63 cm3    AV peak gradient 10 mmHg    E/E' ratio 7.38 m/s    MV Peak E Tolu 1.07 m/s    TR Max Tolu 1.83 m/s    MV Peak A Tolu 0.55 m/s    PV Peak S Tolu 0.54 m/s    PV Peak D Tolu 0.55 m/s    LV Systolic Volume 25.79 mL    LV Systolic Volume Index 14.6 mL/m2    LV Diastolic Volume 92.84 mL    LV Diastolic Volume Index 52.64 mL/m2    LA Volume Index 27.3 mL/m2    LV Mass Index 94 g/m2    RA Major Axis 4.49 cm    Left Atrium Minor Axis 4.18 cm    Left Atrium Major Axis 4.33 cm    Triscuspid Valve Regurgitation Peak Gradient 13 mmHg    RA Width 2.96 cm    Right Atrial Pressure (from IVC) 3 mmHg    TV rest pulmonary artery pressure 16 mmHg    Narrative    · Normal left ventricular systolic function. The estimated ejection   fraction is 70%  · Normal LV diastolic function.  · Normal right ventricular systolic function.  · Normal central venous pressure (3 mm Hg).  · The estimated PA systolic pressure is 16 mm Hg          BRII:  No results found for this or any previous visit.    ASSESSMENT/PLAN:                                                                                                                  01/16/2020  Antoinette Love is a 29 y.o., female.    Anesthesia Evaluation    I have reviewed the Patient Summary Reports.    I have reviewed the Nursing Notes.      Review of Systems  Anesthesia Hx:  No problems with previous Anesthesia Emergence delirium   Social:  Non-Smoker, Social Alcohol Use    Hematology/Oncology:         -- Anemia: Hematology Comments: Factor 5 Leiden, recurrent DVTs with hx of PE    Cardiovascular:  Cardiovascular Normal Exercise tolerance:  good  Denies Hypertension.         Pulmonary:   Asthma Denies Recent URI. Pt denies any reactive airway dz. When she had PE, but prior to its being correctly dx'd, she was given inhalers to try.   Her breathing is at baseline now.   Renal/:  Renal/ Normal  Denies Chronic Renal Disease.     Hepatic/GI:  Hepatic/GI Normal  Denies GERD.    Musculoskeletal:   Paresthesias of right foot   Neurological:   TIA, Denies CVA. Denies Seizures.    Endocrine:  Endocrine Normal Denies Diabetes. Denies Hypothyroidism.    Dermatological:  Skin Normal    Psych:  Psychiatric Normal           Physical Exam  General:  Well nourished    Airway/Jaw/Neck:  Airway Findings: Mouth Opening: Normal Tongue: Normal, Piercing  General Airway Assessment: Adult, Good  Mallampati: I  TM Distance: Normal, at least 6 cm  Jaw/Neck Findings:  Neck ROM: Normal ROM  M2  Good mouth opening  Plastic piercing (small post) in bottom lip  Tongue ring removed  No loose dentition  FROM   Eyes/Ears/Nose:  EYES/EARS/NOSE FINDINGS: Normal   Dental:  Dental Findings: In tact   Chest/Lungs:  Chest/Lungs Clear    Heart/Vascular:  Heart Findings: Normal       Mental Status:  Mental Status Findings:  Cooperative, Alert and Oriented         Anesthesia Plan  Type of Anesthesia, risks & benefits discussed:  Anesthesia Type:  general  Patient's Preference: general  Intra-op Monitoring Plan: standard ASA monitors  Intra-op Monitoring Plan Comments:   Post Op Pain Control Plan: multimodal analgesia and IV/PO Opioids PRN  Post Op Pain Control Plan Comments:   Induction:   IV  Beta Blocker:  Patient is not currently on a Beta-Blocker (No further documentation required).       Informed Consent: Patient understands risks and agrees with Anesthesia plan.  Questions answered. Anesthesia consent signed with patient.  ASA Score: 3     Day of Surgery Review of History & Physical:    H&P update referred to the provider.         Ready For Surgery From Anesthesia Perspective.

## 2020-01-18 LAB
ALBUMIN SERPL BCP-MCNC: 3 G/DL (ref 3.5–5.2)
ALP SERPL-CCNC: 85 U/L (ref 55–135)
ALT SERPL W/O P-5'-P-CCNC: <5 U/L (ref 10–44)
ANION GAP SERPL CALC-SCNC: 8 MMOL/L (ref 8–16)
APTT BLDCRRT: 109 SEC (ref 21–32)
APTT BLDCRRT: 23.8 SEC (ref 21–32)
AST SERPL-CCNC: 11 U/L (ref 10–40)
BASOPHILS # BLD AUTO: 0.01 K/UL (ref 0–0.2)
BASOPHILS NFR BLD: 0.3 % (ref 0–1.9)
BILIRUB SERPL-MCNC: 0.3 MG/DL (ref 0.1–1)
BUN SERPL-MCNC: 7 MG/DL (ref 6–20)
CALCIUM SERPL-MCNC: 8.7 MG/DL (ref 8.7–10.5)
CHLORIDE SERPL-SCNC: 108 MMOL/L (ref 95–110)
CO2 SERPL-SCNC: 22 MMOL/L (ref 23–29)
CREAT SERPL-MCNC: 0.7 MG/DL (ref 0.5–1.4)
DIFFERENTIAL METHOD: ABNORMAL
EOSINOPHIL # BLD AUTO: 0 K/UL (ref 0–0.5)
EOSINOPHIL NFR BLD: 1 % (ref 0–8)
ERYTHROCYTE [DISTWIDTH] IN BLOOD BY AUTOMATED COUNT: 15.2 % (ref 11.5–14.5)
EST. GFR  (AFRICAN AMERICAN): >60 ML/MIN/1.73 M^2
EST. GFR  (NON AFRICAN AMERICAN): >60 ML/MIN/1.73 M^2
GLUCOSE SERPL-MCNC: 74 MG/DL (ref 70–110)
HCT VFR BLD AUTO: 26.5 % (ref 37–48.5)
HCT VFR BLD AUTO: 27.8 % (ref 37–48.5)
HGB BLD-MCNC: 7.6 G/DL (ref 12–16)
HGB BLD-MCNC: 8.3 G/DL (ref 12–16)
IMM GRANULOCYTES # BLD AUTO: 0.01 K/UL (ref 0–0.04)
IMM GRANULOCYTES NFR BLD AUTO: 0.3 % (ref 0–0.5)
INR PPP: 1.2 (ref 0.8–1.2)
LYMPHOCYTES # BLD AUTO: 0.8 K/UL (ref 1–4.8)
LYMPHOCYTES NFR BLD: 26.1 % (ref 18–48)
MAGNESIUM SERPL-MCNC: 1.7 MG/DL (ref 1.6–2.6)
MCH RBC QN AUTO: 26.8 PG (ref 27–31)
MCHC RBC AUTO-ENTMCNC: 28.7 G/DL (ref 32–36)
MCV RBC AUTO: 93 FL (ref 82–98)
MONOCYTES # BLD AUTO: 0.3 K/UL (ref 0.3–1)
MONOCYTES NFR BLD: 8.5 % (ref 4–15)
NEUTROPHILS # BLD AUTO: 2 K/UL (ref 1.8–7.7)
NEUTROPHILS NFR BLD: 63.8 % (ref 38–73)
NRBC BLD-RTO: 0 /100 WBC
PHOSPHATE SERPL-MCNC: 4.1 MG/DL (ref 2.7–4.5)
PLATELET # BLD AUTO: 146 K/UL (ref 150–350)
PMV BLD AUTO: 9.3 FL (ref 9.2–12.9)
POTASSIUM SERPL-SCNC: 3.7 MMOL/L (ref 3.5–5.1)
PROT SERPL-MCNC: 6.1 G/DL (ref 6–8.4)
PROTHROMBIN TIME: 12 SEC (ref 9–12.5)
RBC # BLD AUTO: 2.84 M/UL (ref 4–5.4)
SODIUM SERPL-SCNC: 138 MMOL/L (ref 136–145)
WBC # BLD AUTO: 3.07 K/UL (ref 3.9–12.7)

## 2020-01-18 PROCEDURE — 80053 COMPREHEN METABOLIC PANEL: CPT

## 2020-01-18 PROCEDURE — 85014 HEMATOCRIT: CPT

## 2020-01-18 PROCEDURE — 85730 THROMBOPLASTIN TIME PARTIAL: CPT | Mod: 91

## 2020-01-18 PROCEDURE — 36415 COLL VENOUS BLD VENIPUNCTURE: CPT

## 2020-01-18 PROCEDURE — 99232 PR SUBSEQUENT HOSPITAL CARE,LEVL II: ICD-10-PCS | Mod: ,,, | Performed by: INTERNAL MEDICINE

## 2020-01-18 PROCEDURE — 11000001 HC ACUTE MED/SURG PRIVATE ROOM

## 2020-01-18 PROCEDURE — 63600175 PHARM REV CODE 636 W HCPCS: Performed by: EMERGENCY MEDICINE

## 2020-01-18 PROCEDURE — 63600175 PHARM REV CODE 636 W HCPCS: Performed by: PHYSICIAN ASSISTANT

## 2020-01-18 PROCEDURE — 85018 HEMOGLOBIN: CPT

## 2020-01-18 PROCEDURE — 99232 SBSQ HOSP IP/OBS MODERATE 35: CPT | Mod: ,,, | Performed by: INTERNAL MEDICINE

## 2020-01-18 PROCEDURE — 83735 ASSAY OF MAGNESIUM: CPT

## 2020-01-18 PROCEDURE — 85610 PROTHROMBIN TIME: CPT

## 2020-01-18 PROCEDURE — 84100 ASSAY OF PHOSPHORUS: CPT

## 2020-01-18 PROCEDURE — 25000003 PHARM REV CODE 250: Performed by: INTERNAL MEDICINE

## 2020-01-18 PROCEDURE — 85025 COMPLETE CBC W/AUTO DIFF WBC: CPT

## 2020-01-18 RX ORDER — AMOXICILLIN 250 MG
1 CAPSULE ORAL 2 TIMES DAILY
Status: DISCONTINUED | OUTPATIENT
Start: 2020-01-18 | End: 2020-01-21 | Stop reason: HOSPADM

## 2020-01-18 RX ORDER — POLYETHYLENE GLYCOL 3350 17 G/17G
17 POWDER, FOR SOLUTION ORAL 2 TIMES DAILY
Status: DISCONTINUED | OUTPATIENT
Start: 2020-01-18 | End: 2020-01-21 | Stop reason: HOSPADM

## 2020-01-18 RX ORDER — WARFARIN SODIUM 5 MG/1
5 TABLET ORAL DAILY
Status: DISCONTINUED | OUTPATIENT
Start: 2020-01-18 | End: 2020-01-19

## 2020-01-18 RX ORDER — ASPIRIN 325 MG
325 TABLET ORAL DAILY
Status: DISCONTINUED | OUTPATIENT
Start: 2020-01-19 | End: 2020-01-19

## 2020-01-18 RX ORDER — OXYCODONE HYDROCHLORIDE 10 MG/1
10 TABLET ORAL EVERY 6 HOURS PRN
Status: DISCONTINUED | OUTPATIENT
Start: 2020-01-18 | End: 2020-01-21 | Stop reason: HOSPADM

## 2020-01-18 RX ADMIN — POLYETHYLENE GLYCOL 3350 17 G: 17 POWDER, FOR SOLUTION ORAL at 08:01

## 2020-01-18 RX ADMIN — Medication 1 MG: at 10:01

## 2020-01-18 RX ADMIN — Medication 1 MG: at 08:01

## 2020-01-18 RX ADMIN — Medication 1 MG: at 12:01

## 2020-01-18 RX ADMIN — Medication 1 MG: at 03:01

## 2020-01-18 RX ADMIN — WARFARIN SODIUM 5 MG: 5 TABLET ORAL at 04:01

## 2020-01-18 RX ADMIN — Medication 1 MG: at 02:01

## 2020-01-18 RX ADMIN — Medication 1 MG: at 04:01

## 2020-01-18 RX ADMIN — ASPIRIN 81 MG CHEWABLE TABLET 324 MG: 81 TABLET CHEWABLE at 08:01

## 2020-01-18 RX ADMIN — ONDANSETRON 4 MG: 2 INJECTION INTRAMUSCULAR; INTRAVENOUS at 08:01

## 2020-01-18 RX ADMIN — Medication 1 MG: at 05:01

## 2020-01-18 RX ADMIN — Medication 1 MG: at 06:01

## 2020-01-18 RX ADMIN — CLOPIDOGREL BISULFATE 75 MG: 75 TABLET ORAL at 08:01

## 2020-01-18 RX ADMIN — SENNOSIDES AND DOCUSATE SODIUM 1 TABLET: 8.6; 5 TABLET ORAL at 08:01

## 2020-01-18 RX ADMIN — HEPARIN SODIUM 18 UNITS/KG/HR: 10000 INJECTION, SOLUTION INTRAVENOUS at 02:01

## 2020-01-18 NOTE — PROGRESS NOTES
Dr. Tanner with Hospital Medicine informed of patient's pain status and conversation with Dr. Barfield regarding suggestion for better pain control. Dr. Tanner stated he will review chart and place orders.

## 2020-01-18 NOTE — PLAN OF CARE
Returned to [facility]. Patient calm, vital signs stable. Pain tolerable level at present. Patient appreciative for care given.

## 2020-01-18 NOTE — NURSING
Patient arrived via stretcher to the room, and was able to move self onto bed without difficulty.  Patient is crying, and shivering, reporting severe pain to the right lower abdomen.  Vitals are within normal limits.  Surgical sites assessed for bleeding.  All sites either clean or has old, dried blood.  Pain medication administered per new orders on emar.  Will continue to monitor.

## 2020-01-18 NOTE — PROCEDURES
Radiology Post-Procedure Note    Pre Op Diagnosis: DVT    Post Op Diagnosis: Same    Procedure: Thrombectomy, venogram, venoplasty, stent placement    Procedure performed by: Prateek Saldivar MD    Written Informed Consent Obtained: Yes  Specimen Removed: NO  Estimated Blood Loss: Minimal    Findings:   Using US guidance, bilateral popliteal and rt ij access obtained. Mechanical thrombectomy of distal ivc, bilateral civ, eiv, and cfv performed. Stents extended to cfv bilaterally. Final venogram shows excellent flow through the system. Hemostasis achieved with manual compression.    Patient tolerated procedure well.    Prateek Saldivar M.D.  Diagnostic and Interventional Radiologist  Department of Radiology  Pager: [phone]

## 2020-01-18 NOTE — NURSING TRANSFER
Nursing Transfer Note      1/17/2020     Transfer to [facility] from PACU    Transfer via stretcher    Transfer with cardiac monitoring    Transported by PACU PCT Fredi    Medicines sent:     Chart send with patient: Yes    Notified: motherradha

## 2020-01-18 NOTE — PROGRESS NOTES
Informed Dr. Barfield IR resident on call regarding patients frequent complaint of severe right lower abdominal pain. Dr. Barfield stated that she  conferred with Dr. Saldivar who stated according to Dr. Barfield that this type of pain is expected after her procedure and existent pain prior to today's procedure. MD suggested Primary team be called to inquire about possibility of PCA pump for better pain control.

## 2020-01-18 NOTE — PROGRESS NOTES
"                                                    [facility] Hospital Medicine                                                                     Progress Note     Team: [facility] MED G Willi Anna MD   Admit Date: 1/14/2020   Hospital Day: 3  KADEEM: 1/20/2020   Code status: Full Code   Principal Problem: Acute deep vein thrombosis (DVT) of proximal vein of both lower extremities     SUMMARY:     29-year-old female with Factor V Leiden, May-Thurner Syndrome, and history of PE and recurrent DVTs who presented again with bilateral leg pain and swelling along with right sided abdominal pain for about 2 days. She has an IVC filter since 2014 and has had multiple DVTs due to May-Thurner syndrome despite being fully anticoagulated on Eliquis. On her last admission from 12/18 - 12/22/19, the patient had catheter-directed thrombolysis in addition to anticoagulation - IR performed Angiojet and angioplasty of both legs and stents was performed with reestablished flow through legs and stents on 12/20/2019. Dr. Rola Miranda with IR recommended close outpatient follow-up with IR at [facility] due to patient's need to be re-evaluated for further aggressive treatment. Patient never followed up with IR at [facility].  She was discharged on Eliquis for which she reports compliance. In the ER, workup CT of the abd/pelvis with contrast showed "IVC filter with bilateral IVC and external iliac vein stents.  Unable to assess for patency or thrombus within the stent due to contrast timing.  There do however appear to be intraluminal filling defects within the right common femoral vein and possibly left common femoral vein which may indicate acute deep venous thrombosis. Mild dilatation of the tip of the appendix measuring 9 mm.  No surrounding inflammatory changes are seen, however potential early acute appendicitis not excluded.  Similar " "findings were however seen on previous CT from November 2019" and U/S of LE showed "nonocclusive thrombus is visualized within the right external iliac, common femoral, femoral, popliteal, peroneal, and anterior tibial veins. Greater saphenous vein and posterior tibial veins are patent. Left lower extremity: Nonocclusive thrombus is visualized within the left external iliac, common femoral, femoral, popliteal, and 1 of the paired posterior tibial veins. Greater saphenous, anterior tibial, and peroneal veins are patent."    At OSH, Patient was started on IV heparin. IR was consulted at  and noted, "IR consult placed to evaluate pt with h/o of recurrent BLE iliofemoral DVT for potential intervention. Unfortunately, rheolytic thrombectomy device required for intervention here at  is currently not operable since 1/15/20 and needs to be serviced. Unsure what timeline is for rep to evaluate and then potential fix or replace the unit. Recommend transfer to [facility] and consult IR there for evaluation and potential intervention."     Patient transferred to [facility] [place] for IR consultation for Rheolytic thrombectomy. IR consulted. Remains on heparin infusion. Pain adequately controlled. Planned for thrombolysis/thrombectomy with anesthesia 1/17. S/p mechanical thrombectomy with IR 1/17 with stent extension to cfv bilaterally. Final venogram showing good flow thorough the system. IR rec high dose daily 325 mg asa and plavix for now. Hematology consulted for AC recs, and rec warfarin with INR 2.5-3.5.       SUBJECTIVE:     Pt was seen and examined at bedside. S/p IR mechanical thrombectomy yesterday. Overnight patient had severe RLQ abd pain post procedure relieved by dilaudid. Also endorsed bilateral popliteal pain, neck pain and lower back pain. Remained NPO this morning when seen, with pozo present, and has not ambulated yet. This AM she complaints of RLQ pain but much better now since hydromorphone frequency has increased. " Her heparin drip was not continued overnight. Asked RN to immediately start. Orders placed to remove pozo, and ambulate patient. PT OT ordered. Diet ordered. Hgb 7.6 from 8.6 yesterday.     ROS (Positive in Bold, otherwise negative)  Pain Scale: 5 /10   Constitutional: fever, chills, night sweats  CV: chest pain, edema, palpitations  Resp: SOB, cough, sputum production  GI: changes in appetite, NVDC, abd pain, melena, hematochezia, GERD, hematemesis  : Dysuria, hematuria, urinary urgency, frequency  MSK: arthralgia/myalgia - Right and left leg pain and swelling , joint swelling  SKIN: rashes, pruritis, petechiae   Neuro/Psych: FND, anxiety, depression      OBJECTIVE:     Vitals:  Temp:  [97.4 °F (36.3 °C)-98.8 °F (37.1 °C)]   Pulse:  [51-91]   Resp:  [14-32]   BP: ()/(55-80)   SpO2:  [93 %-100 %]      I & O (Last 24H):     Intake/Output Summary (Last 24 hours) at 1/18/2020 1407  Last data filed at 1/18/2020 1000  Gross per 24 hour   Intake 2140 ml   Output 1825 ml   Net 315 ml         GEN:  female  in no acute distress. Nontoxic. Resting in bed. Cooperative.  HEENT: NCAT. PERRL. EOMI. Conjunctivae/corneas clear, sclera Anicteric.  CVS: RRR. Normal s1 s2 no murmur, click, rub or gallop  LUNG: CTAB. Normal respiratory effort. No wheezes, rhonchi, or crackles.  ABD: Normoactive BS, soft, RLQ tenderness, ND, no masses or organomegaly. No peritoneal signs.  EXT: Bilateral asymmetric LE + 2 edema. Calf TTP. No cyanosis or toe color changes. Full ROM.   SKIN: color, texture, turgor normal. No rashes or lesions  NEURO: Alert, oriented x 4, Spont mvt of all extremities with no focal deficits noted.      All recent labs and imaging has been reviewed.     Recent Results (from the past 24 hour(s))   CBC auto differential    Collection Time: 01/18/20  4:09 AM   Result Value Ref Range    WBC 3.07 (L) 3.90 - 12.70 K/uL    RBC 2.84 (L) 4.00 - 5.40 M/uL    Hemoglobin 7.6 (L) 12.0 - 16.0 g/dL    Hematocrit 26.5 (L)  37.0 - 48.5 %    Mean Corpuscular Volume 93 82 - 98 fL    Mean Corpuscular Hemoglobin 26.8 (L) 27.0 - 31.0 pg    Mean Corpuscular Hemoglobin Conc 28.7 (L) 32.0 - 36.0 g/dL    RDW 15.2 (H) 11.5 - 14.5 %    Platelets 146 (L) 150 - 350 K/uL    MPV 9.3 9.2 - 12.9 fL    Immature Granulocytes 0.3 0.0 - 0.5 %    Gran # (ANC) 2.0 1.8 - 7.7 K/uL    Immature Grans (Abs) 0.01 0.00 - 0.04 K/uL    Lymph # 0.8 (L) 1.0 - 4.8 K/uL    Mono # 0.3 0.3 - 1.0 K/uL    Eos # 0.0 0.0 - 0.5 K/uL    Baso # 0.01 0.00 - 0.20 K/uL    nRBC 0 0 /100 WBC    Gran% 63.8 38.0 - 73.0 %    Lymph% 26.1 18.0 - 48.0 %    Mono% 8.5 4.0 - 15.0 %    Eosinophil% 1.0 0.0 - 8.0 %    Basophil% 0.3 0.0 - 1.9 %    Differential Method Automated    Comprehensive metabolic panel    Collection Time: 01/18/20  4:09 AM   Result Value Ref Range    Sodium 138 136 - 145 mmol/L    Potassium 3.7 3.5 - 5.1 mmol/L    Chloride 108 95 - 110 mmol/L    CO2 22 (L) 23 - 29 mmol/L    Glucose 74 70 - 110 mg/dL    BUN, Bld 7 6 - 20 mg/dL    Creatinine 0.7 0.5 - 1.4 mg/dL    Calcium 8.7 8.7 - 10.5 mg/dL    Total Protein 6.1 6.0 - 8.4 g/dL    Albumin 3.0 (L) 3.5 - 5.2 g/dL    Total Bilirubin 0.3 0.1 - 1.0 mg/dL    Alkaline Phosphatase 85 55 - 135 U/L    AST 11 10 - 40 U/L    ALT <5 (L) 10 - 44 U/L    Anion Gap 8 8 - 16 mmol/L    eGFR if African American >60.0 >60 mL/min/1.73 m^2    eGFR if non African American >60.0 >60 mL/min/1.73 m^2   Magnesium    Collection Time: 01/18/20  4:09 AM   Result Value Ref Range    Magnesium 1.7 1.6 - 2.6 mg/dL   Phosphorus    Collection Time: 01/18/20  4:09 AM   Result Value Ref Range    Phosphorus 4.1 2.7 - 4.5 mg/dL   Protime-INR    Collection Time: 01/18/20  4:09 AM   Result Value Ref Range    Prothrombin Time 12.0 9.0 - 12.5 sec    INR 1.2 0.8 - 1.2   APTT    Collection Time: 01/18/20 11:12 AM   Result Value Ref Range    aPTT 23.8 21.0 - 32.0 sec       No results for input(s): POCTGLUCOSE in the last 168 hours.    No results found for: HGBA1C  "    Active Hospital Problems    Diagnosis  POA    *Acute deep vein thrombosis (DVT) of proximal vein of both lower extremities [I82.4Y3]  Yes    Anemia of chronic disease, iron deficiency anemia and acute blood loss anemia [D63.8]  Yes    Presence of IVC filter [Z95.828]  Not Applicable    May-Thurner syndrome [I87.1]  Yes    History of pulmonary embolism [Z86.711]  Yes     Provoked and unprovoked, on lovenox      Factor V Leiden mutation [D68.51]  Yes     Follow by Hematology        Resolved Hospital Problems   No resolved problems to display.          ASSESSMENT AND PLAN:     Acute deep vein thrombosis (DVT) of proximal vein of both lower extremities  Acute on chronic issue for this patient secondary to her May-Thurner syndrome. Imaging with CT of the abd/pelvis with contrast showed "IVC filter with bilateral IVC and external iliac vein stents.  Unable to assess for patency or thrombus within the stent due to contrast timing.  There do however appear to be intraluminal filling defects within the right common femoral vein and possibly left common femoral vein which may indicate acute deep venous thrombosis. Mild dilatation of the tip of the appendix measuring 9 mm.  No surrounding inflammatory changes are seen, however potential early acute appendicitis not excluded.  Similar findings were however seen on previous CT from November 2019." U/S of LE showed "nonocclusive thrombus is visualized within the right external iliac, common femoral, femoral, popliteal, peroneal, and anterior tibial veins. Greater saphenous vein and posterior tibial veins are patent. Left lower extremity: Nonocclusive thrombus is visualized within the left external iliac, common femoral, femoral, popliteal, and 1 of the paired posterior tibial veins. Greater saphenous, anterior tibial, and peroneal veins are patent." Treatment initiated with heparin infusion. On previous admission, Dr. Rola Miranda with IR recommended close outpatient " follow-up with IR at [facility] due to patient's need to be re-evaluated for further aggressive treatment not done at this facility. Additionally, IR at  reported rheolytic thrombectomy device required for intervention here at  is currently not operable and needs to be serviced   - Transferred to [facility] [place] 1/16 AM  - Continued heparin infusion  - S/p mechanical thrombectomy with IR 1/17 with stent extension to cfv bilaterally. Final venogram showing good flow thorough the system.   - IR rec AC, high dose daily 325 mg asa and plavix for now.   - Hematology consulted for AC recs, and rec warfarin with INR 2.5-3.5.   - Judicial pain control with stool softeners/stimulants  - Serial hgb and abdominal exam  - Diet ordered  - Remove pozo  - Ambulate, Up in chair, PT OT      Anemia of chronic disease, iron deficiency anemia and acute blood loss anemia  - Known iron deficiency anemia (per chart review, iron level checked last month).   - H&H with noted drop as expected post thrombectomy  - Serial hgb  - Transfuse as needed for hgb <7.  - Will continue monitor     May-Thurner syndrome  - Chronic condition  - Treatment as discussed above     Factor V Leiden mutation  - Followed by Hematology as outpatient  - Chronic condition  - Hem consulted for AC mgmt and recs for dc, rec warfarin with goal INR 2.5-3.5     Presence of IVC filter  - Filter in place       History of pulmonary embolism  - S/p IVC filter since 2014, IR attempted to remove this in the past but was unsuccessful and complicated   - Chronically anticoagulated on Eliquis as outpatient, despite this she has clots  - Hematology consulted, rec warfarin goal 2.5-3.5 with on heparin gtt        Prophylaxis- on high dose heparin infusion while started warfarin goal 2.5-3.5 as per hematology   Code Status- Full Code   Discharge plan and follow up - d/c home once medically stable with PCP, IR and hematology fu    Willi Anna MD  Hospital Medicine Staff  Pager [phone]

## 2020-01-18 NOTE — PROGRESS NOTES
IR Note    Pt seen and examined. C/o severe RLQ abd pain that started after procedure. Got almost 5 mg of Dilaudid overnight, which helped some. Still c/o moderate pain in RLQ that's worse than before angio. Also c/o of BL popliteal pain and lower back pain. Dick still in. Hasn't ambulated. Hasn't eaten anything but ice chips. Hb drop 8.6 to 7.6    Temp:  [97.4 °F (36.3 °C)-98.2 °F (36.8 °C)]   Pulse:  [51-91]   Resp:  [14-32]   BP: ()/(55-80)   SpO2:  [93 %-100 %]      Awake and appears somewhat uncomfortable  Regular rhythm  Breathing unlabored  Lower back non tender, looks normal  Abd is soft, bed bump negative, non-peritonitic, but pretty tender in RLQ with light palpation  BL popliteal access C/D/I, soft compartments, no issues    A/P:  of distal ivc, bilateral civ, eiv, and cfv performed. Stents extended to cfv bilaterally. Final venogram shows excellent flow through the system.    -Continue anticoagulation.  -Would consider removing Dick  -Encourage ambulation  -Should be ok to start diet  -Pain control per primary  -Would keep an eye on RLQ pain and have low threshold for re-imaging, post procedure pain is expected but this is a little more and it's very focal. Could have totally unrelated acute appendicitis, but no leukocytosis or fevers, so it's unlikely. Abdomen exam only tender, not peritonitic. Serial belly exams recommended.  -Trend Hb.  -Appreciate medicine assistance    Lamin Chavez MD  R[facility], Diagnostic and Interventional Radiology  Pager: [phone]

## 2020-01-18 NOTE — PLAN OF CARE
Pt progressing towards goals without reports of distress. Pt requests pain medications every 2 hours for pain at 7 of 10 in lower back and legs, pt may do well with oral pain meds to extend efficacy of medications. Will continue to monitor.

## 2020-01-18 NOTE — ANESTHESIA POSTPROCEDURE EVALUATION
Anesthesia Post Evaluation    Patient: Antoinette Love    Procedure(s) Performed: Procedure(s) (LRB):  DECLOTTING-LEG (Bilateral)    Final Anesthesia Type: general    Patient location during evaluation: PACU  Patient participation: Yes- Able to Participate  Level of consciousness: awake and alert  Post-procedure vital signs: reviewed and stable  Pain management: adequate  Airway patency: patent    PONV status at discharge: No PONV  Anesthetic complications: no      Cardiovascular status: stable  Respiratory status: spontaneous ventilation and room air  Hydration status: euvolemic  Follow-up not needed.          Vitals Value Taken Time   /58 1/17/2020  6:32 PM   Temp 36.6 °C (97.9 °F) 1/17/2020  4:49 PM   Pulse 64 1/17/2020  6:39 PM   Resp 14 1/17/2020  6:39 PM   SpO2 100 % 1/17/2020  6:39 PM   Vitals shown include unvalidated device data.      No case tracking events are documented in the log.      Pain/Rosaura Score: Pain Rating Prior to Med Admin: 6 (1/17/2020  6:15 PM)  Pain Rating Post Med Admin: 5 (1/17/2020  6:00 PM)  Rosaura Score: 10 (1/17/2020  6:00 PM)

## 2020-01-18 NOTE — PLAN OF CARE
Patient is lying in bed AAOx4 with complaint of 7 out 10 pain to the lower back, legs, and neck.  Bed is low and locked, and call light is within reach.  Bedside shift report given to ÁNGELA Arambula.  Next dose of hydromorphone can be given at 0731.

## 2020-01-19 LAB
ABO + RH BLD: NORMAL
ALBUMIN SERPL BCP-MCNC: 3 G/DL (ref 3.5–5.2)
ALP SERPL-CCNC: 87 U/L (ref 55–135)
ALT SERPL W/O P-5'-P-CCNC: 5 U/L (ref 10–44)
ANION GAP SERPL CALC-SCNC: 8 MMOL/L (ref 8–16)
APTT BLDCRRT: 41.1 SEC (ref 21–32)
APTT BLDCRRT: 46.1 SEC (ref 21–32)
AST SERPL-CCNC: 11 U/L (ref 10–40)
BASOPHILS # BLD AUTO: 0.01 K/UL (ref 0–0.2)
BASOPHILS NFR BLD: 0.4 % (ref 0–1.9)
BILIRUB SERPL-MCNC: 0.1 MG/DL (ref 0.1–1)
BLD GP AB SCN CELLS X3 SERPL QL: NORMAL
BUN SERPL-MCNC: 6 MG/DL (ref 6–20)
CALCIUM SERPL-MCNC: 8.4 MG/DL (ref 8.7–10.5)
CHLORIDE SERPL-SCNC: 106 MMOL/L (ref 95–110)
CO2 SERPL-SCNC: 24 MMOL/L (ref 23–29)
CREAT SERPL-MCNC: 0.7 MG/DL (ref 0.5–1.4)
DIFFERENTIAL METHOD: ABNORMAL
EOSINOPHIL # BLD AUTO: 0.1 K/UL (ref 0–0.5)
EOSINOPHIL NFR BLD: 3.2 % (ref 0–8)
ERYTHROCYTE [DISTWIDTH] IN BLOOD BY AUTOMATED COUNT: 15.3 % (ref 11.5–14.5)
EST. GFR  (AFRICAN AMERICAN): >60 ML/MIN/1.73 M^2
EST. GFR  (NON AFRICAN AMERICAN): >60 ML/MIN/1.73 M^2
GLUCOSE SERPL-MCNC: 84 MG/DL (ref 70–110)
HCT VFR BLD AUTO: 24 % (ref 37–48.5)
HCT VFR BLD AUTO: 25 % (ref 37–48.5)
HGB BLD-MCNC: 7.1 G/DL (ref 12–16)
HGB BLD-MCNC: 7.6 G/DL (ref 12–16)
IMM GRANULOCYTES # BLD AUTO: 0.01 K/UL (ref 0–0.04)
IMM GRANULOCYTES NFR BLD AUTO: 0.4 % (ref 0–0.5)
INR PPP: 3 (ref 0.8–1.2)
LYMPHOCYTES # BLD AUTO: 0.8 K/UL (ref 1–4.8)
LYMPHOCYTES NFR BLD: 30.2 % (ref 18–48)
MAGNESIUM SERPL-MCNC: 1.7 MG/DL (ref 1.6–2.6)
MCH RBC QN AUTO: 26.6 PG (ref 27–31)
MCHC RBC AUTO-ENTMCNC: 29.6 G/DL (ref 32–36)
MCV RBC AUTO: 90 FL (ref 82–98)
MONOCYTES # BLD AUTO: 0.2 K/UL (ref 0.3–1)
MONOCYTES NFR BLD: 7.1 % (ref 4–15)
NEUTROPHILS # BLD AUTO: 1.5 K/UL (ref 1.8–7.7)
NEUTROPHILS NFR BLD: 58.7 % (ref 38–73)
NRBC BLD-RTO: 0 /100 WBC
PHOSPHATE SERPL-MCNC: 3.6 MG/DL (ref 2.7–4.5)
PLATELET # BLD AUTO: 122 K/UL (ref 150–350)
PMV BLD AUTO: 10.2 FL (ref 9.2–12.9)
POTASSIUM SERPL-SCNC: 3.6 MMOL/L (ref 3.5–5.1)
PROT SERPL-MCNC: 6.2 G/DL (ref 6–8.4)
PROTHROMBIN TIME: 28.3 SEC (ref 9–12.5)
RBC # BLD AUTO: 2.67 M/UL (ref 4–5.4)
SODIUM SERPL-SCNC: 138 MMOL/L (ref 136–145)
WBC # BLD AUTO: 2.52 K/UL (ref 3.9–12.7)

## 2020-01-19 PROCEDURE — 99231 PR SUBSEQUENT HOSPITAL CARE,LEVL I: ICD-10-PCS | Mod: ,,, | Performed by: INTERNAL MEDICINE

## 2020-01-19 PROCEDURE — 85014 HEMATOCRIT: CPT

## 2020-01-19 PROCEDURE — 85018 HEMOGLOBIN: CPT

## 2020-01-19 PROCEDURE — 25000003 PHARM REV CODE 250: Performed by: INTERNAL MEDICINE

## 2020-01-19 PROCEDURE — 11000001 HC ACUTE MED/SURG PRIVATE ROOM

## 2020-01-19 PROCEDURE — 80053 COMPREHEN METABOLIC PANEL: CPT

## 2020-01-19 PROCEDURE — 85610 PROTHROMBIN TIME: CPT

## 2020-01-19 PROCEDURE — 84100 ASSAY OF PHOSPHORUS: CPT

## 2020-01-19 PROCEDURE — 36415 COLL VENOUS BLD VENIPUNCTURE: CPT

## 2020-01-19 PROCEDURE — 85025 COMPLETE CBC W/AUTO DIFF WBC: CPT

## 2020-01-19 PROCEDURE — 86850 RBC ANTIBODY SCREEN: CPT

## 2020-01-19 PROCEDURE — 99231 SBSQ HOSP IP/OBS SF/LOW 25: CPT | Mod: ,,, | Performed by: INTERNAL MEDICINE

## 2020-01-19 PROCEDURE — 63600175 PHARM REV CODE 636 W HCPCS: Performed by: PHYSICIAN ASSISTANT

## 2020-01-19 PROCEDURE — 83735 ASSAY OF MAGNESIUM: CPT

## 2020-01-19 PROCEDURE — 85730 THROMBOPLASTIN TIME PARTIAL: CPT

## 2020-01-19 RX ORDER — ASPIRIN 325 MG
325 TABLET, DELAYED RELEASE (ENTERIC COATED) ORAL DAILY
Status: DISCONTINUED | OUTPATIENT
Start: 2020-01-20 | End: 2020-01-21 | Stop reason: HOSPADM

## 2020-01-19 RX ORDER — POTASSIUM CHLORIDE 20 MEQ/1
40 TABLET, EXTENDED RELEASE ORAL ONCE
Status: COMPLETED | OUTPATIENT
Start: 2020-01-19 | End: 2020-01-19

## 2020-01-19 RX ORDER — CALCIUM CARBONATE 200(500)MG
500 TABLET,CHEWABLE ORAL 3 TIMES DAILY PRN
Status: DISCONTINUED | OUTPATIENT
Start: 2020-01-19 | End: 2020-01-21 | Stop reason: HOSPADM

## 2020-01-19 RX ADMIN — Medication 1 MG: at 03:01

## 2020-01-19 RX ADMIN — Medication 1 MG: at 08:01

## 2020-01-19 RX ADMIN — ASPIRIN 325 MG ORAL TABLET 325 MG: 325 PILL ORAL at 09:01

## 2020-01-19 RX ADMIN — POLYETHYLENE GLYCOL 3350 17 G: 17 POWDER, FOR SOLUTION ORAL at 08:01

## 2020-01-19 RX ADMIN — Medication 1 MG: at 11:01

## 2020-01-19 RX ADMIN — Medication 1 MG: at 06:01

## 2020-01-19 RX ADMIN — POTASSIUM CHLORIDE 40 MEQ: 1500 TABLET, EXTENDED RELEASE ORAL at 08:01

## 2020-01-19 RX ADMIN — SENNOSIDES AND DOCUSATE SODIUM 1 TABLET: 8.6; 5 TABLET ORAL at 08:01

## 2020-01-19 RX ADMIN — Medication 1 MG: at 12:01

## 2020-01-19 RX ADMIN — CLOPIDOGREL BISULFATE 75 MG: 75 TABLET ORAL at 09:01

## 2020-01-19 RX ADMIN — POLYETHYLENE GLYCOL 3350 17 G: 17 POWDER, FOR SOLUTION ORAL at 09:01

## 2020-01-19 RX ADMIN — OXYCODONE HYDROCHLORIDE 10 MG: 10 TABLET ORAL at 11:01

## 2020-01-19 RX ADMIN — SENNOSIDES AND DOCUSATE SODIUM 1 TABLET: 8.6; 5 TABLET ORAL at 09:01

## 2020-01-19 RX ADMIN — OXYCODONE HYDROCHLORIDE 10 MG: 10 TABLET ORAL at 09:01

## 2020-01-19 RX ADMIN — OXYCODONE HYDROCHLORIDE 10 MG: 10 TABLET ORAL at 05:01

## 2020-01-19 RX ADMIN — Medication 1 MG: at 04:01

## 2020-01-19 NOTE — CARE UPDATE
Pt tolerating the venous declot well, still with soreness in lower extremities but getting out of bed and ambulating.    Ronny Butt MD, MS  Radiology  PGY-2  Pager: [phone]

## 2020-01-19 NOTE — CONSULTS
Food & Nutrition  Education    Diet Education: coumadin education  Time Spent: 10 minutes  Learners: patient      Nutrition Education provided with handouts: vitamin K restrictions      Comments: Pt has been on coumadin before and is aware of dietary restrictions for coumadin. Reinforced foods with vitamin K. Pt has stable wt, good po intake and shows no signs malnutrition.      All questions and concerns answered. Dietitian's contact information provided.       Follow-Up: yes    Please Re-consult as needed    Matilde Vasquez RD  Ext [phone]    Thanks!

## 2020-01-19 NOTE — NURSING
"Pt is AAOx4 and ambulates with assistance. Pt complains of pain to BLE unrelieved by PRN hydromorphone q2h despite expressing that she wants to try to wean off of IV meds so she "can go home". Pt experienced what she believes was a panic attack at about 2000. Pt states she gets those when she sees blood or thinks about blood and that it "all started after the first surgery" in which she says she was "awake for it all".  "

## 2020-01-19 NOTE — PROGRESS NOTES
"                                                    [facility] Hospital Medicine                                                                     Progress Note     Team: [facility] MED G Willi Anna MD   Admit Date: 1/14/2020   Hospital Day: 4  KADEEM: 1/20/2020   Code status: Full Code   Principal Problem: Acute deep vein thrombosis (DVT) of proximal vein of both lower extremities     SUMMARY:     29-year-old female with Factor V Leiden, May-Thurner Syndrome, and history of PE and recurrent DVTs who presented again with bilateral leg pain and swelling along with right sided abdominal pain for about 2 days. She has an IVC filter since 2014 and has had multiple DVTs due to May-Thurner syndrome despite being fully anticoagulated on Eliquis. On her last admission from 12/18 - 12/22/19, the patient had catheter-directed thrombolysis in addition to anticoagulation - IR performed Angiojet and angioplasty of both legs and stents was performed with reestablished flow through legs and stents on 12/20/2019. Dr. Rola Miranda with IR recommended close outpatient follow-up with IR at [facility] due to patient's need to be re-evaluated for further aggressive treatment. Patient never followed up with IR at [facility].  She was discharged on Eliquis for which she reports compliance. In the ER, workup CT of the abd/pelvis with contrast showed "IVC filter with bilateral IVC and external iliac vein stents.  Unable to assess for patency or thrombus within the stent due to contrast timing.  There do however appear to be intraluminal filling defects within the right common femoral vein and possibly left common femoral vein which may indicate acute deep venous thrombosis. Mild dilatation of the tip of the appendix measuring 9 mm.  No surrounding inflammatory changes are seen, however potential early acute appendicitis not excluded.  Similar " "findings were however seen on previous CT from November 2019" and U/S of LE showed "nonocclusive thrombus is visualized within the right external iliac, common femoral, femoral, popliteal, peroneal, and anterior tibial veins. Greater saphenous vein and posterior tibial veins are patent. Left lower extremity: Nonocclusive thrombus is visualized within the left external iliac, common femoral, femoral, popliteal, and 1 of the paired posterior tibial veins. Greater saphenous, anterior tibial, and peroneal veins are patent."    At OSH, Patient was started on IV heparin. IR was consulted at  and noted, "IR consult placed to evaluate pt with h/o of recurrent BLE iliofemoral DVT for potential intervention. Unfortunately, rheolytic thrombectomy device required for intervention here at  is currently not operable since 1/15/20 and needs to be serviced. Unsure what timeline is for rep to evaluate and then potential fix or replace the unit. Recommend transfer to [facility] and consult IR there for evaluation and potential intervention."     Patient transferred to [facility] for IR consultation for Rheolytic thrombectomy. IR consulted. Remains on heparin infusion. Pain adequately controlled. Planned for thrombolysis/thrombectomy with anesthesia 1/17. S/p mechanical thrombectomy with IR 1/17 with stent extension to cfv bilaterally. Final venogram showing good flow thorough the system. IR rec high dose daily 325 mg asa and plavix for now. Hematology consulted for AC recs, and rec warfarin with INR 2.5-3.5. INR now 3, discontinued heparin drip. Hgb and vitals stable.       SUBJECTIVE:     Pt was seen and examined at bedside. No acute events overnight. No new complaints. HDS and afebrile. Pain improving now more soreness than sharp pain. Having good BMs. Ambulating better compared to yesterday. Asking about discharge plan.     ROS (Positive in Bold, otherwise negative)  Pain Scale: 4 /10   Constitutional: fever, chills, night " sweats  CV: chest pain, edema, palpitations  Resp: SOB, cough, sputum production  GI: changes in appetite, NVDC, abd pain improving, melena, hematochezia, GERD, hematemesis  : Dysuria, hematuria, urinary urgency, frequency  MSK: arthralgia/myalgia - Right and left leg pain (improving) and swelling , joint swelling  SKIN: rashes, pruritis, petechiae   Neuro/Psych: FND, anxiety, depression      OBJECTIVE:     Vitals:  Temp:  [99.2 °F (37.3 °C)-99.6 °F (37.6 °C)]   Pulse:  [82-96]   Resp:  [16-18]   BP: ()/(51-66)   SpO2:  [95 %-100 %]      I & O (Last 24H):     Intake/Output Summary (Last 24 hours) at 1/19/2020 1417  Last data filed at 1/18/2020 1418  Gross per 24 hour   Intake --   Output 150 ml   Net -150 ml         GEN:  female  in no acute distress. Nontoxic. Resting in bed. Cooperative.  HEENT: NCAT. PERRL. EOMI. Conjunctivae/corneas clear, sclera Anicteric.  CVS: RRR. Normal s1 s2 no murmur, click, rub or gallop  LUNG: CTAB. Normal respiratory effort. No wheezes, rhonchi, or crackles.  ABD: Normoactive BS, soft, mild RLQ tenderness, ND, no masses or organomegaly. No peritoneal signs.  EXT: Bilateral asymmetric LE + 2 edema. Calf TTP. No cyanosis or toe color changes. Full ROM.   SKIN: color, texture, turgor normal. No rashes or lesions  NEURO: Alert, oriented x 4, Spont mvt of all extremities with no focal deficits noted.      All recent labs and imaging has been reviewed.     Recent Results (from the past 24 hour(s))   Hemoglobin    Collection Time: 01/18/20  7:47 PM   Result Value Ref Range    Hemoglobin 8.3 (L) 12.0 - 16.0 g/dL   Hematocrit    Collection Time: 01/18/20  7:47 PM   Result Value Ref Range    Hematocrit 27.8 (L) 37.0 - 48.5 %   APTT    Collection Time: 01/18/20  7:47 PM   Result Value Ref Range    aPTT 109.0 (H) 21.0 - 32.0 sec   CBC auto differential    Collection Time: 01/19/20  3:49 AM   Result Value Ref Range    WBC 2.52 (L) 3.90 - 12.70 K/uL    RBC 2.67 (L) 4.00 - 5.40 M/uL     Hemoglobin 7.1 (L) 12.0 - 16.0 g/dL    Hematocrit 24.0 (L) 37.0 - 48.5 %    Mean Corpuscular Volume 90 82 - 98 fL    Mean Corpuscular Hemoglobin 26.6 (L) 27.0 - 31.0 pg    Mean Corpuscular Hemoglobin Conc 29.6 (L) 32.0 - 36.0 g/dL    RDW 15.3 (H) 11.5 - 14.5 %    Platelets 122 (L) 150 - 350 K/uL    MPV 10.2 9.2 - 12.9 fL    Immature Granulocytes 0.4 0.0 - 0.5 %    Gran # (ANC) 1.5 (L) 1.8 - 7.7 K/uL    Immature Grans (Abs) 0.01 0.00 - 0.04 K/uL    Lymph # 0.8 (L) 1.0 - 4.8 K/uL    Mono # 0.2 (L) 0.3 - 1.0 K/uL    Eos # 0.1 0.0 - 0.5 K/uL    Baso # 0.01 0.00 - 0.20 K/uL    nRBC 0 0 /100 WBC    Gran% 58.7 38.0 - 73.0 %    Lymph% 30.2 18.0 - 48.0 %    Mono% 7.1 4.0 - 15.0 %    Eosinophil% 3.2 0.0 - 8.0 %    Basophil% 0.4 0.0 - 1.9 %    Differential Method Automated    Comprehensive metabolic panel    Collection Time: 01/19/20  3:49 AM   Result Value Ref Range    Sodium 138 136 - 145 mmol/L    Potassium 3.6 3.5 - 5.1 mmol/L    Chloride 106 95 - 110 mmol/L    CO2 24 23 - 29 mmol/L    Glucose 84 70 - 110 mg/dL    BUN, Bld 6 6 - 20 mg/dL    Creatinine 0.7 0.5 - 1.4 mg/dL    Calcium 8.4 (L) 8.7 - 10.5 mg/dL    Total Protein 6.2 6.0 - 8.4 g/dL    Albumin 3.0 (L) 3.5 - 5.2 g/dL    Total Bilirubin 0.1 0.1 - 1.0 mg/dL    Alkaline Phosphatase 87 55 - 135 U/L    AST 11 10 - 40 U/L    ALT 5 (L) 10 - 44 U/L    Anion Gap 8 8 - 16 mmol/L    eGFR if African American >60.0 >60 mL/min/1.73 m^2    eGFR if non African American >60.0 >60 mL/min/1.73 m^2   Magnesium    Collection Time: 01/19/20  3:49 AM   Result Value Ref Range    Magnesium 1.7 1.6 - 2.6 mg/dL   Phosphorus    Collection Time: 01/19/20  3:49 AM   Result Value Ref Range    Phosphorus 3.6 2.7 - 4.5 mg/dL   Protime-INR    Collection Time: 01/19/20  3:49 AM   Result Value Ref Range    Prothrombin Time 28.3 (H) 9.0 - 12.5 sec    INR 3.0 (H) 0.8 - 1.2   Type & Screen    Collection Time: 01/19/20  3:49 AM   Result Value Ref Range    Group & Rh A POS     Indirect Biju NEG   "  APTT    Collection Time: 01/19/20  3:49 AM   Result Value Ref Range    aPTT 41.1 (H) 21.0 - 32.0 sec   Hemoglobin    Collection Time: 01/19/20  7:57 AM   Result Value Ref Range    Hemoglobin 7.6 (L) 12.0 - 16.0 g/dL   Hematocrit    Collection Time: 01/19/20  7:57 AM   Result Value Ref Range    Hematocrit 25.0 (L) 37.0 - 48.5 %   APTT    Collection Time: 01/19/20  7:57 AM   Result Value Ref Range    aPTT 46.1 (H) 21.0 - 32.0 sec       No results for input(s): POCTGLUCOSE in the last 168 hours.    No results found for: HGBA1C     Active Hospital Problems    Diagnosis  POA    *Acute deep vein thrombosis (DVT) of proximal vein of both lower extremities [I82.4Y3]  Yes    Anemia of chronic disease, iron deficiency anemia and acute blood loss anemia [D63.8]  Yes    Presence of IVC filter [Z95.828]  Not Applicable    May-Thurner syndrome [I87.1]  Yes    History of pulmonary embolism [Z86.711]  Yes     Provoked and unprovoked, on lovenox      Factor V Leiden mutation [D68.51]  Yes     Follow by Hematology        Resolved Hospital Problems   No resolved problems to display.          ASSESSMENT AND PLAN:     Acute deep vein thrombosis (DVT) of proximal vein of both lower extremities  Acute on chronic issue for this patient secondary to her May-Thurner syndrome. Imaging with CT of the abd/pelvis with contrast showed "IVC filter with bilateral IVC and external iliac vein stents.  Unable to assess for patency or thrombus within the stent due to contrast timing.  There do however appear to be intraluminal filling defects within the right common femoral vein and possibly left common femoral vein which may indicate acute deep venous thrombosis. Mild dilatation of the tip of the appendix measuring 9 mm.  No surrounding inflammatory changes are seen, however potential early acute appendicitis not excluded.  Similar findings were however seen on previous CT from November 2019." U/S of LE showed "nonocclusive thrombus is " "visualized within the right external iliac, common femoral, femoral, popliteal, peroneal, and anterior tibial veins. Greater saphenous vein and posterior tibial veins are patent. Left lower extremity: Nonocclusive thrombus is visualized within the left external iliac, common femoral, femoral, popliteal, and 1 of the paired posterior tibial veins. Greater saphenous, anterior tibial, and peroneal veins are patent." Treatment initiated with heparin infusion. On previous admission, Dr. Rola Miranda with IR recommended close outpatient follow-up with IR at [facility] due to patient's need to be re-evaluated for further aggressive treatment not done at this facility. Additionally, IR at  reported rheolytic thrombectomy device required for intervention here at  is currently not operable and needs to be serviced   - Transferred to [facility] [place] 1/16 AM  - Continued heparin infusion  - S/p mechanical thrombectomy with IR 1/17 with stent extension to cfv bilaterally. Final venogram showing good flow thorough the system.   - IR rec AC, high dose daily 325 mg asa and plavix for now.   - Hematology consulted for AC recs, and rec warfarin with INR 2.5-3.5.   - INR today 3 , discontinue heparin drip, hold today's warfarin, expect INR to go up  - Judicial pain control with stool softeners/stimulants  - Serial hgb and abdominal exam  - Diet ordered  - Remove pozo  - Ambulate, Up in chair, PT OT      Anemia of chronic disease, iron deficiency anemia and acute blood loss anemia  - Known iron deficiency anemia (per chart review, iron level checked last month).   - Serial hgb, remains stable  - Transfuse as needed for hgb <7.  - Will continue monitor     May-Thurner syndrome  - Chronic condition  - Treatment as discussed above     Factor V Leiden mutation  - Followed by Hematology as outpatient  - Chronic condition  - Hem consulted for AC mgmt and recs for dc, rec warfarin with goal INR 2.5-3.5     Presence of IVC filter  - Filter in " place       History of pulmonary embolism  - S/p IVC filter since 2014, IR attempted to remove this in the past but was unsuccessful and complicated   - Chronically anticoagulated on Eliquis as outpatient, despite this she has clots  - Hematology consulted, rec warfarin goal 2.5-3.5 as above        Prophylaxis- on high dose heparin infusion while started warfarin goal 2.5-3.5 as per hematology   Code Status- Full Code   Discharge plan and follow up - d/c home once medically stable with PCP, IR and hematology fu    Willi Anna MD  Hospital Medicine Staff  Pager [phone]

## 2020-01-20 LAB
ALBUMIN SERPL BCP-MCNC: 3.2 G/DL (ref 3.5–5.2)
ALP SERPL-CCNC: 94 U/L (ref 55–135)
ALT SERPL W/O P-5'-P-CCNC: 6 U/L (ref 10–44)
ANION GAP SERPL CALC-SCNC: 8 MMOL/L (ref 8–16)
APTT BLDCRRT: 31.3 SEC (ref 21–32)
APTT BLDCRRT: 51.9 SEC (ref 21–32)
AST SERPL-CCNC: 10 U/L (ref 10–40)
BASOPHILS # BLD AUTO: 0.01 K/UL (ref 0–0.2)
BASOPHILS NFR BLD: 0.4 % (ref 0–1.9)
BILIRUB SERPL-MCNC: 0.2 MG/DL (ref 0.1–1)
BUN SERPL-MCNC: 4 MG/DL (ref 6–20)
CALCIUM SERPL-MCNC: 9.3 MG/DL (ref 8.7–10.5)
CHLORIDE SERPL-SCNC: 106 MMOL/L (ref 95–110)
CO2 SERPL-SCNC: 27 MMOL/L (ref 23–29)
CREAT SERPL-MCNC: 0.7 MG/DL (ref 0.5–1.4)
DIFFERENTIAL METHOD: ABNORMAL
EOSINOPHIL # BLD AUTO: 0.1 K/UL (ref 0–0.5)
EOSINOPHIL NFR BLD: 4.8 % (ref 0–8)
ERYTHROCYTE [DISTWIDTH] IN BLOOD BY AUTOMATED COUNT: 15.1 % (ref 11.5–14.5)
EST. GFR  (AFRICAN AMERICAN): >60 ML/MIN/1.73 M^2
EST. GFR  (NON AFRICAN AMERICAN): >60 ML/MIN/1.73 M^2
GLUCOSE SERPL-MCNC: 95 MG/DL (ref 70–110)
HCT VFR BLD AUTO: 27.5 % (ref 37–48.5)
HGB BLD-MCNC: 8.3 G/DL (ref 12–16)
IMM GRANULOCYTES # BLD AUTO: 0.01 K/UL (ref 0–0.04)
IMM GRANULOCYTES NFR BLD AUTO: 0.4 % (ref 0–0.5)
INR PPP: 2 (ref 0.8–1.2)
LYMPHOCYTES # BLD AUTO: 0.8 K/UL (ref 1–4.8)
LYMPHOCYTES NFR BLD: 35.1 % (ref 18–48)
MAGNESIUM SERPL-MCNC: 2 MG/DL (ref 1.6–2.6)
MCH RBC QN AUTO: 27 PG (ref 27–31)
MCHC RBC AUTO-ENTMCNC: 30.2 G/DL (ref 32–36)
MCV RBC AUTO: 90 FL (ref 82–98)
MONOCYTES # BLD AUTO: 0.2 K/UL (ref 0.3–1)
MONOCYTES NFR BLD: 7.4 % (ref 4–15)
NEUTROPHILS # BLD AUTO: 1.2 K/UL (ref 1.8–7.7)
NEUTROPHILS NFR BLD: 51.9 % (ref 38–73)
NRBC BLD-RTO: 0 /100 WBC
PHOSPHATE SERPL-MCNC: 3.6 MG/DL (ref 2.7–4.5)
PLATELET # BLD AUTO: 138 K/UL (ref 150–350)
PMV BLD AUTO: 8.9 FL (ref 9.2–12.9)
POTASSIUM SERPL-SCNC: 4.1 MMOL/L (ref 3.5–5.1)
PROT SERPL-MCNC: 7.1 G/DL (ref 6–8.4)
PROTHROMBIN TIME: 19.3 SEC (ref 9–12.5)
RBC # BLD AUTO: 3.07 M/UL (ref 4–5.4)
SODIUM SERPL-SCNC: 141 MMOL/L (ref 136–145)
WBC # BLD AUTO: 2.31 K/UL (ref 3.9–12.7)

## 2020-01-20 PROCEDURE — 25000003 PHARM REV CODE 250: Performed by: INTERNAL MEDICINE

## 2020-01-20 PROCEDURE — 11000001 HC ACUTE MED/SURG PRIVATE ROOM

## 2020-01-20 PROCEDURE — 84100 ASSAY OF PHOSPHORUS: CPT

## 2020-01-20 PROCEDURE — 97165 OT EVAL LOW COMPLEX 30 MIN: CPT

## 2020-01-20 PROCEDURE — 36415 COLL VENOUS BLD VENIPUNCTURE: CPT

## 2020-01-20 PROCEDURE — 94761 N-INVAS EAR/PLS OXIMETRY MLT: CPT

## 2020-01-20 PROCEDURE — 80053 COMPREHEN METABOLIC PANEL: CPT

## 2020-01-20 PROCEDURE — 85610 PROTHROMBIN TIME: CPT

## 2020-01-20 PROCEDURE — 83735 ASSAY OF MAGNESIUM: CPT

## 2020-01-20 PROCEDURE — 97161 PT EVAL LOW COMPLEX 20 MIN: CPT

## 2020-01-20 PROCEDURE — 85730 THROMBOPLASTIN TIME PARTIAL: CPT

## 2020-01-20 PROCEDURE — 63600175 PHARM REV CODE 636 W HCPCS: Performed by: PHYSICIAN ASSISTANT

## 2020-01-20 PROCEDURE — 99232 PR SUBSEQUENT HOSPITAL CARE,LEVL II: ICD-10-PCS | Mod: ,,, | Performed by: INTERNAL MEDICINE

## 2020-01-20 PROCEDURE — 85025 COMPLETE CBC W/AUTO DIFF WBC: CPT

## 2020-01-20 PROCEDURE — 63600175 PHARM REV CODE 636 W HCPCS: Performed by: INTERNAL MEDICINE

## 2020-01-20 PROCEDURE — 99232 SBSQ HOSP IP/OBS MODERATE 35: CPT | Mod: ,,, | Performed by: INTERNAL MEDICINE

## 2020-01-20 RX ORDER — HEPARIN SODIUM,PORCINE/D5W 25000/250
18 INTRAVENOUS SOLUTION INTRAVENOUS CONTINUOUS
Status: DISCONTINUED | OUTPATIENT
Start: 2020-01-20 | End: 2020-01-21

## 2020-01-20 RX ADMIN — OXYCODONE HYDROCHLORIDE 10 MG: 10 TABLET ORAL at 01:01

## 2020-01-20 RX ADMIN — Medication 1 MG: at 06:01

## 2020-01-20 RX ADMIN — CLOPIDOGREL BISULFATE 75 MG: 75 TABLET ORAL at 08:01

## 2020-01-20 RX ADMIN — WARFARIN SODIUM 7.5 MG: 2.5 TABLET ORAL at 05:01

## 2020-01-20 RX ADMIN — Medication 1 MG: at 11:01

## 2020-01-20 RX ADMIN — HEPARIN SODIUM 18 UNITS/KG/HR: 10000 INJECTION, SOLUTION INTRAVENOUS at 03:01

## 2020-01-20 RX ADMIN — HYDROMORPHONE HYDROCHLORIDE 0.5 MG: 1 INJECTION, SOLUTION INTRAMUSCULAR; INTRAVENOUS; SUBCUTANEOUS at 12:01

## 2020-01-20 RX ADMIN — OXYCODONE HYDROCHLORIDE 10 MG: 10 TABLET ORAL at 06:01

## 2020-01-20 RX ADMIN — Medication 1 MG: at 08:01

## 2020-01-20 RX ADMIN — ASPIRIN 325 MG: 325 TABLET, COATED ORAL at 08:01

## 2020-01-20 NOTE — PLAN OF CARE
Problem: Occupational Therapy Goal  Goal: Occupational Therapy Goal  Outcome: Met    OT evaluation completed. Patient presents at [facility]. No further OT services needed at this time.  Cielo Fairbanks OT  1/20/2020

## 2020-01-20 NOTE — PLAN OF CARE
Pt presented supine, agreeable to PT.  Pt transferred supine<>sit<>stand, ambulated x 200ft, no AD Mod I due to post sx pain.  Gait with slight hip flexion, decreased step length r, due to L LE.  It can be reasonably expected that pt will spontaneously return to baseline LOF as she returns to her normal activities. Pt instructed to inform physician and consider PT if not.  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

## 2020-01-20 NOTE — PROGRESS NOTES
"                                                    [facility] Hospital Medicine                                                                     Progress Note     Team: [facility] MED G Willi Anna MD   Admit Date: 1/14/2020   Hospital Day: 5  KADEEM: 1/20/2020   Code status: Full Code   Principal Problem: Acute deep vein thrombosis (DVT) of proximal vein of both lower extremities     SUMMARY:     29-year-old female with Factor V Leiden, May-Thurner Syndrome, and history of PE and recurrent DVTs who presented again with bilateral leg pain and swelling along with right sided abdominal pain for about 2 days. She has an IVC filter since 2014 and has had multiple DVTs due to May-Thurner syndrome despite being fully anticoagulated on Eliquis. On her last admission from 12/18 - 12/22/19, the patient had catheter-directed thrombolysis in addition to anticoagulation - IR performed Angiojet and angioplasty of both legs and stents was performed with reestablished flow through legs and stents on 12/20/2019. Dr. Rola Miranda with IR recommended close outpatient follow-up with IR at [facility] due to patient's need to be re-evaluated for further aggressive treatment. Patient never followed up with IR at [facility].  She was discharged on Eliquis for which she reports compliance. In the ER, workup CT of the abd/pelvis with contrast showed "IVC filter with bilateral IVC and external iliac vein stents.  Unable to assess for patency or thrombus within the stent due to contrast timing.  There do however appear to be intraluminal filling defects within the right common femoral vein and possibly left common femoral vein which may indicate acute deep venous thrombosis. Mild dilatation of the tip of the appendix measuring 9 mm.  No surrounding inflammatory changes are seen, however potential early acute appendicitis not excluded.  Similar " "findings were however seen on previous CT from November 2019" and U/S of LE showed "nonocclusive thrombus is visualized within the right external iliac, common femoral, femoral, popliteal, peroneal, and anterior tibial veins. Greater saphenous vein and posterior tibial veins are patent. Left lower extremity: Nonocclusive thrombus is visualized within the left external iliac, common femoral, femoral, popliteal, and 1 of the paired posterior tibial veins. Greater saphenous, anterior tibial, and peroneal veins are patent."    At OSH, Patient was started on IV heparin. IR was consulted at  and noted, "IR consult placed to evaluate pt with h/o of recurrent BLE iliofemoral DVT for potential intervention. Unfortunately, rheolytic thrombectomy device required for intervention here at  is currently not operable since 1/15/20 and needs to be serviced. Unsure what timeline is for rep to evaluate and then potential fix or replace the unit. Recommend transfer to [facility] and consult IR there for evaluation and potential intervention."     Patient transferred to [facility] for IR consultation for Rheolytic thrombectomy. IR consulted. Remains on heparin infusion. Pain adequately controlled. Planned for thrombolysis/thrombectomy with anesthesia 1/17. S/p mechanical thrombectomy with IR 1/17 with stent extension to cfv bilaterally. Final venogram showing good flow thorough the system. IR rec high dose daily 325 mg asa and plavix for now. Hematology consulted for AC recs, and rec warfarin with INR 2.5-3.5. INR now 3, discontinued heparin drip. Hgb and vitals stable.     On 1/20 INR 2, discussed with hematology, they rec restarting heparin drip due to complicated hx of clot and recent thrombectomy. Will need to bridge warfarin with heparin at this time. CM/SW working on home INR meter.       SUBJECTIVE:     Pt was seen and examined at bedside. No acute events overnight. No new complaints. HDS and afebrile. Pain improving overall. " Having good BMs. Ambulating well. Was planned for discharge today, but INR dropped to 2, now back on heparin drip. Tearful she has to stay, she apparently need to go back to work by Wednesday as per patient.    ROS (Positive in Bold, otherwise negative)  Pain Scale: 4 /10   Constitutional: fever, chills, night sweats  CV: chest pain, edema, palpitations  Resp: SOB, cough, sputum production  GI: changes in appetite, NVDC, abd pain improving, melena, hematochezia, GERD, hematemesis  : Dysuria, hematuria, urinary urgency, frequency  MSK: arthralgia/myalgia - Right and left leg pain (improving) and swelling , joint swelling  SKIN: rashes, pruritis, petechiae   Neuro/Psych: FND, anxiety, depression      OBJECTIVE:     Vitals:  Temp:  [97.6 °F (36.4 °C)-98.7 °F (37.1 °C)]   Pulse:  []   Resp:  [16-20]   BP: (100-113)/(55-62)   SpO2:  [98 %-100 %]      I & O (Last 24H):   No intake or output data in the 24 hours ending 01/20/20 1337      GEN:  female  in no acute distress. Nontoxic. Resting in bed. Cooperative.  HEENT: NCAT. PERRL. EOMI. Conjunctivae/corneas clear, sclera Anicteric.  CVS: RRR. Normal s1 s2 no murmur, click, rub or gallop  LUNG: CTAB. Normal respiratory effort. No wheezes, rhonchi, or crackles.  ABD: Normoactive BS, soft, mild RLQ and LLQ tenderness, ND, no masses or organomegaly. No peritoneal signs.  EXT: Bilateral asymmetric LE + 2 edema. Calf TTP. No cyanosis or toe color changes. Full ROM.   SKIN: color, texture, turgor normal. No rashes or lesions  NEURO: Alert, oriented x 4, Spont mvt of all extremities with no focal deficits noted.      All recent labs and imaging has been reviewed.     Recent Results (from the past 24 hour(s))   CBC auto differential    Collection Time: 01/20/20  8:25 AM   Result Value Ref Range    WBC 2.31 (L) 3.90 - 12.70 K/uL    RBC 3.07 (L) 4.00 - 5.40 M/uL    Hemoglobin 8.3 (L) 12.0 - 16.0 g/dL    Hematocrit 27.5 (L) 37.0 - 48.5 %    Mean Corpuscular Volume 90  82 - 98 fL    Mean Corpuscular Hemoglobin 27.0 27.0 - 31.0 pg    Mean Corpuscular Hemoglobin Conc 30.2 (L) 32.0 - 36.0 g/dL    RDW 15.1 (H) 11.5 - 14.5 %    Platelets 138 (L) 150 - 350 K/uL    MPV 8.9 (L) 9.2 - 12.9 fL    Immature Granulocytes 0.4 0.0 - 0.5 %    Gran # (ANC) 1.2 (L) 1.8 - 7.7 K/uL    Immature Grans (Abs) 0.01 0.00 - 0.04 K/uL    Lymph # 0.8 (L) 1.0 - 4.8 K/uL    Mono # 0.2 (L) 0.3 - 1.0 K/uL    Eos # 0.1 0.0 - 0.5 K/uL    Baso # 0.01 0.00 - 0.20 K/uL    nRBC 0 0 /100 WBC    Gran% 51.9 38.0 - 73.0 %    Lymph% 35.1 18.0 - 48.0 %    Mono% 7.4 4.0 - 15.0 %    Eosinophil% 4.8 0.0 - 8.0 %    Basophil% 0.4 0.0 - 1.9 %    Differential Method Automated    Comprehensive metabolic panel    Collection Time: 01/20/20  8:25 AM   Result Value Ref Range    Sodium 141 136 - 145 mmol/L    Potassium 4.1 3.5 - 5.1 mmol/L    Chloride 106 95 - 110 mmol/L    CO2 27 23 - 29 mmol/L    Glucose 95 70 - 110 mg/dL    BUN, Bld 4 (L) 6 - 20 mg/dL    Creatinine 0.7 0.5 - 1.4 mg/dL    Calcium 9.3 8.7 - 10.5 mg/dL    Total Protein 7.1 6.0 - 8.4 g/dL    Albumin 3.2 (L) 3.5 - 5.2 g/dL    Total Bilirubin 0.2 0.1 - 1.0 mg/dL    Alkaline Phosphatase 94 55 - 135 U/L    AST 10 10 - 40 U/L    ALT 6 (L) 10 - 44 U/L    Anion Gap 8 8 - 16 mmol/L    eGFR if African American >60.0 >60 mL/min/1.73 m^2    eGFR if non African American >60.0 >60 mL/min/1.73 m^2   Magnesium    Collection Time: 01/20/20  8:25 AM   Result Value Ref Range    Magnesium 2.0 1.6 - 2.6 mg/dL   Phosphorus    Collection Time: 01/20/20  8:25 AM   Result Value Ref Range    Phosphorus 3.6 2.7 - 4.5 mg/dL   Protime-INR    Collection Time: 01/20/20  8:25 AM   Result Value Ref Range    Prothrombin Time 19.3 (H) 9.0 - 12.5 sec    INR 2.0 (H) 0.8 - 1.2   APTT    Collection Time: 01/20/20 12:08 PM   Result Value Ref Range    aPTT 31.3 21.0 - 32.0 sec       No results for input(s): POCTGLUCOSE in the last 168 hours.    No results found for: HGBA1C     Active Hospital Problems     "Diagnosis  POA    *Acute deep vein thrombosis (DVT) of proximal vein of both lower extremities [I82.4Y3]  Yes    Anemia of chronic disease, iron deficiency anemia and acute blood loss anemia [D63.8]  Yes    Presence of IVC filter [Z95.828]  Not Applicable    May-Thurner syndrome [I87.1]  Yes    History of pulmonary embolism [Z86.711]  Yes     Provoked and unprovoked, on lovenox      Factor V Leiden mutation [D68.51]  Yes     Follow by Hematology        Resolved Hospital Problems   No resolved problems to display.          ASSESSMENT AND PLAN:     Acute deep vein thrombosis (DVT) of proximal vein of both lower extremities  Acute on chronic issue for this patient secondary to her May-Thurner syndrome. Imaging with CT of the abd/pelvis with contrast showed "IVC filter with bilateral IVC and external iliac vein stents.  Unable to assess for patency or thrombus within the stent due to contrast timing.  There do however appear to be intraluminal filling defects within the right common femoral vein and possibly left common femoral vein which may indicate acute deep venous thrombosis. Mild dilatation of the tip of the appendix measuring 9 mm.  No surrounding inflammatory changes are seen, however potential early acute appendicitis not excluded.  Similar findings were however seen on previous CT from November 2019." U/S of LE showed "nonocclusive thrombus is visualized within the right external iliac, common femoral, femoral, popliteal, peroneal, and anterior tibial veins. Greater saphenous vein and posterior tibial veins are patent. Left lower extremity: Nonocclusive thrombus is visualized within the left external iliac, common femoral, femoral, popliteal, and 1 of the paired posterior tibial veins. Greater saphenous, anterior tibial, and peroneal veins are patent." Treatment initiated with heparin infusion. On previous admission, Dr. Rola Miranda with IR recommended close outpatient follow-up with IR at [facility] [place] due to patient's need to be re-evaluated for further aggressive treatment not done at this facility. Additionally, IR at  reported rheolytic thrombectomy device required for intervention here at  is currently not operable and needs to be serviced. Transferred to [facility] 1/16 AM. Continued heparin infusion. S/p mechanical thrombectomy with IR 1/17 with stent extension to cfv bilaterally. Final venogram showing good flow thorough the system.   - IR rec AC, high dose daily 325 mg asa and plavix for now.   - Hematology consulted for AC recs, and rec warfarin with INR 2.5-3.5.   - INR today 2 , restart heparin drip as per hematology, continue warfarin 7.5 mg today, and change to 5 mg daily starting tomorrow  - Judicial pain control with stool softeners/stimulants  - Daily hgb and abdominal exam  - Ambulate, Up in chair, PT OT   - Patient would like home INR meter, CM /SW working on this     Anemia of chronic disease, iron deficiency anemia and acute blood loss anemia  - Known iron deficiency anemia (per chart review, iron level checked last month).   - Serial hgb, remains stable  - Transfuse as needed for hgb <7.  - Will continue monitor     May-Thurner syndrome  - Chronic condition  - Treatment as discussed above     Factor V Leiden mutation  - Followed by Hematology as outpatient  - Chronic condition  - Hem consulted for AC mgmt and recs for dc, rec warfarin with goal INR 2.5-3.5     Presence of IVC filter  - Filter in place       History of pulmonary embolism  - S/p IVC filter since 2014, IR attempted to remove this in the past but was unsuccessful and complicated   - Chronically anticoagulated on Eliquis as outpatient, despite this she has clots  - Hematology consulted, rec warfarin goal 2.5-3.5 as above        Prophylaxis- on high dose heparin infusion while started warfarin goal 2.5-3.5 as per hematology   Code Status- Full Code   Discharge plan and follow up - d/c home once medically stable with PCP, IR and  hematology fu    Willi Anna MD  [facility] Medicine Staff  Pager [phone]

## 2020-01-20 NOTE — PT/OT/SLP EVAL
Occupational Therapy   Evaluation and Discharge Note    Name: Antoinette Love  MRN: [id number]  Admitting Diagnosis:  Acute deep vein thrombosis (DVT) of proximal vein of both lower extremities 3 Days Post-Op   DECLOTTING-LEG (Bilateral)    Recommendations:     Discharge Recommendations: home  Discharge Equipment Recommendations:  none  Barriers to discharge:  None    Assessment:     Antoinette Love is a 29 y.o. female with a medical diagnosis of Acute deep vein thrombosis (DVT) of proximal vein of both lower extremities. At this time, patient is functioning at her prior level of function and does not require further acute OT services.     Plan:     During this hospitalization, patient does not require further acute OT services.  Please re-consult if situation changes.    · Plan of Care Reviewed with: patient    Subjective     Chief Complaint: Pain on lower left side around her abdomen/hip  Patient/Family Comments/goals: To feel better and return home    Occupational Profile:  Living Environment: Patient lives with her fiance in a 1st floor apartment with 0 [address]. Her bathroom has a tub/shower combination.  Previous level of function: Independent PTA  Roles and Routines: Fiancee. 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 She enjoys playing poker.  Equipment Used at home:  none  Assistance upon Discharge: Leobardo is available to assist    Pain/Comfort:  · Pain Rating 1: 5/10  · Location - Side 1: Left  · Location - Orientation 1: lower  · Location 1: abdomen  · Pain Addressed 1: Distraction, Reposition  · Pain Rating Post-Intervention 1: 5/10    Patients cultural, spiritual, Mosque conflicts given the current situation: no    Objective:     Communicated with: RN prior to session.  Patient found HOB elevated with telemetry(hep lock IV) upon OT entry to room.    General Precautions: Standard,     Orthopedic Precautions:N/A   Braces: N/A     Occupational Performance:    Bed Mobility:    · Patient completed  Scooting anteriorly to EOB for foot placement on floor with modified independence with increased time due to pain  · Patient completed Supine to Sit to L side EOB with modified independence with increased time due to pain  · Patient completed Sit to Supine with modified independence with increased time due to pain    Functional Mobility/Transfers:  · Patient completed Sit <> Stand Transfer with modified independence  with  no assistive device   · Functional Mobility: Patient completed functional mobility ~200' with modified independence with no assistive device. Patient with decreased gait speed 2' pain but with no LOB/SOB noted.    Activities of Daily Living:  · Lower Body Dressing: independence Patient donned non-slip socks while sitting EOB independently.    Cognitive/Visual Perceptual:  Cognitive/Psychosocial Skills:     -       Oriented to: Person, Place, Time and Situation   -       Follows Commands/attention:Follows multistep  commands    Physical Exam:  Upper Extremity Range of Motion:     -       Right Upper Extremity: WFL  -       Left Upper Extremity: WFL  Upper Extremity Strength:    -       Right Upper Extremity: WFL  -       Left Upper Extremity: WFL    AMPAC 6 Click ADL:  AMPAC Total Score: 24    Treatment & Education:  Role of OT and evaluation  Discussed d/c from skilled OT services with patient in agreement - no concerns/questions within OT scope of practice  Call button for assistance  Education:    Patient left HOB elevated with all lines intact, call button in reach and RN notified    GOALS:   Multidisciplinary Problems     Occupational Therapy Goals     Not on file          Multidisciplinary Problems (Resolved)        Problem: Occupational Therapy Goal    Goal Priority Disciplines Outcome Interventions   Occupational Therapy Goal   (Resolved)     OT, PT/OT Met                    History:     Past Medical History:   Diagnosis Date    Anemia of chronic disease 12/19/2019    Anticoagulant  long-term use     Anticoagulant long-term use     FERNANDO (dyspnea on exertion)     DVT (deep venous thrombosis)     Encounter for blood transfusion     Factor V Leiden     Leg edema, left     May-Thurner syndrome     Multiple pulmonary nodules     Pulmonary embolus     RAD (reactive airway disease)     Recurrent upper respiratory infection (URI)     Recurrent urticaria     Stroke     TIA (transient ischemic attack)        Past Surgical History:   Procedure Laterality Date    COLONOSCOPY N/A 12/18/2015    Procedure: COLONOSCOPY;  Surgeon: Lefty Lee MD;  Location: [facility] (4TH FLR);  Service: Endoscopy;  Laterality: N/A;  schedule with Jesus    IVC FILTER RETRIEVAL      IVC FILTER RETRIEVAL N/A 9/23/2019    Procedure: REMOVAL-FILTER-IVC;  Surgeon: Claudia Surgeon;  Location: [facility];  Service: Anesthesiology;  Laterality: N/A;  188  4 hours Anes    tumor from breast      left    WISDOM TOOTH EXTRACTION         Time Tracking:     OT Date of Treatment: 01/20/20  OT Start Time: 0924  OT Stop Time: 0934  OT Total Time (min): 10 min    Billable Minutes:Evaluation 10 minutes    Cielo Fairbanks OT  1/20/2020

## 2020-01-20 NOTE — PLAN OF CARE
Pt progressing towards goals. Heparin and warfarin stopped. INR to be checked tomorrow and pt to remain inpatient until therapeutic and pain controlled.

## 2020-01-20 NOTE — NURSING
Pt eager to go home and expressed desire to wean off IV pain meds. Pt walked in hallway with stand-by assistance.

## 2020-01-20 NOTE — PLAN OF CARE
Pt able to make needs known, started on heparin drip, medicated with PRN pain meds, no distress noted, will continue to monitor.

## 2020-01-20 NOTE — PT/OT/SLP EVAL
Physical Therapy Evaluation and Discharge Note    Patient Name:  Antoinette Love   MRN:  [id number]    Recommendations:     Discharge Recommendations:    Home  Discharge Equipment Recommendations: none home  Barriers to discharge: None    Assessment:     Antoinette Love is a 29 y.o. female admitted with a medical diagnosis of Acute deep vein thrombosis (DVT) of proximal vein of both lower extremities. .  At this time, patient is functioning at their prior level of function and does not require further acute PT services. Pt presented supine, agreeable to PT.  Pt transferred supine<>sit<>stand, ambulated x 200ft, no AD Mod I due to post sx pain.  Gait with slight hip flexion, decreased step length r, due to L LE.  It can be reasonably expected that pt will spontaneously return to baseline LOF as she returns to her normal activities. Pt instructed to inform physician and consider PT if not.  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    Recent Surgery: Procedure(s) (LRB):  DECLOTTING-LEG (Bilateral) 3 Days Post-Op    Plan:     During this hospitalization, patient does not require further acute PT services.  Please re-consult if situation changes.      Subjective     Chief Complaint : L hip/trunk/LE sx  pain  Patient/Family Comments/goals: go home today  Pain/Comfort:  Pain Rating Post-Intervention 1: 5/10    Patients cultural, spiritual, Gnosticism conflicts given the current situation: no    Living Environment:  Pt lives in 1st floor apt with v[place], 0STE  Prior to admission, patients level of function was I.  Equipment used at home: none.  DME owned (not currently used): none.  Upon discharge, patient will have assistance from niya as needed.    Objective:     Communicated with nurse prior to session.  Patient found supine with telemetry, peripheral IV(hep lock IV) upon PT entry to room.    General Precautions: Standard, (standard)   Orthopedic  Precautions:    Braces:       Exams:  · Cognitive Exam:  Patient is oriented to Person, Place, Time and Situation  · Gross Motor Coordination:  WFL  · Postural Exam:  Patient presented with the following abnormalities:    · -       slight hip/trunk flexion in gait due to sx pain  · Sensation:    · -       Intact  · RLE ROM: WFL  · RLE Strength: WFL  · LLE ROM: WFL  · LLE Strength: WFL    Functional Mobility:  Pt presented supine, agreeable to PT.  Pt transferred supine<>sit<>stand, ambulated x 200ft, no AD Mod I due to post sx pain.  Gait with slight hip flexion, decreased step length r, due to L LE.  It can be reasonably expected that pt will spontaneously return to baseline LOF as she returns to her normal activities. Pt instructed to inform physician and consider PT if not.  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    AM-PAC 6 CLICK MOBILITY  Total Score:24       AM-PAC 6 CLICK MOBILITY  Total Score:24     Patient left supine with all lines intact and call button in reach.    GOALS:   Multidisciplinary Problems     Physical Therapy Goals     Not on file          Multidisciplinary Problems (Resolved)        Problem: Physical Therapy Goal    Goal Priority Disciplines Outcome Goal Variances Interventions   Physical Therapy Goal   (Resolved)     PT, PT/OT Met     Description:  eval only                    History:     Past Medical History:   Diagnosis Date    Anemia of chronic disease 12/19/2019    Anticoagulant long-term use     Anticoagulant long-term use     FERNANDO (dyspnea on exertion)     DVT (deep venous thrombosis)     Encounter for blood transfusion     Factor V Leiden     Leg edema, left     May-Thurner syndrome     Multiple pulmonary nodules     Pulmonary embolus     RAD (reactive airway disease)     Recurrent upper respiratory infection (URI)     Recurrent urticaria     Stroke     TIA (transient ischemic attack)        Past Surgical  History:   Procedure Laterality Date    COLONOSCOPY N/A 12/18/2015    Procedure: COLONOSCOPY;  Surgeon: Lefty Lee MD;  Location: [facility] (4TH FLR);  Service: Endoscopy;  Laterality: N/A;  schedule with Ray    IVC FILTER RETRIEVAL      IVC FILTER RETRIEVAL N/A 9/23/2019    Procedure: REMOVAL-FILTER-IVC;  Surgeon: Claudia Surgeon;  Location: [facility];  Service: Anesthesiology;  Laterality: N/A;  188  4 hours Anes    tumor from breast      left    WISDOM TOOTH EXTRACTION         Time Tracking:     PT Received On: 01/20/20  PT Start Time: 0925     PT Stop Time: 0934  PT Total Time (min): 9 min     Billable Minutes: Evaluation 9      Tavares Hinson, PT  01/20/2020

## 2020-01-20 NOTE — ANESTHESIA PROCEDURE NOTES
Arterial    Diagnosis: DVT    Patient location during procedure: done in OR  Procedure start time: 1/17/2020 11:11 AM  Timeout: 1/17/2020 11:05 AM  Procedure end time: 1/17/2020 11:15 AM    Staffing  Authorizing Provider: Jorge Nails MD  Performing Provider: Jorge Nails MD    Anesthesiologist was present at the time of the procedure.    Preanesthetic Checklist  Completed: patient identified, site marked, surgical consent, pre-op evaluation, timeout performed, IV checked, risks and benefits discussed, monitors and equipment checked and anesthesia consent givenArterial  Skin Prep: chlorhexidine gluconate  Orientation: right  Location: radial  Catheter Size: 20 G  Catheter placement by Anatomical landmarks. Heme positive aspiration all ports.Insertion Attempts: 1  Assessment  Dressing: secured with tape and tegaderm  Patient: Tolerated well

## 2020-01-20 NOTE — PLAN OF CARE
CM met with patient to obtain updated demographics and payor coverage.    EMPLOYER: [employer], [address] [place] [address] , [place] [address].    PAYOR: [employer]  Effective 1/1/2020    CM requested and received pictures of insurance card. Will upload to pt's chart.    CM contacted [facility] to inquire re: INR home device, but they do not have.    CM update team.     Shari Hallman RN  [id number]

## 2020-01-21 VITALS
BODY MASS INDEX: 27 KG/M2 | TEMPERATURE: 98 F | RESPIRATION RATE: 16 BRPM | HEIGHT: 65 IN | DIASTOLIC BLOOD PRESSURE: 50 MMHG | HEART RATE: 77 BPM | OXYGEN SATURATION: 99 % | SYSTOLIC BLOOD PRESSURE: 100 MMHG | WEIGHT: 162.06 LBS

## 2020-01-21 PROBLEM — J45.30 MILD PERSISTENT REACTIVE AIRWAY DISEASE WITHOUT COMPLICATION: Status: ACTIVE | Noted: 2020-01-21

## 2020-01-21 PROBLEM — R10.31 RIGHT LOWER QUADRANT ABDOMINAL PAIN: Status: ACTIVE | Noted: 2020-01-21

## 2020-01-21 PROBLEM — M79.89 LEG SWELLING: Status: ACTIVE | Noted: 2020-01-21

## 2020-01-21 PROBLEM — D72.819 LEUKOPENIA, UNSPECIFIED TYPE: Status: ACTIVE | Noted: 2020-01-21

## 2020-01-21 PROBLEM — I82.413 ACUTE DEEP VEIN THROMBOSIS (DVT) OF FEMORAL VEIN OF BOTH LOWER EXTREMITIES: Status: ACTIVE | Noted: 2020-01-21

## 2020-01-21 LAB
ALBUMIN SERPL BCP-MCNC: 3.5 G/DL (ref 3.5–5.2)
ALP SERPL-CCNC: 97 U/L (ref 55–135)
ALT SERPL W/O P-5'-P-CCNC: 6 U/L (ref 10–44)
ANION GAP SERPL CALC-SCNC: 10 MMOL/L (ref 8–16)
APTT BLDCRRT: 66.2 SEC (ref 21–32)
AST SERPL-CCNC: 9 U/L (ref 10–40)
BASOPHILS # BLD AUTO: 0.02 K/UL (ref 0–0.2)
BASOPHILS NFR BLD: 0.7 % (ref 0–1.9)
BILIRUB SERPL-MCNC: 0.2 MG/DL (ref 0.1–1)
BUN SERPL-MCNC: 7 MG/DL (ref 6–20)
CALCIUM SERPL-MCNC: 9.8 MG/DL (ref 8.7–10.5)
CHLORIDE SERPL-SCNC: 104 MMOL/L (ref 95–110)
CO2 SERPL-SCNC: 27 MMOL/L (ref 23–29)
CREAT SERPL-MCNC: 0.7 MG/DL (ref 0.5–1.4)
DIFFERENTIAL METHOD: ABNORMAL
EOSINOPHIL # BLD AUTO: 0.1 K/UL (ref 0–0.5)
EOSINOPHIL NFR BLD: 4.4 % (ref 0–8)
ERYTHROCYTE [DISTWIDTH] IN BLOOD BY AUTOMATED COUNT: 14.9 % (ref 11.5–14.5)
EST. GFR  (AFRICAN AMERICAN): >60 ML/MIN/1.73 M^2
EST. GFR  (NON AFRICAN AMERICAN): >60 ML/MIN/1.73 M^2
GLUCOSE SERPL-MCNC: 89 MG/DL (ref 70–110)
HCT VFR BLD AUTO: 30.9 % (ref 37–48.5)
HGB BLD-MCNC: 8.9 G/DL (ref 12–16)
IMM GRANULOCYTES # BLD AUTO: 0.01 K/UL (ref 0–0.04)
IMM GRANULOCYTES NFR BLD AUTO: 0.4 % (ref 0–0.5)
INR PPP: 2.6 (ref 0.8–1.2)
LYMPHOCYTES # BLD AUTO: 1 K/UL (ref 1–4.8)
LYMPHOCYTES NFR BLD: 36 % (ref 18–48)
MAGNESIUM SERPL-MCNC: 2.1 MG/DL (ref 1.6–2.6)
MCH RBC QN AUTO: 26.5 PG (ref 27–31)
MCHC RBC AUTO-ENTMCNC: 28.8 G/DL (ref 32–36)
MCV RBC AUTO: 92 FL (ref 82–98)
MONOCYTES # BLD AUTO: 0.2 K/UL (ref 0.3–1)
MONOCYTES NFR BLD: 5.8 % (ref 4–15)
NEUTROPHILS # BLD AUTO: 1.5 K/UL (ref 1.8–7.7)
NEUTROPHILS NFR BLD: 52.7 % (ref 38–73)
NRBC BLD-RTO: 0 /100 WBC
PHOSPHATE SERPL-MCNC: 4.6 MG/DL (ref 2.7–4.5)
PLATELET # BLD AUTO: 172 K/UL (ref 150–350)
PMV BLD AUTO: 9.1 FL (ref 9.2–12.9)
POTASSIUM SERPL-SCNC: 4.2 MMOL/L (ref 3.5–5.1)
PROT SERPL-MCNC: 7.8 G/DL (ref 6–8.4)
PROTHROMBIN TIME: 24.8 SEC (ref 9–12.5)
RBC # BLD AUTO: 3.36 M/UL (ref 4–5.4)
SODIUM SERPL-SCNC: 141 MMOL/L (ref 136–145)
WBC # BLD AUTO: 2.75 K/UL (ref 3.9–12.7)

## 2020-01-21 PROCEDURE — 99239 HOSP IP/OBS DSCHRG MGMT >30: CPT | Mod: ,,, | Performed by: HOSPITALIST

## 2020-01-21 PROCEDURE — 99239 PR HOSPITAL DISCHARGE DAY,>30 MIN: ICD-10-PCS | Mod: ,,, | Performed by: HOSPITALIST

## 2020-01-21 PROCEDURE — 80053 COMPREHEN METABOLIC PANEL: CPT

## 2020-01-21 PROCEDURE — 84100 ASSAY OF PHOSPHORUS: CPT

## 2020-01-21 PROCEDURE — 85610 PROTHROMBIN TIME: CPT

## 2020-01-21 PROCEDURE — 83735 ASSAY OF MAGNESIUM: CPT

## 2020-01-21 PROCEDURE — 36415 COLL VENOUS BLD VENIPUNCTURE: CPT

## 2020-01-21 PROCEDURE — 85730 THROMBOPLASTIN TIME PARTIAL: CPT

## 2020-01-21 PROCEDURE — 63600175 PHARM REV CODE 636 W HCPCS: Performed by: PHYSICIAN ASSISTANT

## 2020-01-21 PROCEDURE — 85025 COMPLETE CBC W/AUTO DIFF WBC: CPT

## 2020-01-21 PROCEDURE — 25000003 PHARM REV CODE 250: Performed by: INTERNAL MEDICINE

## 2020-01-21 RX ORDER — OXYCODONE HYDROCHLORIDE 5 MG/1
5 CAPSULE ORAL EVERY 6 HOURS PRN
Qty: 20 CAPSULE | Refills: 0 | Status: SHIPPED | OUTPATIENT
Start: 2020-01-21 | End: 2020-02-13

## 2020-01-21 RX ORDER — CALCIUM CARBONATE 200(500)MG
500 TABLET,CHEWABLE ORAL 3 TIMES DAILY PRN
COMMUNITY
Start: 2020-01-21 | End: 2020-02-13

## 2020-01-21 RX ORDER — WARFARIN SODIUM 5 MG/1
5 TABLET ORAL DAILY
Qty: 90 TABLET | Refills: 3 | Status: SHIPPED | OUTPATIENT
Start: 2020-01-21 | End: 2020-04-16

## 2020-01-21 RX ORDER — ASPIRIN 325 MG
325 TABLET, DELAYED RELEASE (ENTERIC COATED) ORAL DAILY
Qty: 90 TABLET | Refills: 3 | Status: SHIPPED | OUTPATIENT
Start: 2020-01-22 | End: 2020-04-16

## 2020-01-21 RX ORDER — CLOPIDOGREL BISULFATE 75 MG/1
75 TABLET ORAL DAILY
Qty: 90 TABLET | Refills: 3 | Status: SHIPPED | OUTPATIENT
Start: 2020-01-22 | End: 2021-01-21

## 2020-01-21 RX ADMIN — CLOPIDOGREL BISULFATE 75 MG: 75 TABLET ORAL at 08:01

## 2020-01-21 RX ADMIN — Medication 1 MG: at 08:01

## 2020-01-21 RX ADMIN — OXYCODONE HYDROCHLORIDE 10 MG: 10 TABLET ORAL at 02:01

## 2020-01-21 RX ADMIN — ASPIRIN 325 MG: 325 TABLET, COATED ORAL at 08:01

## 2020-01-21 NOTE — PROGRESS NOTES
IR Note    Pt seen and examined this AM. Doing very well. GIA. Ambulating. Tolerating diet. +BM. Good UOP. RLQ pain basically gone. Leg issues pretty much resolved.     Temp:  [97.6 °F (36.4 °C)-98.7 °F (37.1 °C)]   Pulse:  []   Resp:  [16-20]   BP: (100-113)/(55-62)   SpO2:  [94 %-100 %]     NAD, smiling, seems happy  RRR  Breathing unlabored  RLE markedly improved  Abd soft, non-peritonitic    A/P:  Progressing very well. RLE complaints resolving. Much less pain.    -Appreciate medicine and hematology assistance  -INR goal 2.5-3.5, cont bridge to warfarin  -Encourage ambulation AMAP  -F/u in IR clinic in 1 month (arranged)    Lamin Chavez MD  R4, Diagnostic and Interventional Radiology  Pager: [phone]

## 2020-01-21 NOTE — PLAN OF CARE
Sitting up on side of bed, happy about going home. No c/o pain or discomfort, breathing even and unlabored. Safety maintained during stay. No s/s of bleeding. Agrees to follow outpatient tx recommendations.

## 2020-01-22 ENCOUNTER — PATIENT MESSAGE (OUTPATIENT)
Dept: INTERNAL MEDICINE | Facility: CLINIC | Age: 30
End: 2020-01-22

## 2020-01-22 ENCOUNTER — ANTI-COAG VISIT (OUTPATIENT)
Dept: CARDIOLOGY | Facility: CLINIC | Age: 30
End: 2020-01-22
Payer: COMMERCIAL

## 2020-01-22 ENCOUNTER — TELEPHONE (OUTPATIENT)
Dept: HEMATOLOGY/ONCOLOGY | Facility: HOSPITAL | Age: 30
End: 2020-01-22

## 2020-01-22 DIAGNOSIS — Z86.718 HISTORY OF DEEP VENOUS THROMBOSIS (DVT) OF DISTAL VEIN OF LEFT LOWER EXTREMITY: ICD-10-CM

## 2020-01-22 DIAGNOSIS — R20.2 PARESTHESIA OF RIGHT FOOT: ICD-10-CM

## 2020-01-22 DIAGNOSIS — D72.819 LEUKOPENIA, UNSPECIFIED TYPE: ICD-10-CM

## 2020-01-22 DIAGNOSIS — I82.4Y3 ACUTE DEEP VEIN THROMBOSIS (DVT) OF PROXIMAL VEIN OF BOTH LOWER EXTREMITIES: ICD-10-CM

## 2020-01-22 DIAGNOSIS — D64.9 NORMOCYTIC ANEMIA: ICD-10-CM

## 2020-01-22 DIAGNOSIS — D68.51 FACTOR V LEIDEN MUTATION: Primary | ICD-10-CM

## 2020-01-22 DIAGNOSIS — D63.8 ANEMIA OF CHRONIC DISEASE: ICD-10-CM

## 2020-01-22 DIAGNOSIS — Z95.828 PRESENCE OF IVC FILTER: ICD-10-CM

## 2020-01-22 DIAGNOSIS — R10.31 RIGHT LOWER QUADRANT ABDOMINAL PAIN: ICD-10-CM

## 2020-01-22 DIAGNOSIS — I82.401 DVT, RECURRENT, LOWER EXTREMITY, ACUTE, RIGHT: ICD-10-CM

## 2020-01-22 DIAGNOSIS — I82.413 ACUTE DEEP VEIN THROMBOSIS (DVT) OF FEMORAL VEIN OF BOTH LOWER EXTREMITIES: ICD-10-CM

## 2020-01-22 DIAGNOSIS — Z79.01 LONG TERM (CURRENT) USE OF ANTICOAGULANTS: Primary | ICD-10-CM

## 2020-01-22 DIAGNOSIS — R00.1 BRADYCARDIA: ICD-10-CM

## 2020-01-22 DIAGNOSIS — J45.30 MILD PERSISTENT REACTIVE AIRWAY DISEASE WITHOUT COMPLICATION: ICD-10-CM

## 2020-01-22 DIAGNOSIS — I82.4Z3 ACUTE DEEP VEIN THROMBOSIS (DVT) OF DISTAL VEIN OF BOTH LOWER EXTREMITIES: ICD-10-CM

## 2020-01-22 DIAGNOSIS — D68.51 FACTOR V LEIDEN MUTATION: ICD-10-CM

## 2020-01-22 DIAGNOSIS — I87.1 MAY-THURNER SYNDROME: ICD-10-CM

## 2020-01-22 DIAGNOSIS — I82.402: ICD-10-CM

## 2020-01-22 DIAGNOSIS — R91.8 MULTIPLE PULMONARY NODULES: ICD-10-CM

## 2020-01-22 DIAGNOSIS — R06.09 DYSPNEA ON EXERTION: ICD-10-CM

## 2020-01-22 DIAGNOSIS — L50.8 RECURRENT URTICARIA: ICD-10-CM

## 2020-01-22 DIAGNOSIS — Z86.711 HISTORY OF PULMONARY EMBOLISM: ICD-10-CM

## 2020-01-22 DIAGNOSIS — J96.11 CHRONIC RESPIRATORY FAILURE WITH HYPOXIA: ICD-10-CM

## 2020-01-22 DIAGNOSIS — M79.89 LEG SWELLING: ICD-10-CM

## 2020-01-22 PROCEDURE — 93793 PR ANTICOAGULANT MGMT FOR PT TAKING WARFARIN: ICD-10-PCS | Mod: ,,,

## 2020-01-22 PROCEDURE — 93793 ANTICOAG MGMT PT WARFARIN: CPT | Mod: ,,,

## 2020-01-22 NOTE — DISCHARGE SUMMARY
"  Discharge Summary  Hospital Medicine       Name: Antoinette Love  YOB: 1990 (Age: 29 y.o.)  Date of Admission: 1/14/2020  Date of Discharge: 1/22/2020  Attending Provider on Discharge: Susana Escobar MD  Hospital Medicine Team: [facility] HOSP MED G  Code status: Full Code    Primary Care Provider: Alberto Holguin MD    Discharge Diagnosis:  Active Hospital Problems    Diagnosis  POA    *Acute deep vein thrombosis (DVT) of proximal vein of both lower extremities [I82.4Y3]  Yes    Anemia of chronic disease, iron deficiency anemia and acute blood loss anemia [D63.8]  Yes    Presence of IVC filter [Z95.828]  Not Applicable    May-Thurner syndrome [I87.1]  Yes    History of pulmonary embolism [Z86.711]  Yes     Provoked and unprovoked, on lovenox      Factor V Leiden mutation [D68.51]  Yes     Follow by Hematology        Resolved Hospital Problems   No resolved problems to display.       29-year-old female with Factor V Leiden, May-Thurner Syndrome, and history of PE and recurrent DVTs who presented again with bilateral leg pain and swelling along with right sided abdominal pain for about 2 days. She has an IVC filter since 2014 and has had multiple DVTs due to May-Thurner syndrome despite being fully anticoagulated on Eliquis. On her last admission from 12/18 - 12/22/19, the patient had catheter-directed thrombolysis in addition to anticoagulation - IR performed Angiojet and angioplasty of both legs and stents was performed with reestablished flow through legs and stents on 12/20/2019. Dr. Rola Miranda with IR recommended close outpatient follow-up with IR at [facility] due to patient's need to be re-evaluated for further aggressive treatment. Patient never followed up with IR at [facility].  She was discharged on Eliquis for which she reports compliance. In the ER, workup CT of the abd/pelvis with contrast showed "IVC filter with bilateral IVC and external iliac vein stents.  Unable to assess " "for patency or thrombus within the stent due to contrast timing.  There do however appear to be intraluminal filling defects within the right common femoral vein and possibly left common femoral vein which may indicate acute deep venous thrombosis. Mild dilatation of the tip of the appendix measuring 9 mm.  No surrounding inflammatory changes are seen, however potential early acute appendicitis not excluded.  Similar findings were however seen on previous CT from November 2019" and U/S of LE showed "nonocclusive thrombus is visualized within the right external iliac, common femoral, femoral, popliteal, peroneal, and anterior tibial veins. Greater saphenous vein and posterior tibial veins are patent. Left lower extremity: Nonocclusive thrombus is visualized within the left external iliac, common femoral, femoral, popliteal, and 1 of the paired posterior tibial veins. Greater saphenous, anterior tibial, and peroneal veins are patent."     At OSH, Patient was started on IV heparin. IR was consulted at  and noted, "IR consult placed to evaluate pt with h/o of recurrent BLE iliofemoral DVT for potential intervention. Unfortunately, rheolytic thrombectomy device required for intervention here at  is currently not operable since 1/15/20 and needs to be serviced. Unsure what timeline is for rep to evaluate and then potential fix or replace the unit. Recommend transfer to [facility] [place] [facility] and consult IR there for evaluation and potential intervention."      Patient transferred to [facility] for IR consultation for Rheolytic thrombectomy. IR consulted. Remains on heparin infusion. Pain adequately controlled. Planned for thrombolysis/thrombectomy with anesthesia 1/17. S/p mechanical thrombectomy with IR 1/17 with stent extension to cfv bilaterally. Final venogram showing good flow thorough the system. IR rec high dose daily 325 mg asa and plavix for now. Hematology consulted for AC recs, and rec warfarin with INR 2.5-3.5. INR now 3, " "discontinued heparin drip. Hgb and vitals stable.      On 1/20 INR 2, discussed with hematology, they rec restarting heparin drip due to complicated hx of clot and recent thrombectomy. Will need to bridge warfarin with heparin at this time. CM/SW working on home INR meter.     Hospital Course:   Acute deep vein thrombosis (DVT) of proximal vein of both lower extremities  Acute on chronic issue for this patient secondary to her May-Thurner syndrome. Imaging with CT of the abd/pelvis with contrast showed "IVC filter with bilateral IVC and external iliac vein stents.  Unable to assess for patency or thrombus within the stent due to contrast timing.  There do however appear to be intraluminal filling defects within the right common femoral vein and possibly left common femoral vein which may indicate acute deep venous thrombosis. Mild dilatation of the tip of the appendix measuring 9 mm.  No surrounding inflammatory changes are seen, however potential early acute appendicitis not excluded.  Similar findings were however seen on previous CT from November 2019." U/S of LE showed "nonocclusive thrombus is visualized within the right external iliac, common femoral, femoral, popliteal, peroneal, and anterior tibial veins. Greater saphenous vein and posterior tibial veins are patent. Left lower extremity: Nonocclusive thrombus is visualized within the left external iliac, common femoral, femoral, popliteal, and 1 of the paired posterior tibial veins. Greater saphenous, anterior tibial, and peroneal veins are patent." Treatment initiated with heparin infusion. On previous admission, Dr. Rola Miranda with IR recommended close outpatient follow-up with IR at [facility] due to patient's need to be re-evaluated for further aggressive treatment not done at this facility. Additionally, IR at  reported rheolytic thrombectomy device required for intervention here at  is currently not operable and needs to be serviced. " [place] [facility] [place]. Continued heparin infusion. S/p mechanical thrombectomy with IR 1/17 with stent extension to cfv bilaterally. Final venogram showing good flow thorough the system.   - IR rec AC, high dose daily 325 mg asa and plavix for now.   - Hematology consulted for AC recs, and rec warfarin with INR 2.5-3.5.   - INR on the day of discharge 2.5.  Will discharge with warfarin 5 mg daily with anticoagulation clinic follow-up  -also continue aspirin 325 mg, Plavix 75 mg daily     Anemia of chronic disease, iron deficiency anemia and acute blood loss anemia  - Known iron deficiency anemia (per chart review, iron level checked last month).   - Serial hgb, remains stable  - outpatient follow-up with PCP with periodic CBC checked     May-Thurner syndrome  - Chronic condition  - Treatment as discussed above     Factor V Leiden mutation  - Followed by Hematology as outpatient  - Chronic condition  - Hem consulted for AC mgmt and recs for dc, rec warfarin with goal INR 2.5-3.5     Presence of IVC filter  - Filter in place       History of pulmonary embolism  - S/p IVC filter since 2014, IR attempted to remove this in the past but was unsuccessful and complicated   - Chronically anticoagulated on Eliquis as outpatient, despite this she has clots  - Hematology consulted, rec warfarin goal 2.5-3.5 as above       Labs:  Recent Labs   Lab 01/19/20 0349 01/19/20 0757 01/20/20  0825 01/21/20  0537   WBC 2.52*  --  2.31* 2.75*   HGB 7.1* 7.6* 8.3* 8.9*   HCT 24.0* 25.0* 27.5* 30.9*   *  --  138* 172     Recent Labs   Lab 01/19/20  0349 01/20/20  0825 01/21/20  0537    141 141   K 3.6 4.1 4.2    106 104   CO2 24 27 27   BUN 6 4* 7   CREATININE 0.7 0.7 0.7   GLU 84 95 89   CALCIUM 8.4* 9.3 9.8   MG 1.7 2.0 2.1   PHOS 3.6 3.6 4.6*     Recent Labs   Lab 01/19/20  0349 01/20/20  0825 01/21/20  0537   ALKPHOS 87 94 97   ALT 5* 6* 6*   AST 11 10 9*   ALBUMIN 3.0* 3.2* 3.5   PROT 6.2 7.1 7.8   BILITOT 0.1  0.2 0.2   INR 3.0* 2.0* 2.6*      No results for input(s): POCTGLUCOSE in the last 168 hours.  No results for input(s): CPK, CPKMB, MB, TROPONINI in the last 72 hours.    ROS (Positive in Bold, otherwise negative)  Constitutional: fever, chills, night sweats  CV: chest pain, edema, palpitations  Resp: SOB, cough, sputum production  GI: changes in appetite, NVDC, pain, melena, hematochezia, GERD, hematemesis  : Dysuria, hematuria, urinary urgency, frequency  MSK: arthralgia/myalgia, joint swelling  Neuro/Psych: anxiety, depression    PEx       General: no distress   Lungs: clear to ausculation anteriorly and posteriorly   Heart: regular rate and rhythm   Abdomen: normal bowel sounds, soft, no tenderness   Extremities: no edema. No clubbing or cyanosis       Procedures:  IR lower extremity venogram, CT abdomen/pelvis    Consultants:  IR, Heme-Onc    Discharge Medication List as of 1/21/2020 11:13 AM      START taking these medications    Details   aspirin (ECOTRIN) 325 MG EC tablet Take 1 tablet (325 mg total) by mouth once daily., Starting Wed 1/22/2020, Until Thu 1/21/2021, Normal      calcium carbonate (TUMS) 200 mg calcium (500 mg) chewable tablet Take 1 tablet (500 mg total) by mouth 3 (three) times daily as needed., Starting Tue 1/21/2020, Until Wed 1/20/2021, OTC      clopidogrel (PLAVIX) 75 mg tablet Take 1 tablet (75 mg total) by mouth once daily., Starting Wed 1/22/2020, Until Thu 1/21/2021, Normal      oxyCODONE (OXY-IR) 5 mg Cap Take 1 capsule (5 mg total) by mouth every 6 (six) hours as needed for Pain., Starting Tue 1/21/2020, Normal      warfarin (COUMADIN) 5 MG tablet Take 1 tablet (5 mg total) by mouth Daily., Starting Tue 1/21/2020, Until Wed 1/20/2021, Normal         CONTINUE these medications which have NOT CHANGED    Details   diazePAM (VALIUM) 5 MG tablet TAKE 1 TABLET (5 MG TOTAL) BY MOUTH EVERY 12 (TWELVE) HOURS AS NEEDED FOR ANXIETY., Starting Mon 1/13/2020, Until Wed 2/12/2020, Normal       EPINEPHrine (EPIPEN 2-VAN) 0.3 mg/0.3 mL AtIn Inject 0.3 mLs (0.3 mg total) into the muscle once. for 1 dose, Starting Thu 3/28/2019, Normal      ondansetron (ZOFRAN-ODT) 8 MG TbDL Take 1 tablet (8 mg total) by mouth every 8 (eight) hours as needed (nausea)., Starting Tue 9/24/2019, Print         STOP taking these medications       apixaban (ELIQUIS) 5 mg Tab Comments:   Reason for Stopping:               The relevant and important risks, side effects, and benefits of their medications were reviewed with patient during hospitalization and at discharge. The patient was given the opportunity to discuss and ask questions about their medications, including target symptoms, potential risks, side effects and benefits of their medications, as well as their expected prognosis if non-medication treatment options were chosen.  The patient expresses understanding of all these options and information and voluntarily consents to treatment.    Discharge Diet:regular diet with Normal Fluid intake of 1500 - 2000 mL per day    Activity: activity as tolerated    Discharge Condition: Stable    Disposition: Home or Self Care    Tests pending at the time of discharge: none      Time spent  on the discharge of the patient including review of hospital course with the patient. reviewing discharge medications and arranging follow-up care: 38 mins    Discharge examination Patient was seen and examined on the date of discharge and determined to be suitable for discharge.    Discharge plan and follow up:  It is critical that you make your follow-up appointment(s). If you are discharged on the weekend or after business hours, or if we are unable to schedule these appointments for you for any reason, you or a family member need to call during the next business day to schedule your appointment(s).    -Follow up with you PCP, Alberto Holguin MD within 1-2 weeks as arranged with SW and treatment team prior to discharge  -Take all medications  as prescribed and listed above.    - Please return to ED or call your physician if you have:         1. Fevers > 101.5 unresponsive to tylenol.       2. Abdominal pain and/or distention       3. Intractable nausea, vomiting or diarrhea       4. Inability to tolerate adequate oral intake of food       5. Neurologic changes, chest pain or shortness of breath         Future Appointments   Date Time Provider Department Center   1/23/2020  5:00 PM LAB, APPOINTMENT [facility] IM Jonah Vasquez PCW   2/13/2020  1:30 PM LAB, HEMONC [facility]   2/13/2020  2:30 PM Earnestine Garcia MD [facility]     Follow-up with anticoagulation Clinic, PCP, Heme-Onc    Susana Escobar MD  Hospital Medicine Staff  [phone] pager

## 2020-01-22 NOTE — PLAN OF CARE
Pt d/c to home without INR device. Per IMG resident pt had to d/c home due to personally issues without device. DEWAYNE spoke with Loulou at [facility] in regards to home INR device. Loulou stated there is no orders in epic for INR device. Notified Dr. Anna with IMG. Scheduled Coumadin appt on 1/22/2020 and Laboratory appt on 1/23/2020 01/22/20 1144   Final Note   Assessment Type Final Discharge Note   Anticipated Discharge Disposition Home

## 2020-01-22 NOTE — PROGRESS NOTES
"30 y/o female currently admitted since 1/14/20 and now s/p leg de-clotting procedure on 1/17/20.  Pt's PMHx: Factor V Leiden, May-Thurner Syndrome, history of PE and recurrent DVTs, hx of CVA, hx of IVC filter (2014), anemia of chronic disease, reactive airway disease and hx of recurrent urticaria.  Per MD note: "U/S 1/14/20: Extensive bilateral deep venous thrombosis as discussed above.  Clot burden is increased from previous exam from 12/18/2019, noting new nonocclusive thrombus within the bilateral external iliac veins. . . The patient has had at least 11 DVTs and 3-4 PEs . . . She was on Coumadin for years and stated she came off when she had a DVT despite a therapeutic INR. . . on Eliquis since 2015. She had a DVT in 2016 while on Eliquis  . . . followed by a Hematologist in [place], and he opted to take her off of anti-coagulation in 2018 because stated that she had an IVC filter . . . noted symptoms of DVT , so she self resumed a previous Eliquis . . . July 2019 new thrombus, was started on Lovenox. . . Nov 2019 new thrombus, concern for Lovenox failure, switched to Eliquis . . . DRVVT positive 11/2/2019 . . .Transition to Coumadin goal INR 2.5-3.5."      See calendar for current INRs and warfarin doses. 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 She confirms current dose of 5mg daily and will have INR tomorrow.  "

## 2020-01-23 ENCOUNTER — LAB VISIT (OUTPATIENT)
Dept: LAB | Facility: HOSPITAL | Age: 30
End: 2020-01-23
Attending: INTERNAL MEDICINE
Payer: COMMERCIAL

## 2020-01-23 ENCOUNTER — TELEPHONE (OUTPATIENT)
Dept: INTERNAL MEDICINE | Facility: CLINIC | Age: 30
End: 2020-01-23

## 2020-01-23 ENCOUNTER — TELEPHONE (OUTPATIENT)
Dept: HEMATOLOGY/ONCOLOGY | Facility: CLINIC | Age: 30
End: 2020-01-23

## 2020-01-23 DIAGNOSIS — I82.4Z3 ACUTE DEEP VEIN THROMBOSIS (DVT) OF DISTAL VEIN OF BOTH LOWER EXTREMITIES: ICD-10-CM

## 2020-01-23 DIAGNOSIS — Z86.711 HISTORY OF PULMONARY EMBOLISM: ICD-10-CM

## 2020-01-23 DIAGNOSIS — Z79.01 LONG TERM (CURRENT) USE OF ANTICOAGULANTS: ICD-10-CM

## 2020-01-23 DIAGNOSIS — I82.413 ACUTE DEEP VEIN THROMBOSIS (DVT) OF FEMORAL VEIN OF BOTH LOWER EXTREMITIES: ICD-10-CM

## 2020-01-23 DIAGNOSIS — I82.401 DVT, RECURRENT, LOWER EXTREMITY, ACUTE, RIGHT: ICD-10-CM

## 2020-01-23 DIAGNOSIS — Z86.718 HISTORY OF DEEP VENOUS THROMBOSIS (DVT) OF DISTAL VEIN OF LEFT LOWER EXTREMITY: ICD-10-CM

## 2020-01-23 DIAGNOSIS — I82.402: ICD-10-CM

## 2020-01-23 DIAGNOSIS — I87.1 MAY-THURNER SYNDROME: ICD-10-CM

## 2020-01-23 DIAGNOSIS — D68.51 FACTOR V LEIDEN MUTATION: ICD-10-CM

## 2020-01-23 DIAGNOSIS — I82.4Y3 ACUTE DEEP VEIN THROMBOSIS (DVT) OF PROXIMAL VEIN OF BOTH LOWER EXTREMITIES: ICD-10-CM

## 2020-01-23 LAB
INR PPP: 7.7 (ref 0.8–1.2)
PROTHROMBIN TIME: 78.8 SEC (ref 9–12.5)

## 2020-01-23 PROCEDURE — 36415 COLL VENOUS BLD VENIPUNCTURE: CPT

## 2020-01-23 PROCEDURE — 85610 PROTHROMBIN TIME: CPT

## 2020-01-23 NOTE — TELEPHONE ENCOUNTER
Second attempt to call Ms. Love to schedule an hospital follow up but no answer.  LVM to call the office.  Message sent to portal

## 2020-01-23 NOTE — TELEPHONE ENCOUNTER
Attempted to call Ms. Love to schedule a hospital follow up, but no answer.  LVM to call the office.

## 2020-01-24 ENCOUNTER — ANTI-COAG VISIT (OUTPATIENT)
Dept: CARDIOLOGY | Facility: CLINIC | Age: 30
End: 2020-01-24
Payer: COMMERCIAL

## 2020-01-24 DIAGNOSIS — I87.1 MAY-THURNER SYNDROME: ICD-10-CM

## 2020-01-24 DIAGNOSIS — I82.4Y3 ACUTE DEEP VEIN THROMBOSIS (DVT) OF PROXIMAL VEIN OF BOTH LOWER EXTREMITIES: ICD-10-CM

## 2020-01-24 DIAGNOSIS — D68.51 FACTOR V LEIDEN MUTATION: ICD-10-CM

## 2020-01-24 DIAGNOSIS — Z86.711 HISTORY OF PULMONARY EMBOLISM: ICD-10-CM

## 2020-01-24 DIAGNOSIS — Z95.828 PRESENCE OF IVC FILTER: ICD-10-CM

## 2020-01-24 DIAGNOSIS — I82.4Z3 ACUTE DEEP VEIN THROMBOSIS (DVT) OF DISTAL VEIN OF BOTH LOWER EXTREMITIES: ICD-10-CM

## 2020-01-24 PROCEDURE — 93793 PR ANTICOAGULANT MGMT FOR PT TAKING WARFARIN: ICD-10-PCS | Mod: S$GLB,,,

## 2020-01-24 PROCEDURE — 93793 ANTICOAG MGMT PT WARFARIN: CPT | Mod: S$GLB,,,

## 2020-01-24 NOTE — PROGRESS NOTES
Mom was called and given coumadin instructions: skip coumadin -1/24, 1/25, 1/26, get lab drawn 1/27 and take 2.5mg on 1/27 if no call is received regarding the result, also if any bleeding occurs that will not stop--go to ED, Mom repeated instructions back correctly, verbalized understanding, appointment booked

## 2020-01-24 NOTE — TELEPHONE ENCOUNTER
Left VM regarding INR of 7.7, instructed patient to hold Warfarin today and have it rechecked tomorrow. She should seek medical care with any bleeding.

## 2020-02-07 ENCOUNTER — PATIENT MESSAGE (OUTPATIENT)
Dept: PRIMARY CARE CLINIC | Facility: CLINIC | Age: 30
End: 2020-02-07

## 2020-02-10 ENCOUNTER — TELEPHONE (OUTPATIENT)
Dept: INTERVENTIONAL RADIOLOGY/VASCULAR | Facility: CLINIC | Age: 30
End: 2020-02-10

## 2020-02-10 NOTE — TELEPHONE ENCOUNTER
Left message for pt to return my call. Need to schedule IR follow up. Please forward call to [phone]. Thanks

## 2020-02-13 ENCOUNTER — OFFICE VISIT (OUTPATIENT)
Dept: HEMATOLOGY/ONCOLOGY | Facility: CLINIC | Age: 30
End: 2020-02-13
Payer: COMMERCIAL

## 2020-02-13 ENCOUNTER — LAB VISIT (OUTPATIENT)
Dept: LAB | Facility: HOSPITAL | Age: 30
End: 2020-02-13
Payer: COMMERCIAL

## 2020-02-13 VITALS
HEART RATE: 78 BPM | HEIGHT: 65 IN | BODY MASS INDEX: 25.27 KG/M2 | DIASTOLIC BLOOD PRESSURE: 70 MMHG | SYSTOLIC BLOOD PRESSURE: 120 MMHG | WEIGHT: 151.69 LBS | OXYGEN SATURATION: 100 % | TEMPERATURE: 99 F | RESPIRATION RATE: 18 BRPM

## 2020-02-13 DIAGNOSIS — Z86.711 HISTORY OF PULMONARY EMBOLISM: ICD-10-CM

## 2020-02-13 DIAGNOSIS — Z95.828 PRESENCE OF IVC FILTER: ICD-10-CM

## 2020-02-13 DIAGNOSIS — I82.4Z3 ACUTE DEEP VEIN THROMBOSIS (DVT) OF DISTAL VEIN OF BOTH LOWER EXTREMITIES: ICD-10-CM

## 2020-02-13 DIAGNOSIS — D68.51 FACTOR V LEIDEN MUTATION: ICD-10-CM

## 2020-02-13 DIAGNOSIS — R16.1 SPLENOMEGALY: ICD-10-CM

## 2020-02-13 DIAGNOSIS — D68.51 FACTOR V LEIDEN MUTATION: Primary | ICD-10-CM

## 2020-02-13 DIAGNOSIS — D64.9 NORMOCYTIC ANEMIA: ICD-10-CM

## 2020-02-13 LAB
BASOPHILS # BLD AUTO: 0.02 K/UL (ref 0–0.2)
BASOPHILS NFR BLD: 0.5 % (ref 0–1.9)
DIFFERENTIAL METHOD: ABNORMAL
EOSINOPHIL # BLD AUTO: 0.1 K/UL (ref 0–0.5)
EOSINOPHIL NFR BLD: 2.7 % (ref 0–8)
ERYTHROCYTE [DISTWIDTH] IN BLOOD BY AUTOMATED COUNT: 14.4 % (ref 11.5–14.5)
HCT VFR BLD AUTO: 32.1 % (ref 37–48.5)
HGB BLD-MCNC: 9.5 G/DL (ref 12–16)
IMM GRANULOCYTES # BLD AUTO: 0.01 K/UL (ref 0–0.04)
IMM GRANULOCYTES NFR BLD AUTO: 0.3 % (ref 0–0.5)
INR PPP: 1 (ref 0.8–1.2)
LYMPHOCYTES # BLD AUTO: 1.4 K/UL (ref 1–4.8)
LYMPHOCYTES NFR BLD: 38.6 % (ref 18–48)
MCH RBC QN AUTO: 26 PG (ref 27–31)
MCHC RBC AUTO-ENTMCNC: 29.6 G/DL (ref 32–36)
MCV RBC AUTO: 88 FL (ref 82–98)
MONOCYTES # BLD AUTO: 0.2 K/UL (ref 0.3–1)
MONOCYTES NFR BLD: 6.3 % (ref 4–15)
NEUTROPHILS # BLD AUTO: 1.9 K/UL (ref 1.8–7.7)
NEUTROPHILS NFR BLD: 51.6 % (ref 38–73)
NRBC BLD-RTO: 0 /100 WBC
PLATELET # BLD AUTO: 172 K/UL (ref 150–350)
PMV BLD AUTO: 9 FL (ref 9.2–12.9)
PROTHROMBIN TIME: 10.3 SEC (ref 9–12.5)
RBC # BLD AUTO: 3.65 M/UL (ref 4–5.4)
WBC # BLD AUTO: 3.65 K/UL (ref 3.9–12.7)

## 2020-02-13 PROCEDURE — 85025 COMPLETE CBC W/AUTO DIFF WBC: CPT

## 2020-02-13 PROCEDURE — 85610 PROTHROMBIN TIME: CPT

## 2020-02-13 PROCEDURE — 99999 PR PBB SHADOW E&M-EST. PATIENT-LVL III: ICD-10-PCS | Mod: PBBFAC,,, | Performed by: STUDENT IN AN ORGANIZED HEALTH CARE EDUCATION/TRAINING PROGRAM

## 2020-02-13 PROCEDURE — 3008F BODY MASS INDEX DOCD: CPT | Mod: CPTII,S$GLB,, | Performed by: STUDENT IN AN ORGANIZED HEALTH CARE EDUCATION/TRAINING PROGRAM

## 2020-02-13 PROCEDURE — 99999 PR PBB SHADOW E&M-EST. PATIENT-LVL III: CPT | Mod: PBBFAC,,, | Performed by: STUDENT IN AN ORGANIZED HEALTH CARE EDUCATION/TRAINING PROGRAM

## 2020-02-13 PROCEDURE — 3008F PR BODY MASS INDEX (BMI) DOCUMENTED: ICD-10-PCS | Mod: CPTII,S$GLB,, | Performed by: STUDENT IN AN ORGANIZED HEALTH CARE EDUCATION/TRAINING PROGRAM

## 2020-02-13 PROCEDURE — 36415 COLL VENOUS BLD VENIPUNCTURE: CPT

## 2020-02-13 PROCEDURE — 99215 PR OFFICE/OUTPT VISIT, EST, LEVL V, 40-54 MIN: ICD-10-PCS | Mod: S$GLB,,, | Performed by: STUDENT IN AN ORGANIZED HEALTH CARE EDUCATION/TRAINING PROGRAM

## 2020-02-13 PROCEDURE — 99215 OFFICE O/P EST HI 40 MIN: CPT | Mod: S$GLB,,, | Performed by: STUDENT IN AN ORGANIZED HEALTH CARE EDUCATION/TRAINING PROGRAM

## 2020-02-13 NOTE — Clinical Note
Lab check (INR and DRVVT lupus anti-coag, glycoprotein, anti-cardiolipin) on 2/15, weekly INR on Saturday 2/22, 2/29, 3/1, 3/8; MD visit with INR +CBC on 3/12/20, vascular surgery referral ASAP. Thank you.

## 2020-02-13 NOTE — PROGRESS NOTES
PATIENT: Antoinette Love  MRN: [id number]  DATE: 2/13/2020      Diagnosis:   1. Factor V Leiden mutation    2. History of pulmonary embolism    3. Acute deep vein thrombosis (DVT) of distal vein of both lower extremities    4. Presence of IVC filter    5. Normocytic anemia    6. Splenomegaly        Chief Complaint: Follow-up    Subjective:   Antoinette Love is a 29-year-old female with Homozygous Factor V Leiden mutation, and recurrent DVTs, IVC filter placement, miscarriage in 2013 at 11 weeks, TIA in 2013, who presents to Hematology Clinic for follow-up of VTE.     Hematologic History  -The patient has had at least 11 DVTs and 3-4 PEs.  -She was on Coumadin for years and stated she came off when she had a DVT despite a therapeutic INR.  -She was then placed on Xarelto buts states she only took 1 dose and shortly after developed gallbladder pain. When she was evaluated she was told she had gallbladder inflammation.  -IVC filter was placed in 2014. 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 She had a DVT in 2016 while on Eliquis  She was followed by a Hematologist in [place], and he opted to take her off of anti-coagulation in 2018 because stated that she had an IVC filter and did not need to be anti-coagulated.  -She noted symptoms of DVT last week, so she self resumed a previous Eliquis prescription that she had at 10mg BID  -Presented 7/21/19 c/o pain and swelling found to have 'partial, nonocclusive thrombus in the right common femoral vein, left common femoral vein, and left external iliac vein. Superficial thrombophlebitis of the bilateral greater saphenous veins.'  -The patient was subsequently started on lovenox 1mg/kg BID on 8/22/19. Subsequently there was concern for Lovenox failure, and patient was transitioned to Eliquis 5mg BID  -Then  presented to the hospital Jan 2020 with bilateral leg pain and swelling along with right sided abdominal pain for about 2 days. CT A/P revealed intraluminal filling defects within the right common femoral vein and possibly left common femoral vein which may indicate acute deep venous thrombosis. U/S of LE 1/14/20 showed increased clot burden from Dec 2019 imaging. Patient seen by IR, planned bilateral lower extremity venogram and likely thrombolysis/thrombectomy. Patient discharged home on Coumadin with goal INR 2.5-3.5     Interval History  Patient seen today, taking lots of Naproxen for the RLQ pain at site of previous IR procedure, where she states IVC filter was crushed (Oct 2019). Tylenol did not help. Leg swelling much improved. Denies dyspnea, chest pain. Patient works full time as nurse at public school, unable to get INR checked but agreeable to have it checked on Saturdays, INR 1.0 today.     Past Medical History:   Past Medical History:   Diagnosis Date    Anemia of chronic disease 12/19/2019    Anticoagulant long-term use     Anticoagulant long-term use     FERNANDO (dyspnea on exertion)     DVT (deep venous thrombosis)     Encounter for blood transfusion     Factor V Leiden     Leg edema, left     May-Thurner syndrome     Multiple pulmonary nodules     Pulmonary embolus     RAD (reactive airway disease)     Recurrent upper respiratory infection (URI)     Recurrent urticaria     Stroke     TIA (transient ischemic attack)        Past Surgical HIstory:   Past Surgical History:   Procedure Laterality Date    COLONOSCOPY N/A 12/18/2015    Procedure: COLONOSCOPY;  Surgeon: Lefty Lee MD;  Location: [facility] ([address] [place] [address]);  Service: Endoscopy;  Laterality: N/A;  schedule with Jesus    IVC FILTER RETRIEVAL      IVC FILTER RETRIEVAL N/A 9/23/2019    Procedure: REMOVAL-FILTER-IVC;  Surgeon: Claudia Surgeon;  Location: [facility];  Service: Anesthesiology;  Laterality: N/A;  188  4 hours Anes    tumor from  "breast      left    WISDOM TOOTH EXTRACTION         Family History:   Family History   Problem Relation Age of Onset    Allergies Mother         azithromycin       Social History:  reports that she has never smoked. She has never used smokeless tobacco. She reports that she does not drink alcohol or use drugs.    Allergies:  Review of patient's allergies indicates:   Allergen Reactions    Azithromycin Hives    Beef containing products Anaphylaxis     Patient cannot eat BEEF BROTH  STATES IT WILL MAKE HER THROAT CLOSE UP .     Pork derived (porcine) Anaphylaxis     Throat swells up cannot eat pork sausage or pork patties ,     Unclassified drug Shortness Of Breath and Other (See Comments)     Is allergic to a beef spice - Causes "throat closing"    Xarelto [rivaroxaban] Other (See Comments)     Gallbladder swelling and disfunction    Toradol [ketorolac] Hives and Itching       Medications:  Current Outpatient Medications   Medication Sig Dispense Refill    aspirin (ECOTRIN) 325 MG EC tablet Take 1 tablet (325 mg total) by mouth once daily. 90 tablet 3    clopidogrel (PLAVIX) 75 mg tablet Take 1 tablet (75 mg total) by mouth once daily. 90 tablet 3    prothrombin time/INR test metr Misc 1 each by Misc.(Non-Drug; Combo Route) route once daily. 1 each 0    warfarin (COUMADIN) 5 MG tablet Take 1 tablet (5 mg total) by mouth Daily. 90 tablet 3    diazePAM (VALIUM) 5 MG tablet TAKE 1 TABLET (5 MG TOTAL) BY MOUTH EVERY 12 (TWELVE) HOURS AS NEEDED FOR ANXIETY. 30 tablet 0    EPINEPHrine (EPIPEN 2-VAN) 0.3 mg/0.3 mL AtIn Inject 0.3 mLs (0.3 mg total) into the muscle once. for 1 dose 2 Device 2     No current facility-administered medications for this visit.        Review of Systems   Constitutional: Negative for chills, fatigue, fever and unexpected weight change.   HENT: Positive for nosebleeds. Negative for mouth sores.    Respiratory: Negative for cough, chest tightness and shortness of breath.  " "  Cardiovascular: Negative for chest pain, palpitations and leg swelling.   Gastrointestinal: Positive for abdominal pain (RLQ). Negative for diarrhea, nausea and vomiting.   Endocrine: Negative for cold intolerance and heat intolerance.   Genitourinary: Negative for dysuria and hematuria.   Musculoskeletal: Negative for arthralgias, back pain and joint swelling.   Skin: Negative for rash.   Allergic/Immunologic: Negative for environmental allergies.   Neurological: Negative for light-headedness and numbness.   Hematological: Negative for adenopathy. Does not bruise/bleed easily.       ECOG Performance Status: 1   Objective:      Vitals:   Vitals:    02/13/20 1401   BP: 120/70   Pulse: 78   Resp: 18   Temp: 98.5 °F (36.9 °C)   SpO2: 100%   Weight: 68.8 kg (151 lb 10.8 oz)   Height: 5' 5" (1.651 m)     BMI: Body mass index is 25.24 kg/m².    Physical Exam   Constitutional: She is oriented to person, place, and time. She appears well-developed and well-nourished.   Eyes: EOM are normal.   Neck: Normal range of motion.   Cardiovascular: Normal rate and regular rhythm.   Pulmonary/Chest: Effort normal. No respiratory distress.   Abdominal: Soft. She exhibits no distension. There is tenderness.   Musculoskeletal: She exhibits no edema or tenderness.   Neurological: She is alert and oriented to person, place, and time.   Skin: Skin is warm and dry.   Ecchymosis   Psychiatric: She has a normal mood and affect. Her behavior is normal.       Laboratory Data:  Lab Visit on 02/13/2020   Component Date Value Ref Range Status    WBC 02/13/2020 3.65* 3.90 - 12.70 K/uL Final    RBC 02/13/2020 3.65* 4.00 - 5.40 M/uL Final    Hemoglobin 02/13/2020 9.5* 12.0 - 16.0 g/dL Final    Hematocrit 02/13/2020 32.1* 37.0 - 48.5 % Final    Mean Corpuscular Volume 02/13/2020 88  82 - 98 fL Final    Mean Corpuscular Hemoglobin 02/13/2020 26.0* 27.0 - 31.0 pg Final    Mean Corpuscular Hemoglobin Conc 02/13/2020 29.6* 32.0 - 36.0 g/dL Final "    RDW 02/13/2020 14.4  11.5 - 14.5 % Final    Platelets 02/13/2020 172  150 - 350 K/uL Final    MPV 02/13/2020 9.0* 9.2 - 12.9 fL Final    Immature Granulocytes 02/13/2020 0.3  0.0 - 0.5 % Final    Gran # (ANC) 02/13/2020 1.9  1.8 - 7.7 K/uL Final    Immature Grans (Abs) 02/13/2020 0.01  0.00 - 0.04 K/uL Final    Comment: Mild elevation in immature granulocytes is non specific and   can be seen in a variety of conditions including stress response,   acute inflammation, trauma and pregnancy. Correlation with other   laboratory and clinical findings is essential.      Lymph # 02/13/2020 1.4  1.0 - 4.8 K/uL Final    Mono # 02/13/2020 0.2* 0.3 - 1.0 K/uL Final    Eos # 02/13/2020 0.1  0.0 - 0.5 K/uL Final    Baso # 02/13/2020 0.02  0.00 - 0.20 K/uL Final    nRBC 02/13/2020 0  0 /100 WBC Final    Gran% 02/13/2020 51.6  38.0 - 73.0 % Final    Lymph% 02/13/2020 38.6  18.0 - 48.0 % Final    Mono% 02/13/2020 6.3  4.0 - 15.0 % Final    Eosinophil% 02/13/2020 2.7  0.0 - 8.0 % Final    Basophil% 02/13/2020 0.5  0.0 - 1.9 % Final    Differential Method 02/13/2020 Automated   Final    Prothrombin Time 02/13/2020 10.3  9.0 - 12.5 sec Final    INR 02/13/2020 1.0  0.8 - 1.2 Final    Comment: Coumadin Therapy:  2.0 - 3.0 for INR for all indicators except mechanical heart valves  and antiphospholipid syndromes which should use 2.5 - 3.5.       Assessment:       1. Factor V Leiden mutation    2. History of pulmonary embolism    3. Acute deep vein thrombosis (DVT) of distal vein of both lower extremities    4. Presence of IVC filter    5. Normocytic anemia    6. Splenomegaly           Plan:   1) Homozygous Factor V Leiden mutation, and recurrent DVTs, IVC filter placement    Hematologic History  The patient has had at least 11 DVTs and 3-4 PEs.  -She was on Coumadin for years and stated she came off when she had a DVT despite a therapeutic INR.  -She was then placed on Xarelto buts states she only took 1 dose and  shortly after developed gallbladder pain. When she was evaluated she was told she had gallbladder inflammation.  -IVC filter was placed in 2014. 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 She had a DVT in 2016 while on Eliquis  She was followed by a Hematologist in [place], and he opted to take her off of anti-coagulation in 2018 because stated that she had an IVC filter and did not need to be anti-coagulated.  -She noted symptoms of DVT , so she self resumed a previous Eliquis prescription that she had 10mg BID  -July 2019 new thrombus, was started on Lovenox 1.5mg/d Qd then to 1mg/kg BID  -Nov 2019 new thrombus, concern for Lovenox failure, switched to Eliquis 5mg BID  -DRVVT positive 11/2/2019  -Jan 2020: switched to Coumadin goal INR 2.5-3.5      2) Normocytic anemia  - Reticulocyte count has been decreased, suggestive of a hypoproliferative marrow.  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 On Coumadin 5mg nightly, increase to 7.5mg MWF and 5mg TTSS  -Patient with difficulties getting to INR checks d/t work schedule, agreeable to weekly INR checks on Saturdays  -Vascular surgery referral to evaluate options for intractable pain related to IVC filter which IR unable to remove. Patient also has IR follow-up, and to see pain management. Instructed to limit Naproxen given bleeding risk with Coumadin  -Patient to seek medical care with any unusual bleeding  -Check MPN panel    Follow-up: INR and DRVVT lupus anti-coag, glycoprotein, anti-cardiolipin on 2/15, weekly INR  on Saturday 2/22, 2/29, 3/1, 3/8; MD visit with INR +CBC on 3/12/20, vascular surgery referral ASAP    The following was staffed and discussed with supervising physician Dr. Billy.    Earnestine Garcia MD  Hematology/Oncology Fellow

## 2020-02-14 ENCOUNTER — ANTI-COAG VISIT (OUTPATIENT)
Dept: CARDIOLOGY | Facility: CLINIC | Age: 30
End: 2020-02-14
Payer: COMMERCIAL

## 2020-02-14 ENCOUNTER — TELEPHONE (OUTPATIENT)
Dept: HEMATOLOGY/ONCOLOGY | Facility: CLINIC | Age: 30
End: 2020-02-14

## 2020-02-14 DIAGNOSIS — D68.51 FACTOR V LEIDEN MUTATION: ICD-10-CM

## 2020-02-14 DIAGNOSIS — I82.4Y3 ACUTE DEEP VEIN THROMBOSIS (DVT) OF PROXIMAL VEIN OF BOTH LOWER EXTREMITIES: ICD-10-CM

## 2020-02-14 DIAGNOSIS — I82.4Z3 ACUTE DEEP VEIN THROMBOSIS (DVT) OF DISTAL VEIN OF BOTH LOWER EXTREMITIES: ICD-10-CM

## 2020-02-14 DIAGNOSIS — Z86.711 HISTORY OF PULMONARY EMBOLISM: ICD-10-CM

## 2020-02-14 DIAGNOSIS — I87.1 MAY-THURNER SYNDROME: ICD-10-CM

## 2020-02-14 DIAGNOSIS — Z95.828 PRESENCE OF IVC FILTER: ICD-10-CM

## 2020-02-14 PROCEDURE — 93793 PR ANTICOAGULANT MGMT FOR PT TAKING WARFARIN: ICD-10-PCS | Mod: S$GLB,,,

## 2020-02-14 PROCEDURE — 93793 ANTICOAG MGMT PT WARFARIN: CPT | Mod: S$GLB,,,

## 2020-02-14 NOTE — TELEPHONE ENCOUNTER
"----- Message from Gurjit Hyatt sent at 2/14/2020 11:26 AM CST -----  Contact: Antoinette  Reschedule Existing Appointment    Appt Date: 2/14, 2/21, 2/28, 3/2, 3/9, 3/12  Type of appt : Labs  Physician: Dr. Gacria  Reason for rescheduling? Needs labs schedule on Saturdays only  Caller: Jesusita  Contact Preference: [phone]    Additional Information:  "Thank you for all that you do for our patients'"  "

## 2020-02-14 NOTE — TELEPHONE ENCOUNTER
Called pt. Left voicemail informing her that lab is not open on saturdays. Gave her callback number. Routing to BMT pool as she is a BMT pt.

## 2020-02-15 ENCOUNTER — PATIENT OUTREACH (OUTPATIENT)
Dept: ADMINISTRATIVE | Facility: OTHER | Age: 30
End: 2020-02-15

## 2020-02-17 ENCOUNTER — OFFICE VISIT (OUTPATIENT)
Dept: VASCULAR SURGERY | Facility: CLINIC | Age: 30
End: 2020-02-17
Attending: SURGERY
Payer: COMMERCIAL

## 2020-02-17 ENCOUNTER — TELEPHONE (OUTPATIENT)
Dept: HEMATOLOGY/ONCOLOGY | Facility: CLINIC | Age: 30
End: 2020-02-17

## 2020-02-17 VITALS
SYSTOLIC BLOOD PRESSURE: 109 MMHG | DIASTOLIC BLOOD PRESSURE: 69 MMHG | TEMPERATURE: 99 F | BODY MASS INDEX: 25.34 KG/M2 | WEIGHT: 152.13 LBS | HEIGHT: 65 IN | HEART RATE: 77 BPM

## 2020-02-17 DIAGNOSIS — Z95.828 PRESENCE OF IVC FILTER: ICD-10-CM

## 2020-02-17 DIAGNOSIS — R10.30 LOWER ABDOMINAL PAIN: ICD-10-CM

## 2020-02-17 PROCEDURE — 3008F PR BODY MASS INDEX (BMI) DOCUMENTED: ICD-10-PCS | Mod: CPTII,S$GLB,, | Performed by: SURGERY

## 2020-02-17 PROCEDURE — 99999 PR PBB SHADOW E&M-EST. PATIENT-LVL IV: ICD-10-PCS | Mod: PBBFAC,,, | Performed by: SURGERY

## 2020-02-17 PROCEDURE — 99999 PR PBB SHADOW E&M-EST. PATIENT-LVL IV: CPT | Mod: PBBFAC,,, | Performed by: SURGERY

## 2020-02-17 PROCEDURE — 99202 OFFICE O/P NEW SF 15 MIN: CPT | Mod: S$GLB,,, | Performed by: SURGERY

## 2020-02-17 PROCEDURE — 99202 PR OFFICE/OUTPT VISIT, NEW, LEVL II, 15-29 MIN: ICD-10-PCS | Mod: S$GLB,,, | Performed by: SURGERY

## 2020-02-17 PROCEDURE — 3008F BODY MASS INDEX DOCD: CPT | Mod: CPTII,S$GLB,, | Performed by: SURGERY

## 2020-02-17 NOTE — TELEPHONE ENCOUNTER
"----- Message from Gurjit Hyatt sent at 2/17/2020  7:46 AM CST -----  Contact: Antoinette  Reschedule Existing Appointment     Appt Date: 2/21, 2/28, 3/2, 3/9, 3/12   Type of appt : Labs   Physician: Dr. Garcia   Reason for rescheduling? Needs labs schedule on Saturdays only   Caller: Jesusita   Contact Preference: [phone]     Additional Information:   "Thank you for all that you do for our patients'"   "

## 2020-02-17 NOTE — LETTER
February 17, 2020      Earnestine Garcia MD  [address]  [facility] [address]           [facility]  [address]  [facility] [address]  Phone: [phone]  Fax: [phone]          Patient: Antoinette Love   MR Number: [id number]   YOB: 1990   Date of Visit: 2/17/2020       Dear Dr. Earnestine Garcia:    Thank you for referring Antoinette Love to me for evaluation. Attached you will find relevant portions of my assessment and plan of care.    If you have questions, please do not hesitate to call me. I look forward to following Antoinette Love along with you.    Sincerely,    BRIDGET Hernandez II, MD    Enclosure  CC:  No Recipients    If you would like to receive this communication electronically, please contact [email or web address] or [phone] to request more information on [employer] Link access.    For providers and/or their staff who would like to refer a patient to [facility], please contact us through our one-stop-shop provider referral line, [facility], at [phone].    If you feel you have received this communication in error or would no longer like to receive these types of communications, please e-mail [email or web address]

## 2020-02-18 NOTE — PROGRESS NOTES
VASCULAR SURGERY NOTE    Patient ID: Antoinette Love is a 29 y.o. female.    I. HISTORY     Chief Complaint: abdominal pain    HPI: Antoinette Love is a 29 y.o. female who is here today for new patient initial appointment. She has had intractable stabbing abdominal pain since an attempt at IVC filter removal by Dr. Parnell in September 2019. She had intermittent pain prior to the procedure, bus states that ever since the procedure was performed she has had constant stabbing abdominal pain. The pain is not related to meals or movement; it just persists all day and night. She has not had any attempts at filter removal since that procedure. Unfortunately she did have stents placed in her IVC since that time as well as multiple recurrent DVT requiring repeated thrombolytic therapy.        Past Medical History:   Diagnosis Date    Anemia of chronic disease 12/19/2019    Anticoagulant long-term use     Anticoagulant long-term use     FERNANDO (dyspnea on exertion)     DVT (deep venous thrombosis)     Encounter for blood transfusion     Factor V Leiden     Leg edema, left     May-Thurner syndrome     Multiple pulmonary nodules     Pulmonary embolus     RAD (reactive airway disease)     Recurrent upper respiratory infection (URI)     Recurrent urticaria     Stroke     TIA (transient ischemic attack)         Past Surgical History:   Procedure Laterality Date    COLONOSCOPY N/A 12/18/2015    Procedure: COLONOSCOPY;  Surgeon: Lefty Lee MD;  Location: [facility] ([address] [place] [address]);  Service: Endoscopy;  Laterality: N/A;  schedule with Jesus    IVC FILTER RETRIEVAL      IVC FILTER RETRIEVAL N/A 9/23/2019    Procedure: REMOVAL-FILTER-IVC;  Surgeon: Claudia Surgeon;  Location: [facility];  Service: Anesthesiology;  Laterality: N/A;  188  4 hours Anes    tumor from breast      left    WISDOM TOOTH EXTRACTION         Social History     Tobacco Use   Smoking Status Never Smoker   Smokeless Tobacco Never Used         Review of Systems   Constitution: Negative for chills and fever.   HENT: Negative for ear pain and hoarse voice.    Eyes: Negative for discharge and pain.   Cardiovascular: Negative for chest pain and palpitations.   Respiratory: Negative for cough, hemoptysis and shortness of breath.    Endocrine: Negative for polydipsia and polyuria.   Skin: Negative for dry skin and rash.   Musculoskeletal: Negative for back pain and neck pain.   Gastrointestinal: Positive for abdominal pain. Negative for diarrhea, nausea and vomiting.   Genitourinary: Negative for dysuria and hematuria.   Neurological: Negative for dizziness, loss of balance and paresthesias.         II. PHYSICAL EXAM     Physical Exam  GEN: Alert/oriented  HEENT: atraumatic, normocephalic  CHEST: normal respiratory effort, symmetrical chest rise  CARD: RRR    III. ASSESSMENT & PLAN (MEDICAL DECISION MAKING)     1. Presence of IVC filter    2. Lower abdominal pain        Imaging Results:   CT abd/pelvis 1/14/20: Trapease/Optease filter in place with large wallstents extending up between the inferior struts. Filter appears intact.    Assessment/Diagnosis and Plan:  29 y.o. female with persistent pain since attempted IVC filter retrieval followed by IVC balloon angioplasty and stenting. I explained that open filter retrieval was not a viable option for her due to her hypercoagulable state combined with the presence of stents protruding through nearly her entire infrarenal IVC. Discussed treatment options and recommend follow up with IR to discuss complex endovascular filter removal as they have more experience in this area. Explained that this would be purely for pain relief and that she would likely need to have her filter replaced as she has an indication for IVC filter (recurrent DVT despite anticoagulation). Also will place referral to pain management to address her pain in the meantime.    -Follow up with IR to discuss complex endovascular filter  retrieval  -Referral to pain management dayan Hernandez II, MD, RPVI  Vascular Surgeon  [facility]

## 2020-02-19 NOTE — ADDENDUM NOTE
Addendum  created 02/19/20 1016 by Jorge Nails MD    Intraprocedure Blocks edited, LDA created via procedure documentation, Sign clinical note

## 2020-02-21 NOTE — PROGRESS NOTES
Called Patient and left message to call coumadin clinic, need to reschedule 2/19 missed lab appointment

## 2020-03-03 RX ORDER — DIAZEPAM 5 MG/1
5 TABLET ORAL EVERY 12 HOURS PRN
Qty: 30 TABLET | Refills: 0 | Status: SHIPPED | OUTPATIENT
Start: 2020-03-03 | End: 2020-04-14

## 2020-03-09 ENCOUNTER — TELEPHONE (OUTPATIENT)
Dept: INTERVENTIONAL RADIOLOGY/VASCULAR | Facility: CLINIC | Age: 30
End: 2020-03-09

## 2020-03-09 NOTE — PROGRESS NOTES
Pt said she Dr Garcia and Dr Billy has been giving her instruction for her Coumadin. She also mention when they make her appointment on the weekend. She don't want to have labs twice in the same week. She want I our labs on the weekend.She was going to send the doctor a message about this.

## 2020-03-09 NOTE — TELEPHONE ENCOUNTER
Left message for pt to return my call. Need to schedule IR follow up. Please forward call to [phone]. Thanks

## 2020-03-13 ENCOUNTER — TELEPHONE (OUTPATIENT)
Dept: HEMATOLOGY/ONCOLOGY | Facility: CLINIC | Age: 30
End: 2020-03-13

## 2020-03-13 NOTE — TELEPHONE ENCOUNTER
Called patient left message that follow up appt is scheduled for 3/26.  Mailed appt slip.      ----- Message from Earnestine Garcia MD sent at 3/13/2020 11:57 AM CDT -----  Can this patient be scheduled to see me 3/26 with INR? She also needs INR check ASAP today or tomorrow or early next week. Thanks.

## 2020-03-13 NOTE — TELEPHONE ENCOUNTER
Patient missed f/u visit on 3/12, left VM discussing rescheduling. Unclear who is monitoring INR & adjusting Coumadin, as has not been done here since last visit. Patient instructed to call back or send message via [facility].

## 2020-03-13 NOTE — PROGRESS NOTES
3/13 - message sent to Wenceslao Benson, and Ezio regarding INR f/u and request to d/c from our patient care if MDs following labs and making dose adjustments. Awaiting MD reply.

## 2020-03-24 ENCOUNTER — TELEPHONE (OUTPATIENT)
Dept: HEMATOLOGY/ONCOLOGY | Facility: CLINIC | Age: 30
End: 2020-03-24

## 2020-03-25 ENCOUNTER — TELEPHONE (OUTPATIENT)
Dept: HEMATOLOGY/ONCOLOGY | Facility: CLINIC | Age: 30
End: 2020-03-25

## 2020-03-26 ENCOUNTER — TELEPHONE (OUTPATIENT)
Dept: HEMATOLOGY/ONCOLOGY | Facility: CLINIC | Age: 30
End: 2020-03-26

## 2020-03-26 NOTE — TELEPHONE ENCOUNTER
Left VM for patient to reschedule her missed telemedicine visit today. Instructed to call back to reschedule.

## 2020-03-27 ENCOUNTER — PATIENT MESSAGE (OUTPATIENT)
Dept: CARDIOLOGY | Facility: CLINIC | Age: 30
End: 2020-03-27

## 2020-04-03 ENCOUNTER — PATIENT MESSAGE (OUTPATIENT)
Dept: CARDIOLOGY | Facility: CLINIC | Age: 30
End: 2020-04-03

## 2020-04-06 ENCOUNTER — PATIENT MESSAGE (OUTPATIENT)
Dept: HEMATOLOGY/ONCOLOGY | Facility: CLINIC | Age: 30
End: 2020-04-06

## 2020-04-06 NOTE — PROGRESS NOTES
Called Patient and left message to call coumadin clinic, also called Mom's # and left message to call or have Patient call coumadin clinic, Need to verify current dose of coumadin and schedule a Lab appointment as soon as possible

## 2020-04-08 NOTE — PROGRESS NOTES
Called Patient and Mom's phone # and left message to cll coumadin clinic, Need to verify current dose of coumadin, any changes, and need to schedule a lab appointment ASAP

## 2020-04-09 NOTE — PROGRESS NOTES
4/09/20 called Patient and was able to reach her, tried to reschedule missed appointment, she reports that Dr Earnestine Garcia is willing to take over her coumadin monitoring--states it's hard to get to the clinic/keep testing schedule, is trying to get a home meter through [facility]? -wants to self test and call result in to Dr. Layne--see 4/06 message note in Patient's chart to and from Dr Layne

## 2020-04-13 NOTE — PROGRESS NOTES
MA notes and patient messages reviewed. Patient seems adamant about not continuing warfarin. Will f/u heme/onc office visit 4/16, but will likely d/c patient from our care.

## 2020-04-14 RX ORDER — DIAZEPAM 5 MG/1
5 TABLET ORAL EVERY 12 HOURS PRN
Qty: 30 TABLET | Refills: 0 | Status: SHIPPED | OUTPATIENT
Start: 2020-04-14 | End: 2020-05-14

## 2020-04-16 ENCOUNTER — OFFICE VISIT (OUTPATIENT)
Dept: HEMATOLOGY/ONCOLOGY | Facility: CLINIC | Age: 30
End: 2020-04-16
Payer: COMMERCIAL

## 2020-04-16 DIAGNOSIS — R16.1 SPLENOMEGALY: ICD-10-CM

## 2020-04-16 DIAGNOSIS — I82.402: ICD-10-CM

## 2020-04-16 DIAGNOSIS — D63.8 ANEMIA OF CHRONIC DISEASE: ICD-10-CM

## 2020-04-16 DIAGNOSIS — D68.51 FACTOR V LEIDEN MUTATION: Primary | ICD-10-CM

## 2020-04-16 DIAGNOSIS — Z86.711 HISTORY OF PULMONARY EMBOLISM: ICD-10-CM

## 2020-04-16 DIAGNOSIS — Z95.828 PRESENCE OF IVC FILTER: ICD-10-CM

## 2020-04-16 DIAGNOSIS — I87.1 MAY-THURNER SYNDROME: ICD-10-CM

## 2020-04-16 PROCEDURE — 99213 PR OFFICE/OUTPT VISIT, EST, LEVL III, 20-29 MIN: ICD-10-PCS | Mod: 95,,, | Performed by: STUDENT IN AN ORGANIZED HEALTH CARE EDUCATION/TRAINING PROGRAM

## 2020-04-16 PROCEDURE — 99213 OFFICE O/P EST LOW 20 MIN: CPT | Mod: 95,,, | Performed by: STUDENT IN AN ORGANIZED HEALTH CARE EDUCATION/TRAINING PROGRAM

## 2020-04-16 RX ORDER — FONDAPARINUX SODIUM 7.5 MG/.6ML
7.5 INJECTION SUBCUTANEOUS DAILY
Qty: 225 ML | Refills: 3 | Status: SHIPPED | OUTPATIENT
Start: 2020-04-16 | End: 2020-04-20

## 2020-04-16 NOTE — Clinical Note
Labs (myeloproliferative panel, INR, CBC and DRVVT lupus anti-coag, glycoprotein, anti-cardiolipin) on 4/20, MD telemedicine visit on 4/23 at 4PM

## 2020-04-16 NOTE — PROGRESS NOTES
PATIENT: Antoinette Love  MRN: [id number]  DATE: 4/16/2020      Diagnosis:   1. Factor V Leiden mutation    2. Recurrent acute deep vein thrombosis of left lower extremity    3. Presence of IVC filter    4. History of pulmonary embolism    5. Anemia of chronic disease, iron deficiency anemia and acute blood loss anemia    6. Splenomegaly    7. May-Thurner syndrome        Chief Complaint: No chief complaint on file.    Subjective:   Antoinette Love is a 29 y.o. female with Homozygous Factor V Leiden mutation, and recurrent DVTs, IVC filter placement, miscarriage in 2013 at 11 weeks, TIA in 2013, who presents to Hematology Clinic for follow-up of VTE.     Hematologic History  -The patient has had at least 11 DVTs and 3-4 PEs.  -She was on Coumadin for years and stated she came off when she had a DVT despite a therapeutic INR.  -She was then placed on Xarelto buts states she only took 1 dose and shortly after developed gallbladder pain. When she was evaluated she was told she had gallbladder inflammation. Within 20 min of taking Xarelto, had excruciating abdominal pain.  -IVC filter was placed in 2014. 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 She had a DVT in 2016 while on Eliquis  She was followed by a Hematologist in [place], and he opted to take her off of anti-coagulation in 2018 because stated that she had an IVC filter and did not need to be anti-coagulated.  -She noted symptoms of DVT last week, so she self resumed a previous Eliquis prescription that she had at 10mg BID  -Presented 7/21/19 c/o pain and swelling found to have 'partial, nonocclusive thrombus in the right common femoral vein, left common femoral vein, and left external iliac vein. Superficial thrombophlebitis of the bilateral greater saphenous veins.'  -The patient was  subsequently started on lovenox 1mg/kg BID on 8/22/19. Subsequently there was concern for Lovenox failure, and patient was transitioned to Eliquis 5mg BID  -Then presented to the hospital Jan 2020 with bilateral leg pain and swelling along with right sided abdominal pain for about 2 days. CT A/P revealed intraluminal filling defects within the right common femoral vein and possibly left common femoral vein which may indicate acute deep venous thrombosis. U/S of LE 1/14/20 showed increased clot burden from Dec 2019 imaging. Patient seen by IR, planned bilateral lower extremity venogram and likely thrombolysis/thrombectomy. Patient discharged home on Coumadin with goal INR 2.5-3.5     Interval History  Patient seen today, having right leg pain/groin pain,, but no new swelling. Not taking anything for the pain really. Denies dyspnea, chest pain. Patient self-quarantined recently for COVID exposure, but did not have fever. Was referred to pain medicine. Has been taking Coumadin and Plavix, denies missing any doses. Stopped taking ASA 325mg Qd. Had nose bleed and ear bleed, so she self reduced her Coumadin dose, now on 5mg Coumadin daily.    The patient location is: home  The chief complaint leading to consultation is: VTE  Visit type: audiovisual  Total time spent with patient: 15 min  Each patient to whom he or she provides medical services by telemedicine is:  (1) informed of the relationship between the physician and patient and the respective role of any other health care provider with respect to management of the patient; and (2) notified that he or she may decline to receive medical services by telemedicine and may withdraw from such care at any time.    Past Medical History:   Past Medical History:   Diagnosis Date    Anemia of chronic disease 12/19/2019    Anticoagulant long-term use     Anticoagulant long-term use     FERNANDO (dyspnea on exertion)     DVT (deep venous thrombosis)     Encounter for blood  "transfusion     Factor V Leiden     Leg edema, left     May-Thurner syndrome     Multiple pulmonary nodules     Pulmonary embolus     RAD (reactive airway disease)     Recurrent upper respiratory infection (URI)     Recurrent urticaria     Stroke     TIA (transient ischemic attack)        Past Surgical HIstory:   Past Surgical History:   Procedure Laterality Date    COLONOSCOPY N/A 12/18/2015    Procedure: COLONOSCOPY;  Surgeon: Lefty Lee MD;  Location: [facility] (4TH FLR);  Service: Endoscopy;  Laterality: N/A;  schedule with Ray    IVC FILTER RETRIEVAL      IVC FILTER RETRIEVAL N/A 9/23/2019    Procedure: REMOVAL-FILTER-IVC;  Surgeon: Claudia Surgeon;  Location: [facility];  Service: Anesthesiology;  Laterality: N/A;  188  4 hours Anes    tumor from breast      left    WISDOM TOOTH EXTRACTION         Family History:   Family History   Problem Relation Age of Onset    Allergies Mother         azithromycin       Social History:  reports that she has never smoked. She has never used smokeless tobacco. She reports that she does not drink alcohol or use drugs.    Allergies:  Review of patient's allergies indicates:   Allergen Reactions    Azithromycin Hives    Beef containing products Anaphylaxis     Patient cannot eat BEEF BROTH  STATES IT WILL MAKE HER THROAT CLOSE UP .     Pork derived (porcine) Anaphylaxis     Throat swells up cannot eat pork sausage or pork patties ,     Unclassified drug Shortness Of Breath and Other (See Comments)     Is allergic to a beef spice - Causes "throat closing"    Xarelto [rivaroxaban] Other (See Comments)     Gallbladder swelling and disfunction    Toradol [ketorolac] Hives and Itching       Medications:  Current Outpatient Medications   Medication Sig Dispense Refill    clopidogrel (PLAVIX) 75 mg tablet Take 1 tablet (75 mg total) by mouth once daily. 90 tablet 3    diazePAM (VALIUM) 5 MG tablet TAKE 1 TABLET (5 MG TOTAL) BY MOUTH EVERY 12 (TWELVE) HOURS AS " NEEDED FOR ANXIETY. 30 tablet 0    EPINEPHrine (EPIPEN 2-VAN) 0.3 mg/0.3 mL AtIn Inject 0.3 mLs (0.3 mg total) into the muscle once. for 1 dose 2 Device 2    fondaparinux (ARIXTRA) 7.5 mg/0.6 mL injection Inject 0.6 mLs (7.5 mg total) into the skin once daily. 225 mL 3    prothrombin time/INR test metr Misc 1 each by Misc.(Non-Drug; Combo Route) route once daily. 1 each 0     No current facility-administered medications for this visit.        Review of Systems   Constitutional: Negative for chills, fatigue, fever and unexpected weight change.   HENT: Positive for nosebleeds. Negative for mouth sores.    Respiratory: Negative for cough, chest tightness and shortness of breath.    Cardiovascular: Negative for chest pain, palpitations and leg swelling.   Gastrointestinal: Positive for abdominal pain (RLQ). Negative for diarrhea, nausea and vomiting.   Endocrine: Negative for cold intolerance and heat intolerance.   Genitourinary: Negative for dysuria and hematuria.   Musculoskeletal: Positive for arthralgias (right leg pain). Negative for back pain and joint swelling.   Skin: Negative for rash.   Allergic/Immunologic: Negative for environmental allergies.   Neurological: Negative for light-headedness and numbness.   Hematological: Negative for adenopathy. Does not bruise/bleed easily.       ECOG Performance Status: 1   Objective:      Vitals:   There were no vitals filed for this visit.  BMI: There is no height or weight on file to calculate BMI.    Physical Exam   Constitutional: She appears well-developed and well-nourished.   Neurological: She is alert.   Psychiatric: She has a normal mood and affect. Her behavior is normal.       Laboratory Data:  No visits with results within 1 Week(s) from this visit.   Latest known visit with results is:   Lab Visit on 02/13/2020   Component Date Value Ref Range Status    WBC 02/13/2020 3.65* 3.90 - 12.70 K/uL Final    RBC 02/13/2020 3.65* 4.00 - 5.40 M/uL Final     Hemoglobin 02/13/2020 9.5* 12.0 - 16.0 g/dL Final    Hematocrit 02/13/2020 32.1* 37.0 - 48.5 % Final    Mean Corpuscular Volume 02/13/2020 88  82 - 98 fL Final    Mean Corpuscular Hemoglobin 02/13/2020 26.0* 27.0 - 31.0 pg Final    Mean Corpuscular Hemoglobin Conc 02/13/2020 29.6* 32.0 - 36.0 g/dL Final    RDW 02/13/2020 14.4  11.5 - 14.5 % Final    Platelets 02/13/2020 172  150 - 350 K/uL Final    MPV 02/13/2020 9.0* 9.2 - 12.9 fL Final    Immature Granulocytes 02/13/2020 0.3  0.0 - 0.5 % Final    Gran # (ANC) 02/13/2020 1.9  1.8 - 7.7 K/uL Final    Immature Grans (Abs) 02/13/2020 0.01  0.00 - 0.04 K/uL Final    Comment: Mild elevation in immature granulocytes is non specific and   can be seen in a variety of conditions including stress response,   acute inflammation, trauma and pregnancy. Correlation with other   laboratory and clinical findings is essential.      Lymph # 02/13/2020 1.4  1.0 - 4.8 K/uL Final    Mono # 02/13/2020 0.2* 0.3 - 1.0 K/uL Final    Eos # 02/13/2020 0.1  0.0 - 0.5 K/uL Final    Baso # 02/13/2020 0.02  0.00 - 0.20 K/uL Final    nRBC 02/13/2020 0  0 /100 WBC Final    Gran% 02/13/2020 51.6  38.0 - 73.0 % Final    Lymph% 02/13/2020 38.6  18.0 - 48.0 % Final    Mono% 02/13/2020 6.3  4.0 - 15.0 % Final    Eosinophil% 02/13/2020 2.7  0.0 - 8.0 % Final    Basophil% 02/13/2020 0.5  0.0 - 1.9 % Final    Differential Method 02/13/2020 Automated   Final    Prothrombin Time 02/13/2020 10.3  9.0 - 12.5 sec Final    INR 02/13/2020 1.0  0.8 - 1.2 Final    Comment: Coumadin Therapy:  2.0 - 3.0 for INR for all indicators except mechanical heart valves  and antiphospholipid syndromes which should use 2.5 - 3.5.       Assessment:       1. Factor V Leiden mutation    2. Recurrent acute deep vein thrombosis of left lower extremity    3. Presence of IVC filter    4. History of pulmonary embolism    5. Anemia of chronic disease, iron deficiency anemia and acute blood loss anemia    6.  Splenomegaly    7. May-Thurner syndrome         Plan:   1) Homozygous Factor V Leiden mutation, and recurrent DVTs, IVC filter placement    Hematologic History  The patient has had at least 11 DVTs and 3-4 PEs.  -She was on Coumadin for years and stated she came off when she had a DVT despite a therapeutic INR.  -She was then placed on Xarelto buts states she only took 1 dose and shortly after developed gallbladder pain. When she was evaluated she was told she had gallbladder inflammation.  -IVC filter was placed in 2014. 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 She had a DVT in 2016 while on Eliquis  She was followed by a Hematologist in [place], and he opted to take her off of anti-coagulation in 2018 because stated that she had an IVC filter and did not need to be anti-coagulated.  -She noted symptoms of DVT , so she self resumed a previous Eliquis prescription that she had 10mg BID  -July 2019 new thrombus, was started on Lovenox 1.5mg/d Qd then to 1mg/kg BID  -Nov 2019 new thrombus, concern for Lovenox failure, switched to Eliquis 5mg BID  -DRVVT positive 11/2/2019  -Jan 2020: switched to Coumadin goal INR 2.5-3.5      2) Normocytic anemia  - Reticulocyte count has been decreased, suggestive of a hypoproliferative marrow.  - Splenomegaly noted on CT  - Hb 9.5     3) Right lower abdominal pain d/t IVC filter    4) May Suarez Syndrome    5) Splenomegaly    Plan:  -Will get repeat APLS labs on 4/20, with INR check, and CBC  -Continue Coumadin goal INR 2.5-3.5 given possible APLS and extensive clotting history on other anti-coagulants  -Patient unable to get INR checked routinely, possibly moving to [place] d/t  being re-deployed. D/t issues with compliance, will try Fondaparinux 7.5mg subQ daily  -Daily plavix, stop ASA, too high risk of bleeding  with double platelet therapy + anti-coagulation  -If Fondaparinux too expensive, will discuss trial of Xarelto again (had questionable gallbladder inflammation when took it years ago)  -Xa level routinely while on Fondaparinux  -Patient to seek medical care with any unusual bleeding or new VTE symptoms  -Check MPN panel    Follow-up: myeloproliferative panel, INR, CBC and DRVVT lupus anti-coag, glycoprotein, anti-cardiolipin on 4/20, MD telemedicine visit on 4/23 at 4PM    The following was staffed and discussed with supervising physician Dr. Hoffman.    Earnestine Garcia MD  Hematology/Oncology Fellow

## 2020-04-17 ENCOUNTER — TELEPHONE (OUTPATIENT)
Dept: HEMATOLOGY/ONCOLOGY | Facility: CLINIC | Age: 30
End: 2020-04-17

## 2020-04-17 ENCOUNTER — ANTI-COAG VISIT (OUTPATIENT)
Dept: CARDIOLOGY | Facility: CLINIC | Age: 30
End: 2020-04-17

## 2020-04-17 DIAGNOSIS — I82.4Z3 ACUTE DEEP VEIN THROMBOSIS (DVT) OF DISTAL VEIN OF BOTH LOWER EXTREMITIES: ICD-10-CM

## 2020-04-17 DIAGNOSIS — I87.1 MAY-THURNER SYNDROME: ICD-10-CM

## 2020-04-17 DIAGNOSIS — Z95.828 PRESENCE OF IVC FILTER: ICD-10-CM

## 2020-04-17 DIAGNOSIS — Z86.711 HISTORY OF PULMONARY EMBOLISM: ICD-10-CM

## 2020-04-17 DIAGNOSIS — I82.4Y3 ACUTE DEEP VEIN THROMBOSIS (DVT) OF PROXIMAL VEIN OF BOTH LOWER EXTREMITIES: ICD-10-CM

## 2020-04-17 DIAGNOSIS — D68.51 FACTOR V LEIDEN MUTATION: ICD-10-CM

## 2020-04-17 NOTE — PROGRESS NOTES
Patient seen by heme/onc yesterday and being transitioned to fondaparinux or DOAC. Will d/c from our care.

## 2020-04-17 NOTE — TELEPHONE ENCOUNTER
----- Message from Jessy Cordero sent at 4/17/2020  3:50 PM CDT -----  Contact: Patient  Returning a call     Caller name:: pt    Who Left Message for Patient:: lennox     Communication preference:: [phone]    Additional info::

## 2020-04-17 NOTE — TELEPHONE ENCOUNTER
Left message to pt that we will be scheduling appts for her on 4/20 and 4/23 if she can call us back regarding the appts

## 2020-04-17 NOTE — TELEPHONE ENCOUNTER
----- Message from Earnestine Garcia MD sent at 4/16/2020  4:14 PM CDT -----  Labs (myeloproliferative panel, INR, CBC and DRVVT lupus anti-coag, glycoprotein, anti-cardiolipin) on 4/20, MD telemedicine visit on 4/23 at 4PM

## 2020-04-20 ENCOUNTER — TELEPHONE (OUTPATIENT)
Dept: HEMATOLOGY/ONCOLOGY | Facility: HOSPITAL | Age: 30
End: 2020-04-20

## 2020-04-20 NOTE — TELEPHONE ENCOUNTER
Patient states cannot afford Fondaparinux, would like to try Xarelto again. Previous Xarelto allergy per patient, was gallbladder inflammation which occurred 20 min after taking her 1st Xarelto pill, she never took any more after that. Unclear if truly related to Xarelto, discussed risk of adverse effects, given lack of other good options & unable to get INR checked, patient would like to try Xarelto. Prescription sent to pharmacy. F/u visit on 4/23.

## 2020-04-23 ENCOUNTER — TELEPHONE (OUTPATIENT)
Dept: HEMATOLOGY/ONCOLOGY | Facility: CLINIC | Age: 30
End: 2020-04-23

## 2020-04-23 NOTE — TELEPHONE ENCOUNTER
Patient no showed her lab appointment on 4/20, and telemedicine visit 4/23. Left VM with patient to reschedule. Patient should now be taking Xarelto.

## 2020-05-03 ENCOUNTER — HOSPITAL ENCOUNTER (INPATIENT)
Facility: HOSPITAL | Age: 30
LOS: 10 days | Discharge: HOME | DRG: 253 | End: 2020-05-13
Attending: EMERGENCY MEDICINE | Admitting: INTERNAL MEDICINE
Payer: COMMERCIAL

## 2020-05-03 DIAGNOSIS — B95.61 STAPHYLOCOCCUS AUREUS BACTEREMIA: ICD-10-CM

## 2020-05-03 DIAGNOSIS — Z95.828 PRESENCE OF IVC FILTER: ICD-10-CM

## 2020-05-03 DIAGNOSIS — R78.81 GRAM-POSITIVE BACTEREMIA: ICD-10-CM

## 2020-05-03 DIAGNOSIS — I87.1 MAY-THURNER SYNDROME: ICD-10-CM

## 2020-05-03 DIAGNOSIS — M79.604 LEG PAIN, BILATERAL: ICD-10-CM

## 2020-05-03 DIAGNOSIS — D68.51 FACTOR 5 LEIDEN MUTATION, HETEROZYGOUS: ICD-10-CM

## 2020-05-03 DIAGNOSIS — R78.81 STAPHYLOCOCCUS AUREUS BACTEREMIA: ICD-10-CM

## 2020-05-03 DIAGNOSIS — K62.5 BRIGHT RED BLOOD PER RECTUM: ICD-10-CM

## 2020-05-03 DIAGNOSIS — Z86.711 HISTORY OF PULMONARY EMBOLISM: ICD-10-CM

## 2020-05-03 DIAGNOSIS — M79.605 LEG PAIN, BILATERAL: ICD-10-CM

## 2020-05-03 DIAGNOSIS — I82.4Y3 DVT, LOWER EXTREMITY, PROXIMAL, ACUTE, BILATERAL: Primary | ICD-10-CM

## 2020-05-03 DIAGNOSIS — D63.8 ANEMIA OF CHRONIC DISEASE: ICD-10-CM

## 2020-05-03 DIAGNOSIS — Z79.01 LONG TERM (CURRENT) USE OF ANTICOAGULANTS: ICD-10-CM

## 2020-05-03 DIAGNOSIS — D70.8 OTHER NEUTROPENIA: ICD-10-CM

## 2020-05-03 DIAGNOSIS — M79.606 LEG PAIN: ICD-10-CM

## 2020-05-03 LAB
ALBUMIN SERPL BCP-MCNC: 2.4 G/DL (ref 3.5–5.2)
ALP SERPL-CCNC: 146 U/L (ref 55–135)
ALT SERPL W/O P-5'-P-CCNC: 14 U/L (ref 10–44)
ANION GAP SERPL CALC-SCNC: 12 MMOL/L (ref 8–16)
APTT BLDCRRT: 22.9 SEC (ref 21–32)
AST SERPL-CCNC: 18 U/L (ref 10–40)
B-HCG UR QL: NEGATIVE
BACTERIA #/AREA URNS AUTO: ABNORMAL /HPF
BASOPHILS # BLD AUTO: 0.01 K/UL (ref 0–0.2)
BASOPHILS NFR BLD: 0.3 % (ref 0–1.9)
BILIRUB SERPL-MCNC: 0.3 MG/DL (ref 0.1–1)
BILIRUB UR QL STRIP: ABNORMAL
BUN SERPL-MCNC: 6 MG/DL (ref 6–20)
CALCIUM SERPL-MCNC: 9 MG/DL (ref 8.7–10.5)
CHLORIDE SERPL-SCNC: 100 MMOL/L (ref 95–110)
CLARITY UR REFRACT.AUTO: ABNORMAL
CO2 SERPL-SCNC: 24 MMOL/L (ref 23–29)
COLOR UR AUTO: ABNORMAL
CREAT SERPL-MCNC: 0.7 MG/DL (ref 0.5–1.4)
CTP QC/QA: YES
D DIMER PPP IA.FEU-MCNC: 3.69 MG/L FEU
DIFFERENTIAL METHOD: ABNORMAL
EOSINOPHIL # BLD AUTO: 0 K/UL (ref 0–0.5)
EOSINOPHIL NFR BLD: 0.5 % (ref 0–8)
ERYTHROCYTE [DISTWIDTH] IN BLOOD BY AUTOMATED COUNT: 16.6 % (ref 11.5–14.5)
EST. GFR  (AFRICAN AMERICAN): >60 ML/MIN/1.73 M^2
EST. GFR  (NON AFRICAN AMERICAN): >60 ML/MIN/1.73 M^2
GLUCOSE SERPL-MCNC: 97 MG/DL (ref 70–110)
GLUCOSE UR QL STRIP: NEGATIVE
HCT VFR BLD AUTO: 28.5 % (ref 37–48.5)
HGB BLD-MCNC: 8.6 G/DL (ref 12–16)
HGB UR QL STRIP: NEGATIVE
HYALINE CASTS UR QL AUTO: 0 /LPF
IMM GRANULOCYTES # BLD AUTO: 0.03 K/UL (ref 0–0.04)
IMM GRANULOCYTES NFR BLD AUTO: 0.8 % (ref 0–0.5)
INR PPP: 1.1 (ref 0.8–1.2)
KETONES UR QL STRIP: ABNORMAL
LACTATE SERPL-SCNC: 0.9 MMOL/L (ref 0.5–2.2)
LEUKOCYTE ESTERASE UR QL STRIP: ABNORMAL
LYMPHOCYTES # BLD AUTO: 0.9 K/UL (ref 1–4.8)
LYMPHOCYTES NFR BLD: 22.2 % (ref 18–48)
MCH RBC QN AUTO: 25.6 PG (ref 27–31)
MCHC RBC AUTO-ENTMCNC: 30.2 G/DL (ref 32–36)
MCV RBC AUTO: 85 FL (ref 82–98)
MICROSCOPIC COMMENT: ABNORMAL
MONOCYTES # BLD AUTO: 0.2 K/UL (ref 0.3–1)
MONOCYTES NFR BLD: 5.5 % (ref 4–15)
NEUTROPHILS # BLD AUTO: 2.7 K/UL (ref 1.8–7.7)
NEUTROPHILS NFR BLD: 70.7 % (ref 38–73)
NITRITE UR QL STRIP: NEGATIVE
NRBC BLD-RTO: 0 /100 WBC
PH UR STRIP: 6 [PH] (ref 5–8)
PLATELET # BLD AUTO: 228 K/UL (ref 150–350)
PMV BLD AUTO: 9.5 FL (ref 9.2–12.9)
POTASSIUM SERPL-SCNC: 3.5 MMOL/L (ref 3.5–5.1)
PROT SERPL-MCNC: 8 G/DL (ref 6–8.4)
PROT UR QL STRIP: ABNORMAL
PROTHROMBIN TIME: 11.4 SEC (ref 9–12.5)
RBC # BLD AUTO: 3.36 M/UL (ref 4–5.4)
RBC #/AREA URNS AUTO: 1 /HPF (ref 0–4)
SARS-COV-2 RDRP RESP QL NAA+PROBE: NEGATIVE
SODIUM SERPL-SCNC: 136 MMOL/L (ref 136–145)
SP GR UR STRIP: 1.02 (ref 1–1.03)
SQUAMOUS #/AREA URNS AUTO: 12 /HPF
URN SPEC COLLECT METH UR: ABNORMAL
WBC # BLD AUTO: 3.83 K/UL (ref 3.9–12.7)
WBC #/AREA URNS AUTO: 18 /HPF (ref 0–5)

## 2020-05-03 PROCEDURE — 85730 THROMBOPLASTIN TIME PARTIAL: CPT

## 2020-05-03 PROCEDURE — 99285 EMERGENCY DEPT VISIT HI MDM: CPT | Mod: 25

## 2020-05-03 PROCEDURE — 96375 TX/PRO/DX INJ NEW DRUG ADDON: CPT

## 2020-05-03 PROCEDURE — U0002 COVID-19 LAB TEST NON-CDC: HCPCS

## 2020-05-03 PROCEDURE — 85379 FIBRIN DEGRADATION QUANT: CPT

## 2020-05-03 PROCEDURE — 83605 ASSAY OF LACTIC ACID: CPT

## 2020-05-03 PROCEDURE — 63600175 PHARM REV CODE 636 W HCPCS: Performed by: HOSPITALIST

## 2020-05-03 PROCEDURE — 85610 PROTHROMBIN TIME: CPT

## 2020-05-03 PROCEDURE — 80053 COMPREHEN METABOLIC PANEL: CPT

## 2020-05-03 PROCEDURE — 99291 PR CRITICAL CARE, E/M 30-74 MINUTES: ICD-10-PCS | Mod: ,,, | Performed by: EMERGENCY MEDICINE

## 2020-05-03 PROCEDURE — 96374 THER/PROPH/DIAG INJ IV PUSH: CPT

## 2020-05-03 PROCEDURE — 99291 CRITICAL CARE FIRST HOUR: CPT | Mod: ,,, | Performed by: EMERGENCY MEDICINE

## 2020-05-03 PROCEDURE — 81001 URINALYSIS AUTO W/SCOPE: CPT

## 2020-05-03 PROCEDURE — 20600001 HC STEP DOWN PRIVATE ROOM

## 2020-05-03 PROCEDURE — 81025 URINE PREGNANCY TEST: CPT | Performed by: EMERGENCY MEDICINE

## 2020-05-03 PROCEDURE — 85025 COMPLETE CBC W/AUTO DIFF WBC: CPT

## 2020-05-03 PROCEDURE — 96376 TX/PRO/DX INJ SAME DRUG ADON: CPT

## 2020-05-03 PROCEDURE — 87086 URINE CULTURE/COLONY COUNT: CPT

## 2020-05-03 PROCEDURE — 63600175 PHARM REV CODE 636 W HCPCS: Performed by: EMERGENCY MEDICINE

## 2020-05-03 PROCEDURE — 93005 ELECTROCARDIOGRAM TRACING: CPT

## 2020-05-03 RX ORDER — OXYCODONE HYDROCHLORIDE 5 MG/1
5 TABLET ORAL EVERY 4 HOURS PRN
Status: DISCONTINUED | OUTPATIENT
Start: 2020-05-04 | End: 2020-05-06

## 2020-05-03 RX ORDER — HYDROMORPHONE HYDROCHLORIDE 1 MG/ML
1 INJECTION, SOLUTION INTRAMUSCULAR; INTRAVENOUS; SUBCUTANEOUS EVERY 4 HOURS PRN
Status: DISCONTINUED | OUTPATIENT
Start: 2020-05-03 | End: 2020-05-05

## 2020-05-03 RX ORDER — IBUPROFEN 200 MG
16 TABLET ORAL
Status: DISCONTINUED | OUTPATIENT
Start: 2020-05-04 | End: 2020-05-13 | Stop reason: HOSPADM

## 2020-05-03 RX ORDER — HYDROMORPHONE HYDROCHLORIDE 1 MG/ML
0.5 INJECTION, SOLUTION INTRAMUSCULAR; INTRAVENOUS; SUBCUTANEOUS
Status: COMPLETED | OUTPATIENT
Start: 2020-05-03 | End: 2020-05-03

## 2020-05-03 RX ORDER — HYDROMORPHONE HYDROCHLORIDE 1 MG/ML
1 INJECTION, SOLUTION INTRAMUSCULAR; INTRAVENOUS; SUBCUTANEOUS
Status: COMPLETED | OUTPATIENT
Start: 2020-05-03 | End: 2020-05-03

## 2020-05-03 RX ORDER — ONDANSETRON 8 MG/1
8 TABLET, ORALLY DISINTEGRATING ORAL EVERY 8 HOURS PRN
Status: DISCONTINUED | OUTPATIENT
Start: 2020-05-04 | End: 2020-05-13 | Stop reason: HOSPADM

## 2020-05-03 RX ORDER — IBUPROFEN 200 MG
24 TABLET ORAL
Status: DISCONTINUED | OUTPATIENT
Start: 2020-05-04 | End: 2020-05-13 | Stop reason: HOSPADM

## 2020-05-03 RX ORDER — HYDROMORPHONE HYDROCHLORIDE 1 MG/ML
0.5 INJECTION, SOLUTION INTRAMUSCULAR; INTRAVENOUS; SUBCUTANEOUS EVERY 4 HOURS PRN
Status: DISCONTINUED | OUTPATIENT
Start: 2020-05-03 | End: 2020-05-03

## 2020-05-03 RX ORDER — HEPARIN SODIUM,PORCINE/D5W 25000/250
18 INTRAVENOUS SOLUTION INTRAVENOUS CONTINUOUS
Status: DISCONTINUED | OUTPATIENT
Start: 2020-05-03 | End: 2020-05-04

## 2020-05-03 RX ORDER — ACETAMINOPHEN 325 MG/1
650 TABLET ORAL EVERY 8 HOURS PRN
Status: DISCONTINUED | OUTPATIENT
Start: 2020-05-04 | End: 2020-05-07

## 2020-05-03 RX ORDER — SODIUM CHLORIDE 0.9 % (FLUSH) 0.9 %
10 SYRINGE (ML) INJECTION
Status: DISCONTINUED | OUTPATIENT
Start: 2020-05-04 | End: 2020-05-13 | Stop reason: HOSPADM

## 2020-05-03 RX ORDER — SODIUM CHLORIDE 0.9 % (FLUSH) 0.9 %
10 SYRINGE (ML) INJECTION
Status: DISCONTINUED | OUTPATIENT
Start: 2020-05-03 | End: 2020-05-13

## 2020-05-03 RX ORDER — GLUCAGON 1 MG
1 KIT INJECTION
Status: DISCONTINUED | OUTPATIENT
Start: 2020-05-04 | End: 2020-05-13 | Stop reason: HOSPADM

## 2020-05-03 RX ADMIN — HYDROMORPHONE HYDROCHLORIDE 1 MG: 1 INJECTION, SOLUTION INTRAMUSCULAR; INTRAVENOUS; SUBCUTANEOUS at 10:05

## 2020-05-03 RX ADMIN — HEPARIN SODIUM AND DEXTROSE 18 UNITS/KG/HR: 10000; 5 INJECTION INTRAVENOUS at 09:05

## 2020-05-03 RX ADMIN — HYDROMORPHONE HYDROCHLORIDE 1 MG: 1 INJECTION, SOLUTION INTRAMUSCULAR; INTRAVENOUS; SUBCUTANEOUS at 06:05

## 2020-05-03 RX ADMIN — HYDROMORPHONE HYDROCHLORIDE 0.5 MG: 1 INJECTION, SOLUTION INTRAMUSCULAR; INTRAVENOUS; SUBCUTANEOUS at 08:05

## 2020-05-03 RX ADMIN — OXYCODONE HYDROCHLORIDE 5 MG: 5 TABLET ORAL at 11:05

## 2020-05-03 NOTE — ED TRIAGE NOTES
Patient arrived via EMS with complaints of bilateral leg and hip pain, hx of DVT with filter placement.

## 2020-05-03 NOTE — ED NOTES
Antoinette Love, a 29 y.o. female presents to the ED via EMS with CC bilateral leg pain.  States hx of DVT with mesh, patient has filter placement.  For hx of DVT.        Patient identifiers verified verbally with patient and correct for Antoinette Love.    LOC/ APPEARANCE: The patient is AAOx4. Pt is speaking appropriately, no slurred speech. Pt changed into hospital gown. Continuous cardiac monitor, cont pulse ox, and auto BP cuff applied to patient. Pt is clean and well groomed. No JVD visible. Pt reports pain level of 0. Pt updated on POC. Bed low and locked with side rails up x2, call bell in pt reach.  SKIN: Skin is warm dry and intact, and color is consistent with ethnicity. Capillary refill <3 seconds. No breakdown or brusing visible. Mucus membranes moist, acyanotic.  RESPIRATORY: Airway is open and patent. Respirations-spontaneous, unlabored, regular rate, equal bilaterally on inspiration and expiration. No accessory muscle use noted. Lungs clear to auscultation in all fields bilaterally anterior and posterior.   CARDIAC: Patient has regular heart rate.  No peripheral edema noted, and patient has no c/o chest pain. Peripheral pulses present equal and strong throughout. Doppler to bilateral legs and noted palpable pulses.    ABDOMEN: Soft and non-tender to palpation with no distention noted. Normoactive bowel sounds x4 quadrants. Pt has no complaints of abnormal bowel movements, denies blood in stool. Pt reports normal appetite.   NEUROLOGIC: Eyes open spontaneously and facial expression symmetrical. Pt behavior appropriate to situation, and pt follows commands. Pt reports sensation present in all extremities when touched with a finger, denies any numbness or tingling. PERRLA  MUSCULOSKELETAL: Spontaneous movement noted to all extremities. Hand  equal and leg strength strong +5 bilaterally.   : No complaints of frequency, burning, urgency or blood in the urine. No complaints of  incontinence.

## 2020-05-03 NOTE — ED PROVIDER NOTES
"Encounter Date: 5/3/2020       History     Chief Complaint   Patient presents with    Leg Pain     Bilateral leg pain; pt has a history of blood clots; pt is supposed to be on Coumadin, but ran out.      HPI   Patient is a 29-year-old female with a history of homozygous Factor 5 Leiden mutation, recurrent DVTs, IVC filter placement, history of TIA in 2013 presenting with acute onset bilateral leg pain that has worsened in the last 24 hr.  Pain is severe rated at 8/10 and located to bilateral thigh extending to her groins.  She also has a history of May-Thurner syndrome, history of pulmonary embolism with recurrent DVTs.  She denies any fevers or chills, nausea vomiting, diarrhea, back pain, dysuria, hematochezia, melena or hemoptysis.  Patient has had interventions before for DVTs including catheter directed thrombolysis with angioplasty of her bilateral lower extremities in December of 2019.  She was also treated with IV heparin, discharged on Eliquis.  She was recently placed on Xarelto and then switched to Fondaparinux.  She was unable to fill this from the pharmacy.  She is currently taking self injections of Lovenox.    Review of patient's allergies indicates:   Allergen Reactions    Azithromycin Hives    Beef containing products Anaphylaxis     Patient cannot eat BEEF BROTH  STATES IT WILL MAKE HER THROAT CLOSE UP .     Pork derived (porcine) Anaphylaxis     Throat swells up cannot eat pork sausage or pork patties ,     Unclassified drug Shortness Of Breath and Other (See Comments)     Is allergic to a beef spice - Causes "throat closing"    Toradol [ketorolac] Hives and Itching     Past Medical History:   Diagnosis Date    Anemia of chronic disease 12/19/2019    Anticoagulant long-term use     Anticoagulant long-term use     FERNANDO (dyspnea on exertion)     DVT (deep venous thrombosis)     Encounter for blood transfusion     Factor V Leiden     Leg edema, left     May-Thurner syndrome     " Multiple pulmonary nodules     Pulmonary embolus     RAD (reactive airway disease)     Recurrent upper respiratory infection (URI)     Recurrent urticaria     Stroke     TIA (transient ischemic attack)      Past Surgical History:   Procedure Laterality Date    COLONOSCOPY N/A 12/18/2015    Procedure: COLONOSCOPY;  Surgeon: Lefty Lee MD;  Location: [facility] (4TH FLR);  Service: Endoscopy;  Laterality: N/A;  schedule with Ray    IVC FILTER RETRIEVAL      IVC FILTER RETRIEVAL N/A 9/23/2019    Procedure: REMOVAL-FILTER-IVC;  Surgeon: Claudia Surgeon;  Location: [facility];  Service: Anesthesiology;  Laterality: N/A;  188  4 hours Anes    tumor from breast      left    WISDOM TOOTH EXTRACTION       Family History   Problem Relation Age of Onset    Allergies Mother         azithromycin     Social History     Tobacco Use    Smoking status: Never Smoker    Smokeless tobacco: Never Used   Substance Use Topics    Alcohol use: No     Frequency: Monthly or less     Drinks per session: 3 or 4     Binge frequency: Never    Drug use: No     Review of Systems   Constitutional: Negative for chills, diaphoresis, fatigue and fever.   HENT: Negative for congestion, nosebleeds and sinus pain.    Eyes: Negative for photophobia and visual disturbance.   Respiratory: Negative for choking, chest tightness, shortness of breath and wheezing.    Cardiovascular: Positive for leg swelling. Negative for chest pain and palpitations.   Gastrointestinal: Negative for abdominal distention, abdominal pain, nausea and vomiting.   Endocrine: Negative for polyuria.   Genitourinary: Negative for dysuria, flank pain, hematuria and vaginal bleeding.   Musculoskeletal: Positive for myalgias. Negative for arthralgias, back pain, gait problem, neck pain and neck stiffness.        Bilateral lower extremity pain in the thighs   Skin: Negative for color change, pallor and rash.   Neurological: Negative for dizziness, tremors, syncope, facial  asymmetry, light-headedness and headaches.   Hematological: Negative for adenopathy. Does not bruise/bleed easily.   Psychiatric/Behavioral: Negative for confusion and hallucinations.       Physical Exam     Initial Vitals   BP Pulse Resp Temp SpO2   05/03/20 1840 05/03/20 1811 05/03/20 1906 05/03/20 1906 05/03/20 1811   116/62 102 20 99.1 °F (37.3 °C) 100 %      MAP       --                Physical Exam    Nursing note and vitals reviewed.    Gen/Constitutional: Interactive. No acute distress  Head: Normocephalic, Atraumatic  Neck: supple, no masses or LAD  Eyes: PERRLA, conjunctiva clear  Ears, Nose and Throat: No rhinorrhea or stridor.  Cardiac: Reg Rhythm, No murmur  Pulmonary: CTA Bilat, no wheezes, rhonchi, rales.  GI: Abdomen soft, non-tender, non-distended; no rebound or guarding  : No CVA tenderness.  Musculoskeletal: Extremities warm, well perfused, no erythema, no edema  Skin: No rashes  Neuro: Alert and Oriented x 3; No focal motor or sensory deficits.    Psych: Normal affect        ED Course   Critical Care  Date/Time: 5/3/2020 9:20 PM  Performed by: Van Carvalho DO  Authorized by: Van Carvalho DO   Direct patient critical care time: 15 minutes  Additional history critical care time: 15 minutes  Ordering / reviewing critical care time: 12 minutes  Documentation critical care time: 7 minutes  Consulting other physicians critical care time: 10 minutes  Consult with family critical care time: 5 minutes  Total critical care time (exclusive of procedural time) : 64 minutes  Critical care was necessary to treat or prevent imminent or life-threatening deterioration of the following conditions: circulatory failure (Extensive iliofemoral DVT with coagulopathy).  Critical care was time spent personally by me on the following activities: blood draw for specimens, development of treatment plan with patient or surrogate, discussions with consultants, evaluation of patient's response to treatment,  examination of patient, obtaining history from patient or surrogate, ordering and performing treatments and interventions, ordering and review of laboratory studies, ordering and review of radiographic studies, pulse oximetry, re-evaluation of patient's condition and review of old charts.        Labs Reviewed   CBC W/ AUTO DIFFERENTIAL - Abnormal; Notable for the following components:       Result Value    WBC 3.83 (*)     RBC 3.36 (*)     Hemoglobin 8.6 (*)     Hematocrit 28.5 (*)     Mean Corpuscular Hemoglobin 25.6 (*)     Mean Corpuscular Hemoglobin Conc 30.2 (*)     RDW 16.6 (*)     Immature Granulocytes 0.8 (*)     Lymph # 0.9 (*)     Mono # 0.2 (*)     All other components within normal limits   D DIMER, QUANTITATIVE - Abnormal; Notable for the following components:    D-Dimer 3.69 (*)     All other components within normal limits   URINALYSIS, REFLEX TO URINE CULTURE - Abnormal; Notable for the following components:    Appearance, UA Cloudy (*)     Protein, UA 1+ (*)     Ketones, UA Trace (*)     Bilirubin (UA) 1+ (*)     Leukocytes, UA Trace (*)     All other components within normal limits    Narrative:     Preferred Collection Type->Urine, Clean Catch   URINALYSIS MICROSCOPIC - Abnormal; Notable for the following components:    WBC, UA 18 (*)     All other components within normal limits    Narrative:     Preferred Collection Type->Urine, Clean Catch   COMPREHENSIVE METABOLIC PANEL - Abnormal; Notable for the following components:    Albumin 2.4 (*)     Alkaline Phosphatase 146 (*)     All other components within normal limits   CULTURE, URINE   LACTIC ACID, PLASMA   PROTIME-INR   APTT   SARS-COV-2 RNA AMPLIFICATION, QUAL   POCT URINE PREGNANCY          Imaging Results          US Lower Extremity Veins Bilateral (Final result)  Result time 05/03/20 20:30:21    Final result by Porfirio Lan MD (05/03/20 20:30:21)                 Impression:      Extensive deep venous thrombosis as described above  noting previously visualized thrombus within 1 of the paired posterior tibial veins on the left is not identified on current exam however there is thrombosis of 1 of the paired right posterior tibial veins, previously no thrombus identified.  Please see above for full details.      Electronically signed by: Porfirio Lan MD  Date:    05/03/2020  Time:    20:30             Narrative:    EXAMINATION:  US LOWER EXTREMITY VEINS BILATERAL    CLINICAL HISTORY:  Pain in right leg    TECHNIQUE:  Duplex and color flow Doppler and dynamic compression was performed of the bilateral lower extremity veins was performed.    COMPARISON:  None    FINDINGS:  There are stents within the right common femoral vein and left common femoral vein, thrombosed.  There is partial thrombosis of the femoral veins, with thrombosis extending to the bilateral popliteal veins.  The bilateral greater saphenous veins are patent.  There is partial thrombus in the right posterior tibial vein.  The left posterior tibial vein is patent.  The bilateral anterior tibial veins and peroneal veins are patent.  There is stent of the left external iliac vein, left external iliac vein appears patent.  There is thrombus within the right external iliac vein.                                 Medical Decision Making:   History:   I obtained history from: EMS provider.  Old Medical Records: I decided to obtain old medical records.  Old Records Summarized: records from clinic visits and records from previous admission(s).  Initial Assessment:   Patient is a 29-year-old female with a history of homozygous Factor 5 Leiden mutation, recurrent DVTs, IVC filter placement, history of TIA in 2013 presenting with acute onset bilateral leg pain that has worsened in the last 24 hr.  Differential Diagnosis:   Differential diagnosis includes but not limited to:  Phlegmasia, iliofemoral DVT, worsened DVT,  Independently Interpreted Test(s):   I have ordered and independently  interpreted X-rays - see prior notes.  I have ordered and independently interpreted EKG Reading(s) - see prior notes  Clinical Tests:   Lab Tests: Ordered and Reviewed  Radiological Study: Ordered and Reviewed  Medical Tests: Ordered and Reviewed    Emergent evaluation of patient presenting with acute on chronic bilateral lower extremity pain extending to the thighs.  She has an extensive history of iliofemoral DVT with multiple angioplasty, catheter directed lysis and stents in place.  She also has a IVC filter in place that is no longer in use.  Pain is rated at 8/10 with some thigh swelling.  Concern for worsening DVT.  She has had significant changes in her anticoagulation regimen and has been unable to get a hold of her medications secondary to pharmacy changes.  Broad workup conducted, IV line placed, labs were drawn, ECG obtained.  ECG with no signs of ischemia or STEMI on my read.  Placed on cardiac and telemetry monitoring including pulse oximetry.  Bilateral DVT ultrasound obtained which shows extensive DVT throughout bilateral lower extremities with occlusion of right iliofemoral stent.  Left iliofemoral stent is patent.  Discussed case with interventional radiology team, who will evaluate the patient and will be remaining as a consult team.  Plan is to keep patient NPO overnight, began heparin drip and continue to monitor for signs of phlegmasia.  At this time no evidence of phlegmasia on exam.  Distal pulses intact throughout.  No overlying skin changes.  Labs stable without significant leukocytosis.  Urine has trace leukocytes with few wbc's and no bacteria, and patient is asymptomatic will await culture for any treatment.  D-dimer elevated greater than 3.  Patient agreeable to admission plan.  Please see critical care note for critical care time given extensive DVT, heparin drip and IV pain medication including Dilaudid IV x2.    Complexity:  Critical care                               Clinical  Impression:       ICD-10-CM ICD-9-CM   1. DVT, lower extremity, proximal, acute, bilateral I82.4Y3 453.41   2. Leg pain M79.606 729.5   3. Leg pain, bilateral M79.604 729.5    M79.605    4. Factor 5 Leiden mutation, heterozygous D68.51 289.81   5. May-Thurner syndrome I87.1 459.2         Disposition:   Disposition: Admitted  Condition: Serious     ED Disposition Condition    Admit              Van Carvalho DO, [facility]  Emergency Staff Physician   Dept of Emergency Medicine   [facility]  Spectralink: [phone]               Van Carvalho DO  05/03/20 2121

## 2020-05-04 PROBLEM — Z79.01 LONG TERM (CURRENT) USE OF ANTICOAGULANTS: Status: ACTIVE | Noted: 2020-05-04

## 2020-05-04 LAB
ABO + RH BLD: NORMAL
ALBUMIN SERPL BCP-MCNC: 2 G/DL (ref 3.5–5.2)
ALP SERPL-CCNC: 189 U/L (ref 55–135)
ALT SERPL W/O P-5'-P-CCNC: 16 U/L (ref 10–44)
ANION GAP SERPL CALC-SCNC: 10 MMOL/L (ref 8–16)
ANISOCYTOSIS BLD QL SMEAR: SLIGHT
APTT BLDCRRT: 32.9 SEC (ref 21–32)
APTT BLDCRRT: 35.9 SEC (ref 21–32)
AST SERPL-CCNC: 28 U/L (ref 10–40)
BASOPHILS # BLD AUTO: 0 K/UL (ref 0–0.2)
BASOPHILS # BLD AUTO: 0.01 K/UL (ref 0–0.2)
BASOPHILS # BLD AUTO: 0.02 K/UL (ref 0–0.2)
BASOPHILS NFR BLD: 0 % (ref 0–1.9)
BASOPHILS NFR BLD: 0.3 % (ref 0–1.9)
BASOPHILS NFR BLD: 0.4 % (ref 0–1.9)
BILIRUB SERPL-MCNC: 0.2 MG/DL (ref 0.1–1)
BLD GP AB SCN CELLS X3 SERPL QL: NORMAL
BLD PROD TYP BPU: NORMAL
BLD PROD TYP BPU: NORMAL
BLOOD UNIT EXPIRATION DATE: NORMAL
BLOOD UNIT EXPIRATION DATE: NORMAL
BLOOD UNIT TYPE CODE: 6200
BLOOD UNIT TYPE CODE: 6200
BLOOD UNIT TYPE: NORMAL
BLOOD UNIT TYPE: NORMAL
BUN SERPL-MCNC: 5 MG/DL (ref 6–20)
CALCIUM SERPL-MCNC: 8.7 MG/DL (ref 8.7–10.5)
CHLORIDE SERPL-SCNC: 100 MMOL/L (ref 95–110)
CO2 SERPL-SCNC: 25 MMOL/L (ref 23–29)
CODING SYSTEM: NORMAL
CODING SYSTEM: NORMAL
CREAT SERPL-MCNC: 0.7 MG/DL (ref 0.5–1.4)
DIFFERENTIAL METHOD: ABNORMAL
DISPENSE STATUS: NORMAL
DISPENSE STATUS: NORMAL
EOSINOPHIL # BLD AUTO: 0 K/UL (ref 0–0.5)
EOSINOPHIL # BLD AUTO: 0 K/UL (ref 0–0.5)
EOSINOPHIL # BLD AUTO: 0.1 K/UL (ref 0–0.5)
EOSINOPHIL NFR BLD: 0.7 % (ref 0–8)
EOSINOPHIL NFR BLD: 0.7 % (ref 0–8)
EOSINOPHIL NFR BLD: 1.1 % (ref 0–8)
ERYTHROCYTE [DISTWIDTH] IN BLOOD BY AUTOMATED COUNT: 15.4 % (ref 11.5–14.5)
ERYTHROCYTE [DISTWIDTH] IN BLOOD BY AUTOMATED COUNT: 15.5 % (ref 11.5–14.5)
ERYTHROCYTE [DISTWIDTH] IN BLOOD BY AUTOMATED COUNT: 15.6 % (ref 11.5–14.5)
EST. GFR  (AFRICAN AMERICAN): >60 ML/MIN/1.73 M^2
EST. GFR  (NON AFRICAN AMERICAN): >60 ML/MIN/1.73 M^2
GLUCOSE SERPL-MCNC: 92 MG/DL (ref 70–110)
HCT VFR BLD AUTO: 23 % (ref 37–48.5)
HCT VFR BLD AUTO: 23.2 % (ref 37–48.5)
HCT VFR BLD AUTO: 31.4 % (ref 37–48.5)
HGB BLD-MCNC: 6.7 G/DL (ref 12–16)
HGB BLD-MCNC: 6.8 G/DL (ref 12–16)
HGB BLD-MCNC: 9.8 G/DL (ref 12–16)
HYPOCHROMIA BLD QL SMEAR: ABNORMAL
IMM GRANULOCYTES # BLD AUTO: 0.01 K/UL (ref 0–0.04)
IMM GRANULOCYTES # BLD AUTO: 0.02 K/UL (ref 0–0.04)
IMM GRANULOCYTES # BLD AUTO: 0.02 K/UL (ref 0–0.04)
IMM GRANULOCYTES NFR BLD AUTO: 0.3 % (ref 0–0.5)
IMM GRANULOCYTES NFR BLD AUTO: 0.4 % (ref 0–0.5)
IMM GRANULOCYTES NFR BLD AUTO: 0.7 % (ref 0–0.5)
INR PPP: 1.2 (ref 0.8–1.2)
LYMPHOCYTES # BLD AUTO: 0.6 K/UL (ref 1–4.8)
LYMPHOCYTES # BLD AUTO: 0.8 K/UL (ref 1–4.8)
LYMPHOCYTES # BLD AUTO: 1.2 K/UL (ref 1–4.8)
LYMPHOCYTES NFR BLD: 19.8 % (ref 18–48)
LYMPHOCYTES NFR BLD: 26.2 % (ref 18–48)
LYMPHOCYTES NFR BLD: 27.1 % (ref 18–48)
MCH RBC QN AUTO: 25.2 PG (ref 27–31)
MCH RBC QN AUTO: 25.3 PG (ref 27–31)
MCH RBC QN AUTO: 26.8 PG (ref 27–31)
MCHC RBC AUTO-ENTMCNC: 29.1 G/DL (ref 32–36)
MCHC RBC AUTO-ENTMCNC: 29.3 G/DL (ref 32–36)
MCHC RBC AUTO-ENTMCNC: 31.2 G/DL (ref 32–36)
MCV RBC AUTO: 86 FL (ref 82–98)
MCV RBC AUTO: 86 FL (ref 82–98)
MCV RBC AUTO: 87 FL (ref 82–98)
MONOCYTES # BLD AUTO: 0.2 K/UL (ref 0.3–1)
MONOCYTES NFR BLD: 4.3 % (ref 4–15)
MONOCYTES NFR BLD: 6.6 % (ref 4–15)
MONOCYTES NFR BLD: 6.8 % (ref 4–15)
NEUTROPHILS # BLD AUTO: 1.9 K/UL (ref 1.8–7.7)
NEUTROPHILS # BLD AUTO: 2.2 K/UL (ref 1.8–7.7)
NEUTROPHILS # BLD AUTO: 3.1 K/UL (ref 1.8–7.7)
NEUTROPHILS NFR BLD: 65.1 % (ref 38–73)
NEUTROPHILS NFR BLD: 67.6 % (ref 38–73)
NEUTROPHILS NFR BLD: 71.9 % (ref 38–73)
NRBC BLD-RTO: 0 /100 WBC
OVALOCYTES BLD QL SMEAR: ABNORMAL
PLATELET # BLD AUTO: 208 K/UL (ref 150–350)
PLATELET # BLD AUTO: 225 K/UL (ref 150–350)
PLATELET # BLD AUTO: 227 K/UL (ref 150–350)
PLATELET BLD QL SMEAR: ABNORMAL
PMV BLD AUTO: 9.1 FL (ref 9.2–12.9)
PMV BLD AUTO: 9.3 FL (ref 9.2–12.9)
PMV BLD AUTO: 9.4 FL (ref 9.2–12.9)
POIKILOCYTOSIS BLD QL SMEAR: SLIGHT
POLYCHROMASIA BLD QL SMEAR: ABNORMAL
POTASSIUM SERPL-SCNC: 4 MMOL/L (ref 3.5–5.1)
PROT SERPL-MCNC: 7.1 G/DL (ref 6–8.4)
PROTHROMBIN TIME: 12.2 SEC (ref 9–12.5)
RBC # BLD AUTO: 2.66 M/UL (ref 4–5.4)
RBC # BLD AUTO: 2.69 M/UL (ref 4–5.4)
RBC # BLD AUTO: 3.65 M/UL (ref 4–5.4)
SARS-COV-2 RNA RESP QL NAA+PROBE: NOT DETECTED
SCHISTOCYTES BLD QL SMEAR: ABNORMAL
SODIUM SERPL-SCNC: 135 MMOL/L (ref 136–145)
TRANS ERYTHROCYTES VOL PATIENT: NORMAL ML
TRANS ERYTHROCYTES VOL PATIENT: NORMAL ML
WBC # BLD AUTO: 2.95 K/UL (ref 3.9–12.7)
WBC # BLD AUTO: 3.03 K/UL (ref 3.9–12.7)
WBC # BLD AUTO: 4.61 K/UL (ref 3.9–12.7)

## 2020-05-04 PROCEDURE — 63600175 PHARM REV CODE 636 W HCPCS

## 2020-05-04 PROCEDURE — 99223 1ST HOSP IP/OBS HIGH 75: CPT | Mod: ,,, | Performed by: INTERNAL MEDICINE

## 2020-05-04 PROCEDURE — 25000003 PHARM REV CODE 250: Performed by: STUDENT IN AN ORGANIZED HEALTH CARE EDUCATION/TRAINING PROGRAM

## 2020-05-04 PROCEDURE — 80053 COMPREHEN METABOLIC PANEL: CPT

## 2020-05-04 PROCEDURE — 99222 PR INITIAL HOSPITAL CARE,LEVL II: ICD-10-PCS | Mod: ,,, | Performed by: INTERNAL MEDICINE

## 2020-05-04 PROCEDURE — 63600175 PHARM REV CODE 636 W HCPCS: Performed by: HOSPITALIST

## 2020-05-04 PROCEDURE — 85610 PROTHROMBIN TIME: CPT

## 2020-05-04 PROCEDURE — 20600001 HC STEP DOWN PRIVATE ROOM

## 2020-05-04 PROCEDURE — 85730 THROMBOPLASTIN TIME PARTIAL: CPT | Mod: 91

## 2020-05-04 PROCEDURE — 86850 RBC ANTIBODY SCREEN: CPT

## 2020-05-04 PROCEDURE — 25500020 PHARM REV CODE 255: Performed by: INTERNAL MEDICINE

## 2020-05-04 PROCEDURE — U0002 COVID-19 LAB TEST NON-CDC: HCPCS

## 2020-05-04 PROCEDURE — 85730 THROMBOPLASTIN TIME PARTIAL: CPT

## 2020-05-04 PROCEDURE — 99222 1ST HOSP IP/OBS MODERATE 55: CPT | Mod: ,,, | Performed by: INTERNAL MEDICINE

## 2020-05-04 PROCEDURE — 85025 COMPLETE CBC W/AUTO DIFF WBC: CPT | Mod: 91

## 2020-05-04 PROCEDURE — 94761 N-INVAS EAR/PLS OXIMETRY MLT: CPT

## 2020-05-04 PROCEDURE — P9021 RED BLOOD CELLS UNIT: HCPCS

## 2020-05-04 PROCEDURE — 36415 COLL VENOUS BLD VENIPUNCTURE: CPT

## 2020-05-04 PROCEDURE — 36430 TRANSFUSION BLD/BLD COMPNT: CPT

## 2020-05-04 PROCEDURE — 99223 PR INITIAL HOSPITAL CARE,LEVL III: ICD-10-PCS | Mod: ,,, | Performed by: INTERNAL MEDICINE

## 2020-05-04 PROCEDURE — 86920 COMPATIBILITY TEST SPIN: CPT

## 2020-05-04 PROCEDURE — 25000003 PHARM REV CODE 250: Performed by: INTERNAL MEDICINE

## 2020-05-04 RX ORDER — HEPARIN SODIUM,PORCINE/D5W 25000/250
20 INTRAVENOUS SOLUTION INTRAVENOUS CONTINUOUS
Status: DISCONTINUED | OUTPATIENT
Start: 2020-05-04 | End: 2020-05-05

## 2020-05-04 RX ORDER — HYDROCODONE BITARTRATE AND ACETAMINOPHEN 500; 5 MG/1; MG/1
TABLET ORAL
Status: DISCONTINUED | OUTPATIENT
Start: 2020-05-04 | End: 2020-05-13

## 2020-05-04 RX ORDER — POLYETHYLENE GLYCOL 3350, SODIUM SULFATE ANHYDROUS, SODIUM BICARBONATE, SODIUM CHLORIDE, POTASSIUM CHLORIDE 236; 22.74; 6.74; 5.86; 2.97 G/4L; G/4L; G/4L; G/4L; G/4L
4000 POWDER, FOR SOLUTION ORAL ONCE
Status: DISCONTINUED | OUTPATIENT
Start: 2020-05-04 | End: 2020-05-04

## 2020-05-04 RX ORDER — POLYETHYLENE GLYCOL 3350, SODIUM SULFATE ANHYDROUS, SODIUM BICARBONATE, SODIUM CHLORIDE, POTASSIUM CHLORIDE 236; 22.74; 6.74; 5.86; 2.97 G/4L; G/4L; G/4L; G/4L; G/4L
4000 POWDER, FOR SOLUTION ORAL ONCE
Status: COMPLETED | OUTPATIENT
Start: 2020-05-04 | End: 2020-05-04

## 2020-05-04 RX ORDER — SODIUM CHLORIDE 9 MG/ML
INJECTION, SOLUTION INTRAVENOUS CONTINUOUS
Status: DISCONTINUED | OUTPATIENT
Start: 2020-05-05 | End: 2020-05-08

## 2020-05-04 RX ADMIN — OXYCODONE HYDROCHLORIDE 5 MG: 5 TABLET ORAL at 02:05

## 2020-05-04 RX ADMIN — OXYCODONE HYDROCHLORIDE 5 MG: 5 TABLET ORAL at 11:05

## 2020-05-04 RX ADMIN — ACETAMINOPHEN 650 MG: 325 TABLET ORAL at 09:05

## 2020-05-04 RX ADMIN — HYDROMORPHONE HYDROCHLORIDE 1 MG: 1 INJECTION, SOLUTION INTRAMUSCULAR; INTRAVENOUS; SUBCUTANEOUS at 12:05

## 2020-05-04 RX ADMIN — POLYETHYLENE GLYCOL 3350, SODIUM SULFATE ANHYDROUS, SODIUM BICARBONATE, SODIUM CHLORIDE, POTASSIUM CHLORIDE 4000 ML: 236; 22.74; 6.74; 5.86; 2.97 POWDER, FOR SOLUTION ORAL at 05:05

## 2020-05-04 RX ADMIN — HYDROMORPHONE HYDROCHLORIDE 1 MG: 1 INJECTION, SOLUTION INTRAMUSCULAR; INTRAVENOUS; SUBCUTANEOUS at 02:05

## 2020-05-04 RX ADMIN — HEPARIN SODIUM AND DEXTROSE 20 UNITS/KG/HR: 10000; 5 INJECTION INTRAVENOUS at 03:05

## 2020-05-04 RX ADMIN — HEPARIN SODIUM AND DEXTROSE 20 UNITS/KG/HR: 10000; 5 INJECTION INTRAVENOUS at 12:05

## 2020-05-04 RX ADMIN — HYDROMORPHONE HYDROCHLORIDE 1 MG: 1 INJECTION, SOLUTION INTRAMUSCULAR; INTRAVENOUS; SUBCUTANEOUS at 09:05

## 2020-05-04 RX ADMIN — OXYCODONE HYDROCHLORIDE 5 MG: 5 TABLET ORAL at 10:05

## 2020-05-04 RX ADMIN — SODIUM CHLORIDE: 0.9 INJECTION, SOLUTION INTRAVENOUS at 11:05

## 2020-05-04 RX ADMIN — SODIUM CHLORIDE 1000 ML: 0.9 INJECTION, SOLUTION INTRAVENOUS at 08:05

## 2020-05-04 RX ADMIN — HEPARIN SODIUM AND DEXTROSE 20 UNITS/KG/HR: 10000; 5 INJECTION INTRAVENOUS at 08:05

## 2020-05-04 RX ADMIN — HYDROMORPHONE HYDROCHLORIDE 1 MG: 1 INJECTION, SOLUTION INTRAMUSCULAR; INTRAVENOUS; SUBCUTANEOUS at 04:05

## 2020-05-04 RX ADMIN — HYDROMORPHONE HYDROCHLORIDE 1 MG: 1 INJECTION, SOLUTION INTRAMUSCULAR; INTRAVENOUS; SUBCUTANEOUS at 07:05

## 2020-05-04 RX ADMIN — HEPARIN SODIUM AND DEXTROSE 21 UNITS/KG/HR: 10000; 5 INJECTION INTRAVENOUS at 06:05

## 2020-05-04 RX ADMIN — OXYCODONE HYDROCHLORIDE 5 MG: 5 TABLET ORAL at 05:05

## 2020-05-04 RX ADMIN — IOHEXOL 100 ML: 350 INJECTION, SOLUTION INTRAVENOUS at 07:05

## 2020-05-04 RX ADMIN — OXYCODONE HYDROCHLORIDE 5 MG: 5 TABLET ORAL at 06:05

## 2020-05-04 NOTE — PLAN OF CARE
I have seen the patient, discussed the resident's history and physical, assessment and plan. I have personally interviewed and examined the patient at bedside.  In summary:    Ms. Antoinette Love is a 29 y.o. female with May Thurner Syndrome, Factor 5 Leiden, and recurrent DVT/PE s/p IVC filter and on long term anticoagulation, who presents to the ER for evaluation of BLE leg pain.  She is followed by Oncology, and has been on Warfarin.  In April, she was being changed to Fondaparinux.  However, she couldn't afford it so she was started on Xarelto.  She ran out, and then started using Lovenox 80mg subq BID.  She had blood in her stool, so she changed to once a day dosing.  She normally has worse pains in her right leg, but her left leg is more painful currently, specifically in the thigh and groin area.  BLE US showed resolution of one thrombus, and development of a new thrombus.  Start Heparin gtt.  IR consult.  NPO at midnight.  Of note, patient has PTSD from one encounter with TPA, and requires heavy sedation for IR.  Pain control.    Full H&P by team intern to follow.    Denise Crum MD  [facility]  Cell:  [phone]  Spectra:  [phone]

## 2020-05-04 NOTE — ED NOTES
Patient requesting diet.  Per Dr. Davsi, patient is NPO after midnight.  [place] and juice provided for patient at this time.

## 2020-05-04 NOTE — H&P
[facility] Medicine  History & Physical    Patient Name: Antoinette Love  MRN: [id number]  Admission Date: 5/3/2020  Attending Physician: Jorge Mercedes MD   Primary Care Provider: Alberto Holguin MD    [facility] Medicine Team: [facility] MED 4 Jeanine Schilling MD     Patient information was obtained from patient, past medical records and ER records.     Subjective:     Principal Problem:DVT, lower extremity, proximal, acute, bilateral    Chief Complaint:   Chief Complaint   Patient presents with    Leg Pain     Bilateral leg pain; pt has a history of blood clots; pt is supposed to be on Coumadin, but ran out.         HPI: Ms. Love is a 29-year-old female patient with history of factor 5 laden mutation, May-Thuner syndrome, recurrent DVTs and PEs currently on Coumadin, IVC filter placement 2014, miscarriage in 2013 at 11 weeks, TIA in 2013.  Who present to ED with bilateral thighs pain and swelling in the last 7 days. She is following up with Dr. Garcia in hematology and she was just switched from Coumadin to Fondaparinux around a week ago.  She has not been able to get the prescription for Fondaparinux from the pharmacy and she started taking Lovenox 80 mg BID and she stop taking it yesterday when she noticed BRBPR. She states that on Monday she started having pain in the medial side of her right thigh then she has pain in the same area of the left thigh. She describes the pain as excruciating 8/10, bilateral medial thigh associated with swelling, numbness and bruises.  She endorses episodes of chills, hot flashes,night sweats dry cough and palpitation but denies chest pain, SOB or hemoptysis.  She states that she has been taking Coumadin since age of 18 and she was switched to Eliquis 2015 and she has been doing great with it with only one DVT 2016. She had multiple DVTs since August of the last year and she has been seen by IR around 9 times for thrombolysis, ballooning  and stents placment with last one on  where she had mechanical thrombectomy and stent extension to common femoral veins bilaterally.  Sh was discharged and placed back on Coumadin for unclear reason.  She states that she is not comfortable with using Coumadin because of the frequent monitoring.  She spoke to her hematologist week ago to switch her to non vitamin K anticoagulant.    She was started on Xarelto before and she was discontinued from taking first dose because of biliary colic pain and she was told she has inflammation.  She reports anxiety/panic attack which she was operated by IR in this first procedure and she has been sedated for all procedure after that.     In ED, she was looks uncomfortable and in pain.  She was afebrile, , /62, SpO2 100% on RA. WBC: 3.8, H.6, platelet: 228. Unremarkable CMP. INR: 1.1, Ptt: 22 and D-dimer 3.6. Normal lactic acid. Doppler US of lower extremities shows  partial thrombosis of the femoral veins, extending to the bilateral popliteal veins. EKG with NSR. IR were consulted and patient loaded with heparin and started on heparin gtt.     Past Medical History:   Diagnosis Date    Anemia of chronic disease 2019    Anticoagulant long-term use     Anticoagulant long-term use     FERNANDO (dyspnea on exertion)     DVT (deep venous thrombosis)     Encounter for blood transfusion     Factor V Leiden     Leg edema, left     May-Thurner syndrome     Multiple pulmonary nodules     Pulmonary embolus     RAD (reactive airway disease)     Recurrent upper respiratory infection (URI)     Recurrent urticaria     Stroke     TIA (transient ischemic attack)        Past Surgical History:   Procedure Laterality Date    COLONOSCOPY N/A 2015    Procedure: COLONOSCOPY;  Surgeon: Lefty Lee MD;  Location: [facility] ([address] [place] [address]);  Service: Endoscopy;  Laterality: N/A;  schedule with Jesus    IVC FILTER RETRIEVAL      IVC FILTER RETRIEVAL N/A 2019     "Procedure: REMOVAL-FILTER-IVC;  Surgeon: Claudia Surgeon;  Location: [facility];  Service: Anesthesiology;  Laterality: N/A;  188  4 hours Anes    SKIN BIOPSY      tumor from breast      left    VASCULAR SURGERY      WISDOM TOOTH EXTRACTION         Review of patient's allergies indicates:   Allergen Reactions    Azithromycin Hives    Beef containing products Anaphylaxis     Patient cannot eat BEEF BROTH  STATES IT WILL MAKE HER THROAT CLOSE UP .     Pork derived (porcine) Anaphylaxis     Throat swells up cannot eat pork sausage or pork patties ,     Unclassified drug Shortness Of Breath and Other (See Comments)     Is allergic to a beef spice - Causes "throat closing"    Xarelto [rivaroxaban] Other (See Comments)     Gallbladder swelling and disfunction    Toradol [ketorolac] Hives and Itching       No current facility-administered medications on file prior to encounter.      Current Outpatient Medications on File Prior to Encounter   Medication Sig    clopidogrel (PLAVIX) 75 mg tablet Take 1 tablet (75 mg total) by mouth once daily.    diazePAM (VALIUM) 5 MG tablet TAKE 1 TABLET (5 MG TOTAL) BY MOUTH EVERY 12 (TWELVE) HOURS AS NEEDED FOR ANXIETY.    EPINEPHrine (EPIPEN 2-VAN) 0.3 mg/0.3 mL AtIn Inject 0.3 mLs (0.3 mg total) into the muscle once. for 1 dose    prothrombin time/INR test metr Misc 1 each by Misc.(Non-Drug; Combo Route) route once daily.    rivaroxaban (XARELTO) 15 mg (42)- 20 mg (9) tablet dose pack Take 1 tablet (15 mg) by mouth twice daily with food for 21 days followed by 1 tablet (20 mg) by mouth once daily with food     Family History     Problem Relation (Age of Onset)    Allergies Mother        Tobacco Use    Smoking status: Never Smoker    Smokeless tobacco: Never Used   Substance and Sexual Activity    Alcohol use: No     Frequency: Monthly or less     Drinks per session: 3 or 4     Binge frequency: Never    Drug use: No    Sexual activity: Not Currently     Partners: Male "     Review of Systems   Constitutional: Positive for activity change, chills and fatigue. Negative for appetite change.   HENT: Negative for congestion and sore throat.    Respiratory: Positive for cough. Negative for shortness of breath.    Cardiovascular: Positive for leg swelling. Negative for chest pain and palpitations.   Gastrointestinal: Negative for abdominal distention, abdominal pain, nausea and vomiting.   Genitourinary: Negative for dysuria and frequency.   Musculoskeletal: Negative for arthralgias.   Neurological: Negative for dizziness, light-headedness and headaches.     Objective:     Vital Signs (Most Recent):  Temp: 100.1 °F (37.8 °C) (05/04/20 0753)  Pulse: 106 (05/04/20 0753)  Resp: 18 (05/04/20 0753)  BP: (!) 92/54 (05/04/20 0753)  SpO2: 97 % (05/04/20 0753) Vital Signs (24h Range):  Temp:  [99.1 °F (37.3 °C)-100.1 °F (37.8 °C)] 100.1 °F (37.8 °C)  Pulse:  [] 106  Resp:  [16-20] 18  SpO2:  [97 %-100 %] 97 %  BP: ()/(54-64) 92/54     Weight: 62.7 kg (138 lb 3.7 oz)  Body mass index is 23 kg/m².    Physical Exam   Constitutional: She is oriented to person, place, and time. She appears well-developed and well-nourished. No distress.   Uncomfortable and in pain but not in distress    Cardiovascular: Normal rate, regular rhythm and normal heart sounds.   No murmur heard.  Pulmonary/Chest: Effort normal and breath sounds normal. No respiratory distress. She has no rales.   Abdominal: Soft. Bowel sounds are normal. She exhibits no distension. There is no tenderness.   Musculoskeletal: She exhibits no edema.   Neurological: She is alert and oriented to person, place, and time. She displays normal reflexes. No cranial nerve deficit.   B/l thighs are warm and swollen. Multiple bruises  tenderness L>R  Muscle strength 4/5 b/l and sensation is intact b/l    Normal pulses in lower extremities    Skin: Skin is warm. She is not diaphoretic. No erythema.   Psychiatric: She has a normal mood and  affect.           Significant Labs:   CBC:   Recent Labs   Lab 05/03/20  1841 05/04/20  0621   WBC 3.83* 2.95*   HGB 8.6* 6.8*   HCT 28.5* 23.2*    208     Cardiac Markers: No results for input(s): CKMB, MYOGLOBIN, BNP, TROPISTAT in the last 48 hours.  Lactic Acid:   Recent Labs   Lab 05/03/20  1906   LACTATE 0.9       Significant Imaging: I have reviewed all pertinent imaging results/findings within the past 24 hours.    Assessment/Plan:     * DVT, lower extremity, proximal, acute, bilateral  49-year-old female patient with history of factor 5 laden mutation, May-Thuner syndrome, recurrent DVTs and PEs currently on Coumadin, IVC filter placement 2014, miscarriage in 2013 at 11 weeks, TIA in 2013.  Who present to ED with bilateral thighs pain and swelling in the last 7 days. 11 DVTs. Has not been able to fill her prescription and she was taking only Lovenox ( concern for Lovenox failure per hematology).  Doppler ultrasound of lower extremities shows bilateral partial thrombosis bilateral femoral veins extending to bilateral popliteal veins  .  Plan :   -- patient was loaded with heparin in the ED  -- started on heparin drip  -- IR consulted, appreciate their input  -- monitor for signs of phlegmasia       Long term (current) use of anticoagulants  -- pt multiple syndromes of thrombophilia    -- on long trem AC   --  sine age of 18  -- switched to liquids 2015   -- unable to tolerate Xarelto because of SE ( biliary colic)  -- concern for Lovenox failure  -- had BRBPR before admission     Plan :   -- consider GI consult for colonoscopy   -- ensure pt having prescription filled for AC   -- f/u with hematology in the clinic       Anemia of chronic disease, iron deficiency anemia and acute blood loss anemia  -- Hg on admission 8.6, MCV 87  -- Hg has been between ( 6.9-8)   -- reports PRBPR day before admission   -- Iron study 11/2019: iron 24, TIBC 274, transferrin 185. Normal heptoglobin and RC   --  monitor Hb, transfuse if < 7      History of pulmonary embolism  -- she had 3 PEs   -- at home on coumadin       VTE Risk Mitigation (From admission, onward)         Ordered     IP VTE HIGH RISK PATIENT  Once      05/03/20 2310     Place sequential compression device  Until discontinued      05/03/20 2210                   Jeanine Schilling MD  Department of Hospital Medicine   [facility]

## 2020-05-04 NOTE — ASSESSMENT & PLAN NOTE
-- pt multiple syndromes of thrombophilia    -- on long trem AC   --  [age] age of 18  -- switched to liquids 2015   -- unable to tolerate Xarelto because of SE ( biliary colic)  -- concern for Lovenox failure    Plan :   -- ensure pt having prescription filled for AC   -- f/u with hematology in the clinic

## 2020-05-04 NOTE — ASSESSMENT & PLAN NOTE
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 GI consulted for rectal bleeding and possible colonoscopy prior to thrombolysis.    Hemodynamically stable, no active GI bleeding at this time. Hgb at baseline. High risk for bleed given medical history and chronic use of anticoagulation.    - check TTG IgA and total IgA  - colonoscopy tomorrow  - Clear liquids today, prep this evening / tomorrow AM and NPO after midnight

## 2020-05-04 NOTE — ASSESSMENT & PLAN NOTE
49-year-old female patient with history of factor 5 laden mutation, May-Thuner syndrome, recurrent DVTs and PEs currently on Coumadin, IVC filter placement 2014, miscarriage in 2013 at 11 weeks, TIA in 2013.  Who present to ED with bilateral thighs pain and swelling in the last 7 days. 11 DVTs. Has not been able to fill her prescription and she was taking only Lovenox ( concern for Lovenox failure per hematology).  Doppler ultrasound of lower extremities shows bilateral partial thrombosis bilateral femoral veins extending to bilateral popliteal veins  .  Plan :   -- patient was loaded with heparin in the ED  -- started on heparin drip  -- IR consulted, appreciate their input  -- monitor for signs of phlegmasia

## 2020-05-04 NOTE — NURSING
Nurse called CT to notify department patient was ready for CT abdomen. CT personnel stated he will send for patient.

## 2020-05-04 NOTE — CONSULTS
Radiology Consult    Antoinette Love is a 29 y.o. female with Hx of May Thurner Syndrome, Factor 5 Leiden, GI bleed, and recurrent DVT/PE s/p venoplasty/multiple stent placements and long term anticoagulation. Patient presents with recurrent bilateral lower extremity DVTs on recent ultrasound including clot in both common femoral stents and extension of dru into the right external iliac vein.      Past Medical History:   Diagnosis Date    Anemia of chronic disease 12/19/2019    Anticoagulant long-term use     Anticoagulant long-term use     FERNANDO (dyspnea on exertion)     DVT (deep venous thrombosis)     Encounter for blood transfusion     Factor V Leiden     Leg edema, left     May-Thurner syndrome     Multiple pulmonary nodules     Pulmonary embolus     RAD (reactive airway disease)     Recurrent upper respiratory infection (URI)     Recurrent urticaria     Stroke     TIA (transient ischemic attack)      Past Surgical History:   Procedure Laterality Date    COLONOSCOPY N/A 12/18/2015    Procedure: COLONOSCOPY;  Surgeon: Lefty Lee MD;  Location: [address]);  Service: Endoscopy;  Laterality: N/A;  schedule with Jesus    IVC FILTER RETRIEVAL      IVC FILTER RETRIEVAL N/A 9/23/2019    Procedure: REMOVAL-FILTER-IVC;  Surgeon: Claudia Surgeon;  Location: [facility];  Service: Anesthesiology;  Laterality: N/A;  188  4 hours Anes    SKIN BIOPSY      tumor from breast      left    VASCULAR SURGERY      WISDOM TOOTH EXTRACTION         Discussed with primary team.    Imaging reviewed with Radiology staff, Dr. Parnell.     Scheduled Meds:    sodium chloride 0.9%  1,000 mL Intravenous Once     Continuous Infusions:   PRN Meds:sodium chloride, acetaminophen, Dextrose 10% Bolus, Dextrose 10% Bolus, glucagon (human recombinant), glucose, glucose, HYDROmorphone, ondansetron, oxyCODONE, sodium chloride 0.9%, sodium chloride 0.9%    Allergies:   Review of patient's allergies indicates:   Allergen  "Reactions    Azithromycin Hives    Beef containing products Anaphylaxis     Patient cannot eat BEEF BROTH  STATES IT WILL MAKE HER THROAT CLOSE UP .     Pork derived (porcine) Anaphylaxis     Throat swells up cannot eat pork sausage or pork patties ,     Unclassified drug Shortness Of Breath and Other (See Comments)     Is allergic to a beef spice - Causes "throat closing"    Xarelto [rivaroxaban] Other (See Comments)     Gallbladder swelling and disfunction    Toradol [ketorolac] Hives and Itching       Labs:  Recent Labs   Lab 05/04/20 0621   INR 1.2       Recent Labs   Lab 05/04/20 0621   WBC 2.95*   HGB 6.8*   HCT 23.2*   MCV 86         Recent Labs   Lab 05/04/20 0621   GLU 92   *   K 4.0      CO2 25   BUN 5*   CREATININE 0.7   CALCIUM 8.7   ALT 16   AST 28   ALBUMIN 2.0*   BILITOT 0.2         Vitals (Most Recent):  Temp: 100.1 °F (37.8 °C) (05/04/20 0753)  Pulse: 106 (05/04/20 0753)  Resp: 18 (05/04/20 0753)  BP: (!) 92/54 (05/04/20 0753)  SpO2: 97 % (05/04/20 0753)    Plan:     Patient with recurrent DVT s/p venoplasty/stenting and long term anti-coagulation. Failure is likely due to reported GI bleed with subsequent change in anti-coagulation dosing. Will consider further intervention/thrombolysis once patient has GI evaluation with C-scope in order to rule out any underlying colonic pathology which would contraindicate anticoagulation post further thrombolysis. Will also need CT abd/pevis with IV contrast in order to evaluate extent of clot burden which may extend into the IVC. Also, patient reportedly tachycardic and hypotensive with low blood counts and suspicion for possible ongoing GI bleed. This further confirms need for GI evaluation. We will continue to communicate with primary team and determine clinically appropriate time for intervention, will hold off for now until work up above is completed and patient stable.      Oskar Otto MD  Radiology, PGY - IV  Spectra " link: [id number]

## 2020-05-04 NOTE — PLAN OF CARE
POC reviewed with jorge. Patient NPO for procedure/Ctscan. Hgb 6.8/23.2 transfused 2 units PRBC's. Post H/H due at 1815. Heparin gtt at 20units/kg/hr next PTT at 1815. GI consulted and plan is for colonoscopy in am. Golytly prep started at 1800. Patient c/o pain in LE PRN dilaudid and Oxycodone utilized. Plan for IR procedure after cleared by GI. Will continue to monitor

## 2020-05-04 NOTE — SUBJECTIVE & OBJECTIVE
"Past Medical History:   Diagnosis Date    Anemia of chronic disease 12/19/2019    Anticoagulant long-term use     Anticoagulant long-term use     FERNANDO (dyspnea on exertion)     DVT (deep venous thrombosis)     Encounter for blood transfusion     Factor V Leiden     Leg edema, left     May-Thurner syndrome     Multiple pulmonary nodules     Pulmonary embolus     RAD (reactive airway disease)     Recurrent upper respiratory infection (URI)     Recurrent urticaria     Stroke     TIA (transient ischemic attack)        Past Surgical History:   Procedure Laterality Date    COLONOSCOPY N/A 12/18/2015    Procedure: COLONOSCOPY;  Surgeon: Lefty Lee MD;  Location: [facility] (4TH FLR);  Service: Endoscopy;  Laterality: N/A;  schedule with Jesus    IVC FILTER RETRIEVAL      IVC FILTER RETRIEVAL N/A 9/23/2019    Procedure: REMOVAL-FILTER-IVC;  Surgeon: Claudia Surgeon;  Location: [facility];  Service: Anesthesiology;  Laterality: N/A;  188  4 hours Anes    SKIN BIOPSY      tumor from breast      left    VASCULAR SURGERY      WISDOM TOOTH EXTRACTION         Review of patient's allergies indicates:   Allergen Reactions    Azithromycin Hives    Beef containing products Anaphylaxis     Patient cannot eat BEEF BROTH  STATES IT WILL MAKE HER THROAT CLOSE UP .     Pork derived (porcine) Anaphylaxis     Throat swells up cannot eat pork sausage or pork patties ,     Unclassified drug Shortness Of Breath and Other (See Comments)     Is allergic to a beef spice - Causes "throat closing"    Xarelto [rivaroxaban] Other (See Comments)     Gallbladder swelling and disfunction    Toradol [ketorolac] Hives and Itching       No current facility-administered medications on file prior to encounter.      Current Outpatient Medications on File Prior to Encounter   Medication Sig    clopidogrel (PLAVIX) 75 mg tablet Take 1 tablet (75 mg total) by mouth once daily.    diazePAM (VALIUM) 5 MG tablet TAKE 1 TABLET (5 MG TOTAL) BY " MOUTH EVERY 12 (TWELVE) HOURS AS NEEDED FOR ANXIETY.    EPINEPHrine (EPIPEN 2-VAN) 0.3 mg/0.3 mL AtIn Inject 0.3 mLs (0.3 mg total) into the muscle once. for 1 dose    prothrombin time/INR test metr Misc 1 each by Misc.(Non-Drug; Combo Route) route once daily.    rivaroxaban (XARELTO) 15 mg (42)- 20 mg (9) tablet dose pack Take 1 tablet (15 mg) by mouth twice daily with food for 21 days followed by 1 tablet (20 mg) by mouth once daily with food     Family History     Problem Relation (Age of Onset)    Allergies Mother        Tobacco Use    Smoking status: Never Smoker    Smokeless tobacco: Never Used   Substance and Sexual Activity    Alcohol use: No     Frequency: Monthly or less     Drinks per session: 3 or 4     Binge frequency: Never    Drug use: No    Sexual activity: Not Currently     Partners: Male     Review of Systems   Constitutional: Positive for activity change, chills and fatigue. Negative for appetite change.   HENT: Negative for congestion and sore throat.    Respiratory: Positive for cough. Negative for shortness of breath.    Cardiovascular: Positive for leg swelling. Negative for chest pain and palpitations.   Gastrointestinal: Negative for abdominal distention, abdominal pain, nausea and vomiting.   Genitourinary: Negative for dysuria and frequency.   Musculoskeletal: Negative for arthralgias.   Neurological: Negative for dizziness, light-headedness and headaches.     Objective:     Vital Signs (Most Recent):  Temp: 100.1 °F (37.8 °C) (05/04/20 0753)  Pulse: 106 (05/04/20 0753)  Resp: 18 (05/04/20 0753)  BP: (!) 92/54 (05/04/20 0753)  SpO2: 97 % (05/04/20 0753) Vital Signs (24h Range):  Temp:  [99.1 °F (37.3 °C)-100.1 °F (37.8 °C)] 100.1 °F (37.8 °C)  Pulse:  [] 106  Resp:  [16-20] 18  SpO2:  [97 %-100 %] 97 %  BP: ()/(54-64) 92/54     Weight: 62.7 kg (138 lb 3.7 oz)  Body mass index is 23 kg/m².    Physical Exam   Constitutional: She is oriented to person, place, and time. She  appears well-developed and well-nourished. No distress.   Uncomfortable and in pain but not in distress    Cardiovascular: Normal rate, regular rhythm and normal heart sounds.   No murmur heard.  Pulmonary/Chest: Effort normal and breath sounds normal. No respiratory distress. She has no rales.   Abdominal: Soft. Bowel sounds are normal. She exhibits no distension. There is no tenderness.   Musculoskeletal: She exhibits no edema.   Neurological: She is alert and oriented to person, place, and time. She displays normal reflexes. No cranial nerve deficit.   B/l thighs are warm and swollen. Multiple bruises  tenderness L>R  Muscle strength 4/5 b/l and sensation is intact b/l    Normal pulses in lower extremities    Skin: Skin is warm. She is not diaphoretic. No erythema.   Psychiatric: She has a normal mood and affect.           Significant Labs:   CBC:   Recent Labs   Lab 05/03/20  1841 05/04/20  0621   WBC 3.83* 2.95*   HGB 8.6* 6.8*   HCT 28.5* 23.2*    208     Cardiac Markers: No results for input(s): CKMB, MYOGLOBIN, BNP, TROPISTAT in the last 48 hours.  Lactic Acid:   Recent Labs   Lab 05/03/20  1906   LACTATE 0.9       Significant Imaging: I have reviewed all pertinent imaging results/findings within the past 24 hours.

## 2020-05-04 NOTE — HPI
Ms. 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  Who present to ED with bilateral thighs pain and swelling in the last 7 days. She is following up with Dr. Garcia in hematology and she was just switched from Coumadin to Fondaparinux around a week ago.  She has not been able to get the prescription for Fondaparinux from the pharmacy and she started taking Lovenox 80 mg BID and she stop taking it yesterday when she noticed BRBPR. She states that on Monday she started having pain in the medial side of her right thigh then she has pain in the same area of the left thigh. She describes the pain as excruciating 8/10, bilateral medial thigh associated with swelling, numbness and bruises.  She endorses episodes of chills, hot flashes,night sweats dry cough and palpitation but denies chest pain, SOB or hemoptysis.  She states that she has been taking Coumadin since age of 18 and she was switched to Eliquis 2015 and she has been doing great with it with only one DVT 2016. She had multiple DVTs since August of the last year and she has been seen by IR around 9 times for thrombolysis, ballooning and stents placment with last one on 1/17 where she had mechanical thrombectomy and stent extension to common femoral veins bilaterally.  Sh was discharged and placed back on Coumadin for unclear reason.  She states that she is not comfortable with using Coumadin because of the frequent monitoring.  She spoke to her hematologist week ago to switch her to non vitamin K anticoagulant.    She was started on Xarelto before and she was discontinued from taking first dose because of biliary colic pain and she was told she has inflammation.  She reports anxiety/panic attack which she was operated by IR in this first procedure and she has been sedated for all  procedure after that.     In ED, she was looks uncomfortable and in pain.  She was afebrile, , /62, SpO2 100% on RA. WBC: 3.8, H.6, platelet: 228. Unremarkable CMP. INR: 1.1, Ptt: 22 and D-dimer 3.6. Normal lactic acid. Doppler US of lower extremities shows  partial thrombosis of the femoral veins, extending to the bilateral popliteal veins. EKG with NSR. IR were consulted and patient loaded with heparin and started on heparin gtt.

## 2020-05-04 NOTE — PLAN OF CARE
05/04/20 0847   Discharge Assessment   Assessment Type Discharge Planning Assessment   Assessment information obtained from? Medical Record   Expected Length of Stay (days) 3   Prior to hospitilization cognitive status: Alert/Oriented   Prior to hospitalization functional status: Independent   Current cognitive status: Alert/Oriented   Current Functional Status: Independent   Lives With significant other   Able to Return to Prior Arrangements yes   Is patient able to care for self after discharge? Yes   Patient's perception of discharge disposition home or selfcare   Readmission Within the Last 30 Days no previous admission in last 30 days   Patient currently being followed by outpatient case management? No   Patient currently receives any other outside agency services? No   Equipment Currently Used at Home none   Do you have any problems affording any of your prescribed medications? Yes   If yes, what medications? Xarelto   Is the patient taking medications as prescribed? yes   Does the patient have transportation home? Yes   Does the patient receive services at the [facility]madin Clinic? No   Discharge Plan A Home   DME Needed Upon Discharge  none   Admitted with recurrent DVT. Information obtained from EMR. 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 Lives with SO and is independent in her ADLs. Plan is to DC home. No DC needs identified. Pt has had difficulty affording Rx which may need to be addressed at DC.

## 2020-05-04 NOTE — HPI
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 GI consulted for rectal bleeding and possible colonoscopy prior to thrombolysis.    Patient reports one episode of small amount of blood in stool one week ago. Patient recently switched from coumadin to fondaparinux, but unable to  fondaparinux and instead taking lovenox 80mg bid (self-dosing). Last colonoscopy 2015 for hematochezia with friable rectal mucosa and internal hemorrhoids, otherwise normal. She denies any bleeding from 2015 until a week ago. No n/v or abdominal pain. No blood in stool or melena since a week ago. Rarely takes advil. Last dose of Plavix was 4/29.    U/S here with extensive RLE DVT. IR consulted but requesting colonoscopy for evaluation of anemia and rectal bleeding prior to thrombolysis / AC. She is currently on a heparin gtt. SBP 90-110s, HR 80-100s (patient states this is her baseline and denies lightheadedness). Hgb 8.6 -> 6.8 on presentation from b/l 7-9. INR 1.2.

## 2020-05-04 NOTE — NURSING
Paged CT department ([id number], [phone]) to get a timeline on when patient can come down. Will get patient asap.

## 2020-05-04 NOTE — PROGRESS NOTES
Assume care of pt. Pt is resting in bed, NAD. Heparin 18units/kg/min infusing at 10.7 ml/hr. Will cont to monitor pt pain.

## 2020-05-04 NOTE — PROGRESS NOTES
Received from ER via stretcher to room 317. Neuro intact x 4. Oriented to room. Heparin gtt infusing upon arrival at 18u/kg or 10.7ml/hr. Complete assessment done. VSS. Temp-99.9. Dilaudid 1mg ivp given for c/o 8/10 bilat lower extremity pain.

## 2020-05-04 NOTE — PROGRESS NOTES
Oxycodone 5mg po given for c/o 7/10 bilat lower extremity pain after MD finished his assessment. Report given to oncoming RN at bedside.  Monitor remains ST-106. Orange juice and water given then instructed on NPO status for surgery in am. BSC ok'ed per MD and placed in room.

## 2020-05-04 NOTE — ASSESSMENT & PLAN NOTE
-- Hg on admission 8.6, MCV 87  -- Hg has been between ( 6.9-8)   -- reports PRBPR day before admission   -- Iron study 11/2019: iron 24, TIBC 274, transferrin 185. Normal heptoglobin and RC   -- monitor Hb, transfuse if < 7

## 2020-05-04 NOTE — SUBJECTIVE & OBJECTIVE
"Past Medical History:   Diagnosis Date    Anemia of chronic disease 12/19/2019    Anticoagulant long-term use     Anticoagulant long-term use     FERNANDO (dyspnea on exertion)     DVT (deep venous thrombosis)     Encounter for blood transfusion     Factor V Leiden     Leg edema, left     May-Thurner syndrome     Multiple pulmonary nodules     Pulmonary embolus     RAD (reactive airway disease)     Recurrent upper respiratory infection (URI)     Recurrent urticaria     Stroke     TIA (transient ischemic attack)        Past Surgical History:   Procedure Laterality Date    COLONOSCOPY N/A 12/18/2015    Procedure: COLONOSCOPY;  Surgeon: Lefty Lee MD;  Location: [facility] (4TH FLR);  Service: Endoscopy;  Laterality: N/A;  schedule with Jesus    IVC FILTER RETRIEVAL      IVC FILTER RETRIEVAL N/A 9/23/2019    Procedure: REMOVAL-FILTER-IVC;  Surgeon: Claudia Surgeon;  Location: [facility];  Service: Anesthesiology;  Laterality: N/A;  188  4 hours Anes    SKIN BIOPSY      tumor from breast      left    VASCULAR SURGERY      WISDOM TOOTH EXTRACTION         Review of patient's allergies indicates:   Allergen Reactions    Azithromycin Hives    Beef containing products Anaphylaxis     Patient cannot eat BEEF BROTH  STATES IT WILL MAKE HER THROAT CLOSE UP .     Pork derived (porcine) Anaphylaxis     Throat swells up cannot eat pork sausage or pork patties ,     Unclassified drug Shortness Of Breath and Other (See Comments)     Is allergic to a beef spice - Causes "throat closing"    Xarelto [rivaroxaban] Other (See Comments)     Gallbladder swelling and disfunction    Toradol [ketorolac] Hives and Itching     Family History     Problem Relation (Age of Onset)    Allergies Mother        Tobacco Use    Smoking status: Never Smoker    Smokeless tobacco: Never Used   Substance and Sexual Activity    Alcohol use: No     Frequency: Monthly or less     Drinks per session: 3 or 4     Binge frequency: Never    Drug " use: No    Sexual activity: Not Currently     Partners: Male     Review of Systems   Constitutional: Negative for chills, fatigue and fever.   HENT: Negative for trouble swallowing.    Respiratory: Negative for cough and shortness of breath.    Cardiovascular: Positive for leg swelling. Negative for chest pain.   Gastrointestinal: Negative for abdominal pain, blood in stool, constipation, diarrhea, nausea and vomiting.   Genitourinary: Negative for dysuria.   Musculoskeletal: Negative for arthralgias.        Leg pain   Skin: Negative for color change and pallor.   Neurological: Negative for light-headedness and headaches.   Psychiatric/Behavioral: Negative for behavioral problems and confusion.     Objective:     Vital Signs (Most Recent):  Temp: 98.8 °F (37.1 °C) (05/04/20 1022)  Pulse: 83 (05/04/20 1022)  Resp: 18 (05/04/20 1022)  BP: (!) 85/46 (05/04/20 1022)  SpO2: 99 % (05/04/20 1022) Vital Signs (24h Range):  Temp:  [97.6 °F (36.4 °C)-100.1 °F (37.8 °C)] 98.8 °F (37.1 °C)  Pulse:  [] 83  Resp:  [16-20] 18  SpO2:  [97 %-100 %] 99 %  BP: ()/(46-64) 85/46     Weight: 62.7 kg (138 lb 3.7 oz) (05/03/20 2258)  Body mass index is 23 kg/m².      Intake/Output Summary (Last 24 hours) at 5/4/2020 1031  Last data filed at 5/4/2020 0500  Gross per 24 hour   Intake 480 ml   Output 0 ml   Net 480 ml       Lines/Drains/Airways     Arterial Line                 Arterial Line 01/17/20 1111 Right Radial 107 days          Peripheral Intravenous Line                 Peripheral IV - Single Lumen 05/03/20 1841 22 G Hand less than 1 day         Peripheral IV - Single Lumen 05/03/20 1906 22 G Left Antecubital less than 1 day                Physical Exam   Constitutional: She appears well-developed and well-nourished. No distress.   HENT:   Mouth/Throat: Oropharynx is clear and moist.   Neck: Neck supple.   Cardiovascular: Normal rate and regular rhythm.   No murmur heard.  Pulmonary/Chest: Effort normal and breath sounds  normal. No respiratory distress.   Abdominal: Soft. Bowel sounds are normal. She exhibits no distension. There is no tenderness.   Neurological: She is alert.   Skin: Skin is warm.   Psychiatric: She has a normal mood and affect. Her behavior is normal.   Nursing note and vitals reviewed.      Significant Labs:  All pertinent lab results from the last 24 hours have been reviewed.    Significant Imaging:  Imaging results within the past 24 hours have been reviewed.

## 2020-05-04 NOTE — CONSULTS
[facility]  Gastroenterology  Consult Note    Patient Name: Antoinette Love  MRN: [id number]  Admission Date: 5/3/2020  Hospital Length of Stay: 1 days  Code Status: Full Code   Attending Provider: Jorge Mercedes MD   Consulting Provider: Torrey Houston MD  Primary Care Physician: Alberto Holguin MD  Principal Problem:DVT, lower extremity, proximal, acute, bilateral    Inpatient consult to Gastroenterology  Consult performed by: Torrey Houston MD  Consult ordered by: Ben Martines MD        Subjective:     HPI:  49-year-old female patient with history of factor 5 laden mutation, May-Thuner syndrome, recurrent DVTs and PEs currently on Coumadin and Plavix, IVC filter placement 2014, miscarriage in 2013 at 11 weeks, TIA in 2013 who presents with leg swelling and pain. GI consulted for rectal bleeding and possible colonoscopy prior to thrombolysis.    Patient reports one episode of small amount of blood in stool one week ago. Patient recently switched from coumadin to fondaparinux, but unable to  fondaparinux and instead taking lovenox 80mg bid (self-dosing). Last colonoscopy 2015 for hematochezia with friable rectal mucosa and internal hemorrhoids, otherwise normal. She denies any bleeding from 2015 until a week ago. No n/v or abdominal pain. No blood in stool or melena since a week ago. Rarely takes advil. Last dose of Plavix was 4/29.    U/S here with extensive RLE DVT. IR consulted but requesting colonoscopy for evaluation of anemia and rectal bleeding prior to thrombolysis / AC. She is currently on a heparin gtt. SBP 90-110s, HR 80-100s (patient states this is her baseline and denies lightheadedness). Hgb 8.6 -> 6.8 on presentation from b/l 7-9. INR 1.2.    Past Medical History:   Diagnosis Date    Anemia of chronic disease 12/19/2019    Anticoagulant long-term use     Anticoagulant long-term use     FERNANDO (dyspnea on exertion)     DVT (deep venous thrombosis)      "Encounter for blood transfusion     Factor V Leiden     Leg edema, left     May-Thurner syndrome     Multiple pulmonary nodules     Pulmonary embolus     RAD (reactive airway disease)     Recurrent upper respiratory infection (URI)     Recurrent urticaria     Stroke     TIA (transient ischemic attack)        Past Surgical History:   Procedure Laterality Date    COLONOSCOPY N/A 12/18/2015    Procedure: COLONOSCOPY;  Surgeon: Lefty Lee MD;  Location: [facility] (4TH FLR);  Service: Endoscopy;  Laterality: N/A;  schedule with Ray    IVC FILTER RETRIEVAL      IVC FILTER RETRIEVAL N/A 9/23/2019    Procedure: REMOVAL-FILTER-IVC;  Surgeon: Claudia Surgeon;  Location: [facility];  Service: Anesthesiology;  Laterality: N/A;  188  4 hours Anes    SKIN BIOPSY      tumor from breast      left    VASCULAR SURGERY      WISDOM TOOTH EXTRACTION         Review of patient's allergies indicates:   Allergen Reactions    Azithromycin Hives    Beef containing products Anaphylaxis     Patient cannot eat BEEF BROTH  STATES IT WILL MAKE HER THROAT CLOSE UP .     Pork derived (porcine) Anaphylaxis     Throat swells up cannot eat pork sausage or pork patties ,     Unclassified drug Shortness Of Breath and Other (See Comments)     Is allergic to a beef spice - Causes "throat closing"    Xarelto [rivaroxaban] Other (See Comments)     Gallbladder swelling and disfunction    Toradol [ketorolac] Hives and Itching     Family History     Problem Relation (Age of Onset)    Allergies Mother        Tobacco Use    Smoking status: Never Smoker    Smokeless tobacco: Never Used   Substance and Sexual Activity    Alcohol use: No     Frequency: Monthly or less     Drinks per session: 3 or 4     Binge frequency: Never    Drug use: No    Sexual activity: Not Currently     Partners: Male     Review of Systems   Constitutional: Negative for chills, fatigue and fever.   HENT: Negative for trouble swallowing.    Respiratory: Negative for " cough and shortness of breath.    Cardiovascular: Positive for leg swelling. Negative for chest pain.   Gastrointestinal: Negative for abdominal pain, blood in stool, constipation, diarrhea, nausea and vomiting.   Genitourinary: Negative for dysuria.   Musculoskeletal: Negative for arthralgias.        Leg pain   Skin: Negative for color change and pallor.   Neurological: Negative for light-headedness and headaches.   Psychiatric/Behavioral: Negative for behavioral problems and confusion.     Objective:     Vital Signs (Most Recent):  Temp: 98.8 °F (37.1 °C) (05/04/20 1022)  Pulse: 83 (05/04/20 1022)  Resp: 18 (05/04/20 1022)  BP: (!) 85/46 (05/04/20 1022)  SpO2: 99 % (05/04/20 1022) Vital Signs (24h Range):  Temp:  [97.6 °F (36.4 °C)-100.1 °F (37.8 °C)] 98.8 °F (37.1 °C)  Pulse:  [] 83  Resp:  [16-20] 18  SpO2:  [97 %-100 %] 99 %  BP: ()/(46-64) 85/46     Weight: 62.7 kg (138 lb 3.7 oz) (05/03/20 2258)  Body mass index is 23 kg/m².      Intake/Output Summary (Last 24 hours) at 5/4/2020 1031  Last data filed at 5/4/2020 0500  Gross per 24 hour   Intake 480 ml   Output 0 ml   Net 480 ml       Lines/Drains/Airways     Arterial Line                 Arterial Line 01/17/20 1111 Right Radial 107 days          Peripheral Intravenous Line                 Peripheral IV - Single Lumen 05/03/20 1841 22 G Hand less than 1 day         Peripheral IV - Single Lumen 05/03/20 1906 22 G Left Antecubital less than 1 day                Physical Exam   Constitutional: She appears well-developed and well-nourished. No distress.   HENT:   Mouth/Throat: Oropharynx is clear and moist.   Neck: Neck supple.   Cardiovascular: Normal rate and regular rhythm.   No murmur heard.  Pulmonary/Chest: Effort normal and breath sounds normal. No respiratory distress.   Abdominal: Soft. Bowel sounds are normal. She exhibits no distension. There is no tenderness.   Neurological: She is alert.   Skin: Skin is warm.   Psychiatric: She has a  normal mood and affect. Her behavior is normal.   Nursing note and vitals reviewed.      Significant Labs:  All pertinent lab results from the last 24 hours have been reviewed.    Significant Imaging:  Imaging results within the past 24 hours have been reviewed.    Assessment/Plan:     Anemia of chronic disease, iron deficiency anemia and acute blood loss anemia  49-year-old female patient with history of factor 5 laden mutation, May-Thuner syndrome, recurrent DVTs and PEs currently on Coumadin and Plavix, IVC filter placement 2014, miscarriage in 2013 at 11 weeks, TIA in 2013 who presents with leg swelling and pain. GI consulted for rectal bleeding and possible colonoscopy prior to thrombolysis.    Hemodynamically stable, no active GI bleeding at this time. Hgb at baseline. High risk for bleed given medical history and chronic use of anticoagulation.    - check TTG IgA and total IgA  - colonoscopy tomorrow  - Clear liquids today, prep this evening / tomorrow AM and NPO after midnight        Thank you for your consult. I will follow-up with patient. Please contact us if you have any additional questions.    Torrey Houston MD  Gastroenterology  [facility]

## 2020-05-05 ENCOUNTER — ANESTHESIA EVENT (OUTPATIENT)
Dept: ENDOSCOPY | Facility: HOSPITAL | Age: 30
DRG: 253 | End: 2020-05-05
Payer: COMMERCIAL

## 2020-05-05 ENCOUNTER — ANESTHESIA (OUTPATIENT)
Dept: ENDOSCOPY | Facility: HOSPITAL | Age: 30
DRG: 253 | End: 2020-05-05
Payer: COMMERCIAL

## 2020-05-05 PROBLEM — N30.00 ACUTE CYSTITIS WITHOUT HEMATURIA: Status: ACTIVE | Noted: 2020-05-05

## 2020-05-05 LAB
ALBUMIN SERPL BCP-MCNC: 2.4 G/DL (ref 3.5–5.2)
ALP SERPL-CCNC: 294 U/L (ref 55–135)
ALT SERPL W/O P-5'-P-CCNC: 17 U/L (ref 10–44)
ANION GAP SERPL CALC-SCNC: 14 MMOL/L (ref 8–16)
APTT BLDCRRT: 26.2 SEC (ref 21–32)
APTT BLDCRRT: 30 SEC (ref 21–32)
APTT BLDCRRT: 37.4 SEC (ref 21–32)
APTT BLDCRRT: 42.4 SEC (ref 21–32)
AST SERPL-CCNC: 20 U/L (ref 10–40)
BACTERIA UR CULT: NORMAL
BACTERIA UR CULT: NORMAL
BASOPHILS # BLD AUTO: 0.01 K/UL (ref 0–0.2)
BASOPHILS # BLD AUTO: 0.01 K/UL (ref 0–0.2)
BASOPHILS NFR BLD: 0.2 % (ref 0–1.9)
BASOPHILS NFR BLD: 0.3 % (ref 0–1.9)
BILIRUB SERPL-MCNC: 1 MG/DL (ref 0.1–1)
BUN SERPL-MCNC: 7 MG/DL (ref 6–20)
CALCIUM SERPL-MCNC: 9.8 MG/DL (ref 8.7–10.5)
CHLORIDE SERPL-SCNC: 99 MMOL/L (ref 95–110)
CO2 SERPL-SCNC: 22 MMOL/L (ref 23–29)
CREAT SERPL-MCNC: 0.7 MG/DL (ref 0.5–1.4)
DIFFERENTIAL METHOD: ABNORMAL
DIFFERENTIAL METHOD: ABNORMAL
EOSINOPHIL # BLD AUTO: 0.1 K/UL (ref 0–0.5)
EOSINOPHIL # BLD AUTO: 0.1 K/UL (ref 0–0.5)
EOSINOPHIL NFR BLD: 1.2 % (ref 0–8)
EOSINOPHIL NFR BLD: 1.3 % (ref 0–8)
ERYTHROCYTE [DISTWIDTH] IN BLOOD BY AUTOMATED COUNT: 15.3 % (ref 11.5–14.5)
ERYTHROCYTE [DISTWIDTH] IN BLOOD BY AUTOMATED COUNT: 15.6 % (ref 11.5–14.5)
EST. GFR  (AFRICAN AMERICAN): >60 ML/MIN/1.73 M^2
EST. GFR  (NON AFRICAN AMERICAN): >60 ML/MIN/1.73 M^2
GLUCOSE SERPL-MCNC: 65 MG/DL (ref 70–110)
HCT VFR BLD AUTO: 29.7 % (ref 37–48.5)
HCT VFR BLD AUTO: 30.1 % (ref 37–48.5)
HGB BLD-MCNC: 9 G/DL (ref 12–16)
HGB BLD-MCNC: 9.2 G/DL (ref 12–16)
IMM GRANULOCYTES # BLD AUTO: 0.01 K/UL (ref 0–0.04)
IMM GRANULOCYTES # BLD AUTO: 0.03 K/UL (ref 0–0.04)
IMM GRANULOCYTES NFR BLD AUTO: 0.2 % (ref 0–0.5)
IMM GRANULOCYTES NFR BLD AUTO: 0.8 % (ref 0–0.5)
INR PPP: 1.4 (ref 0.8–1.2)
INR PPP: 1.5 (ref 0.8–1.2)
LYMPHOCYTES # BLD AUTO: 0.8 K/UL (ref 1–4.8)
LYMPHOCYTES # BLD AUTO: 1 K/UL (ref 1–4.8)
LYMPHOCYTES NFR BLD: 20.9 % (ref 18–48)
LYMPHOCYTES NFR BLD: 25.2 % (ref 18–48)
MCH RBC QN AUTO: 26.8 PG (ref 27–31)
MCH RBC QN AUTO: 26.8 PG (ref 27–31)
MCHC RBC AUTO-ENTMCNC: 30.3 G/DL (ref 32–36)
MCHC RBC AUTO-ENTMCNC: 30.6 G/DL (ref 32–36)
MCV RBC AUTO: 88 FL (ref 82–98)
MCV RBC AUTO: 88 FL (ref 82–98)
MONOCYTES # BLD AUTO: 0.2 K/UL (ref 0.3–1)
MONOCYTES # BLD AUTO: 0.2 K/UL (ref 0.3–1)
MONOCYTES NFR BLD: 5 % (ref 4–15)
MONOCYTES NFR BLD: 5.3 % (ref 4–15)
NEUTROPHILS # BLD AUTO: 2.7 K/UL (ref 1.8–7.7)
NEUTROPHILS # BLD AUTO: 2.8 K/UL (ref 1.8–7.7)
NEUTROPHILS NFR BLD: 68.2 % (ref 38–73)
NEUTROPHILS NFR BLD: 71.4 % (ref 38–73)
NRBC BLD-RTO: 0 /100 WBC
NRBC BLD-RTO: 0 /100 WBC
PLATELET # BLD AUTO: 226 K/UL (ref 150–350)
PLATELET # BLD AUTO: 233 K/UL (ref 150–350)
PMV BLD AUTO: 8.9 FL (ref 9.2–12.9)
PMV BLD AUTO: 9.1 FL (ref 9.2–12.9)
POTASSIUM SERPL-SCNC: 4.1 MMOL/L (ref 3.5–5.1)
PROT SERPL-MCNC: 8.6 G/DL (ref 6–8.4)
PROTHROMBIN TIME: 13.6 SEC (ref 9–12.5)
PROTHROMBIN TIME: 15 SEC (ref 9–12.5)
RBC # BLD AUTO: 3.36 M/UL (ref 4–5.4)
RBC # BLD AUTO: 3.43 M/UL (ref 4–5.4)
SODIUM SERPL-SCNC: 135 MMOL/L (ref 136–145)
WBC # BLD AUTO: 3.93 K/UL (ref 3.9–12.7)
WBC # BLD AUTO: 4.01 K/UL (ref 3.9–12.7)

## 2020-05-05 PROCEDURE — 97161 PT EVAL LOW COMPLEX 20 MIN: CPT

## 2020-05-05 PROCEDURE — 37000009 HC ANESTHESIA EA ADD 15 MINS: Performed by: INTERNAL MEDICINE

## 2020-05-05 PROCEDURE — 25000003 PHARM REV CODE 250: Performed by: STUDENT IN AN ORGANIZED HEALTH CARE EDUCATION/TRAINING PROGRAM

## 2020-05-05 PROCEDURE — 87186 SC STD MICRODIL/AGAR DIL: CPT

## 2020-05-05 PROCEDURE — 99233 SBSQ HOSP IP/OBS HIGH 50: CPT | Mod: ,,, | Performed by: INTERNAL MEDICINE

## 2020-05-05 PROCEDURE — D9220A PRA ANESTHESIA: Mod: CRNA,,, | Performed by: NURSE ANESTHETIST, CERTIFIED REGISTERED

## 2020-05-05 PROCEDURE — 37000008 HC ANESTHESIA 1ST 15 MINUTES: Performed by: INTERNAL MEDICINE

## 2020-05-05 PROCEDURE — 25000003 PHARM REV CODE 250: Performed by: NURSE ANESTHETIST, CERTIFIED REGISTERED

## 2020-05-05 PROCEDURE — 63600175 PHARM REV CODE 636 W HCPCS: Performed by: HOSPITALIST

## 2020-05-05 PROCEDURE — 85730 THROMBOPLASTIN TIME PARTIAL: CPT

## 2020-05-05 PROCEDURE — 45378 DIAGNOSTIC COLONOSCOPY: CPT | Performed by: INTERNAL MEDICINE

## 2020-05-05 PROCEDURE — 85610 PROTHROMBIN TIME: CPT | Mod: 91

## 2020-05-05 PROCEDURE — D9220A PRA ANESTHESIA: ICD-10-PCS | Mod: CRNA,,, | Performed by: NURSE ANESTHETIST, CERTIFIED REGISTERED

## 2020-05-05 PROCEDURE — 87040 BLOOD CULTURE FOR BACTERIA: CPT | Mod: 59

## 2020-05-05 PROCEDURE — 63600175 PHARM REV CODE 636 W HCPCS

## 2020-05-05 PROCEDURE — D9220A PRA ANESTHESIA: Mod: ANES,,, | Performed by: ANESTHESIOLOGY

## 2020-05-05 PROCEDURE — 80053 COMPREHEN METABOLIC PANEL: CPT

## 2020-05-05 PROCEDURE — 85730 THROMBOPLASTIN TIME PARTIAL: CPT | Mod: 91

## 2020-05-05 PROCEDURE — D9220A PRA ANESTHESIA: ICD-10-PCS | Mod: ANES,,, | Performed by: ANESTHESIOLOGY

## 2020-05-05 PROCEDURE — 20600001 HC STEP DOWN PRIVATE ROOM

## 2020-05-05 PROCEDURE — 45378 DIAGNOSTIC COLONOSCOPY: CPT | Mod: ,,, | Performed by: INTERNAL MEDICINE

## 2020-05-05 PROCEDURE — 94761 N-INVAS EAR/PLS OXIMETRY MLT: CPT

## 2020-05-05 PROCEDURE — 87077 CULTURE AEROBIC IDENTIFY: CPT

## 2020-05-05 PROCEDURE — 36415 COLL VENOUS BLD VENIPUNCTURE: CPT

## 2020-05-05 PROCEDURE — 85610 PROTHROMBIN TIME: CPT

## 2020-05-05 PROCEDURE — 63600175 PHARM REV CODE 636 W HCPCS: Performed by: NURSE ANESTHETIST, CERTIFIED REGISTERED

## 2020-05-05 PROCEDURE — 97165 OT EVAL LOW COMPLEX 30 MIN: CPT

## 2020-05-05 PROCEDURE — 99233 PR SUBSEQUENT HOSPITAL CARE,LEVL III: ICD-10-PCS | Mod: ,,, | Performed by: INTERNAL MEDICINE

## 2020-05-05 PROCEDURE — 85025 COMPLETE CBC W/AUTO DIFF WBC: CPT

## 2020-05-05 PROCEDURE — 45378 PR COLONOSCOPY,DIAGNOSTIC: ICD-10-PCS | Mod: ,,, | Performed by: INTERNAL MEDICINE

## 2020-05-05 RX ORDER — ONDANSETRON 2 MG/ML
4 INJECTION INTRAMUSCULAR; INTRAVENOUS DAILY PRN
Status: DISCONTINUED | OUTPATIENT
Start: 2020-05-05 | End: 2020-05-05 | Stop reason: HOSPADM

## 2020-05-05 RX ORDER — MIDAZOLAM HYDROCHLORIDE 1 MG/ML
INJECTION, SOLUTION INTRAMUSCULAR; INTRAVENOUS
Status: DISCONTINUED | OUTPATIENT
Start: 2020-05-05 | End: 2020-05-05

## 2020-05-05 RX ORDER — FENTANYL CITRATE 50 UG/ML
25 INJECTION, SOLUTION INTRAMUSCULAR; INTRAVENOUS EVERY 5 MIN PRN
Status: DISCONTINUED | OUTPATIENT
Start: 2020-05-05 | End: 2020-05-05 | Stop reason: HOSPADM

## 2020-05-05 RX ORDER — SODIUM CHLORIDE 0.9 % (FLUSH) 0.9 %
10 SYRINGE (ML) INJECTION
Status: DISCONTINUED | OUTPATIENT
Start: 2020-05-05 | End: 2020-05-05 | Stop reason: HOSPADM

## 2020-05-05 RX ORDER — CEFTRIAXONE 1 G/1
1 INJECTION, POWDER, FOR SOLUTION INTRAMUSCULAR; INTRAVENOUS
Status: DISCONTINUED | OUTPATIENT
Start: 2020-05-06 | End: 2020-05-06

## 2020-05-05 RX ORDER — HYDROMORPHONE HYDROCHLORIDE 1 MG/ML
2 INJECTION, SOLUTION INTRAMUSCULAR; INTRAVENOUS; SUBCUTANEOUS EVERY 4 HOURS PRN
Status: DISCONTINUED | OUTPATIENT
Start: 2020-05-05 | End: 2020-05-08

## 2020-05-05 RX ORDER — PROPOFOL 10 MG/ML
VIAL (ML) INTRAVENOUS CONTINUOUS PRN
Status: DISCONTINUED | OUTPATIENT
Start: 2020-05-05 | End: 2020-05-05

## 2020-05-05 RX ORDER — LIDOCAINE HYDROCHLORIDE 20 MG/ML
INJECTION INTRAVENOUS
Status: DISCONTINUED | OUTPATIENT
Start: 2020-05-05 | End: 2020-05-05

## 2020-05-05 RX ORDER — SODIUM CHLORIDE 9 MG/ML
INJECTION, SOLUTION INTRAVENOUS CONTINUOUS PRN
Status: DISCONTINUED | OUTPATIENT
Start: 2020-05-05 | End: 2020-05-05

## 2020-05-05 RX ORDER — PROPOFOL 10 MG/ML
VIAL (ML) INTRAVENOUS
Status: DISCONTINUED | OUTPATIENT
Start: 2020-05-05 | End: 2020-05-05

## 2020-05-05 RX ORDER — CEFTRIAXONE 1 G/1
1 INJECTION, POWDER, FOR SOLUTION INTRAMUSCULAR; INTRAVENOUS
Status: DISCONTINUED | OUTPATIENT
Start: 2020-05-06 | End: 2020-05-05

## 2020-05-05 RX ORDER — HEPARIN SODIUM,PORCINE/D5W 25000/250
18 INTRAVENOUS SOLUTION INTRAVENOUS CONTINUOUS
Status: DISCONTINUED | OUTPATIENT
Start: 2020-05-05 | End: 2020-05-08

## 2020-05-05 RX ADMIN — HYDROMORPHONE HYDROCHLORIDE 1 MG: 1 INJECTION, SOLUTION INTRAMUSCULAR; INTRAVENOUS; SUBCUTANEOUS at 11:05

## 2020-05-05 RX ADMIN — PROPOFOL 20 MG: 10 INJECTION, EMULSION INTRAVENOUS at 02:05

## 2020-05-05 RX ADMIN — SODIUM CHLORIDE: 9 INJECTION, SOLUTION INTRAVENOUS at 02:05

## 2020-05-05 RX ADMIN — HYDROMORPHONE HYDROCHLORIDE 2 MG: 1 INJECTION, SOLUTION INTRAMUSCULAR; INTRAVENOUS; SUBCUTANEOUS at 04:05

## 2020-05-05 RX ADMIN — ACETAMINOPHEN 650 MG: 325 TABLET ORAL at 04:05

## 2020-05-05 RX ADMIN — SODIUM CHLORIDE: 0.9 INJECTION, SOLUTION INTRAVENOUS at 11:05

## 2020-05-05 RX ADMIN — CEFTRIAXONE 2 G: 2 INJECTION, SOLUTION INTRAVENOUS at 08:05

## 2020-05-05 RX ADMIN — SODIUM CHLORIDE: 0.9 INJECTION, SOLUTION INTRAVENOUS at 03:05

## 2020-05-05 RX ADMIN — PROPOFOL 150 MCG/KG/MIN: 10 INJECTION, EMULSION INTRAVENOUS at 02:05

## 2020-05-05 RX ADMIN — MIDAZOLAM HYDROCHLORIDE 2 MG: 1 INJECTION, SOLUTION INTRAMUSCULAR; INTRAVENOUS at 02:05

## 2020-05-05 RX ADMIN — OXYCODONE HYDROCHLORIDE 5 MG: 5 TABLET ORAL at 11:05

## 2020-05-05 RX ADMIN — HEPARIN SODIUM 21 UNITS/KG/HR: 10000 INJECTION, SOLUTION INTRAVENOUS at 11:05

## 2020-05-05 RX ADMIN — LIDOCAINE HYDROCHLORIDE 60 MG: 20 INJECTION, SOLUTION INTRAVENOUS at 02:05

## 2020-05-05 RX ADMIN — HYDROMORPHONE HYDROCHLORIDE 1 MG: 1 INJECTION, SOLUTION INTRAMUSCULAR; INTRAVENOUS; SUBCUTANEOUS at 07:05

## 2020-05-05 RX ADMIN — HEPARIN SODIUM 18 UNITS/KG/HR: 10000 INJECTION, SOLUTION INTRAVENOUS at 04:05

## 2020-05-05 RX ADMIN — PROPOFOL 60 MG: 10 INJECTION, EMULSION INTRAVENOUS at 02:05

## 2020-05-05 RX ADMIN — OXYCODONE HYDROCHLORIDE 5 MG: 5 TABLET ORAL at 01:05

## 2020-05-05 RX ADMIN — OXYCODONE HYDROCHLORIDE 5 MG: 5 TABLET ORAL at 09:05

## 2020-05-05 RX ADMIN — OXYCODONE HYDROCHLORIDE 5 MG: 5 TABLET ORAL at 05:05

## 2020-05-05 RX ADMIN — HYDROMORPHONE HYDROCHLORIDE 2 MG: 1 INJECTION, SOLUTION INTRAMUSCULAR; INTRAVENOUS; SUBCUTANEOUS at 09:05

## 2020-05-05 NOTE — ASSESSMENT & PLAN NOTE
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  Who present to ED with bilateral thighs pain and swelling in the last 7 days. 11 DVTs. Has not been able to fill her prescription and she was taking only Lovenox ( concern for Lovenox failure per hematology).  Doppler ultrasound of lower extremities shows bilateral partial thrombosis bilateral femoral veins extending to bilateral popliteal veins  .  Plan :   -- patient was loaded with heparin in the ED  -- started on heparin drip at minimal intensity due to concern for possible GI bleed at admission  -- IR consulted, pending GI workup will plan for thrombectomy  -- monitor for signs of phlegmasia

## 2020-05-05 NOTE — PT/OT/SLP EVAL
Physical Therapy Evaluation    Patient Name:  Antoinette Love   MRN:  [id number]  Admit Date: 5/3/2020  Admitting Diagnosis:  DVT, lower extremity, proximal, acute, bilateral   Length of Stay: 2 days  Recent Surgery: Procedure(s) (LRB):  COLONOSCOPY (N/A) Day of Surgery    Recommendations:     Discharge Recommendations:  home health PT  Discharge Equipment Recommendations: other (see comments)(tbd pending progress/pain relief)  Barriers to discharge: Decreased caregiver support - fiance deployed until July    Assessment:     Antoinette Love is a 29 y.o. female admitted with a medical diagnosis of DVT, lower extremity, proximal, acute, bilateral.  She presents with the following impairments/functional limitations: impaired functional mobilty, gait instability, impaired self care skills, impaired balance, decreased lower extremity function, pain, weakness. Pt unable to tolerate EOB/OOB mobility due to pain in LLE from DVT. At this time pt is not functioning at baseline mobility due to pain. Pt most likely will return to PLOF once DVT and pain subsides. Due to pt not being at PLOF, PT to follow for continued assessment of mobility for pt safety as well as assistive device training to assist with pain control. Pt will continue to benefit from skilled PT services during this hospital admit to continue to improve transfer ability and efficiency as well as continue to progress pt's ambulation distance and cardiopulmonary endurance to maximize pt's functional independence and return to PLOF.     Rehab Prognosis: Good; patient would benefit from acute skilled PT services to address these deficits and reach maximum level of function.      Plan:     During this hospitalization, patient to be seen 4 x/week to address the identified rehab impairments via gait training, therapeutic activities, therapeutic exercises, neuromuscular re-education and progress towards the established goals.    · Plan of Care Expires:   06/05/20    Subjective     RN notified prior to session. RN present upon PT entrance into room.    Chief Complaint: Pt reporting difficulty finding a comfortable position  Patient/Family Comments/goals: get bettter  Pain/Comfort:  · Pain Rating 1: 8/10  · Location - Side 1: Left  · Location 1: thigh  · Pain Addressed 1: Pre-medicate for activity, Distraction, Cessation of Activity  · Pain Rating Post-Intervention 1: 8/10    Living Environment:  Patient lives with significant other in a single family home with 0 JOLANTA and tub/shower.   Prior Level of Function: Patient reports being independent with mobility & with ADLs. Patient uses DME as follows: none. DME owned (not currently used): none.  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 Drive: yes. Managing Medicines/Managing Home: yes. Hobbies: Pt enjoys spending time with her two dogs.    Patient reports they will have limited to no assist upon discharge. Pt's family is in [place] and  is deployed in [facility] until July.    Objective:     Additional staff present: OT for co-eval    Patient found HOB elevated with: telemetry, peripheral IV    General Precautions: Standard, Cardiac fall  Orthopedic Precautions:N/A  Braces: N/A  Body mass index is 23 kg/m².  Oxygen Device: Room Air    Exams:  · Mental Status: Patient is AxOx4 and follows all multi-step verbal commands. Pt is Alert and Cooperative during session.  · Skin Integrity: Visible skin intact  · Edema: None noted   · Sensation: Numbness along L2/L3 dermatome on Lt to light touch  · Hearing: Intact  · Vision:  Intact  · Postural Assessment: slouched posture and rounded shoulders  · Range of Motion:  · RUE: WFL  · LUE: WFL  · RLE: WFL  · LLE: WFL  · Strength Exam (performed with HOB elevated):  · Lower Extremity Strength  Right LE  Left LE    Knee extension: 5/5 Knee extension: Not assessed due to pain   Knee flexion: 5/5 Knee flexion: Not assessed due  to pain   Hip flexion: 5/5 Hip flexion: Not assessed due to pain   Ankle dorsiflexion:   5/5 Ankle dorsiflexion: Not assessed due to pain   Ankle plantarflexion: 5/5 Ankle plantarflexion: Not assessed due to pain       Outcome Measures:  AM-PAC 6 CLICK MOBILITY  Turning over in bed (including adjusting bedclothes, sheets and blankets)?: 4  Sitting down on and standing up from a chair with arms (e.g., wheelchair, bedside commode, etc.): 3  Moving from lying on back to sitting on the side of the bed?: 4  Moving to and from a bed to a chair (including a wheelchair)?: 3  Need to walk in hospital room?: 1  Climbing 3-5 steps with a railing?: 1  Basic Mobility Total Score: 16     Functional Mobility:    Bed Mobility:   · Rolling/Turning to Left: stand by assistance and with side rail  · Rolling/Turning to Right: stand by assistance and with side rail  · Scooting to HOB via supine bridge: stand by assistance and with side rail  · Supine to Sit: deferred 2/2 pain    Sitting Balance at Edge of Bed/Transfers/Standing Balance/Gait: deferred 2/2 pain in Lt leg    Education:   Time provided for education, counseling and discussion of health disposition in regards to patient's current status   All questions answered within PT scope of practice and to patient's satisfaction   PT role in POC to address current functional deficits   Pt educated on proper body mechanics, safety techniques, and energy conservation with PT facilitation and cueing throughout session   Call nursing/pct to transfer to chair/use bathroom. Pt stated understanding.   Whiteboard updated with pt's current mobility status documented above   Safe to perform stand/squat pivot transfer to/from chair/bedside commode CGA and assist x 1 w/ nursing/PCT present   RW ordered for pt's room for use with nsg staff    Patient left left sidelying with all lines intact, call button in reach and RN notified.    GOALS:   Multidisciplinary Problems     Physical Therapy  Goals        Problem: Physical Therapy Goal    Goal Priority Disciplines Outcome Goal Variances Interventions   Physical Therapy Goal     PT, PT/OT Ongoing, Progressing     Description:  Goals to be met by: 2020     Patient will increase functional independence with mobility by performin. Sit to stand transfer with [place]  2. Bed to chair transfer with Modified [place] using LRAD  3. Gait  x 150 feet with Modified [place] using LRAD.   4. Lower extremity exercise program x20 reps per handout, with independence                      History:     Past Medical History:   Diagnosis Date    Anemia of chronic disease 2019    Anticoagulant long-term use     Anticoagulant long-term use     FERNANDO (dyspnea on exertion)     DVT (deep venous thrombosis)     Encounter for blood transfusion     Factor V Leiden     Leg edema, left     May-Thurner syndrome     Multiple pulmonary nodules     Pulmonary embolus     RAD (reactive airway disease)     Reactive airway disease without complication 2019    On albuterol prn, addition singulair    Recurrent upper respiratory infection (URI)     Recurrent urticaria     Stroke     TIA (transient ischemic attack)        Past Surgical History:   Procedure Laterality Date    COLONOSCOPY N/A 2015    Procedure: COLONOSCOPY;  Surgeon: Lefty Lee MD;  Location: [facility] ([address] [place] [address]);  Service: Endoscopy;  Laterality: N/A;  schedule with Jesus    IVC FILTER RETRIEVAL      IVC FILTER RETRIEVAL N/A 2019    Procedure: REMOVAL-FILTER-IVC;  Surgeon: Claudia Surgeon;  Location: [facility];  Service: Anesthesiology;  Laterality: N/A;  188  4 hours Anes    SKIN BIOPSY      tumor from breast      left    VASCULAR SURGERY      WISDOM TOOTH EXTRACTION         Time Tracking:     PT Received On: 20  PT Start Time: 0842     PT Stop Time: 0855  PT Total Time (min): 13 min     Billable Minutes: Evaluation 13    Rachael Ceron PT, DPT  2020  Pager:  [phone]

## 2020-05-05 NOTE — PLAN OF CARE
Discharge Recommendation: HHPT.    Eval completed today. PT goals appropriate.    Progressive Mobility Protocol: Patient is safe to perform bed <>chair transfer with assist x 1 with nursing staff.    Rachael Ceron, PT, DPT  2020  Pager: [phone]        Problem: Physical Therapy Goal  Goal: Physical Therapy Goal  Description  Goals to be met by: 2020     Patient will increase functional independence with mobility by performin. Sit to stand transfer with [place]  2. Bed to chair transfer with Modified [place] using LRAD  3. Gait  x 150 feet with Modified [place] using LRAD.   4. Lower extremity exercise program x20 reps per handout, with independence     Outcome: Ongoing, Progressing

## 2020-05-05 NOTE — PT/OT/SLP EVAL
"Occupational Therapy   Evaluation    Name: Antoinette Love  MRN: [id number]  Admitting Diagnosis:  DVT, lower extremity, proximal, acute, bilateral Day of Surgery    Recommendations:     Discharge Recommendations: home health OT  Discharge Equipment Recommendations:  none  Barriers to discharge:  None    Assessment:     Antoinette Love is a 29 y.o. female with a medical diagnosis of DVT, lower extremity, proximal, acute, bilateral.  She presents with the following performance deficits affecting function: weakness, pain, decreased lower extremity function, edema. Pt tolerated assessment fair this date due to LLE pain. Pt agreeable to bed level evaluation due to pain. In bed, pt required SBA for repositioning displaying minimal performance deficits. Due to these minimal performance deficits observed during the evaluation, pt's anticipated discharge recommendation is home with home health. While in house, pt will benefit from skilled OT 4x/wk to further address functional performance.     Rehab Prognosis: Good; patient would benefit from acute skilled OT services to address these deficits and reach maximum level of function.       Plan:     Patient to be seen 4 x/week to address the above listed problems via self-care/home management, therapeutic activities, therapeutic exercises  · Plan of Care Expires: 06/03/20  · Plan of Care Reviewed with: patient    Subjective     Chief Complaint: L medial thigh pain  Patient/Family Comments/goals: Pt agreeable to OT/PT evaluation and OT POC.    Occupational Profile:  Living Environment: Pt lives with spouse in a [facility] with 0 JOLANTA. Bathroom set up: Step in tub   Previous level of function: Before a week ago when symptoms began, pt I in all ADLs/IADLs. Pt reported of having a few "almost" falls due to BLE pain. "It's like my thighs were not even there." Pt did not use DME for mobility; however for the last week, pt has been utilizing the walls to assist for balance during " mobility   Roles and Routines: Homemaker, nurse, dog owner   Equipment Used at Home:  none  Assistance upon Discharge: Pt will have limited assistance upon discharge 2/2 pt's spouse is deployed in [place] in [place].      Pain/Comfort:  · Pain Rating 1: 8/10  · Location - Side 1: Left  · Location - Orientation 1: medial  · Location 1: thigh  · Pain Addressed 1: Pre-medicate for activity, Reposition, Distraction  · Pain Rating Post-Intervention 1: 8/10  · Pain Addressed 2: Nurse notified, Cessation of Activity    Patients cultural, spiritual, Mosque conflicts given the current situation: no    Objective:     Communicated with: RN prior to session.  Patient found HOB elevated with peripheral IV, telemetry upon OT entry to room.    General Precautions: Standard, fall   Orthopedic Precautions:N/A   Braces: N/A     Occupational Performance:    Bed Mobility:    · Patient completed Rolling/Turning to Right with stand by assistance  · Patient completed Scooting/Bridging with stand by assistance    Functional Mobility/Transfers:  · NT this date due to pain     Activities of Daily Living:  · Grooming: supplies within reach on tray table to complete     Cognitive/Visual Perceptual:  Cognitive/Psychosocial Skills:     -       Oriented to: Person, Place, Time and Situation   -       Follows Commands/attention:Follows multistep  commands  -       Communication: clear/fluent  -       Memory: No Deficits noted  -       Safety awareness/insight to disability: intact   -       Mood/Affect/Coping skills/emotional control: Appropriate to situation and Pleasant  Visual/Perceptual:      -Intact no defs noted    Physical Exam:  Balance:    -       NT   Postural examination/scapula alignment:    -       No postural abnormalities identified  Skin integrity: Visible skin intact  Edema:  Mild BLE thighs  Sensation:    -       Intact  light/touch BUEs  -       Impaired  light/touch immer thigh per pt report  Upper Extremity Range of Motion: Supine      -       Right Upper Extremity: WFL  -       Left Upper Extremity: WFL  Upper Extremity Strength:    -       Right Upper Extremity: WFL  -       Left Upper Extremity: WFL   Strength:    -       Right Upper Extremity: WFL  -       Left Upper Extremity: WFL  Fine Motor Coordination:    -       Intact    AMPAC 6 Click ADL:  AMPAC Total Score: 19    Treatment & Education:  - Role of OT/ OT POC  - Self care safety/ independence  - Bed mobility safety  Education:    Patient left left sidelying with all lines intact, call button in reach and RN notified    GOALS:   Multidisciplinary Problems     Occupational Therapy Goals        Problem: Occupational Therapy Goal    Goal Priority Disciplines Outcome Interventions   Occupational Therapy Goal     OT, PT/OT Ongoing, Progressing    Description:  Goals to be met by: 5/15/20    Patient will increase functional independence with ADLs by performing:    UE Dressing with [place].  LE Dressing with [place].  Grooming while standing at sink with Supervision.  Toileting from toilet with Supervision for hygiene and clothing management.   Supine to sit with Modified [place] for increased bed mobility independence.  Step transfer with Supervision with no AD to prepare for household mobility to complete occupations of choice.   Toilet transfer to toilet with Supervision.                      History:     Past Medical History:   Diagnosis Date    Anemia of chronic disease 12/19/2019    Anticoagulant long-term use     Anticoagulant long-term use     FERNANDO (dyspnea on exertion)     DVT (deep venous thrombosis)     Encounter for blood transfusion     Factor V Leiden     Leg edema, left     May-Thurner syndrome     Multiple pulmonary nodules     Pulmonary embolus     RAD (reactive airway disease)     Reactive airway disease without complication 8/9/2019    On albuterol prn, addition singulair    Recurrent upper respiratory infection (URI)     Recurrent urticaria      Stroke     TIA (transient ischemic attack)        Past Surgical History:   Procedure Laterality Date    COLONOSCOPY N/A 12/18/2015    Procedure: COLONOSCOPY;  Surgeon: Lefty Lee MD;  Location: [facility] ([address] [place] [address]);  Service: Endoscopy;  Laterality: N/A;  schedule with Ray    IVC FILTER RETRIEVAL      IVC FILTER RETRIEVAL N/A 9/23/2019    Procedure: REMOVAL-FILTER-IVC;  Surgeon: Claudia Surgeon;  Location: [facility];  Service: Anesthesiology;  Laterality: N/A;  188  4 hours Anes    SKIN BIOPSY      tumor from breast      left    VASCULAR SURGERY      WISDOM TOOTH EXTRACTION         Time Tracking:     OT Date of Treatment: 05/05/20  OT Start Time: 0842  OT Stop Time: 0855  OT Total Time (min): 13 min co eval with PT     Billable Minutes:Evaluation 13    Cydney Mackay OT  5/5/2020

## 2020-05-05 NOTE — NURSING TRANSFER
Nursing Transfer Note      5/4/2020     Transfer To: CT     Transfer via stretcher    Transfer with cardiac monitoring    Transported by escort    Medicines sent: heparin gtts    Chart send with patient: No

## 2020-05-05 NOTE — ANESTHESIA PREPROCEDURE EVALUATION
"                                                                                                             05/05/2020  Antoinette Love is a 29 y.o., female presenting for colonoscopy for BRBPR.  She is on anticoagulation for DVTs.    Past Medical History:   Diagnosis Date    Anemia of chronic disease 12/19/2019    Anticoagulant long-term use     Anticoagulant long-term use     FERNANDO (dyspnea on exertion)     DVT (deep venous thrombosis)     Encounter for blood transfusion     Factor V Leiden     Leg edema, left     May-Thurner syndrome     Multiple pulmonary nodules     Pulmonary embolus     RAD (reactive airway disease)     Reactive airway disease without complication 8/9/2019    On albuterol prn, addition singulair    Recurrent upper respiratory infection (URI)     Recurrent urticaria     Stroke     TIA (transient ischemic attack)      Past Surgical History:   Procedure Laterality Date    COLONOSCOPY N/A 12/18/2015    Procedure: COLONOSCOPY;  Surgeon: Lefty Lee MD;  [facility]  Service: Endoscopy;  Laterality: N/A;  schedule with Jesus    IVC FILTER RETRIEVAL      IVC FILTER RETRIEVAL N/A 9/23/2019    Procedure: REMOVAL-FILTER-IVC;  Surgeon: Claudia Surgeon;  Location: [facility];  Service: Anesthesiology;  Laterality: N/A;  188  4 hours Anes    SKIN BIOPSY      tumor from breast      left    VASCULAR SURGERY      WISDOM TOOTH EXTRACTION       Review of patient's allergies indicates:   Allergen Reactions    Azithromycin Hives    Beef containing products Anaphylaxis     Patient cannot eat BEEF BROTH  STATES IT WILL MAKE HER THROAT CLOSE UP .     Pork derived (porcine) Anaphylaxis     Throat swells up cannot eat pork sausage or pork patties ,     Unclassified drug Shortness Of Breath and Other (See Comments)     Is allergic to a beef spice - Causes "throat closing"    Xarelto [rivaroxaban] Other (See Comments)     Gallbladder swelling and disfunction    Toradol " [ketorolac] Hives and Itching     No current facility-administered medications on file prior to encounter.      Current Outpatient Medications on File Prior to Encounter   Medication Sig Dispense Refill    clopidogrel (PLAVIX) 75 mg tablet Take 1 tablet (75 mg total) by mouth once daily. 90 tablet 3    diazePAM (VALIUM) 5 MG tablet TAKE 1 TABLET (5 MG TOTAL) BY MOUTH EVERY 12 (TWELVE) HOURS AS NEEDED FOR ANXIETY. 30 tablet 0    EPINEPHrine (EPIPEN 2-VAN) 0.3 mg/0.3 mL AtIn Inject 0.3 mLs (0.3 mg total) into the muscle once. for 1 dose 2 Device 2    prothrombin time/INR test metr Misc 1 each by Misc.(Non-Drug; Combo Route) route once daily. 1 each 0    rivaroxaban (XARELTO) 15 mg (42)- 20 mg (9) tablet dose pack Take 1 tablet (15 mg) by mouth twice daily with food for 21 days followed by 1 tablet (20 mg) by mouth once daily with food 1 Package 0     Lab Results   Component Value Date    WBC 3.93 05/05/2020    HGB 9.2 (L) 05/05/2020    HCT 30.1 (L) 05/05/2020    MCV 88 05/05/2020     05/05/2020       BMP  Lab Results   Component Value Date     (L) 05/05/2020    K 4.1 05/05/2020    CL 99 05/05/2020    CO2 22 (L) 05/05/2020    BUN 7 05/05/2020    CREATININE 0.7 05/05/2020    CALCIUM 9.8 05/05/2020    ANIONGAP 14 05/05/2020    ESTGFRAFRICA >60.0 05/05/2020    EGFRNONAA >60.0 05/05/2020         Anesthesia Evaluation    I have reviewed the Patient Summary Reports.     I have reviewed the Medications.     Review of Systems  Anesthesia Hx:  No problems with previous Anesthesia   Denies Personal Hx of Anesthesia complications.   Hematology/Oncology:        Hematology Comments: Factor V Leiden  May-Thurner syndrome   Cardiovascular:   Exercise tolerance: good Denies Pacemaker.  Denies CAD.       Pulmonary:   Asthma Shortness of breath Hx of multiple PEs   Renal/:  Renal/ Normal     Hepatic/GI:  Hepatic/GI Normal    Neurological:   TIA,        Physical Exam  General:  Well nourished     Airway/Jaw/Neck:  Airway Findings: Mouth Opening: Normal Tongue: Normal  General Airway Assessment: Adult  Mallampati: II  TM Distance: Normal, at least 6 cm  Jaw/Neck Findings:  Neck ROM: Normal ROM      Dental:  Dental Findings: In tact        Mental Status:  Mental Status Findings:  Cooperative, Alert and Oriented         Anesthesia Plan  Type of Anesthesia, risks & benefits discussed:  Anesthesia Type:  general  Patient's Preference: natural airway  Intra-op Monitoring Plan: standard ASA monitors  Intra-op Monitoring Plan Comments:   Post Op Pain Control Plan: per primary service following discharge from PACU  Post Op Pain Control Plan Comments:   Induction:   IV  Beta Blocker:  Patient is not currently on a Beta-Blocker (No further documentation required).       Informed Consent: Patient understands risks and agrees with Anesthesia plan.  Questions answered. Anesthesia consent signed with patient.  ASA Score: 3     Day of Surgery Review of History & Physical:    H&P update referred to the surgeon.     Anesthesia Plan Notes: Patient refuses to remove lip ring.  She understands risks of dislodgement during the procedure and possible entry into the airway.  She wishes to proceed.        Ready For Surgery From Anesthesia Perspective.

## 2020-05-05 NOTE — PROGRESS NOTES
05/05/20 1607   Vital Signs   Temp 100.1 °F (37.8 °C)   MD notified of pt fever, spiked to 103 while at endo. MD stated will pass on to team and keep monitoring for now. No new orders received at this time. Tylenol given. Will continue to monitor.

## 2020-05-05 NOTE — ASSESSMENT & PLAN NOTE
Patient reports dysuria and frequency prior to admission. U/a at admission consistent with infection, though culture grew multiple organisms. Febrile to 102.6 overnight 5/4.     - DDx for fever includes cystitis (dysuria, U/a) vs infected clot vs clot burden itself  - Rocephin for possible UTI or infected clot  - U/a repeated, CXR and blood cultures ordered to complete workup  - Continue heparin gtt for clot

## 2020-05-05 NOTE — NURSING TRANSFER
Nursing Transfer Note      5/5/2020     Transfer To: ENDO    Transfer via stretcher    Transfer with cardiac monitoring    Transported by transport    Medicines sent: NS gtt    Chart send with patient: Yes

## 2020-05-05 NOTE — PLAN OF CARE
Problem: Occupational Therapy Goal  Goal: Occupational Therapy Goal  Description  Goals to be met by: 5/15/20    Patient will increase functional independence with ADLs by performing:    UE Dressing with [place].  LE Dressing with [place].  Grooming while standing at sink with Supervision.  Toileting from toilet with Supervision for hygiene and clothing management.   Supine to sit with Modified [place] for increased bed mobility independence.  Step transfer with Supervision with no AD to prepare for household mobility to complete occupations of choice.   Toilet transfer to toilet with Supervision.     Outcome: Ongoing, Progressing     OT evaluation complete and POC established. See evaluation note for further details.   Disposition recommendation: Home with home health    Cydney Mackay OTR/L  5/5/20

## 2020-05-05 NOTE — HOSPITAL COURSE
"Ms. Love was admitted to [facility] Medicine 5/3 with extensive thrombus of the "Extensive deep venous thrombosis...noting previously visualized thrombus within 1 of the paired posterior tibial veins on the left is not identified on current exam however there is thrombosis of 1 of the paired right posterior tibial veins, previously no thrombus identified." Discussed the case with IR, who recommended GI workup given patient's persistent anemia and report of possible BRBPR; furthermore, the morning after admission, patient was noted to have 2 g drop in hemoglobin with associated hypotension and tachycardia increasing concern for GI bleed - though no further BRBPR noted. CTA with venous phase added to assess for possible GI bleed and to evaluate extensiveness of clot. No extravasation of contrast noted, and on venous phase, thrombus noted to extend to IVC filter. C-scope remarkable only for internal hemorrhoids.    On 5/05, patient spiked fever, blood cultures growing MSSA. ID consulted, and patient started on vanc, which was transitioned to cefazolin when cultures finalized.    Thrombectomy with IR attempted on 5/06; however due to "extensive, chronic, calcific thrombosis without ability to pass a microwire" thrombectomy was not possible. Vascular Surgery was consulted. Unfortunately patient is not a candidate for open thrombectomy due to large clot burden and high risk for post thrombotic syndrome.     Patient's hospital course has been complicated further by persistently positive blood cultures. Source of infection is likely infected clot, and source control is difficult in the absence of thrombectomy. CT c/a/p ordered 5/10 to rule out underlying abscess or further seeding of infection given prolonged bacteremia. It still showed clot burden and possible myositiis. Most likely secondary from the inflammation from the clot.     Her blood cultures from 5/09 remained negative. ID decided on 6 weeks of antibiotics of " cefazolin. She remained afebrile for 48 hours from 5/11. She had a picc placed 5/13 and discharged after. She is being sent with pt/ot home health and the infusion company. She was given a week worth of percocet for breakthrough pain.

## 2020-05-05 NOTE — TREATMENT PLAN
GI Treatment Plan    Interval History  Colonoscopy completed. Normal exam.    Plan  - s/p colonoscopy (see procedure note)  - further care per primary team and IR    Thank you for involving us in the care of Antoinette Love. We are signing-off. Please call with any additional questions, concerns or changes in the patient's clinical status.      Torrey Houston MD  Gastroenterology Fellow, PGY4  [facility]

## 2020-05-05 NOTE — TRANSFER OF CARE
"Anesthesia Transfer of Care Note    Patient: Antoinette Love    Procedure(s) Performed: Procedure(s) (LRB):  COLONOSCOPY (N/A)    Patient location: PACU    Anesthesia Type: general    Transport from OR: Transported from OR on room air with adequate spontaneous ventilation    Post pain: adequate analgesia    Post assessment: no apparent anesthetic complications and tolerated procedure well    Post vital signs: stable    Level of consciousness: awake, alert and oriented    Nausea/Vomiting: no nausea/vomiting    Complications: none    Transfer of care protocol was followed      Last vitals:   Visit Vitals  /64 (BP Location: Right arm, Patient Position: Lying)   Pulse (!) 116   Temp (!) 38.7 °C (101.7 °F)   Resp 18   Ht 5' 5" (1.651 m)   Wt 62.7 kg (138 lb 3.7 oz)   LMP  (Within Months)   SpO2 100%   Breastfeeding? No   BMI 23.00 kg/m²     "

## 2020-05-05 NOTE — CARE UPDATE
Rapid Response Nurse Chart Check     Chart check completed, abnormal VS noted. Please call [phone] for further concerns or assistance.

## 2020-05-05 NOTE — NURSING TRANSFER
Nursing Transfer Note      5/5/2020     Transfer To: 317    Transfer via stretcher    Transfer with cardiac monitoring    Transported by pct    Medicines sent: ivf    Chart send with patient: Yes    Notified: mother    Patient reassessed at: 5/5/20

## 2020-05-05 NOTE — ANESTHESIA POSTPROCEDURE EVALUATION
Anesthesia Post Evaluation    Patient: Antoinette Love    Procedure(s) Performed: Procedure(s) (LRB):  COLONOSCOPY (N/A)    Final Anesthesia Type: general (Natural airway)    Patient location during evaluation: PACU  Patient participation: Yes- Able to Participate  Level of consciousness: awake and alert  Post-procedure vital signs: reviewed and stable  Pain management: adequate  Airway patency: patent    PONV status at discharge: No PONV  Anesthetic complications: no      Cardiovascular status: hemodynamically stable  Respiratory status: unassisted  Hydration status: euvolemic  Follow-up not needed.          Vitals Value Taken Time   /58 5/5/2020  4:07 PM   Temp 37.8 °C (100.1 °F) 5/5/2020  4:07 PM   Pulse 99 5/5/2020  4:07 PM   Resp 16 5/5/2020  4:07 PM   SpO2 97 % 5/5/2020  4:07 PM         Event Time     Out of Recovery 15:55:16          Pain/Rosaura Score: Pain Rating Prior to Med Admin: 7 (5/5/2020  4:14 PM)  Pain Rating Post Med Admin: 9 (5/5/2020  2:33 PM)  Rosaura Score: 10 (5/5/2020  3:40 PM)

## 2020-05-05 NOTE — ASSESSMENT & PLAN NOTE
BRBPR    -- Hg on admission 8.6, MCV 87  -- Hg has been between ( 6.9-8)   -- reports PRBPR day before admission   -- Iron study 11/2019: iron 24, TIBC 274, transferrin 185. Normal heptoglobin and RC   -- Required 2 u PRBC on 5/4 with appropriate correction of hg  -- CBC q8h stable over 24 hours, will decrease to daily.   -- GI planning scope 5/5

## 2020-05-05 NOTE — PLAN OF CARE
Plan of care discussed with patient.  Patient ambulating independently, fall precautions in place. Patient pain being controled by PRN medication. Discussed medications and care. Colonoscopy completed today. NS running at 125 mL/hr, heparin running at 18 units/kg/hr. PTT to be drawn until therapeutic x2. Pt had febrile episode last night and again when in Endo, blood cultures and CXR ordered. NPO at midnight for thrombolysis/venogram tomorrow. Patient has no questions at this time. Will continue to monitor.

## 2020-05-05 NOTE — PROGRESS NOTES
[facility] Hospital Medicine  Progress Note    Patient Name: Antoinette Love  MRN: [id number]  Patient Class: IP- Inpatient   Admission Date: 5/3/2020  Length of Stay: 2 days  Attending Physician: Jorge Mercedes MD  Primary Care Provider: Alberto Holguin MD    [facility] Medicine Team: [facility] MED 4 Alonso Mendoza MD    Subjective:     Principal Problem:DVT, lower extremity, proximal, acute, bilateral        HPI:  Ms. 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  Who present to ED with bilateral thighs pain and swelling in the last 7 days. She is following up with Dr. Garcia in hematology and she was just switched from Coumadin to Fondaparinux around a week ago.  She has not been able to get the prescription for Fondaparinux from the pharmacy and she started taking Lovenox 80 mg BID and she stop taking it yesterday when she noticed BRBPR. She states that on Monday she started having pain in the medial side of her right thigh then she has pain in the same area of the left thigh. She describes the pain as excruciating 8/10, bilateral medial thigh associated with swelling, numbness and bruises.  She endorses episodes of chills, hot flashes,night sweats dry cough and palpitation but denies chest pain, SOB or hemoptysis.  She states that she has been taking Coumadin since age of 18 and she was switched to Eliquis 2015 and she has been doing great with it with only one DVT 2016. She had multiple DVTs since August of the last year and she has been seen by IR around 9 times for thrombolysis, ballooning and stents placment with last one on 1/17 where she had mechanical thrombectomy and stent extension to common femoral veins bilaterally.  Sh was discharged and placed back on Coumadin for unclear reason.  She states that  "she is not comfortable with using Coumadin because of the frequent monitoring.  She spoke to her hematologist week ago to switch her to non vitamin K anticoagulant.    She was started on Xarelto before and she was discontinued from taking first dose because of biliary colic pain and she was told she has inflammation.  She reports anxiety/panic attack which she was operated by IR in this first procedure and she has been sedated for all procedure after that.     In ED, she was looks uncomfortable and in pain.  She was afebrile, , /62, SpO2 100% on RA. WBC: 3.8, H.6, platelet: 228. Unremarkable CMP. INR: 1.1, Ptt: 22 and D-dimer 3.6. Normal lactic acid. Doppler US of lower extremities shows  partial thrombosis of the femoral veins, extending to the bilateral popliteal veins. EKG with NSR. IR were consulted and patient loaded with heparin and started on heparin gtt.     Overview/Hospital Course:  Ms. Love was admitted to Hospital Medicine 5/3 with extensive thrombus of the "Extensive deep venous thrombosis...noting previously visualized thrombus within 1 of the paired posterior tibial veins on the left is not identified on current exam however there is thrombosis of 1 of the paired right posterior tibial veins, previously no thrombus identified." Discussed the case with IR, who recommended GI workup given patient's persistent anemia and report of possible BRBPR; furthermore, the morning after admission, patient was noted to have 2 g drop in hemoglobin with associated hypotension and tachycardia increasing concern for GI bleed - though no further BRBPR noted. CTA with venous phase added to assess for possible GI bleed and to evaluate extensiveness of clot. No extravasation of contrast noted, and on venous phase, thrombus noted to extend to IVC filter.     Overnight, patient febrile to 102.6. Though urine culture grew multiple organisms, patient does report dysuria the day prior to admission. " Ceftriaxone started. Blood cultures, CXR, and u/a ordered to complete infectious workup. GI planning for cscope today. Pending results will plan for thrombectomy tomorrow.     Interval History: Overnight, patient febrile to 102.6. Though urine culture grew multiple organisms, patient does report dysuria the day prior to admission. Ceftriaxone started. Blood cultures, CXR, and u/a ordered to complete infectious workup. GI planning for cscope today. Pending results will plan for thrombectomy tomorrow.     Review of Systems   Constitutional: Negative for chills, fatigue and fever.   HENT: Negative for trouble swallowing.    Respiratory: Negative for cough and shortness of breath.    Cardiovascular: Positive for leg swelling. Negative for chest pain.   Gastrointestinal: Negative for abdominal pain, blood in stool, constipation, diarrhea, nausea and vomiting.   Genitourinary: Negative for dysuria.   Musculoskeletal: Negative for arthralgias.        Leg pain   Skin: Negative for color change and pallor.   Neurological: Negative for light-headedness and headaches.   Psychiatric/Behavioral: Negative for behavioral problems and confusion.     Objective:     Vital Signs (Most Recent):  Temp: 99 °F (37.2 °C) (05/05/20 1140)  Pulse: 92 (05/05/20 1140)  Resp: 18 (05/05/20 1140)  BP: 120/68 (05/05/20 1140)  SpO2: 99 % (05/05/20 1140) Vital Signs (24h Range):  Temp:  [97.6 °F (36.4 °C)-102.6 °F (39.2 °C)] 99 °F (37.2 °C)  Pulse:  [] 92  Resp:  [16-18] 18  SpO2:  [92 %-100 %] 99 %  BP: ()/(54-73) 120/68     Weight: 62.7 kg (138 lb 3.7 oz)  Body mass index is 23 kg/m².    Intake/Output Summary (Last 24 hours) at 5/5/2020 1234  Last data filed at 5/5/2020 0600  Gross per 24 hour   Intake 370 ml   Output 0 ml   Net 370 ml      Physical Exam   Constitutional: She is oriented to person, place, and time. She appears well-developed and well-nourished. No distress.   Uncomfortable.    Cardiovascular: Normal rate, regular rhythm  and normal heart sounds.   No murmur heard.  Pulmonary/Chest: Effort normal and breath sounds normal. No respiratory distress. She has no rales.   Abdominal: Soft. Bowel sounds are normal. She exhibits no distension. There is no tenderness.   Musculoskeletal: She exhibits edema.   Tenderness of bilateral LE present, L>R   Neurological: She is alert and oriented to person, place, and time. She displays normal reflexes. No cranial nerve deficit.   Skin: Skin is warm. She is not diaphoretic. No erythema.   Psychiatric: She has a normal mood and affect.   Nursing note and vitals reviewed.      Significant Labs:   CBC:   Recent Labs   Lab 05/04/20  1754 05/04/20  2340 05/05/20  0755   WBC 4.61 4.01 3.93   HGB 9.8* 9.0* 9.2*   HCT 31.4* 29.7* 30.1*    226 233     CMP:   Recent Labs   Lab 05/03/20  2021 05/04/20  0621 05/05/20  0543    135* 135*   K 3.5 4.0 4.1    100 99   CO2 24 25 22*   GLU 97 92 65*   BUN 6 5* 7   CREATININE 0.7 0.7 0.7   CALCIUM 9.0 8.7 9.8   PROT 8.0 7.1 8.6*   ALBUMIN 2.4* 2.0* 2.4*   BILITOT 0.3 0.2 1.0   ALKPHOS 146* 189* 294*   AST 18 28 20   ALT 14 16 17   ANIONGAP 12 10 14   EGFRNONAA >60.0 >60.0 >60.0       Significant Imaging: I have reviewed and interpreted all pertinent imaging results/findings within the past 24 hours.      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  Who present to ED with bilateral thighs pain and swelling in the last 7 days. 11 DVTs. Has not been able to fill her prescription and she was taking only Lovenox ( concern for Lovenox failure per hematology).  Doppler ultrasound of lower extremities shows  bilateral partial thrombosis bilateral femoral veins extending to bilateral popliteal veins  .  Plan :   -- patient was loaded with heparin in the ED  -- started on heparin drip at minimal intensity due to concern for possible GI bleed at admission  -- IR consulted, pending GI workup will plan for thrombectomy  -- monitor for signs of phlegmasia       Acute cystitis without hematuria  Patient reports dysuria and frequency prior to admission. U/a at admission consistent with infection, though culture grew multiple organisms. Febrile to 102.6 overnight 5/4.     - DDx for fever includes cystitis (dysuria, U/a) vs infected clot vs clot burden itself  - Rocephin for possible UTI or infected clot  - U/a repeated, CXR and blood cultures ordered to complete workup  - Continue heparin gtt for clot      Long term (current) use of anticoagulants  -- pt multiple syndromes of thrombophilia    -- on long trem AC   --  sine age of 18  -- switched to liquids 2015   -- unable to tolerate Xarelto because of SE ( biliary colic)  -- concern for Lovenox failure    Plan :   -- ensure pt having prescription filled for AC   -- f/u with hematology in the clinic       Anemia of chronic disease, iron deficiency anemia and acute blood loss anemia  BRBPR    -- Hg on admission 8.6, MCV 87  -- Hg has been between ( 6.9-8)   -- reports PRBPR day before admission   -- Iron study 11/2019: iron 24, TIBC 274, transferrin 185. Normal heptoglobin and RC   -- Required 2 u PRBC on 5/4 with appropriate correction of hg  -- CBC q8h stable over 24 hours, will decrease to daily.   -- GI planning scope 5/5      History of pulmonary embolism  -- she had 3 PEs   -- at home on coumadin     Factor 5 Leiden mutation, heterozygous          VTE Risk Mitigation (From admission, onward)         Ordered     heparin 25,000 units in dextrose 5% 250 ml (100 units/mL) infusion MINIMAL INTENSITY nomogram - [facility]  Continuous     Question:  Heparin Infusion Adjustment  (DO NOT MODIFY ANSWER)  Answer:  [email or web address] MINIMAL  INTENSITY nomogram for [facility] [id number]    05/04/20 1037     IP VTE HIGH RISK PATIENT  Once      05/03/20 2310     Place sequential compression device  Until discontinued      05/03/20 2210                      Alonso Mendoza MD  Department of Hospital Medicine   [facility]

## 2020-05-05 NOTE — SUBJECTIVE & OBJECTIVE
Interval History: Overnight, patient febrile to 102.6. Though urine culture grew multiple organisms, patient does report dysuria the day prior to admission. Ceftriaxone started. Blood cultures, CXR, and u/a ordered to complete infectious workup. GI planning for cscope today. Pending results will plan for thrombectomy tomorrow.     Review of Systems   Constitutional: Negative for chills, fatigue and fever.   HENT: Negative for trouble swallowing.    Respiratory: Negative for cough and shortness of breath.    Cardiovascular: Positive for leg swelling. Negative for chest pain.   Gastrointestinal: Negative for abdominal pain, blood in stool, constipation, diarrhea, nausea and vomiting.   Genitourinary: Negative for dysuria.   Musculoskeletal: Negative for arthralgias.        Leg pain   Skin: Negative for color change and pallor.   Neurological: Negative for light-headedness and headaches.   Psychiatric/Behavioral: Negative for behavioral problems and confusion.     Objective:     Vital Signs (Most Recent):  Temp: 99 °F (37.2 °C) (05/05/20 1140)  Pulse: 92 (05/05/20 1140)  Resp: 18 (05/05/20 1140)  BP: 120/68 (05/05/20 1140)  SpO2: 99 % (05/05/20 1140) Vital Signs (24h Range):  Temp:  [97.6 °F (36.4 °C)-102.6 °F (39.2 °C)] 99 °F (37.2 °C)  Pulse:  [] 92  Resp:  [16-18] 18  SpO2:  [92 %-100 %] 99 %  BP: ()/(54-73) 120/68     Weight: 62.7 kg (138 lb 3.7 oz)  Body mass index is 23 kg/m².    Intake/Output Summary (Last 24 hours) at 5/5/2020 1234  Last data filed at 5/5/2020 0600  Gross per 24 hour   Intake 370 ml   Output 0 ml   Net 370 ml      Physical Exam   Constitutional: She is oriented to person, place, and time. She appears well-developed and well-nourished. No distress.   Uncomfortable.    Cardiovascular: Normal rate, regular rhythm and normal heart sounds.   No murmur heard.  Pulmonary/Chest: Effort normal and breath sounds normal. No respiratory distress. She has no rales.   Abdominal: Soft. Bowel  sounds are normal. She exhibits no distension. There is no tenderness.   Musculoskeletal: She exhibits edema.   Tenderness of bilateral LE present, L>R   Neurological: She is alert and oriented to person, place, and time. She displays normal reflexes. No cranial nerve deficit.   Skin: Skin is warm. She is not diaphoretic. No erythema.   Psychiatric: She has a normal mood and affect.   Nursing note and vitals reviewed.      Significant Labs:   CBC:   Recent Labs   Lab 05/04/20  1754 05/04/20  2340 05/05/20  0755   WBC 4.61 4.01 3.93   HGB 9.8* 9.0* 9.2*   HCT 31.4* 29.7* 30.1*    226 233     CMP:   Recent Labs   Lab 05/03/20  2021 05/04/20  0621 05/05/20  0543    135* 135*   K 3.5 4.0 4.1    100 99   CO2 24 25 22*   GLU 97 92 65*   BUN 6 5* 7   CREATININE 0.7 0.7 0.7   CALCIUM 9.0 8.7 9.8   PROT 8.0 7.1 8.6*   ALBUMIN 2.4* 2.0* 2.4*   BILITOT 0.3 0.2 1.0   ALKPHOS 146* 189* 294*   AST 18 28 20   ALT 14 16 17   ANIONGAP 12 10 14   EGFRNONAA >60.0 >60.0 >60.0       Significant Imaging: I have reviewed and interpreted all pertinent imaging results/findings within the past 24 hours.

## 2020-05-06 ENCOUNTER — ANESTHESIA (OUTPATIENT)
Dept: ENDOSCOPY | Facility: HOSPITAL | Age: 30
DRG: 253 | End: 2020-05-06
Payer: COMMERCIAL

## 2020-05-06 ENCOUNTER — ANESTHESIA EVENT (OUTPATIENT)
Dept: ENDOSCOPY | Facility: HOSPITAL | Age: 30
DRG: 253 | End: 2020-05-06
Payer: COMMERCIAL

## 2020-05-06 PROBLEM — R78.81 GRAM-POSITIVE BACTEREMIA: Status: ACTIVE | Noted: 2020-05-06

## 2020-05-06 LAB
ALBUMIN SERPL BCP-MCNC: 1.8 G/DL (ref 3.5–5.2)
ALP SERPL-CCNC: 330 U/L (ref 55–135)
ALT SERPL W/O P-5'-P-CCNC: 14 U/L (ref 10–44)
ANION GAP SERPL CALC-SCNC: 9 MMOL/L (ref 8–16)
APTT BLDCRRT: 34.6 SEC (ref 21–32)
APTT BLDCRRT: 37.4 SEC (ref 21–32)
APTT BLDCRRT: 40.8 SEC (ref 21–32)
ASCENDING AORTA: 3.08 CM
AST SERPL-CCNC: 19 U/L (ref 10–40)
AV INDEX (PROSTH): 0.89
AV MEAN GRADIENT: 4 MMHG
AV PEAK GRADIENT: 8 MMHG
AV VALVE AREA: 3.08 CM2
AV VELOCITY RATIO: 0.8
BASOPHILS # BLD AUTO: 0.01 K/UL (ref 0–0.2)
BASOPHILS NFR BLD: 0.2 % (ref 0–1.9)
BILIRUB SERPL-MCNC: 0.4 MG/DL (ref 0.1–1)
BSA FOR ECHO PROCEDURE: 1.69 M2
BUN SERPL-MCNC: 3 MG/DL (ref 6–20)
CALCIUM SERPL-MCNC: 8.5 MG/DL (ref 8.7–10.5)
CHLORIDE SERPL-SCNC: 103 MMOL/L (ref 95–110)
CO2 SERPL-SCNC: 22 MMOL/L (ref 23–29)
CREAT SERPL-MCNC: 0.6 MG/DL (ref 0.5–1.4)
CV ECHO LV RWT: 0.26 CM
DIFFERENTIAL METHOD: ABNORMAL
DOP CALC AO PEAK VEL: 1.45 M/S
DOP CALC AO VTI: 23.89 CM
DOP CALC LVOT AREA: 3.5 CM2
DOP CALC LVOT DIAMETER: 2.1 CM
DOP CALC LVOT PEAK VEL: 1.16 M/S
DOP CALC LVOT STROKE VOLUME: 73.67 CM3
DOP CALCLVOT PEAK VEL VTI: 21.28 CM
E WAVE DECELERATION TIME: 191.99 MSEC
E/A RATIO: 1.69
E/E' RATIO: 6.64 M/S
ECHO LV POSTERIOR WALL: 0.65 CM (ref 0.6–1.1)
EOSINOPHIL # BLD AUTO: 0 K/UL (ref 0–0.5)
EOSINOPHIL NFR BLD: 0.2 % (ref 0–8)
ERYTHROCYTE [DISTWIDTH] IN BLOOD BY AUTOMATED COUNT: 15.9 % (ref 11.5–14.5)
EST. GFR  (AFRICAN AMERICAN): >60 ML/MIN/1.73 M^2
EST. GFR  (NON AFRICAN AMERICAN): >60 ML/MIN/1.73 M^2
FRACTIONAL SHORTENING: 41 % (ref 28–44)
GLUCOSE SERPL-MCNC: 99 MG/DL (ref 70–110)
HCT VFR BLD AUTO: 27.7 % (ref 37–48.5)
HGB BLD-MCNC: 8.2 G/DL (ref 12–16)
IMM GRANULOCYTES # BLD AUTO: 0.02 K/UL (ref 0–0.04)
IMM GRANULOCYTES NFR BLD AUTO: 0.4 % (ref 0–0.5)
INR PPP: 1.2 (ref 0.8–1.2)
INTERVENTRICULAR SEPTUM: 0.6 CM (ref 0.6–1.1)
IVRT: 74.22 MSEC
LA MAJOR: 4.52 CM
LA MINOR: 4.39 CM
LA WIDTH: 4.2 CM
LEFT ATRIUM SIZE: 2.77 CM
LEFT ATRIUM VOLUME INDEX: 26.1 ML/M2
LEFT ATRIUM VOLUME: 44.05 CM3
LEFT INTERNAL DIMENSION IN SYSTOLE: 2.89 CM (ref 2.1–4)
LEFT VENTRICLE DIASTOLIC VOLUME INDEX: 68.13 ML/M2
LEFT VENTRICLE DIASTOLIC VOLUME: 115.11 ML
LEFT VENTRICLE MASS INDEX: 58 G/M2
LEFT VENTRICLE SYSTOLIC VOLUME INDEX: 18.9 ML/M2
LEFT VENTRICLE SYSTOLIC VOLUME: 31.95 ML
LEFT VENTRICULAR INTERNAL DIMENSION IN DIASTOLE: 4.94 CM (ref 3.5–6)
LEFT VENTRICULAR MASS: 97.63 G
LV LATERAL E/E' RATIO: 6.2 M/S
LV SEPTAL E/E' RATIO: 7.15 M/S
LYMPHOCYTES # BLD AUTO: 0.9 K/UL (ref 1–4.8)
LYMPHOCYTES NFR BLD: 20 % (ref 18–48)
MCH RBC QN AUTO: 26.3 PG (ref 27–31)
MCHC RBC AUTO-ENTMCNC: 29.6 G/DL (ref 32–36)
MCV RBC AUTO: 89 FL (ref 82–98)
MONOCYTES # BLD AUTO: 0.2 K/UL (ref 0.3–1)
MONOCYTES NFR BLD: 4.5 % (ref 4–15)
MV PEAK A VEL: 0.55 M/S
MV PEAK E VEL: 0.93 M/S
NEUTROPHILS # BLD AUTO: 3.4 K/UL (ref 1.8–7.7)
NEUTROPHILS NFR BLD: 74.7 % (ref 38–73)
NRBC BLD-RTO: 0 /100 WBC
PISA TR MAX VEL: 2 M/S
PLATELET # BLD AUTO: 243 K/UL (ref 150–350)
PMV BLD AUTO: 9 FL (ref 9.2–12.9)
POTASSIUM SERPL-SCNC: 3.6 MMOL/L (ref 3.5–5.1)
PROT SERPL-MCNC: 7 G/DL (ref 6–8.4)
PROTHROMBIN TIME: 12.1 SEC (ref 9–12.5)
PULM VEIN S/D RATIO: 1.09
PV PEAK D VEL: 0.44 M/S
PV PEAK S VEL: 0.48 M/S
RA MAJOR: 4.77 CM
RA PRESSURE: 3 MMHG
RA WIDTH: 4.88 CM
RBC # BLD AUTO: 3.12 M/UL (ref 4–5.4)
RIGHT VENTRICULAR END-DIASTOLIC DIMENSION: 3.26 CM
RV TISSUE DOPPLER FREE WALL SYSTOLIC VELOCITY 1 (APICAL 4 CHAMBER VIEW): 16.62 CM/S
SINUS: 3.23 CM
SODIUM SERPL-SCNC: 134 MMOL/L (ref 136–145)
STJ: 2.77 CM
TDI LATERAL: 0.15 M/S
TDI SEPTAL: 0.13 M/S
TDI: 0.14 M/S
TR MAX PG: 16 MMHG
TRICUSPID ANNULAR PLANE SYSTOLIC EXCURSION: 2.69 CM
TV REST PULMONARY ARTERY PRESSURE: 19 MMHG
WBC # BLD AUTO: 4.49 K/UL (ref 3.9–12.7)

## 2020-05-06 PROCEDURE — D9220A PRA ANESTHESIA: Mod: CRNA,,, | Performed by: NURSE ANESTHETIST, CERTIFIED REGISTERED

## 2020-05-06 PROCEDURE — 63600175 PHARM REV CODE 636 W HCPCS: Performed by: NURSE ANESTHETIST, CERTIFIED REGISTERED

## 2020-05-06 PROCEDURE — 99223 1ST HOSP IP/OBS HIGH 75: CPT | Mod: ,,, | Performed by: PHYSICIAN ASSISTANT

## 2020-05-06 PROCEDURE — 25000003 PHARM REV CODE 250: Performed by: STUDENT IN AN ORGANIZED HEALTH CARE EDUCATION/TRAINING PROGRAM

## 2020-05-06 PROCEDURE — 71000039 HC RECOVERY, EACH ADD'L HOUR

## 2020-05-06 PROCEDURE — D9220A PRA ANESTHESIA: ICD-10-PCS | Mod: CRNA,,, | Performed by: NURSE ANESTHETIST, CERTIFIED REGISTERED

## 2020-05-06 PROCEDURE — 20600001 HC STEP DOWN PRIVATE ROOM

## 2020-05-06 PROCEDURE — 37000009 HC ANESTHESIA EA ADD 15 MINS

## 2020-05-06 PROCEDURE — 85610 PROTHROMBIN TIME: CPT

## 2020-05-06 PROCEDURE — 85730 THROMBOPLASTIN TIME PARTIAL: CPT | Mod: 91

## 2020-05-06 PROCEDURE — 63600175 PHARM REV CODE 636 W HCPCS: Performed by: ANESTHESIOLOGY

## 2020-05-06 PROCEDURE — D9220A PRA ANESTHESIA: ICD-10-PCS | Mod: ANES,,, | Performed by: ANESTHESIOLOGY

## 2020-05-06 PROCEDURE — 25000003 PHARM REV CODE 250: Performed by: NURSE ANESTHETIST, CERTIFIED REGISTERED

## 2020-05-06 PROCEDURE — 99223 PR INITIAL HOSPITAL CARE,LEVL III: ICD-10-PCS | Mod: ,,, | Performed by: PHYSICIAN ASSISTANT

## 2020-05-06 PROCEDURE — 63600175 PHARM REV CODE 636 W HCPCS

## 2020-05-06 PROCEDURE — 85025 COMPLETE CBC W/AUTO DIFF WBC: CPT

## 2020-05-06 PROCEDURE — 99233 PR SUBSEQUENT HOSPITAL CARE,LEVL III: ICD-10-PCS | Mod: ,,, | Performed by: INTERNAL MEDICINE

## 2020-05-06 PROCEDURE — 99233 SBSQ HOSP IP/OBS HIGH 50: CPT | Mod: ,,, | Performed by: INTERNAL MEDICINE

## 2020-05-06 PROCEDURE — D9220A PRA ANESTHESIA: Mod: ANES,,, | Performed by: ANESTHESIOLOGY

## 2020-05-06 PROCEDURE — 25000003 PHARM REV CODE 250: Performed by: INTERNAL MEDICINE

## 2020-05-06 PROCEDURE — 85730 THROMBOPLASTIN TIME PARTIAL: CPT

## 2020-05-06 PROCEDURE — 80053 COMPREHEN METABOLIC PANEL: CPT

## 2020-05-06 PROCEDURE — 63600175 PHARM REV CODE 636 W HCPCS: Performed by: INTERNAL MEDICINE

## 2020-05-06 PROCEDURE — 37000008 HC ANESTHESIA 1ST 15 MINUTES

## 2020-05-06 PROCEDURE — 87040 BLOOD CULTURE FOR BACTERIA: CPT

## 2020-05-06 PROCEDURE — 71000033 HC RECOVERY, INTIAL HOUR

## 2020-05-06 PROCEDURE — 36415 COLL VENOUS BLD VENIPUNCTURE: CPT

## 2020-05-06 RX ORDER — NEOSTIGMINE METHYLSULFATE 0.5 MG/ML
INJECTION, SOLUTION INTRAVENOUS
Status: DISCONTINUED | OUTPATIENT
Start: 2020-05-06 | End: 2020-05-06

## 2020-05-06 RX ORDER — OXYCODONE HYDROCHLORIDE 5 MG/1
10 TABLET ORAL EVERY 4 HOURS PRN
Status: DISCONTINUED | OUTPATIENT
Start: 2020-05-06 | End: 2020-05-06

## 2020-05-06 RX ORDER — HEPARIN SODIUM 10000 [USP'U]/100ML
INJECTION, SOLUTION INTRAVENOUS
Status: DISPENSED
Start: 2020-05-06 | End: 2020-05-07

## 2020-05-06 RX ORDER — ONDANSETRON 2 MG/ML
4 INJECTION INTRAMUSCULAR; INTRAVENOUS DAILY PRN
Status: COMPLETED | OUTPATIENT
Start: 2020-05-06 | End: 2020-05-07

## 2020-05-06 RX ORDER — OXYCODONE HYDROCHLORIDE 5 MG/1
5 TABLET ORAL EVERY 4 HOURS PRN
Status: DISCONTINUED | OUTPATIENT
Start: 2020-05-06 | End: 2020-05-10

## 2020-05-06 RX ORDER — VANCOMYCIN HCL IN 5 % DEXTROSE 1G/250ML
1000 PLASTIC BAG, INJECTION (ML) INTRAVENOUS
Status: DISCONTINUED | OUTPATIENT
Start: 2020-05-06 | End: 2020-05-06

## 2020-05-06 RX ORDER — ROCURONIUM BROMIDE 10 MG/ML
INJECTION, SOLUTION INTRAVENOUS
Status: DISCONTINUED | OUTPATIENT
Start: 2020-05-06 | End: 2020-05-06

## 2020-05-06 RX ORDER — ONDANSETRON 2 MG/ML
INJECTION INTRAMUSCULAR; INTRAVENOUS
Status: DISCONTINUED | OUTPATIENT
Start: 2020-05-06 | End: 2020-05-06

## 2020-05-06 RX ORDER — SODIUM CHLORIDE 9 MG/ML
INJECTION, SOLUTION INTRAVENOUS CONTINUOUS PRN
Status: DISCONTINUED | OUTPATIENT
Start: 2020-05-06 | End: 2020-05-06

## 2020-05-06 RX ORDER — FENTANYL CITRATE 50 UG/ML
25 INJECTION, SOLUTION INTRAMUSCULAR; INTRAVENOUS EVERY 5 MIN PRN
Status: COMPLETED | OUTPATIENT
Start: 2020-05-06 | End: 2020-05-06

## 2020-05-06 RX ORDER — HYDROMORPHONE HYDROCHLORIDE 1 MG/ML
0.2 INJECTION, SOLUTION INTRAMUSCULAR; INTRAVENOUS; SUBCUTANEOUS EVERY 5 MIN PRN
Status: COMPLETED | OUTPATIENT
Start: 2020-05-06 | End: 2020-05-06

## 2020-05-06 RX ORDER — GLYCOPYRROLATE 0.2 MG/ML
INJECTION INTRAMUSCULAR; INTRAVENOUS
Status: DISCONTINUED | OUTPATIENT
Start: 2020-05-06 | End: 2020-05-06

## 2020-05-06 RX ORDER — PROPOFOL 10 MG/ML
VIAL (ML) INTRAVENOUS
Status: DISCONTINUED | OUTPATIENT
Start: 2020-05-06 | End: 2020-05-06

## 2020-05-06 RX ORDER — FENTANYL CITRATE 50 UG/ML
INJECTION, SOLUTION INTRAMUSCULAR; INTRAVENOUS
Status: DISCONTINUED | OUTPATIENT
Start: 2020-05-06 | End: 2020-05-06

## 2020-05-06 RX ORDER — VANCOMYCIN HCL IN 5 % DEXTROSE 1G/250ML
1000 PLASTIC BAG, INJECTION (ML) INTRAVENOUS
Status: DISCONTINUED | OUTPATIENT
Start: 2020-05-06 | End: 2020-05-08

## 2020-05-06 RX ORDER — LIDOCAINE HYDROCHLORIDE 20 MG/ML
INJECTION INTRAVENOUS
Status: DISCONTINUED | OUTPATIENT
Start: 2020-05-06 | End: 2020-05-06

## 2020-05-06 RX ORDER — MIDAZOLAM HYDROCHLORIDE 1 MG/ML
INJECTION, SOLUTION INTRAMUSCULAR; INTRAVENOUS
Status: DISCONTINUED | OUTPATIENT
Start: 2020-05-06 | End: 2020-05-06

## 2020-05-06 RX ADMIN — HYDROMORPHONE HYDROCHLORIDE 2 MG: 1 INJECTION, SOLUTION INTRAMUSCULAR; INTRAVENOUS; SUBCUTANEOUS at 11:05

## 2020-05-06 RX ADMIN — ROCURONIUM BROMIDE 10 MG: 10 INJECTION, SOLUTION INTRAVENOUS at 03:05

## 2020-05-06 RX ADMIN — FENTANYL CITRATE 25 MCG: 50 INJECTION INTRAMUSCULAR; INTRAVENOUS at 07:05

## 2020-05-06 RX ADMIN — PROPOFOL 150 MG: 10 INJECTION, EMULSION INTRAVENOUS at 02:05

## 2020-05-06 RX ADMIN — FENTANYL CITRATE 25 MCG: 50 INJECTION, SOLUTION INTRAMUSCULAR; INTRAVENOUS at 06:05

## 2020-05-06 RX ADMIN — ONDANSETRON 4 MG: 2 INJECTION, SOLUTION INTRAMUSCULAR; INTRAVENOUS at 09:05

## 2020-05-06 RX ADMIN — ROCURONIUM BROMIDE 10 MG: 10 INJECTION, SOLUTION INTRAVENOUS at 05:05

## 2020-05-06 RX ADMIN — MIDAZOLAM HYDROCHLORIDE 2 MG: 1 INJECTION, SOLUTION INTRAMUSCULAR; INTRAVENOUS at 02:05

## 2020-05-06 RX ADMIN — HYDROMORPHONE HYDROCHLORIDE 0.2 MG: 1 INJECTION, SOLUTION INTRAMUSCULAR; INTRAVENOUS; SUBCUTANEOUS at 07:05

## 2020-05-06 RX ADMIN — FENTANYL CITRATE 25 MCG: 50 INJECTION, SOLUTION INTRAMUSCULAR; INTRAVENOUS at 05:05

## 2020-05-06 RX ADMIN — SUGAMMADEX 200 MG: 100 INJECTION, SOLUTION INTRAVENOUS at 06:05

## 2020-05-06 RX ADMIN — GLYCOPYRROLATE 0.4 MG: 0.2 INJECTION, SOLUTION INTRAMUSCULAR; INTRAVENOUS at 06:05

## 2020-05-06 RX ADMIN — OXYCODONE HYDROCHLORIDE 5 MG: 5 TABLET ORAL at 04:05

## 2020-05-06 RX ADMIN — SODIUM CHLORIDE: 9 INJECTION, SOLUTION INTRAVENOUS at 02:05

## 2020-05-06 RX ADMIN — ACETAMINOPHEN 650 MG: 325 TABLET ORAL at 09:05

## 2020-05-06 RX ADMIN — HYDROMORPHONE HYDROCHLORIDE 2 MG: 1 INJECTION, SOLUTION INTRAMUSCULAR; INTRAVENOUS; SUBCUTANEOUS at 09:05

## 2020-05-06 RX ADMIN — HYDROMORPHONE HYDROCHLORIDE 0.2 MG: 1 INJECTION, SOLUTION INTRAMUSCULAR; INTRAVENOUS; SUBCUTANEOUS at 08:05

## 2020-05-06 RX ADMIN — NEOSTIGMINE METHYLSULFATE 4 MG: 0.5 INJECTION INTRAVENOUS at 06:05

## 2020-05-06 RX ADMIN — HYDROMORPHONE HYDROCHLORIDE 2 MG: 1 INJECTION, SOLUTION INTRAMUSCULAR; INTRAVENOUS; SUBCUTANEOUS at 02:05

## 2020-05-06 RX ADMIN — ROCURONIUM BROMIDE 40 MG: 10 INJECTION, SOLUTION INTRAVENOUS at 02:05

## 2020-05-06 RX ADMIN — OXYCODONE HYDROCHLORIDE 5 MG: 5 TABLET ORAL at 06:05

## 2020-05-06 RX ADMIN — HEPARIN SODIUM 21 UNITS/KG/HR: 10000 INJECTION, SOLUTION INTRAVENOUS at 08:05

## 2020-05-06 RX ADMIN — ONDANSETRON 4 MG: 2 INJECTION, SOLUTION INTRAMUSCULAR; INTRAVENOUS at 05:05

## 2020-05-06 RX ADMIN — OXYCODONE HYDROCHLORIDE 5 MG: 5 TABLET ORAL at 09:05

## 2020-05-06 RX ADMIN — HYDROMORPHONE HYDROCHLORIDE 2 MG: 1 INJECTION, SOLUTION INTRAMUSCULAR; INTRAVENOUS; SUBCUTANEOUS at 07:05

## 2020-05-06 RX ADMIN — LIDOCAINE HYDROCHLORIDE 100 MG: 20 INJECTION, SOLUTION INTRAVENOUS at 02:05

## 2020-05-06 RX ADMIN — FENTANYL CITRATE 100 MCG: 50 INJECTION, SOLUTION INTRAMUSCULAR; INTRAVENOUS at 02:05

## 2020-05-06 RX ADMIN — VANCOMYCIN HYDROCHLORIDE 1500 MG: 1.5 INJECTION, POWDER, LYOPHILIZED, FOR SOLUTION INTRAVENOUS at 10:05

## 2020-05-06 NOTE — PROGRESS NOTES
"Pharmacokinetic Initial Assessment: IV Vancomycin    Assessment/Plan:    Initiate intravenous vancomycin with loading dose of 1500 mg once followed by a maintenance dose of vancomycin 1000mg IV every 12 hours  Desired empiric serum trough concentration is 15 to 20 mcg/mL  Draw vancomycin trough level 30 min prior to fourth dose on 5/7 at approximately 1430   Pharmacy will continue to follow and monitor vancomycin.      Thank you for allowing pharmacy participate in this patient's care.     Thalia Currie, PharmD  Clinical Pharmacist, [facility] Pharmacy/[facility] Team  [email or web address]  office [phone]         Patient brief summary:  Antoinette Love is a 29 y.o. female initiated on antimicrobial therapy with IV Vancomycin for treatment of suspected bacteremia    Drug Allergies:   Review of patient's allergies indicates:   Allergen Reactions    Azithromycin Hives    Beef containing products Anaphylaxis     Patient cannot eat BEEF BROTH  STATES IT WILL MAKE HER THROAT CLOSE UP .     Pork derived (porcine) Anaphylaxis     Throat swells up cannot eat pork sausage or pork patties ,     Unclassified drug Shortness Of Breath and Other (See Comments)     Is allergic to a beef spice - Causes "throat closing"    Xarelto [rivaroxaban] Other (See Comments)     Gallbladder swelling and disfunction    Toradol [ketorolac] Hives and Itching       Actual Body Weight:   62.7kg    Renal Function:   Estimated Creatinine Clearance: 104.8 mL/min (based on SCr of 0.7 mg/dL).,     Dialysis Method (if applicable):  N/A    CBC (last 72 hours):  Recent Labs   Lab Result Units 05/03/20  1841 05/04/20  0621 05/04/20  0751 05/04/20  1754 05/04/20  2340 05/05/20  0755   WBC K/uL 3.83* 2.95* 3.03* 4.61 4.01 3.93   Hemoglobin g/dL 8.6* 6.8* 6.7* 9.8* 9.0* 9.2*   Hematocrit % 28.5* 23.2* 23.0* 31.4* 29.7* 30.1*   Platelets K/uL 228 208 225 227 226 233   Gran% % 70.7 65.1 71.9 67.6 68.2 71.4   Lymph% % 22.2 27.1 19.8 26.2 25.2 20.9   Mono% % 5.5 " 6.8 6.6 4.3 5.0 5.3   Eosinophil% % 0.5 0.7 0.7 1.1 1.2 1.3   Basophil% % 0.3 0.0 0.3 0.4 0.2 0.3   Differential Method  Automated Automated Automated Automated Automated Automated       Metabolic Panel (last 72 hours):  Recent Labs   Lab Result Units 05/03/20 1845 05/03/20 2021 05/04/20 0621 05/05/20  0543   Sodium mmol/L  --  136 135* 135*   Potassium mmol/L  --  3.5 4.0 4.1   Chloride mmol/L  --  100 100 99   CO2 mmol/L  --  24 25 22*   Glucose mg/dL  --  97 92 65*   Glucose, UA  Negative  --   --   --    BUN, Bld mg/dL  --  6 5* 7   Creatinine mg/dL  --  0.7 0.7 0.7   Albumin g/dL  --  2.4* 2.0* 2.4*   Total Bilirubin mg/dL  --  0.3 0.2 1.0   Alkaline Phosphatase U/L  --  146* 189* 294*   AST U/L  --  18 28 20   ALT U/L  --  14 16 17       Drug levels (last 3 results):  No results for input(s): VANCOMYCINRA, VANCOMYCINPE, VANCOMYCINTR in the last 72 hours.    Microbiologic Results:  Microbiology Results (last 7 days)       Procedure Component Value Units Date/Time    Blood culture [[id number]]     Order Status:  Sent Specimen:  Blood     Blood culture [[id number]]     Order Status:  Sent Specimen:  Blood     Blood culture [[id number]] Collected:  05/05/20 0755    Order Status:  Completed Specimen:  Blood Updated:  05/06/20 0051     Blood Culture, Routine Gram stain anny bottle: Gram positive cocci in clusters resembling Staph      Results called to and read back by: Cheng Arriaga RN  05/06/2020        00:51    Blood culture [[id number]] Collected:  05/05/20 0755    Order Status:  Completed Specimen:  Blood Updated:  05/05/20 1515     Blood Culture, Routine No Growth to date    Urine culture [[id number]] Collected:  05/03/20 1845    Order Status:  Completed Specimen:  Urine Updated:  05/05/20 0323     Urine Culture, Routine Multiple organisms isolated. None in predominance.  Repeat if      clinically necessary.    Narrative:       Preferred Collection Type->Urine, Clean Catch

## 2020-05-06 NOTE — H&P
Inpatient Radiology Pre-procedure Note    History of Present Illness:  Antoinette Love is a 29 y.o. female with hx of factor V leiden and may thurner syndrome who presents with recurrent thrombosis of venous stents. IR consulted for thrombolysis. Imaging reviewed. Screening C-scope also performed and negative.    Admission H&P reviewed.  Past Medical History:   Diagnosis Date    Anemia of chronic disease 12/19/2019    Anticoagulant long-term use     Anticoagulant long-term use     FERNANDO (dyspnea on exertion)     DVT (deep venous thrombosis)     Encounter for blood transfusion     Factor V Leiden     Leg edema, left     May-Thurner syndrome     Multiple pulmonary nodules     Pulmonary embolus     RAD (reactive airway disease)     Reactive airway disease without complication 8/9/2019    On albuterol prn, addition singulair    Recurrent upper respiratory infection (URI)     Recurrent urticaria     Stroke     TIA (transient ischemic attack)      Past Surgical History:   Procedure Laterality Date    COLONOSCOPY N/A 12/18/2015    Procedure: COLONOSCOPY;  Surgeon: Lefty Lee MD;  Location: [facility] (4TH FLR);  Service: Endoscopy;  Laterality: N/A;  schedule with Jesus    COLONOSCOPY N/A 5/5/2020    Procedure: COLONOSCOPY;  Surgeon: Lamin Powers MD;  Location: [facility] (2ND FLR);  Service: Endoscopy;  Laterality: N/A;    IVC FILTER RETRIEVAL      IVC FILTER RETRIEVAL N/A 9/23/2019    Procedure: REMOVAL-FILTER-IVC;  Surgeon: Claudia Surgeon;  Location: [facility];  Service: Anesthesiology;  Laterality: N/A;  188  4 hours Anes    SKIN BIOPSY      tumor from breast      left    VASCULAR SURGERY      WISDOM TOOTH EXTRACTION         Review of Systems:   As documented in primary team H&P    Home Meds:   Prior to Admission medications    Medication Sig Start Date End Date Taking? Authorizing Provider   clopidogrel (PLAVIX) 75 mg tablet Take 1 tablet (75 mg total) by mouth once daily. 1/22/20 1/21/21 Yes  "Susana Escobar MD   diazePAM (VALIUM) 5 MG tablet TAKE 1 TABLET (5 MG TOTAL) BY MOUTH EVERY 12 (TWELVE) HOURS AS NEEDED FOR ANXIETY. 4/14/20 5/14/20 Yes Chelsey Mclean MD   EPINEPHrine (EPIPEN 2-VAN) 0.3 mg/0.3 mL AtIn Inject 0.3 mLs (0.3 mg total) into the muscle once. for 1 dose 3/28/19 11/11/19  Luis A Junior MD   prothrombin time/INR test metr Misc 1 each by Misc.(Non-Drug; Combo Route) route once daily. 1/21/20   Susana Escobar MD   rivaroxaban (XARELTO) 15 mg (42)- 20 mg (9) tablet dose pack Take 1 tablet (15 mg) by mouth twice daily with food for 21 days followed by 1 tablet (20 mg) by mouth once daily with food 4/20/20   Earnestine Garcia MD     Scheduled Meds:    vancomycin (VANCOCIN) IVPB  1,000 mg Intravenous Q12H     Continuous Infusions:    sodium chloride 0.9% 125 mL/hr at 05/05/20 2303    heparin (porcine) in D5W 21 Units/kg/hr (05/05/20 2331)     PRN Meds:sodium chloride, sodium chloride, acetaminophen, Dextrose 10% Bolus, Dextrose 10% Bolus, glucagon (human recombinant), glucose, glucose, heparin (PORCINE), heparin (PORCINE), HYDROmorphone, ondansetron, oxyCODONE, sodium chloride 0.9%, sodium chloride 0.9%     Anticoagulants/Antiplatelets: Heparin    Allergies:   Review of patient's allergies indicates:   Allergen Reactions    Azithromycin Hives    Beef containing products Anaphylaxis     Patient cannot eat BEEF BROTH  STATES IT WILL MAKE HER THROAT CLOSE UP .     Pork derived (porcine) Anaphylaxis     Throat swells up cannot eat pork sausage or pork patties ,     Unclassified drug Shortness Of Breath and Other (See Comments)     Is allergic to a beef spice - Causes "throat closing"    Xarelto [rivaroxaban] Other (See Comments)     Gallbladder swelling and disfunction    Toradol [ketorolac] Hives and Itching     Sedation Hx: have not been any systemic reactions    Labs:  Recent Labs   Lab 05/06/20  0709   INR 1.2       Recent Labs   Lab 05/06/20  0709   WBC 4.49   HGB 8.2* "   HCT 27.7*   MCV 89         Recent Labs   Lab 05/06/20  0709   GLU 99   *   K 3.6      CO2 22*   BUN 3*   CREATININE 0.6   CALCIUM 8.5*   ALT 14   AST 19   ALBUMIN 1.8*   BILITOT 0.4         Vitals:  Temp: 99.9 °F (37.7 °C) (05/06/20 1147)  Pulse: 92 (05/06/20 1147)  Resp: 18 (05/06/20 1147)  BP: 107/61 (05/06/20 1147)  SpO2: (!) 94 % (05/06/20 1147)     Physical Exam:  ASA: 2  Mallampati: 2    General: no acute distress  Mental Status: alert and oriented to person, place and time  HEENT: normocephalic, atraumatic  Chest: unlabored breathing  Heart: regular heart rate  Abdomen: nondistended  Extremity: moves all extremities    Plan:     IR venogram with venoplasty/thrombolysis today.  Informed consent obtained.    Sedation Plan:     Oskar Otto MD  Radiology, PGY - IV

## 2020-05-06 NOTE — ASSESSMENT & PLAN NOTE
Patient reports dysuria and frequency prior to admission. U/a at admission consistent with infection, though culture grew multiple organisms. Febrile to 102.6 overnight 5/4.     - DDx for fever includes cystitis (dysuria, U/a) vs infected clot vs clot burden itself  - Dced rocephin

## 2020-05-06 NOTE — NURSING TRANSFER
Nursing Transfer Note      5/6/2020     Transfer To: [facility]    Transfer via stretcher    Transfer with cardiac monitoring    Transported by transport    Medicines sent: NS and heparin    Chart send with patient: Yes

## 2020-05-06 NOTE — SUBJECTIVE & OBJECTIVE
Interval History: Patient febrile overnight again. Growing gpcs in clusters in two bottles. She is on vanc. Going for surgery today with IR    Review of Systems   Constitutional: Negative for chills, fatigue and fever.   HENT: Negative for trouble swallowing.    Respiratory: Negative for cough and shortness of breath.    Cardiovascular: Positive for leg swelling. Negative for chest pain.   Gastrointestinal: Negative for abdominal pain, blood in stool, constipation, diarrhea, nausea and vomiting.   Genitourinary: Negative for dysuria.   Musculoskeletal: Negative for arthralgias.        Leg pain   Skin: Negative for color change and pallor.   Neurological: Negative for light-headedness and headaches.   Psychiatric/Behavioral: Negative for behavioral problems and confusion.     Objective:     Vital Signs (Most Recent):  Temp: 99.9 °F (37.7 °C) (05/06/20 1147)  Pulse: 92 (05/06/20 1147)  Resp: 18 (05/06/20 1147)  BP: 107/61 (05/06/20 1147)  SpO2: (!) 94 % (05/06/20 1147) Vital Signs (24h Range):  Temp:  [98.1 °F (36.7 °C)-103 °F (39.4 °C)] 99.9 °F (37.7 °C)  Pulse:  [] 92  Resp:  [15-24] 18  SpO2:  [94 %-100 %] 94 %  BP: ()/(56-70) 107/61     Weight: 62.6 kg (138 lb)  Body mass index is 22.96 kg/m².    Intake/Output Summary (Last 24 hours) at 5/6/2020 1159  Last data filed at 5/6/2020 0900  Gross per 24 hour   Intake 3527.23 ml   Output 2100 ml   Net 1427.23 ml      Physical Exam   Constitutional: She is oriented to person, place, and time. She appears well-developed and well-nourished. No distress.   Uncomfortable.    Cardiovascular: Normal rate, regular rhythm and normal heart sounds.   No murmur heard.  Pulmonary/Chest: Effort normal and breath sounds normal. No respiratory distress. She has no rales.   Abdominal: Soft. Bowel sounds are normal. She exhibits no distension. There is no tenderness.   Musculoskeletal: She exhibits edema.   Tenderness of bilateral LE present, L>R   Neurological: She is alert  and oriented to person, place, and time. She displays normal reflexes. No cranial nerve deficit.   Skin: Skin is warm. She is not diaphoretic. No erythema.   Psychiatric: She has a normal mood and affect.   Nursing note and vitals reviewed.      Significant Labs:   CBC:   Recent Labs   Lab 05/04/20  2340 05/05/20  0755 05/06/20  0709   WBC 4.01 3.93 4.49   HGB 9.0* 9.2* 8.2*   HCT 29.7* 30.1* 27.7*    233 243     CMP:   Recent Labs   Lab 05/05/20  0543 05/06/20  0709   * 134*   K 4.1 3.6   CL 99 103   CO2 22* 22*   GLU 65* 99   BUN 7 3*   CREATININE 0.7 0.6   CALCIUM 9.8 8.5*   PROT 8.6* 7.0   ALBUMIN 2.4* 1.8*   BILITOT 1.0 0.4   ALKPHOS 294* 330*   AST 20 19   ALT 17 14   ANIONGAP 14 9   EGFRNONAA >60.0 >60.0       Significant Imaging: I have reviewed and interpreted all pertinent imaging results/findings within the past 24 hours.

## 2020-05-06 NOTE — PROGRESS NOTES
[facility] Hospital Medicine  Progress Note    Patient Name: Antoinette Love  MRN: [id number]  Patient Class: IP- Inpatient   Admission Date: 5/3/2020  Length of Stay: 3 days  Attending Physician: Jorge Mercedes MD  Primary Care Provider: Alberto Holguin MD    [facility] Team: [facility] 4 Ben Martines MD    Subjective:     Principal Problem:DVT, lower extremity, proximal, acute, bilateral        HPI:  Ms. 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  Who present to ED with bilateral thighs pain and swelling in the last 7 days. She is following up with Dr. Garcia in hematology and she was just switched from Coumadin to Fondaparinux around a week ago.  She has not been able to get the prescription for Fondaparinux from the pharmacy and she started taking Lovenox 80 mg BID and she stop taking it yesterday when she noticed BRBPR. She states that on Monday she started having pain in the medial side of her right thigh then she has pain in the same area of the left thigh. She describes the pain as excruciating 8/10, bilateral medial thigh associated with swelling, numbness and bruises.  She endorses episodes of chills, hot flashes,night sweats dry cough and palpitation but denies chest pain, SOB or hemoptysis.  She states that she has been taking Coumadin since age of 18 and she was switched to Eliquis 2015 and she has been doing great with it with only one DVT 2016. She had multiple DVTs since August of the last year and she has been seen by IR around 9 times for thrombolysis, ballooning and stents placment with last one on 1/17 where she had mechanical thrombectomy and stent extension to common femoral veins bilaterally.  Sh was discharged and placed back on Coumadin for unclear reason.  She states  "that she is not comfortable with using Coumadin because of the frequent monitoring.  She spoke to her hematologist week ago to switch her to non vitamin K anticoagulant.    She was started on Xarelto before and she was discontinued from taking first dose because of biliary colic pain and she was told she has inflammation.  She reports anxiety/panic attack which she was operated by IR in this first procedure and she has been sedated for all procedure after that.     In ED, she was looks uncomfortable and in pain.  She was afebrile, , /62, SpO2 100% on RA. WBC: 3.8, H.6, platelet: 228. Unremarkable CMP. INR: 1.1, Ptt: 22 and D-dimer 3.6. Normal lactic acid. Doppler US of lower extremities shows  partial thrombosis of the femoral veins, extending to the bilateral popliteal veins. EKG with NSR. IR were consulted and patient loaded with heparin and started on heparin gtt.     Overview/Hospital Course:  Ms. Love was admitted to Hospital Medicine 5/3 with extensive thrombus of the "Extensive deep venous thrombosis...noting previously visualized thrombus within 1 of the paired posterior tibial veins on the left is not identified on current exam however there is thrombosis of 1 of the paired right posterior tibial veins, previously no thrombus identified." Discussed the case with IR, who recommended GI workup given patient's persistent anemia and report of possible BRBPR; furthermore, the morning after admission, patient was noted to have 2 g drop in hemoglobin with associated hypotension and tachycardia increasing concern for GI bleed - though no further BRBPR noted. CTA with venous phase added to assess for possible GI bleed and to evaluate extensiveness of clot. No extravasation of contrast noted, and on venous phase, thrombus noted to extend to IVC filter.     Overnight, patient febrile to 102.6. Though urine culture grew multiple organisms, patient does report dysuria the day prior to admission. " Ceftriaxone started. Blood cultures, CXR, and u/a ordered to complete infectious workup. GI planning for cscope today. Pending results will plan for thrombectomy tomorrow.     Interval History: Patient febrile overnight again. Growing gpcs in clusters in two bottles. She is on vanc. Going for surgery today with IR    Review of Systems   Constitutional: Negative for chills, fatigue and fever.   HENT: Negative for trouble swallowing.    Respiratory: Negative for cough and shortness of breath.    Cardiovascular: Positive for leg swelling. Negative for chest pain.   Gastrointestinal: Negative for abdominal pain, blood in stool, constipation, diarrhea, nausea and vomiting.   Genitourinary: Negative for dysuria.   Musculoskeletal: Negative for arthralgias.        Leg pain   Skin: Negative for color change and pallor.   Neurological: Negative for light-headedness and headaches.   Psychiatric/Behavioral: Negative for behavioral problems and confusion.     Objective:     Vital Signs (Most Recent):  Temp: 99.9 °F (37.7 °C) (05/06/20 1147)  Pulse: 92 (05/06/20 1147)  Resp: 18 (05/06/20 1147)  BP: 107/61 (05/06/20 1147)  SpO2: (!) 94 % (05/06/20 1147) Vital Signs (24h Range):  Temp:  [98.1 °F (36.7 °C)-103 °F (39.4 °C)] 99.9 °F (37.7 °C)  Pulse:  [] 92  Resp:  [15-24] 18  SpO2:  [94 %-100 %] 94 %  BP: ()/(56-70) 107/61     Weight: 62.6 kg (138 lb)  Body mass index is 22.96 kg/m².    Intake/Output Summary (Last 24 hours) at 5/6/2020 1159  Last data filed at 5/6/2020 0900  Gross per 24 hour   Intake 3527.23 ml   Output 2100 ml   Net 1427.23 ml      Physical Exam   Constitutional: She is oriented to person, place, and time. She appears well-developed and well-nourished. No distress.   Uncomfortable.    Cardiovascular: Normal rate, regular rhythm and normal heart sounds.   No murmur heard.  Pulmonary/Chest: Effort normal and breath sounds normal. No respiratory distress. She has no rales.   Abdominal: Soft. Bowel sounds  are normal. She exhibits no distension. There is no tenderness.   Musculoskeletal: She exhibits edema.   Tenderness of bilateral LE present, L>R   Neurological: She is alert and oriented to person, place, and time. She displays normal reflexes. No cranial nerve deficit.   Skin: Skin is warm. She is not diaphoretic. No erythema.   Psychiatric: She has a normal mood and affect.   Nursing note and vitals reviewed.      Significant Labs:   CBC:   Recent Labs   Lab 05/04/20  2340 05/05/20  0755 05/06/20  0709   WBC 4.01 3.93 4.49   HGB 9.0* 9.2* 8.2*   HCT 29.7* 30.1* 27.7*    233 243     CMP:   Recent Labs   Lab 05/05/20  0543 05/06/20  0709   * 134*   K 4.1 3.6   CL 99 103   CO2 22* 22*   GLU 65* 99   BUN 7 3*   CREATININE 0.7 0.6   CALCIUM 9.8 8.5*   PROT 8.6* 7.0   ALBUMIN 2.4* 1.8*   BILITOT 1.0 0.4   ALKPHOS 294* 330*   AST 20 19   ALT 17 14   ANIONGAP 14 9   EGFRNONAA >60.0 >60.0       Significant Imaging: I have reviewed and interpreted all pertinent imaging results/findings within the past 24 hours.      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  Who present to ED with bilateral thighs pain and swelling in the last 7 days. 11 DVTs. Has not been able to fill her prescription and she was taking only Lovenox ( concern for Lovenox failure per hematology).  Doppler ultrasound of lower extremities shows bilateral partial thrombosis bilateral femoral veins extending to bilateral popliteal veins  .  Plan :   -- patient was loaded with heparin in the ED  -- started on heparin drip at minimal intensity due to concern for possible GI bleed at admission  --  IR consulted, pending GI workup will plan for thrombectomy  -- monitor for signs of phlegmasia       Gram-positive bacteremia  Patient growing gpcs in two bottles. Started on Vanc. Will continue following cultures      Acute cystitis without hematuria  Patient reports dysuria and frequency prior to admission. U/a at admission consistent with infection, though culture grew multiple organisms. Febrile to 102.6 overnight 5/4.     - DDx for fever includes cystitis (dysuria, U/a) vs infected clot vs clot burden itself  - Dced rocephin      Long term (current) use of anticoagulants  -- pt multiple syndromes of thrombophilia    -- on long trem AC   --  sine age of 18  -- switched to liquids 2015   -- unable to tolerate Xarelto because of SE ( biliary colic)  -- concern for Lovenox failure    Plan :   -- ensure pt having prescription filled for AC   -- f/u with hematology in the clinic       Anemia of chronic disease, iron deficiency anemia and acute blood loss anemia  BRBPR    -- Hg on admission 8.6, MCV 87  -- Hg has been between ( 6.9-8)   -- reports PRBPR day before admission   -- Iron study 11/2019: iron 24, TIBC 274, transferrin 185. Normal heptoglobin and RC   -- Required 2 u PRBC on 5/4 with appropriate correction of hg  -- CBC q8h stable over 24 hours, will decrease to daily.   -- GI planning scope 5/5      History of pulmonary embolism  -- she had 3 PEs   -- at home on coumadin     Factor 5 Leiden mutation, heterozygous          VTE Risk Mitigation (From admission, onward)         Ordered     heparin 25,000 units in dextrose 5% 250 mL (100 units/mL) infusion HIGH INTENSITY nomogram - [facility]  Continuous     Question:  Heparin Infusion Adjustment (DO NOT MODIFY ANSWER)  Answer:  [email or web address]\Images\Pharmacy\HeparinInfusions\heparin HIGH INTENSITY nomogram for [facility] [id number]    05/05/20 1426     heparin 25,000 units in dextrose 5% (100 units/ml) IV bolus from bag - ADDITIONAL PRN BOLUS - 60 units/kg  As  needed (PRN)     Question:  Heparin Infusion Adjustment (DO NOT MODIFY ANSWER)  Answer:  [email or web address]\Images\Pharmacy\HeparinInfusions\heparin HIGH INTENSITY nomogram for [facility] [id number]    05/05/20 1426     heparin 25,000 units in dextrose 5% (100 units/ml) IV bolus from bag - ADDITIONAL PRN BOLUS - 30 units/kg  As needed (PRN)     Question:  Heparin Infusion Adjustment (DO NOT MODIFY ANSWER)  Answer:  [email or web address]\Images\Pharmacy\HeparinInfusions\heparin HIGH INTENSITY nomogram for [facility] [id number]    05/05/20 1426     IP VTE HIGH RISK PATIENT  Once      05/03/20 2310     Place sequential compression device  Until discontinued      05/03/20 2210                      Ben Martines MD  Department of Hospital Medicine   [facility]

## 2020-05-06 NOTE — PT/OT/SLP PROGRESS
Occupational Therapy      Patient Name:  Antoinette Love   MRN:  [id number]    Patient not seen today secondary to receiving pain medicine upon first attempt at 9:50 am and requesting to defer therapy on 2nd attempt at 11:05 am due to pain and procedure in the PM. Will follow-up on 5/7/20 to continue with POC.     Cydney Mackay, OT  5/6/2020

## 2020-05-06 NOTE — PLAN OF CARE
05/06/20 0926   Discharge Reassessment   Assessment Type Discharge Planning Reassessment   Do you have any problems affording any of your prescribed medications? Yes   Discharge Plan A Home;Home Health

## 2020-05-06 NOTE — PROGRESS NOTES
Patient arrived to room 189 in IR with ILA Hunter CRNA, VICTORIA Tsang RN and VICTORIA Hyatt RN. Allergies reviewed and Patient ID [id number].

## 2020-05-06 NOTE — PROGRESS NOTES
Procedure complete without difficulties per CRNA. Will transfer to PACU with CRNA and RN. Report will be given upon arrival.

## 2020-05-06 NOTE — SUBJECTIVE & OBJECTIVE
"Past Medical History:   Diagnosis Date    Anemia of chronic disease 12/19/2019    Anticoagulant long-term use     Anticoagulant long-term use     FERNANDO (dyspnea on exertion)     DVT (deep venous thrombosis)     Encounter for blood transfusion     Factor V Leiden     Leg edema, left     May-Thurner syndrome     Multiple pulmonary nodules     Pulmonary embolus     RAD (reactive airway disease)     Reactive airway disease without complication 8/9/2019    On albuterol prn, addition singulair    Recurrent upper respiratory infection (URI)     Recurrent urticaria     Stroke     TIA (transient ischemic attack)        Past Surgical History:   Procedure Laterality Date    COLONOSCOPY N/A 12/18/2015    Procedure: COLONOSCOPY;  Surgeon: Lefty Lee MD;  Location: [facility] ENDO (4TH FLR);  Service: Endoscopy;  Laterality: N/A;  schedule with Ray    COLONOSCOPY N/A 5/5/2020    Procedure: COLONOSCOPY;  Surgeon: Lamin Powers MD;  Location: [facility] ENDO (2ND FLR);  Service: Endoscopy;  Laterality: N/A;    IVC FILTER RETRIEVAL      IVC FILTER RETRIEVAL N/A 9/23/2019    Procedure: REMOVAL-FILTER-IVC;  Surgeon: Claudia Surgeon;  Location: [facility];  Service: Anesthesiology;  Laterality: N/A;  188  4 hours Anes    SKIN BIOPSY      tumor from breast      left    VASCULAR SURGERY      WISDOM TOOTH EXTRACTION         Review of patient's allergies indicates:   Allergen Reactions    Azithromycin Hives    Beef containing products Anaphylaxis     Patient cannot eat BEEF BROTH  STATES IT WILL MAKE HER THROAT CLOSE UP .     Pork derived (porcine) Anaphylaxis     Throat swells up cannot eat pork sausage or pork patties ,     Unclassified drug Shortness Of Breath and Other (See Comments)     Is allergic to a beef spice - Causes "throat closing"    Xarelto [rivaroxaban] Other (See Comments)     Gallbladder swelling and disfunction    Toradol [ketorolac] Hives and Itching       Medications:  Medications Prior to Admission "   Medication Sig    clopidogrel (PLAVIX) 75 mg tablet Take 1 tablet (75 mg total) by mouth once daily.    diazePAM (VALIUM) 5 MG tablet TAKE 1 TABLET (5 MG TOTAL) BY MOUTH EVERY 12 (TWELVE) HOURS AS NEEDED FOR ANXIETY.    EPINEPHrine (EPIPEN 2-VAN) 0.3 mg/0.3 mL AtIn Inject 0.3 mLs (0.3 mg total) into the muscle once. for 1 dose    prothrombin time/INR test metr Misc 1 each by Misc.(Non-Drug; Combo Route) route once daily.    rivaroxaban (XARELTO) 15 mg (42)- 20 mg (9) tablet dose pack Take 1 tablet (15 mg) by mouth twice daily with food for 21 days followed by 1 tablet (20 mg) by mouth once daily with food     Antibiotics (From admission, onward)    Start     Stop Route Frequency Ordered    05/06/20 2330  vancomycin in dextrose 5 % 1 gram/250 mL IVPB 1,000 mg      -- IV Every 12 hours (non-standard times) 05/06/20 1026    05/06/20 1130  vancomycin 1.5 g in dextrose 5 % 250 mL IVPB (ready to mix)      -- IV Once 05/06/20 1026        Antifungals (From admission, onward)    None        Antivirals (From admission, onward)    None           Immunization History   Administered Date(s) Administered    Influenza - Quadrivalent - PF (6 months and older) 01/07/2019       Family History     Problem Relation (Age of Onset)    Allergies Mother        Social History     Socioeconomic History    Marital status: Single     Spouse name: Not on file    Number of children: Not on file    Years of education: Not on file    Highest education level: Not on file   Occupational History    Not on file   Social Needs    Financial resource strain: Not very hard    Food insecurity:     Worry: Never true     Inability: Never true    Transportation needs:     Medical: No     Non-medical: No   Tobacco Use    Smoking status: Never Smoker    Smokeless tobacco: Never Used   Substance and Sexual Activity    Alcohol use: No     Frequency: Monthly or less     Drinks per session: 3 or 4     Binge frequency: Never    Drug use: No     Sexual activity: Not Currently     Partners: Male   Lifestyle    Physical activity:     Days per week: 4 days     Minutes per session: 150+ min    Stress: Rather much   Relationships    Social connections:     Talks on phone: More than three times a week     Gets together: More than three times a week     Attends [employer] service: Not on file     Active member of club or organization: Yes     Attends meetings of clubs or organizations: 1 to 4 times per year     Relationship status: Living with partner   Other Topics Concern    Not on file   Social History Narrative    Not on file     Review of Systems   Constitutional: Positive for chills and fever. Negative for fatigue.   HENT: Negative for congestion, sinus pressure, sore throat and trouble swallowing.    Eyes: Negative for photophobia.   Respiratory: Negative for cough and shortness of breath.    Cardiovascular: Positive for leg swelling. Negative for chest pain.   Gastrointestinal: Negative for abdominal pain, diarrhea, nausea and vomiting.   Genitourinary: Negative for difficulty urinating, dysuria, flank pain, hematuria and urgency.   Musculoskeletal: Positive for arthralgias (BLE) and gait problem (2/2 pain). Negative for back pain.   Skin: Negative for color change, pallor and wound.   Neurological: Positive for weakness. Negative for light-headedness and headaches.   Hematological: Bruises/bleeds easily.   Psychiatric/Behavioral: Negative for behavioral problems and confusion. The patient is not nervous/anxious.      Objective:     Vital Signs (Most Recent):  Temp: 98.7 °F (37.1 °C) (05/06/20 0733)  Pulse: 87 (05/06/20 1104)  Resp: 16 (05/06/20 0733)  BP: 120/70 (05/06/20 1016)  SpO2: 96 % (05/06/20 0733) Vital Signs (24h Range):  Temp:  [98.1 °F (36.7 °C)-103 °F (39.4 °C)] 98.7 °F (37.1 °C)  Pulse:  [] 87  Resp:  [15-24] 16  SpO2:  [96 %-100 %] 96 %  BP: ()/(56-70) 120/70     Weight: 62.6 kg (138 lb)  Body mass index is 22.96  kg/m².    Estimated Creatinine Clearance: 122.2 mL/min (based on SCr of 0.6 mg/dL).    Physical Exam   Constitutional: She is oriented to person, place, and time. She appears well-developed and well-nourished. No distress.   HENT:   Head: Normocephalic and atraumatic.   Mouth/Throat: No oropharyngeal exudate.   Eyes: Conjunctivae are normal. No scleral icterus.   Neck: Neck supple.   Cardiovascular: Normal rate, regular rhythm and normal heart sounds.   Pulmonary/Chest: Effort normal and breath sounds normal. No respiratory distress. She has no rales.   Abdominal: Soft. Bowel sounds are normal. She exhibits no distension. There is no tenderness.   Musculoskeletal: She exhibits edema and tenderness (BLE).   Decreased ROM 2/2 pain   Neurological: She is alert and oriented to person, place, and time.   Skin: Skin is warm. She is not diaphoretic. No erythema.   PIV sites c/d/i  Left antecubital fossa bruising and tenderness at prior IV site   Psychiatric: She has a normal mood and affect. Her behavior is normal. Thought content normal.   Nursing note and vitals reviewed.      Significant Labs:   Blood Culture:   Recent Labs   Lab 05/05/20  0755   LABBLOO Gram stain anny bottle: Gram positive cocci in clusters resembling Staph  Results called to and read back by: Cheng Arriaga RN  05/06/2020    03:45  Gram stain anny bottle: Gram positive cocci in clusters resembling Staph  Results called to and read back by: Cheng Arriaga RN  05/06/2020    00:51     CBC:   Recent Labs   Lab 05/04/20  2340 05/05/20  0755 05/06/20  0709   WBC 4.01 3.93 4.49   HGB 9.0* 9.2* 8.2*   HCT 29.7* 30.1* 27.7*    233 243     CMP:   Recent Labs   Lab 05/05/20  0543 05/06/20  0709   * 134*   K 4.1 3.6   CL 99 103   CO2 22* 22*   GLU 65* 99   BUN 7 3*   CREATININE 0.7 0.6   CALCIUM 9.8 8.5*   PROT 8.6* 7.0   ALBUMIN 2.4* 1.8*   BILITOT 1.0 0.4   ALKPHOS 294* 330*   AST 20 19   ALT 17 14   ANIONGAP 14 9   EGFRNONAA >60.0 >60.0      Lactic Acid: No results for input(s): LACTATE in the last 48 hours.  Procalcitonin: No results for input(s): PROCAL in the last 48 hours.  Quantiferon: No results for input(s): NIL, TBAG, TBAGNIL, MITOGENNIL, TBGOLD in the last 48 hours.  Respiratory Culture: No results for input(s): GSRESP, RESPIRATORYC in the last 4320 hours.  Urine Culture:   Recent Labs   Lab 05/03/20  1845   LABURIN Multiple organisms isolated. None in predominance.  Repeat if  clinically necessary.     Urine Studies:   Recent Labs   Lab 01/14/20  1720 05/03/20  1845   COLORU Sonja Sonja   APPEARANCEUA Clear Cloudy*   PHUR 5.0 6.0   SPECGRAV >1.030* 1.025   PROTEINUA 2+* 1+*   GLUCUA Negative Negative   KETONESU Trace* Trace*   BILIRUBINUA Negative 1+*   OCCULTUA Negative Negative   NITRITE Negative Negative   UROBILINOGEN Negative  --    LEUKOCYTESUR Trace* Trace*   RBCUA 0 1   WBCUA 1 18*   BACTERIA Few* Rare   SQUAMEPITHEL  --  12   HYALINECASTS 0 0     Wound Culture: No results for input(s): LABAERO in the last 4320 hours.    Significant Imaging: I have reviewed all pertinent imaging results/findings within the past 24 hours.   X-Ray Chest 1 View [[id number]] Resulted: 05/05/20 0855   Order Status: Completed Updated: 05/05/20 0857   Narrative:     EXAMINATION:  XR CHEST 1 VIEW    CLINICAL HISTORY:  Febrile workup;    TECHNIQUE:  Single frontal view of the chest was performed.    COMPARISON:  Radiograph 08/30/2019.    FINDINGS:  Cardiac monitoring leads project over the bilateral hemithoraces.  Mediastinal structures are midline.  Hilar contours are unremarkable.  Cardiac silhouette is normal in size.  Lung volumes are normal and symmetric.  No consolidation.  No pneumothorax or pleural effusion.  No free air beneath the diaphragm.  No acute osseous abnormalities.  Visualized soft tissues are unremarkable.   Impression:       1. No acute radiographic findings in the chest on this single view.      Electronically signed by: Daniel  MD Yariel  Date: 05/05/2020  Time: 08:55   CTA Abdomen and Pelvis [[id number]] Resulted: 05/04/20 2027   Order Status: Completed Updated: 05/04/20 2030   Narrative:     EXAMINATION:  CTA ABDOMEN AND PELVIS    CLINICAL HISTORY:  GI bleeding;Patient with extensive DVT requiring thrombectomy, but also concern for active GI bleed. Please add venous phase to CTA (discussed with IR);    TECHNIQUE:  Helical CT images of the abdomen and pelvis were obtained prior to and after the IV administration of 100 mL of Omnipaque 350 .  Oral contrast was not given. Post-contrast images were obtained in the arterial and delayed phases per GI bleed protocol.  Axial, coronal, and sagittal reconstructions were created from the source data, including MIP reconstructions for the arterial phase images.    COMPARISON:  CT abdomen pelvis, 01/14/2020.  Lower extremity venous ultrasound, 05/03/2020.    FINDINGS:  Exam quality is limited by suboptimal bolus timing and extensive beam hardening artifact related to the patient's left upper extremity overlying the field of view.    Lower Chest:    Lung bases demonstrate minimal dependent subsegmental atelectasis.  Heart size is normal.  No pleural or pericardial effusion.    Abdomen:    Liver is normal in contour and measures at the upper limits of normal in size.  No suspicious hepatic lesion.  Small amount of focal fatty infiltration noted adjacent to the falciform ligament.  Gallbladder is decompressed but otherwise unremarkable.  No intrahepatic biliary ductal dilatation.    Spleen is markedly enlarged, measuring up to 19 cm in craniocaudal length.  This has mildly increased when compared with the prior study.  Adrenal glands and pancreas are unremarkable.    The kidneys are symmetric.  No hydronephrosis.    No evidence of small-bowel obstruction.  No inflammatory changes identified in the GI tract.  No evidence of active GI hemorrhage allowing for suboptimal bolus timing and artifact  limitations.    No pneumoperitoneum or organized fluid collection.    No bulky lymphadenopathy.    Abdominal aorta is normal in caliber.    Portal, splenic, and superior mesenteric veins are patent.  IVC filter and IVC external iliac vein stents are present.  There is no significant contrast opacification within the IVC or iliac stents.  The bilateral femoral veins beneath the stents appear thrombosed, as seen on same day ultrasound.  Retroperitoneal collateral vessels noted.    Pelvis:    Urinary bladder, pelvic organs, and rectum are unremarkable.  No significant free fluid in the pelvis.  No pelvic lymphadenopathy.    Bones and soft tissues:    No aggressive osseous lesions.  Anterior abdominal wall collateral vessels are noted.  Significant inflammatory changes noted involving the anterior aspect of the right thigh with asymmetric enlargement of the left anterior thigh musculature.   Impression:       No evidence of active GI hemorrhage, allowing for suboptimal bolus timing and artifact limitations.  Consider colonoscopy follow-up if concern for lower GI bleed persists.    Extensive thrombosis of the bilateral femoral veins, iliac vein stents, and IVC stent as seen on same day lower extremity venous ultrasound.  IVC filter is in place at the level of the superior aspect of the IVC stent.    Asymmetric inflammatory changes involving the anterior compartment of the left thigh.  Recommend correlation with physical exam findings and clinical history.    Massive splenomegaly.    Additional stable findings discussed in the body of the report.      Electronically signed by: Cheng Morrow MD  Date: 05/04/2020  Time: 20:27   US Lower Extremity Veins Bilateral [[id number]] Resulted: 05/03/20 2030   Order Status: Completed Updated: 05/03/20 2032   Narrative:     EXAMINATION:  US LOWER EXTREMITY VEINS BILATERAL    CLINICAL HISTORY:  Pain in right leg    TECHNIQUE:  Duplex and color flow Doppler and dynamic compression was  performed of the bilateral lower extremity veins was performed.    COMPARISON:  None    FINDINGS:  There are stents within the right common femoral vein and left common femoral vein, thrombosed.  There is partial thrombosis of the femoral veins, with thrombosis extending to the bilateral popliteal veins.  The bilateral greater saphenous veins are patent.  There is partial thrombus in the right posterior tibial vein.  The left posterior tibial vein is patent.  The bilateral anterior tibial veins and peroneal veins are patent.  There is stent of the left external iliac vein, left external iliac vein appears patent.  There is thrombus within the right external iliac vein.   Impression:       Extensive deep venous thrombosis as described above noting previously visualized thrombus within 1 of the paired posterior tibial veins on the left is not identified on current exam however there is thrombosis of 1 of the paired right posterior tibial veins, previously no thrombus identified.  Please see above for full details.      Electronically signed by: Porfirio Lan MD  Date: 05/03/2020  Time: 20:30

## 2020-05-06 NOTE — ASSESSMENT & PLAN NOTE
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 She was found to have extensive thrombosis of the bilateral femoral veins, iliac vein stents, and IVC stent with thrombosis extending to the bilateral popliteal veins. IR is planning for thrombolysis today. On 5/4 she developed fevers. Blood cultures drawn on 5/5 are showing GPCs resembling Staph. ID consulted for antibiotic recommendations.    Plan  - Continue IV Vancomycin. Vanc trough goal 15-20. Inpatient pharmacy managing dosing.   - Follow up GPC speciation and susceptibilities  - Repeat blood cultures ordered  - Remove/exchange lines if not done so already  - Will follow cultures and tailor antibiotics accordingly   - ID will follow.

## 2020-05-06 NOTE — PROCEDURES
Radiology Post-Procedure Note    Pre Op Diagnosis: Extensive iliocaval thrombosis  Post Op Diagnosis: Same    Procedure: Venogram, Venoplasty    Procedure performed by: Demetris Parnell MD    Written Informed Consent Obtained: Yes  Specimen Removed: NO  Estimated Blood Loss: Minimal    Findings:   Unable to access the right popliteal vein secondary to extensive, chronic, calcific thrombosis without ability to pass a microwire.      The left popliteal vein was accessed.  8F sheath placed.  With much difficulty, a glidewire and quikcross catheter were advanced to the left external iliac vein stent.  A wire was advanced through the stent and into the IVC.    R IJ access was obtained.  10F sheath placed.  The wire was snared and the sheath brought into the left sided iliocaval stent.  Retrograde access obtained to the distal femoral vein.  However, we were unable to communicate the popliteal vein to the femoral vein 2/2 extensive, chronic, thrombus.  Venoplasty of the left sided iliocaval stents performed with 10mm and 16mm balloons.  Post-venoplasty venogram demonstrates minimal antegrade flow.    The catheter was then advanced retrograde through the right iliocaval stents to the proximal femoral vein.  Catheter unable to be advanced through the deep venous system 2/2 extensive, chronic thrombus.  Venoplasty of the right sided iliocaval stents performed with 10mm and 16mm balloons.  Post-venoplasty venogram demonstrates minimal antegrade flow.    Aggressive thrombectomy unable to be performed 2/2 chronic thrombus, which will not provide significant benefit.    Catheters and sheaths removed.    Patient tolerated procedure well.    Recommend aggressive anticoagulation.    Demetris Parnell MD  Diagnostic and Interventional Radiologist  Department of Radiology  Pager: [phone]

## 2020-05-06 NOTE — PT/OT/SLP PROGRESS
Physical Therapy      Patient Name:  Antoinette Love   MRN:  [id number]    Patient not seen today secondary to off the floor for Venogram/Thrombolysis. Will follow up at next scheduled visit.     Mavis aBrfield, PT   5/6/2020

## 2020-05-06 NOTE — PLAN OF CARE
Pt remained free from falls/trauma/injuries. No complaints of CP/SOB. Pt getting oxycodone and dilaudid PRN. Pt going down for thrombolysis/venogram today; maintained NPO status. VSS. Fall bundle in place. POC explained, no questions at this time. Pt tolerating care.

## 2020-05-06 NOTE — CONSULTS
[facility]  Infectious Disease  Consult Note    Patient Name: Antoinette Love  MRN: [id number]  Admission Date: 5/3/2020  Hospital Length of Stay: 3 days  Attending Physician: Jorge Mercedes MD  Primary Care Provider: Alberto Holguin MD     Isolation Status: No active isolations    Patient information was obtained from patient and past medical records.      Inpatient consult to Infectious Diseases  Consult performed by: Sheryl Glaser PA-C  Consult ordered by: Alonso Mendoza MD        Assessment/Plan:     Gram-positive bacteremia     29-year-old female with history of factor 5 laden mutation, May-Thuner syndrome, recurrent DVTs and PEs s/p stents to iliac/femoral veins/IVC, IVC filter on long term anti-coagulation admitted with fevers, bilateral thigh pain and swelling. She was found to have extensive thrombosis of the bilateral femoral veins, iliac vein stents, and IVC stent with thrombosis extending to the bilateral popliteal veins. IR is planning for thrombolysis today. On 5/4 she developed fevers. Blood cultures drawn on 5/5 are showing GPCs resembling Staph. ID consulted for antibiotic recommendations.    Plan  - Continue IV Vancomycin. Vanc trough goal 15-20. Inpatient pharmacy managing dosing.   - Follow up GPC speciation and susceptibilities  - Repeat blood cultures ordered  - Remove/exchange lines if not done so already  - Will follow cultures and tailor antibiotics accordingly   - ID will follow.            Thank you for the consult. Please call for any questions.  Sheryl Glaser PA-C  Phone: [phone]  Pager: [phone]    Subjective:     Principal Problem: DVT, lower extremity, proximal, acute, bilateral    HPI: Ms. Love is a 29-year-old female with history of factor 5 laden mutation, May-Thuner syndrome, recurrent DVTs and PEs s/p stents to iliac/femoral veins and IVC filter placement 2014 who presented to the ED on 5/3 with bilateral thigh pain and swelling over  the last 7 days. She is following up with Dr. Garcia in hematology and she was just switched from Coumadin to Fondaparinux around a week ago.  She had not been able to get the prescription for Fondaparinux from the pharmacy and she started taking Lovenox 80 mg BID. She states that on Monday she started having pain in the medial side of her right thigh then she has pain in the same area of the left thigh. She describes the pain as excruciating /10, bilateral medial thigh associated with swelling, numbness and bruises.  She endorses episodes of chills, hot flashes, night sweats, dry cough and palpitations over the last two days but denies chest pain, SOB or hemoptysis. Also mentions dysuria for a few days that resolved on its own.  Denies recent trauma/procedures within the last month. She mentions a small abrasion to her leg that has fully healed. 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     She states that she has been taking Coumadin since age of 18 and she was switched to Eliquis  and she has been doing great with it with only one DVT 2016. She had multiple DVTs since August of last year and she has been seen by IR around 9 times for thrombolysis, ballooning and stents placment with last one on  where she had mechanical thrombectomy and stent extension to common femoral veins bilaterally.       In ED, she was afebrile, , /62, SpO2 100% on RA. WBC: 3.8, H.6, platelet: 228. Unremarkable CMP. INR: 1.1, Ptt: 22 and D-dimer 3.6. Normal lactic acid. Doppler US of lower extremities shows  partial thrombosis of the femoral veins, extending to the bilateral popliteal veins. IR was consulted and patient started on heparin. She was admitted to  for management.     Since admit she has been seen by GI and IR. The morning after admission, patient was noted to have 2 g drop in hemoglobin with associated hypotension and tachycardia with  "increasing concern for GI bleed.  CTA ordered to assess for possible GI bleed and to evaluate extensiveness of clot. No GI bleed or extravasation of contrast noted, though thrombus noted to extend to IVC filter. ("Extensive thrombosis of the bilateral femoral veins, iliac vein stents, and IVC stent. IVC filter is in place at the level of the superior aspect of the IVC stent. Asymmetric inflammatory changes involving the anterior compartment of the left thigh."). GI consulted and performed colonoscopy yesterday which did not reveal a bleed. IR planning for thrombolysis today.      On 5/4, patient became febrile to 102.6. Ceftriaxone started. UA showed 18 WBC. Urine cx with multiple organisms isolated. Blood cultures drawn 5/5 are now showing GPCs resembling Staph. Repeats ordered. Vanc started.     GI consulted and performed colonoscopy yesterday which did not reveal a bleed. IR planning for thrombolysis.        US BLE  "There are stents within the right common femoral vein and left common femoral vein, thrombosed.  There is partial thrombosis of the femoral veins, with thrombosis extending to the bilateral popliteal veins.  The bilateral greater saphenous veins are patent.  There is partial thrombus in the right posterior tibial vein.  The left posterior tibial vein is patent.  The bilateral anterior tibial veins and peroneal veins are patent.  There is stent of the left external iliac vein, left external iliac vein appears patent.  There is thrombus within the right external iliac vein."    Two PIVs     Past Medical History:   Diagnosis Date    Anemia of chronic disease 12/19/2019    Anticoagulant long-term use     Anticoagulant long-term use     FERNANDO (dyspnea on exertion)     DVT (deep venous thrombosis)     Encounter for blood transfusion     Factor V Leiden     Leg edema, left     May-Thurner syndrome     Multiple pulmonary nodules     Pulmonary embolus     RAD (reactive airway disease)     Reactive " "airway disease without complication 8/9/2019    On albuterol prn, addition singulair    Recurrent upper respiratory infection (URI)     Recurrent urticaria     Stroke     TIA (transient ischemic attack)        Past Surgical History:   Procedure Laterality Date    COLONOSCOPY N/A 12/18/2015    Procedure: COLONOSCOPY;  Surgeon: Lefty Lee MD;  Location: [facility] ENDO (4TH FLR);  Service: Endoscopy;  Laterality: N/A;  schedule with Jesus    COLONOSCOPY N/A 5/5/2020    Procedure: COLONOSCOPY;  Surgeon: Lamin Powers MD;  Location: [facility] ENDO (2ND FLR);  Service: Endoscopy;  Laterality: N/A;    IVC FILTER RETRIEVAL      IVC FILTER RETRIEVAL N/A 9/23/2019    Procedure: REMOVAL-FILTER-IVC;  Surgeon: Claudia Surgeon;  Location: [facility] CLAUDIA;  Service: Anesthesiology;  Laterality: N/A;  188  4 hours Anes    SKIN BIOPSY      tumor from breast      left    VASCULAR SURGERY      WISDOM TOOTH EXTRACTION         Review of patient's allergies indicates:   Allergen Reactions    Azithromycin Hives    Beef containing products Anaphylaxis     Patient cannot eat BEEF BROTH  STATES IT WILL MAKE HER THROAT CLOSE UP .     Pork derived (porcine) Anaphylaxis     Throat swells up cannot eat pork sausage or pork patties ,     Unclassified drug Shortness Of Breath and Other (See Comments)     Is allergic to a beef spice - Causes "throat closing"    Xarelto [rivaroxaban] Other (See Comments)     Gallbladder swelling and disfunction    Toradol [ketorolac] Hives and Itching       Medications:  Medications Prior to Admission   Medication Sig    clopidogrel (PLAVIX) 75 mg tablet Take 1 tablet (75 mg total) by mouth once daily.    diazePAM (VALIUM) 5 MG tablet TAKE 1 TABLET (5 MG TOTAL) BY MOUTH EVERY 12 (TWELVE) HOURS AS NEEDED FOR ANXIETY.    EPINEPHrine (EPIPEN 2-VAN) 0.3 mg/0.3 mL AtIn Inject 0.3 mLs (0.3 mg total) into the muscle once. for 1 dose    prothrombin time/INR test metr Misc 1 each by Misc.(Non-Drug; Combo Route) route once " daily.    rivaroxaban (XARELTO) 15 mg (42)- 20 mg (9) tablet dose pack Take 1 tablet (15 mg) by mouth twice daily with food for 21 days followed by 1 tablet (20 mg) by mouth once daily with food     Antibiotics (From admission, onward)    Start     Stop Route Frequency Ordered    05/06/20 2330  vancomycin in dextrose 5 % 1 gram/250 mL IVPB 1,000 mg      -- IV Every 12 hours (non-standard times) 05/06/20 1026    05/06/20 1130  vancomycin 1.5 g in dextrose 5 % 250 mL IVPB (ready to mix)      -- IV Once 05/06/20 1026        Antifungals (From admission, onward)    None        Antivirals (From admission, onward)    None           Immunization History   Administered Date(s) Administered    Influenza - Quadrivalent - PF (6 months and older) 01/07/2019       Family History     Problem Relation (Age of Onset)    Allergies Mother        Social History     Socioeconomic History    Marital status: Single     Spouse name: Not on file    Number of children: Not on file    Years of education: Not on file    Highest education level: Not on file   Occupational History    Not on file   Social Needs    Financial resource strain: Not very hard    Food insecurity:     Worry: Never true     Inability: Never true    Transportation needs:     Medical: No     Non-medical: No   Tobacco Use    Smoking status: Never Smoker    Smokeless tobacco: Never Used   Substance and Sexual Activity    Alcohol use: No     Frequency: Monthly or less     Drinks per session: 3 or 4     Binge frequency: Never    Drug use: No    Sexual activity: Not Currently     Partners: Male   Lifestyle    Physical activity:     Days per week: 4 days     Minutes per session: 150+ min    Stress: Rather much   Relationships    Social connections:     Talks on phone: More than three times a week     Gets together: More than three times a week     Attends Confucianist service: Not on file     Active member of club or organization: Yes     Attends meetings of clubs  or organizations: 1 to 4 times per year     Relationship status: Living with partner   Other Topics Concern    Not on file   Social History Narrative    Not on file     Review of Systems   Constitutional: Positive for chills and fever. Negative for fatigue.   HENT: Negative for congestion, sinus pressure, sore throat and trouble swallowing.    Eyes: Negative for photophobia.   Respiratory: Negative for cough and shortness of breath.    Cardiovascular: Positive for leg swelling. Negative for chest pain.   Gastrointestinal: Negative for abdominal pain, diarrhea, nausea and vomiting.   Genitourinary: Negative for difficulty urinating, dysuria, flank pain, hematuria and urgency.   Musculoskeletal: Positive for arthralgias (BLE) and gait problem (2/2 pain). Negative for back pain.   Skin: Negative for color change, pallor and wound.   Neurological: Positive for weakness. Negative for light-headedness and headaches.   Hematological: Bruises/bleeds easily.   Psychiatric/Behavioral: Negative for behavioral problems and confusion. The patient is not nervous/anxious.      Objective:     Vital Signs (Most Recent):  Temp: 98.7 °F (37.1 °C) (05/06/20 0733)  Pulse: 87 (05/06/20 1104)  Resp: 16 (05/06/20 0733)  BP: 120/70 (05/06/20 1016)  SpO2: 96 % (05/06/20 0733) Vital Signs (24h Range):  Temp:  [98.1 °F (36.7 °C)-103 °F (39.4 °C)] 98.7 °F (37.1 °C)  Pulse:  [] 87  Resp:  [15-24] 16  SpO2:  [96 %-100 %] 96 %  BP: ()/(56-70) 120/70     Weight: 62.6 kg (138 lb)  Body mass index is 22.96 kg/m².    Estimated Creatinine Clearance: 122.2 mL/min (based on SCr of 0.6 mg/dL).    Physical Exam   Constitutional: She is oriented to person, place, and time. She appears well-developed and well-nourished. No distress.   HENT:   Head: Normocephalic and atraumatic.   Mouth/Throat: No oropharyngeal exudate.   Eyes: Conjunctivae are normal. No scleral icterus.   Neck: Neck supple.   Cardiovascular: Normal rate, regular rhythm and  normal heart sounds.   Pulmonary/Chest: Effort normal and breath sounds normal. No respiratory distress. She has no rales.   Abdominal: Soft. Bowel sounds are normal. She exhibits no distension. There is no tenderness.   Musculoskeletal: She exhibits edema and tenderness (BLE).   Decreased ROM 2/2 pain   Neurological: She is alert and oriented to person, place, and time.   Skin: Skin is warm. She is not diaphoretic. No erythema.   PIV sites c/d/i  Left antecubital fossa bruising and tenderness at prior IV site   Psychiatric: She has a normal mood and affect. Her behavior is normal. Thought content normal.   Nursing note and vitals reviewed.      Significant Labs:   Blood Culture:   Recent Labs   Lab 05/05/20  0755   LABBLOO Gram stain anny bottle: Gram positive cocci in clusters resembling Staph  Results called to and read back by: Cheng Arriaga RN  05/06/2020    03:45  Gram stain anny bottle: Gram positive cocci in clusters resembling Staph  Results called to and read back by: Cheng Arriaga RN  05/06/2020    00:51     CBC:   Recent Labs   Lab 05/04/20  2340 05/05/20  0755 05/06/20  0709   WBC 4.01 3.93 4.49   HGB 9.0* 9.2* 8.2*   HCT 29.7* 30.1* 27.7*    233 243     CMP:   Recent Labs   Lab 05/05/20  0543 05/06/20  0709   * 134*   K 4.1 3.6   CL 99 103   CO2 22* 22*   GLU 65* 99   BUN 7 3*   CREATININE 0.7 0.6   CALCIUM 9.8 8.5*   PROT 8.6* 7.0   ALBUMIN 2.4* 1.8*   BILITOT 1.0 0.4   ALKPHOS 294* 330*   AST 20 19   ALT 17 14   ANIONGAP 14 9   EGFRNONAA >60.0 >60.0     Lactic Acid: No results for input(s): LACTATE in the last 48 hours.  Procalcitonin: No results for input(s): PROCAL in the last 48 hours.  Quantiferon: No results for input(s): NIL, TBAG, TBAGNIL, MITOGENNIL, TBGOLD in the last 48 hours.  Respiratory Culture: No results for input(s): GSRESP, RESPIRATORYC in the last 4320 hours.  Urine Culture:   Recent Labs   Lab 05/03/20  1845   LABURIN Multiple organisms isolated. None in  predominance.  Repeat if  clinically necessary.     Urine Studies:   Recent Labs   Lab 01/14/20  1720 05/03/20  1845   COLORU Sonja Sonja   APPEARANCEUA Clear Cloudy*   PHUR 5.0 6.0   SPECGRAV >1.030* 1.025   PROTEINUA 2+* 1+*   GLUCUA Negative Negative   KETONESU Trace* Trace*   BILIRUBINUA Negative 1+*   OCCULTUA Negative Negative   NITRITE Negative Negative   UROBILINOGEN Negative  --    LEUKOCYTESUR Trace* Trace*   RBCUA 0 1   WBCUA 1 18*   BACTERIA Few* Rare   SQUAMEPITHEL  --  12   HYALINECASTS 0 0     Wound Culture: No results for input(s): LABAERO in the last 4320 hours.    Significant Imaging: I have reviewed all pertinent imaging results/findings within the past 24 hours.   X-Ray Chest 1 View [[id number]] Resulted: 05/05/20 0855   Order Status: Completed Updated: 05/05/20 0857   Narrative:     EXAMINATION:  XR CHEST 1 VIEW    CLINICAL HISTORY:  Febrile workup;    TECHNIQUE:  Single frontal view of the chest was performed.    COMPARISON:  Radiograph 08/30/2019.    FINDINGS:  Cardiac monitoring leads project over the bilateral hemithoraces.  Mediastinal structures are midline.  Hilar contours are unremarkable.  Cardiac silhouette is normal in size.  Lung volumes are normal and symmetric.  No consolidation.  No pneumothorax or pleural effusion.  No free air beneath the diaphragm.  No acute osseous abnormalities.  Visualized soft tissues are unremarkable.   Impression:       1. No acute radiographic findings in the chest on this single view.      Electronically signed by: Daniel Saldivar MD  Date: 05/05/2020  Time: 08:55   CTA Abdomen and Pelvis [[id number]] Resulted: 05/04/20 2027   Order Status: Completed Updated: 05/04/20 2030   Narrative:     EXAMINATION:  CTA ABDOMEN AND PELVIS    CLINICAL HISTORY:  GI bleeding;Patient with extensive DVT requiring thrombectomy, but also concern for active GI bleed. Please add venous phase to CTA (discussed with IR);    TECHNIQUE:  Helical CT images of the abdomen and  pelvis were obtained prior to and after the IV administration of 100 mL of Omnipaque 350 .  Oral contrast was not given. Post-contrast images were obtained in the arterial and delayed phases per GI bleed protocol.  Axial, coronal, and sagittal reconstructions were created from the source data, including MIP reconstructions for the arterial phase images.    COMPARISON:  CT abdomen pelvis, 01/14/2020.  Lower extremity venous ultrasound, 05/03/2020.    FINDINGS:  Exam quality is limited by suboptimal bolus timing and extensive beam hardening artifact related to the patient's left upper extremity overlying the field of view.    Lower Chest:    Lung bases demonstrate minimal dependent subsegmental atelectasis.  Heart size is normal.  No pleural or pericardial effusion.    Abdomen:    Liver is normal in contour and measures at the upper limits of normal in size.  No suspicious hepatic lesion.  Small amount of focal fatty infiltration noted adjacent to the falciform ligament.  Gallbladder is decompressed but otherwise unremarkable.  No intrahepatic biliary ductal dilatation.    Spleen is markedly enlarged, measuring up to 19 cm in craniocaudal length.  This has mildly increased when compared with the prior study.  Adrenal glands and pancreas are unremarkable.    The kidneys are symmetric.  No hydronephrosis.    No evidence of small-bowel obstruction.  No inflammatory changes identified in the GI tract.  No evidence of active GI hemorrhage allowing for suboptimal bolus timing and artifact limitations.    No pneumoperitoneum or organized fluid collection.    No bulky lymphadenopathy.    Abdominal aorta is normal in caliber.    Portal, splenic, and superior mesenteric veins are patent.  IVC filter and IVC external iliac vein stents are present.  There is no significant contrast opacification within the IVC or iliac stents.  The bilateral femoral veins beneath the stents appear thrombosed, as seen on same day ultrasound.   Retroperitoneal collateral vessels noted.    Pelvis:    Urinary bladder, pelvic organs, and rectum are unremarkable.  No significant free fluid in the pelvis.  No pelvic lymphadenopathy.    Bones and soft tissues:    No aggressive osseous lesions.  Anterior abdominal wall collateral vessels are noted.  Significant inflammatory changes noted involving the anterior aspect of the right thigh with asymmetric enlargement of the left anterior thigh musculature.   Impression:       No evidence of active GI hemorrhage, allowing for suboptimal bolus timing and artifact limitations.  Consider colonoscopy follow-up if concern for lower GI bleed persists.    Extensive thrombosis of the bilateral femoral veins, iliac vein stents, and IVC stent as seen on same day lower extremity venous ultrasound.  IVC filter is in place at the level of the superior aspect of the IVC stent.    Asymmetric inflammatory changes involving the anterior compartment of the left thigh.  Recommend correlation with physical exam findings and clinical history.    Massive splenomegaly.    Additional stable findings discussed in the body of the report.      Electronically signed by: Cheng Morrow MD  Date: 05/04/2020  Time: 20:27   US Lower Extremity Veins Bilateral [[id number]] Resulted: 05/03/20 2030   Order Status: Completed Updated: 05/03/20 2032   Narrative:     EXAMINATION:  US LOWER EXTREMITY VEINS BILATERAL    CLINICAL HISTORY:  Pain in right leg    TECHNIQUE:  Duplex and color flow Doppler and dynamic compression was performed of the bilateral lower extremity veins was performed.    COMPARISON:  None    FINDINGS:  There are stents within the right common femoral vein and left common femoral vein, thrombosed.  There is partial thrombosis of the femoral veins, with thrombosis extending to the bilateral popliteal veins.  The bilateral greater saphenous veins are patent.  There is partial thrombus in the right posterior tibial vein.  The left  posterior tibial vein is patent.  The bilateral anterior tibial veins and peroneal veins are patent.  There is stent of the left external iliac vein, left external iliac vein appears patent.  There is thrombus within the right external iliac vein.   Impression:       Extensive deep venous thrombosis as described above noting previously visualized thrombus within 1 of the paired posterior tibial veins on the left is not identified on current exam however there is thrombosis of 1 of the paired right posterior tibial veins, previously no thrombus identified.  Please see above for full details.      Electronically signed by: Porfirio Lan MD  Date: 05/03/2020  Time: 20:30

## 2020-05-06 NOTE — PLAN OF CARE
Plan of care discussed with patient.  Patient ambulating independently, fall precautions in place. Patient pain being controled by PRN medication. Discussed medications and care. NS running at 125 mL/hr, heparin infusing and being titrated per MAR. Thrombolysis/venogram in IR. Patient has no questions at this time. Will continue to monitor.

## 2020-05-06 NOTE — HPI
Ms. Love is a 29-year-old female with history of factor 5 laden mutation, May-Thuner syndrome, recurrent DVTs and PEs s/p stents to iliac/femoral veins and IVC filter placement  who presented to the ED on 5/3 with bilateral thigh pain and swelling over the last 7 days. She is following up with Dr. Garcia in hematology and she was just switched from Coumadin to Fondaparinux around a week ago.  She had not been able to get the prescription for Fondaparinux from the pharmacy and she started taking Lovenox 80 mg BID. She states that on Monday she started having pain in the medial side of her right thigh then she has pain in the same area of the left thigh. She describes the pain as excruciating 8/10, bilateral medial thigh associated with swelling, numbness and bruises.  She endorses episodes of chills, hot flashes, night sweats, dry cough and palpitations over the last two days but denies chest pain, SOB or hemoptysis. Also mentions dysuria for a few days that resolved on its own. Denies recent procedures within the last month. 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 In late March she did hit her leg on the side of a desk while working and scraped it. She mentions that the area became red, indurated and painful. She self treated with topical abx and the lesion resolved.    She states that she has been taking Coumadin since age of 18 and she was switched to Eliquis  and she has been doing great with it with only one DVT 2016. She had multiple DVTs since August of last year and she has been seen by IR around 9 times for thrombolysis, ballooning and stents placment with last one on  where she had mechanical thrombectomy and stent extension to common femoral veins bilaterally.       In ED, she was afebrile, , /62, SpO2 100% on RA. WBC: 3.8, H.6, platelet: 228. Unremarkable CMP. INR: 1.1, Ptt: 22 and D-dimer 3.6. Normal lactic  "acid. Doppler US of lower extremities shows  partial thrombosis of the femoral veins, extending to the bilateral popliteal veins. IR was consulted and patient started on heparin. She was admitted to  for management.     Since admit she has been seen by GI and IR. The morning after admission, patient was noted to have 2 g drop in hemoglobin with associated hypotension and tachycardia with increasing concern for GI bleed.  CTA ordered to assess for possible GI bleed and to evaluate extensiveness of clot. No GI bleed or extravasation of contrast noted, though thrombus noted to extend to IVC filter. ("Extensive thrombosis of the bilateral femoral veins, iliac vein stents, and IVC stent. IVC filter is in place at the level of the superior aspect of the IVC stent. Asymmetric inflammatory changes involving the anterior compartment of the left thigh."). GI consulted and performed colonoscopy yesterday which did not reveal a bleed. IR planning for thrombolysis today.      On 5/4, patient became febrile to 102.6. Ceftriaxone started. UA showed 18 WBC. Urine cx with multiple organisms isolated. Blood cultures drawn 5/5 are now showing GPCs resembling Staph. Repeats ordered. Vanc started.     GI consulted and performed colonoscopy yesterday which did not reveal a bleed. IR planning for thrombolysis.        US BLE  "There are stents within the right common femoral vein and left common femoral vein, thrombosed.  There is partial thrombosis of the femoral veins, with thrombosis extending to the bilateral popliteal veins.  The bilateral greater saphenous veins are patent.  There is partial thrombus in the right posterior tibial vein.  The left posterior tibial vein is patent.  The bilateral anterior tibial veins and peroneal veins are patent.  There is stent of the left external iliac vein, left external iliac vein appears patent.  There is thrombus within the right external iliac vein."    Two PIVs   "

## 2020-05-06 NOTE — ANESTHESIA PREPROCEDURE EVALUATION
[facility]  Anesthesia Pre-Operative Evaluation         Patient Name: Antoinette Love  YOB: 1990  MRN: [id number]    SUBJECTIVE:     Pre-operative evaluation for Procedure(s) (LRB):  Venogram/Thrombolysis     05/06/2020    Antoinette Love is a 29 y.o. female w/ a significant PMHx of Factor V Leiden, May-Thurner Syndrome, and history of PE and recurrent DVTs (s/p IVC filter).  Pt with multiple DV  Ts of bilateral lower extremities.  Currently on high-intensity heparin gtt.    Pt reports hx of emergence delirium.    Patient now presents for the above procedure(s).       LDA:        Peripheral IV - Single Lumen 01/14/20 1735 20 G Right Antecubital (Active)   Site Assessment Clean;Dry;Intact;No redness;No swelling 1/16/2020  8:00 PM   Line Status Infusing 1/16/2020  8:00 PM   Dressing Status Clean;Dry;Intact 1/16/2020  8:00 PM   Dressing Change Due 01/18/20 1/16/2020  8:00 PM   Site Change Due 01/18/20 1/16/2020  8:00 PM   Reason Not Rotated Not due 1/16/2020  8:00 PM   Number of days: 2       Prev airway:   Placement Date: 12/20/19; Placement Time: 1113; Method of Intubation: Direct laryngoscopy; Inserted by: CRNA; Airway Device: Endotracheal Tube; Mask Ventilation: Easy; Intubated: Postinduction; Airway Device Size: 7.0; Style: Cuffed; Cuff Inflation: Minimal occlusive pressure; Grade: Grade I; Complicating Factors: None; Intubation Findings: Positive EtCO2, Atraumatic/Condition of teeth unchanged, Bilateral breath sounds;  Depth of Insertion: 21; Securment: Teeth; Complications: None; Breath Sounds: Equal Bilateral; Insertion Attempts: 1    Drips:      Patient Active Problem List   Diagnosis    Bright red blood per rectum    Factor 5 Leiden mutation, heterozygous    Multiple pulmonary nodules    History of pulmonary embolism    Reactive airway disease without complication    Recurrent urticaria    May-Thurner syndrome    Presence of IVC filter    Leukopenia     "Bradycardia    Anemia of chronic disease, iron deficiency anemia and acute blood loss anemia    Splenomegaly    DVT, lower extremity, proximal, acute, bilateral    Long term (current) use of anticoagulants    Acute cystitis without hematuria    Gram-positive bacteremia       Review of patient's allergies indicates:   Allergen Reactions    Azithromycin Hives    Beef containing products Anaphylaxis     Patient cannot eat BEEF BROTH  STATES IT WILL MAKE HER THROAT CLOSE UP .     Pork derived (porcine) Anaphylaxis     Throat swells up cannot eat pork sausage or pork patties ,     Unclassified drug Shortness Of Breath and Other (See Comments)     Is allergic to a beef spice - Causes "throat closing"    Xarelto [rivaroxaban] Other (See Comments)     Gallbladder swelling and disfunction    Toradol [ketorolac] Hives and Itching       Current Inpatient Medications:      Current Facility-Administered Medications on File Prior to Visit   Medication Dose Route Frequency Provider Last Rate Last Dose    0.9%  NaCl infusion (for blood administration)   Intravenous Q24H PRN Ben Martines MD        0.9%  NaCl infusion (for blood administration)   Intravenous Q24H PRN Ben Martines MD        0.9%  NaCl infusion   Intravenous Continuous Ben Martines  mL/hr at 05/05/20 2303      acetaminophen tablet 650 mg  650 mg Oral Q8H PRN Jeanine Schilling MD   650 mg at 05/05/20 1614    dextrose 10% (D10W) Bolus  12.5 g Intravenous PRN Jorge Mercedes MD        dextrose 10% (D10W) Bolus  25 g Intravenous PRN Jorge Mercedes MD        glucagon (human recombinant) injection 1 mg  1 mg Intramuscular PRN Jeanine Schilling MD        glucose chewable tablet 16 g  16 g Oral PRN Jeanine Schilling MD        glucose chewable tablet 24 g  24 g Oral PRN Jeanine Schilling MD        heparin 25,000 units in dextrose 5% (100 units/ml) IV bolus from bag - ADDITIONAL PRN BOLUS - 30 units/kg  30 Units/kg (Adjusted) " Intravenous PRN Alonso Mendoza MD        heparin 25,000 units in dextrose 5% (100 units/ml) IV bolus from bag - ADDITIONAL PRN BOLUS - 60 units/kg  60 Units/kg (Adjusted) Intravenous PRN Alonso Mendoza MD   3,558 Units at 05/05/20 2338    heparin 25,000 units in dextrose 5% 250 mL (100 units/mL) infusion HIGH INTENSITY nomogram - [facility]  18 Units/kg/hr (Adjusted) Intravenous Continuous Alonso Mendoza MD 12.5 mL/hr at 05/05/20 2331 21 Units/kg/hr at 05/05/20 2331    HYDROmorphone injection 2 mg  2 mg Intravenous Q4H PRN Alonso Mendoza MD   2 mg at 05/06/20 1155    ondansetron disintegrating tablet 8 mg  8 mg Oral Q8H PRN Jeanine Schilling MD        oxyCODONE immediate release tablet 5 mg  5 mg Oral Q4H PRN Ben Martines MD   5 mg at 05/06/20 0952    sodium chloride 0.9% flush 10 mL  10 mL Intravenous PRN Van Carvalho DO        sodium chloride 0.9% flush 10 mL  10 mL Intravenous PRN Jeanine Schilling MD        vancomycin in dextrose 5 % 1 gram/250 mL IVPB 1,000 mg  1,000 mg Intravenous Q12H Jorge Mercedes MD         Current Outpatient Medications on File Prior to Visit   Medication Sig Dispense Refill    clopidogrel (PLAVIX) 75 mg tablet Take 1 tablet (75 mg total) by mouth once daily. 90 tablet 3    diazePAM (VALIUM) 5 MG tablet TAKE 1 TABLET (5 MG TOTAL) BY MOUTH EVERY 12 (TWELVE) HOURS AS NEEDED FOR ANXIETY. 30 tablet 0    EPINEPHrine (EPIPEN 2-VAN) 0.3 mg/0.3 mL AtIn Inject 0.3 mLs (0.3 mg total) into the muscle once. for 1 dose 2 Device 2    prothrombin time/INR test metr Misc 1 each by Misc.(Non-Drug; Combo Route) route once daily. 1 each 0    rivaroxaban (XARELTO) 15 mg (42)- 20 mg (9) tablet dose pack Take 1 tablet (15 mg) by mouth twice daily with food for 21 days followed by 1 tablet (20 mg) by mouth once daily with food 1 Package 0       Past Surgical History:   Procedure Laterality Date    COLONOSCOPY N/A 12/18/2015    Procedure: COLONOSCOPY;  Surgeon: Lefty Lee MD;  Location:  [facility] (4TH FLR);  Service: Endoscopy;  Laterality: N/A;  schedule with Ray    COLONOSCOPY N/A 5/5/2020    Procedure: COLONOSCOPY;  Surgeon: Lamin Powers MD;  Location: [facility] (2ND FLR);  Service: Endoscopy;  Laterality: N/A;    IVC FILTER RETRIEVAL      IVC FILTER RETRIEVAL N/A 9/23/2019    Procedure: REMOVAL-FILTER-IVC;  Surgeon: Claudia Surgeon;  Location: [facility];  Service: Anesthesiology;  Laterality: N/A;  188  4 hours Anes    SKIN BIOPSY      tumor from breast      left    VASCULAR SURGERY      WISDOM TOOTH EXTRACTION         Social History     Socioeconomic History    Marital status: Single     Spouse name: Not on file    Number of children: Not on file    Years of education: Not on file    Highest education level: Not on file   Occupational History    Not on file   Social Needs    Financial resource strain: Not very hard    Food insecurity:     Worry: Never true     Inability: Never true    Transportation needs:     Medical: No     Non-medical: No   Tobacco Use    Smoking status: Never Smoker    Smokeless tobacco: Never Used   Substance and Sexual Activity    Alcohol use: No     Frequency: Monthly or less     Drinks per session: 3 or 4     Binge frequency: Never    Drug use: No    Sexual activity: Not Currently     Partners: Male   Lifestyle    Physical activity:     Days per week: 4 days     Minutes per session: 150+ min    Stress: Rather much   Relationships    Social connections:     Talks on phone: More than three times a week     Gets together: More than three times a week     Attends Hindu service: Not on file     Active member of club or organization: Yes     Attends meetings of clubs or organizations: 1 to 4 times per year     Relationship status: Living with partner   Other Topics Concern    Not on file   Social History Narrative    Not on file       OBJECTIVE:     Vital Signs Range (Last 24H):  Temp:  [36.7 °C (98.1 °F)-39.4 °C (103 °F)]   Pulse:  []    Resp:  [15-24]   BP: ()/(56-70)   SpO2:  [94 %-100 %]       Significant Labs:  Lab Results   Component Value Date    WBC 4.49 05/06/2020    HGB 8.2 (L) 05/06/2020    HCT 27.7 (L) 05/06/2020     05/06/2020    ALT 14 05/06/2020    AST 19 05/06/2020     (L) 05/06/2020    K 3.6 05/06/2020     05/06/2020    CREATININE 0.6 05/06/2020    BUN 3 (L) 05/06/2020    CO2 22 (L) 05/06/2020    TSH 0.783 11/01/2019    INR 1.2 05/06/2020       Diagnostic Studies: No relevant studies.    EKG:   Results for orders placed or performed during the hospital encounter of 05/03/20   EKG 12-lead    Collection Time: 05/03/20  6:12 PM    Narrative    Test Reason : M79.606,    Vent. Rate : 098 BPM     Atrial Rate : 098 BPM     P-R Int : 164 ms          QRS Dur : 096 ms      QT Int : 356 ms       P-R-T Axes : 057 065 040 degrees     QTc Int : 454 ms    Normal sinus rhythm  Nonspecific ST and T wave abnormality  Abnormal ECG  When compared with ECG of 02-NOV-2019 03:01,  Vent. rate has increased BY  55 BPM  Nonspecific T wave abnormality now evident in Lateral leads  Confirmed by BENOIT GRIMM MD ([id number]) on 5/4/2020 10:20:37 AM    Referred By: [email or web address]   SELF           Confirmed By:BENOIT GRIMM MD       2D ECHO:  TTE:  Results for orders placed or performed during the hospital encounter of 05/03/20   Echo Color Flow Doppler? Yes   Result Value Ref Range    Ascending aorta 3.08 cm    STJ 2.77 cm    AV mean gradient 4 mmHg    Ao peak lei 1.45 m/s    Ao VTI 23.89 cm    IVRT 74.22 msec    IVS 0.60 0.6 - 1.1 cm    LA size 2.77 cm    Left Atrium Major Axis 4.52 cm    Left Atrium Minor Axis 4.39 cm    LVIDD 4.94 3.5 - 6.0 cm    LVIDS 2.89 2.1 - 4.0 cm    LVOT diameter 2.10 cm    LVOT peak VTI 21.28 cm    PW 0.65 0.6 - 1.1 cm    MV Peak A Lei 0.55 m/s    E wave decelartion time 191.99 msec    MV Peak E Lei 0.93 m/s    PV Peak D Lei 0.44 m/s    PV Peak S Lei 0.48 m/s    RA Major Axis 4.77 cm    RA Width 4.88 cm    RVDD  3.26 cm    Sinus 3.23 cm    TAPSE 2.69 cm    TR Max Tolu 2.00 m/s    TDI LATERAL 0.15 m/s    TDI SEPTAL 0.13 m/s    LA WIDTH 4.20 cm    LV Diastolic Volume 115.11 mL    LV Systolic Volume 31.95 mL    RV S' 16.62 cm/s    LVOT peak tolu 1.16 m/s    LV LATERAL E/E' RATIO 6.20 m/s    LV SEPTAL E/E' RATIO 7.15 m/s    FS 41 %    LA volume 44.05 cm3    LV mass 97.63 g    Left Ventricle Relative Wall Thickness 0.26 cm    AV valve area 3.08 cm2    AV Velocity Ratio 0.80     AV index (prosthetic) 0.89     E/A ratio 1.69     Mean e' 0.14 m/s    Pulm vein S/D ratio 1.09     LVOT area 3.5 cm2    LVOT stroke volume 73.67 cm3    AV peak gradient 8 mmHg    E/E' ratio 6.64 m/s    LV Systolic Volume Index 18.9 mL/m2    LV Diastolic Volume Index 68.13 mL/m2    LA Volume Index 26.1 mL/m2    LV Mass Index 58 g/m2    Triscuspid Valve Regurgitation Peak Gradient 16 mmHg    BSA 1.69 m2    Right Atrial Pressure (from IVC) 3 mmHg    TV rest pulmonary artery pressure 19 mmHg    Narrative    · Normal left ventricular systolic function. The estimated ejection   fraction is 60%.  · Normal right ventricular systolic function.  · Normal LV diastolic function.  · No wall motion abnormalities.  · The estimated PA systolic pressure is 19 mmHg.  · Normal central venous pressure (3 mmHg).          BRII:  No results found for this or any previous visit.    ASSESSMENT/PLAN:                                                                                                                  05/06/2020  Antoinette Love is a 29 y.o., female.    Pre-op Assessment    I have reviewed the Patient Summary Reports.     I have reviewed the Nursing Notes.      Review of Systems  Anesthesia Hx:  No problems with previous Anesthesia Emergence delirium   Social:  Non-Smoker, Social Alcohol Use    Hematology/Oncology:         -- Anemia: Hematology Comments: Factor 5 Leiden, recurrent DVTs with hx of PE    Cardiovascular:   Exercise tolerance: good Denies Hypertension.       FERNANDO    Pulmonary:   Asthma Denies Recent URI. Pt denies any reactive airway dz. When she had PE, but prior to its being correctly dx'd, she was given inhalers to try.   Her breathing is at baseline now.   Renal/:  Renal/ Normal  Denies Chronic Renal Disease.     Hepatic/GI:  Hepatic/GI Normal  Denies GERD.    Musculoskeletal:   Paresthesias of right foot    LE swelling and pain   Neurological:   TIA, Denies CVA. Denies Seizures.    Endocrine:  Endocrine Normal Denies Diabetes. Denies Hypothyroidism.    Dermatological:  Skin Normal    Psych:  Psychiatric Normal           Physical Exam  General:  Well nourished    Airway/Jaw/Neck:  Airway Findings: Mouth Opening: Normal Tongue: Normal, Piercing  General Airway Assessment: Adult, Good  Mallampati: I  TM Distance: Normal, at least 6 cm  Jaw/Neck Findings:  Neck ROM: Normal ROM  M2  Good mouth opening  Plastic piercing (small post) in bottom lip  Tongue ring removed  No loose dentition  FROM   Eyes/Ears/Nose:  EYES/EARS/NOSE FINDINGS: Normal   Dental:  Dental Findings: In tact   Chest/Lungs:  Chest/Lungs Findings: Clear to auscultation, Normal Respiratory Rate     Heart/Vascular:  Heart Findings: Rate: Normal  Rhythm: Regular Rhythm  Sounds: Normal  Heart Murmur  Vascular Findings:  Edema Locations: RLE, LLE        Mental Status:  Mental Status Findings:  Cooperative, Alert and Oriented         Anesthesia Plan  Type of Anesthesia, risks & benefits discussed:  Anesthesia Type:  general  Patient's Preference: general  Intra-op Monitoring Plan: standard ASA monitors  Intra-op Monitoring Plan Comments:   Post Op Pain Control Plan: multimodal analgesia and IV/PO Opioids PRN  Post Op Pain Control Plan Comments:   Induction:   IV  Beta Blocker:  Patient is not currently on a Beta-Blocker (No further documentation required).       Informed Consent: Patient understands risks and agrees with Anesthesia plan.  Questions answered. Anesthesia consent signed with patient.  ASA  Score: 3     Day of Surgery Review of History & Physical:    H&P update referred to the provider.     Anesthesia Plan Notes: Discussed TIVA as well as General anesthetic with inhaled volatile anesthetic gases, Pt understands options and agrees with plan        Ready For Surgery From Anesthesia Perspective.

## 2020-05-06 NOTE — ANESTHESIA PROCEDURE NOTES
Intubation  Performed by: Erin Hunter CRNA  Authorized by: Porfirio Garcia MD     Intubation:     Induction:  Intravenous    Intubated:  Postinduction    Mask Ventilation:  Easy mask    Attempts:  1    Attempted By:  CRNA    Blade:  Gordon 2    Laryngeal View Grade: Grade I - full view of chords      Difficult Airway Encountered?: No      Complications:  None    Airway Device:  Oral endotracheal tube    Airway Device Size:  7.0    Style/Cuff Inflation:  Cuffed (inflated to minimal occlusive pressure)    Inflation Amount (mL):  3    Tube secured:  22    Secured at:  The lips    Placement Verified By:  Capnometry    Complicating Factors:  None    Findings Post-Intubation:  BS equal bilateral and atraumatic/condition of teeth unchanged

## 2020-05-07 ENCOUNTER — ANESTHESIA EVENT (OUTPATIENT)
Dept: MEDSURG UNIT | Facility: HOSPITAL | Age: 30
DRG: 253 | End: 2020-05-07
Payer: COMMERCIAL

## 2020-05-07 PROBLEM — B95.61 STAPHYLOCOCCUS AUREUS BACTEREMIA: Status: ACTIVE | Noted: 2020-05-06

## 2020-05-07 LAB
ALBUMIN SERPL BCP-MCNC: 1.7 G/DL (ref 3.5–5.2)
ALP SERPL-CCNC: 222 U/L (ref 55–135)
ALT SERPL W/O P-5'-P-CCNC: 12 U/L (ref 10–44)
ANION GAP SERPL CALC-SCNC: 11 MMOL/L (ref 8–16)
APTT BLDCRRT: 53.5 SEC (ref 21–32)
APTT BLDCRRT: 54.7 SEC (ref 21–32)
APTT BLDCRRT: 57.4 SEC (ref 21–32)
AST SERPL-CCNC: 9 U/L (ref 10–40)
BASOPHILS # BLD AUTO: 0.02 K/UL (ref 0–0.2)
BASOPHILS NFR BLD: 0.5 % (ref 0–1.9)
BILIRUB SERPL-MCNC: 0.3 MG/DL (ref 0.1–1)
BUN SERPL-MCNC: 2 MG/DL (ref 6–20)
CALCIUM SERPL-MCNC: 8.5 MG/DL (ref 8.7–10.5)
CHLORIDE SERPL-SCNC: 107 MMOL/L (ref 95–110)
CO2 SERPL-SCNC: 20 MMOL/L (ref 23–29)
CREAT SERPL-MCNC: 0.6 MG/DL (ref 0.5–1.4)
DIFFERENTIAL METHOD: ABNORMAL
EOSINOPHIL # BLD AUTO: 0 K/UL (ref 0–0.5)
EOSINOPHIL NFR BLD: 0.5 % (ref 0–8)
ERYTHROCYTE [DISTWIDTH] IN BLOOD BY AUTOMATED COUNT: 15.9 % (ref 11.5–14.5)
EST. GFR  (AFRICAN AMERICAN): >60 ML/MIN/1.73 M^2
EST. GFR  (NON AFRICAN AMERICAN): >60 ML/MIN/1.73 M^2
GLUCOSE SERPL-MCNC: 84 MG/DL (ref 70–110)
HCT VFR BLD AUTO: 27.2 % (ref 37–48.5)
HGB BLD-MCNC: 8.2 G/DL (ref 12–16)
IMM GRANULOCYTES # BLD AUTO: 0.03 K/UL (ref 0–0.04)
IMM GRANULOCYTES NFR BLD AUTO: 0.7 % (ref 0–0.5)
INR PPP: 1.4 (ref 0.8–1.2)
LYMPHOCYTES # BLD AUTO: 0.8 K/UL (ref 1–4.8)
LYMPHOCYTES NFR BLD: 18.8 % (ref 18–48)
MCH RBC QN AUTO: 26.7 PG (ref 27–31)
MCHC RBC AUTO-ENTMCNC: 30.1 G/DL (ref 32–36)
MCV RBC AUTO: 89 FL (ref 82–98)
MONOCYTES # BLD AUTO: 0.2 K/UL (ref 0.3–1)
MONOCYTES NFR BLD: 4.9 % (ref 4–15)
NEUTROPHILS # BLD AUTO: 3 K/UL (ref 1.8–7.7)
NEUTROPHILS NFR BLD: 74.6 % (ref 38–73)
NRBC BLD-RTO: 0 /100 WBC
PLATELET # BLD AUTO: 177 K/UL (ref 150–350)
PMV BLD AUTO: 9.4 FL (ref 9.2–12.9)
POTASSIUM SERPL-SCNC: 3.5 MMOL/L (ref 3.5–5.1)
PROT SERPL-MCNC: 6.6 G/DL (ref 6–8.4)
PROTHROMBIN TIME: 13.7 SEC (ref 9–12.5)
RBC # BLD AUTO: 3.07 M/UL (ref 4–5.4)
SARS-COV-2 RNA RESP QL NAA+PROBE: NOT DETECTED
SODIUM SERPL-SCNC: 138 MMOL/L (ref 136–145)
WBC # BLD AUTO: 4.05 K/UL (ref 3.9–12.7)

## 2020-05-07 PROCEDURE — 25000003 PHARM REV CODE 250: Performed by: STUDENT IN AN ORGANIZED HEALTH CARE EDUCATION/TRAINING PROGRAM

## 2020-05-07 PROCEDURE — 97535 SELF CARE MNGMENT TRAINING: CPT

## 2020-05-07 PROCEDURE — 63600175 PHARM REV CODE 636 W HCPCS: Performed by: INTERNAL MEDICINE

## 2020-05-07 PROCEDURE — 63600175 PHARM REV CODE 636 W HCPCS: Performed by: ANESTHESIOLOGY

## 2020-05-07 PROCEDURE — 36415 COLL VENOUS BLD VENIPUNCTURE: CPT

## 2020-05-07 PROCEDURE — 99223 1ST HOSP IP/OBS HIGH 75: CPT | Mod: ,,, | Performed by: SURGERY

## 2020-05-07 PROCEDURE — 99223 PR INITIAL HOSPITAL CARE,LEVL III: ICD-10-PCS | Mod: ,,, | Performed by: SURGERY

## 2020-05-07 PROCEDURE — 99233 SBSQ HOSP IP/OBS HIGH 50: CPT | Mod: ,,, | Performed by: HOSPITALIST

## 2020-05-07 PROCEDURE — 99233 PR SUBSEQUENT HOSPITAL CARE,LEVL III: ICD-10-PCS | Mod: ,,, | Performed by: HOSPITALIST

## 2020-05-07 PROCEDURE — 99233 PR SUBSEQUENT HOSPITAL CARE,LEVL III: ICD-10-PCS | Mod: ,,, | Performed by: PHYSICIAN ASSISTANT

## 2020-05-07 PROCEDURE — 25000003 PHARM REV CODE 250: Performed by: INTERNAL MEDICINE

## 2020-05-07 PROCEDURE — 20600001 HC STEP DOWN PRIVATE ROOM

## 2020-05-07 PROCEDURE — 94761 N-INVAS EAR/PLS OXIMETRY MLT: CPT

## 2020-05-07 PROCEDURE — 99223 1ST HOSP IP/OBS HIGH 75: CPT | Mod: ,,, | Performed by: INTERNAL MEDICINE

## 2020-05-07 PROCEDURE — 99223 PR INITIAL HOSPITAL CARE,LEVL III: ICD-10-PCS | Mod: ,,, | Performed by: INTERNAL MEDICINE

## 2020-05-07 PROCEDURE — 97530 THERAPEUTIC ACTIVITIES: CPT

## 2020-05-07 PROCEDURE — 80053 COMPREHEN METABOLIC PANEL: CPT

## 2020-05-07 PROCEDURE — 87040 BLOOD CULTURE FOR BACTERIA: CPT | Mod: 59

## 2020-05-07 PROCEDURE — 99233 SBSQ HOSP IP/OBS HIGH 50: CPT | Mod: ,,, | Performed by: PHYSICIAN ASSISTANT

## 2020-05-07 PROCEDURE — 63600175 PHARM REV CODE 636 W HCPCS

## 2020-05-07 PROCEDURE — U0002 COVID-19 LAB TEST NON-CDC: HCPCS

## 2020-05-07 PROCEDURE — 85025 COMPLETE CBC W/AUTO DIFF WBC: CPT

## 2020-05-07 PROCEDURE — 85730 THROMBOPLASTIN TIME PARTIAL: CPT

## 2020-05-07 PROCEDURE — 85610 PROTHROMBIN TIME: CPT

## 2020-05-07 PROCEDURE — 85730 THROMBOPLASTIN TIME PARTIAL: CPT | Mod: 91

## 2020-05-07 RX ORDER — ACETAMINOPHEN 325 MG/1
650 TABLET ORAL EVERY 6 HOURS
Status: DISCONTINUED | OUTPATIENT
Start: 2020-05-07 | End: 2020-05-13

## 2020-05-07 RX ADMIN — SODIUM CHLORIDE: 0.9 INJECTION, SOLUTION INTRAVENOUS at 08:05

## 2020-05-07 RX ADMIN — HYDROMORPHONE HYDROCHLORIDE 2 MG: 1 INJECTION, SOLUTION INTRAMUSCULAR; INTRAVENOUS; SUBCUTANEOUS at 07:05

## 2020-05-07 RX ADMIN — ACETAMINOPHEN 650 MG: 325 TABLET ORAL at 12:05

## 2020-05-07 RX ADMIN — ONDANSETRON 4 MG: 2 INJECTION, SOLUTION INTRAMUSCULAR; INTRAVENOUS at 08:05

## 2020-05-07 RX ADMIN — HEPARIN SODIUM 23 UNITS/KG/HR: 10000 INJECTION, SOLUTION INTRAVENOUS at 12:05

## 2020-05-07 RX ADMIN — HEPARIN SODIUM 23 UNITS/KG/HR: 10000 INJECTION, SOLUTION INTRAVENOUS at 04:05

## 2020-05-07 RX ADMIN — HYDROMORPHONE HYDROCHLORIDE 2 MG: 1 INJECTION, SOLUTION INTRAMUSCULAR; INTRAVENOUS; SUBCUTANEOUS at 12:05

## 2020-05-07 RX ADMIN — HYDROMORPHONE HYDROCHLORIDE 2 MG: 1 INJECTION, SOLUTION INTRAMUSCULAR; INTRAVENOUS; SUBCUTANEOUS at 08:05

## 2020-05-07 RX ADMIN — OXYCODONE HYDROCHLORIDE 5 MG: 5 TABLET ORAL at 10:05

## 2020-05-07 RX ADMIN — VANCOMYCIN HYDROCHLORIDE 1000 MG: 1 INJECTION, POWDER, LYOPHILIZED, FOR SOLUTION INTRAVENOUS at 12:05

## 2020-05-07 RX ADMIN — HYDROMORPHONE HYDROCHLORIDE 2 MG: 1 INJECTION, SOLUTION INTRAMUSCULAR; INTRAVENOUS; SUBCUTANEOUS at 04:05

## 2020-05-07 RX ADMIN — HYDROMORPHONE HYDROCHLORIDE 2 MG: 1 INJECTION, SOLUTION INTRAMUSCULAR; INTRAVENOUS; SUBCUTANEOUS at 02:05

## 2020-05-07 RX ADMIN — ACETAMINOPHEN 650 MG: 325 TABLET ORAL at 06:05

## 2020-05-07 NOTE — ANESTHESIA POSTPROCEDURE EVALUATION
Anesthesia Post Evaluation    Patient: Antoinette Love    Procedure(s) Performed: Procedure(s) (LRB):  Venogram (N/A)    Final Anesthesia Type: general    Patient location during evaluation: PACU  Patient participation: Yes- Able to Participate  Level of consciousness: awake and alert  Post-procedure vital signs: reviewed and stable  Pain management: adequate  Airway patency: patent    PONV status at discharge: No PONV  Anesthetic complications: no      Cardiovascular status: blood pressure returned to baseline  Respiratory status: unassisted  Hydration status: euvolemic  Follow-up not needed.          Vitals Value Taken Time   BP 86/49 5/7/2020  4:23 AM   Temp 36.9 °C (98.4 °F) 5/7/2020  4:23 AM   Pulse 80 5/7/2020  6:00 AM   Resp 16 5/7/2020  4:23 AM   SpO2 98 % 5/7/2020  4:23 AM         Event Time     Out of Recovery 05/06/2020 21:16:00          Pain/Rosaura Score: Pain Rating Prior to Med Admin: 7 (5/7/2020  2:23 AM)  Pain Rating Post Med Admin: 2 (5/7/2020  2:53 AM)  Rosaura Score: 10 (5/6/2020  9:22 PM)

## 2020-05-07 NOTE — ASSESSMENT & PLAN NOTE
Long history of DVT/PE with factor V leiden and May-Thurner syndrome complicating.  I discussed the natural history of occluded IVC / Iliac veins and that she has already started to develop collateral flow.  I discussed the importance of adherence to an anticoagulation program indefinitely.  She has already undergone multiple interventions with IR.  Unfortunately, we have no other options for her from an intervention standpoint.    Discussed with Dr. Hernandez.

## 2020-05-07 NOTE — SUBJECTIVE & OBJECTIVE
"Medications Prior to Admission   Medication Sig Dispense Refill Last Dose    clopidogrel (PLAVIX) 75 mg tablet Take 1 tablet (75 mg total) by mouth once daily. 90 tablet 3 Past Week at Unknown time    diazePAM (VALIUM) 5 MG tablet TAKE 1 TABLET (5 MG TOTAL) BY MOUTH EVERY 12 (TWELVE) HOURS AS NEEDED FOR ANXIETY. 30 tablet 0 Past Month at Unknown time    EPINEPHrine (EPIPEN 2-VAN) 0.3 mg/0.3 mL AtIn Inject 0.3 mLs (0.3 mg total) into the muscle once. for 1 dose 2 Device 2 Taking    prothrombin time/INR test metr Misc 1 each by Misc.(Non-Drug; Combo Route) route once daily. 1 each 0 Taking    rivaroxaban (XARELTO) 15 mg (42)- 20 mg (9) tablet dose pack Take 1 tablet (15 mg) by mouth twice daily with food for 21 days followed by 1 tablet (20 mg) by mouth once daily with food 1 Package 0 More than a month at Unknown time       Review of patient's allergies indicates:   Allergen Reactions    Azithromycin Hives    Beef containing products Anaphylaxis     Patient cannot eat BEEF BROTH  STATES IT WILL MAKE HER THROAT CLOSE UP .     Pork derived (porcine) Anaphylaxis     Throat swells up cannot eat pork sausage or pork patties ,     Unclassified drug Shortness Of Breath and Other (See Comments)     Is allergic to a beef spice - Causes "throat closing"    Xarelto [rivaroxaban] Other (See Comments)     Gallbladder swelling and disfunction    Toradol [ketorolac] Hives and Itching       Past Medical History:   Diagnosis Date    Anemia of chronic disease 12/19/2019    DVT (deep venous thrombosis)     Encounter for blood transfusion     Factor V Leiden     Anticoagulant long-term use    Leg edema, left     May-Thurner syndrome     Multiple pulmonary nodules     Pulmonary embolus     Reactive airway disease without complication 8/9/2019    On albuterol prn, addition singulair    Recurrent upper respiratory infection (URI)     Recurrent urticaria     Stroke     TIA (transient ischemic attack)      Past " Surgical History:   Procedure Laterality Date    COLONOSCOPY N/A 12/18/2015    Procedure: COLONOSCOPY;  Surgeon: Lefty Lee MD;  Location: [facility] ENDO (4TH FLR);  Service: Endoscopy;  Laterality: N/A;  schedule with Ray    COLONOSCOPY N/A 5/5/2020    Procedure: COLONOSCOPY;  Surgeon: Lamin Powers MD;  Location: [facility] ENDO (2ND FLR);  Service: Endoscopy;  Laterality: N/A;    IVC FILTER RETRIEVAL      IVC FILTER RETRIEVAL N/A 9/23/2019    Procedure: REMOVAL-FILTER-IVC;  Surgeon: Claudia Surgeon;  Location: [facility];  Service: Anesthesiology;  Laterality: N/A;  188  4 hours Anes    PHLEBOGRAPHY N/A 5/6/2020    Procedure: Venogram;  Surgeon: Claudia Surgeon;  Location: [facility];  Service: Anesthesiology;  Laterality: N/A;    SKIN BIOPSY      tumor from breast      left    VASCULAR SURGERY      WISDOM TOOTH EXTRACTION       Family History     Problem Relation (Age of Onset)    Allergies Mother        Tobacco Use    Smoking status: Never Smoker    Smokeless tobacco: Never Used   Substance and Sexual Activity    Alcohol use: No     Frequency: Monthly or less     Drinks per session: 3 or 4     Binge frequency: Never    Drug use: No    Sexual activity: Not Currently     Partners: Male     Review of Systems   Constitutional: Negative for activity change.   HENT: Negative for hearing loss.    Eyes: Negative for visual disturbance.   Respiratory: Negative for shortness of breath.    Cardiovascular: Negative for chest pain.   Gastrointestinal: Negative for abdominal pain.   Endocrine: Negative.    Genitourinary: Positive for vaginal bleeding.   Musculoskeletal: Positive for arthralgias and myalgias.   Skin: Negative for color change, rash and wound.   Allergic/Immunologic: Negative.    Neurological: Negative for weakness.   Hematological: Does not bruise/bleed easily.   Psychiatric/Behavioral: Negative.      Objective:     Vital Signs (Most Recent):  Temp: 98.7 °F (37.1 °C) (05/07/20 1535)  Pulse: 87 (05/07/20  1535)  Resp: 17 (05/07/20 1535)  BP: (!) 87/53 (05/07/20 1535)  SpO2: 99 % (05/07/20 1535) Vital Signs (24h Range):  Temp:  [98.4 °F (36.9 °C)-103.1 °F (39.5 °C)] 98.7 °F (37.1 °C)  Pulse:  [] 87  Resp:  [11-31] 17  SpO2:  [96 %-100 %] 99 %  BP: ()/(49-81) 87/53     Weight: 61.1 kg (134 lb 11.2 oz)  Body mass index is 22.42 kg/m².    Physical Exam   Constitutional: She is oriented to person, place, and time. She appears well-developed and well-nourished.   HENT:   Head: Normocephalic and atraumatic.   Eyes: Conjunctivae are normal.   Neck: No JVD present. No tracheal deviation present.   Cardiovascular: Normal rate and regular rhythm.   Pulmonary/Chest: Effort normal.   Abdominal: Soft.   Musculoskeletal: She exhibits edema.   2+ DP  Edematous thighs bilaterally   Neurological: She is alert and oriented to person, place, and time.   Skin: Skin is dry. Capillary refill takes less than 2 seconds.   Psychiatric: She has a normal mood and affect.       Significant Labs:  All pertinent labs from the last 24 hours have been reviewed.    Significant Diagnostics:  I have reviewed all pertinent imaging results/findings within the past 24 hours.

## 2020-05-07 NOTE — SUBJECTIVE & OBJECTIVE
Interval History: Remains febrile overnight. Unfortunately thrombectomy unable to be performed yesterday due to extensiveness of her clots. IR recommending aggressive anticoagulation. Blood cx returned with Staph aureus. Repeats 5/6 also positive (drawn prior to starting antibiotics).     Discussed antibiotic plan with the patient. She became tearful as she has never had this serious of an infection before and she is alone at home and worried she will not be able to care for herself. Emotional support given. She is to work with PT today as she has not been able to ambulate on her own 2/2 pain. Her family lives in [place]. Talked about possibility of discharging to [place] as her family is able to help care for her there. Will discuss with primary team.    Review of Systems   Constitutional: Positive for chills and fever. Negative for fatigue.   HENT: Negative for congestion.    Respiratory: Negative for cough and shortness of breath.    Cardiovascular: Positive for leg swelling. Negative for chest pain.   Gastrointestinal: Negative for abdominal pain, diarrhea and nausea.   Genitourinary: Negative for difficulty urinating, dysuria, hematuria and urgency.   Musculoskeletal: Positive for arthralgias (BLE) and gait problem (2/2 pain). Negative for back pain.   Skin: Negative for color change, pallor and wound.   Neurological: Positive for weakness. Negative for light-headedness and headaches.   Hematological: Bruises/bleeds easily.   Psychiatric/Behavioral: Negative for behavioral problems and confusion. The patient is not nervous/anxious.      Objective:     Vital Signs (Most Recent):  Temp: 99.9 °F (37.7 °C) (05/07/20 1119)  Pulse: 103 (05/07/20 1119)  Resp: 18 (05/07/20 1119)  BP: (!) 96/50 (05/07/20 1119)  SpO2: 96 % (05/07/20 1119) Vital Signs (24h Range):  Temp:  [98.4 °F (36.9 °C)-103.1 °F (39.5 °C)] 99.9 °F (37.7 °C)  Pulse:  [] 103  Resp:  [11-31] 18  SpO2:  [94 %-100 %] 96 %  BP: ()/(49-81) 96/50      Weight: 61.1 kg (134 lb 11.2 oz)  Body mass index is 22.42 kg/m².    Estimated Creatinine Clearance: 122.2 mL/min (based on SCr of 0.6 mg/dL).    Physical Exam   Constitutional: She is oriented to person, place, and time. She appears well-developed and well-nourished. No distress.   HENT:   Head: Normocephalic and atraumatic.   Mouth/Throat: No oropharyngeal exudate.   Eyes: Conjunctivae are normal. No scleral icterus.   Neck: Neck supple.   Cardiovascular: Normal rate, regular rhythm and normal heart sounds.   Pulmonary/Chest: Effort normal and breath sounds normal. No respiratory distress. She has no rales.   Abdominal: Soft. She exhibits no distension. There is no tenderness.   Musculoskeletal: She exhibits edema and tenderness (BLE).   Decreased ROM 2/2 pain   Neurological: She is alert and oriented to person, place, and time.   Skin: Skin is warm. She is not diaphoretic. No erythema.   PIV sites c/d/i  Left antecubital fossa bruising and tenderness at prior IV site  bruising of BLE   Psychiatric: She has a normal mood and affect. Her behavior is normal. Thought content normal.   Nursing note and vitals reviewed.      Significant Labs: All pertinent labs within the past 24 hours have been reviewed.    Significant Imaging: I have reviewed all pertinent imaging results/findings within the past 24 hours.

## 2020-05-07 NOTE — ASSESSMENT & PLAN NOTE
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 She was found to have extensive thrombosis of the bilateral femoral veins, iliac vein stents, and IVC stent with thrombosis extending to the bilateral popliteal veins. IR unfortunately was unable to perform thrombolysis 2/2 due the extensive chronic thrombi. On 5/4 she developed fevers. Blood cultures drawn returned positive for Staph aureus. Vancomycin started. Unclear source of bacteremia though I am concerned for an endovascular infection.       Plan  - Continue IV Vancomycin. Vanc trough due today. Vanc trough goal 15-20.  - If Staph aureus returns oxacillin susceptible, deescalate antibiotics to Cefazolin 6 g IV continuous infusion   - Repeat blood cultures daily until sterile  - Recommend BRII   - ID will follow.

## 2020-05-07 NOTE — CONSULTS
[facility]  Vascular Surgery  Consult Note    Inpatient consult to Vascular Surgery  Consult performed by: Oskar Jara MD  Consult ordered by: Ben Martines MD        Subjective:     Chief Complaint/Reason for Admission: DVT    History of Present Illness: Antoinette Love is a 29 y.o. female admitted to the medicine service for acute on chronic DVT. She has protracted history of DVT/PE with multiple interventions with IR. She has factor V leiden as well as May-Thurner syndrome. She was admitted on 05/03 with extensive thrombosis of LE - likely acute on chronic. IR attempted thrombectomy, but were unsuccessful because of calcific, organized thrombus in both legs. She has had difficulty affording anticoagulation in the past, and has swapped around a few different agents in the past months because of trouble with cost. Vascular surgery is consulted to see if there are any further interventions for this patient.       Medications Prior to Admission   Medication Sig Dispense Refill Last Dose    clopidogrel (PLAVIX) 75 mg tablet Take 1 tablet (75 mg total) by mouth once daily. 90 tablet 3 Past Week at Unknown time    diazePAM (VALIUM) 5 MG tablet TAKE 1 TABLET (5 MG TOTAL) BY MOUTH EVERY 12 (TWELVE) HOURS AS NEEDED FOR ANXIETY. 30 tablet 0 Past Month at Unknown time    EPINEPHrine (EPIPEN 2-VAN) 0.3 mg/0.3 mL AtIn Inject 0.3 mLs (0.3 mg total) into the muscle once. for 1 dose 2 Device 2 Taking    prothrombin time/INR test metr Misc 1 each by Misc.(Non-Drug; Combo Route) route once daily. 1 each 0 Taking    rivaroxaban (XARELTO) 15 mg (42)- 20 mg (9) tablet dose pack Take 1 tablet (15 mg) by mouth twice daily with food for 21 days followed by 1 tablet (20 mg) by mouth once daily with food 1 Package 0 More than a month at Unknown time       Review of patient's allergies indicates:   Allergen Reactions    Azithromycin Hives    Beef containing products Anaphylaxis     Patient cannot  "eat BEEF BROTH  STATES IT WILL MAKE HER THROAT CLOSE UP .     Pork derived (porcine) Anaphylaxis     Throat swells up cannot eat pork sausage or pork patties ,     Unclassified drug Shortness Of Breath and Other (See Comments)     Is allergic to a beef spice - Causes "throat closing"    Xarelto [rivaroxaban] Other (See Comments)     Gallbladder swelling and disfunction    Toradol [ketorolac] Hives and Itching       Past Medical History:   Diagnosis Date    Anemia of chronic disease 12/19/2019    DVT (deep venous thrombosis)     Encounter for blood transfusion     Factor V Leiden     Anticoagulant long-term use    Leg edema, left     May-Thurner syndrome     Multiple pulmonary nodules     Pulmonary embolus     Reactive airway disease without complication 8/9/2019    On albuterol prn, addition singulair    Recurrent upper respiratory infection (URI)     Recurrent urticaria     Stroke     TIA (transient ischemic attack)      Past Surgical History:   Procedure Laterality Date    COLONOSCOPY N/A 12/18/2015    Procedure: COLONOSCOPY;  Surgeon: Lefty Lee MD;  Location: [facility] (4TH FLR);  Service: Endoscopy;  Laterality: N/A;  schedule with Jesus    COLONOSCOPY N/A 5/5/2020    Procedure: COLONOSCOPY;  Surgeon: Lamin Powers MD;  Location: [facility] (2ND FLR);  Service: Endoscopy;  Laterality: N/A;    IVC FILTER RETRIEVAL      IVC FILTER RETRIEVAL N/A 9/23/2019    Procedure: REMOVAL-FILTER-IVC;  Surgeon: Claudia Surgeon;  Location: [facility];  Service: Anesthesiology;  Laterality: N/A;  188  4 hours Anes    PHLEBOGRAPHY N/A 5/6/2020    Procedure: Venogram;  Surgeon: Claudia Surgeon;  [facility];  Service: Anesthesiology;  Laterality: N/A;    SKIN BIOPSY      tumor from breast      left    VASCULAR SURGERY      WISDOM TOOTH EXTRACTION       Family History     Problem Relation (Age of Onset)    Allergies Mother        Tobacco Use    Smoking status: Never Smoker    Smokeless tobacco: Never Used "   Substance and Sexual Activity    Alcohol use: No     Frequency: Monthly or less     Drinks per session: 3 or 4     Binge frequency: Never    Drug use: No    Sexual activity: Not Currently     Partners: Male     Review of Systems   Constitutional: Negative for activity change.   HENT: Negative for hearing loss.    Eyes: Negative for visual disturbance.   Respiratory: Negative for shortness of breath.    Cardiovascular: Negative for chest pain.   Gastrointestinal: Negative for abdominal pain.   Endocrine: Negative.    Genitourinary: Positive for vaginal bleeding.   Musculoskeletal: Positive for arthralgias and myalgias.   Skin: Negative for color change, rash and wound.   Allergic/Immunologic: Negative.    Neurological: Negative for weakness.   Hematological: Does not bruise/bleed easily.   Psychiatric/Behavioral: Negative.      Objective:     Vital Signs (Most Recent):  Temp: 98.7 °F (37.1 °C) (05/07/20 1535)  Pulse: 87 (05/07/20 1535)  Resp: 17 (05/07/20 1535)  BP: (!) 87/53 (05/07/20 1535)  SpO2: 99 % (05/07/20 1535) Vital Signs (24h Range):  Temp:  [98.4 °F (36.9 °C)-103.1 °F (39.5 °C)] 98.7 °F (37.1 °C)  Pulse:  [] 87  Resp:  [11-31] 17  SpO2:  [96 %-100 %] 99 %  BP: ()/(49-81) 87/53     Weight: 61.1 kg (134 lb 11.2 oz)  Body mass index is 22.42 kg/m².    Physical Exam   Constitutional: She is oriented to person, place, and time. She appears well-developed and well-nourished.   HENT:   Head: Normocephalic and atraumatic.   Eyes: Conjunctivae are normal.   Neck: No JVD present. No tracheal deviation present.   Cardiovascular: Normal rate and regular rhythm.   Pulmonary/Chest: Effort normal.   Abdominal: Soft.   Musculoskeletal: She exhibits edema.   2+ DP  Edematous thighs bilaterally   Neurological: She is alert and oriented to person, place, and time.   Skin: Skin is dry. Capillary refill takes less than 2 seconds.   Psychiatric: She has a normal mood and affect.       Significant Labs:  All  pertinent labs from the last 24 hours have been reviewed.    Significant Diagnostics:  I have reviewed all pertinent imaging results/findings within the past 24 hours.    Assessment/Plan:     * DVT, lower extremity, proximal, acute, bilateral  Long history of DVT/PE with factor V leiden and May-Thurner syndrome complicating.  I discussed the natural history of occluded IVC / Iliac veins and that she has already started to develop collateral flow.  I discussed the importance of adherence to an anticoagulation program indefinitely.  She has already undergone multiple interventions with IR.  Unfortunately, we have no other options for her from an intervention standpoint.    Discussed with Dr. Hernandez.        Thank you for your consult. I will sign off. Please contact us if you have any additional questions.    Oskar Jara MD  Vascular Surgery  [facility]

## 2020-05-07 NOTE — HPI
Antoinette Love is a 29 y.o. female admitted to the medicine service for acute on chronic DVT. She has protracted history of DVT/PE with multiple interventions with IR. She has factor V leiden as well as May-Thurner syndrome. She was admitted on 05/03 with extensive thrombosis of LE - likely acute on chronic. IR attempted thrombectomy, but were unsuccessful because of calcific, organized thrombus in both legs. She has had difficulty affording anticoagulation in the past, and has swapped around a few different agents in the past months because of trouble with cost. Vascular surgery is consulted to see if there are any further interventions for this patient.

## 2020-05-07 NOTE — ASSESSMENT & PLAN NOTE
Patient growing gpcs in two bottles. Started on Vanc. Will continue following cultures until negative. ID consulted

## 2020-05-07 NOTE — TRANSFER OF CARE
"Anesthesia Transfer of Care Note    Patient: Antoinette Love    Procedure(s) Performed: Procedure(s) (LRB):  Venogram (N/A)    Patient location: PACU    Anesthesia Type: general    Transport from OR: Transported from OR on 6-10 L/min O2 by face mask with adequate spontaneous ventilation    Post pain: adequate analgesia    Post assessment: no apparent anesthetic complications    Post vital signs: stable    Level of consciousness: awake and alert    Nausea/Vomiting: no nausea/vomiting    Complications: none    Transfer of care protocol was followed      Last vitals:   Visit Vitals  BP (!) 166/81 (BP Location: Right arm, Patient Position: Lying)   Pulse 104   Temp 37.1 °C (98.7 °F) (Oral)   Resp 18   Ht 5' 5" (1.651 m)   Wt 61.2 kg (135 lb)   LMP  (Within Months)   SpO2 96%   Breastfeeding? No   BMI 22.47 kg/m²     "

## 2020-05-07 NOTE — CONSULTS
Consult Note    Inpatient consult to Hematology/Oncology  Consult performed by: Jessica Nieves MD  Consult ordered by: Ben Martines MD        SUBJECTIVE:     History of Present Illness:    Antoinette Love is a 28 y/o F admitted to [facility] with thrombosis of venous stents. She has a PMH significant for FVL, May-Thuner Syndrome, recurrent DVT/VTE, miscarriages and TIA.      Admitted on 05/03 with extensive thrombosis of LE. IR consulted for thrombectomy, attempted to be performed 05/06 but what unsuccessful due to chronic thrombus, hence aggressive anti coagulation recommended. Hospital course notable for acute anemia after patient reported BRBPR the day before admission, underwent colonoscopy on 05/05, which was only noteable for internal hemorrhoids.    Per Dr. Garcia's last clinic note - the patient has had at least 11 DVTs and 3-4 PEs. Initially she was on Coumadin for years before suffering a DVT despite a reported therapeutic INR. Chicken of Xarelto was attempted but reported gallbladder pain after only one dose (unclear if related).   IVC filter was placed in 2014. Put on Elliquis, but had a DVT in 2016. In 2018 she was taken of a/c by a hematologist in 2018, as she had a filter. In 07/21/19 she experienced a partial, nonocclusive thrombus in the right common femoral vein, left common femoral vein, and left external iliac vein. Started on lovenox 1mg/kg BID on 08/22/19. There was concern for Lovenox failure, and patient was transitioned to Eliquis 5mg BID. In 01/2020 She has recurrent DVT and was discharged home on Coumadin with goal INR 2.5-3.5 + Plavix. Unfortunately she had issues getting INR checked so Fondaparinux was prescribed but was too expensive. Xarelto was supposed to be restarted but on my interview the patient tells me that she actually did not restart Xarelto and was administering 80mcg of Lovenox daily.     Review of patient's allergies indicates:   Allergen Reactions     "Azithromycin Hives    Beef containing products Anaphylaxis     Patient cannot eat BEEF BROTH  STATES IT WILL MAKE HER THROAT CLOSE UP .     Pork derived (porcine) Anaphylaxis     Throat swells up cannot eat pork sausage or pork patties ,     Unclassified drug Shortness Of Breath and Other (See Comments)     Is allergic to a beef spice - Causes "throat closing"    Xarelto [rivaroxaban] Other (See Comments)     Gallbladder swelling and disfunction    Toradol [ketorolac] Hives and Itching     Past Medical History:   Diagnosis Date    Anemia of chronic disease 12/19/2019    Anticoagulant long-term use     Anticoagulant long-term use     FERNANDO (dyspnea on exertion)     DVT (deep venous thrombosis)     Encounter for blood transfusion     Factor V Leiden     Leg edema, left     May-Thurner syndrome     Multiple pulmonary nodules     Pulmonary embolus     RAD (reactive airway disease)     Reactive airway disease without complication 8/9/2019    On albuterol prn, addition singulair    Recurrent upper respiratory infection (URI)     Recurrent urticaria     Stroke     TIA (transient ischemic attack)      Past Surgical History:   Procedure Laterality Date    COLONOSCOPY N/A 12/18/2015    Procedure: COLONOSCOPY;  Surgeon: Lefty Lee MD;  Location: [facility] (4TH FLR);  Service: Endoscopy;  Laterality: N/A;  schedule with Jesus    COLONOSCOPY N/A 5/5/2020    Procedure: COLONOSCOPY;  Surgeon: Lamin Powers MD;  Location: [facility] (2ND FLR);  Service: Endoscopy;  Laterality: N/A;    IVC FILTER RETRIEVAL      IVC FILTER RETRIEVAL N/A 9/23/2019    Procedure: REMOVAL-FILTER-IVC;  Surgeon: Claudia Surgeon;  Location: [facility];  Service: Anesthesiology;  Laterality: N/A;  188  4 hours Anes    PHLEBOGRAPHY N/A 5/6/2020    Procedure: Venogram;  Surgeon: Claudia Surgeon;  Location: [facility];  Service: Anesthesiology;  Laterality: N/A;    SKIN BIOPSY      tumor from breast      left    VASCULAR SURGERY      WISDOM " TOOTH EXTRACTION       Family History   Problem Relation Age of Onset    Allergies Mother         azithromycin     Social History     Tobacco Use    Smoking status: Never Smoker    Smokeless tobacco: Never Used   Substance Use Topics    Alcohol use: No     Frequency: Monthly or less     Drinks per session: 3 or 4     Binge frequency: Never    Drug use: No     Review of Systems   Constitutional: Negative for chills and fever.   HENT: Positive for nosebleeds.    Eyes: Negative for blurred vision.   Respiratory: Negative for shortness of breath.    Cardiovascular: Negative for chest pain.   Gastrointestinal: Positive for abdominal pain and blood in stool.   Musculoskeletal: Negative for myalgias.   Neurological: Negative for focal weakness.   Endo/Heme/Allergies: Bruises/bleeds easily.   Psychiatric/Behavioral: Negative for depression.     OBJECTIVE:     Vital Signs:  Temp:  [98.4 °F (36.9 °C)-99.9 °F (37.7 °C)]   Pulse:  []   Resp:  [16-18]   BP: ()/(49-53)   SpO2:  [96 %-98 %]     Physical Exam   Constitutional: She is oriented to person, place, and time. She appears well-developed and well-nourished.   HENT:   Head: Normocephalic and atraumatic.   Eyes: No scleral icterus.   Neck: Normal range of motion. Neck supple.   Cardiovascular: Normal rate, regular rhythm and normal heart sounds.   Pulmonary/Chest: Effort normal.   Abdominal: Soft. Bowel sounds are normal.   Musculoskeletal: Normal range of motion.   Neurological: She is alert and oriented to person, place, and time.   Skin: Skin is warm and dry.   Psychiatric: She has a normal mood and affect.     Laboratory:  CBC:   Recent Labs   Lab 05/07/20  0744   WBC 4.05   RBC 3.07*   HGB 8.2*   HCT 27.2*      MCV 89   MCH 26.7*   MCHC 30.1*     CMP:   Recent Labs   Lab 05/07/20  0744   GLU 84   CALCIUM 8.5*   ALBUMIN 1.7*   PROT 6.6      K 3.5   CO2 20*      BUN 2*   CREATININE 0.6   ALKPHOS 222*   ALT 12   AST 9*   BILITOT 0.3        Diagnostic Results:  Labs: Reviewed  CT: Reviewed    ASSESSMENT/PLAN:     30 y/o F with an extensive history of VTE/PE with break through thrombosis on multiple anti coagulating agents. Most recently she was supposed to be on Fondaparinux but was unable to afford the co-pay.     I discussed with her that anti coagulation with Warfarin for a goal INR of 2.5 - 3.5 may be the safest anti coagulation strategy but the patient is adamant that she does not want to do this as she is unable to get her INR checked and feels that taking Warfarin is too burdensome.     We then spoke about Xarelto + Plavix as per Dr. Garcia's plan but she is very scared of having the same severe abdominal pain again that she attributes to Xarelto.     Prefers Eliquis as she went 4 - 5 years without thrombosis on it. I think it is reasonable to transition from heparin to Eliquis 10mg BID for 7 days followed by 5mg BID. I also recommend adding Plavix 75mg qD.     I will arrange follow up in hematology clinic.     The patient will be discussed with the attending physician . Recommendations outlined in this note are not final until attending attestation. Thank you for consulting hematology, we will sign off the case but please page with questions.    Jessica Nieves MD  Clinical Fellow  Hematology and Medical Oncology.  05/07/2020          I have reviewed the notes, assessments, and/or procedures performed by the housestaff, as above.  I have personally  rounded with the housestaff. I concur with her/his assessment and plan and the documentation of Antoinette Love.      Agree with Eliquis + Plavix.  Will follow-up in in heme clinic.      I, Dr. Grant Boykin, personally spent more than 70 mins during this encounter, greater than 50% was spent in direct counseling and/or coordination of care.     Grant Boykin M.D., M.S., F.A.C.P.  Hematology/Oncology Attending  [facility]

## 2020-05-07 NOTE — ANESTHESIA PREPROCEDURE EVALUATION
05/07/2020  Antoinette Love is a 29 y.o., female.  Pre-operative evaluation for Procedure(s) (LRB):  ECHOCARDIOGRAM,TRANSESOPHAGEAL (N/A)    Antoinette Love is a 29 y.o. female  Admitted with thrombosis of venous stents. She has a PMH significant for FVL, May-Thuner Syndrome, recurrent DVT/VTE (at least 11 DVTs and 3-4 Pes despite mutiple different therapeutic coumadin and different oral ACs s/p IVC filter 2014), miscarriages and TIA.      Admitted on 05/03 with extensive thrombosis of LE. IR consulted for thrombectomy, attempted to be performed 05/06 but what unsuccessful due to chronic thrombus. On AC with heparin drip. Has staph bacteremia as well on vanc as well so patient scheduled for above BRII.    Hx of Emergence delirium  LDA:   20G L Upper Arm PIV  20G R Upper Arm PIV    Prev airway:   Placement Date: 05/06/20; Placement Time: 1454 (created via procedure documentation); Inserted by: CRNA; Mask Ventilation: Easy; Intubated: Postinduction; Blade: Gordon #2; Airway Device Size: 7.0; Cuff Inflation: Minimal occlusive pressure; Placement Verified By: Capnometry; Complicating Factors: None    Drips: Heparin    Patient Active Problem List   Diagnosis    Bright red blood per rectum    Factor 5 Leiden mutation, heterozygous    Multiple pulmonary nodules    History of pulmonary embolism    Reactive airway disease without complication    Recurrent urticaria    May-Thurner syndrome    Presence of IVC filter    Leukopenia    Bradycardia    Anemia of chronic disease, iron deficiency anemia and acute blood loss anemia    Splenomegaly    DVT, lower extremity, proximal, acute, bilateral    Long term (current) use of anticoagulants    Acute cystitis without hematuria    Staphylococcus aureus bacteremia       Review of patient's allergies indicates:   Allergen Reactions    Azithromycin  "Hives    Beef containing products Anaphylaxis     Patient cannot eat BEEF BROTH  STATES IT WILL MAKE HER THROAT CLOSE UP .     Pork derived (porcine) Anaphylaxis     Throat swells up cannot eat pork sausage or pork patties ,     Unclassified drug Shortness Of Breath and Other (See Comments)     Is allergic to a beef spice - Causes "throat closing"    Xarelto [rivaroxaban] Other (See Comments)     Gallbladder swelling and disfunction    Toradol [ketorolac] Hives and Itching        No current facility-administered medications on file prior to encounter.      Current Outpatient Medications on File Prior to Encounter   Medication Sig Dispense Refill    clopidogrel (PLAVIX) 75 mg tablet Take 1 tablet (75 mg total) by mouth once daily. 90 tablet 3    diazePAM (VALIUM) 5 MG tablet TAKE 1 TABLET (5 MG TOTAL) BY MOUTH EVERY 12 (TWELVE) HOURS AS NEEDED FOR ANXIETY. 30 tablet 0    EPINEPHrine (EPIPEN 2-VAN) 0.3 mg/0.3 mL AtIn Inject 0.3 mLs (0.3 mg total) into the muscle once. for 1 dose 2 Device 2    prothrombin time/INR test metr Misc 1 each by Misc.(Non-Drug; Combo Route) route once daily. 1 each 0    rivaroxaban (XARELTO) 15 mg (42)- 20 mg (9) tablet dose pack Take 1 tablet (15 mg) by mouth twice daily with food for 21 days followed by 1 tablet (20 mg) by mouth once daily with food 1 Package 0       Past Surgical History:   Procedure Laterality Date    COLONOSCOPY N/A 12/18/2015    Procedure: COLONOSCOPY;  Surgeon: Lefty Lee MD;  Location: [facility] (4TH FLR);  Service: Endoscopy;  Laterality: N/A;  schedule with Jesus    COLONOSCOPY N/A 5/5/2020    Procedure: COLONOSCOPY;  Surgeon: Lamin Powers MD;  Location: [facility] (2ND FLR);  Service: Endoscopy;  Laterality: N/A;    IVC FILTER RETRIEVAL      IVC FILTER RETRIEVAL N/A 9/23/2019    Procedure: REMOVAL-FILTER-IVC;  Surgeon: Claudia Surgeon;  Location: [facility];  Service: Anesthesiology;  Laterality: N/A;  188  4 hours Anes    PHLEBOGRAPHY N/A 5/6/2020    " Procedure: Venogram;  Surgeon: Claudia Surgeon;  Location: [facility];  Service: Anesthesiology;  Laterality: N/A;    SKIN BIOPSY      tumor from breast      left    VASCULAR SURGERY      WISDOM TOOTH EXTRACTION         Social History     Socioeconomic History    Marital status: Single     Spouse name: Not on file    Number of children: Not on file    Years of education: Not on file    Highest education level: Not on file   Occupational History    Not on file   Social Needs    Financial resource strain: Not very hard    Food insecurity:     Worry: Never true     Inability: Never true    Transportation needs:     Medical: No     Non-medical: No   Tobacco Use    Smoking status: Never Smoker    Smokeless tobacco: Never Used   Substance and Sexual Activity    Alcohol use: No     Frequency: Monthly or less     Drinks per session: 3 or 4     Binge frequency: Never    Drug use: No    Sexual activity: Not Currently     Partners: Male   Lifestyle    Physical activity:     Days per week: 4 days     Minutes per session: 150+ min    Stress: Rather much   Relationships    Social connections:     Talks on phone: More than three times a week     Gets together: More than three times a week     Attends [employer] service: Not on file     Active member of club or organization: Yes     Attends meetings of clubs or organizations: 1 to 4 times per year     Relationship status: Living with partner   Other Topics Concern    Not on file   Social History Narrative    Not on file         Vital Signs Range (Last 24H):  Temp:  [36.9 °C (98.4 °F)-39.5 °C (103.1 °F)]   Pulse:  []   Resp:  [11-31]   BP: ()/(49-81)   SpO2:  [96 %-100 %]       CBC:   Recent Labs     05/06/20  0709 05/07/20  0744   WBC 4.49 4.05   RBC 3.12* 3.07*   HGB 8.2* 8.2*   HCT 27.7* 27.2*    177   MCV 89 89   MCH 26.3* 26.7*   MCHC 29.6* 30.1*       CMP:   Recent Labs     05/06/20  0709 05/07/20  0744   * 138   K 3.6 3.5    107    CO2 22* 20*   BUN 3* 2*   CREATININE 0.6 0.6   GLU 99 84   CALCIUM 8.5* 8.5*   ALBUMIN 1.8* 1.7*   PROT 7.0 6.6   ALKPHOS 330* 222*   ALT 14 12   AST 19 9*   BILITOT 0.4 0.3       INR  Recent Labs     05/05/20  1518  05/06/20  0709 05/06/20  2159 05/07/20  0744 05/07/20  1408   INR 1.5*  --  1.2  --  1.4*  --    APTT 30.0   < > 34.6*  40.8* 37.4* 54.7*  53.5* 57.4*    < > = values in this interval not displayed.           Diagnostic Studies:      EKG:  Vent. Rate : 098 BPM     Atrial Rate : 098 BPM     P-R Int : 164 ms          QRS Dur : 096 ms      QT Int : 356 ms       P-R-T Axes : 057 065 040 degrees     QTc Int : 454 ms    Normal sinus rhythm  Nonspecific ST and T wave abnormality  Abnormal ECG  When compared with ECG of 02-NOV-2019 03:01,  Vent. rate has increased BY  55 BPM  Nonspecific T wave abnormality now evident in Lateral leads  Confirmed by ALFA BATISTA, BENOIT ([id number]) on 5/4/2020 10:20:37 AM    2D Echo: 5/6/20  · Normal left ventricular systolic function. The estimated ejection fraction is 60%.  · Normal right ventricular systolic function.  · Normal LV diastolic function.  · No wall motion abnormalities.  · The estimated PA systolic pressure is 19 mmHg.  · Normal central venous pressure (3 mmHg).    Anesthesia Evaluation    I have reviewed the Patient Summary Reports.    I have reviewed the Nursing Notes.   I have reviewed the Medications.     Review of Systems  Anesthesia Hx:  No problems with previous Anesthesia   Denies Personal Hx of Anesthesia complications.   Social:  Non-Smoker    Hematology/Oncology:        Hematology Comments: Factor 5 Leiden  May Thurner syndrome   Cardiovascular:   PVD LE extensive DVT   Pulmonary:   Denies Shortness of breath. Multple PE last 2014 before IVC filter   Neurological:   TIA,    Endocrine:  Endocrine Normal    Psych:  Psychiatric Normal           Physical Exam  General:  Well nourished    Airway/Jaw/Neck:  Airway Findings: Mouth Opening: Normal Tongue: Normal   General Airway Assessment: Adult  Mallampati: II  Improves to I with phonation.  TM Distance: Normal, at least 6 cm  Jaw/Neck Findings:  Neck ROM: Normal ROM     Eyes/Ears/Nose:  EYES/EARS/NOSE FINDINGS: Normal   Dental:  Dental Findings: In tact   Chest/Lungs:  Chest/Lungs Findings: Clear to auscultation     Heart/Vascular:  Heart Findings: Rate: Normal  Rhythm: Regular Rhythm  Heart murmur: negative       Mental Status:  Mental Status Findings:  Cooperative, Alert and Oriented         Anesthesia Plan  Type of Anesthesia, risks & benefits discussed:  Anesthesia Type:  general  Patient's Preference:   Intra-op Monitoring Plan: standard ASA monitors  Intra-op Monitoring Plan Comments:   Post Op Pain Control Plan: multimodal analgesia and IV/PO Opioids PRN  Post Op Pain Control Plan Comments:   Induction:   IV  Beta Blocker:         Informed Consent: Patient understands risks and agrees with Anesthesia plan.  Questions answered. Anesthesia consent signed with patient.  ASA Score: 3     Day of Surgery Review of History & Physical:            Ready For Surgery From Anesthesia Perspective.

## 2020-05-07 NOTE — PLAN OF CARE
Problem: Physical Therapy Goal  Goal: Physical Therapy Goal  Description  Goals to be met by: 2020     Patient will increase functional independence with mobility by performin. Sit to stand transfer with [place]  2. Bed to chair transfer with Modified [place] using LRAD  3. Gait  x 150 feet with Modified [place] using LRAD.   4. Lower extremity exercise program x20 reps per handout, with independence     Outcome: Ongoing, Progressing     Pt pleasant and cooperative, willing to work with therapy. Goals remain appropriate. Will continue to follow while in house.     Wanda Edouard, PT, DPT  2020

## 2020-05-07 NOTE — PT/OT/SLP PROGRESS
Physical Therapy Treatment    Patient Name:  Antoinette Love   MRN:  [id number]    Recommendations:     Discharge Recommendations:  home health PT  Discharge Equipment Recommendations: Rolling walker   Barriers to discharge: None    Assessment:     Antoinette Love is a 29 y.o. female admitted with a medical diagnosis of DVT, lower extremity, proximal, acute, bilateral.  She presents with the following impairments/functional limitations:  weakness, impaired endurance, pain, gait instability, impaired functional mobilty, impaired balance. Pt pleasant and cooperative, motivated to work with therapy. Pt continues to be limited by LLE pain, decreased endurance and impaired mobility at this time. Required CGA-Conner x 1 for bed mobility, transfers and side steps. Anticipate that once pt's pain improves, pt will be appropriate to return home with home PT and use of RW. Will continue to follow while in house.     Rehab Prognosis: Good; patient would benefit from acute skilled PT services to address these deficits and reach maximum level of function.    Recent Surgery: Procedure(s) (LRB):  Venogram (N/A) 1 Day Post-Op    Plan:     During this hospitalization, patient to be seen 4 x/week to address the identified rehab impairments via gait training, therapeutic activities, therapeutic exercises, neuromuscular re-education and progress toward the following goals:    · Plan of Care Expires:  06/05/20    Subjective     Chief Complaint: LLE pain   Patient/Family Comments/goals: To get stronger  Pain/Comfort: 4/10 LLE      Objective:     Communicated with RN prior to session.  Patient found with MALCOLM pozo and telemetry upon PT entry to room.     General Precautions: Standard, fall  Orthopedic Precautions:N/A  Braces:   N/A    Functional Mobility:  · Bed Mobility:     · Supine to Sit: minimum assistance for LLE, performed in segments due to pain   · Sit to Supine: contact guard assistance  · Transfers:     · Sit to Stand:   minimum assistance with hand-held assist; x2 trials, limited by LLE pain with weight-bearing   · Gait: Pt able to hop 2-3 steps to the HOB with modA x 1/HHA, limited by pain  · Balance: Fair, requires UE support for stability       AM-PAC 6 CLICK MOBILITY  Turning over in bed (including adjusting bedclothes, sheets and blankets)?: 3  Sitting down on and standing up from a chair with arms (e.g., wheelchair, bedside commode, etc.): 3  Moving from lying on back to sitting on the side of the bed?: 3  Moving to and from a bed to a chair (including a wheelchair)?: 3  Need to walk in hospital room?: 1  Climbing 3-5 steps with a railing?: 1  Basic Mobility Total Score: 14       Therapeutic Activities and Exercises:   Pt educated on: PT role/POC; safety with mobility; benefits of OOB activities; discharge recommendations. Pt verbalized understanding.     Patient left HOB elevated with all lines intact, call button in reach and RN notified.    GOALS:   Multidisciplinary Problems     Physical Therapy Goals        Problem: Physical Therapy Goal    Goal Priority Disciplines Outcome Goal Variances Interventions   Physical Therapy Goal     PT, PT/OT Ongoing, Progressing     Description:  Goals to be met by: 2020     Patient will increase functional independence with mobility by performin. Sit to stand transfer with [address]  2. Bed to chair transfer with Modified [address] using LRAD  3. Gait  x 150 feet with Modified [address] using LRAD.   4. Lower extremity exercise program x20 reps per handout, with independence                      Time Tracking:     PT Received On: 20  PT Start Time: 1317     PT Stop Time: 1335  PT Total Time (min): 18 min     Billable Minutes: Therapeutic Activity 18  min    Treatment Type: Treatment  PT/PTA: PT     PTA Visit Number: 0     Wanda Edouard, PT, DPT  2020

## 2020-05-07 NOTE — PT/OT/SLP PROGRESS
Occupational Therapy   Treatment    Name: Antoinette Love  MRN: [id number]  Admitting Diagnosis:  DVT, lower extremity, proximal, acute, bilateral  1 Day Post-Op    Recommendations:     Discharge Recommendations: home health OT  Discharge Equipment Recommendations:  (TBD pending progress; maybe RW   Barriers to discharge:  None    Assessment:     Antoinette Love is a 29 y.o. female with a medical diagnosis of DVT, lower extremity, proximal, acute, bilateral.  She presents with the following performance deficits affecting function: weakness, impaired endurance, pain. Pt tolerated session well this date with improvements noted in active participation due to decreased pain. Pt demo'd the ability to sit EOB with SPV and tolerate standing with min A. Once pain subside, pt with good rehab potential to return home with home health when medically stable. OT POC remains appropriate for patient on this date. Pt will continue to benefit from skilled OT to address the deficits affecting her occupational performance.      Rehab Prognosis:  Good; patient would benefit from acute skilled OT services to address these deficits and reach maximum level of function.       Plan:     Patient to be seen 4 x/week to address the above listed problems via self-care/home management, therapeutic activities, therapeutic exercises  · Plan of Care Expires: 06/03/20  · Plan of Care Reviewed with: patient    Subjective     Pain/Comfort:  · Pain Rating 1: 4/10  · Location - Side 1: Left  · Location - Orientation 1: medial  · Location 1: thigh  · Pain Addressed 1: Pre-medicate for activity, Reposition, Distraction  · Pain Rating Post-Intervention 1: 4/10  · Pain Addressed 2: Reposition, Cessation of Activity    Objective:     Communicated with: RN prior to session.  Patient found HOB elevated with telemetry, peripheral IV, pozo catheter(Simultaneous filing. User may not have seen previous data.) upon OT entry to room.    General  Precautions: Standard, fall  Orthopedic Precautions:N/A  Braces: N/A    Occupational Performance:     Bed Mobility:    · Patient completed Rolling/Turning to Right with contact guard assistance and with side rail  · Patient completed Scooting/Bridging with minimum assistance for anterior hip scooting EOB  · Patient completed Supine to Sit with minimum assistance for LLE guidance over bed  · Patient completed Sit to Supine with contact guard assistance; decreased pace noted     Functional Mobility/Transfers:  · Patient completed Sit <> Stand Transfer from bed with minimum assistance  with  hand-held assist of BUE  · X2 trials completed EOB   · Decreased weight placed in LLE due to pain   · Functional Mobility: Pt took 3 hops EOB towards the R with mod A due to pain and decreased quality of movements.   · No LOB noted; No RBs required     Activities of Daily Living:  · Grooming: of During 1st visit, pt simulated grooming routine I'ly in bed persons  · Lower Body Dressing: supervision in sitting to don socks  · Toileting: dependence with use of pozo for voiding      AMPAC 6 Click ADL: 19    Treatment & Education:  - Role of OT/ OT POC  - Self care safety/ independence  - Functional transfer/ mobility safety  - Bed mobility safety  - Pursed lip breathing  - Energy conservation techniques such as taking rest breaks as needed   - Therapeutic listening provided   - Skin re-assessed; moderate amount of swelling in L thigh noted   - Pt sat EOB for ~8 minutes with SBA while conversing with therapist and recovering from RB after standing x2 trials.     Patient left HOB elevated with all lines intact, call button in reach and RN notifiedEducation:      GOALS:   Multidisciplinary Problems     Occupational Therapy Goals        Problem: Occupational Therapy Goal    Goal Priority Disciplines Outcome Interventions   Occupational Therapy Goal     OT, PT/OT Ongoing, Progressing    Description:  Goals to be met by: 5/15/20    Patient  will increase functional independence with ADLs by performing:    UE Dressing with [place].  LE Dressing with [place].  Grooming while standing at sink with Supervision.  Toileting from toilet with Supervision for hygiene and clothing management.   Supine to sit with Modified [place] for increased bed mobility independence.- PROGRESSING   Step transfer with Supervision with no AD to prepare for household mobility to complete occupations of choice.   Toilet transfer to toilet with Supervision.                       Time Tracking:     OT Date of Treatment: 05/07/20  OT Start Time: 0910(2nd visit [id number])  OT Stop Time: 0919  OT Total Time (min): 9 min Total time: 28 mins co tx with PT     Billable Minutes:Self Care/Home Management 23    Cydney Mackay OT  5/7/2020

## 2020-05-07 NOTE — PLAN OF CARE
POC reviewed with patient. Continue to monitor heparin gtt and adjuste per protocol. No sign of bleeding. Dick cath dc'd per protocol. Will monitor for post void. PRN pain meds utilized per patient request and comfort. Plan for BRII in am. Patient to be NPO after midnight. Will continue to monitor

## 2020-05-07 NOTE — PROGRESS NOTES
[facility] Hospital Medicine  Progress Note    Patient Name: Antoinette Love  MRN: [id number]  Patient Class: IP- Inpatient   Admission Date: 5/3/2020  Length of Stay: 4 days  Attending Physician: Benito Luna MD  Primary Care Provider: Alberto Holguin MD    [facility] Medicine Team: [facility] HOSP MED 4 Ben Martines MD    Subjective:     Principal Problem:DVT, lower extremity, proximal, acute, bilateral        HPI:  Ms. 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  Who present to ED with bilateral thighs pain and swelling in the last 7 days. She is following up with Dr. Garcia in hematology and she was just switched from Coumadin to Fondaparinux around a week ago.  She has not been able to get the prescription for Fondaparinux from the pharmacy and she started taking Lovenox 80 mg BID and she stop taking it yesterday when she noticed BRBPR. She states that on Monday she started having pain in the medial side of her right thigh then she has pain in the same area of the left thigh. She describes the pain as excruciating 8/10, bilateral medial thigh associated with swelling, numbness and bruises.  She endorses episodes of chills, hot flashes,night sweats dry cough and palpitation but denies chest pain, SOB or hemoptysis.  She states that she has been taking Coumadin since age of 18 and she was switched to Eliquis 2015 and she has been doing great with it with only one DVT 2016. She had multiple DVTs since August of the last year and she has been seen by IR around 9 times for thrombolysis, ballooning and stents placment with last one on 1/17 where she had mechanical thrombectomy and stent extension to common femoral veins bilaterally.  Sh was discharged and placed back on Coumadin for unclear reason.  She  "states that she is not comfortable with using Coumadin because of the frequent monitoring.  She spoke to her hematologist week ago to switch her to non vitamin K anticoagulant.    She was started on Xarelto before and she was discontinued from taking first dose because of biliary colic pain and she was told she has inflammation.  She reports anxiety/panic attack which she was operated by IR in this first procedure and she has been sedated for all procedure after that.     In ED, she was looks uncomfortable and in pain.  She was afebrile, , /62, SpO2 100% on RA. WBC: 3.8, H.6, platelet: 228. Unremarkable CMP. INR: 1.1, Ptt: 22 and D-dimer 3.6. Normal lactic acid. Doppler US of lower extremities shows  partial thrombosis of the femoral veins, extending to the bilateral popliteal veins. EKG with NSR. IR were consulted and patient loaded with heparin and started on heparin gtt.     Overview/Hospital Course:  Ms. Love was admitted to Hospital Medicine 5/3 with extensive thrombus of the "Extensive deep venous thrombosis...noting previously visualized thrombus within 1 of the paired posterior tibial veins on the left is not identified on current exam however there is thrombosis of 1 of the paired right posterior tibial veins, previously no thrombus identified." Discussed the case with IR, who recommended GI workup given patient's persistent anemia and report of possible BRBPR; furthermore, the morning after admission, patient was noted to have 2 g drop in hemoglobin with associated hypotension and tachycardia increasing concern for GI bleed - though no further BRBPR noted. CTA with venous phase added to assess for possible GI bleed and to evaluate extensiveness of clot. No extravasation of contrast noted, and on venous phase, thrombus noted to extend to IVC filter.     Overnight, patient febrile to 102.6. Though urine culture grew multiple organisms, patient does report dysuria the day prior to " admission. Ceftriaxone started. Blood cultures, CXR, and u/a ordered to complete infectious workup. GI planning for cscope today. Pending results will plan for thrombectomy tomorrow.     Interval History: NAEON. IR unable to remove the thrombosis. Still continuing heparin drip. Consulted hematology for further recs on anticoagulation.     Review of Systems   Constitutional: Negative for chills, fatigue and fever.   HENT: Negative for trouble swallowing.    Respiratory: Negative for cough and shortness of breath.    Cardiovascular: Positive for leg swelling. Negative for chest pain.   Gastrointestinal: Negative for abdominal pain, blood in stool, constipation, diarrhea, nausea and vomiting.   Genitourinary: Negative for dysuria.   Musculoskeletal: Negative for arthralgias.        Leg pain   Skin: Negative for color change and pallor.   Neurological: Negative for light-headedness and headaches.   Psychiatric/Behavioral: Negative for behavioral problems and confusion.     Objective:     Vital Signs (Most Recent):  Temp: 99.9 °F (37.7 °C) (05/07/20 1119)  Pulse: 103 (05/07/20 1119)  Resp: 18 (05/07/20 1119)  BP: (!) 96/50 (05/07/20 1119)  SpO2: 96 % (05/07/20 1119) Vital Signs (24h Range):  Temp:  [98.4 °F (36.9 °C)-103.1 °F (39.5 °C)] 99.9 °F (37.7 °C)  Pulse:  [] 103  Resp:  [11-31] 18  SpO2:  [96 %-100 %] 96 %  BP: ()/(49-81) 96/50     Weight: 61.1 kg (134 lb 11.2 oz)  Body mass index is 22.42 kg/m².    Intake/Output Summary (Last 24 hours) at 5/7/2020 1223  Last data filed at 5/7/2020 0800  Gross per 24 hour   Intake 5442.45 ml   Output 2550 ml   Net 2892.45 ml      Physical Exam   Constitutional: She is oriented to person, place, and time. She appears well-developed and well-nourished. No distress.   Uncomfortable.    Cardiovascular: Normal rate, regular rhythm and normal heart sounds.   No murmur heard.  Pulmonary/Chest: Effort normal and breath sounds normal. No respiratory distress. She has no rales.    Abdominal: Soft. Bowel sounds are normal. She exhibits no distension. There is no tenderness.   Musculoskeletal: She exhibits edema.   Tenderness of bilateral LE present, L>R   Neurological: She is alert and oriented to person, place, and time. She displays normal reflexes. No cranial nerve deficit.   Skin: Skin is warm. She is not diaphoretic. No erythema.   Psychiatric: She has a normal mood and affect.   Nursing note and vitals reviewed.      Significant Labs:   CBC:   Recent Labs   Lab 05/06/20  0709 05/07/20  0744   WBC 4.49 4.05   HGB 8.2* 8.2*   HCT 27.7* 27.2*    177     CMP:   Recent Labs   Lab 05/06/20  0709 05/07/20  0744   * 138   K 3.6 3.5    107   CO2 22* 20*   GLU 99 84   BUN 3* 2*   CREATININE 0.6 0.6   CALCIUM 8.5* 8.5*   PROT 7.0 6.6   ALBUMIN 1.8* 1.7*   BILITOT 0.4 0.3   ALKPHOS 330* 222*   AST 19 9*   ALT 14 12   ANIONGAP 9 11   EGFRNONAA >60.0 >60.0       Significant Imaging: I have reviewed and interpreted all pertinent imaging results/findings within the past 24 hours.      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  Who present to ED with bilateral thighs pain and swelling in the last 7 days. 11 DVTs. Has not been able to fill her prescription and she was taking only Lovenox ( concern for Lovenox failure per hematology).  Doppler ultrasound of lower extremities shows bilateral partial thrombosis bilateral femoral veins extending to bilateral popliteal veins  .  Plan :   -- patient was loaded with heparin in the ED and started on heparin drip.   -- Continuing heparin drip  -- Hematology consulted for further AC recs  --  IR unable to remove thrombosis 5/6  -- monitor for signs of phlegmasia       Staphylococcus aureus bacteremia  Patient growing gpcs in two bottles. Started on Vanc. Will continue following cultures until negative. ID consulted      Acute cystitis without hematuria  Patient reports dysuria and frequency prior to admission. U/a at admission consistent with infection, though culture grew multiple organisms. Febrile to 102.6 overnight 5/4.     - DDx for fever includes cystitis (dysuria, U/a) vs infected clot vs clot burden itself  - Dced rocephin      Long term (current) use of anticoagulants  -- pt multiple syndromes of thrombophilia    -- on long trem AC   --  sine age of 18  -- switched to liquids 2015   -- unable to tolerate Xarelto because of SE ( biliary colic)  -- concern for Lovenox failure    Plan :   -- ensure pt having prescription filled for AC   -- f/u with hematology in the clinic       Anemia of chronic disease, iron deficiency anemia and acute blood loss anemia  BRBPR    -- Hg on admission 8.6, MCV 87  -- Hg has been between ( 6.9-8)   -- reports PRBPR day before admission   -- Iron study 11/2019: iron 24, TIBC 274, transferrin 185. Normal heptoglobin and RC   -- Required 2 u PRBC on 5/4 with appropriate correction of hg  -- CBC q8h stable over 24 hours, will decrease to daily.   -- GI planning scope 5/5      History of pulmonary embolism  -- she had 3 PEs   -- at home on coumadin     Factor 5 Leiden mutation, heterozygous          VTE Risk Mitigation (From admission, onward)         Ordered     heparin (porcine) in 5 % dex 25,000 unit/250 mL(100 unit/mL) infusion      05/06/20 2022     heparin 25,000 units in dextrose 5% 250 mL (100 units/mL) infusion HIGH INTENSITY nomogram - [facility]  Continuous     Question:  Heparin Infusion Adjustment (DO NOT MODIFY ANSWER)  Answer:  [email or web address]\Images\Pharmacy\HeparinInfusions\heparin HIGH INTENSITY nomogram for [facility] [id number]    05/05/20 2739      heparin 25,000 units in dextrose 5% (100 units/ml) IV bolus from bag - ADDITIONAL PRN BOLUS - 60 units/kg  As needed (PRN)     Question:  Heparin Infusion Adjustment (DO NOT MODIFY ANSWER)  Answer:  [email or web address]\Images\Pharmacy\HeparinInfusions\heparin HIGH INTENSITY nomogram for [facility] [id number]    05/05/20 1426     heparin 25,000 units in dextrose 5% (100 units/ml) IV bolus from bag - ADDITIONAL PRN BOLUS - 30 units/kg  As needed (PRN)     Question:  Heparin Infusion Adjustment (DO NOT MODIFY ANSWER)  Answer:  [email or web address]\Images\Pharmacy\HeparinInfusions\heparin HIGH INTENSITY nomogram for [facility] [id number]    05/05/20 1426     IP VTE HIGH RISK PATIENT  Once      05/03/20 2310     Place sequential compression device  Until discontinued      05/03/20 2210                      Ben Martines MD  Department of Hospital Medicine   [facility]

## 2020-05-07 NOTE — PLAN OF CARE
Plan of care discussed with pt. VSS. Denies c/o CP or SOB. Monitor showing SR/ST. Pt is s/p venogram/venoplasty. Rt neck and left popliteal incisions with CDI dressing. Dilaudid 2mg iv given for pain and tylenol given for temp. Max Temp 103.1. Pt continued on vancomycin.  Pt remains free of injury or falls. All questions addressed. Uneventful night shift.

## 2020-05-07 NOTE — PROGRESS NOTES
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 She was found to have extensive thrombosis of the bilateral femoral veins, iliac vein stents, and IVC stent with thrombosis extending to the bilateral popliteal veins. IR unfortunately was unable to perform thrombolysis 2/2 due the extensive chronic thrombi. On 5/4 she developed fevers. Blood cultures drawn returned positive for Staph aureus. Vancomycin started. Unclear source of bacteremia though I am concerned for an endovascular infection.       Plan  - Continue IV Vancomycin. Vanc trough due today. Vanc trough goal 15-20.  - If Staph aureus returns oxacillin susceptible, deescalate antibiotics to Cefazolin 6 g IV continuous infusion   - Repeat blood cultures daily until sterile  - Recommend BRII   - ID will follow.            Please call for any questions. Thank you.  Sheryl Glaser PA-C  Phone: [phone]  Pager: [phone]    Subjective:     Principal Problem:DVT, lower extremity, proximal, acute, bilateral    HPI: Ms. Love is a 29-year-old female with history of factor 5 laden mutation, May-Thuner syndrome, recurrent DVTs and PEs s/p stents to iliac/femoral veins and IVC filter placement 2014 who presented to the ED on 5/3 with bilateral thigh pain and swelling over the last 7 days. She is following up with Dr. Garcia in hematology and she was just switched from Coumadin to Fondaparinux around a week ago.  She had  not been able to get the prescription for Fondaparinux from the pharmacy and she started taking Lovenox 80 mg BID. She states that on Monday she started having pain in the medial side of her right thigh then she has pain in the same area of the left thigh. She describes the pain as excruciating 8/10, bilateral medial thigh associated with swelling, numbness and bruises.  She endorses episodes of chills, hot flashes, night sweats, dry cough and palpitations over the last two days but denies chest pain, SOB or hemoptysis. Also mentions dysuria for a few days that resolved on its own.  Denies recent procedures within the last month. 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 In late March she did hit her leg on the side of a desk while working and scraped it. She mentions that the area became red, indurated and painful. She self treated with topical abx and the lesion resolved.    She states that she has been taking Coumadin since age of 18 and she was switched to Eliquis  and she has been doing great with it with only one DVT 2016. She had multiple DVTs since August of last year and she has been seen by IR around 9 times for thrombolysis, ballooning and stents placment with last one on  where she had mechanical thrombectomy and stent extension to common femoral veins bilaterally.       In ED, she was afebrile, , /62, SpO2 100% on RA. WBC: 3.8, H.6, platelet: 228. Unremarkable CMP. INR: 1.1, Ptt: 22 and D-dimer 3.6. Normal lactic acid. Doppler US of lower extremities shows  partial thrombosis of the femoral veins, extending to the bilateral popliteal veins. IR was consulted and patient started on heparin. She was admitted to  for management.     Since admit she has been seen by GI and IR. The morning after admission, patient was noted to have 2 g drop in hemoglobin with associated hypotension and tachycardia with increasing concern  "for GI bleed.  CTA ordered to assess for possible GI bleed and to evaluate extensiveness of clot. No GI bleed or extravasation of contrast noted, though thrombus noted to extend to IVC filter. ("Extensive thrombosis of the bilateral femoral veins, iliac vein stents, and IVC stent. IVC filter is in place at the level of the superior aspect of the IVC stent. Asymmetric inflammatory changes involving the anterior compartment of the left thigh."). GI consulted and performed colonoscopy yesterday which did not reveal a bleed. IR planning for thrombolysis today.      On 5/4, patient became febrile to 102.6. Ceftriaxone started. UA showed 18 WBC. Urine cx with multiple organisms isolated. Blood cultures drawn 5/5 are now showing GPCs resembling Staph. Repeats ordered. Vanc started.     GI consulted and performed colonoscopy yesterday which did not reveal a bleed. IR planning for thrombolysis.        US BLE  "There are stents within the right common femoral vein and left common femoral vein, thrombosed.  There is partial thrombosis of the femoral veins, with thrombosis extending to the bilateral popliteal veins.  The bilateral greater saphenous veins are patent.  There is partial thrombus in the right posterior tibial vein.  The left posterior tibial vein is patent.  The bilateral anterior tibial veins and peroneal veins are patent.  There is stent of the left external iliac vein, left external iliac vein appears patent.  There is thrombus within the right external iliac vein."    Two PIVs   Interval History: Remains febrile overnight. Unfortunately thrombectomy unable to be performed yesterday due to extensiveness of her clots. IR recommending aggressive anticoagulation. Blood cx returned with Staph aureus. Repeats 5/6 also positive (drawn prior to starting antibiotics).     Discussed antibiotic plan with the patient. She became tearful as she has never had this serious of an infection before and she is alone at home and " worried she will not be able to care for herself. Emotional support given. She is to work with PT today as she has not been able to ambulate on her own 2/2 pain. Her family lives in [place]. Talked about possibility of discharging to [place] as her family is able to help care for her there. Will discuss with primary team.    Review of Systems   Constitutional: Positive for chills and fever. Negative for fatigue.   HENT: Negative for congestion.    Respiratory: Negative for cough and shortness of breath.    Cardiovascular: Positive for leg swelling. Negative for chest pain.   Gastrointestinal: Negative for abdominal pain, diarrhea and nausea.   Genitourinary: Negative for difficulty urinating, dysuria, hematuria and urgency.   Musculoskeletal: Positive for arthralgias (BLE) and gait problem (2/2 pain). Negative for back pain.   Skin: Negative for color change, pallor and wound.   Neurological: Positive for weakness. Negative for light-headedness and headaches.   Hematological: Bruises/bleeds easily.   Psychiatric/Behavioral: Negative for behavioral problems and confusion. The patient is not nervous/anxious.      Objective:     Vital Signs (Most Recent):  Temp: 99.9 °F (37.7 °C) (05/07/20 1119)  Pulse: 103 (05/07/20 1119)  Resp: 18 (05/07/20 1119)  BP: (!) 96/50 (05/07/20 1119)  SpO2: 96 % (05/07/20 1119) Vital Signs (24h Range):  Temp:  [98.4 °F (36.9 °C)-103.1 °F (39.5 °C)] 99.9 °F (37.7 °C)  Pulse:  [] 103  Resp:  [11-31] 18  SpO2:  [94 %-100 %] 96 %  BP: ()/(49-81) 96/50     Weight: 61.1 kg (134 lb 11.2 oz)  Body mass index is 22.42 kg/m².    Estimated Creatinine Clearance: 122.2 mL/min (based on SCr of 0.6 mg/dL).    Physical Exam   Constitutional: She is oriented to person, place, and time. She appears well-developed and well-nourished. No distress.   HENT:   Head: Normocephalic and atraumatic.   Mouth/Throat: No oropharyngeal exudate.   Eyes: Conjunctivae are normal. No scleral icterus.   Neck: Neck  supple.   Cardiovascular: Normal rate, regular rhythm and normal heart sounds.   Pulmonary/Chest: Effort normal and breath sounds normal. No respiratory distress. She has no rales.   Abdominal: Soft. She exhibits no distension. There is no tenderness.   Musculoskeletal: She exhibits edema and tenderness (BLE).   Decreased ROM 2/2 pain   Neurological: She is alert and oriented to person, place, and time.   Skin: Skin is warm. She is not diaphoretic. No erythema.   PIV sites c/d/i  Left antecubital fossa bruising and tenderness at prior IV site  bruising of BLE   Psychiatric: She has a normal mood and affect. Her behavior is normal. Thought content normal.   Nursing note and vitals reviewed.      Significant Labs: All pertinent labs within the past 24 hours have been reviewed.    Significant Imaging: I have reviewed all pertinent imaging results/findings within the past 24 hours.

## 2020-05-07 NOTE — CARE UPDATE
Rapid Response Nurse Chart Check     Chart check completed, abnormal VS noted. Please call [phone] for further concerns or assistance.

## 2020-05-07 NOTE — ASSESSMENT & PLAN NOTE
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  Who present to ED with bilateral thighs pain and swelling in the last 7 days. 11 DVTs. Has not been able to fill her prescription and she was taking only Lovenox ( concern for Lovenox failure per hematology).  Doppler ultrasound of lower extremities shows bilateral partial thrombosis bilateral femoral veins extending to bilateral popliteal veins  .  Plan :   -- patient was loaded with heparin in the ED and started on heparin drip.   -- Continuing heparin drip  -- Hematology consulted for further AC recs  -- IR unable to remove thrombosis 5/6  -- monitor for signs of phlegmasia

## 2020-05-07 NOTE — SUBJECTIVE & OBJECTIVE
Interval History: NAEON. IR unable to remove the thrombosis. Still continuing heparin drip. Consulted hematology for further recs on anticoagulation.     Review of Systems   Constitutional: Negative for chills, fatigue and fever.   HENT: Negative for trouble swallowing.    Respiratory: Negative for cough and shortness of breath.    Cardiovascular: Positive for leg swelling. Negative for chest pain.   Gastrointestinal: Negative for abdominal pain, blood in stool, constipation, diarrhea, nausea and vomiting.   Genitourinary: Negative for dysuria.   Musculoskeletal: Negative for arthralgias.        Leg pain   Skin: Negative for color change and pallor.   Neurological: Negative for light-headedness and headaches.   Psychiatric/Behavioral: Negative for behavioral problems and confusion.     Objective:     Vital Signs (Most Recent):  Temp: 99.9 °F (37.7 °C) (05/07/20 1119)  Pulse: 103 (05/07/20 1119)  Resp: 18 (05/07/20 1119)  BP: (!) 96/50 (05/07/20 1119)  SpO2: 96 % (05/07/20 1119) Vital Signs (24h Range):  Temp:  [98.4 °F (36.9 °C)-103.1 °F (39.5 °C)] 99.9 °F (37.7 °C)  Pulse:  [] 103  Resp:  [11-31] 18  SpO2:  [96 %-100 %] 96 %  BP: ()/(49-81) 96/50     Weight: 61.1 kg (134 lb 11.2 oz)  Body mass index is 22.42 kg/m².    Intake/Output Summary (Last 24 hours) at 5/7/2020 1223  Last data filed at 5/7/2020 0800  Gross per 24 hour   Intake 5442.45 ml   Output 2550 ml   Net 2892.45 ml      Physical Exam   Constitutional: She is oriented to person, place, and time. She appears well-developed and well-nourished. No distress.   Uncomfortable.    Cardiovascular: Normal rate, regular rhythm and normal heart sounds.   No murmur heard.  Pulmonary/Chest: Effort normal and breath sounds normal. No respiratory distress. She has no rales.   Abdominal: Soft. Bowel sounds are normal. She exhibits no distension. There is no tenderness.   Musculoskeletal: She exhibits edema.   Tenderness of bilateral LE present, L>R    Neurological: She is alert and oriented to person, place, and time. She displays normal reflexes. No cranial nerve deficit.   Skin: Skin is warm. She is not diaphoretic. No erythema.   Psychiatric: She has a normal mood and affect.   Nursing note and vitals reviewed.      Significant Labs:   CBC:   Recent Labs   Lab 05/06/20  0709 05/07/20  0744   WBC 4.49 4.05   HGB 8.2* 8.2*   HCT 27.7* 27.2*    177     CMP:   Recent Labs   Lab 05/06/20  0709 05/07/20  0744   * 138   K 3.6 3.5    107   CO2 22* 20*   GLU 99 84   BUN 3* 2*   CREATININE 0.6 0.6   CALCIUM 8.5* 8.5*   PROT 7.0 6.6   ALBUMIN 1.8* 1.7*   BILITOT 0.4 0.3   ALKPHOS 330* 222*   AST 19 9*   ALT 14 12   ANIONGAP 9 11   EGFRNONAA >60.0 >60.0       Significant Imaging: I have reviewed and interpreted all pertinent imaging results/findings within the past 24 hours.

## 2020-05-07 NOTE — PLAN OF CARE
Problem: Occupational Therapy Goal  Goal: Occupational Therapy Goal  Description  Goals to be met by: 5/15/20    Patient will increase functional independence with ADLs by performing:    UE Dressing with Stark.  LE Dressing with Stark.  Grooming while standing at sink with Supervision.  Toileting from toilet with Supervision for hygiene and clothing management.   Supine to sit with Modified Stark for increased bed mobility independence.- PROGRESSING   Step transfer with Supervision with no AD to prepare for household mobility to complete occupations of choice.   Toilet transfer to toilet with Supervision.      Outcome: Ongoing, Progressing     Pt progressing well; however, remains limited in functional tasks due to pain. OT POC remains appropriate for patient on this date. Pt will continue to benefit from skilled OT to address the deficits affecting her occupational performance.    Cydney Mackay OTR/L  5/7/20

## 2020-05-08 ENCOUNTER — ANESTHESIA (OUTPATIENT)
Dept: MEDSURG UNIT | Facility: HOSPITAL | Age: 30
DRG: 253 | End: 2020-05-08
Payer: COMMERCIAL

## 2020-05-08 LAB
ALBUMIN SERPL BCP-MCNC: 1.8 G/DL (ref 3.5–5.2)
ALP SERPL-CCNC: 267 U/L (ref 55–135)
ALT SERPL W/O P-5'-P-CCNC: 14 U/L (ref 10–44)
ANION GAP SERPL CALC-SCNC: 11 MMOL/L (ref 8–16)
APTT BLDCRRT: 41.7 SEC (ref 21–32)
AST SERPL-CCNC: 19 U/L (ref 10–40)
BACTERIA BLD CULT: ABNORMAL
BASOPHILS # BLD AUTO: 0.01 K/UL (ref 0–0.2)
BASOPHILS NFR BLD: 0.3 % (ref 0–1.9)
BILIRUB SERPL-MCNC: 0.2 MG/DL (ref 0.1–1)
BSA FOR ECHO PROCEDURE: 1.67 M2
BUN SERPL-MCNC: 3 MG/DL (ref 6–20)
CALCIUM SERPL-MCNC: 8.9 MG/DL (ref 8.7–10.5)
CHLORIDE SERPL-SCNC: 106 MMOL/L (ref 95–110)
CO2 SERPL-SCNC: 21 MMOL/L (ref 23–29)
CREAT SERPL-MCNC: 0.7 MG/DL (ref 0.5–1.4)
DIFFERENTIAL METHOD: ABNORMAL
EOSINOPHIL # BLD AUTO: 0.1 K/UL (ref 0–0.5)
EOSINOPHIL NFR BLD: 2.4 % (ref 0–8)
ERYTHROCYTE [DISTWIDTH] IN BLOOD BY AUTOMATED COUNT: 16.4 % (ref 11.5–14.5)
EST. GFR  (AFRICAN AMERICAN): >60 ML/MIN/1.73 M^2
EST. GFR  (NON AFRICAN AMERICAN): >60 ML/MIN/1.73 M^2
GLUCOSE SERPL-MCNC: 87 MG/DL (ref 70–110)
HCT VFR BLD AUTO: 29.3 % (ref 37–48.5)
HGB BLD-MCNC: 8.7 G/DL (ref 12–16)
IMM GRANULOCYTES # BLD AUTO: 0.02 K/UL (ref 0–0.04)
IMM GRANULOCYTES NFR BLD AUTO: 0.6 % (ref 0–0.5)
INR PPP: 1.3 (ref 0.8–1.2)
LYMPHOCYTES # BLD AUTO: 0.8 K/UL (ref 1–4.8)
LYMPHOCYTES NFR BLD: 24.4 % (ref 18–48)
MCH RBC QN AUTO: 26.5 PG (ref 27–31)
MCHC RBC AUTO-ENTMCNC: 29.7 G/DL (ref 32–36)
MCV RBC AUTO: 89 FL (ref 82–98)
MONOCYTES # BLD AUTO: 0.2 K/UL (ref 0.3–1)
MONOCYTES NFR BLD: 5.1 % (ref 4–15)
NEUTROPHILS # BLD AUTO: 2.3 K/UL (ref 1.8–7.7)
NEUTROPHILS NFR BLD: 67.2 % (ref 38–73)
NRBC BLD-RTO: 0 /100 WBC
PLATELET # BLD AUTO: 232 K/UL (ref 150–350)
PMV BLD AUTO: 8.8 FL (ref 9.2–12.9)
POTASSIUM SERPL-SCNC: 3.1 MMOL/L (ref 3.5–5.1)
PROT SERPL-MCNC: 7.2 G/DL (ref 6–8.4)
PROTHROMBIN TIME: 12.8 SEC (ref 9–12.5)
RBC # BLD AUTO: 3.28 M/UL (ref 4–5.4)
SODIUM SERPL-SCNC: 138 MMOL/L (ref 136–145)
WBC # BLD AUTO: 3.36 K/UL (ref 3.9–12.7)

## 2020-05-08 PROCEDURE — 99233 SBSQ HOSP IP/OBS HIGH 50: CPT | Mod: ,,, | Performed by: HOSPITALIST

## 2020-05-08 PROCEDURE — 85025 COMPLETE CBC W/AUTO DIFF WBC: CPT

## 2020-05-08 PROCEDURE — D9220A PRA ANESTHESIA: ICD-10-PCS | Mod: CRNA,,, | Performed by: NURSE ANESTHETIST, CERTIFIED REGISTERED

## 2020-05-08 PROCEDURE — 99233 PR SUBSEQUENT HOSPITAL CARE,LEVL III: ICD-10-PCS | Mod: ,,, | Performed by: HOSPITALIST

## 2020-05-08 PROCEDURE — 63600175 PHARM REV CODE 636 W HCPCS: Performed by: STUDENT IN AN ORGANIZED HEALTH CARE EDUCATION/TRAINING PROGRAM

## 2020-05-08 PROCEDURE — 87077 CULTURE AEROBIC IDENTIFY: CPT

## 2020-05-08 PROCEDURE — 37000009 HC ANESTHESIA EA ADD 15 MINS

## 2020-05-08 PROCEDURE — 87186 SC STD MICRODIL/AGAR DIL: CPT

## 2020-05-08 PROCEDURE — 85610 PROTHROMBIN TIME: CPT

## 2020-05-08 PROCEDURE — 99233 SBSQ HOSP IP/OBS HIGH 50: CPT | Mod: ,,, | Performed by: PHYSICIAN ASSISTANT

## 2020-05-08 PROCEDURE — 25000003 PHARM REV CODE 250: Performed by: PHYSICIAN ASSISTANT

## 2020-05-08 PROCEDURE — 25000003 PHARM REV CODE 250: Performed by: STUDENT IN AN ORGANIZED HEALTH CARE EDUCATION/TRAINING PROGRAM

## 2020-05-08 PROCEDURE — 63600175 PHARM REV CODE 636 W HCPCS: Performed by: NURSE ANESTHETIST, CERTIFIED REGISTERED

## 2020-05-08 PROCEDURE — 85730 THROMBOPLASTIN TIME PARTIAL: CPT

## 2020-05-08 PROCEDURE — 63700000 PHARM REV CODE 250 ALT 637 W/O HCPCS: Performed by: STUDENT IN AN ORGANIZED HEALTH CARE EDUCATION/TRAINING PROGRAM

## 2020-05-08 PROCEDURE — D9220A PRA ANESTHESIA: Mod: CRNA,,, | Performed by: NURSE ANESTHETIST, CERTIFIED REGISTERED

## 2020-05-08 PROCEDURE — 63600175 PHARM REV CODE 636 W HCPCS

## 2020-05-08 PROCEDURE — 63600175 PHARM REV CODE 636 W HCPCS: Performed by: INTERNAL MEDICINE

## 2020-05-08 PROCEDURE — 99233 PR SUBSEQUENT HOSPITAL CARE,LEVL III: ICD-10-PCS | Mod: ,,, | Performed by: PHYSICIAN ASSISTANT

## 2020-05-08 PROCEDURE — 36415 COLL VENOUS BLD VENIPUNCTURE: CPT

## 2020-05-08 PROCEDURE — 63600175 PHARM REV CODE 636 W HCPCS: Performed by: PHYSICIAN ASSISTANT

## 2020-05-08 PROCEDURE — D9220A PRA ANESTHESIA: Mod: ANES,,, | Performed by: ANESTHESIOLOGY

## 2020-05-08 PROCEDURE — 87040 BLOOD CULTURE FOR BACTERIA: CPT | Mod: 59

## 2020-05-08 PROCEDURE — 94761 N-INVAS EAR/PLS OXIMETRY MLT: CPT

## 2020-05-08 PROCEDURE — 25000003 PHARM REV CODE 250: Performed by: NURSE ANESTHETIST, CERTIFIED REGISTERED

## 2020-05-08 PROCEDURE — 80053 COMPREHEN METABOLIC PANEL: CPT

## 2020-05-08 PROCEDURE — 37000008 HC ANESTHESIA 1ST 15 MINUTES

## 2020-05-08 PROCEDURE — 25000003 PHARM REV CODE 250: Performed by: INTERNAL MEDICINE

## 2020-05-08 PROCEDURE — D9220A PRA ANESTHESIA: ICD-10-PCS | Mod: ANES,,, | Performed by: ANESTHESIOLOGY

## 2020-05-08 PROCEDURE — 20600001 HC STEP DOWN PRIVATE ROOM

## 2020-05-08 RX ORDER — SODIUM CHLORIDE 0.9 % (FLUSH) 0.9 %
10 SYRINGE (ML) INJECTION
Status: DISCONTINUED | OUTPATIENT
Start: 2020-05-08 | End: 2020-05-13

## 2020-05-08 RX ORDER — LIDOCAINE HYDROCHLORIDE 20 MG/ML
INJECTION INTRAVENOUS
Status: DISCONTINUED | OUTPATIENT
Start: 2020-05-08 | End: 2020-05-08

## 2020-05-08 RX ORDER — MIDAZOLAM HYDROCHLORIDE 1 MG/ML
INJECTION, SOLUTION INTRAMUSCULAR; INTRAVENOUS
Status: DISCONTINUED | OUTPATIENT
Start: 2020-05-08 | End: 2020-05-08

## 2020-05-08 RX ORDER — POTASSIUM CHLORIDE 20 MEQ/1
40 TABLET, EXTENDED RELEASE ORAL EVERY 4 HOURS
Status: DISPENSED | OUTPATIENT
Start: 2020-05-08 | End: 2020-05-08

## 2020-05-08 RX ORDER — POTASSIUM CHLORIDE 20 MEQ/15ML
40 SOLUTION ORAL ONCE
Status: COMPLETED | OUTPATIENT
Start: 2020-05-08 | End: 2020-05-08

## 2020-05-08 RX ORDER — ONDANSETRON 2 MG/ML
4 INJECTION INTRAMUSCULAR; INTRAVENOUS DAILY PRN
Status: DISCONTINUED | OUTPATIENT
Start: 2020-05-08 | End: 2020-05-13

## 2020-05-08 RX ORDER — HYDROMORPHONE HYDROCHLORIDE 1 MG/ML
2 INJECTION, SOLUTION INTRAMUSCULAR; INTRAVENOUS; SUBCUTANEOUS EVERY 6 HOURS PRN
Status: DISCONTINUED | OUTPATIENT
Start: 2020-05-08 | End: 2020-05-10

## 2020-05-08 RX ORDER — SODIUM CHLORIDE 9 MG/ML
INJECTION, SOLUTION INTRAVENOUS CONTINUOUS PRN
Status: DISCONTINUED | OUTPATIENT
Start: 2020-05-08 | End: 2020-05-08

## 2020-05-08 RX ORDER — PROPOFOL 10 MG/ML
VIAL (ML) INTRAVENOUS
Status: DISCONTINUED | OUTPATIENT
Start: 2020-05-08 | End: 2020-05-08

## 2020-05-08 RX ORDER — FENTANYL CITRATE 50 UG/ML
INJECTION, SOLUTION INTRAMUSCULAR; INTRAVENOUS
Status: DISCONTINUED | OUTPATIENT
Start: 2020-05-08 | End: 2020-05-08

## 2020-05-08 RX ORDER — FENTANYL CITRATE 50 UG/ML
25 INJECTION, SOLUTION INTRAMUSCULAR; INTRAVENOUS EVERY 5 MIN PRN
Status: DISCONTINUED | OUTPATIENT
Start: 2020-05-08 | End: 2020-05-09

## 2020-05-08 RX ADMIN — APIXABAN 10 MG: 5 TABLET, FILM COATED ORAL at 08:05

## 2020-05-08 RX ADMIN — ONDANSETRON 8 MG: 8 TABLET, ORALLY DISINTEGRATING ORAL at 05:05

## 2020-05-08 RX ADMIN — SODIUM CHLORIDE: 0.9 INJECTION, SOLUTION INTRAVENOUS at 10:05

## 2020-05-08 RX ADMIN — FENTANYL CITRATE 25 MCG: 50 INJECTION, SOLUTION INTRAMUSCULAR; INTRAVENOUS at 10:05

## 2020-05-08 RX ADMIN — HEPARIN SODIUM 23 UNITS/KG/HR: 10000 INJECTION, SOLUTION INTRAVENOUS at 07:05

## 2020-05-08 RX ADMIN — DEXTROSE 6 G: 5 SOLUTION INTRAVENOUS at 05:05

## 2020-05-08 RX ADMIN — HYDROMORPHONE HYDROCHLORIDE 2 MG: 1 INJECTION, SOLUTION INTRAMUSCULAR; INTRAVENOUS; SUBCUTANEOUS at 11:05

## 2020-05-08 RX ADMIN — VANCOMYCIN HYDROCHLORIDE 1000 MG: 1 INJECTION, POWDER, LYOPHILIZED, FOR SOLUTION INTRAVENOUS at 12:05

## 2020-05-08 RX ADMIN — PROPOFOL 40 MG: 10 INJECTION, EMULSION INTRAVENOUS at 10:05

## 2020-05-08 RX ADMIN — POTASSIUM CHLORIDE 40 MEQ: 1500 TABLET, EXTENDED RELEASE ORAL at 12:05

## 2020-05-08 RX ADMIN — MIDAZOLAM 2 MG: 1 INJECTION INTRAMUSCULAR; INTRAVENOUS at 10:05

## 2020-05-08 RX ADMIN — PROPOFOL 50 MG: 10 INJECTION, EMULSION INTRAVENOUS at 10:05

## 2020-05-08 RX ADMIN — HYDROMORPHONE HYDROCHLORIDE 2 MG: 1 INJECTION, SOLUTION INTRAMUSCULAR; INTRAVENOUS; SUBCUTANEOUS at 05:05

## 2020-05-08 RX ADMIN — POTASSIUM CHLORIDE 40 MEQ: 20 SOLUTION ORAL at 01:05

## 2020-05-08 RX ADMIN — OXYCODONE HYDROCHLORIDE 5 MG: 5 TABLET ORAL at 08:05

## 2020-05-08 RX ADMIN — HYDROMORPHONE HYDROCHLORIDE 2 MG: 1 INJECTION, SOLUTION INTRAMUSCULAR; INTRAVENOUS; SUBCUTANEOUS at 12:05

## 2020-05-08 RX ADMIN — SODIUM CHLORIDE: 0.9 INJECTION, SOLUTION INTRAVENOUS at 04:05

## 2020-05-08 RX ADMIN — LIDOCAINE HYDROCHLORIDE 100 MG: 20 INJECTION, SOLUTION INTRAVENOUS at 10:05

## 2020-05-08 RX ADMIN — APIXABAN 10 MG: 5 TABLET, FILM COATED ORAL at 12:05

## 2020-05-08 RX ADMIN — FENTANYL CITRATE 50 MCG: 50 INJECTION, SOLUTION INTRAMUSCULAR; INTRAVENOUS at 10:05

## 2020-05-08 RX ADMIN — ACETAMINOPHEN 650 MG: 325 TABLET ORAL at 12:05

## 2020-05-08 RX ADMIN — OXACILLIN SODIUM 12 G: 10 INJECTION, POWDER, FOR SOLUTION INTRAVENOUS at 12:05

## 2020-05-08 RX ADMIN — OXYCODONE HYDROCHLORIDE 5 MG: 5 TABLET ORAL at 04:05

## 2020-05-08 RX ADMIN — HYDROMORPHONE HYDROCHLORIDE 2 MG: 1 INJECTION, SOLUTION INTRAMUSCULAR; INTRAVENOUS; SUBCUTANEOUS at 01:05

## 2020-05-08 RX ADMIN — HEPARIN SODIUM 23 UNITS/KG/HR: 10000 INJECTION, SOLUTION INTRAVENOUS at 11:05

## 2020-05-08 RX ADMIN — PROPOFOL 80 MG: 10 INJECTION, EMULSION INTRAVENOUS at 10:05

## 2020-05-08 NOTE — PLAN OF CARE
Plan of care ongoing. Patient had BRII today no vegetation or acute findings. Temp of 100; resolved with tylenol. Swollen bilateral LE and tenderness of left knee. Dressing cdi. Patient on cefazolin continuous antibiotic. Patient potassium replaced. Heparin drip discontinued and oral AC resumed. No bleeding, falls, or complications this shift. No complaints or concerns.

## 2020-05-08 NOTE — PROGRESS NOTES
[facility]  Infectious Disease  Progress Note    Patient Name: Antoinette Love  MRN: [id number]  Admission Date: 5/3/2020  Length of Stay: 5 days  Attending Physician: Benito Luna MD  Primary Care Provider: Alberto Holguin MD    Isolation Status: No active isolations  Assessment/Plan:      Staphylococcus aureus bacteremia   29-year-old F with hx of factor 5 laden mutation, May-Thuner syndrome, recurrent DVTs and PEs s/p stents to iliac/femoral veins/IVC, IVC filter on long term anti-coagulation admitted with fevers, bilateral thigh pain and swelling. She was found to have extensive thrombosis of the bilateral femoral veins, iliac vein stents, and IVC stent with thrombosis extending to the bilateral popliteal veins. IR unfortunately was unable to perform thrombolysis 2/2 due the extensive chronic thrombi. On 5/4 she developed fevers. Blood cultures drawn returned positive for Staph aureus.  Now showing MSSA.  Vancomycin started initially then primary team started pt on Oxacillin. Unclear source of bacteremia though pt remains concerning for an endovascular infection.  BRII done today w/o signs of endocarditis infection.      Plan  - Discontinue Vanc and start pt on Cefazolin 6 g IV continuous infusion   - Repeat blood cultures daily until sterile  - Anticipate pt will need at least 6 week course of abx from blood cx clearance.  - ID will follow.            Pt discussed with primary team and staff.  Thank you for your consult. I will follow-up with patient. Please contact us if you have any additional questions.    Karel Kim PA-C  Infectious Disease  [facility]    Subjective:     Principal Problem:DVT, lower extremity, proximal, acute, bilateral    HPI: Ms. Love is a 29-year-old female with history of factor 5 laden mutation, May-Thuner syndrome, recurrent DVTs and PEs s/p stents to iliac/femoral veins and IVC filter placement 2014 who  presented to the ED on 5/3 with bilateral thigh pain and swelling over the last 7 days. She is following up with Dr. Garcia in hematology and she was just switched from Coumadin to Fondaparinux around a week ago.  She had not been able to get the prescription for Fondaparinux from the pharmacy and she started taking Lovenox 80 mg BID. She states that on Monday she started having pain in the medial side of her right thigh then she has pain in the same area of the left thigh. She describes the pain as excruciating 8/10, bilateral medial thigh associated with swelling, numbness and bruises.  She endorses episodes of chills, hot flashes, night sweats, dry cough and palpitations over the last two days but denies chest pain, SOB or hemoptysis. Also mentions dysuria for a few days that resolved on its own.  Denies recent procedures within the last month. 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 In late March she did hit her leg on the side of a desk while working and scraped it. She mentions that the area became red, indurated and painful. She self treated with topical abx and the lesion resolved.    She states that she has been taking Coumadin since age of 18 and she was switched to Eliquis  and she has been doing great with it with only one DVT 2016. She had multiple DVTs since August of last year and she has been seen by IR around 9 times for thrombolysis, ballooning and stents placment with last one on  where she had mechanical thrombectomy and stent extension to common femoral veins bilaterally.       In ED, she was afebrile, , /62, SpO2 100% on RA. WBC: 3.8, H.6, platelet: 228. Unremarkable CMP. INR: 1.1, Ptt: 22 and D-dimer 3.6. Normal lactic acid. Doppler US of lower extremities shows  partial thrombosis of the femoral veins, extending to the bilateral popliteal veins. IR was consulted and patient started on heparin. She was  "admitted to  for management.     Since admit she has been seen by GI and IR. The morning after admission, patient was noted to have 2 g drop in hemoglobin with associated hypotension and tachycardia with increasing concern for GI bleed.  CTA ordered to assess for possible GI bleed and to evaluate extensiveness of clot. No GI bleed or extravasation of contrast noted, though thrombus noted to extend to IVC filter. ("Extensive thrombosis of the bilateral femoral veins, iliac vein stents, and IVC stent. IVC filter is in place at the level of the superior aspect of the IVC stent. Asymmetric inflammatory changes involving the anterior compartment of the left thigh."). GI consulted and performed colonoscopy yesterday which did not reveal a bleed. IR planning for thrombolysis today.      On 5/4, patient became febrile to 102.6. Ceftriaxone started. UA showed 18 WBC. Urine cx with multiple organisms isolated. Blood cultures drawn 5/5 are now showing GPCs resembling Staph. Repeats ordered. Vanc started.     GI consulted and performed colonoscopy yesterday which did not reveal a bleed. IR planning for thrombolysis.        US BLE  "There are stents within the right common femoral vein and left common femoral vein, thrombosed.  There is partial thrombosis of the femoral veins, with thrombosis extending to the bilateral popliteal veins.  The bilateral greater saphenous veins are patent.  There is partial thrombus in the right posterior tibial vein.  The left posterior tibial vein is patent.  The bilateral anterior tibial veins and peroneal veins are patent.  There is stent of the left external iliac vein, left external iliac vein appears patent.  There is thrombus within the right external iliac vein."    Two PIVs   Interval History: Pt afebrile.  Blood cxs now showing MSSA. Repeats 5/6 and 5/8 are positive.  BRII today w/o evidence of endocarditis.    Review of Systems   Constitutional: Positive for chills and fever. Negative " for fatigue.   HENT: Negative for congestion.    Respiratory: Negative for cough and shortness of breath.    Cardiovascular: Positive for leg swelling. Negative for chest pain.   Gastrointestinal: Negative for abdominal pain, diarrhea and nausea.   Genitourinary: Negative for difficulty urinating, dysuria, hematuria and urgency.   Musculoskeletal: Positive for arthralgias (BLE) and gait problem (2/2 pain). Negative for back pain.   Skin: Negative for color change, pallor and wound.   Neurological: Positive for weakness. Negative for light-headedness and headaches.   Hematological: Bruises/bleeds easily.   Psychiatric/Behavioral: Negative for behavioral problems and confusion. The patient is not nervous/anxious.      Objective:     Vital Signs (Most Recent):  Temp: 99.7 °F (37.6 °C) (05/08/20 0834)  Pulse: 75 (05/08/20 0911)  Resp: 16 (05/08/20 0834)  BP: (!) 98/57 (05/08/20 0834)  SpO2: 99 % (05/08/20 0834) Vital Signs (24h Range):  Temp:  [98.3 °F (36.8 °C)-99.9 °F (37.7 °C)] 99.7 °F (37.6 °C)  Pulse:  [] 75  Resp:  [16-18] 16  SpO2:  [96 %-99 %] 99 %  BP: ()/(50-58) 98/57     Weight: 61.1 kg (134 lb 11.2 oz)  Body mass index is 22.42 kg/m².    Estimated Creatinine Clearance: 104.8 mL/min (based on SCr of 0.7 mg/dL).    Physical Exam   Constitutional: She is oriented to person, place, and time. She appears well-developed and well-nourished. No distress.   HENT:   Head: Normocephalic and atraumatic.   Mouth/Throat: No oropharyngeal exudate.   Eyes: Conjunctivae are normal. No scleral icterus.   Neck: Neck supple.   Cardiovascular: Normal rate, regular rhythm and normal heart sounds.   Pulmonary/Chest: Effort normal and breath sounds normal. No respiratory distress. She has no rales.   Abdominal: Soft. She exhibits no distension. There is no tenderness.   Musculoskeletal: She exhibits edema and tenderness (BLE).   Decreased ROM 2/2 pain   Neurological: She is alert and oriented to person, place, and time.    Skin: Skin is warm. She is not diaphoretic. No erythema.   PIV sites c/d/i  Left antecubital fossa bruising and tenderness at prior IV site  bruising of BLE   Psychiatric: She has a normal mood and affect. Her behavior is normal. Thought content normal.   Nursing note and vitals reviewed.      Significant Labs: All pertinent labs within the past 24 hours have been reviewed.    Significant Imaging: I have reviewed all pertinent imaging results/findings within the past 24 hours.

## 2020-05-08 NOTE — SUBJECTIVE & OBJECTIVE
"Past Medical History:   Diagnosis Date    Anemia of chronic disease 12/19/2019    DVT (deep venous thrombosis)     Encounter for blood transfusion     Factor V Leiden     Anticoagulant long-term use    Leg edema, left     May-Thurner syndrome     Multiple pulmonary nodules     Pulmonary embolus     Reactive airway disease without complication 8/9/2019    On albuterol prn, addition singulair    Recurrent upper respiratory infection (URI)     Recurrent urticaria     Stroke     TIA (transient ischemic attack)        Past Surgical History:   Procedure Laterality Date    COLONOSCOPY N/A 12/18/2015    Procedure: COLONOSCOPY;  Surgeon: Lefty Lee MD;  Location: [facility] ENDO (4TH FLR);  Service: Endoscopy;  Laterality: N/A;  schedule with Ray    COLONOSCOPY N/A 5/5/2020    Procedure: COLONOSCOPY;  Surgeon: aLmin Powers MD;  Location: [facility] ENDO (2ND FLR);  Service: Endoscopy;  Laterality: N/A;    IVC FILTER RETRIEVAL      IVC FILTER RETRIEVAL N/A 9/23/2019    Procedure: REMOVAL-FILTER-IVC;  Surgeon: Claudia Surgeon;  Location: [facility];  Service: Anesthesiology;  Laterality: N/A;  188  4 hours Anes    PHLEBOGRAPHY N/A 5/6/2020    Procedure: Venogram;  Surgeon: Claudia Surgeon;  Location: [facility];  Service: Anesthesiology;  Laterality: N/A;    SKIN BIOPSY      tumor from breast      left    VASCULAR SURGERY      WISDOM TOOTH EXTRACTION         Review of patient's allergies indicates:   Allergen Reactions    Azithromycin Hives    Beef containing products Anaphylaxis     Patient cannot eat BEEF BROTH  STATES IT WILL MAKE HER THROAT CLOSE UP .     Pork derived (porcine) Anaphylaxis     Throat swells up cannot eat pork sausage or pork patties ,     Unclassified drug Shortness Of Breath and Other (See Comments)     Is allergic to a beef spice - Causes "throat closing"    Xarelto [rivaroxaban] Other (See Comments)     Gallbladder swelling and disfunction    Toradol [ketorolac] Hives and Itching       No " current facility-administered medications on file prior to encounter.      Current Outpatient Medications on File Prior to Encounter   Medication Sig    clopidogrel (PLAVIX) 75 mg tablet Take 1 tablet (75 mg total) by mouth once daily.    diazePAM (VALIUM) 5 MG tablet TAKE 1 TABLET (5 MG TOTAL) BY MOUTH EVERY 12 (TWELVE) HOURS AS NEEDED FOR ANXIETY.    EPINEPHrine (EPIPEN 2-VAN) 0.3 mg/0.3 mL AtIn Inject 0.3 mLs (0.3 mg total) into the muscle once. for 1 dose    prothrombin time/INR test metr Misc 1 each by Misc.(Non-Drug; Combo Route) route once daily.    rivaroxaban (XARELTO) 15 mg (42)- 20 mg (9) tablet dose pack Take 1 tablet (15 mg) by mouth twice daily with food for 21 days followed by 1 tablet (20 mg) by mouth once daily with food     Family History     Problem Relation (Age of Onset)    Allergies Mother        Tobacco Use    Smoking status: Never Smoker    Smokeless tobacco: Never Used   Substance and Sexual Activity    Alcohol use: No     Frequency: Monthly or less     Drinks per session: 3 or 4     Binge frequency: Never    Drug use: No    Sexual activity: Not Currently     Partners: Male     Review of Systems   Constitution: Negative for chills.   HENT: Negative for congestion.    Eyes: Negative for blurred vision.   Cardiovascular: Negative for chest pain, leg swelling, near-syncope, palpitations and syncope.   Respiratory: Negative for cough and shortness of breath.    Endocrine: Negative for polyuria.   Skin: Negative for itching and rash.   Musculoskeletal: Negative for back pain.   Gastrointestinal: Negative for abdominal pain, constipation, diarrhea, nausea and vomiting.   Genitourinary: Negative for dysuria.   Neurological: Negative for dizziness, headaches and light-headedness.   Psychiatric/Behavioral: Negative for altered mental status.     Objective:     Vital Signs (Most Recent):  Temp: 99.2 °F (37.3 °C) (05/08/20 0424)  Pulse: 100 (05/08/20 0600)  Resp: 18 (05/08/20 0424)  BP: (!)  109/58 (05/08/20 0424)  SpO2: 98 % (05/08/20 0424) Vital Signs (24h Range):  Temp:  [98.3 °F (36.8 °C)-99.9 °F (37.7 °C)] 99.2 °F (37.3 °C)  Pulse:  [] 100  Resp:  [16-18] 18  SpO2:  [96 %-99 %] 98 %  BP: ()/(50-58) 109/58     Weight: 61.1 kg (134 lb 11.2 oz)  Body mass index is 22.42 kg/m².    SpO2: 98 %  O2 Device (Oxygen Therapy): room air      Intake/Output Summary (Last 24 hours) at 5/8/2020 0727  Last data filed at 5/8/2020 0600  Gross per 24 hour   Intake 5007.08 ml   Output 1550 ml   Net 3457.08 ml       Lines/Drains/Airways     Peripheral Intravenous Line                 Peripheral IV - Single Lumen 05/05/20 1146 20 G;1 3/4 in Left;Anterior Upper Arm 2 days         Peripheral IV - Single Lumen 05/06/20 1000 20 G;1 3/4 in Right Upper Arm 1 day                Physical Exam   Constitutional: She is oriented to person, place, and time. She appears well-developed and well-nourished.   HENT:   Head: Normocephalic and atraumatic.   Eyes: Pupils are equal, round, and reactive to light. Conjunctivae are normal.   Neck: Normal range of motion. Neck supple.   Cardiovascular: Normal rate, regular rhythm, S1 normal, S2 normal and normal heart sounds. Exam reveals no gallop and no friction rub.   No murmur heard.  Pulses:       Radial pulses are 2+ on the right side, and 2+ on the left side.   Pulmonary/Chest: Effort normal and breath sounds normal. No respiratory distress. She has no wheezes. She has no rales. She exhibits no tenderness.   Abdominal: Soft. Bowel sounds are normal. She exhibits no distension and no mass. There is no tenderness. There is no rebound and no guarding.   Musculoskeletal: She exhibits no edema.   Neurological: She is alert and oriented to person, place, and time.   Skin: Skin is warm and dry.   Psychiatric: She has a normal mood and affect.       Significant Labs:   Blood Culture:   Recent Labs   Lab 05/07/20  0744 05/07/20  0745   SHOSHANA Gram stain anny bottle: Gram positive cocci  in clusters resembling Staph   Results called to and read back by:Malka Maldonado RN 05/08/2020  05:17 No Growth to date   , BMP:   Recent Labs   Lab 05/07/20  0744 05/08/20  0614   GLU 84 87    138   K 3.5 3.1*    106   CO2 20* 21*   BUN 2* 3*   CREATININE 0.6 0.7   CALCIUM 8.5* 8.9   , CMP   Recent Labs   Lab 05/07/20  0744 05/08/20  0614    138   K 3.5 3.1*    106   CO2 20* 21*   GLU 84 87   BUN 2* 3*   CREATININE 0.6 0.7   CALCIUM 8.5* 8.9   PROT 6.6 7.2   ALBUMIN 1.7* 1.8*   BILITOT 0.3 0.2   ALKPHOS 222* 267*   AST 9* 19   ALT 12 14   ANIONGAP 11 11   ESTGFRAFRICA >60.0 >60.0   EGFRNONAA >60.0 >60.0   , CBC   Recent Labs   Lab 05/07/20  0744 05/08/20  0614   WBC 4.05 3.36*   HGB 8.2* 8.7*   HCT 27.2* 29.3*    232   , INR   Recent Labs   Lab 05/07/20  0744   INR 1.4*   , Lipid Panel No results for input(s): CHOL, HDL, LDLCALC, TRIG, CHOLHDL in the last 48 hours., Troponin No results for input(s): TROPONINI in the last 48 hours. and All pertinent lab results from the last 24 hours have been reviewed.    Significant Imaging: CT scan: CT ABDOMEN PELVIS WITH CONTRAST: No results found for this visit on 05/03/20. and CT ABDOMEN PELVIS WITHOUT CONTRAST: No results found for this visit on 05/03/20., Echocardiogram:   2D echo with color flow doppler: No results found for this or any previous visit. and Transthoracic echo (TTE) complete (Cupid Only):   Results for orders placed or performed during the hospital encounter of 05/03/20   Echo Color Flow Doppler? Yes   Result Value Ref Range    Ascending aorta 3.08 cm    STJ 2.77 cm    AV mean gradient 4 mmHg    Ao peak tolu 1.45 m/s    Ao VTI 23.89 cm    IVRT 74.22 msec    IVS 0.60 0.6 - 1.1 cm    LA size 2.77 cm    Left Atrium Major Axis 4.52 cm    Left Atrium Minor Axis 4.39 cm    LVIDD 4.94 3.5 - 6.0 cm    LVIDS 2.89 2.1 - 4.0 cm    LVOT diameter 2.10 cm    LVOT peak VTI 21.28 cm    PW 0.65 0.6 - 1.1 cm    MV Peak A Tolu 0.55 m/s    E wave  decelartion time 191.99 msec    MV Peak E Tolu 0.93 m/s    PV Peak D Tolu 0.44 m/s    PV Peak S Tolu 0.48 m/s    RA Major Axis 4.77 cm    RA Width 4.88 cm    RVDD 3.26 cm    Sinus 3.23 cm    TAPSE 2.69 cm    TR Max Tolu 2.00 m/s    TDI LATERAL 0.15 m/s    TDI SEPTAL 0.13 m/s    LA WIDTH 4.20 cm    LV Diastolic Volume 115.11 mL    LV Systolic Volume 31.95 mL    RV S' 16.62 cm/s    LVOT peak tolu 1.16 m/s    LV LATERAL E/E' RATIO 6.20 m/s    LV SEPTAL E/E' RATIO 7.15 m/s    FS 41 %    LA volume 44.05 cm3    LV mass 97.63 g    Left Ventricle Relative Wall Thickness 0.26 cm    AV valve area 3.08 cm2    AV Velocity Ratio 0.80     AV index (prosthetic) 0.89     E/A ratio 1.69     Mean e' 0.14 m/s    Pulm vein S/D ratio 1.09     LVOT area 3.5 cm2    LVOT stroke volume 73.67 cm3    AV peak gradient 8 mmHg    E/E' ratio 6.64 m/s    LV Systolic Volume Index 18.9 mL/m2    LV Diastolic Volume Index 68.13 mL/m2    LA Volume Index 26.1 mL/m2    LV Mass Index 58 g/m2    Triscuspid Valve Regurgitation Peak Gradient 16 mmHg    BSA 1.69 m2    Right Atrial Pressure (from IVC) 3 mmHg    TV rest pulmonary artery pressure 19 mmHg    Narrative    · Normal left ventricular systolic function. The estimated ejection   fraction is 60%.  · Normal right ventricular systolic function.  · Normal LV diastolic function.  · No wall motion abnormalities.  · The estimated PA systolic pressure is 19 mmHg.  · Normal central venous pressure (3 mmHg).       and X-Ray: CXR: X-Ray Chest 1 View (CXR):   Results for orders placed or performed during the hospital encounter of 05/03/20   X-Ray Chest 1 View    Narrative    EXAMINATION:  XR CHEST 1 VIEW    CLINICAL HISTORY:  Febrile workup;    TECHNIQUE:  Single frontal view of the chest was performed.    COMPARISON:  Radiograph 08/30/2019.    FINDINGS:  Cardiac monitoring leads project over the bilateral hemithoraces.  Mediastinal structures are midline.  Hilar contours are unremarkable.  Cardiac silhouette is normal  in size.  Lung volumes are normal and symmetric.  No consolidation.  No pneumothorax or pleural effusion.  No free air beneath the diaphragm.  No acute osseous abnormalities.  Visualized soft tissues are unremarkable.      Impression    1. No acute radiographic findings in the chest on this single view.      Electronically signed by: Daniel Saldivar MD  Date:    05/05/2020  Time:    08:55    and X-Ray Chest PA and Lateral (CXR): No results found for this visit on 05/03/20. and KUB: X-Ray Abdomen AP 1 View (KUB): No results found for this visit on 05/03/20.

## 2020-05-08 NOTE — PT/OT/SLP PROGRESS
Occupational Therapy      Patient Name:  Antoinette Love   MRN:  [id number]    Patient not seen today secondary to Patient fatigue, Patient ill (Comment), Pain(Pt refused Therapy x2 2* poor sleep and post procedure pain. ). Will follow-up Monday 11 May.    Brain Marie, OT  5/8/2020

## 2020-05-08 NOTE — TRANSFER OF CARE
"Anesthesia Transfer of Care Note    Patient: Antoinette Love    Procedure(s) Performed: Procedure(s) (LRB):  ECHOCARDIOGRAM,TRANSESOPHAGEAL (N/A)    Patient location: PACU    Anesthesia Type: general    Transport from OR: Transported from OR on room air with adequate spontaneous ventilation    Post pain: adequate analgesia    Post assessment: no apparent anesthetic complications    Post vital signs: stable    Level of consciousness: awake    Nausea/Vomiting: no nausea/vomiting    Complications: none    Transfer of care protocol was followed      Last vitals:   Visit Vitals  BP (!) 105/56   Pulse 85   Temp 37.9 °C (100.2 °F) (Oral)   Resp 16   Ht 5' 5" (1.651 m)   Wt 60.8 kg (134 lb)   LMP  (Within Months)   SpO2 99%   Breastfeeding? No   BMI 22.30 kg/m²     "

## 2020-05-08 NOTE — HPI
Antoinette Love is a 29 y.o. year old female. Cardiology consulted for BRII for evaluation for endocarditits. Initially presented with DVT. Bcx positive on admission for MRSA. ID requesting BRII. No vegetations on TTE. Bcx are now clear on IV Vanc.     Dysphagia or odynophagia:  No  Liver Disease, esophageal disease, or known varices:  No  Upper GI Bleeding: No  Snoring:  No  Sleep Apnea:  No  Prior neck surgery or radiation:  No  History of anesthetic difficulties:  No  Family history of anesthetic difficulties:  No  Last oral intake:  12 hours ago  Able to move neck in all directions:  Yes  Stroke History:  No  Last dose of anticoagulation:  On heparin drip    Anticoagulation: Heparin drip  Antiplatelets: None  Platelet count:  Lab Results   Component Value Date     05/08/2020     INR:   Lab Results   Component Value Date    INR 1.4 (H) 05/07/2020    INR 1.2 05/06/2020    INR 1.5 (H) 05/05/2020     PTT:   Lab Results   Component Value Date    APTT 57.4 (H) 05/07/2020      Echo: No results found for: EF  2D echo with color flow doppler: No results found for this or any previous visit. and Transthoracic echo (TTE) complete (Cupid Only):   Results for orders placed or performed during the hospital encounter of 05/03/20   Echo Color Flow Doppler? Yes   Result Value Ref Range    Ascending aorta 3.08 cm    STJ 2.77 cm    AV mean gradient 4 mmHg    Ao peak lei 1.45 m/s    Ao VTI 23.89 cm    IVRT 74.22 msec    IVS 0.60 0.6 - 1.1 cm    LA size 2.77 cm    Left Atrium Major Axis 4.52 cm    Left Atrium Minor Axis 4.39 cm    LVIDD 4.94 3.5 - 6.0 cm    LVIDS 2.89 2.1 - 4.0 cm    LVOT diameter 2.10 cm    LVOT peak VTI 21.28 cm    PW 0.65 0.6 - 1.1 cm    MV Peak A Lei 0.55 m/s    E wave decelartion time 191.99 msec    MV Peak E Lei 0.93 m/s    PV Peak D Lei 0.44 m/s    PV Peak S Lei 0.48 m/s    RA Major Axis 4.77 cm    RA Width 4.88 cm    RVDD 3.26 cm    Sinus 3.23 cm    TAPSE 2.69 cm    TR Max Lei 2.00 m/s    TDI  LATERAL 0.15 m/s    TDI SEPTAL 0.13 m/s    LA WIDTH 4.20 cm    LV Diastolic Volume 115.11 mL    LV Systolic Volume 31.95 mL    RV S' 16.62 cm/s    LVOT peak lei 1.16 m/s    LV LATERAL E/E' RATIO 6.20 m/s    LV SEPTAL E/E' RATIO 7.15 m/s    FS 41 %    LA volume 44.05 cm3    LV mass 97.63 g    Left Ventricle Relative Wall Thickness 0.26 cm    AV valve area 3.08 cm2    AV Velocity Ratio 0.80     AV index (prosthetic) 0.89     E/A ratio 1.69     Mean e' 0.14 m/s    Pulm vein S/D ratio 1.09     LVOT area 3.5 cm2    LVOT stroke volume 73.67 cm3    AV peak gradient 8 mmHg    E/E' ratio 6.64 m/s    LV Systolic Volume Index 18.9 mL/m2    LV Diastolic Volume Index 68.13 mL/m2    LA Volume Index 26.1 mL/m2    LV Mass Index 58 g/m2    Triscuspid Valve Regurgitation Peak Gradient 16 mmHg    BSA 1.69 m2    Right Atrial Pressure (from IVC) 3 mmHg    TV rest pulmonary artery pressure 19 mmHg    Narrative    · Normal left ventricular systolic function. The estimated ejection   fraction is 60%.  · Normal right ventricular systolic function.  · Normal LV diastolic function.  · No wall motion abnormalities.  · The estimated PA systolic pressure is 19 mmHg.  · Normal central venous pressure (3 mmHg).

## 2020-05-08 NOTE — CONSULTS
[facility]  Cardiology  Consult Note    Patient Name: Antoinette Love  MRN: [id number]  Admission Date: 5/3/2020  Hospital Length of Stay: 5 days  Code Status: Full Code   Attending Provider: Benito Luna MD   Consulting Provider: Hesham Wisdom MD  Primary Care Physician: Alberto Holguin MD  Principal Problem:DVT, lower extremity, proximal, acute, bilateral    Patient information was obtained from patient.       Subjective:       HPI:   Antoinette Love is a 29 y.o. year old female. Cardiology consulted for BRII for evaluation for endocarditits. Initially presented with DVT. Bcx positive on admission for MRSA. ID requesting BRII. No vegetations on TTE. Bcx are now clear on IV Vanc.     Dysphagia or odynophagia:  No  Liver Disease, esophageal disease, or known varices:  No  Upper GI Bleeding: No  Snoring:  No  Sleep Apnea:  No  Prior neck surgery or radiation:  No  History of anesthetic difficulties:  No  Family history of anesthetic difficulties:  No  Last oral intake:  12 hours ago  Able to move neck in all directions:  Yes  Stroke History:  No  Last dose of anticoagulation:  On heparin drip    Anticoagulation: Heparin drip  Antiplatelets: None  Platelet count:  Lab Results   Component Value Date     05/08/2020     INR:   Lab Results   Component Value Date    INR 1.4 (H) 05/07/2020    INR 1.2 05/06/2020    INR 1.5 (H) 05/05/2020     PTT:   Lab Results   Component Value Date    APTT 57.4 (H) 05/07/2020      Echo: No results found for: EF  2D echo with color flow doppler: No results found for this or any previous visit. and Transthoracic echo (TTE) complete (Cupid Only):   Results for orders placed or performed during the hospital encounter of 05/03/20   Echo Color Flow Doppler? Yes   Result Value Ref Range    Ascending aorta 3.08 cm    STJ 2.77 cm    AV mean gradient 4 mmHg    Ao peak lei 1.45 m/s    Ao VTI 23.89 cm    IVRT 74.22 msec    IVS 0.60 0.6 - 1.1 cm    LA  size 2.77 cm    Left Atrium Major Axis 4.52 cm    Left Atrium Minor Axis 4.39 cm    LVIDD 4.94 3.5 - 6.0 cm    LVIDS 2.89 2.1 - 4.0 cm    LVOT diameter 2.10 cm    LVOT peak VTI 21.28 cm    PW 0.65 0.6 - 1.1 cm    MV Peak A Tolu 0.55 m/s    E wave decelartion time 191.99 msec    MV Peak E Tolu 0.93 m/s    PV Peak D Tolu 0.44 m/s    PV Peak S Tolu 0.48 m/s    RA Major Axis 4.77 cm    RA Width 4.88 cm    RVDD 3.26 cm    Sinus 3.23 cm    TAPSE 2.69 cm    TR Max Tolu 2.00 m/s    TDI LATERAL 0.15 m/s    TDI SEPTAL 0.13 m/s    LA WIDTH 4.20 cm    LV Diastolic Volume 115.11 mL    LV Systolic Volume 31.95 mL    RV S' 16.62 cm/s    LVOT peak tolu 1.16 m/s    LV LATERAL E/E' RATIO 6.20 m/s    LV SEPTAL E/E' RATIO 7.15 m/s    FS 41 %    LA volume 44.05 cm3    LV mass 97.63 g    Left Ventricle Relative Wall Thickness 0.26 cm    AV valve area 3.08 cm2    AV Velocity Ratio 0.80     AV index (prosthetic) 0.89     E/A ratio 1.69     Mean e' 0.14 m/s    Pulm vein S/D ratio 1.09     LVOT area 3.5 cm2    LVOT stroke volume 73.67 cm3    AV peak gradient 8 mmHg    E/E' ratio 6.64 m/s    LV Systolic Volume Index 18.9 mL/m2    LV Diastolic Volume Index 68.13 mL/m2    LA Volume Index 26.1 mL/m2    LV Mass Index 58 g/m2    Triscuspid Valve Regurgitation Peak Gradient 16 mmHg    BSA 1.69 m2    Right Atrial Pressure (from IVC) 3 mmHg    TV rest pulmonary artery pressure 19 mmHg    Narrative    · Normal left ventricular systolic function. The estimated ejection   fraction is 60%.  · Normal right ventricular systolic function.  · Normal LV diastolic function.  · No wall motion abnormalities.  · The estimated PA systolic pressure is 19 mmHg.  · Normal central venous pressure (3 mmHg).          Past Medical History:   Diagnosis Date    Anemia of chronic disease 12/19/2019    DVT (deep venous thrombosis)     Encounter for blood transfusion     Factor V Leiden     Anticoagulant long-term use    Leg edema, left     May-Thurner syndrome     Multiple  "pulmonary nodules     Pulmonary embolus     Reactive airway disease without complication 8/9/2019    On albuterol prn, addition singulair    Recurrent upper respiratory infection (URI)     Recurrent urticaria     Stroke     TIA (transient ischemic attack)        Past Surgical History:   Procedure Laterality Date    COLONOSCOPY N/A 12/18/2015    Procedure: COLONOSCOPY;  Surgeon: Lefty Lee MD;  Location: [facility] ENDO (4TH FLR);  Service: Endoscopy;  Laterality: N/A;  schedule with Ray    COLONOSCOPY N/A 5/5/2020    Procedure: COLONOSCOPY;  Surgeon: Lamin Powers MD;  Location: [facility] ENDO (2ND FLR);  Service: Endoscopy;  Laterality: N/A;    IVC FILTER RETRIEVAL      IVC FILTER RETRIEVAL N/A 9/23/2019    Procedure: REMOVAL-FILTER-IVC;  Surgeon: Claudia Surgeon;  Location: [facility];  Service: Anesthesiology;  Laterality: N/A;  188  4 hours Anes    PHLEBOGRAPHY N/A 5/6/2020    Procedure: Venogram;  Surgeon: Claudia Surgeon;  Location: [facility];  Service: Anesthesiology;  Laterality: N/A;    SKIN BIOPSY      tumor from breast      left    VASCULAR SURGERY      WISDOM TOOTH EXTRACTION         Review of patient's allergies indicates:   Allergen Reactions    Azithromycin Hives    Beef containing products Anaphylaxis     Patient cannot eat BEEF BROTH  STATES IT WILL MAKE HER THROAT CLOSE UP .     Pork derived (porcine) Anaphylaxis     Throat swells up cannot eat pork sausage or pork patties ,     Unclassified drug Shortness Of Breath and Other (See Comments)     Is allergic to a beef spice - Causes "throat closing"    Xarelto [rivaroxaban] Other (See Comments)     Gallbladder swelling and disfunction    Toradol [ketorolac] Hives and Itching       No current facility-administered medications on file prior to encounter.      Current Outpatient Medications on File Prior to Encounter   Medication Sig    clopidogrel (PLAVIX) 75 mg tablet Take 1 tablet (75 mg total) by mouth once daily.    diazePAM (VALIUM) 5 MG " tablet TAKE 1 TABLET (5 MG TOTAL) BY MOUTH EVERY 12 (TWELVE) HOURS AS NEEDED FOR ANXIETY.    EPINEPHrine (EPIPEN 2-VAN) 0.3 mg/0.3 mL AtIn Inject 0.3 mLs (0.3 mg total) into the muscle once. for 1 dose    prothrombin time/INR test metr Misc 1 each by Misc.(Non-Drug; Combo Route) route once daily.    rivaroxaban (XARELTO) 15 mg (42)- 20 mg (9) tablet dose pack Take 1 tablet (15 mg) by mouth twice daily with food for 21 days followed by 1 tablet (20 mg) by mouth once daily with food     Family History     Problem Relation (Age of Onset)    Allergies Mother        Tobacco Use    Smoking status: Never Smoker    Smokeless tobacco: Never Used   Substance and Sexual Activity    Alcohol use: No     Frequency: Monthly or less     Drinks per session: 3 or 4     Binge frequency: Never    Drug use: No    Sexual activity: Not Currently     Partners: Male     Review of Systems   Constitution: Negative for chills.   HENT: Negative for congestion.    Eyes: Negative for blurred vision.   Cardiovascular: Negative for chest pain, leg swelling, near-syncope, palpitations and syncope.   Respiratory: Negative for cough and shortness of breath.    Endocrine: Negative for polyuria.   Skin: Negative for itching and rash.   Musculoskeletal: Negative for back pain.   Gastrointestinal: Negative for abdominal pain, constipation, diarrhea, nausea and vomiting.   Genitourinary: Negative for dysuria.   Neurological: Negative for dizziness, headaches and light-headedness.   Psychiatric/Behavioral: Negative for altered mental status.     Objective:     Vital Signs (Most Recent):  Temp: 99.2 °F (37.3 °C) (05/08/20 0424)  Pulse: 100 (05/08/20 0600)  Resp: 18 (05/08/20 0424)  BP: (!) 109/58 (05/08/20 0424)  SpO2: 98 % (05/08/20 0424) Vital Signs (24h Range):  Temp:  [98.3 °F (36.8 °C)-99.9 °F (37.7 °C)] 99.2 °F (37.3 °C)  Pulse:  [] 100  Resp:  [16-18] 18  SpO2:  [96 %-99 %] 98 %  BP: ()/(50-58) 109/58     Weight: 61.1 kg (134 lb  11.2 oz)  Body mass index is 22.42 kg/m².    SpO2: 98 %  O2 Device (Oxygen Therapy): room air      Intake/Output Summary (Last 24 hours) at 5/8/2020 0727  Last data filed at 5/8/2020 0600  Gross per 24 hour   Intake 5007.08 ml   Output 1550 ml   Net 3457.08 ml       Lines/Drains/Airways     Peripheral Intravenous Line                 Peripheral IV - Single Lumen 05/05/20 1146 20 G;1 3/4 in Left;Anterior Upper Arm 2 days         Peripheral IV - Single Lumen 05/06/20 1000 20 G;1 3/4 in Right Upper Arm 1 day                Physical Exam   Constitutional: She is oriented to person, place, and time. She appears well-developed and well-nourished.   HENT:   Head: Normocephalic and atraumatic.   Eyes: Pupils are equal, round, and reactive to light. Conjunctivae are normal.   Neck: Normal range of motion. Neck supple.   Cardiovascular: Normal rate, regular rhythm, S1 normal, S2 normal and normal heart sounds. Exam reveals no gallop and no friction rub.   No murmur heard.  Pulses:       Radial pulses are 2+ on the right side, and 2+ on the left side.   Pulmonary/Chest: Effort normal and breath sounds normal. No respiratory distress. She has no wheezes. She has no rales. She exhibits no tenderness.   Abdominal: Soft. Bowel sounds are normal. She exhibits no distension and no mass. There is no tenderness. There is no rebound and no guarding.   Musculoskeletal: She exhibits no edema.   Neurological: She is alert and oriented to person, place, and time.   Skin: Skin is warm and dry.   Psychiatric: She has a normal mood and affect.       Significant Labs:   Blood Culture:   Recent Labs   Lab 05/07/20  0744 05/07/20  0745   LABBLOO Gram stain anny bottle: Gram positive cocci in clusters resembling Staph   Results called to and read back by:Malka Maldonado RN 05/08/2020  05:17 No Growth to date   , BMP:   Recent Labs   Lab 05/07/20  0744 05/08/20  0614   GLU 84 87    138   K 3.5 3.1*    106   CO2 20* 21*   BUN 2* 3*    CREATININE 0.6 0.7   CALCIUM 8.5* 8.9   , CMP   Recent Labs   Lab 05/07/20  0744 05/08/20  0614    138   K 3.5 3.1*    106   CO2 20* 21*   GLU 84 87   BUN 2* 3*   CREATININE 0.6 0.7   CALCIUM 8.5* 8.9   PROT 6.6 7.2   ALBUMIN 1.7* 1.8*   BILITOT 0.3 0.2   ALKPHOS 222* 267*   AST 9* 19   ALT 12 14   ANIONGAP 11 11   ESTGFRAFRICA >60.0 >60.0   EGFRNONAA >60.0 >60.0   , CBC   Recent Labs   Lab 05/07/20  0744 05/08/20  0614   WBC 4.05 3.36*   HGB 8.2* 8.7*   HCT 27.2* 29.3*    232   , INR   Recent Labs   Lab 05/07/20  0744   INR 1.4*   , Lipid Panel No results for input(s): CHOL, HDL, LDLCALC, TRIG, CHOLHDL in the last 48 hours., Troponin No results for input(s): TROPONINI in the last 48 hours. and All pertinent lab results from the last 24 hours have been reviewed.    Significant Imaging: CT scan: CT ABDOMEN PELVIS WITH CONTRAST: No results found for this visit on 05/03/20. and CT ABDOMEN PELVIS WITHOUT CONTRAST: No results found for this visit on 05/03/20., Echocardiogram:   2D echo with color flow doppler: No results found for this or any previous visit. and Transthoracic echo (TTE) complete (Cupid Only):   Results for orders placed or performed during the hospital encounter of 05/03/20   Echo Color Flow Doppler? Yes   Result Value Ref Range    Ascending aorta 3.08 cm    STJ 2.77 cm    AV mean gradient 4 mmHg    Ao peak tolu 1.45 m/s    Ao VTI 23.89 cm    IVRT 74.22 msec    IVS 0.60 0.6 - 1.1 cm    LA size 2.77 cm    Left Atrium Major Axis 4.52 cm    Left Atrium Minor Axis 4.39 cm    LVIDD 4.94 3.5 - 6.0 cm    LVIDS 2.89 2.1 - 4.0 cm    LVOT diameter 2.10 cm    LVOT peak VTI 21.28 cm    PW 0.65 0.6 - 1.1 cm    MV Peak A Tolu 0.55 m/s    E wave decelartion time 191.99 msec    MV Peak E Tolu 0.93 m/s    PV Peak D Tolu 0.44 m/s    PV Peak S Tolu 0.48 m/s    RA Major Axis 4.77 cm    RA Width 4.88 cm    RVDD 3.26 cm    Sinus 3.23 cm    TAPSE 2.69 cm    TR Max Tolu 2.00 m/s    TDI LATERAL 0.15 m/s    TDI  SEPTAL 0.13 m/s    LA WIDTH 4.20 cm    LV Diastolic Volume 115.11 mL    LV Systolic Volume 31.95 mL    RV S' 16.62 cm/s    LVOT peak lei 1.16 m/s    LV LATERAL E/E' RATIO 6.20 m/s    LV SEPTAL E/E' RATIO 7.15 m/s    FS 41 %    LA volume 44.05 cm3    LV mass 97.63 g    Left Ventricle Relative Wall Thickness 0.26 cm    AV valve area 3.08 cm2    AV Velocity Ratio 0.80     AV index (prosthetic) 0.89     E/A ratio 1.69     Mean e' 0.14 m/s    Pulm vein S/D ratio 1.09     LVOT area 3.5 cm2    LVOT stroke volume 73.67 cm3    AV peak gradient 8 mmHg    E/E' ratio 6.64 m/s    LV Systolic Volume Index 18.9 mL/m2    LV Diastolic Volume Index 68.13 mL/m2    LA Volume Index 26.1 mL/m2    LV Mass Index 58 g/m2    Triscuspid Valve Regurgitation Peak Gradient 16 mmHg    BSA 1.69 m2    Right Atrial Pressure (from IVC) 3 mmHg    TV rest pulmonary artery pressure 19 mmHg    Narrative    · Normal left ventricular systolic function. The estimated ejection   fraction is 60%.  · Normal right ventricular systolic function.  · Normal LV diastolic function.  · No wall motion abnormalities.  · The estimated PA systolic pressure is 19 mmHg.  · Normal central venous pressure (3 mmHg).       and X-Ray: CXR: X-Ray Chest 1 View (CXR):   Results for orders placed or performed during the hospital encounter of 05/03/20   X-Ray Chest 1 View    Narrative    EXAMINATION:  XR CHEST 1 VIEW    CLINICAL HISTORY:  Febrile workup;    TECHNIQUE:  Single frontal view of the chest was performed.    COMPARISON:  Radiograph 08/30/2019.    FINDINGS:  Cardiac monitoring leads project over the bilateral hemithoraces.  Mediastinal structures are midline.  Hilar contours are unremarkable.  Cardiac silhouette is normal in size.  Lung volumes are normal and symmetric.  No consolidation.  No pneumothorax or pleural effusion.  No free air beneath the diaphragm.  No acute osseous abnormalities.  Visualized soft tissues are unremarkable.      Impression    1. No acute  radiographic findings in the chest on this single view.      Electronically signed by: Daniel Saldivar MD  Date:    05/05/2020  Time:    08:55    and X-Ray Chest PA and Lateral (CXR): No results found for this visit on 05/03/20. and KUB: X-Ray Abdomen AP 1 View (KUB): No results found for this visit on 05/03/20.    Assessment and Plan:     Staphylococcus aureus bacteremia  1. BRII for evaluation of Endocarditis in setting of community acquired MRSA bacteremia  ASA: 1  Mallampati: 1   EF: 60%    -No absolute contraindications of esophageal stricture, tumor, perforation, laceration,or diverticulum and/or active GI bleed  -The risks, benefits & alternatives of the procedure were explained to the patient.   -The risks of transesophageal echo include but are not limited to:  Dental trauma, esophageal trauma/perforation, bleeding, laryngospasm/brochospasm, aspiration, sore throat/hoarseness, & dislodgement of the endotracheal tube/nasogastric tube (where applicable).    -The risks of moderate sedation include hypotension, respiratory depression, arrhythmias, bronchospasm, & death.    -Informed consent was obtained. The patient is agreeable to proceed with the procedure and all questions and concerns addressed.    Case discussed with an attending in echocardiography lab.     Further recommendations per attending addendum        VTE Risk Mitigation (From admission, onward)         Ordered     heparin (porcine) in 5 % dex 25,000 unit/250 mL(100 unit/mL) infusion      05/06/20 2022     heparin 25,000 units in dextrose 5% 250 mL (100 units/mL) infusion HIGH INTENSITY nomogram - [facility]  Continuous     Question:  Heparin Infusion Adjustment (DO NOT MODIFY ANSWER)  Answer:  [email or web address]\epic\Images\Pharmacy\HeparinInfusions\heparin HIGH INTENSITY nomogram for [facility] [id number].pdf    05/05/20 1426     heparin 25,000 units in dextrose 5% (100 units/ml) IV bolus from bag - ADDITIONAL PRN BOLUS - 60 units/kg  As needed (PRN)     Question:   Heparin Infusion Adjustment (DO NOT MODIFY ANSWER)  Answer:  \\[email or web address]\epic\Images\Pharmacy\HeparinInfusions\heparin HIGH INTENSITY nomogram for [facility] [id number].pdf    05/05/20 1426     heparin 25,000 units in dextrose 5% (100 units/ml) IV bolus from bag - ADDITIONAL PRN BOLUS - 30 units/kg  As needed (PRN)     Question:  Heparin Infusion Adjustment (DO NOT MODIFY ANSWER)  Answer:  \\[email or web address]\epic\Images\Pharmacy\HeparinInfusions\heparin HIGH INTENSITY nomogram for [facility] [id number].pdf    05/05/20 1426     IP VTE HIGH RISK PATIENT  Once      05/03/20 2310     Place sequential compression device  Until discontinued      05/03/20 2210                  Hesham Wisdom MD  Cardiology   [facility]

## 2020-05-08 NOTE — PLAN OF CARE
Plan of care discussed with pt. VSS. Denies c/o CP or SOB. C/o bilat lower ext pain and dilaudid 2mg ivp given prn as ordered. Monitor showing SR. Pt remains free of injury or falls. Vanco trough came back falsely high due to vanco infusing when lab drawn. Will recheck prior to next vanco dose. Remains on heparin gtt and next PTT with am labs. All questions addressed. Uneventful night shift.

## 2020-05-08 NOTE — PT/OT/SLP PROGRESS
Physical Therapy      Patient Name:  Antoinette Love   MRN:  [id number]  Admitting Diagnosis:  DVT, lower extremity, proximal, acute, bilateral   Recent Surgery: Procedure(s) (LRB):  ECHOCARDIOGRAM,TRANSESOPHAGEAL (N/A) Day of Surgery  Admit Date: 5/3/2020  Length of Stay: 5 days     Antoinette Love's plan of care and PT goals reviewed on this date and remain appropriate. Pt reporting high pain around 0800 and declined session with OT, BRII around 1000. Will follow-up for progressive mobility 5/11/2020 or as medically appropriate.    Rachael Ceron, PT, DPT  5/8/2020   Pager: [phone]

## 2020-05-08 NOTE — SUBJECTIVE & OBJECTIVE
Interval History: NAEON. Patient BRII today was negative. Will need 6 weeks of antibiotics for staph bacteremia. Hematology recommending apixaban. Vascular surgery with no possible interventions    Review of Systems   Constitutional: Negative for chills, fatigue and fever.   HENT: Negative for trouble swallowing.    Respiratory: Negative for cough and shortness of breath.    Cardiovascular: Positive for leg swelling. Negative for chest pain.   Gastrointestinal: Negative for abdominal pain, blood in stool, constipation, diarrhea, nausea and vomiting.   Genitourinary: Negative for dysuria.   Musculoskeletal: Negative for arthralgias.        Leg pain   Skin: Negative for color change and pallor.   Neurological: Negative for light-headedness and headaches.   Psychiatric/Behavioral: Negative for behavioral problems and confusion.     Objective:     Vital Signs (Most Recent):  Temp: 100.2 °F (37.9 °C) (05/08/20 1055)  Pulse: 86 (05/08/20 1130)  Resp: 12 (05/08/20 1130)  BP: 122/67 (05/08/20 1130)  SpO2: 98 % (05/08/20 1130) Vital Signs (24h Range):  Temp:  [98.3 °F (36.8 °C)-100.2 °F (37.9 °C)] 100.2 °F (37.9 °C)  Pulse:  [] 86  Resp:  [12-18] 12  SpO2:  [97 %-99 %] 98 %  BP: ()/(53-67) 122/67     Weight: 60.8 kg (134 lb)  Body mass index is 22.3 kg/m².    Intake/Output Summary (Last 24 hours) at 5/8/2020 1145  Last data filed at 5/8/2020 1042  Gross per 24 hour   Intake 4737.08 ml   Output 1100 ml   Net 3637.08 ml      Physical Exam   Constitutional: She is oriented to person, place, and time. She appears well-developed and well-nourished. No distress.   Uncomfortable.    Cardiovascular: Normal rate, regular rhythm and normal heart sounds.   No murmur heard.  Pulmonary/Chest: Effort normal and breath sounds normal. No respiratory distress. She has no rales.   Abdominal: Soft. Bowel sounds are normal. She exhibits no distension. There is no tenderness.   Musculoskeletal: She exhibits edema.   Tenderness of  bilateral LE present, L>R   Neurological: She is alert and oriented to person, place, and time. She displays normal reflexes. No cranial nerve deficit.   Skin: Skin is warm. She is not diaphoretic. No erythema.   Psychiatric: She has a normal mood and affect.   Nursing note and vitals reviewed.      Significant Labs:   CBC:   Recent Labs   Lab 05/07/20  0744 05/08/20  0614   WBC 4.05 3.36*   HGB 8.2* 8.7*   HCT 27.2* 29.3*    232     CMP:   Recent Labs   Lab 05/07/20  0744 05/08/20  0614    138   K 3.5 3.1*    106   CO2 20* 21*   GLU 84 87   BUN 2* 3*   CREATININE 0.6 0.7   CALCIUM 8.5* 8.9   PROT 6.6 7.2   ALBUMIN 1.7* 1.8*   BILITOT 0.3 0.2   ALKPHOS 222* 267*   AST 9* 19   ALT 12 14   ANIONGAP 11 11   EGFRNONAA >60.0 >60.0       Significant Imaging: I have reviewed and interpreted all pertinent imaging results/findings within the past 24 hours.

## 2020-05-08 NOTE — PROGRESS NOTES
Therapy with Vancomycin complete and/or consult discontinued by provider.  Pharmacy will sign off, please re-consult as needed.    Thank you,  Leigh Sanchez, PharmD  PGY-1 Resident  [id number]

## 2020-05-08 NOTE — ASSESSMENT & PLAN NOTE
Patient growing gpcs in two bottles. Started on Vanc. Will continue following cultures until negative. ID consulted    Plan  MSSA. 6 weeks of oxacillin

## 2020-05-08 NOTE — PROGRESS NOTES
[facility] Hospital Medicine  Progress Note    Patient Name: Antoinette Love  MRN: [id number]  Patient Class: IP- Inpatient   Admission Date: 5/3/2020  Length of Stay: 5 days  Attending Physician: Benito Luna MD  Primary Care Provider: Alberto Holguin MD    [facility] Medicine Team: [facility] HOSP MED 4 Ben Martines MD    Subjective:     Principal Problem:DVT, lower extremity, proximal, acute, bilateral        HPI:  Ms. 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  Who present to ED with bilateral thighs pain and swelling in the last 7 days. She is following up with Dr. Garcia in hematology and she was just switched from Coumadin to Fondaparinux around a week ago.  She has not been able to get the prescription for Fondaparinux from the pharmacy and she started taking Lovenox 80 mg BID and she stop taking it yesterday when she noticed BRBPR. She states that on Monday she started having pain in the medial side of her right thigh then she has pain in the same area of the left thigh. She describes the pain as excruciating 8/10, bilateral medial thigh associated with swelling, numbness and bruises.  She endorses episodes of chills, hot flashes,night sweats dry cough and palpitation but denies chest pain, SOB or hemoptysis.  She states that she has been taking Coumadin since age of 18 and she was switched to Eliquis 2015 and she has been doing great with it with only one DVT 2016. She had multiple DVTs since August of the last year and she has been seen by IR around 9 times for thrombolysis, ballooning and stents placment with last one on 1/17 where she had mechanical thrombectomy and stent extension to common femoral veins bilaterally.  Sh was discharged and placed back on Coumadin for unclear reason.  She  "states that she is not comfortable with using Coumadin because of the frequent monitoring.  She spoke to her hematologist week ago to switch her to non vitamin K anticoagulant.    She was started on Xarelto before and she was discontinued from taking first dose because of biliary colic pain and she was told she has inflammation.  She reports anxiety/panic attack which she was operated by IR in this first procedure and she has been sedated for all procedure after that.     In ED, she was looks uncomfortable and in pain.  She was afebrile, , /62, SpO2 100% on RA. WBC: 3.8, H.6, platelet: 228. Unremarkable CMP. INR: 1.1, Ptt: 22 and D-dimer 3.6. Normal lactic acid. Doppler US of lower extremities shows  partial thrombosis of the femoral veins, extending to the bilateral popliteal veins. EKG with NSR. IR were consulted and patient loaded with heparin and started on heparin gtt.     Overview/Hospital Course:  Ms. Love was admitted to Hospital Medicine 5/3 with extensive thrombus of the "Extensive deep venous thrombosis...noting previously visualized thrombus within 1 of the paired posterior tibial veins on the left is not identified on current exam however there is thrombosis of 1 of the paired right posterior tibial veins, previously no thrombus identified." Discussed the case with IR, who recommended GI workup given patient's persistent anemia and report of possible BRBPR; furthermore, the morning after admission, patient was noted to have 2 g drop in hemoglobin with associated hypotension and tachycardia increasing concern for GI bleed - though no further BRBPR noted. CTA with venous phase added to assess for possible GI bleed and to evaluate extensiveness of clot. No extravasation of contrast noted, and on venous phase, thrombus noted to extend to IVC filter.     Overnight, patient febrile to 102.6. Though urine culture grew multiple organisms, patient does report dysuria the day prior to " admission. Ceftriaxone started. Blood cultures, CXR, and u/a ordered to complete infectious workup. GI planning for cscope today. Pending results will plan for thrombectomy tomorrow.     Interval History: NAEON. Patient BRII today was negative. Will need 6 weeks of antibiotics for staph bacteremia. Hematology recommending apixaban. Vascular surgery with no possible interventions    Review of Systems   Constitutional: Negative for chills, fatigue and fever.   HENT: Negative for trouble swallowing.    Respiratory: Negative for cough and shortness of breath.    Cardiovascular: Positive for leg swelling. Negative for chest pain.   Gastrointestinal: Negative for abdominal pain, blood in stool, constipation, diarrhea, nausea and vomiting.   Genitourinary: Negative for dysuria.   Musculoskeletal: Negative for arthralgias.        Leg pain   Skin: Negative for color change and pallor.   Neurological: Negative for light-headedness and headaches.   Psychiatric/Behavioral: Negative for behavioral problems and confusion.     Objective:     Vital Signs (Most Recent):  Temp: 100.2 °F (37.9 °C) (05/08/20 1055)  Pulse: 86 (05/08/20 1130)  Resp: 12 (05/08/20 1130)  BP: 122/67 (05/08/20 1130)  SpO2: 98 % (05/08/20 1130) Vital Signs (24h Range):  Temp:  [98.3 °F (36.8 °C)-100.2 °F (37.9 °C)] 100.2 °F (37.9 °C)  Pulse:  [] 86  Resp:  [12-18] 12  SpO2:  [97 %-99 %] 98 %  BP: ()/(53-67) 122/67     Weight: 60.8 kg (134 lb)  Body mass index is 22.3 kg/m².    Intake/Output Summary (Last 24 hours) at 5/8/2020 1145  Last data filed at 5/8/2020 1042  Gross per 24 hour   Intake 4737.08 ml   Output 1100 ml   Net 3637.08 ml      Physical Exam   Constitutional: She is oriented to person, place, and time. She appears well-developed and well-nourished. No distress.   Uncomfortable.    Cardiovascular: Normal rate, regular rhythm and normal heart sounds.   No murmur heard.  Pulmonary/Chest: Effort normal and breath sounds normal. No  respiratory distress. She has no rales.   Abdominal: Soft. Bowel sounds are normal. She exhibits no distension. There is no tenderness.   Musculoskeletal: She exhibits edema.   Tenderness of bilateral LE present, L>R   Neurological: She is alert and oriented to person, place, and time. She displays normal reflexes. No cranial nerve deficit.   Skin: Skin is warm. She is not diaphoretic. No erythema.   Psychiatric: She has a normal mood and affect.   Nursing note and vitals reviewed.      Significant Labs:   CBC:   Recent Labs   Lab 05/07/20  0744 05/08/20  0614   WBC 4.05 3.36*   HGB 8.2* 8.7*   HCT 27.2* 29.3*    232     CMP:   Recent Labs   Lab 05/07/20  0744 05/08/20  0614    138   K 3.5 3.1*    106   CO2 20* 21*   GLU 84 87   BUN 2* 3*   CREATININE 0.6 0.7   CALCIUM 8.5* 8.9   PROT 6.6 7.2   ALBUMIN 1.7* 1.8*   BILITOT 0.3 0.2   ALKPHOS 222* 267*   AST 9* 19   ALT 12 14   ANIONGAP 11 11   EGFRNONAA >60.0 >60.0       Significant Imaging: I have reviewed and interpreted all pertinent imaging results/findings within the past 24 hours.      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  Who present to ED with bilateral thighs pain and swelling in the last 7 days. 11 DVTs. Has not been able to fill her prescription and she was taking only Lovenox ( concern for Lovenox failure per hematology).  Doppler ultrasound of lower extremities shows bilateral partial thrombosis bilateral femoral veins extending to bilateral popliteal veins  .  Plan :   -- patient was loaded with heparin in the ED and started on heparin drip.   -- Hematology consulted for further  AC recs. Apixaban and dcing heparin drip 5/8  -- IR unable to remove thrombosis 5/6  -- monitor for signs of phlegmasia       Staphylococcus aureus bacteremia  Patient growing gpcs in two bottles. Started on Vanc. Will continue following cultures until negative. ID consulted    Plan  MSSA. 6 weeks of oxacillin     Acute cystitis without hematuria  Patient reports dysuria and frequency prior to admission. U/a at admission consistent with infection, though culture grew multiple organisms. Febrile to 102.6 overnight 5/4.     - DDx for fever includes cystitis (dysuria, U/a) vs infected clot vs clot burden itself  - Dced rocephin      Long term (current) use of anticoagulants  -- pt multiple syndromes of thrombophilia    -- on long trem AC   --  sine age of 18  -- switched to liquids 2015   -- unable to tolerate Xarelto because of SE ( biliary colic)  -- concern for Lovenox failure    Plan :   -- ensure pt having prescription filled for AC   -- f/u with hematology in the clinic       Anemia of chronic disease, iron deficiency anemia and acute blood loss anemia  BRBPR    -- Hg on admission 8.6, MCV 87  -- Hg has been between ( 6.9-8)   -- reports PRBPR day before admission   -- Iron study 11/2019: iron 24, TIBC 274, transferrin 185. Normal heptoglobin and RC   -- Required 2 u PRBC on 5/4 with appropriate correction of hg  -- CBC q8h stable over 24 hours, will decrease to daily.   -- GI planning scope 5/5      History of pulmonary embolism  -- she had 3 PEs   -- at home on coumadin     Factor 5 Leiden mutation, heterozygous          VTE Risk Mitigation (From admission, onward)         Ordered     apixaban tablet 10 mg  2 times daily      05/08/20 1148     heparin (porcine) in 5 % dex 25,000 unit/250 mL(100 unit/mL) infusion      05/06/20 2022     IP VTE HIGH RISK PATIENT  Once      05/03/20 2310     Place sequential compression device  Until discontinued      05/03/20 2210                      Ben Martines,  MD  Department of Hospital Medicine   [facility]

## 2020-05-08 NOTE — ASSESSMENT & PLAN NOTE
29-year-old F with hx of factor 5 laden mutation, May-Thuner syndrome, recurrent DVTs and PEs s/p stents to iliac/femoral veins/IVC, IVC filter on long term anti-coagulation admitted with fevers, bilateral thigh pain and swelling. She was found to have extensive thrombosis of the bilateral femoral veins, iliac vein stents, and IVC stent with thrombosis extending to the bilateral popliteal veins. IR unfortunately was unable to perform thrombolysis 2/2 due the extensive chronic thrombi. On 5/4 she developed fevers. Blood cultures drawn returned positive for Staph aureus.  Now showing MSSA.  Vancomycin started initially then primary team started pt on Oxacillin. Unclear source of bacteremia though pt remains concerning for an endovascular infection.  BRII done today w/o signs of endocarditis infection.      Plan  - Discontinue Vanc and start pt on Cefazolin 6 g IV continuous infusion   - Repeat blood cultures daily until sterile  - Anticipate pt will need at least 6 week course of abx from blood cx clearance.  - ID will follow.       Subjective   21-year-old female complains of depression and suicidal ideation with self-harm by cutting left wrist.  Tetanus is up-to-date.  Patient also admits to consuming alcohol this evening.  Patient denies any other self-harm including ingestion.  Symptoms are moderate, symptoms are made worse by emotional stressors.  Symptoms are associated with depression            Review of Systems   Skin: Positive for wound.   Psychiatric/Behavioral:        As per HPI   All other systems reviewed and are negative.      Past Medical History:   Diagnosis Date   • ADHD    • Depression with anxiety    • Family history of diabetes mellitus (DM)    • Family history of heart disease    • GERD (gastroesophageal reflux disease)    • Hypertension    • Obesity    • Plantar wart of right foot        No Known Allergies    Past Surgical History:   Procedure Laterality Date   •  SECTION  2016   • PLANTAR'S WART EXCISION Bilateral        No family history on file.    Social History     Socioeconomic History   • Marital status: Single     Spouse name: Not on file   • Number of children: Not on file   • Years of education: Not on file   • Highest education level: Not on file   Tobacco Use   • Smoking status: Former Smoker   Substance and Sexual Activity   • Alcohol use: Yes     Comment: occ   • Drug use: No   • Sexual activity: Yes           Objective   Physical Exam   Constitutional: She is oriented to person, place, and time. She appears well-developed and well-nourished.   HENT:   Head: Normocephalic and atraumatic.   Mouth/Throat: Oropharynx is clear and moist.   Eyes: Conjunctivae are normal. Pupils are equal, round, and reactive to light.   Neck: Normal range of motion. Neck supple.   Cardiovascular: Normal rate, regular rhythm, normal heart sounds and intact distal pulses.   Pulmonary/Chest: Effort normal and breath sounds normal.   Abdominal: Soft. Bowel sounds are normal. She exhibits no distension. There is no tenderness.    Musculoskeletal:   Multiple abrasions to the volar aspect of left forearm with laceration at wrist.  Transverse, through subcutaneous tissue but does not violate fascia or any deep vessels.  6 centimeters in length.   Neurological: She is alert and oriented to person, place, and time.   Motor and sensation intact   Skin: Capillary refill takes less than 2 seconds.   Psychiatric:   Depressed, tearful, vague suicidal ideation       Laceration Repair  Date/Time: 9/25/2019 7:58 AM  Performed by: Uriah Keller MD  Authorized by: Uriah Keller MD     Consent:     Consent obtained:  Verbal    Consent given by:  Patient  Anesthesia (see MAR for exact dosages):     Anesthesia method:  Local infiltration    Local anesthetic:  Lidocaine 1% WITH epi  Laceration details:     Location:  Shoulder/arm    Shoulder/arm location:  L lower arm    Length (cm):  6  Repair type:     Repair type:  Simple  Pre-procedure details:     Preparation:  Patient was prepped and draped in usual sterile fashion  Exploration:     Wound exploration: wound explored through full range of motion and entire depth of wound probed and visualized      Contaminated: no    Treatment:     Area cleansed with:  Hibiclens    Amount of cleaning:  Standard    Irrigation solution:  Sterile saline    Visualized foreign bodies/material removed: no    Skin repair:     Repair method:  Sutures    Suture size:  4-0    Suture material:  Nylon    Suture technique:  Simple interrupted    Number of sutures:  6  Approximation:     Approximation:  Close    Vermilion border: well-aligned    Post-procedure details:     Patient tolerance of procedure:  Tolerated well, no immediate complications               ED Course                  MDM  Number of Diagnoses or Management Options  Alcohol abuse:   Extensor tendon laceration of left wrist with open wound, initial encounter:   Suicidal ideation:   Diagnosis management comments: Results for orders placed or performed during the  hospital encounter of 09/25/19  -Pregnancy, Urine - Urine, Clean Catch       Result                      Value             Ref Range           HCG, Urine QL               Negative          Negative       -Urinalysis With Microscopic If Indicated (No Culture) - Urine, Clean Catch       Result                      Value             Ref Range           Color, UA                   Yellow            Yellow, Straw       Appearance, UA              Clear             Clear               pH, UA                      6.0               5.0 - 8.0           Specific Gravity, UA        <=1.005           1.005 - 1.030       Glucose, UA                 Negative          Negative            Ketones, UA                 Negative          Negative            Bilirubin, UA               Negative          Negative            Blood, UA                                     Negative        Small (1+) (A)       Protein, UA                 Negative          Negative            Leuk Esterase, UA           Negative          Negative            Nitrite, UA                 Negative          Negative            Urobilinogen, UA            0.2 E.U./dL       0.2 - 1.0 E.*  -Urine Drug Screen - Urine, Clean Catch       Result                      Value             Ref Range           Barbiturates Screen, U*     Negative          Negative            Benzodiazepine Screen,*     Negative          Negative            Cocaine Screen, Urine       Negative          Negative            Opiate Screen               Negative          Negative            THC, Screen, Urine          Positive (A)      Negative            Methadone Screen, Urine     Negative          Negative            Amphetamine Screen, Ur*     Negative          Negative            Creatinine, Urine           <20.0             mg/dL               Phencyclidine (PCP), U*     Negative          Negative       -Urinalysis, Microscopic Only - Urine, Clean Catch       Result                      Value              Ref Range           RBC, UA                     0-2 (A)           None Seen /H*       WBC, UA                     0-2 (A)           None Seen /H*       Bacteria, UA                None Seen         None Seen /H*       Squamous Epithelial Ce*     0-2               None Seen, 0*       Hyaline Casts, UA           None Seen         None Seen /L*       Methodology                                                   Manual Light Microscopy  Patient has been evaluated by [place] [facility] and accepted for transfer for further management by Dr. Stein       Amount and/or Complexity of Data Reviewed  Clinical lab tests: reviewed        Final diagnoses:   Suicidal ideation   Extensor tendon laceration of left wrist with open wound, initial encounter   Alcohol abuse              Uriah Keller MD  09/25/19 075

## 2020-05-08 NOTE — PROGRESS NOTES
Lab tech has still not drawn Vanco trough. Finally spoke with [employer] and notified that it needed to be drawn in order to given medication. She said she would notify another tech to draw it.

## 2020-05-08 NOTE — ANESTHESIA POSTPROCEDURE EVALUATION
Anesthesia Post Evaluation    Patient: Antoinette Love    Procedure(s) Performed: Procedure(s) (LRB):  ECHOCARDIOGRAM,TRANSESOPHAGEAL (N/A)    Final Anesthesia Type: general    Patient location during evaluation: PACU  Patient participation: Yes- Able to Participate  Level of consciousness: awake and alert  Post-procedure vital signs: reviewed and stable  Pain management: adequate  Airway patency: patent    PONV status at discharge: No PONV  Anesthetic complications: no      Cardiovascular status: hemodynamically stable  Respiratory status: spontaneous ventilation  Follow-up not needed.          Vitals Value Taken Time   /66 5/8/2020 11:01 AM   Temp 37.9 °C (100.2 °F) 5/8/2020 10:55 AM   Pulse 81 5/8/2020 11:05 AM   Resp 12 5/8/2020 11:05 AM   SpO2 97 % 5/8/2020 11:05 AM   Vitals shown include unvalidated device data.      No case tracking events are documented in the log.      Pain/Rosaura Score: Pain Rating Prior to Med Admin: 8 (5/8/2020  5:51 AM)  Pain Rating Post Med Admin: 2 (5/8/2020  1:35 AM)

## 2020-05-08 NOTE — ASSESSMENT & PLAN NOTE
1. BRII for evaluation of Endocarditis in setting of community acquired MRSA bacteremia  ASA: 1  Mallampati: 1   EF: 60%    -No absolute contraindications of esophageal stricture, tumor, perforation, laceration,or diverticulum and/or active GI bleed  -The risks, benefits & alternatives of the procedure were explained to the patient.   -The risks of transesophageal echo include but are not limited to:  Dental trauma, esophageal trauma/perforation, bleeding, laryngospasm/brochospasm, aspiration, sore throat/hoarseness, & dislodgement of the endotracheal tube/nasogastric tube (where applicable).    -The risks of moderate sedation include hypotension, respiratory depression, arrhythmias, bronchospasm, & death.    -Informed consent was obtained. The patient is agreeable to proceed with the procedure and all questions and concerns addressed.    Case discussed with an attending in echocardiography lab.     Further recommendations per attending addendum

## 2020-05-08 NOTE — ASSESSMENT & PLAN NOTE
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  Who present to ED with bilateral thighs pain and swelling in the last 7 days. 11 DVTs. Has not been able to fill her prescription and she was taking only Lovenox ( concern for Lovenox failure per hematology).  Doppler ultrasound of lower extremities shows bilateral partial thrombosis bilateral femoral veins extending to bilateral popliteal veins  .  Plan :   -- patient was loaded with heparin in the ED and started on heparin drip.   -- Hematology consulted for further AC recs. Apixaban and dcing heparin drip 5/8  -- IR unable to remove thrombosis 5/6  -- monitor for signs of phlegmasia

## 2020-05-08 NOTE — SUBJECTIVE & OBJECTIVE
Interval History: Pt afebrile.  Blood cxs now showing MSSA. Repeats 5/6 and 5/8 are positive.  BRII today w/o evidence of endocarditis.    Review of Systems   Constitutional: Positive for chills and fever. Negative for fatigue.   HENT: Negative for congestion.    Respiratory: Negative for cough and shortness of breath.    Cardiovascular: Positive for leg swelling. Negative for chest pain.   Gastrointestinal: Negative for abdominal pain, diarrhea and nausea.   Genitourinary: Negative for difficulty urinating, dysuria, hematuria and urgency.   Musculoskeletal: Positive for arthralgias (BLE) and gait problem (2/2 pain). Negative for back pain.   Skin: Negative for color change, pallor and wound.   Neurological: Positive for weakness. Negative for light-headedness and headaches.   Hematological: Bruises/bleeds easily.   Psychiatric/Behavioral: Negative for behavioral problems and confusion. The patient is not nervous/anxious.      Objective:     Vital Signs (Most Recent):  Temp: 99.7 °F (37.6 °C) (05/08/20 0834)  Pulse: 75 (05/08/20 0911)  Resp: 16 (05/08/20 0834)  BP: (!) 98/57 (05/08/20 0834)  SpO2: 99 % (05/08/20 0834) Vital Signs (24h Range):  Temp:  [98.3 °F (36.8 °C)-99.9 °F (37.7 °C)] 99.7 °F (37.6 °C)  Pulse:  [] 75  Resp:  [16-18] 16  SpO2:  [96 %-99 %] 99 %  BP: ()/(50-58) 98/57     Weight: 61.1 kg (134 lb 11.2 oz)  Body mass index is 22.42 kg/m².    Estimated Creatinine Clearance: 104.8 mL/min (based on SCr of 0.7 mg/dL).    Physical Exam   Constitutional: She is oriented to person, place, and time. She appears well-developed and well-nourished. No distress.   HENT:   Head: Normocephalic and atraumatic.   Mouth/Throat: No oropharyngeal exudate.   Eyes: Conjunctivae are normal. No scleral icterus.   Neck: Neck supple.   Cardiovascular: Normal rate, regular rhythm and normal heart sounds.   Pulmonary/Chest: Effort normal and breath sounds normal. No respiratory distress. She has no rales.    Abdominal: Soft. She exhibits no distension. There is no tenderness.   Musculoskeletal: She exhibits edema and tenderness (BLE).   Decreased ROM 2/2 pain   Neurological: She is alert and oriented to person, place, and time.   Skin: Skin is warm. She is not diaphoretic. No erythema.   PIV sites c/d/i  Left antecubital fossa bruising and tenderness at prior IV site  bruising of BLE   Psychiatric: She has a normal mood and affect. Her behavior is normal. Thought content normal.   Nursing note and vitals reviewed.      Significant Labs: All pertinent labs within the past 24 hours have been reviewed.    Significant Imaging: I have reviewed all pertinent imaging results/findings within the past 24 hours.

## 2020-05-08 NOTE — PROGRESS NOTES
Pt is a hard stick. Lab unable to draw vanco trough will come back and try again. Unable to give scheduled vanc dose at this time.

## 2020-05-09 LAB
ALBUMIN SERPL BCP-MCNC: 1.6 G/DL (ref 3.5–5.2)
ALP SERPL-CCNC: 215 U/L (ref 55–135)
ALT SERPL W/O P-5'-P-CCNC: 8 U/L (ref 10–44)
ANION GAP SERPL CALC-SCNC: 7 MMOL/L (ref 8–16)
APTT BLDCRRT: 43 SEC (ref 21–32)
AST SERPL-CCNC: 13 U/L (ref 10–40)
BACTERIA BLD CULT: ABNORMAL
BASOPHILS # BLD AUTO: 0.01 K/UL (ref 0–0.2)
BASOPHILS NFR BLD: 0.4 % (ref 0–1.9)
BILIRUB SERPL-MCNC: 0.2 MG/DL (ref 0.1–1)
BUN SERPL-MCNC: 3 MG/DL (ref 6–20)
CALCIUM SERPL-MCNC: 8.3 MG/DL (ref 8.7–10.5)
CHLORIDE SERPL-SCNC: 105 MMOL/L (ref 95–110)
CO2 SERPL-SCNC: 24 MMOL/L (ref 23–29)
CREAT SERPL-MCNC: 0.7 MG/DL (ref 0.5–1.4)
DIFFERENTIAL METHOD: ABNORMAL
EOSINOPHIL # BLD AUTO: 0.1 K/UL (ref 0–0.5)
EOSINOPHIL NFR BLD: 2.1 % (ref 0–8)
ERYTHROCYTE [DISTWIDTH] IN BLOOD BY AUTOMATED COUNT: 16.3 % (ref 11.5–14.5)
EST. GFR  (AFRICAN AMERICAN): >60 ML/MIN/1.73 M^2
EST. GFR  (NON AFRICAN AMERICAN): >60 ML/MIN/1.73 M^2
GLUCOSE SERPL-MCNC: 86 MG/DL (ref 70–110)
HCT VFR BLD AUTO: 26.5 % (ref 37–48.5)
HGB BLD-MCNC: 7.8 G/DL (ref 12–16)
IMM GRANULOCYTES # BLD AUTO: 0.02 K/UL (ref 0–0.04)
IMM GRANULOCYTES NFR BLD AUTO: 0.7 % (ref 0–0.5)
INR PPP: 1.4 (ref 0.8–1.2)
LYMPHOCYTES # BLD AUTO: 0.8 K/UL (ref 1–4.8)
LYMPHOCYTES NFR BLD: 28.5 % (ref 18–48)
MCH RBC QN AUTO: 26.6 PG (ref 27–31)
MCHC RBC AUTO-ENTMCNC: 29.4 G/DL (ref 32–36)
MCV RBC AUTO: 90 FL (ref 82–98)
MONOCYTES # BLD AUTO: 0.2 K/UL (ref 0.3–1)
MONOCYTES NFR BLD: 5.3 % (ref 4–15)
NEUTROPHILS # BLD AUTO: 1.8 K/UL (ref 1.8–7.7)
NEUTROPHILS NFR BLD: 63 % (ref 38–73)
NRBC BLD-RTO: 0 /100 WBC
PLATELET # BLD AUTO: 195 K/UL (ref 150–350)
PMV BLD AUTO: 9.1 FL (ref 9.2–12.9)
POTASSIUM SERPL-SCNC: 3.8 MMOL/L (ref 3.5–5.1)
PROT SERPL-MCNC: 6.1 G/DL (ref 6–8.4)
PROTHROMBIN TIME: 13.9 SEC (ref 9–12.5)
RBC # BLD AUTO: 2.93 M/UL (ref 4–5.4)
SODIUM SERPL-SCNC: 136 MMOL/L (ref 136–145)
WBC # BLD AUTO: 2.84 K/UL (ref 3.9–12.7)

## 2020-05-09 PROCEDURE — 85610 PROTHROMBIN TIME: CPT

## 2020-05-09 PROCEDURE — 87040 BLOOD CULTURE FOR BACTERIA: CPT | Mod: 59

## 2020-05-09 PROCEDURE — 63600175 PHARM REV CODE 636 W HCPCS: Performed by: STUDENT IN AN ORGANIZED HEALTH CARE EDUCATION/TRAINING PROGRAM

## 2020-05-09 PROCEDURE — 99233 SBSQ HOSP IP/OBS HIGH 50: CPT | Mod: ,,, | Performed by: NURSE PRACTITIONER

## 2020-05-09 PROCEDURE — 36415 COLL VENOUS BLD VENIPUNCTURE: CPT

## 2020-05-09 PROCEDURE — 25000003 PHARM REV CODE 250: Performed by: PHYSICIAN ASSISTANT

## 2020-05-09 PROCEDURE — 63600175 PHARM REV CODE 636 W HCPCS: Performed by: PHYSICIAN ASSISTANT

## 2020-05-09 PROCEDURE — 99233 PR SUBSEQUENT HOSPITAL CARE,LEVL III: ICD-10-PCS | Mod: ,,, | Performed by: HOSPITALIST

## 2020-05-09 PROCEDURE — 99233 SBSQ HOSP IP/OBS HIGH 50: CPT | Mod: ,,, | Performed by: HOSPITALIST

## 2020-05-09 PROCEDURE — 85730 THROMBOPLASTIN TIME PARTIAL: CPT

## 2020-05-09 PROCEDURE — 25000003 PHARM REV CODE 250: Performed by: STUDENT IN AN ORGANIZED HEALTH CARE EDUCATION/TRAINING PROGRAM

## 2020-05-09 PROCEDURE — 94761 N-INVAS EAR/PLS OXIMETRY MLT: CPT

## 2020-05-09 PROCEDURE — 99233 PR SUBSEQUENT HOSPITAL CARE,LEVL III: ICD-10-PCS | Mod: ,,, | Performed by: NURSE PRACTITIONER

## 2020-05-09 PROCEDURE — 80053 COMPREHEN METABOLIC PANEL: CPT

## 2020-05-09 PROCEDURE — 20600001 HC STEP DOWN PRIVATE ROOM

## 2020-05-09 PROCEDURE — 85025 COMPLETE CBC W/AUTO DIFF WBC: CPT

## 2020-05-09 RX ADMIN — ACETAMINOPHEN 650 MG: 325 TABLET ORAL at 01:05

## 2020-05-09 RX ADMIN — HYDROMORPHONE HYDROCHLORIDE 2 MG: 1 INJECTION, SOLUTION INTRAMUSCULAR; INTRAVENOUS; SUBCUTANEOUS at 10:05

## 2020-05-09 RX ADMIN — HYDROMORPHONE HYDROCHLORIDE 2 MG: 1 INJECTION, SOLUTION INTRAMUSCULAR; INTRAVENOUS; SUBCUTANEOUS at 05:05

## 2020-05-09 RX ADMIN — ACETAMINOPHEN 650 MG: 325 TABLET ORAL at 04:05

## 2020-05-09 RX ADMIN — OXYCODONE HYDROCHLORIDE 5 MG: 5 TABLET ORAL at 07:05

## 2020-05-09 RX ADMIN — OXYCODONE HYDROCHLORIDE 5 MG: 5 TABLET ORAL at 02:05

## 2020-05-09 RX ADMIN — DEXTROSE 6 G: 5 SOLUTION INTRAVENOUS at 04:05

## 2020-05-09 RX ADMIN — HYDROMORPHONE HYDROCHLORIDE 2 MG: 1 INJECTION, SOLUTION INTRAMUSCULAR; INTRAVENOUS; SUBCUTANEOUS at 11:05

## 2020-05-09 RX ADMIN — HYDROMORPHONE HYDROCHLORIDE 2 MG: 1 INJECTION, SOLUTION INTRAMUSCULAR; INTRAVENOUS; SUBCUTANEOUS at 04:05

## 2020-05-09 RX ADMIN — APIXABAN 10 MG: 5 TABLET, FILM COATED ORAL at 08:05

## 2020-05-09 RX ADMIN — APIXABAN 10 MG: 5 TABLET, FILM COATED ORAL at 07:05

## 2020-05-09 RX ADMIN — OXYCODONE HYDROCHLORIDE 5 MG: 5 TABLET ORAL at 08:05

## 2020-05-09 NOTE — PROGRESS NOTES
[facility]  Infectious Disease  Progress Note    Patient Name: Antoinette Love  MRN: [id number]  Admission Date: 5/3/2020  Length of Stay: 6 days  Attending Physician: Benito Luna MD  Primary Care Provider: Alberto Holguin MD    Isolation Status: No active isolations  Assessment/Plan:      Staphylococcus aureus bacteremia   29-year-old F with hx of factor 5 laden mutation, May-Thurner syndrome, recurrent DVTs and PEs s/p stents to iliac/femoral veins/IVC, IVC filter on long term anti-coagulation admitted with fevers, bilateral thigh pain and swelling. She was found to have extensive thrombosis of the bilateral femoral veins, iliac vein stents, and IVC stent with thrombosis extending to the bilateral popliteal veins. IR unfortunately was unable to perform thrombolysis 2/2 due the extensive chronic thrombi. On 5/4 she developed fevers and blood cultures positive for MSSA.  Remain persistently positive.  BRII negative.  Fevers to 101 last night.  Currently on continuous infusion cefazolin.  Unclear source of bacteremia though concerning for an endovascular infection.        Plan  - Continue Cefazolin 6 g IV continuous infusion   - Repeat blood cultures daily until clear.  Ordered for today.   - Anticipate pt will need at least 6 week course of abx from blood cx clearance.  - ID will follow up Monday.  In the interim, call me at [phone] or page ID staff on call for our service, Dr. Romo, if any questions/concerns.     Data reviewed and plan discussed with ID staff          Thank you.   Please call for any questions or concerns.  ONEIDA Roberts, ANP-C  [phone] pager, [place] [phone]  Subjective:     Principal Problem:DVT, lower extremity, proximal, acute, bilateral    HPI: Ms. Love is a 29-year-old female with history of factor 5 laden mutation, May-Thuner syndrome, recurrent DVTs and PEs s/p stents to iliac/femoral veins and IVC filter placement 2014 who presented to the  ED on 5/3 with bilateral thigh pain and swelling over the last 7 days. She is following up with Dr. Garcia in hematology and she was just switched from Coumadin to Fondaparinux around a week ago.  She had not been able to get the prescription for Fondaparinux from the pharmacy and she started taking Lovenox 80 mg BID. She states that on Monday she started having pain in the medial side of her right thigh then she has pain in the same area of the left thigh. She describes the pain as excruciating 8/10, bilateral medial thigh associated with swelling, numbness and bruises.  She endorses episodes of chills, hot flashes, night sweats, dry cough and palpitations over the last two days but denies chest pain, SOB or hemoptysis. Also mentions dysuria for a few days that resolved on its own.  Denies recent procedures within the last month. 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 In late March she did hit her leg on the side of a desk while working and scraped it. She mentions that the area became red, indurated and painful. She self treated with topical abx and the lesion resolved.    She states that she has been taking Coumadin since age of 18 and she was switched to Eliquis  and she has been doing great with it with only one DVT 2016. She had multiple DVTs since August of last year and she has been seen by IR around 9 times for thrombolysis, ballooning and stents placment with last one on  where she had mechanical thrombectomy and stent extension to common femoral veins bilaterally.       In ED, she was afebrile, , /62, SpO2 100% on RA. WBC: 3.8, H.6, platelet: 228. Unremarkable CMP. INR: 1.1, Ptt: 22 and D-dimer 3.6. Normal lactic acid. Doppler US of lower extremities shows  partial thrombosis of the femoral veins, extending to the bilateral popliteal veins. IR was consulted and patient started on heparin. She was admitted to  for  "management.     Since admit she has been seen by GI and IR. The morning after admission, patient was noted to have 2 g drop in hemoglobin with associated hypotension and tachycardia with increasing concern for GI bleed.  CTA ordered to assess for possible GI bleed and to evaluate extensiveness of clot. No GI bleed or extravasation of contrast noted, though thrombus noted to extend to IVC filter. ("Extensive thrombosis of the bilateral femoral veins, iliac vein stents, and IVC stent. IVC filter is in place at the level of the superior aspect of the IVC stent. Asymmetric inflammatory changes involving the anterior compartment of the left thigh."). GI consulted and performed colonoscopy yesterday which did not reveal a bleed. IR planning for thrombolysis today.      On 5/4, patient became febrile to 102.6. Ceftriaxone started. UA showed 18 WBC. Urine cx with multiple organisms isolated. Blood cultures drawn 5/5 are now showing GPCs resembling Staph. Repeats ordered. Vanc started.     GI consulted and performed colonoscopy yesterday which did not reveal a bleed. IR planning for thrombolysis.        US BLE  "There are stents within the right common femoral vein and left common femoral vein, thrombosed.  There is partial thrombosis of the femoral veins, with thrombosis extending to the bilateral popliteal veins.  The bilateral greater saphenous veins are patent.  There is partial thrombus in the right posterior tibial vein.  The left posterior tibial vein is patent.  The bilateral anterior tibial veins and peroneal veins are patent.  There is stent of the left external iliac vein, left external iliac vein appears patent.  There is thrombus within the right external iliac vein."    Two PIVs   Interval History:    Fever to 101 last night.  Blood cultures remain persistently positive for MSSA.  Blood cxs now showing MSSA.    BRII yesterday negative.  Continues to complain of pain, nausea.     Review of Systems "   Constitutional: Positive for chills and fever. Negative for fatigue.   HENT: Negative for congestion and sore throat.    Respiratory: Negative for cough and shortness of breath.    Cardiovascular: Positive for leg swelling. Negative for chest pain.   Gastrointestinal: Positive for nausea. Negative for abdominal pain and diarrhea.   Genitourinary: Negative for difficulty urinating, dysuria, hematuria and urgency.   Musculoskeletal: Positive for arthralgias (BLE) and gait problem (2/2 pain). Negative for back pain.   Skin: Negative for color change, pallor and wound.   Neurological: Positive for weakness. Negative for light-headedness and headaches.   Psychiatric/Behavioral: Negative for behavioral problems and confusion. The patient is not nervous/anxious.      Objective:     Vital Signs (Most Recent):  Temp: 99.2 °F (37.3 °C) (05/09/20 1633)  Pulse: 75 (05/09/20 1633)  Resp: 17 (05/09/20 1633)  BP: 127/67 (05/09/20 1633)  SpO2: 99 % (05/09/20 1633) Vital Signs (24h Range):  Temp:  [98 °F (36.7 °C)-101.1 °F (38.4 °C)] 99.2 °F (37.3 °C)  Pulse:  [64-95] 75  Resp:  [16-17] 17  SpO2:  [96 %-99 %] 99 %  BP: (112-127)/(62-76) 127/67     Weight: 60.8 kg (134 lb)  Body mass index is 22.3 kg/m².    Estimated Creatinine Clearance: 104.8 mL/min (based on SCr of 0.7 mg/dL).    Physical Exam   Constitutional: She is oriented to person, place, and time. She appears well-developed and well-nourished. No distress.   HENT:   Head: Normocephalic and atraumatic.   Mouth/Throat: No oropharyngeal exudate.   Eyes: Conjunctivae are normal. No scleral icterus.   Cardiovascular: Normal rate and regular rhythm.   No murmur heard.  Pulmonary/Chest: Effort normal and breath sounds normal. No respiratory distress.   Abdominal: Soft. She exhibits no distension. There is no tenderness.   Musculoskeletal: She exhibits edema and tenderness (BLE).   Neurological: She is alert and oriented to person, place, and time.   Skin: Skin is warm. She is  not diaphoretic. No erythema.   PIV sites c/d/i     Psychiatric: She has a normal mood and affect. Her behavior is normal. Thought content normal.   Nursing note and vitals reviewed.      Significant Labs: All pertinent labs within the past 24 hours have been reviewed.    Significant Imaging: I have reviewed all pertinent imaging results/findings within the past 24 hours.

## 2020-05-09 NOTE — PROGRESS NOTES
[facility] Hospital Medicine  Progress Note    Patient Name: Antoinette Love  MRN: [id number]  Patient Class: IP- Inpatient   Admission Date: 5/3/2020  Length of Stay: 6 days  Attending Physician: Benito Luna MD  Primary Care Provider: Alberto Holguin MD    [facility] Medicine Team: [facility] HOSP MED 4 Ben Martines MD    Subjective:     Principal Problem:DVT, lower extremity, proximal, acute, bilateral        HPI:  Ms. 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  Who present to ED with bilateral thighs pain and swelling in the last 7 days. She is following up with Dr. Garcia in hematology and she was just switched from Coumadin to Fondaparinux around a week ago.  She has not been able to get the prescription for Fondaparinux from the pharmacy and she started taking Lovenox 80 mg BID and she stop taking it yesterday when she noticed BRBPR. She states that on Monday she started having pain in the medial side of her right thigh then she has pain in the same area of the left thigh. She describes the pain as excruciating 8/10, bilateral medial thigh associated with swelling, numbness and bruises.  She endorses episodes of chills, hot flashes,night sweats dry cough and palpitation but denies chest pain, SOB or hemoptysis.  She states that she has been taking Coumadin since age of 18 and she was switched to Eliquis 2015 and she has been doing great with it with only one DVT 2016. She had multiple DVTs since August of the last year and she has been seen by IR around 9 times for thrombolysis, ballooning and stents placment with last one on 1/17 where she had mechanical thrombectomy and stent extension to common femoral veins bilaterally.  Sh was discharged and placed back on Coumadin for unclear reason.  She  "states that she is not comfortable with using Coumadin because of the frequent monitoring.  She spoke to her hematologist week ago to switch her to non vitamin K anticoagulant.    She was started on Xarelto before and she was discontinued from taking first dose because of biliary colic pain and she was told she has inflammation.  She reports anxiety/panic attack which she was operated by IR in this first procedure and she has been sedated for all procedure after that.     In ED, she was looks uncomfortable and in pain.  She was afebrile, , /62, SpO2 100% on RA. WBC: 3.8, H.6, platelet: 228. Unremarkable CMP. INR: 1.1, Ptt: 22 and D-dimer 3.6. Normal lactic acid. Doppler US of lower extremities shows  partial thrombosis of the femoral veins, extending to the bilateral popliteal veins. EKG with NSR. IR were consulted and patient loaded with heparin and started on heparin gtt.     Overview/Hospital Course:  Ms. Love was admitted to Hospital Medicine 5/3 with extensive thrombus of the "Extensive deep venous thrombosis...noting previously visualized thrombus within 1 of the paired posterior tibial veins on the left is not identified on current exam however there is thrombosis of 1 of the paired right posterior tibial veins, previously no thrombus identified." Discussed the case with IR, who recommended GI workup given patient's persistent anemia and report of possible BRBPR; furthermore, the morning after admission, patient was noted to have 2 g drop in hemoglobin with associated hypotension and tachycardia increasing concern for GI bleed - though no further BRBPR noted. CTA with venous phase added to assess for possible GI bleed and to evaluate extensiveness of clot. No extravasation of contrast noted, and on venous phase, thrombus noted to extend to IVC filter.     Overnight, patient febrile to 102.6. Though urine culture grew multiple organisms, patient does report dysuria the day prior to " admission. Ceftriaxone started. Blood cultures, CXR, and u/a ordered to complete infectious workup. GI planning for cscope today. Pending results will plan for thrombectomy tomorrow.     Interval History: Patient still febrile at night. Waiting for cultures to be clear. Trying heating blankets and pads to help with pain.     Review of Systems   Constitutional: Negative for chills, fatigue and fever.   HENT: Negative for trouble swallowing.    Respiratory: Negative for cough and shortness of breath.    Cardiovascular: Positive for leg swelling. Negative for chest pain.   Gastrointestinal: Negative for abdominal pain, blood in stool, constipation, diarrhea, nausea and vomiting.   Genitourinary: Negative for dysuria.   Musculoskeletal: Negative for arthralgias.        Leg pain   Skin: Negative for color change and pallor.   Neurological: Negative for light-headedness and headaches.   Psychiatric/Behavioral: Negative for behavioral problems and confusion.     Objective:     Vital Signs (Most Recent):  Temp: 98.7 °F (37.1 °C) (05/09/20 1219)  Pulse: 82 (05/09/20 1219)  Resp: 16 (05/09/20 1219)  BP: 122/76 (05/09/20 1219)  SpO2: 99 % (05/09/20 1219) Vital Signs (24h Range):  Temp:  [98 °F (36.7 °C)-101.1 °F (38.4 °C)] 98.7 °F (37.1 °C)  Pulse:  [64-95] 82  Resp:  [16-17] 16  SpO2:  [96 %-99 %] 99 %  BP: (110-125)/(62-76) 122/76     Weight: 60.8 kg (134 lb)  Body mass index is 22.3 kg/m².    Intake/Output Summary (Last 24 hours) at 5/9/2020 1339  Last data filed at 5/9/2020 0100  Gross per 24 hour   Intake 615 ml   Output 1500 ml   Net -885 ml      Physical Exam   Constitutional: She is oriented to person, place, and time. She appears well-developed and well-nourished. No distress.   Uncomfortable.    Cardiovascular: Normal rate, regular rhythm and normal heart sounds.   No murmur heard.  Pulmonary/Chest: Effort normal and breath sounds normal. No respiratory distress. She has no rales.   Abdominal: Soft. Bowel sounds are  normal. She exhibits no distension. There is no tenderness.   Musculoskeletal: She exhibits edema.   Tenderness of bilateral LE present, L>R   Neurological: She is alert and oriented to person, place, and time. She displays normal reflexes. No cranial nerve deficit.   Skin: Skin is warm. She is not diaphoretic. No erythema.   Psychiatric: She has a normal mood and affect.   Nursing note and vitals reviewed.      Significant Labs:   CBC:   Recent Labs   Lab 05/08/20  0614 05/09/20  0447   WBC 3.36* 2.84*   HGB 8.7* 7.8*   HCT 29.3* 26.5*    195     CMP:   Recent Labs   Lab 05/08/20  0614 05/09/20  1052    136   K 3.1* 3.8    105   CO2 21* 24   GLU 87 86   BUN 3* 3*   CREATININE 0.7 0.7   CALCIUM 8.9 8.3*   PROT 7.2 6.1   ALBUMIN 1.8* 1.6*   BILITOT 0.2 0.2   ALKPHOS 267* 215*   AST 19 13   ALT 14 8*   ANIONGAP 11 7*   EGFRNONAA >60.0 >60.0       Significant Imaging: I have reviewed and interpreted all pertinent imaging results/findings within the past 24 hours.      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  Who present to ED with bilateral thighs pain and swelling in the last 7 days. 11 DVTs. Has not been able to fill her prescription and she was taking only Lovenox ( concern for Lovenox failure per hematology).  Doppler ultrasound of lower extremities shows bilateral partial thrombosis bilateral femoral veins extending to bilateral popliteal veins  .  Plan :   -- patient was loaded with heparin in the ED and started on heparin drip.   -- Hematology consulted for further AC recs. Apixaban and dcing heparin drip 5/8  -- IR unable to remove  thrombosis 5/6  -- monitor for signs of phlegmasia       Staphylococcus aureus bacteremia  Patient growing gpcs in two bottles. Started on Vanc. Will continue following cultures until negative. ID consulted    Plan  MSSA. 6 weeks of oxacillin     Acute cystitis without hematuria  Patient reports dysuria and frequency prior to admission. U/a at admission consistent with infection, though culture grew multiple organisms. Febrile to 102.6 overnight 5/4.     - DDx for fever includes cystitis (dysuria, U/a) vs infected clot vs clot burden itself  - Dced rocephin      Long term (current) use of anticoagulants  -- pt multiple syndromes of thrombophilia    -- on long trem AC   --  sine age of 18  -- switched to liquids 2015   -- unable to tolerate Xarelto because of SE ( biliary colic)  -- concern for Lovenox failure    Plan :   -- ensure pt having prescription filled for AC   -- f/u with hematology in the clinic       Anemia of chronic disease, iron deficiency anemia and acute blood loss anemia  BRBPR    -- Hg on admission 8.6, MCV 87  -- Hg has been between ( 6.9-8)   -- reports PRBPR day before admission   -- Iron study 11/2019: iron 24, TIBC 274, transferrin 185. Normal heptoglobin and RC   -- Required 2 u PRBC on 5/4 with appropriate correction of hg  -- CBC q8h stable over 24 hours, will decrease to daily.   -- GI planning scope 5/5      History of pulmonary embolism  -- she had 3 PEs   -- at home on coumadin     Factor 5 Leiden mutation, heterozygous          VTE Risk Mitigation (From admission, onward)         Ordered     apixaban tablet 10 mg  2 times daily      05/08/20 1148     heparin (porcine) in 5 % dex 25,000 unit/250 mL(100 unit/mL) infusion      05/06/20 2022     IP VTE HIGH RISK PATIENT  Once      05/03/20 2310     Place sequential compression device  Until discontinued      05/03/20 2210                      Ben Martines MD  Department of Hospital Medicine   [facility]  [place]

## 2020-05-09 NOTE — SUBJECTIVE & OBJECTIVE
Interval History:    Fever to 101 last night.  Blood cultures remain persistently positive for MSSA.  Blood cxs now showing MSSA.    BRII yesterday negative.  Continues to complain of pain, nausea.     Review of Systems   Constitutional: Positive for chills and fever. Negative for fatigue.   HENT: Negative for congestion and sore throat.    Respiratory: Negative for cough and shortness of breath.    Cardiovascular: Positive for leg swelling. Negative for chest pain.   Gastrointestinal: Positive for nausea. Negative for abdominal pain and diarrhea.   Genitourinary: Negative for difficulty urinating, dysuria, hematuria and urgency.   Musculoskeletal: Positive for arthralgias (BLE) and gait problem (2/2 pain). Negative for back pain.   Skin: Negative for color change, pallor and wound.   Neurological: Positive for weakness. Negative for light-headedness and headaches.   Psychiatric/Behavioral: Negative for behavioral problems and confusion. The patient is not nervous/anxious.      Objective:     Vital Signs (Most Recent):  Temp: 99.2 °F (37.3 °C) (05/09/20 1633)  Pulse: 75 (05/09/20 1633)  Resp: 17 (05/09/20 1633)  BP: 127/67 (05/09/20 1633)  SpO2: 99 % (05/09/20 1633) Vital Signs (24h Range):  Temp:  [98 °F (36.7 °C)-101.1 °F (38.4 °C)] 99.2 °F (37.3 °C)  Pulse:  [64-95] 75  Resp:  [16-17] 17  SpO2:  [96 %-99 %] 99 %  BP: (112-127)/(62-76) 127/67     Weight: 60.8 kg (134 lb)  Body mass index is 22.3 kg/m².    Estimated Creatinine Clearance: 104.8 mL/min (based on SCr of 0.7 mg/dL).    Physical Exam   Constitutional: She is oriented to person, place, and time. She appears well-developed and well-nourished. No distress.   HENT:   Head: Normocephalic and atraumatic.   Mouth/Throat: No oropharyngeal exudate.   Eyes: Conjunctivae are normal. No scleral icterus.   Cardiovascular: Normal rate and regular rhythm.   No murmur heard.  Pulmonary/Chest: Effort normal and breath sounds normal. No respiratory distress.   Abdominal:  Soft. She exhibits no distension. There is no tenderness.   Musculoskeletal: She exhibits edema and tenderness (BLE).   Neurological: She is alert and oriented to person, place, and time.   Skin: Skin is warm. She is not diaphoretic. No erythema.   PIV sites c/d/i     Psychiatric: She has a normal mood and affect. Her behavior is normal. Thought content normal.   Nursing note and vitals reviewed.      Significant Labs: All pertinent labs within the past 24 hours have been reviewed.    Significant Imaging: I have reviewed all pertinent imaging results/findings within the past 24 hours.

## 2020-05-09 NOTE — SUBJECTIVE & OBJECTIVE
Interval History: Patient still febrile at night. Waiting for cultures to be clear. Trying heating blankets and pads to help with pain.     Review of Systems   Constitutional: Negative for chills, fatigue and fever.   HENT: Negative for trouble swallowing.    Respiratory: Negative for cough and shortness of breath.    Cardiovascular: Positive for leg swelling. Negative for chest pain.   Gastrointestinal: Negative for abdominal pain, blood in stool, constipation, diarrhea, nausea and vomiting.   Genitourinary: Negative for dysuria.   Musculoskeletal: Negative for arthralgias.        Leg pain   Skin: Negative for color change and pallor.   Neurological: Negative for light-headedness and headaches.   Psychiatric/Behavioral: Negative for behavioral problems and confusion.     Objective:     Vital Signs (Most Recent):  Temp: 98.7 °F (37.1 °C) (05/09/20 1219)  Pulse: 82 (05/09/20 1219)  Resp: 16 (05/09/20 1219)  BP: 122/76 (05/09/20 1219)  SpO2: 99 % (05/09/20 1219) Vital Signs (24h Range):  Temp:  [98 °F (36.7 °C)-101.1 °F (38.4 °C)] 98.7 °F (37.1 °C)  Pulse:  [64-95] 82  Resp:  [16-17] 16  SpO2:  [96 %-99 %] 99 %  BP: (110-125)/(62-76) 122/76     Weight: 60.8 kg (134 lb)  Body mass index is 22.3 kg/m².    Intake/Output Summary (Last 24 hours) at 5/9/2020 1339  Last data filed at 5/9/2020 0100  Gross per 24 hour   Intake 615 ml   Output 1500 ml   Net -885 ml      Physical Exam   Constitutional: She is oriented to person, place, and time. She appears well-developed and well-nourished. No distress.   Uncomfortable.    Cardiovascular: Normal rate, regular rhythm and normal heart sounds.   No murmur heard.  Pulmonary/Chest: Effort normal and breath sounds normal. No respiratory distress. She has no rales.   Abdominal: Soft. Bowel sounds are normal. She exhibits no distension. There is no tenderness.   Musculoskeletal: She exhibits edema.   Tenderness of bilateral LE present, L>R   Neurological: She is alert and oriented to  person, place, and time. She displays normal reflexes. No cranial nerve deficit.   Skin: Skin is warm. She is not diaphoretic. No erythema.   Psychiatric: She has a normal mood and affect.   Nursing note and vitals reviewed.      Significant Labs:   CBC:   Recent Labs   Lab 05/08/20  0614 05/09/20  0447   WBC 3.36* 2.84*   HGB 8.7* 7.8*   HCT 29.3* 26.5*    195     CMP:   Recent Labs   Lab 05/08/20  0614 05/09/20  1052    136   K 3.1* 3.8    105   CO2 21* 24   GLU 87 86   BUN 3* 3*   CREATININE 0.7 0.7   CALCIUM 8.9 8.3*   PROT 7.2 6.1   ALBUMIN 1.8* 1.6*   BILITOT 0.2 0.2   ALKPHOS 267* 215*   AST 19 13   ALT 14 8*   ANIONGAP 11 7*   EGFRNONAA >60.0 >60.0       Significant Imaging: I have reviewed and interpreted all pertinent imaging results/findings within the past 24 hours.

## 2020-05-09 NOTE — ASSESSMENT & PLAN NOTE
29-year-old F with hx of factor 5 laden mutation, May-Thurner syndrome, recurrent DVTs and PEs s/p stents to iliac/femoral veins/IVC, IVC filter on long term anti-coagulation admitted with fevers, bilateral thigh pain and swelling. She was found to have extensive thrombosis of the bilateral femoral veins, iliac vein stents, and IVC stent with thrombosis extending to the bilateral popliteal veins. IR unfortunately was unable to perform thrombolysis 2/2 due the extensive chronic thrombi. On 5/4 she developed fevers and blood cultures positive for MSSA.  Remain persistently positive.  BRII negative.  Fevers to 101 last night.  Currently on continuous infusion cefazolin.  Unclear source of bacteremia though concerning for an endovascular infection.        Plan  - Continue Cefazolin 6 g IV continuous infusion   - Repeat blood cultures daily until clear.  Ordered for today.   - Anticipate pt will need at least 6 week course of abx from blood cx clearance.  - ID will follow up Monday.  In the interim, call me at [phone] or page ID staff on call for our service, Dr. Romo, if any questions/concerns.     Data reviewed and plan discussed with ID staff

## 2020-05-09 NOTE — PLAN OF CARE
Pt free of falls and injury during shift. POC reviewed with pt. VS stable. SR on telemetry. PRN pain meds admin. No acute events noted at this time. No complaints. Educated pt why they are at risk for falls and to use call light for assistance ambulating. Yellow non-slip socks on pt. Bed low and locked, call light with in reach. Will continue to monitor.

## 2020-05-10 PROBLEM — N30.00 ACUTE CYSTITIS WITHOUT HEMATURIA: Status: RESOLVED | Noted: 2020-05-05 | Resolved: 2020-05-10

## 2020-05-10 LAB
APTT BLDCRRT: 37.3 SEC (ref 21–32)
BACTERIA BLD CULT: ABNORMAL
BASOPHILS # BLD AUTO: 0.01 K/UL (ref 0–0.2)
BASOPHILS NFR BLD: 0.4 % (ref 0–1.9)
DIFFERENTIAL METHOD: ABNORMAL
EOSINOPHIL # BLD AUTO: 0.1 K/UL (ref 0–0.5)
EOSINOPHIL NFR BLD: 2.4 % (ref 0–8)
ERYTHROCYTE [DISTWIDTH] IN BLOOD BY AUTOMATED COUNT: 16.3 % (ref 11.5–14.5)
HCT VFR BLD AUTO: 30.6 % (ref 37–48.5)
HGB BLD-MCNC: 9 G/DL (ref 12–16)
IMM GRANULOCYTES # BLD AUTO: 0.05 K/UL (ref 0–0.04)
IMM GRANULOCYTES NFR BLD AUTO: 2 % (ref 0–0.5)
INR PPP: 1.3 (ref 0.8–1.2)
LYMPHOCYTES # BLD AUTO: 0.7 K/UL (ref 1–4.8)
LYMPHOCYTES NFR BLD: 27.6 % (ref 18–48)
MCH RBC QN AUTO: 26.6 PG (ref 27–31)
MCHC RBC AUTO-ENTMCNC: 29.4 G/DL (ref 32–36)
MCV RBC AUTO: 91 FL (ref 82–98)
MONOCYTES # BLD AUTO: 0.2 K/UL (ref 0.3–1)
MONOCYTES NFR BLD: 6.3 % (ref 4–15)
NEUTROPHILS # BLD AUTO: 1.6 K/UL (ref 1.8–7.7)
NEUTROPHILS NFR BLD: 61.3 % (ref 38–73)
NRBC BLD-RTO: 0 /100 WBC
PLATELET # BLD AUTO: 228 K/UL (ref 150–350)
PMV BLD AUTO: 8.6 FL (ref 9.2–12.9)
PROTHROMBIN TIME: 13.1 SEC (ref 9–12.5)
RBC # BLD AUTO: 3.38 M/UL (ref 4–5.4)
WBC # BLD AUTO: 2.54 K/UL (ref 3.9–12.7)

## 2020-05-10 PROCEDURE — 25500020 PHARM REV CODE 255: Performed by: HOSPITALIST

## 2020-05-10 PROCEDURE — 85730 THROMBOPLASTIN TIME PARTIAL: CPT

## 2020-05-10 PROCEDURE — 63600175 PHARM REV CODE 636 W HCPCS

## 2020-05-10 PROCEDURE — 25000003 PHARM REV CODE 250: Performed by: PHYSICIAN ASSISTANT

## 2020-05-10 PROCEDURE — 25000003 PHARM REV CODE 250

## 2020-05-10 PROCEDURE — 99233 SBSQ HOSP IP/OBS HIGH 50: CPT | Mod: ,,, | Performed by: HOSPITALIST

## 2020-05-10 PROCEDURE — 63600175 PHARM REV CODE 636 W HCPCS: Performed by: STUDENT IN AN ORGANIZED HEALTH CARE EDUCATION/TRAINING PROGRAM

## 2020-05-10 PROCEDURE — 63600175 PHARM REV CODE 636 W HCPCS: Performed by: PHYSICIAN ASSISTANT

## 2020-05-10 PROCEDURE — 85025 COMPLETE CBC W/AUTO DIFF WBC: CPT

## 2020-05-10 PROCEDURE — 25000003 PHARM REV CODE 250: Performed by: STUDENT IN AN ORGANIZED HEALTH CARE EDUCATION/TRAINING PROGRAM

## 2020-05-10 PROCEDURE — 99233 PR SUBSEQUENT HOSPITAL CARE,LEVL III: ICD-10-PCS | Mod: ,,, | Performed by: HOSPITALIST

## 2020-05-10 PROCEDURE — 36415 COLL VENOUS BLD VENIPUNCTURE: CPT

## 2020-05-10 PROCEDURE — 25500020 PHARM REV CODE 255: Performed by: STUDENT IN AN ORGANIZED HEALTH CARE EDUCATION/TRAINING PROGRAM

## 2020-05-10 PROCEDURE — 20600001 HC STEP DOWN PRIVATE ROOM

## 2020-05-10 PROCEDURE — 85610 PROTHROMBIN TIME: CPT

## 2020-05-10 RX ORDER — HYDROMORPHONE HYDROCHLORIDE 1 MG/ML
2 INJECTION, SOLUTION INTRAMUSCULAR; INTRAVENOUS; SUBCUTANEOUS EVERY 4 HOURS PRN
Status: DISCONTINUED | OUTPATIENT
Start: 2020-05-10 | End: 2020-05-13

## 2020-05-10 RX ORDER — OXYCODONE HYDROCHLORIDE 5 MG/1
10 TABLET ORAL EVERY 4 HOURS PRN
Status: DISCONTINUED | OUTPATIENT
Start: 2020-05-10 | End: 2020-05-10

## 2020-05-10 RX ORDER — OXYCODONE HYDROCHLORIDE 5 MG/1
5 TABLET ORAL ONCE
Status: DISCONTINUED | OUTPATIENT
Start: 2020-05-10 | End: 2020-05-11

## 2020-05-10 RX ORDER — OXYCODONE HYDROCHLORIDE 5 MG/1
10 TABLET ORAL EVERY 4 HOURS PRN
Status: DISCONTINUED | OUTPATIENT
Start: 2020-05-10 | End: 2020-05-13

## 2020-05-10 RX ORDER — OXYCODONE HYDROCHLORIDE 5 MG/1
5 TABLET ORAL EVERY 4 HOURS PRN
Status: DISCONTINUED | OUTPATIENT
Start: 2020-05-10 | End: 2020-05-13

## 2020-05-10 RX ADMIN — OXYCODONE HYDROCHLORIDE 5 MG: 5 TABLET ORAL at 03:05

## 2020-05-10 RX ADMIN — OXYCODONE HYDROCHLORIDE 10 MG: 5 TABLET ORAL at 08:05

## 2020-05-10 RX ADMIN — APIXABAN 10 MG: 5 TABLET, FILM COATED ORAL at 08:05

## 2020-05-10 RX ADMIN — OXYCODONE HYDROCHLORIDE 10 MG: 5 TABLET ORAL at 09:05

## 2020-05-10 RX ADMIN — IOHEXOL 15 ML: 350 INJECTION, SOLUTION INTRAVENOUS at 02:05

## 2020-05-10 RX ADMIN — IOHEXOL 15 ML: 350 INJECTION, SOLUTION INTRAVENOUS at 03:05

## 2020-05-10 RX ADMIN — HYDROMORPHONE HYDROCHLORIDE 2 MG: 1 INJECTION, SOLUTION INTRAMUSCULAR; INTRAVENOUS; SUBCUTANEOUS at 09:05

## 2020-05-10 RX ADMIN — IOHEXOL 75 ML: 350 INJECTION, SOLUTION INTRAVENOUS at 05:05

## 2020-05-10 RX ADMIN — DEXTROSE 6 G: 5 SOLUTION INTRAVENOUS at 02:05

## 2020-05-10 RX ADMIN — OXYCODONE HYDROCHLORIDE 10 MG: 5 TABLET ORAL at 02:05

## 2020-05-10 RX ADMIN — HYDROMORPHONE HYDROCHLORIDE 2 MG: 1 INJECTION, SOLUTION INTRAMUSCULAR; INTRAVENOUS; SUBCUTANEOUS at 04:05

## 2020-05-10 RX ADMIN — HYDROMORPHONE HYDROCHLORIDE 2 MG: 1 INJECTION, SOLUTION INTRAMUSCULAR; INTRAVENOUS; SUBCUTANEOUS at 05:05

## 2020-05-10 RX ADMIN — HYDROMORPHONE HYDROCHLORIDE 2 MG: 1 INJECTION, SOLUTION INTRAMUSCULAR; INTRAVENOUS; SUBCUTANEOUS at 11:05

## 2020-05-10 NOTE — PLAN OF CARE
Pt free of falls and injury during shift. POC reviewed with pt. VS stable. SR on telemetry. PRN pain meds admin. Pt wanted oral dose of pain med increased to lessen use of IV pain meds. RN paged team to relay this request, night coverage for medicine team would like for day team to adjust pain medication. No acute events noted at this time. No complaints. Educated pt why they are at risk for falls and to use call light for assistance ambulating. Yellow non-slip socks on pt. Bed low and locked, call light with in reach. Will continue to monitor.

## 2020-05-10 NOTE — ASSESSMENT & PLAN NOTE
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  Who present to ED with bilateral thighs pain and swelling in the last 7 days. 11 DVTs. Has not been able to fill her prescription and she was taking only Lovenox ( concern for Lovenox failure per hematology).  Doppler ultrasound of lower extremities shows bilateral partial thrombosis bilateral femoral veins extending to bilateral popliteal veins  .  Plan :   -- patient was loaded with heparin in the ED and started on heparin drip.   -- Hematology consulted for further AC recs. Apixaban and dcing heparin drip 5/8  -- IR unable to remove thrombosis 5/6  -- Unfortunately also not a candidate for open thrombectomy with Vascular Surgery  -- monitor for signs of phlegmasia

## 2020-05-10 NOTE — ASSESSMENT & PLAN NOTE
- Likely due to infected clot  - Unable to attain source control in the absense of thrombectomy  - Persistently positive cultures since 5/5  - ID following  - TTE and BRII without vegetations  - Continuing cefazolin per ID  - Anticipating 6 weeks of antibiotics from date of first clear culture

## 2020-05-10 NOTE — PROGRESS NOTES
[facility] Hospital Medicine  Progress Note    Patient Name: Antoinette Love  MRN: [id number]  Patient Class: IP- Inpatient   Admission Date: 5/3/2020  Length of Stay: 7 days  Attending Physician: Benito Luna MD  Primary Care Provider: Alberto Holguin MD    [facility] Medicine Team: [facility] MED 4 Alonso Mendoza MD    Subjective:     Principal Problem:DVT, lower extremity, proximal, acute, bilateral        HPI:  Ms. 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  Who present to ED with bilateral thighs pain and swelling in the last 7 days. She is following up with Dr. Garcia in hematology and she was just switched from Coumadin to Fondaparinux around a week ago.  She has not been able to get the prescription for Fondaparinux from the pharmacy and she started taking Lovenox 80 mg BID and she stop taking it yesterday when she noticed BRBPR. She states that on Monday she started having pain in the medial side of her right thigh then she has pain in the same area of the left thigh. She describes the pain as excruciating 8/10, bilateral medial thigh associated with swelling, numbness and bruises.  She endorses episodes of chills, hot flashes,night sweats dry cough and palpitation but denies chest pain, SOB or hemoptysis.  She states that she has been taking Coumadin since age of 18 and she was switched to Eliquis 2015 and she has been doing great with it with only one DVT 2016. She had multiple DVTs since August of the last year and she has been seen by IR around 9 times for thrombolysis, ballooning and stents placment with last one on 1/17 where she had mechanical thrombectomy and stent extension to common femoral veins bilaterally.  Sh was discharged and placed back on Coumadin for unclear reason.  She states  "that she is not comfortable with using Coumadin because of the frequent monitoring.  She spoke to her hematologist week ago to switch her to non vitamin K anticoagulant.    She was started on Xarelto before and she was discontinued from taking first dose because of biliary colic pain and she was told she has inflammation.  She reports anxiety/panic attack which she was operated by IR in this first procedure and she has been sedated for all procedure after that.     In ED, she was looks uncomfortable and in pain.  She was afebrile, , /62, SpO2 100% on RA. WBC: 3.8, H.6, platelet: 228. Unremarkable CMP. INR: 1.1, Ptt: 22 and D-dimer 3.6. Normal lactic acid. Doppler US of lower extremities shows  partial thrombosis of the femoral veins, extending to the bilateral popliteal veins. EKG with NSR. IR were consulted and patient loaded with heparin and started on heparin gtt.     Overview/Hospital Course:  Ms. Love was admitted to Hospital Medicine 5/3 with extensive thrombus of the "Extensive deep venous thrombosis...noting previously visualized thrombus within 1 of the paired posterior tibial veins on the left is not identified on current exam however there is thrombosis of 1 of the paired right posterior tibial veins, previously no thrombus identified." Discussed the case with IR, who recommended GI workup given patient's persistent anemia and report of possible BRBPR; furthermore, the morning after admission, patient was noted to have 2 g drop in hemoglobin with associated hypotension and tachycardia increasing concern for GI bleed - though no further BRBPR noted. CTA with venous phase added to assess for possible GI bleed and to evaluate extensiveness of clot. No extravasation of contrast noted, and on venous phase, thrombus noted to extend to IVC filter. C-scope remarkable only for internal hemorrhoids.    On , patient spiked fever, blood cultures growing MSSA. ID consulted, and patient " "started on vanc, which was transitioned to cefazolin when cultures finalized.    Thrombectomy with IR attempted on 5/06; however due to "extensive, chronic, calcific thrombosis without ability to pass a microwire" thrombectomy was not possible. Vascular Surgery was consulted. Unfortunately patient is not a candidate for open thrombectomy due to large clot burden and high risk for post thrombotic syndrome.     Patient's hospital course has been complicated further by persistently positive blood cultures. Source of infection is likely infected clot, and source control is difficult in the absence of thrombectomy. CT c/a/p ordered 5/10 to rule out underlying abscess or further seeding of infection given prolonged bacteremia.     Interval History: Afebrile. Issues with pain overnight, PRNs adjusted. Oxy 10 for moderate pain and dilaudid frequency increased to q4h for severe.     Review of Systems   Constitutional: Negative for chills, fatigue and fever.   HENT: Negative for trouble swallowing.    Respiratory: Negative for cough and shortness of breath.    Cardiovascular: Positive for leg swelling. Negative for chest pain.   Gastrointestinal: Negative for abdominal pain, blood in stool, constipation, diarrhea, nausea and vomiting.   Genitourinary: Negative for dysuria.   Musculoskeletal: Negative for arthralgias.        Leg pain   Skin: Negative for color change and pallor.   Neurological: Negative for light-headedness and headaches.   Psychiatric/Behavioral: Negative for behavioral problems and confusion.     Objective:     Vital Signs (Most Recent):  Temp: 98.8 °F (37.1 °C) (05/10/20 0827)  Pulse: 75 (05/10/20 1117)  Resp: 18 (05/10/20 0827)  BP: 113/72 (05/10/20 0827)  SpO2: 95 % (05/10/20 0827) Vital Signs (24h Range):  Temp:  [98.1 °F (36.7 °C)-99.2 °F (37.3 °C)] 98.8 °F (37.1 °C)  Pulse:  [64-85] 75  Resp:  [16-20] 18  SpO2:  [95 %-99 %] 95 %  BP: (107-136)/(58-76) 113/72     Weight: 60.8 kg (134 lb)  Body mass " index is 22.3 kg/m².    Intake/Output Summary (Last 24 hours) at 5/10/2020 1214  Last data filed at 5/10/2020 0400  Gross per 24 hour   Intake 480 ml   Output 2700 ml   Net -2220 ml      Physical Exam   Constitutional: She is oriented to person, place, and time. She appears well-developed and well-nourished. No distress.   Uncomfortable.    Cardiovascular: Normal rate, regular rhythm and normal heart sounds.   No murmur heard.  Pulmonary/Chest: Effort normal and breath sounds normal. No respiratory distress. She has no rales.   Abdominal: Soft. Bowel sounds are normal. She exhibits no distension. There is no tenderness.   Musculoskeletal: She exhibits edema.   Tenderness of bilateral LE present, L>R   Neurological: She is alert and oriented to person, place, and time. She displays normal reflexes. No cranial nerve deficit.   Skin: Skin is warm. She is not diaphoretic. No erythema.   Psychiatric: She has a normal mood and affect.   Nursing note and vitals reviewed.      Significant Labs:   CBC:   Recent Labs   Lab 05/09/20  0447 05/10/20  0426   WBC 2.84* 2.54*   HGB 7.8* 9.0*   HCT 26.5* 30.6*    228     CMP:   Recent Labs   Lab 05/09/20  1052      K 3.8      CO2 24   GLU 86   BUN 3*   CREATININE 0.7   CALCIUM 8.3*   PROT 6.1   ALBUMIN 1.6*   BILITOT 0.2   ALKPHOS 215*   AST 13   ALT 8*   ANIONGAP 7*   EGFRNONAA >60.0       Significant Imaging: I have reviewed and interpreted all pertinent imaging results/findings within the past 24 hours.      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  Who present to ED with bilateral thighs pain and swelling  in the last 7 days. 11 DVTs. Has not been able to fill her prescription and she was taking only Lovenox ( concern for Lovenox failure per hematology).  Doppler ultrasound of lower extremities shows bilateral partial thrombosis bilateral femoral veins extending to bilateral popliteal veins  .  Plan :   -- patient was loaded with heparin in the ED and started on heparin drip.   -- Hematology consulted for further AC recs. Apixaban and dcing heparin drip 5/8  -- IR unable to remove thrombosis 5/6  -- Unfortunately also not a candidate for open thrombectomy with Vascular Surgery  -- monitor for signs of phlegmasia       Staphylococcus aureus bacteremia  - Likely due to infected clot  - Unable to attain source control in the absense of thrombectomy  - Persistently positive cultures since 5/5  - ID following  - TTE and BRII without vegetations  - Continuing cefazolin per ID  - Anticipating 6 weeks of antibiotics from date of first clear culture    Long term (current) use of anticoagulants  -- pt multiple syndromes of thrombophilia    -- on long trem AC   --  sine age of 18  -- switched to liquids 2015   -- unable to tolerate Xarelto because of SE ( biliary colic)  -- concern for Lovenox failure    Plan :   -- ensure pt having prescription filled for AC   -- f/u with hematology in the clinic       Anemia of chronic disease, iron deficiency anemia and acute blood loss anemia  BRBPR    -- Hg on admission 8.6, MCV 87  -- Hg has been between ( 6.9-8)   -- reports PRBPR day before admission   -- Iron study 11/2019: iron 24, TIBC 274, transferrin 185. Normal heptoglobin and RC   -- Required 2 u PRBC on 5/4 with appropriate correction of hg  -- CBC q8h stable over 24 hours, will decrease to daily.   -- GI performed cscope 5/5, remarkable only for internal hemorrhoids.       Presence of IVC filter        May-Thurner syndrome        History of pulmonary embolism  -- she had 3 PEs   -- at home on coumadin     Factor 5 Leiden  mutation, heterozygous          VTE Risk Mitigation (From admission, onward)         Ordered     apixaban tablet 10 mg  2 times daily      05/08/20 1148     heparin (porcine) in 5 % dex 25,000 unit/250 mL(100 unit/mL) infusion      05/06/20 2022     IP VTE HIGH RISK PATIENT  Once      05/03/20 2310     Place sequential compression device  Until discontinued      05/03/20 2210                      Alonso Mendoza MD  Department of Hospital Medicine   [facility]

## 2020-05-10 NOTE — SUBJECTIVE & OBJECTIVE
Interval History: Afebrile. Issues with pain overnight, PRNs adjusted. Oxy 10 for moderate pain and dilaudid frequency increased to q4h for severe.     Review of Systems   Constitutional: Negative for chills, fatigue and fever.   HENT: Negative for trouble swallowing.    Respiratory: Negative for cough and shortness of breath.    Cardiovascular: Positive for leg swelling. Negative for chest pain.   Gastrointestinal: Negative for abdominal pain, blood in stool, constipation, diarrhea, nausea and vomiting.   Genitourinary: Negative for dysuria.   Musculoskeletal: Negative for arthralgias.        Leg pain   Skin: Negative for color change and pallor.   Neurological: Negative for light-headedness and headaches.   Psychiatric/Behavioral: Negative for behavioral problems and confusion.     Objective:     Vital Signs (Most Recent):  Temp: 98.8 °F (37.1 °C) (05/10/20 0827)  Pulse: 75 (05/10/20 1117)  Resp: 18 (05/10/20 0827)  BP: 113/72 (05/10/20 0827)  SpO2: 95 % (05/10/20 0827) Vital Signs (24h Range):  Temp:  [98.1 °F (36.7 °C)-99.2 °F (37.3 °C)] 98.8 °F (37.1 °C)  Pulse:  [64-85] 75  Resp:  [16-20] 18  SpO2:  [95 %-99 %] 95 %  BP: (107-136)/(58-76) 113/72     Weight: 60.8 kg (134 lb)  Body mass index is 22.3 kg/m².    Intake/Output Summary (Last 24 hours) at 5/10/2020 1214  Last data filed at 5/10/2020 0400  Gross per 24 hour   Intake 480 ml   Output 2700 ml   Net -2220 ml      Physical Exam   Constitutional: She is oriented to person, place, and time. She appears well-developed and well-nourished. No distress.   Uncomfortable.    Cardiovascular: Normal rate, regular rhythm and normal heart sounds.   No murmur heard.  Pulmonary/Chest: Effort normal and breath sounds normal. No respiratory distress. She has no rales.   Abdominal: Soft. Bowel sounds are normal. She exhibits no distension. There is no tenderness.   Musculoskeletal: She exhibits edema.   Tenderness of bilateral LE present, L>R   Neurological: She is alert  and oriented to person, place, and time. She displays normal reflexes. No cranial nerve deficit.   Skin: Skin is warm. She is not diaphoretic. No erythema.   Psychiatric: She has a normal mood and affect.   Nursing note and vitals reviewed.      Significant Labs:   CBC:   Recent Labs   Lab 05/09/20  0447 05/10/20  0426   WBC 2.84* 2.54*   HGB 7.8* 9.0*   HCT 26.5* 30.6*    228     CMP:   Recent Labs   Lab 05/09/20  1052      K 3.8      CO2 24   GLU 86   BUN 3*   CREATININE 0.7   CALCIUM 8.3*   PROT 6.1   ALBUMIN 1.6*   BILITOT 0.2   ALKPHOS 215*   AST 13   ALT 8*   ANIONGAP 7*   EGFRNONAA >60.0       Significant Imaging: I have reviewed and interpreted all pertinent imaging results/findings within the past 24 hours.

## 2020-05-10 NOTE — ASSESSMENT & PLAN NOTE
BRBPR    -- Hg on admission 8.6, MCV 87  -- Hg has been between ( 6.9-8)   -- reports PRBPR day before admission   -- Iron study 11/2019: iron 24, TIBC 274, transferrin 185. Normal heptoglobin and RC   -- Required 2 u PRBC on 5/4 with appropriate correction of hg  -- CBC q8h stable over 24 hours, will decrease to daily.   -- GI performed cscope 5/5, remarkable only for internal hemorrhoids.

## 2020-05-10 NOTE — PLAN OF CARE
Plan of care ongoing. No falls or injuries this shift precautions in place. Cefazolin still going continuous per order. Blood cultures came back positive. Patient getting pain medicine around the clock. No complaints or concerns.

## 2020-05-10 NOTE — PLAN OF CARE
Ancef running per MD order. Oxycodone and hydromorphone given. CT Chest Abdomen Pelvis With Contrast done today. Plan of care discussed with patient. Fall precautions in place. Discussed medications and care. Patient has no questions at this time. Will continue to monitor.

## 2020-05-11 LAB
ALBUMIN SERPL BCP-MCNC: 1.9 G/DL (ref 3.5–5.2)
ALP SERPL-CCNC: 189 U/L (ref 55–135)
ALT SERPL W/O P-5'-P-CCNC: <5 U/L (ref 10–44)
ANION GAP SERPL CALC-SCNC: 8 MMOL/L (ref 8–16)
AST SERPL-CCNC: 10 U/L (ref 10–40)
BACTERIA BLD CULT: ABNORMAL
BACTERIA BLD CULT: NORMAL
BASOPHILS # BLD AUTO: 0.01 K/UL (ref 0–0.2)
BASOPHILS NFR BLD: 0.3 % (ref 0–1.9)
BILIRUB SERPL-MCNC: 0.3 MG/DL (ref 0.1–1)
BUN SERPL-MCNC: 3 MG/DL (ref 6–20)
CALCIUM SERPL-MCNC: 9 MG/DL (ref 8.7–10.5)
CHLORIDE SERPL-SCNC: 100 MMOL/L (ref 95–110)
CK SERPL-CCNC: <7 U/L (ref 20–180)
CO2 SERPL-SCNC: 30 MMOL/L (ref 23–29)
CREAT SERPL-MCNC: 0.6 MG/DL (ref 0.5–1.4)
CRP SERPL-MCNC: 97.4 MG/L (ref 0–3.19)
DIFFERENTIAL METHOD: ABNORMAL
EOSINOPHIL # BLD AUTO: 0.1 K/UL (ref 0–0.5)
EOSINOPHIL NFR BLD: 1.7 % (ref 0–8)
ERYTHROCYTE [DISTWIDTH] IN BLOOD BY AUTOMATED COUNT: 16.3 % (ref 11.5–14.5)
ERYTHROCYTE [SEDIMENTATION RATE] IN BLOOD BY WESTERGREN METHOD: 92 MM/HR (ref 0–36)
EST. GFR  (AFRICAN AMERICAN): >60 ML/MIN/1.73 M^2
EST. GFR  (NON AFRICAN AMERICAN): >60 ML/MIN/1.73 M^2
GLUCOSE SERPL-MCNC: 86 MG/DL (ref 70–110)
HCT VFR BLD AUTO: 30.1 % (ref 37–48.5)
HGB BLD-MCNC: 9.4 G/DL (ref 12–16)
IMM GRANULOCYTES # BLD AUTO: 0.05 K/UL (ref 0–0.04)
IMM GRANULOCYTES NFR BLD AUTO: 1.7 % (ref 0–0.5)
LYMPHOCYTES # BLD AUTO: 1 K/UL (ref 1–4.8)
LYMPHOCYTES NFR BLD: 36.2 % (ref 18–48)
MCH RBC QN AUTO: 27.3 PG (ref 27–31)
MCHC RBC AUTO-ENTMCNC: 31.2 G/DL (ref 32–36)
MCV RBC AUTO: 88 FL (ref 82–98)
MONOCYTES # BLD AUTO: 0.1 K/UL (ref 0.3–1)
MONOCYTES NFR BLD: 4.2 % (ref 4–15)
NEUTROPHILS # BLD AUTO: 1.6 K/UL (ref 1.8–7.7)
NEUTROPHILS NFR BLD: 55.9 % (ref 38–73)
NRBC BLD-RTO: 0 /100 WBC
PLATELET # BLD AUTO: 209 K/UL (ref 150–350)
PMV BLD AUTO: 9.4 FL (ref 9.2–12.9)
POTASSIUM SERPL-SCNC: 3.7 MMOL/L (ref 3.5–5.1)
PROT SERPL-MCNC: 7.2 G/DL (ref 6–8.4)
RBC # BLD AUTO: 3.44 M/UL (ref 4–5.4)
SODIUM SERPL-SCNC: 138 MMOL/L (ref 136–145)
WBC # BLD AUTO: 2.87 K/UL (ref 3.9–12.7)

## 2020-05-11 PROCEDURE — 97535 SELF CARE MNGMENT TRAINING: CPT

## 2020-05-11 PROCEDURE — 97110 THERAPEUTIC EXERCISES: CPT

## 2020-05-11 PROCEDURE — 36415 COLL VENOUS BLD VENIPUNCTURE: CPT

## 2020-05-11 PROCEDURE — 97116 GAIT TRAINING THERAPY: CPT

## 2020-05-11 PROCEDURE — 63600175 PHARM REV CODE 636 W HCPCS

## 2020-05-11 PROCEDURE — 63600175 PHARM REV CODE 636 W HCPCS: Performed by: PHYSICIAN ASSISTANT

## 2020-05-11 PROCEDURE — 80053 COMPREHEN METABOLIC PANEL: CPT

## 2020-05-11 PROCEDURE — 86141 C-REACTIVE PROTEIN HS: CPT

## 2020-05-11 PROCEDURE — 99233 PR SUBSEQUENT HOSPITAL CARE,LEVL III: ICD-10-PCS | Mod: ,,, | Performed by: PHYSICIAN ASSISTANT

## 2020-05-11 PROCEDURE — 99233 PR SUBSEQUENT HOSPITAL CARE,LEVL III: ICD-10-PCS | Mod: ,,, | Performed by: HOSPITALIST

## 2020-05-11 PROCEDURE — 99233 SBSQ HOSP IP/OBS HIGH 50: CPT | Mod: ,,, | Performed by: HOSPITALIST

## 2020-05-11 PROCEDURE — 99233 SBSQ HOSP IP/OBS HIGH 50: CPT | Mod: ,,, | Performed by: PHYSICIAN ASSISTANT

## 2020-05-11 PROCEDURE — 25000003 PHARM REV CODE 250

## 2020-05-11 PROCEDURE — 20600001 HC STEP DOWN PRIVATE ROOM

## 2020-05-11 PROCEDURE — 85025 COMPLETE CBC W/AUTO DIFF WBC: CPT

## 2020-05-11 PROCEDURE — 25000003 PHARM REV CODE 250: Performed by: STUDENT IN AN ORGANIZED HEALTH CARE EDUCATION/TRAINING PROGRAM

## 2020-05-11 PROCEDURE — 82550 ASSAY OF CK (CPK): CPT

## 2020-05-11 PROCEDURE — 25000003 PHARM REV CODE 250: Performed by: PHYSICIAN ASSISTANT

## 2020-05-11 PROCEDURE — 85652 RBC SED RATE AUTOMATED: CPT

## 2020-05-11 RX ORDER — AMOXICILLIN 250 MG
1 CAPSULE ORAL 2 TIMES DAILY
Status: DISCONTINUED | OUTPATIENT
Start: 2020-05-11 | End: 2020-05-13 | Stop reason: HOSPADM

## 2020-05-11 RX ORDER — POLYETHYLENE GLYCOL 3350 17 G/17G
17 POWDER, FOR SOLUTION ORAL 3 TIMES DAILY
Status: DISCONTINUED | OUTPATIENT
Start: 2020-05-11 | End: 2020-05-13 | Stop reason: HOSPADM

## 2020-05-11 RX ADMIN — DEXTROSE 6 G: 5 SOLUTION INTRAVENOUS at 01:05

## 2020-05-11 RX ADMIN — HYDROMORPHONE HYDROCHLORIDE 2 MG: 1 INJECTION, SOLUTION INTRAMUSCULAR; INTRAVENOUS; SUBCUTANEOUS at 10:05

## 2020-05-11 RX ADMIN — HYDROMORPHONE HYDROCHLORIDE 2 MG: 1 INJECTION, SOLUTION INTRAMUSCULAR; INTRAVENOUS; SUBCUTANEOUS at 02:05

## 2020-05-11 RX ADMIN — Medication 1 CAPSULE: at 11:05

## 2020-05-11 RX ADMIN — OXYCODONE HYDROCHLORIDE 10 MG: 5 TABLET ORAL at 08:05

## 2020-05-11 RX ADMIN — SENNOSIDES AND DOCUSATE SODIUM 1 TABLET: 8.6; 5 TABLET ORAL at 07:05

## 2020-05-11 RX ADMIN — OXYCODONE HYDROCHLORIDE 10 MG: 5 TABLET ORAL at 06:05

## 2020-05-11 RX ADMIN — APIXABAN 10 MG: 5 TABLET, FILM COATED ORAL at 07:05

## 2020-05-11 RX ADMIN — POLYETHYLENE GLYCOL 3350 17 G: 17 POWDER, FOR SOLUTION ORAL at 02:05

## 2020-05-11 RX ADMIN — SENNOSIDES AND DOCUSATE SODIUM 1 TABLET: 8.6; 5 TABLET ORAL at 11:05

## 2020-05-11 RX ADMIN — APIXABAN 10 MG: 5 TABLET, FILM COATED ORAL at 08:05

## 2020-05-11 RX ADMIN — HYDROMORPHONE HYDROCHLORIDE 2 MG: 1 INJECTION, SOLUTION INTRAMUSCULAR; INTRAVENOUS; SUBCUTANEOUS at 06:05

## 2020-05-11 RX ADMIN — OXYCODONE HYDROCHLORIDE 10 MG: 5 TABLET ORAL at 01:05

## 2020-05-11 RX ADMIN — HYDROMORPHONE HYDROCHLORIDE 2 MG: 1 INJECTION, SOLUTION INTRAMUSCULAR; INTRAVENOUS; SUBCUTANEOUS at 07:05

## 2020-05-11 NOTE — PROGRESS NOTES
[facility] Hospital Medicine  Progress Note    Patient Name: Antoinette Love  MRN: [id number]  Patient Class: IP- Inpatient   Admission Date: 5/3/2020  Length of Stay: 8 days  Attending Physician: Benito Luna MD  Primary Care Provider: Alberto Holguin MD    [facility] Medicine Team: [facility] 4 Ben Martines MD    Subjective:     Principal Problem:DVT, lower extremity, proximal, acute, bilateral        HPI:  Ms. 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  Who present to ED with bilateral thighs pain and swelling in the last 7 days. She is following up with Dr. Garcia in hematology and she was just switched from Coumadin to Fondaparinux around a week ago.  She has not been able to get the prescription for Fondaparinux from the pharmacy and she started taking Lovenox 80 mg BID and she stop taking it yesterday when she noticed BRBPR. She states that on Monday she started having pain in the medial side of her right thigh then she has pain in the same area of the left thigh. She describes the pain as excruciating 8/10, bilateral medial thigh associated with swelling, numbness and bruises.  She endorses episodes of chills, hot flashes,night sweats dry cough and palpitation but denies chest pain, SOB or hemoptysis.  She states that she has been taking Coumadin since age of 18 and she was switched to Eliquis 2015 and she has been doing great with it with only one DVT 2016. She had multiple DVTs since August of the last year and she has been seen by IR around 9 times for thrombolysis, ballooning and stents placment with last one on 1/17 where she had mechanical thrombectomy and stent extension to common femoral veins bilaterally.  Sh was discharged and placed back on Coumadin for unclear reason.  She  "states that she is not comfortable with using Coumadin because of the frequent monitoring.  She spoke to her hematologist week ago to switch her to non vitamin K anticoagulant.    She was started on Xarelto before and she was discontinued from taking first dose because of biliary colic pain and she was told she has inflammation.  She reports anxiety/panic attack which she was operated by IR in this first procedure and she has been sedated for all procedure after that.     In ED, she was looks uncomfortable and in pain.  She was afebrile, , /62, SpO2 100% on RA. WBC: 3.8, H.6, platelet: 228. Unremarkable CMP. INR: 1.1, Ptt: 22 and D-dimer 3.6. Normal lactic acid. Doppler US of lower extremities shows  partial thrombosis of the femoral veins, extending to the bilateral popliteal veins. EKG with NSR. IR were consulted and patient loaded with heparin and started on heparin gtt.     Overview/Hospital Course:  Ms. Love was admitted to Hospital Medicine 5/3 with extensive thrombus of the "Extensive deep venous thrombosis...noting previously visualized thrombus within 1 of the paired posterior tibial veins on the left is not identified on current exam however there is thrombosis of 1 of the paired right posterior tibial veins, previously no thrombus identified." Discussed the case with IR, who recommended GI workup given patient's persistent anemia and report of possible BRBPR; furthermore, the morning after admission, patient was noted to have 2 g drop in hemoglobin with associated hypotension and tachycardia increasing concern for GI bleed - though no further BRBPR noted. CTA with venous phase added to assess for possible GI bleed and to evaluate extensiveness of clot. No extravasation of contrast noted, and on venous phase, thrombus noted to extend to IVC filter. C-scope remarkable only for internal hemorrhoids.    On , patient spiked fever, blood cultures growing MSSA. ID consulted, and patient " "started on vanc, which was transitioned to cefazolin when cultures finalized.    Thrombectomy with IR attempted on 5/06; however due to "extensive, chronic, calcific thrombosis without ability to pass a microwire" thrombectomy was not possible. Vascular Surgery was consulted. Unfortunately patient is not a candidate for open thrombectomy due to large clot burden and high risk for post thrombotic syndrome.     Patient's hospital course has been complicated further by persistently positive blood cultures. Source of infection is likely infected clot, and source control is difficult in the absence of thrombectomy. CT c/a/p ordered 5/10 to rule out underlying abscess or further seeding of infection given prolonged bacteremia.     Interval History: NAEON. CT scan showed myositis but most likely from secondary reaction to clot. Cultures have been clear for about 48 hours. Will wait another day to make sure cultures stay clear.     Review of Systems   Constitutional: Negative for chills, fatigue and fever.   HENT: Negative for trouble swallowing.    Respiratory: Negative for cough and shortness of breath.    Cardiovascular: Positive for leg swelling. Negative for chest pain.   Gastrointestinal: Negative for abdominal pain, blood in stool, constipation, diarrhea, nausea and vomiting.   Genitourinary: Negative for dysuria.   Musculoskeletal: Negative for arthralgias.        Leg pain   Skin: Negative for color change and pallor.   Neurological: Negative for light-headedness and headaches.   Psychiatric/Behavioral: Negative for behavioral problems and confusion.     Objective:     Vital Signs (Most Recent):  Temp: 98.9 °F (37.2 °C) (05/11/20 1139)  Pulse: 96 (05/11/20 1139)  Resp: 18 (05/11/20 1139)  BP: 113/67 (05/11/20 1139)  SpO2: 97 % (05/11/20 1139) Vital Signs (24h Range):  Temp:  [98.7 °F (37.1 °C)-100.5 °F (38.1 °C)] 98.9 °F (37.2 °C)  Pulse:  [62-96] 96  Resp:  [16-20] 18  SpO2:  [95 %-98 %] 97 %  BP: (113-131)/(61-81) " 113/67     Weight: 60.8 kg (134 lb)  Body mass index is 22.3 kg/m².    Intake/Output Summary (Last 24 hours) at 5/11/2020 1219  Last data filed at 5/11/2020 0300  Gross per 24 hour   Intake 300 ml   Output 1000 ml   Net -700 ml      Physical Exam   Constitutional: She is oriented to person, place, and time. She appears well-developed and well-nourished. No distress.   Uncomfortable.    Cardiovascular: Normal rate, regular rhythm and normal heart sounds.   No murmur heard.  Pulmonary/Chest: Effort normal and breath sounds normal. No respiratory distress. She has no rales.   Abdominal: Soft. Bowel sounds are normal. She exhibits no distension. There is no tenderness.   Musculoskeletal: She exhibits edema.   Tenderness of bilateral LE present, L>R   Neurological: She is alert and oriented to person, place, and time. She displays normal reflexes. No cranial nerve deficit.   Skin: Skin is warm. She is not diaphoretic. No erythema.   Psychiatric: She has a normal mood and affect.   Nursing note and vitals reviewed.      Significant Labs:   CBC:   Recent Labs   Lab 05/10/20  0426 05/11/20  0837   WBC 2.54* 2.87*   HGB 9.0* 9.4*   HCT 30.6* 30.1*    209     CMP:   Recent Labs   Lab 05/11/20  0837      K 3.7      CO2 30*   GLU 86   BUN 3*   CREATININE 0.6   CALCIUM 9.0   PROT 7.2   ALBUMIN 1.9*   BILITOT 0.3   ALKPHOS 189*   AST 10   ALT <5*   ANIONGAP 8   EGFRNONAA >60.0       Significant Imaging: I have reviewed and interpreted all pertinent imaging results/findings within the past 24 hours.      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  Who present  to ED with bilateral thighs pain and swelling in the last 7 days. 11 DVTs. Has not been able to fill her prescription and she was taking only Lovenox ( concern for Lovenox failure per hematology).  Doppler ultrasound of lower extremities shows bilateral partial thrombosis bilateral femoral veins extending to bilateral popliteal veins  .  Plan :   -- patient was loaded with heparin in the ED and started on heparin drip.   -- Hematology consulted for further AC recs. Apixaban and dcing heparin drip 5/8  -- IR unable to remove thrombosis 5/6  -- Unfortunately also not a candidate for open thrombectomy with Vascular Surgery  -- monitor for signs of phlegmasia       Staphylococcus aureus bacteremia  - Likely due to infected clot  - Unable to attain source control in the absense of thrombectomy  - Persistently positive cultures since 5/5  - ID following  - TTE and BRII without vegetations  - Continuing cefazolin per ID  - Anticipating 6 weeks of antibiotics from date of first clear culture    Long term (current) use of anticoagulants  -- pt multiple syndromes of thrombophilia    -- on long trem AC   --  sine age of 18  -- switched to liquids 2015   -- unable to tolerate Xarelto because of SE ( biliary colic)  -- concern for Lovenox failure    Plan :   -- ensure pt having prescription filled for AC   -- f/u with hematology in the clinic       Anemia of chronic disease, iron deficiency anemia and acute blood loss anemia  BRBPR    -- Hg on admission 8.6, MCV 87  -- Hg has been between ( 6.9-8)   -- reports PRBPR day before admission   -- Iron study 11/2019: iron 24, TIBC 274, transferrin 185. Normal heptoglobin and RC   -- Required 2 u PRBC on 5/4 with appropriate correction of hg  -- CBC q8h stable over 24 hours, will decrease to daily.   -- GI performed cscope 5/5, remarkable only for internal hemorrhoids.       Presence of IVC filter        May-Thurner syndrome        History of pulmonary embolism  -- she had 3 PEs    -- at home on coumadin     Factor 5 Leiden mutation, heterozygous          VTE Risk Mitigation (From admission, onward)         Ordered     apixaban tablet 10 mg  2 times daily      05/08/20 1148     heparin (porcine) in 5 % dex 25,000 unit/250 mL(100 unit/mL) infusion      05/06/20 2022     IP VTE HIGH RISK PATIENT  Once      05/03/20 2310     Place sequential compression device  Until discontinued      05/03/20 2210                      Ben Martines MD  Department of Hospital Medicine   [facility]

## 2020-05-11 NOTE — PT/OT/SLP PROGRESS
"Physical Therapy Treatment    Patient Name:  Antoinette Love   MRN:  [id number]  Admitting Diagnosis:  DVT, lower extremity, proximal, acute, bilateral   Recent Surgery: Procedure(s) (LRB):  ECHOCARDIOGRAM,TRANSESOPHAGEAL (N/A) 3 Days Post-Op  Admit Date: 5/3/2020  Length of Stay: 8 days    Recommendations:     Discharge Recommendations:  home health PT   Discharge Equipment Recommendations: walker, rolling   Barriers to discharge: Decreased caregiver support    Assessment:     Antoinette Love is a 29 y.o. female admitted with a medical diagnosis of DVT, lower extremity, proximal, acute, bilateral.  She presents with the following impairments/functional limitations:  weakness, impaired endurance, pain, gait instability, impaired functional mobilty, impaired self care skills, impaired balance, impaired cardiopulmonary response to activity. Pt tolerated session well today. Improvements in level of assist required as well as distance ambulated due to improved pain control. Pt also demonstrates improved safety and ambulation ability with use of RW using a hop to/partial WBing 3 point gait pattern. Pt would greatly benefit from a RW at discharge for improved safety and independence.     Rehab Prognosis: Good; patient would benefit from acute skilled PT services to address these deficits and reach maximum level of function.    Recent Surgery: Procedure(s) (LRB):  ECHOCARDIOGRAM,TRANSESOPHAGEAL (N/A) 3 Days Post-Op    Plan:     During this hospitalization, patient to be seen 4 x/week to address the identified rehab impairments via gait training, therapeutic activities, therapeutic exercises, neuromuscular re-education and progress toward the following goals:    · Plan of Care Expires:  06/05/20    Subjective     RN notified prior to session. No family/friends present upon PT entrance into room.    Chief Complaint: "I want to work with yall so I don't go home and like.... Die"  Patient/Family Comments/goals: get " stronger to go home  Pain/Comfort:  · Pain Rating 1: 4/10  · Location - Side 1: Right  · Location 1: thigh  · Pain Addressed 1: Pre-medicate for activity, Nurse notified  · Pain Rating Post-Intervention 1: other (see comments)(did not state change but reported it was less)      Objective:     Additional staff present: OT for partial co-tx 2/2 pt's high pain levels limiting ability to actively participate in multiple therapy sessions    Patient found HOB elevated with: telemetry, peripheral IV   Mental Status: Patient is oriented to AxOx4 and follows multistep commands. Patient is Alert and Cooperative during session.    General Precautions: Standard, Cardiac fall   Orthopedic Precautions:N/A   Braces: N/A   Body mass index is 22.3 kg/m².  Oxygen Device: Room Air    Outcome Measures:  AM-PAC 6 CLICK MOBILITY  Turning over in bed (including adjusting bedclothes, sheets and blankets)?: 3  Sitting down on and standing up from a chair with arms (e.g., wheelchair, bedside commode, etc.): 3  Moving from lying on back to sitting on the side of the bed?: 3  Moving to and from a bed to a chair (including a wheelchair)?: 3  Need to walk in hospital room?: 3  Climbing 3-5 steps with a railing?: 2  Basic Mobility Total Score: 17     Functional Mobility:    Bed Mobility:   · Supine to Sit: supervision and with HOB elevated; from Lt side of bed  · Scooting anteriorly to EOB to have both feet planted on floor: supervision     Sitting Balance at Edge of Bed:   Assistance Level Required: Supervision   Time: 20 minutes   Postural deviations noted: slouched posture and rounded shoulders   Comments: Pt with good sitting balance at EOB and was able to perform multi-directional reaches outside of LINDSAY. Due to good sitting balance time EOB focused on cardiopulmonary endurance and overall response to activity. Pt with some SOB and reports of intermittent dizziness. Pt req'd some short rest breaks.    Transfers:   · Sit <> Stand Transfer:  stand by assistance with rolling walker   · Stand <> Sit Transfer: stand by assistance with rolling walker   · [id number] from EOB and [id number] from bedside chair   · Cueing for hand placement    Standing Balance:   Assistance Level Required: Stand-by Assistance   Patient used: rolling walker       Gait:  · Patient ambulated: 4 ft; 6 ft; 12 ft w/ 180 degree turn   · Patient required: contact guard  · Patient used:  rolling walker   · Gait Pattern observed: 3-point gait and partial weightbearing  · Gait Deviation(s): decreased step length, wide base of support, decreased weight shift, flexed posture, decreased silvia and antalgic gait on RLE   · Impairments due to: pain and decreased endurance  · all lines remained intact throughout ambulation trial  · Chair follow for patient safety  · Comments: Pt with combination of hop to/partial weightbearing 3 point pattern with RW 2/2 pain with FWB on RLE. Pt demonstrated good safety and sequencing after receiving cueing at start of trial.     Education:   Time provided for education, counseling and discussion of health disposition in regards to patient's current status   All questions answered within PT scope of practice and to patient's satisfaction   PT role in POC to address current functional deficits   Pt educated on proper body mechanics, safety techniques, and energy conservation with PT facilitation and cueing throughout session   Call nursing/pct to transfer to chair/use bathroom. Pt stated understanding.   RW ordered for in room use with nursing staff   Safe to perform step transfer to/from chair/bedside commode RW and SBA w/ nursing/PCT present   Importance of OOB tolerance 4-8 hrs/day to improve lung ventilation and expansion as well as strengthen postural musculature    Patient left up in chair with all lines intact, call button in reach and RN notified.    GOALS:   Multidisciplinary Problems     Physical Therapy Goals        Problem: Physical Therapy  Goal    Goal Priority Disciplines Outcome Goal Variances Interventions   Physical Therapy Goal     PT, PT/OT Ongoing, Progressing     Description:  Goals to be met by: 2020     Patient will increase functional independence with mobility by performin. Sit to stand transfer with [place]  2. Bed to chair transfer with Modified [place] using LRAD  3. Gait  x 150 feet with Modified [place] using LRAD.   4. Lower extremity exercise program x20 reps per handout, with independence                    Time Tracking:     PT Received On: 20  PT Start Time: 1025     PT Stop Time: 1109  PT Total Time (min): 44 min       Billable Minutes:   · Gait Training 15 and Therapeutic Exercise 29    Treatment Type: Treatment  PT/PTA: PT       Rachael Ceron PT, DPT  2020  Pager: [phone]

## 2020-05-11 NOTE — PROGRESS NOTES
[facility]  Infectious Disease  Progress Note    Patient Name: Antoinette Love  MRN: [id number]  Admission Date: 5/3/2020  Length of Stay: 8 days  Attending Physician: Benito Luna MD  Primary Care Provider: Alberto Holguin MD    Isolation Status: No active isolations  Assessment/Plan:      Staphylococcus aureus bacteremia   29-year-old F with hx of factor 5 laden mutation, May-Thurner syndrome, recurrent DVTs and PEs s/p stents to iliac/femoral veins/IVC, IVC filter on long term anti-coagulation admitted with fevers, bilateral thigh pain and swelling. She was found to have extensive thrombosis of the bilateral femoral veins, iliac vein stents, and IVC stent with thrombosis extending to the bilateral popliteal veins. IR unfortunately was unable to perform thrombolysis 2/2 due the extensive chronic thrombi. On 5/4 she developed fevers and blood cultures positive for MSSA.  Remain persistently positive.  BRII negative.  Fevers of 100.5 overnight likely result of clotting. Currently on continuous infusion cefazolin.  Unclear source of bacteremia though concerning for an endovascular infection.     Repeat blood cxs 5/9-NGTD. Repeat CT 5/10- showing  suspected bilateral thrombus w/ fat stranding. As well as findings suspicious for secondary reactive or infective myositis and cellulitis, left greater than right w/o drainable fluid collection seen.  Imaging also w/ increased perirectal and rectosigmoid fat stranding compared to 05/04/2020 raising suspicion for developing proctitis from inflammatory or infectious process.  No intra-abdominal or pelvic drainable abscesses.      Plan  - Continue Cefazolin 6 g IV continuous infusion for endovascular infection concerns.  -Recommend at least 6 weeks of therapy from date of blood cx clearance ( est end date 6/20/20).  May need further suppression for stents in place.  -Weekly outpatient laboratory on Monday or Tuesday while on IV antibiotics.     CBC   CMP   ESR   CRP     Fax laboratory results to [facility] at [phone] with attn: Justino Kim    -Outpatient Infectious Diseases clinic virtual follow up will be arranged in 2-3 weeks and found in patient calendar. Prior to discharge, please ensure the patient's follow-up has been scheduled.  If there is still no follow-up scheduled in Infectious Diseases clinic, please send an EPIC message to Marcelle Concepcion LPN in Infectious Diseases.  -ID will sign off.    Pt discussed with primary team and staff.  Thank you for your consult. I will sign off. Please contact us if you have any additional questions.    Karel Kim PA-C  Infectious Disease  [facility]    Subjective:     Principal Problem:DVT, lower extremity, proximal, acute, bilateral    HPI: Ms. Love is a 29-year-old female with history of factor 5 laden mutation, May-Thuner syndrome, recurrent DVTs and PEs s/p stents to iliac/femoral veins and IVC filter placement 2014 who presented to the ED on 5/3 with bilateral thigh pain and swelling over the last 7 days. She is following up with Dr. Garcia in hematology and she was just switched from Coumadin to Fondaparinux around a week ago.  She had not been able to get the prescription for Fondaparinux from the pharmacy and she started taking Lovenox 80 mg BID. She states that on Monday she started having pain in the medial side of her right thigh then she has pain in the same area of the left thigh. She describes the pain as excruciating 8/10, bilateral medial thigh associated with swelling, numbness and bruises.  She endorses episodes of chills, hot flashes, night sweats, dry cough and palpitations over the last two days but denies chest pain, SOB or hemoptysis. Also mentions dysuria for a few days that resolved on its own.  Denies recent procedures within the last month. 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 No  "other sick contacts. In late March she did hit her leg on the side of a desk while working and scraped it. She mentions that the area became red, indurated and painful. She self treated with topical abx and the lesion resolved.    She states that she has been taking Coumadin since age of 18 and she was switched to Eliquis  and she has been doing great with it with only one DVT 2016. She had multiple DVTs since August of last year and she has been seen by IR around 9 times for thrombolysis, ballooning and stents placment with last one on  where she had mechanical thrombectomy and stent extension to common femoral veins bilaterally.       In ED, she was afebrile, , /62, SpO2 100% on RA. WBC: 3.8, H.6, platelet: 228. Unremarkable CMP. INR: 1.1, Ptt: 22 and D-dimer 3.6. Normal lactic acid. Doppler US of lower extremities shows  partial thrombosis of the femoral veins, extending to the bilateral popliteal veins. IR was consulted and patient started on heparin. She was admitted to  for management.     Since admit she has been seen by GI and IR. The morning after admission, patient was noted to have 2 g drop in hemoglobin with associated hypotension and tachycardia with increasing concern for GI bleed.  CTA ordered to assess for possible GI bleed and to evaluate extensiveness of clot. No GI bleed or extravasation of contrast noted, though thrombus noted to extend to IVC filter. ("Extensive thrombosis of the bilateral femoral veins, iliac vein stents, and IVC stent. IVC filter is in place at the level of the superior aspect of the IVC stent. Asymmetric inflammatory changes involving the anterior compartment of the left thigh."). GI consulted and performed colonoscopy yesterday which did not reveal a bleed. IR planning for thrombolysis today.      On , patient became febrile to 102.6. Ceftriaxone started. UA showed 18 WBC. Urine cx with multiple organisms isolated. Blood cultures drawn  are " "now showing GPCs resembling Staph. Repeats ordered. Vanc started.     GI consulted and performed colonoscopy yesterday which did not reveal a bleed. IR planning for thrombolysis.        US BLE  "There are stents within the right common femoral vein and left common femoral vein, thrombosed.  There is partial thrombosis of the femoral veins, with thrombosis extending to the bilateral popliteal veins.  The bilateral greater saphenous veins are patent.  There is partial thrombus in the right posterior tibial vein.  The left posterior tibial vein is patent.  The bilateral anterior tibial veins and peroneal veins are patent.  There is stent of the left external iliac vein, left external iliac vein appears patent.  There is thrombus within the right external iliac vein."    Two PIVs   Interval History:   tmax 100.5.  Blood cultures 5/19 NGTD.  BRII was negative.  Continues to complain of pain, nausea.  Pt did go for long walk with PT today and overall reports much improvement today    Repeat CT showing suspected thrombosis of the bilateral femoral veins extending to the external iliac veins and IVC.  There is fat stranding around the thrombosed vessels.  Additionally, there is asymmetric fat stranding in the bilateral proximal thighs with cutaneous and muscle involvement, left greater than right.  Findings raise suspicion for thrombophlebitis resulting in secondary reactive or infective myositis and cellulitis, left greater than right.  -Also showing Increased perirectal and rectosigmoid fat stranding compared to 05/04/2020 raising suspicion for developing proctitis from inflammatory or infectious process.  No intra-abdominal or pelvic drainable abscesses.    Review of Systems   Constitutional: Negative for chills, fatigue and fever.   HENT: Negative for congestion and sore throat.    Respiratory: Negative for cough and shortness of breath.    Cardiovascular: Positive for leg swelling. Negative for chest pain. "   Gastrointestinal: Positive for nausea. Negative for abdominal pain and diarrhea.   Genitourinary: Negative for difficulty urinating, dysuria, hematuria and urgency.   Musculoskeletal: Positive for arthralgias (BLE) and gait problem (2/2 pain). Negative for back pain.   Skin: Negative for color change, pallor and wound.   Neurological: Positive for weakness. Negative for light-headedness and headaches.   Psychiatric/Behavioral: Negative for behavioral problems and confusion. The patient is not nervous/anxious.      Objective:     Vital Signs (Most Recent):  Temp: (!) 100.5 °F (38.1 °C) (05/11/20 0758)  Pulse: 82 (05/11/20 0758)  Resp: 20 (05/11/20 0758)  BP: 129/69 (05/11/20 0758)  SpO2: 98 % (05/11/20 0758) Vital Signs (24h Range):  Temp:  [98.7 °F (37.1 °C)-100.5 °F (38.1 °C)] 100.5 °F (38.1 °C)  Pulse:  [62-88] 82  Resp:  [16-20] 20  SpO2:  [95 %-98 %] 98 %  BP: (111-131)/(61-81) 129/69     Weight: 60.8 kg (134 lb)  Body mass index is 22.3 kg/m².    Estimated Creatinine Clearance: 122.2 mL/min (based on SCr of 0.6 mg/dL).    Physical Exam   Constitutional: She is oriented to person, place, and time. She appears well-developed and well-nourished. No distress.   HENT:   Head: Normocephalic and atraumatic.   Mouth/Throat: No oropharyngeal exudate.   Eyes: Conjunctivae are normal. No scleral icterus.   Cardiovascular: Normal rate and regular rhythm.   No murmur heard.  Pulmonary/Chest: Effort normal and breath sounds normal. No respiratory distress.   Abdominal: Soft. She exhibits no distension. There is no tenderness.   Musculoskeletal: She exhibits edema and tenderness (BLE).   Neurological: She is alert and oriented to person, place, and time.   Skin: Skin is warm. She is not diaphoretic. No erythema.   PIV sites c/d/i     Psychiatric: She has a normal mood and affect. Her behavior is normal. Thought content normal.   Nursing note and vitals reviewed.      Significant Labs: All pertinent labs within the past 24  hours have been reviewed.    Significant Imaging: I have reviewed all pertinent imaging results/findings within the past 24 hours.

## 2020-05-11 NOTE — PLAN OF CARE
POC reviewed with patient. VSS, PRN pain meds utilized per patient request. MD at bedside aware of k+ 3.7, will review chart. PT and OT worked with patient today. Patient OOB to chair for 3 hours today. Continuous IV Antibiotic infusing for positive blood cultures. Possible plan for PICC line tomorrow for long term antibiotic treatment. Will continue to monitor

## 2020-05-11 NOTE — PLAN OF CARE
05/11/20 0857   Discharge Reassessment   Assessment Type Discharge Planning Reassessment   Do you have any problems affording any of your prescribed medications? Yes   If yes, what medications? Xarelto   Discharge Plan A Home;Home Health   DME Needed Upon Discharge  none

## 2020-05-11 NOTE — SUBJECTIVE & OBJECTIVE
Interval History: NAEON. CT scan showed myositis but most likely from secondary reaction to clot. Cultures have been clear for about 48 hours. Will wait another day to make sure cultures stay clear.     Review of Systems   Constitutional: Negative for chills, fatigue and fever.   HENT: Negative for trouble swallowing.    Respiratory: Negative for cough and shortness of breath.    Cardiovascular: Positive for leg swelling. Negative for chest pain.   Gastrointestinal: Negative for abdominal pain, blood in stool, constipation, diarrhea, nausea and vomiting.   Genitourinary: Negative for dysuria.   Musculoskeletal: Negative for arthralgias.        Leg pain   Skin: Negative for color change and pallor.   Neurological: Negative for light-headedness and headaches.   Psychiatric/Behavioral: Negative for behavioral problems and confusion.     Objective:     Vital Signs (Most Recent):  Temp: 98.9 °F (37.2 °C) (05/11/20 1139)  Pulse: 96 (05/11/20 1139)  Resp: 18 (05/11/20 1139)  BP: 113/67 (05/11/20 1139)  SpO2: 97 % (05/11/20 1139) Vital Signs (24h Range):  Temp:  [98.7 °F (37.1 °C)-100.5 °F (38.1 °C)] 98.9 °F (37.2 °C)  Pulse:  [62-96] 96  Resp:  [16-20] 18  SpO2:  [95 %-98 %] 97 %  BP: (113-131)/(61-81) 113/67     Weight: 60.8 kg (134 lb)  Body mass index is 22.3 kg/m².    Intake/Output Summary (Last 24 hours) at 5/11/2020 1219  Last data filed at 5/11/2020 0300  Gross per 24 hour   Intake 300 ml   Output 1000 ml   Net -700 ml      Physical Exam   Constitutional: She is oriented to person, place, and time. She appears well-developed and well-nourished. No distress.   Uncomfortable.    Cardiovascular: Normal rate, regular rhythm and normal heart sounds.   No murmur heard.  Pulmonary/Chest: Effort normal and breath sounds normal. No respiratory distress. She has no rales.   Abdominal: Soft. Bowel sounds are normal. She exhibits no distension. There is no tenderness.   Musculoskeletal: She exhibits edema.   Tenderness of  bilateral LE present, L>R   Neurological: She is alert and oriented to person, place, and time. She displays normal reflexes. No cranial nerve deficit.   Skin: Skin is warm. She is not diaphoretic. No erythema.   Psychiatric: She has a normal mood and affect.   Nursing note and vitals reviewed.      Significant Labs:   CBC:   Recent Labs   Lab 05/10/20  0426 05/11/20  0837   WBC 2.54* 2.87*   HGB 9.0* 9.4*   HCT 30.6* 30.1*    209     CMP:   Recent Labs   Lab 05/11/20  0837      K 3.7      CO2 30*   GLU 86   BUN 3*   CREATININE 0.6   CALCIUM 9.0   PROT 7.2   ALBUMIN 1.9*   BILITOT 0.3   ALKPHOS 189*   AST 10   ALT <5*   ANIONGAP 8   EGFRNONAA >60.0       Significant Imaging: I have reviewed and interpreted all pertinent imaging results/findings within the past 24 hours.

## 2020-05-11 NOTE — PLAN OF CARE
Pt goals remain appropriate, continue w/ OT POC.    Problem: Occupational Therapy Goal  Goal: Occupational Therapy Goal  Description  Goals to be met by: 5/15/20    Patient will increase functional independence with ADLs by performing:    UE Dressing with [place]. Progressing.   LE Dressing with [place]. progressing  Grooming while standing at sink with Supervision.  Toileting from toilet with Supervision for hygiene and clothing management.   Supine to sit with Modified [place] for increased bed mobility independence. Met 5/11 ZULEIKA  Step transfer with Supervision with no AD to prepare for household mobility to complete occupations of choice.   Toilet transfer to toilet with Supervision.       Outcome: Ongoing, Progressing   Brain CAMP  5/11/2020

## 2020-05-11 NOTE — PT/OT/SLP PROGRESS
Occupational Therapy   Treatment    Name: Antoinette Love  MRN: [id number]  Admitting Diagnosis:  DVT, lower extremity, proximal, acute, bilateral  3 Days Post-Op    Recommendations:     Discharge Recommendations: home health OT  Discharge Equipment Recommendations:  walker, rolling  Barriers to discharge:  None    Assessment:     Antoinette Love is a 29 y.o. female with a medical diagnosis of DVT, lower extremity, proximal, acute, bilateral.  She presents with improved mobility, motivation, and performance of self-care. Performance deficits affecting function are weakness, impaired endurance, impaired self care skills, impaired functional mobilty, gait instability, impaired balance, decreased lower extremity function, pain, decreased ROM, impaired cardiopulmonary response to activity.     Rehab Prognosis:  Fair; patient would benefit from acute skilled OT services to address these deficits and reach maximum level of function.       Plan:     Patient to be seen 4 x/week to address the above listed problems via self-care/home management, therapeutic activities, therapeutic exercises  · Plan of Care Expires: 06/03/20  · Plan of Care Reviewed with: patient    Subjective     Pain/Comfort:  · Pain Rating 1: 4/10  · Location - Side 1: Right  · Location 1: thigh  · Pain Addressed 1: Pre-medicate for activity, Nurse notified  · Pain Rating Post-Intervention 1: other (see comments)(decrease in pain not rated)    Objective:     Communicated with: RN prior to session.  Patient found HOB elevated with telemetry, peripheral IV upon OT entry to room.    General Precautions: Standard, fall   Orthopedic Precautions:N/A   Braces: N/A     Occupational Performance:     Bed Mobility:    · Patient completed Rolling/Turning to Left with  modified independence and with side rail  · Patient completed Scooting/Bridging with modified independence and with side rail  · Patient completed Supine to Sit with modified independence      Functional Mobility/Transfers:  · Patient completed Sit <> Stand Transfer with stand by assistance  with  rolling walker   · Patient completed Bed <> Chair Transfer using Step Transfer technique with stand by assistance with rolling walker  Functional Mobility:  Pt was able to walk short household distances w/ frequent rest breaks, SBA, and RW.       Activities of Daily Living:  · Grooming: supervision seated at EOB w/ setup to apply deoderant under arms.  · Bathing: supervision seated at EOB to clean upper body w/ wash cloth w/ setup.   · Upper Body Dressing: stand by assistance seated at EOB w/ setup  · Lower Body Dressing: supervision seated at EOB to don pants and socks.       [facility] 6 Click ADL: 20    Treatment & Education:  -Pt edu on OT role/POC  -Importance of OOB activity with staff assistance  -Safety during functional t/f and mobility  - White board updated  - Multiple self care tasks/functional mobility completed-- assistance level noted above  - All questions/concerns answered within OT scope of practice      Patient left up in chair with all lines intact, call button in reach, RN notified and MD presentEducation:      GOALS:   Multidisciplinary Problems     Occupational Therapy Goals        Problem: Occupational Therapy Goal    Goal Priority Disciplines Outcome Interventions   Occupational Therapy Goal     OT, PT/OT Ongoing, Progressing    Description:  Goals to be met by: 5/15/20    Patient will increase functional independence with ADLs by performing:    UE Dressing with [place]. Progressing.   LE Dressing with [place]. progressing  Grooming while standing at sink with Supervision.  Toileting from toilet with Supervision for hygiene and clothing management.   Supine to sit with Modified [place] for increased bed mobility independence. Met 5/11 ZULEIKA  Step transfer with Supervision with no AD to prepare for household mobility to complete occupations of choice.   Toilet transfer to  toilet with Supervision.                        Time Tracking:     OT Date of Treatment: 05/11/20  OT Start Time: 1035  OT Stop Time: 1113  OT Total Time (min): 38 min    Billable Minutes:Self Care/Home Management 38 mins    Brain Marie, OT  5/11/2020

## 2020-05-11 NOTE — SUBJECTIVE & OBJECTIVE
Interval History:   tmax 100.5.  Blood cultures 5/19 NGTD.  BRII was negative.  Continues to complain of pain, nausea.  Pt did go for long walk with PT today and overall reports much improvement today    Repeat CT showing suspected thrombosis of the bilateral femoral veins extending to the external iliac veins and IVC.  There is fat stranding around the thrombosed vessels.  Additionally, there is asymmetric fat stranding in the bilateral proximal thighs with cutaneous and muscle involvement, left greater than right.  Findings raise suspicion for thrombophlebitis resulting in secondary reactive or infective myositis and cellulitis, left greater than right.  -Also showing Increased perirectal and rectosigmoid fat stranding compared to 05/04/2020 raising suspicion for developing proctitis from inflammatory or infectious process.  No intra-abdominal or pelvic drainable abscesses.    Review of Systems   Constitutional: Negative for chills, fatigue and fever.   HENT: Negative for congestion and sore throat.    Respiratory: Negative for cough and shortness of breath.    Cardiovascular: Positive for leg swelling. Negative for chest pain.   Gastrointestinal: Positive for nausea. Negative for abdominal pain and diarrhea.   Genitourinary: Negative for difficulty urinating, dysuria, hematuria and urgency.   Musculoskeletal: Positive for arthralgias (BLE) and gait problem (2/2 pain). Negative for back pain.   Skin: Negative for color change, pallor and wound.   Neurological: Positive for weakness. Negative for light-headedness and headaches.   Psychiatric/Behavioral: Negative for behavioral problems and confusion. The patient is not nervous/anxious.      Objective:     Vital Signs (Most Recent):  Temp: (!) 100.5 °F (38.1 °C) (05/11/20 0758)  Pulse: 82 (05/11/20 0758)  Resp: 20 (05/11/20 0758)  BP: 129/69 (05/11/20 0758)  SpO2: 98 % (05/11/20 0758) Vital Signs (24h Range):  Temp:  [98.7 °F (37.1 °C)-100.5 °F (38.1 °C)] 100.5 °F  (38.1 °C)  Pulse:  [62-88] 82  Resp:  [16-20] 20  SpO2:  [95 %-98 %] 98 %  BP: (111-131)/(61-81) 129/69     Weight: 60.8 kg (134 lb)  Body mass index is 22.3 kg/m².    Estimated Creatinine Clearance: 122.2 mL/min (based on SCr of 0.6 mg/dL).    Physical Exam   Constitutional: She is oriented to person, place, and time. She appears well-developed and well-nourished. No distress.   HENT:   Head: Normocephalic and atraumatic.   Mouth/Throat: No oropharyngeal exudate.   Eyes: Conjunctivae are normal. No scleral icterus.   Cardiovascular: Normal rate and regular rhythm.   No murmur heard.  Pulmonary/Chest: Effort normal and breath sounds normal. No respiratory distress.   Abdominal: Soft. She exhibits no distension. There is no tenderness.   Musculoskeletal: She exhibits edema and tenderness (BLE).   Neurological: She is alert and oriented to person, place, and time.   Skin: Skin is warm. She is not diaphoretic. No erythema.   PIV sites c/d/i     Psychiatric: She has a normal mood and affect. Her behavior is normal. Thought content normal.   Nursing note and vitals reviewed.      Significant Labs: All pertinent labs within the past 24 hours have been reviewed.    Significant Imaging: I have reviewed all pertinent imaging results/findings within the past 24 hours.

## 2020-05-11 NOTE — ASSESSMENT & PLAN NOTE
29-year-old F with hx of factor 5 laden mutation, May-Thurner syndrome, recurrent DVTs and PEs s/p stents to iliac/femoral veins/IVC, IVC filter on long term anti-coagulation admitted with fevers, bilateral thigh pain and swelling. She was found to have extensive thrombosis of the bilateral femoral veins, iliac vein stents, and IVC stent with thrombosis extending to the bilateral popliteal veins. IR unfortunately was unable to perform thrombolysis 2/2 due the extensive chronic thrombi. On 5/4 she developed fevers and blood cultures positive for MSSA.  Remain persistently positive.  BRII negative.  Fevers of 100.5 overnight likely result of clotting. Currently on continuous infusion cefazolin.  Unclear source of bacteremia though concerning for an endovascular infection.     Repeat blood cxs 5/9-NGTD. Repeat CT 5/10- showing  suspected bilateral thrombus w/ fat stranding. As well as findings suspicious for secondary reactive or infective myositis and cellulitis, left greater than right w/o drainable fluid collection seen.  Imaging also w/ increased perirectal and rectosigmoid fat stranding compared to 05/04/2020 raising suspicion for developing proctitis from inflammatory or infectious process.  No intra-abdominal or pelvic drainable abscesses.      Plan  - Continue Cefazolin 6 g IV continuous infusion for endovascular infection concerns.  -Recommend 6 weeks of therapy from date of blood cx clearance ( est end date 6/20/20)  -Weekly outpatient laboratory on Monday or Tuesday while on IV antibiotics.    CBC   CMP   ESR   CRP     Fax laboratory results to [facility] at [phone] with attn: Justino Kim    -Outpatient Infectious Diseases clinic virtual follow up will be arranged in 2-3 weeks and found in patient calendar. Prior to discharge, please ensure the patient's follow-up has been scheduled.  If there is still no follow-up scheduled in Infectious Diseases clinic, please send an EPIC message to  Marcelle Concepcion LPN in Infectious Diseases.  -ID will sign off.

## 2020-05-11 NOTE — PLAN OF CARE
Discharge Recommendation: HHPT.    0 goals met today. PT goals appropriate.    Progressive Mobility Protocol: Patient is safe to perform bed <>chair transfer with SBA and RW with nursing staff.    Rachael Ceron, PT, DPT  2020  Pager: [phone]        Problem: Physical Therapy Goal  Goal: Physical Therapy Goal  Description  Goals to be met by: 2020     Patient will increase functional independence with mobility by performin. Sit to stand transfer with Riley  2. Bed to chair transfer with Modified Riley using LRAD  3. Gait  x 150 feet with Modified Riley using LRAD.   4. Lower extremity exercise program x20 reps per handout, with independence     Outcome: Ongoing, Progressing

## 2020-05-12 LAB
BASOPHILS # BLD AUTO: 0.02 K/UL (ref 0–0.2)
BASOPHILS NFR BLD: 0.7 % (ref 0–1.9)
DIFFERENTIAL METHOD: ABNORMAL
EOSINOPHIL # BLD AUTO: 0 K/UL (ref 0–0.5)
EOSINOPHIL NFR BLD: 1.4 % (ref 0–8)
ERYTHROCYTE [DISTWIDTH] IN BLOOD BY AUTOMATED COUNT: 16.3 % (ref 11.5–14.5)
HCT VFR BLD AUTO: 29.8 % (ref 37–48.5)
HGB BLD-MCNC: 8.8 G/DL (ref 12–16)
IMM GRANULOCYTES # BLD AUTO: 0.05 K/UL (ref 0–0.04)
IMM GRANULOCYTES NFR BLD AUTO: 1.8 % (ref 0–0.5)
LYMPHOCYTES # BLD AUTO: 1 K/UL (ref 1–4.8)
LYMPHOCYTES NFR BLD: 36.8 % (ref 18–48)
MCH RBC QN AUTO: 26.7 PG (ref 27–31)
MCHC RBC AUTO-ENTMCNC: 29.5 G/DL (ref 32–36)
MCV RBC AUTO: 90 FL (ref 82–98)
MONOCYTES # BLD AUTO: 0.2 K/UL (ref 0.3–1)
MONOCYTES NFR BLD: 5.4 % (ref 4–15)
NEUTROPHILS # BLD AUTO: 1.5 K/UL (ref 1.8–7.7)
NEUTROPHILS NFR BLD: 53.9 % (ref 38–73)
NRBC BLD-RTO: 0 /100 WBC
PLATELET # BLD AUTO: 184 K/UL (ref 150–350)
PMV BLD AUTO: 9 FL (ref 9.2–12.9)
RBC # BLD AUTO: 3.3 M/UL (ref 4–5.4)
WBC # BLD AUTO: 2.77 K/UL (ref 3.9–12.7)

## 2020-05-12 PROCEDURE — 25000003 PHARM REV CODE 250: Performed by: STUDENT IN AN ORGANIZED HEALTH CARE EDUCATION/TRAINING PROGRAM

## 2020-05-12 PROCEDURE — 20600001 HC STEP DOWN PRIVATE ROOM

## 2020-05-12 PROCEDURE — 94761 N-INVAS EAR/PLS OXIMETRY MLT: CPT

## 2020-05-12 PROCEDURE — 99232 SBSQ HOSP IP/OBS MODERATE 35: CPT | Mod: ,,, | Performed by: HOSPITALIST

## 2020-05-12 PROCEDURE — 85025 COMPLETE CBC W/AUTO DIFF WBC: CPT

## 2020-05-12 PROCEDURE — 63600175 PHARM REV CODE 636 W HCPCS

## 2020-05-12 PROCEDURE — 25000003 PHARM REV CODE 250: Performed by: PHYSICIAN ASSISTANT

## 2020-05-12 PROCEDURE — 25000003 PHARM REV CODE 250

## 2020-05-12 PROCEDURE — 63600175 PHARM REV CODE 636 W HCPCS: Performed by: PHYSICIAN ASSISTANT

## 2020-05-12 PROCEDURE — 99232 PR SUBSEQUENT HOSPITAL CARE,LEVL II: ICD-10-PCS | Mod: ,,, | Performed by: HOSPITALIST

## 2020-05-12 PROCEDURE — 36415 COLL VENOUS BLD VENIPUNCTURE: CPT

## 2020-05-12 RX ADMIN — HYDROMORPHONE HYDROCHLORIDE 2 MG: 1 INJECTION, SOLUTION INTRAMUSCULAR; INTRAVENOUS; SUBCUTANEOUS at 04:05

## 2020-05-12 RX ADMIN — POLYETHYLENE GLYCOL 3350 17 G: 17 POWDER, FOR SOLUTION ORAL at 03:05

## 2020-05-12 RX ADMIN — SENNOSIDES AND DOCUSATE SODIUM 1 TABLET: 8.6; 5 TABLET ORAL at 08:05

## 2020-05-12 RX ADMIN — HYDROMORPHONE HYDROCHLORIDE 2 MG: 1 INJECTION, SOLUTION INTRAMUSCULAR; INTRAVENOUS; SUBCUTANEOUS at 08:05

## 2020-05-12 RX ADMIN — APIXABAN 10 MG: 5 TABLET, FILM COATED ORAL at 08:05

## 2020-05-12 RX ADMIN — HYDROMORPHONE HYDROCHLORIDE 2 MG: 1 INJECTION, SOLUTION INTRAMUSCULAR; INTRAVENOUS; SUBCUTANEOUS at 12:05

## 2020-05-12 RX ADMIN — POLYETHYLENE GLYCOL 3350 17 G: 17 POWDER, FOR SOLUTION ORAL at 08:05

## 2020-05-12 RX ADMIN — OXYCODONE HYDROCHLORIDE 5 MG: 5 TABLET ORAL at 10:05

## 2020-05-12 RX ADMIN — DEXTROSE 6 G: 5 SOLUTION INTRAVENOUS at 03:05

## 2020-05-12 RX ADMIN — Medication 1 CAPSULE: at 08:05

## 2020-05-12 RX ADMIN — ACETAMINOPHEN 650 MG: 325 TABLET ORAL at 10:05

## 2020-05-12 RX ADMIN — OXYCODONE HYDROCHLORIDE 5 MG: 5 TABLET ORAL at 03:05

## 2020-05-12 RX ADMIN — HYDROMORPHONE HYDROCHLORIDE 2 MG: 1 INJECTION, SOLUTION INTRAMUSCULAR; INTRAVENOUS; SUBCUTANEOUS at 07:05

## 2020-05-12 NOTE — PT/OT/SLP PROGRESS
Occupational Therapy      Patient Name:  Antoinette Love   MRN:  [id number]    Patient not seen today secondary to Pain, Patient fatigue, Patient ill (Comment). Will follow-up tomorrow.    Brain Marie, OT  5/12/2020

## 2020-05-12 NOTE — PLAN OF CARE
05/12/20 1133   Post-Acute Status   Post-Acute Authorization Placement   Post-Acute Placement Status Referrals Sent     SW was informed by medical team and CM that pt will be discharging with HH. JOHN sent out referrals via EVELIO Cervantes LMSW  [facility]  Ext. [phone]

## 2020-05-12 NOTE — PROGRESS NOTES
[facility] Hospital Medicine  Progress Note    Patient Name: Antoinette Love  MRN: [id number]  Patient Class: IP- Inpatient   Admission Date: 5/3/2020  Length of Stay: 9 days  Attending Physician: Benito Luna MD  Primary Care Provider: Alberto Holguin MD    [facility] Medicine Team: [facility] MED 4 Ben Martines MD    Subjective:     Principal Problem:DVT, lower extremity, proximal, acute, bilateral        HPI:  Ms. 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  Who present to ED with bilateral thighs pain and swelling in the last 7 days. She is following up with Dr. Garcia in hematology and she was just switched from Coumadin to Fondaparinux around a week ago.  She has not been able to get the prescription for Fondaparinux from the pharmacy and she started taking Lovenox 80 mg BID and she stop taking it yesterday when she noticed BRBPR. She states that on Monday she started having pain in the medial side of her right thigh then she has pain in the same area of the left thigh. She describes the pain as excruciating 8/10, bilateral medial thigh associated with swelling, numbness and bruises.  She endorses episodes of chills, hot flashes,night sweats dry cough and palpitation but denies chest pain, SOB or hemoptysis.  She states that she has been taking Coumadin since age of 18 and she was switched to Eliquis 2015 and she has been doing great with it with only one DVT 2016. She had multiple DVTs since August of the last year and she has been seen by IR around 9 times for thrombolysis, ballooning and stents placment with last one on 1/17 where she had mechanical thrombectomy and stent extension to common femoral veins bilaterally.  Sh was discharged and placed back on Coumadin for unclear reason.  She  "states that she is not comfortable with using Coumadin because of the frequent monitoring.  She spoke to her hematologist week ago to switch her to non vitamin K anticoagulant.    She was started on Xarelto before and she was discontinued from taking first dose because of biliary colic pain and she was told she has inflammation.  She reports anxiety/panic attack which she was operated by IR in this first procedure and she has been sedated for all procedure after that.     In ED, she was looks uncomfortable and in pain.  She was afebrile, , /62, SpO2 100% on RA. WBC: 3.8, H.6, platelet: 228. Unremarkable CMP. INR: 1.1, Ptt: 22 and D-dimer 3.6. Normal lactic acid. Doppler US of lower extremities shows  partial thrombosis of the femoral veins, extending to the bilateral popliteal veins. EKG with NSR. IR were consulted and patient loaded with heparin and started on heparin gtt.     Overview/Hospital Course:  Ms. Love was admitted to Hospital Medicine 5/3 with extensive thrombus of the "Extensive deep venous thrombosis...noting previously visualized thrombus within 1 of the paired posterior tibial veins on the left is not identified on current exam however there is thrombosis of 1 of the paired right posterior tibial veins, previously no thrombus identified." Discussed the case with IR, who recommended GI workup given patient's persistent anemia and report of possible BRBPR; furthermore, the morning after admission, patient was noted to have 2 g drop in hemoglobin with associated hypotension and tachycardia increasing concern for GI bleed - though no further BRBPR noted. CTA with venous phase added to assess for possible GI bleed and to evaluate extensiveness of clot. No extravasation of contrast noted, and on venous phase, thrombus noted to extend to IVC filter. C-scope remarkable only for internal hemorrhoids.    On , patient spiked fever, blood cultures growing MSSA. ID consulted, and patient " "started on vanc, which was transitioned to cefazolin when cultures finalized.    Thrombectomy with IR attempted on 5/06; however due to "extensive, chronic, calcific thrombosis without ability to pass a microwire" thrombectomy was not possible. Vascular Surgery was consulted. Unfortunately patient is not a candidate for open thrombectomy due to large clot burden and high risk for post thrombotic syndrome.     Patient's hospital course has been complicated further by persistently positive blood cultures. Source of infection is likely infected clot, and source control is difficult in the absence of thrombectomy. CT c/a/p ordered 5/10 to rule out underlying abscess or further seeding of infection given prolonged bacteremia.     Interval History: NAEON. Patients cultures from 5/09 still negative. Will get a picc tomorrow and can be discharged.     Review of Systems   Constitutional: Negative for chills, fatigue and fever.   HENT: Negative for trouble swallowing.    Respiratory: Negative for cough and shortness of breath.    Cardiovascular: Positive for leg swelling. Negative for chest pain.   Gastrointestinal: Negative for abdominal pain, blood in stool, constipation, diarrhea, nausea and vomiting.   Genitourinary: Negative for dysuria.   Musculoskeletal: Negative for arthralgias.        Leg pain   Skin: Negative for color change and pallor.   Neurological: Negative for light-headedness and headaches.   Psychiatric/Behavioral: Negative for behavioral problems and confusion.     Objective:     Vital Signs (Most Recent):  Temp: 98.3 °F (36.8 °C) (05/12/20 0720)  Pulse: 79 (05/12/20 0720)  Resp: 15 (05/12/20 0720)  BP: 105/68 (05/12/20 0720)  SpO2: 98 % (05/12/20 1114) Vital Signs (24h Range):  Temp:  [98.3 °F (36.8 °C)-99.3 °F (37.4 °C)] 98.3 °F (36.8 °C)  Pulse:  [59-96] 79  Resp:  [15-18] 15  SpO2:  [96 %-100 %] 98 %  BP: (105-117)/(64-76) 105/68     Weight: 59.2 kg (130 lb 8.2 oz)  Body mass index is 21.72 " kg/m².    Intake/Output Summary (Last 24 hours) at 5/12/2020 1132  Last data filed at 5/12/2020 0000  Gross per 24 hour   Intake 938 ml   Output 1150 ml   Net -212 ml      Physical Exam   Constitutional: She is oriented to person, place, and time. She appears well-developed and well-nourished. No distress.   Uncomfortable.    Cardiovascular: Normal rate, regular rhythm and normal heart sounds.   No murmur heard.  Pulmonary/Chest: Effort normal and breath sounds normal. No respiratory distress. She has no rales.   Abdominal: Soft. Bowel sounds are normal. She exhibits no distension. There is no tenderness.   Musculoskeletal: She exhibits edema.   Tenderness of bilateral LE present, L>R   Neurological: She is alert and oriented to person, place, and time. She displays normal reflexes. No cranial nerve deficit.   Skin: Skin is warm. She is not diaphoretic. No erythema.   Psychiatric: She has a normal mood and affect.   Nursing note and vitals reviewed.      Significant Labs:   CBC:   Recent Labs   Lab 05/11/20  0837 05/12/20  0415   WBC 2.87* 2.77*   HGB 9.4* 8.8*   HCT 30.1* 29.8*    184     CMP:   Recent Labs   Lab 05/11/20  0837      K 3.7      CO2 30*   GLU 86   BUN 3*   CREATININE 0.6   CALCIUM 9.0   PROT 7.2   ALBUMIN 1.9*   BILITOT 0.3   ALKPHOS 189*   AST 10   ALT <5*   ANIONGAP 8   EGFRNONAA >60.0       Significant Imaging: I have reviewed and interpreted all pertinent imaging results/findings within the past 24 hours.      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  Who present to ED with bilateral thighs pain and swelling in the last 7  days. 11 DVTs. Has not been able to fill her prescription and she was taking only Lovenox ( concern for Lovenox failure per hematology).  Doppler ultrasound of lower extremities shows bilateral partial thrombosis bilateral femoral veins extending to bilateral popliteal veins  .  Plan :   -- patient was loaded with heparin in the ED and started on heparin drip.   -- Hematology consulted for further AC recs. Apixaban and dcing heparin drip 5/8  -- IR unable to remove thrombosis 5/6  -- Unfortunately also not a candidate for open thrombectomy with Vascular Surgery  -- monitor for signs of phlegmasia       Staphylococcus aureus bacteremia  - Likely due to infected clot  - Unable to attain source control in the absense of thrombectomy  - Persistently positive cultures since 5/5  - ID following  - TTE and BRII without vegetations  - Continuing cefazolin per ID  - Anticipating 6 weeks of antibiotics from date of first clear culture    Long term (current) use of anticoagulants  -- pt multiple syndromes of thrombophilia    -- on long trem AC   --  sine age of 18  -- switched to liquids 2015   -- unable to tolerate Xarelto because of SE ( biliary colic)  -- concern for Lovenox failure    Plan :   -- ensure pt having prescription filled for AC   -- f/u with hematology in the clinic       Anemia of chronic disease, iron deficiency anemia and acute blood loss anemia  BRBPR    -- Hg on admission 8.6, MCV 87  -- Hg has been between ( 6.9-8)   -- reports PRBPR day before admission   -- Iron study 11/2019: iron 24, TIBC 274, transferrin 185. Normal heptoglobin and RC   -- Required 2 u PRBC on 5/4 with appropriate correction of hg  -- CBC q8h stable over 24 hours, will decrease to daily.   -- GI performed cscope 5/5, remarkable only for internal hemorrhoids.       Presence of IVC filter        May-Thurner syndrome        History of pulmonary embolism  -- she had 3 PEs   -- at home on coumadin     Factor 5 Leiden mutation,  heterozygous          VTE Risk Mitigation (From admission, onward)         Ordered     apixaban tablet 10 mg  2 times daily      05/08/20 1148     heparin (porcine) in 5 % dex 25,000 unit/250 mL(100 unit/mL) infusion      05/06/20 2022     IP VTE HIGH RISK PATIENT  Once      05/03/20 2310     Place sequential compression device  Until discontinued      05/03/20 2210                      Ben Martines MD  Department of Hospital Medicine   [facility]

## 2020-05-12 NOTE — PT/OT/SLP PROGRESS
Physical Therapy      Patient Name:  Antoinette Love   MRN:  [id number]    Patient not seen today secondary to LE pain, pt requesting that therapy come back at a later time. Will follow-up as time allows.    Wanda Edouard, PT, DPT

## 2020-05-12 NOTE — SUBJECTIVE & OBJECTIVE
Interval History: NAEON. Patients cultures from 5/09 still negative. Will get a picc tomorrow and can be discharged.     Review of Systems   Constitutional: Negative for chills, fatigue and fever.   HENT: Negative for trouble swallowing.    Respiratory: Negative for cough and shortness of breath.    Cardiovascular: Positive for leg swelling. Negative for chest pain.   Gastrointestinal: Negative for abdominal pain, blood in stool, constipation, diarrhea, nausea and vomiting.   Genitourinary: Negative for dysuria.   Musculoskeletal: Negative for arthralgias.        Leg pain   Skin: Negative for color change and pallor.   Neurological: Negative for light-headedness and headaches.   Psychiatric/Behavioral: Negative for behavioral problems and confusion.     Objective:     Vital Signs (Most Recent):  Temp: 98.3 °F (36.8 °C) (05/12/20 0720)  Pulse: 79 (05/12/20 0720)  Resp: 15 (05/12/20 0720)  BP: 105/68 (05/12/20 0720)  SpO2: 98 % (05/12/20 1114) Vital Signs (24h Range):  Temp:  [98.3 °F (36.8 °C)-99.3 °F (37.4 °C)] 98.3 °F (36.8 °C)  Pulse:  [59-96] 79  Resp:  [15-18] 15  SpO2:  [96 %-100 %] 98 %  BP: (105-117)/(64-76) 105/68     Weight: 59.2 kg (130 lb 8.2 oz)  Body mass index is 21.72 kg/m².    Intake/Output Summary (Last 24 hours) at 5/12/2020 1132  Last data filed at 5/12/2020 0000  Gross per 24 hour   Intake 938 ml   Output 1150 ml   Net -212 ml      Physical Exam   Constitutional: She is oriented to person, place, and time. She appears well-developed and well-nourished. No distress.   Uncomfortable.    Cardiovascular: Normal rate, regular rhythm and normal heart sounds.   No murmur heard.  Pulmonary/Chest: Effort normal and breath sounds normal. No respiratory distress. She has no rales.   Abdominal: Soft. Bowel sounds are normal. She exhibits no distension. There is no tenderness.   Musculoskeletal: She exhibits edema.   Tenderness of bilateral LE present, L>R   Neurological: She is alert and oriented to person,  place, and time. She displays normal reflexes. No cranial nerve deficit.   Skin: Skin is warm. She is not diaphoretic. No erythema.   Psychiatric: She has a normal mood and affect.   Nursing note and vitals reviewed.      Significant Labs:   CBC:   Recent Labs   Lab 05/11/20  0837 05/12/20  0415   WBC 2.87* 2.77*   HGB 9.4* 8.8*   HCT 30.1* 29.8*    184     CMP:   Recent Labs   Lab 05/11/20  0837      K 3.7      CO2 30*   GLU 86   BUN 3*   CREATININE 0.6   CALCIUM 9.0   PROT 7.2   ALBUMIN 1.9*   BILITOT 0.3   ALKPHOS 189*   AST 10   ALT <5*   ANIONGAP 8   EGFRNONAA >60.0       Significant Imaging: I have reviewed and interpreted all pertinent imaging results/findings within the past 24 hours.

## 2020-05-12 NOTE — PROGRESS NOTES
"[facility]  Adult Nutrition  Progress Note    SUMMARY       Recommendations  1. Continue regular diet as tolerated. Encourage PO intake.   2. If PO intake < 50%, add Boost Plus TID.   3. RD to monitor.    Goals: Adequate PO intake to meet > 75% EEN/EPN by RD follow up  Nutrition Goal Status: new  Communication of RD Recs: other (comment)(POC)    Reason for Assessment    Reason For Assessment: length of stay  Diagnosis: (DVT)  Relevant Medical History: TIA, Factor V Leiden mutation, May-Thurner syndrome, recurrent DVTs + PEs  Interdisciplinary Rounds: did not attend  General Information Comments: Remote assessment completed, unable to reach pt via phone on multiple attempts. 25-50% intake this admission, with an increase to 75% today per RN documentation. 13.6% wt loss noted x 4 months PTA. Further wt loss since admission potentially fluid related, noting pt with edema per chart. NFPE to be completed at a later date 2/2 remote access. Pt is at risk for malnutrition, unable to confirm at this time 2/2 unable to assess PO intake PTA and unable to complete NFPE.  Nutrition Discharge Planning: Adequate PO intake to meet needs    Nutrition Risk Screen    Nutrition Risk Screen: no indicators present    Nutrition/Diet History    Spiritual, Cultural Beliefs, Quaker Practices, Values that Affect Care: no    Anthropometrics    Temp: 98.7 °F (37.1 °C)  Height Method: Stated  Height: 5' 5" (165.1 cm)  Height (inches): 65 in  Weight Method: Stated  Weight: 59.2 kg (130 lb 8.2 oz)  Weight (lb): 130.51 lb  Ideal Body Weight (IBW), Female: 125 lb  % Ideal Body Weight, Female (lb): 107.2 %  BMI (Calculated): 21.7    Lab/Procedures/Meds    Pertinent Labs Reviewed: reviewed  Pertinent Labs Comments: BUN 3, Alb 1.9  Pertinent Medications Reviewed: reviewed  Pertinent Medications Comments: lactobacillus, ondansetron, miralax, senna-docusate    Estimated/Assessed Needs    Weight Used For Calorie Calculations: 59.2 " kg (130 lb 8.2 oz)  Energy Calorie Requirements (kcal): 1635 kcal/day  Energy Need Method: Atco-St Jeor(x 1.25 PAL)  Protein Requirements: 59-71 g/day(1-1.2 g/kg)  Weight Used For Protein Calculations: 59.2 kg (130 lb 8.2 oz)  Fluid Requirements (mL): per MD or 1 mL/kcal     RDA Method (mL): 1635    Nutrition Prescription Ordered    Current Diet Order: Regular    Evaluation of Received Nutrient/Fluid Intake    I/O: -577mL x 24hrs, +5.2L since admit  Comments: LBM 5/7  % Intake of Estimated Energy Needs: 25 - 50 %  % Meal Intake: 25 - 50 %    Nutrition Risk    Level of Risk/Frequency of Follow-up: low(1x/week)     Assessment and Plan    Nutrition Problem  Inadequate energy intake    Related to (etiology):   Decreased appetite    Signs and Symptoms (as evidenced by):   Pt consuming < 75% of meals     Interventions (treatment strategy):  Collaboration with other providers  Commercial beverage - Boost Plus TID    Nutrition Diagnosis Status:   New     Monitor and Evaluation    Food and Nutrient Intake: energy intake, food and beverage intake  Food and Nutrient Adminstration: diet order  Anthropometric Measurements: weight, weight change, body mass index  Biochemical Data, Medical Tests and Procedures: electrolyte and renal panel, gastrointestinal profile, glucose/endocrine profile, inflammatory profile, lipid profile  Nutrition-Focused Physical Findings: overall appearance     Nutrition Follow-Up    RD Follow-up?: Yes

## 2020-05-12 NOTE — PLAN OF CARE
Recommendations  1. Continue regular diet as tolerated. Encourage PO intake.   2. If PO intake < 50%, add Boost Plus TID.   3. RD to monitor.     Goals: Adequate PO intake to meet > 75% EEN/EPN by RD follow up  Nutrition Goal Status: new  Communication of RD Recs: other (comment)(POC)

## 2020-05-12 NOTE — PLAN OF CARE
[facility]    HOME HEALTH ORDERS  FACE TO FACE ENCOUNTER    Patient Name: Antoinette Love  YOB: 1990    PCP: Alberto Holguin MD   [address] [place] [address] [facility] [address]  PCP Phone Number: [phone]  PCP Fax: [phone]    Encounter Date: 05/12/2020    Admit to Home Health    Diagnoses:  Active Hospital Problems    Diagnosis  POA    *DVT, lower extremity, proximal, acute, bilateral [I82.4Y3]  Yes    Staphylococcus aureus bacteremia [R78.81]  Yes    Long term (current) use of anticoagulants [Z79.01]  Not Applicable     -- pt multiple syndromes of thrombophilia    -- on long trem       Anemia of chronic disease, iron deficiency anemia and acute blood loss anemia [D63.8]  Yes    Leukopenia [D72.819]  Yes    Presence of IVC filter [Z95.828]  Not Applicable    May-Thurner syndrome [I87.1]  Yes    History of pulmonary embolism [Z86.711]  Yes     Provoked and unprovoked, on lovenox      Factor 5 Leiden mutation, heterozygous [D68.51]  Yes     Follow by Hematology        Resolved Hospital Problems    Diagnosis Date Resolved POA    Acute cystitis without hematuria [N30.00] 05/10/2020 Yes    Bright red blood per rectum [K62.5] 05/10/2020 Yes       Future Appointments   Date Time Provider Department Center   5/21/2020  4:00 PM Earnestine Garcia MD [facility] B[facility]   6/1/2020  9:30 AM Karel Kim PA-C [facility] ID [address]           I have seen and examined this patient face to face today. My clinical findings that support the need for the home health skilled services and home bound status are the following:  Weakness/numbness causing balance and gait disturbance due to Infection and Weakness/Debility making it taxing to leave home.    Allergies:  Review of patient's allergies indicates:   Allergen Reactions    Azithromycin Hives    Beef containing products Anaphylaxis     Patient cannot eat BEEF BROTH  STATES IT WILL MAKE  "HER THROAT CLOSE UP .     Pork derived (porcine) Anaphylaxis     Throat swells up cannot eat pork sausage or pork patties ,     Unclassified drug Shortness Of Breath and Other (See Comments)     Is allergic to a beef spice - Causes "throat closing"    Xarelto [rivaroxaban] Other (See Comments)     Gallbladder swelling and disfunction    Toradol [ketorolac] Hives and Itching       Diet: regular diet    Activities: activity as tolerated    Nursing:   SN to complete comprehensive assessment including routine vital signs. Instruct on disease process and s/s of complications to report to MD. Review/verify medication list sent home with the patient at time of discharge  and instruct patient/caregiver as needed. Frequency may be adjusted depending on start of care date.    Notify MD if SBP > 160 or < 90; DBP > 90 or < 50; HR > 120 or < 50; Temp > 101; Other:         CONSULTS:    Physical Therapy to evaluate and treat. Evaluate for home safety and equipment needs; Establish/upgrade home exercise program. Perform / instruct on therapeutic exercises, gait training, transfer training, and Range of Motion.  Occupational Therapy to evaluate and treat. Evaluate home environment for safety and equipment needs. Perform/Instruct on transfers, ADL training, ROM, and therapeutic exercises.    MISCELLANEOUS CARE:  Home Infusion Therapy:   SN to perform Infusion Therapy/Central Line Care.  Review Central Line Care & Central Line Flush with patient.    Administer (drug and dose): 6g cefazolin continuous. Adminster 6 grams over 24 hours every 24 hours.     Last dose given: 5/12/20                         Home dose due: 5/13/20    End Date 6/20/20    Scrub the Hub: Prior to accessing the line, always perform a 30 second alcohol scrub  Each lumen of the central line is to be flushed at least daily with 10 mL Normal Saline and 3 mL Heparin flush (10 units/mL)  Skilled Nurse (SN) may draw blood from IV access  Blood Draw Procedure:   - " Aspirate at least 5 mL of blood   - Discard   - Obtain specimen   - Change injection cap   - Flush with 20 mL Normal Saline followed by a                 3-5 mL Heparin flush (10 units/mL)  Central :   - Sterile dressing changes are done weekly and as needed.   - Use chlor-hexadine scrub to cleanse site, apply Biopatch to insertion site,       apply securement device dressing   - Injection caps are changed weekly and after EVERY lab draw.   - If sterile gauze is under dressing to control oozing,                 dressing change must be performed every 24 hours until gauze is not needed.  Labs  Weekly laboratory on Monday or Tuesday while on IV antibiotics.   · CBC  · CMP  · ESR  · CRP     Fax laboratory results to [facility] at [phone] with attn: Justino Kim    Medications: Review discharge medications with patient and family and provide education.      Current Discharge Medication List      START taking these medications    Details   apixaban (ELIQUIS DVT-PE TREAT 30D START) 5 mg (74 tabs) DsPk For the first 2 days take two tablets (10 mg) twice daily.  After 2 days take one tablet (5 mg) twice daily.  Qty: 74 tablet, Refills: 0      apixaban (ELIQUIS) 5 mg Tab Take 1 tablet (5 mg total) by mouth 2 (two) times daily. Begin after finishing starter pack  Qty: 60 tablet, Refills: 1      dextrose 5 % SolP 500 mL with ceFAZolin 1 gram SolR 6 g Inject 6 g into the vein continuous. Administer 6 grams over 24 hours every 24 hours. End date 6/20/20         CONTINUE these medications which have NOT CHANGED    Details   clopidogrel (PLAVIX) 75 mg tablet Take 1 tablet (75 mg total) by mouth once daily.  Qty: 90 tablet, Refills: 3      diazePAM (VALIUM) 5 MG tablet TAKE 1 TABLET (5 MG TOTAL) BY MOUTH EVERY 12 (TWELVE) HOURS AS NEEDED FOR ANXIETY.  Qty: 30 tablet, Refills: 0    Comments: Not to exceed 5 additional fills before 08/30/2020      EPINEPHrine (EPIPEN 2-VAN) 0.3 mg/0.3 mL AtIn Inject 0.3  mLs (0.3 mg total) into the muscle once. for 1 dose  Qty: 2 Device, Refills: 2         STOP taking these medications       prothrombin time/INR test metr Misc Comments:   Reason for Stopping:         rivaroxaban (XARELTO) 15 mg (42)- 20 mg (9) tablet dose pack Comments:   Reason for Stopping:               I certify that this patient is confined to her home and needs physical therapy and occupational therapy.

## 2020-05-13 VITALS
HEART RATE: 78 BPM | SYSTOLIC BLOOD PRESSURE: 130 MMHG | OXYGEN SATURATION: 97 % | RESPIRATION RATE: 18 BRPM | TEMPERATURE: 98 F | BODY MASS INDEX: 21.74 KG/M2 | WEIGHT: 130.5 LBS | HEIGHT: 65 IN | DIASTOLIC BLOOD PRESSURE: 77 MMHG

## 2020-05-13 LAB
BACTERIA BLD CULT: NORMAL
BACTERIA BLD CULT: NORMAL
BASOPHILS # BLD AUTO: 0.02 K/UL (ref 0–0.2)
BASOPHILS NFR BLD: 0.5 % (ref 0–1.9)
DIFFERENTIAL METHOD: ABNORMAL
EOSINOPHIL # BLD AUTO: 0.1 K/UL (ref 0–0.5)
EOSINOPHIL NFR BLD: 1.6 % (ref 0–8)
ERYTHROCYTE [DISTWIDTH] IN BLOOD BY AUTOMATED COUNT: 16.5 % (ref 11.5–14.5)
HCT VFR BLD AUTO: 28.8 % (ref 37–48.5)
HGB BLD-MCNC: 8.5 G/DL (ref 12–16)
IMM GRANULOCYTES # BLD AUTO: 0.07 K/UL (ref 0–0.04)
IMM GRANULOCYTES NFR BLD AUTO: 1.9 % (ref 0–0.5)
LYMPHOCYTES # BLD AUTO: 1.3 K/UL (ref 1–4.8)
LYMPHOCYTES NFR BLD: 33.6 % (ref 18–48)
MCH RBC QN AUTO: 26.8 PG (ref 27–31)
MCHC RBC AUTO-ENTMCNC: 29.5 G/DL (ref 32–36)
MCV RBC AUTO: 91 FL (ref 82–98)
MONOCYTES # BLD AUTO: 0.4 K/UL (ref 0.3–1)
MONOCYTES NFR BLD: 9.3 % (ref 4–15)
NEUTROPHILS # BLD AUTO: 2 K/UL (ref 1.8–7.7)
NEUTROPHILS NFR BLD: 53.1 % (ref 38–73)
NRBC BLD-RTO: 0 /100 WBC
PLATELET # BLD AUTO: 225 K/UL (ref 150–350)
PMV BLD AUTO: 8.7 FL (ref 9.2–12.9)
RBC # BLD AUTO: 3.17 M/UL (ref 4–5.4)
WBC # BLD AUTO: 3.78 K/UL (ref 3.9–12.7)

## 2020-05-13 PROCEDURE — 25000003 PHARM REV CODE 250: Performed by: STUDENT IN AN ORGANIZED HEALTH CARE EDUCATION/TRAINING PROGRAM

## 2020-05-13 PROCEDURE — 76937 US GUIDE VASCULAR ACCESS: CPT

## 2020-05-13 PROCEDURE — 99238 HOSP IP/OBS DSCHRG MGMT 30/<: CPT | Mod: ,,, | Performed by: HOSPITALIST

## 2020-05-13 PROCEDURE — 63600175 PHARM REV CODE 636 W HCPCS

## 2020-05-13 PROCEDURE — 25000003 PHARM REV CODE 250

## 2020-05-13 PROCEDURE — 99238 PR HOSPITAL DISCHARGE DAY,<30 MIN: ICD-10-PCS | Mod: ,,, | Performed by: HOSPITALIST

## 2020-05-13 PROCEDURE — 36573 INSJ PICC RS&I 5 YR+: CPT

## 2020-05-13 PROCEDURE — A4216 STERILE WATER/SALINE, 10 ML: HCPCS | Performed by: HOSPITALIST

## 2020-05-13 PROCEDURE — 25000003 PHARM REV CODE 250: Performed by: PHYSICIAN ASSISTANT

## 2020-05-13 PROCEDURE — 63600175 PHARM REV CODE 636 W HCPCS: Performed by: STUDENT IN AN ORGANIZED HEALTH CARE EDUCATION/TRAINING PROGRAM

## 2020-05-13 PROCEDURE — 97530 THERAPEUTIC ACTIVITIES: CPT

## 2020-05-13 PROCEDURE — 36415 COLL VENOUS BLD VENIPUNCTURE: CPT

## 2020-05-13 PROCEDURE — 63600175 PHARM REV CODE 636 W HCPCS: Performed by: PHYSICIAN ASSISTANT

## 2020-05-13 PROCEDURE — 85025 COMPLETE CBC W/AUTO DIFF WBC: CPT

## 2020-05-13 PROCEDURE — C1751 CATH, INF, PER/CENT/MIDLINE: HCPCS

## 2020-05-13 PROCEDURE — 25000003 PHARM REV CODE 250: Performed by: HOSPITALIST

## 2020-05-13 RX ORDER — OXYCODONE AND ACETAMINOPHEN 10; 325 MG/1; MG/1
1 TABLET ORAL EVERY 4 HOURS PRN
Qty: 42 TABLET | Refills: 0 | Status: SHIPPED | OUTPATIENT
Start: 2020-05-13 | End: 2020-05-20

## 2020-05-13 RX ORDER — SODIUM CHLORIDE 0.9 % (FLUSH) 0.9 %
10 SYRINGE (ML) INJECTION
Status: DISCONTINUED | OUTPATIENT
Start: 2020-05-13 | End: 2020-05-13 | Stop reason: HOSPADM

## 2020-05-13 RX ORDER — POLYETHYLENE GLYCOL 3350 17 G/17G
17 POWDER, FOR SOLUTION ORAL DAILY PRN
Qty: 14 PACKET | Refills: 0
Start: 2020-05-13

## 2020-05-13 RX ORDER — OXYCODONE AND ACETAMINOPHEN 10; 325 MG/1; MG/1
1 TABLET ORAL EVERY 4 HOURS PRN
Status: DISCONTINUED | OUTPATIENT
Start: 2020-05-13 | End: 2020-05-13 | Stop reason: HOSPADM

## 2020-05-13 RX ORDER — SODIUM CHLORIDE 0.9 % (FLUSH) 0.9 %
10 SYRINGE (ML) INJECTION EVERY 6 HOURS
Status: DISCONTINUED | OUTPATIENT
Start: 2020-05-13 | End: 2020-05-13 | Stop reason: HOSPADM

## 2020-05-13 RX ORDER — HYDROMORPHONE HYDROCHLORIDE 1 MG/ML
1 INJECTION, SOLUTION INTRAMUSCULAR; INTRAVENOUS; SUBCUTANEOUS EVERY 4 HOURS PRN
Status: DISCONTINUED | OUTPATIENT
Start: 2020-05-13 | End: 2020-05-13 | Stop reason: HOSPADM

## 2020-05-13 RX ORDER — HYDROMORPHONE HYDROCHLORIDE 1 MG/ML
2 INJECTION, SOLUTION INTRAMUSCULAR; INTRAVENOUS; SUBCUTANEOUS EVERY 4 HOURS PRN
Status: DISCONTINUED | OUTPATIENT
Start: 2020-05-13 | End: 2020-05-13

## 2020-05-13 RX ADMIN — POLYETHYLENE GLYCOL 3350 17 G: 17 POWDER, FOR SOLUTION ORAL at 08:05

## 2020-05-13 RX ADMIN — HYDROMORPHONE HYDROCHLORIDE 1 MG: 1 INJECTION, SOLUTION INTRAMUSCULAR; INTRAVENOUS; SUBCUTANEOUS at 09:05

## 2020-05-13 RX ADMIN — HYDROMORPHONE HYDROCHLORIDE 2 MG: 1 INJECTION, SOLUTION INTRAMUSCULAR; INTRAVENOUS; SUBCUTANEOUS at 05:05

## 2020-05-13 RX ADMIN — HYDROMORPHONE HYDROCHLORIDE 1 MG: 1 INJECTION, SOLUTION INTRAMUSCULAR; INTRAVENOUS; SUBCUTANEOUS at 08:05

## 2020-05-13 RX ADMIN — DEXTROSE 6 G: 5 SOLUTION INTRAVENOUS at 02:05

## 2020-05-13 RX ADMIN — SENNOSIDES AND DOCUSATE SODIUM 1 TABLET: 8.6; 5 TABLET ORAL at 08:05

## 2020-05-13 RX ADMIN — HYDROMORPHONE HYDROCHLORIDE 2 MG: 1 INJECTION, SOLUTION INTRAMUSCULAR; INTRAVENOUS; SUBCUTANEOUS at 01:05

## 2020-05-13 RX ADMIN — APIXABAN 10 MG: 5 TABLET, FILM COATED ORAL at 08:05

## 2020-05-13 RX ADMIN — OXYCODONE HYDROCHLORIDE 10 MG: 5 TABLET ORAL at 08:05

## 2020-05-13 RX ADMIN — Medication 1 CAPSULE: at 08:05

## 2020-05-13 RX ADMIN — Medication 10 ML: at 08:05

## 2020-05-13 RX ADMIN — Medication 10 ML: at 12:05

## 2020-05-13 RX ADMIN — HYDROMORPHONE HYDROCHLORIDE 1 MG: 1 INJECTION, SOLUTION INTRAMUSCULAR; INTRAVENOUS; SUBCUTANEOUS at 02:05

## 2020-05-13 NOTE — PLAN OF CARE
Pt is discharging home today and is safe to return w/ home health.  She will need a rolling walker, and a shower chair.    Brain CAMP  5/13/2020

## 2020-05-13 NOTE — PLAN OF CARE
05/13/20 0919   Post-Acute Status   Post-Acute Authorization Placement   Post-Acute Placement Status Set-up Complete     JOHN spoke with Daina at [facility] and was informed that pt was accepted.    JOHN spoke with Melissa with [facility] and was informed that pt has been accepted by [facility].    UPDATE    JOHN received a call from Daina with [facility] and she reports that they are a Tier 1 agency and pt has not met the deductible for them and would have to come out of pocket to pay for the remaining balance of $5,600 for their services.    JOHN sent out additional referrals for [employer] via EVELIO Cervantes LMSW  [facility]  Ext. [phone]

## 2020-05-13 NOTE — PT/OT/SLP PROGRESS
Occupational Therapy   Treatment    Name: Antoinette Love  MRN: [id number]  Admitting Diagnosis:  DVT, lower extremity, proximal, acute, bilateral  5 Days Post-Op    Recommendations:     Discharge Recommendations: home health OT, home health PT  Discharge Equipment Recommendations:  walker, rolling, shower chair  Barriers to discharge:  None    Assessment:     Antoinette Love is a 29 y.o. female with a medical diagnosis of DVT, lower extremity, proximal, acute, bilateral.  She presents with positive outlook on discharge. Performance deficits affecting function are weakness, impaired endurance, impaired self care skills, impaired functional mobilty, decreased lower extremity function, pain, edema, decreased ROM, gait instability.     Rehab Prognosis:  Fair; patient would benefit from acute skilled OT services to address these deficits and reach maximum level of function.       Plan:     Patient to be seen 4 x/week to address the above listed problems via self-care/home management, therapeutic activities, therapeutic exercises  · Plan of Care Expires: 06/03/20  · Plan of Care Reviewed with: patient    Subjective     Pain/Comfort:  · Pain Rating 1: other (see comments)(did not rate pain)  · Location - Side 1: Right  · Location - Orientation 1: medial  · Location 1: thigh  · Pain Addressed 1: Distraction  · Pain Rating Post-Intervention 1: other (see comments)(no change)  · Pain Rating 2: other (see comments)(did not rate pain at PICC site, no swelling or color change noted)  · Location - Side 2: Right  · Location - Orientation 2: medial  · Location 2: arm  · Pain Addressed 2: Distraction  · Pain Rating Post-Intervention 2: other (see comments)(No change)    Objective:     Communicated with: RN prior to session.  Patient found seated upright in bed.  with telemetry, PICC line upon OT entry to room.    General Precautions: Standard, fall   Orthopedic Precautions:N/A   Braces: N/A     Occupational Performance:      Activities of Daily Living:  · Upper Body Dressing: independence seated at EOB  · Lower Body Dressing: independence seated at EOB      [facility] 6 Click ADL: 20    Treatment & Education:  -Pt edu on OT role/POC  -Importance of OOB activity with staff assistance  -Safety during functional t/f and mobility  - White board updated  - Multiple self care tasks/functional mobility completed-- assistance level noted above  - All questions/concerns answered within OT scope of practice    Discussed Pt discharge needs, recommending a RW and a Shower chair.  Pt felt comfortable w/ her ability to ambulate to the toilet, but OT advised that if it becomes an issue that home health could order a 3-in-1 commode for her.  OT discussed recommendations w/  and attending physician via secure chat.    Patient left seated upright in bed with all lines intact, call button in reach and RN, DEWAYNE, MD notifiedEducation:      GOALS:   Multidisciplinary Problems     Occupational Therapy Goals     Not on file          Multidisciplinary Problems (Resolved)        Problem: Occupational Therapy Goal    Goal Priority Disciplines Outcome Interventions   Occupational Therapy Goal   (Resolved)     OT, PT/OT Met    Description:  Goals to be met by: 5/15/20    Patient will increase functional independence with ADLs by performing:    UE Dressing with [place]. MET 5/13  LE Dressing with [place]. MET 5/13  Grooming while standing at sink with Supervision.  Toileting from toilet with Supervision for hygiene and clothing management.   Supine to sit with Modified [place] for increased bed mobility independence. Met 5/11 ZULEIKA  Step transfer with Supervision with no AD to prepare for household mobility to complete occupations of choice.   Toilet transfer to toilet with Supervision.                         Time Tracking:     OT Date of Treatment: 05/13/20  OT Start Time: 1122  OT Stop Time: 1131  OT Total Time (min): 9 min    Billable  Minutes:Therapeutic Activity 9 mins    Brain Marie, OT  5/13/2020

## 2020-05-13 NOTE — CONSULTS
Double lumen PICC placed to -rt basilic vein. 33 cm in length, 0 cm exposed, 33 cm circumference.  Lot # [id number]

## 2020-05-13 NOTE — PROCEDURES
"Antoinette Love is a 29 y.o. female patient.    Temp: 98.7 °F (37.1 °C) (05/13/20 0651)  Pulse: 102 (05/13/20 0651)  Resp: 18 (05/13/20 0651)  BP: (!) 102/58 (05/13/20 0651)  SpO2: 100 % (05/13/20 0651)  Weight: 59.2 kg (130 lb 8.2 oz) (05/12/20 0600)  Height: 5' 5" (165.1 cm) (05/08/20 1027)    PICC  Date/Time: 5/13/2020 9:39 AM  Performed by: Hernán Almazan RN  Consent Done: Yes  Time out: Immediately prior to procedure a time out was called to verify the correct patient, procedure, equipment, support staff and site/side marked as required  Indications: med administration and vascular access  Local anesthetic: lidocaine 1% without epinephrine  Anesthetic Total (mL): 4  Preparation: skin prepped with ChloraPrep  Skin prep agent dried: skin prep agent completely dried prior to procedure  Sterile barriers: all five maximum sterile barriers used - cap, mask, sterile gown, sterile gloves, and large sterile sheet  Hand hygiene: hand hygiene performed prior to central venous catheter insertion  Location details: right basilic  Catheter type: double lumen  Catheter size: 5 Fr  Catheter Length: 33cm    Ultrasound guidance: yes  Vessel Caliber: medium and patent, compressibility normal  Vascular Doppler: not done  Needle advanced into vessel with real time Ultrasound guidance.  Guidewire confirmed in vessel.  Image recorded and saved.  Sterile sheath used.  Number of attempts: 1  Post-procedure: blood return through all ports, chlorhexidine patch and sterile dressing applied    Assessment: placement verified by x-ray          Robin Perez  5/13/2020  "

## 2020-05-13 NOTE — PLAN OF CARE
Pt free of falls/injuries. No c/o SOB or chest pain. 8/10 pain @ bilateral LE-- medial thighs. Dilaudid administered for management and Oxy for breakthrough pain. Continuous Ancef infusing @ 20.8 ml/hr. VSS. Plan is for PICC placement in AM then discharge home. POC reviewed with pt. Call light in reach. Bed alarm activated. Will continue to monitor.

## 2020-05-13 NOTE — DISCHARGE SUMMARY
[facility] Hospital Medicine  Discharge Summary      Patient Name: Antoinette Love  MRN: [id number]  Admission Date: 5/3/2020  Hospital Length of Stay: 10 days  Discharge Date and Time:  05/13/2020 11:52 AM  Attending Physician: Benito Luna MD   Discharging Provider: Ben Martines MD  Primary Care Provider: Alberto Holguin MD  [facility] Medicine Team: [facility] HOSP MED 4 Ben Martines MD    HPI:   Ms. 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  Who present to ED with bilateral thighs pain and swelling in the last 7 days. She is following up with Dr. Garcia in hematology and she was just switched from Coumadin to Fondaparinux around a week ago.  She has not been able to get the prescription for Fondaparinux from the pharmacy and she started taking Lovenox 80 mg BID and she stop taking it yesterday when she noticed BRBPR. She states that on Monday she started having pain in the medial side of her right thigh then she has pain in the same area of the left thigh. She describes the pain as excruciating 8/10, bilateral medial thigh associated with swelling, numbness and bruises.  She endorses episodes of chills, hot flashes,night sweats dry cough and palpitation but denies chest pain, SOB or hemoptysis.  She states that she has been taking Coumadin since age of 18 and she was switched to Eliquis 2015 and she has been doing great with it with only one DVT 2016. She had multiple DVTs since August of the last year and she has been seen by IR around 9 times for thrombolysis, ballooning and stents placment with last one on 1/17 where she had mechanical thrombectomy and stent extension to common femoral veins bilaterally.  Sh was discharged and placed back on Coumadin for unclear reason.  She states that she  "is not comfortable with using Coumadin because of the frequent monitoring.  She spoke to her hematologist week ago to switch her to non vitamin K anticoagulant.    She was started on Xarelto before and she was discontinued from taking first dose because of biliary colic pain and she was told she has inflammation.  She reports anxiety/panic attack which she was operated by IR in this first procedure and she has been sedated for all procedure after that.     In ED, she was looks uncomfortable and in pain.  She was afebrile, , /62, SpO2 100% on RA. WBC: 3.8, H.6, platelet: 228. Unremarkable CMP. INR: 1.1, Ptt: 22 and D-dimer 3.6. Normal lactic acid. Doppler US of lower extremities shows  partial thrombosis of the femoral veins, extending to the bilateral popliteal veins. EKG with NSR. IR were consulted and patient loaded with heparin and started on heparin gtt.     Procedure(s) (LRB):  ECHOCARDIOGRAM,TRANSESOPHAGEAL (N/A)      Hospital Course:   Ms. Love was admitted to Hospital Medicine 5/3 with extensive thrombus of the "Extensive deep venous thrombosis...noting previously visualized thrombus within 1 of the paired posterior tibial veins on the left is not identified on current exam however there is thrombosis of 1 of the paired right posterior tibial veins, previously no thrombus identified." Discussed the case with IR, who recommended GI workup given patient's persistent anemia and report of possible BRBPR; furthermore, the morning after admission, patient was noted to have 2 g drop in hemoglobin with associated hypotension and tachycardia increasing concern for GI bleed - though no further BRBPR noted. CTA with venous phase added to assess for possible GI bleed and to evaluate extensiveness of clot. No extravasation of contrast noted, and on venous phase, thrombus noted to extend to IVC filter. C-scope remarkable only for internal hemorrhoids.    On , patient spiked fever, blood cultures " "growing MSSA. ID consulted, and patient started on vanc, which was transitioned to cefazolin when cultures finalized.    Thrombectomy with IR attempted on 5/06; however due to "extensive, chronic, calcific thrombosis without ability to pass a microwire" thrombectomy was not possible. Vascular Surgery was consulted. Unfortunately patient is not a candidate for open thrombectomy due to large clot burden and high risk for post thrombotic syndrome.     Patient's hospital course has been complicated further by persistently positive blood cultures. Source of infection is likely infected clot, and source control is difficult in the absence of thrombectomy. CT c/a/p ordered 5/10 to rule out underlying abscess or further seeding of infection given prolonged bacteremia. It still showed clot burden and possible myositiis. Most likely secondary from the inflammation from the clot.     Her blood cultures from 5/09 remained negative. ID decided on 6 weeks of antibiotics of cefazolin. She remained afebrile for 48 hours from 5/11. She had a picc placed 5/13 and discharged after. She is being sent with pt/ot home health and the infusion company. She was given a week worth of percocet for breakthrough pain.       Review of Systems   Constitutional: Negative for chills, fatigue and fever.   HENT: Negative for trouble swallowing.    Respiratory: Negative for cough and shortness of breath.    Cardiovascular: Positive for leg swelling. Negative for chest pain.   Gastrointestinal: Negative for abdominal pain, blood in stool, constipation, diarrhea, nausea and vomiting.   Genitourinary: Negative for dysuria.   Musculoskeletal: Negative for arthralgias.        Leg pain   Skin: Negative for color change and pallor.   Neurological: Negative for light-headedness and headaches.   Psychiatric/Behavioral: Negative for behavioral problems and confusion.     Physical Exam   Constitutional: She is oriented to person, place, and time. She appears " well-developed and well-nourished. No distress.   Cardiovascular: Normal rate, regular rhythm and normal heart sounds.   No murmur heard.  Pulmonary/Chest: Effort normal and breath sounds normal. No respiratory distress. She has no rales.   Abdominal: Soft. Bowel sounds are normal. She exhibits no distension. There is no tenderness.   Musculoskeletal: She exhibits edema.   Tenderness of bilateral LE present, L>R   Neurological: She is alert and oriented to person, place, and time. She displays normal reflexes. No cranial nerve deficit.   Skin: Skin is warm. She is not diaphoretic. No erythema.   Psychiatric: She has a normal mood and affect.   Nursing note and vitals reviewed.    Consults:   Consults (From admission, onward)        Status Ordering Provider     Inpatient consult to Gastroenterology  Once     Provider:  (Not yet assigned)    Completed ROSE MARY AMATO     Inpatient consult to Hematology/Oncology  Once     Provider:  (Not yet assigned)    Completed ROSE MARY AMATO     Inpatient consult to Infectious Diseases  Once     Provider:  (Not yet assigned)    Completed CARLA CURIEL     Inpatient consult to Interventional Radiology  Once     Provider:  (Not yet assigned)    Completed TEDDY SABA     Inpatient consult to Midline team  Once     Provider:  (Not yet assigned)    Completed ROSE MARY AMATO     Inpatient consult to Midline team  Once     Provider:  (Not yet assigned)    Completed TRAY CANTU     [facility]  Once     Provider:  (Not yet assigned)    Completed TRAY CANTU     Inpatient [facility]  Once     Provider:  (Not yet assigned)    Completed ROSE MARY AMATO     Inpatient consult to Vascular Surgery  Once     Provider:  (Not yet assigned)    Completed ROSE MARY AMATO          No new Assessment & Plan notes have been filed under this hospital service since the last note was generated.  Service: Hospital Medicine    Final Active  "Diagnoses:    Diagnosis Date Noted POA    PRINCIPAL PROBLEM:  DVT, lower extremity, proximal, acute, bilateral [I82.4Y3] 05/03/2020 Yes    Staphylococcus aureus bacteremia [R78.81] 05/06/2020 Yes    Long term (current) use of anticoagulants [Z79.01] 05/04/2020 Not Applicable    Anemia of chronic disease, iron deficiency anemia and acute blood loss anemia [D63.8] 12/19/2019 Yes    Leukopenia [D72.819] 11/01/2019 Yes    Presence of IVC filter [Z95.828] 09/23/2019 Not Applicable    May-Thurner syndrome [I87.1] 09/04/2019 Yes    History of pulmonary embolism [Z86.711] 08/09/2019 Yes    Factor 5 Leiden mutation, heterozygous [D68.51] 03/18/2016 Yes      Problems Resolved During this Admission:    Diagnosis Date Noted Date Resolved POA    Acute cystitis without hematuria [N30.00] 05/05/2020 05/10/2020 Yes    Bright red blood per rectum [K62.5] 12/18/2015 05/10/2020 Yes       Discharged Condition: fair    Disposition: Home or Self Care    Follow Up:  Follow-up Information     Alberto Holguin MD In 1 week.    Specialty:  Internal Medicine  Contact information:  [address] [address]  [phone]             [facility] HEMATOLOGY/ONCOLOGY.    Specialty:  Hematology and Oncology  Contact information:  [address] [facility] [address]  [phone]               Patient Instructions:      WALKER FOR HOME USE     Order Specific Question Answer Comments   Type of Walker: Adult (5'4"-6'6")    With wheels? Yes    Height: 5' 5" (1.651 m)    Weight: 59.2 kg (130 lb 8.2 oz)    Length of need (1-99 months): 99    Does patient have medical equipment at home? none    Please check all that apply: Patient's condition impairs ambulation.      BATH/SHOWER CHAIR FOR HOME USE     Order Specific Question Answer Comments   Height: 5' 5" (1.651 m)    Weight: 59.2 kg (130 lb 8.2 oz)    Does patient have medical equipment at home? none    Length of need (1-99 months): 99    Type: With back  "     Ambulatory referral/consult to Hematology / Oncology   Standing Status: Future   Referral Priority: Routine Referral Type: Consultation   Referral Reason: Specialty Services Required   Requested Specialty: Hematology and Oncology   Number of Visits Requested: 1     Diet Adult Regular     Notify your health care provider if you experience any of the following:  temperature >100.4     Notify your health care provider if you experience any of the following:  persistent nausea and vomiting or diarrhea     Notify your health care provider if you experience any of the following:  severe uncontrolled pain     Notify your health care provider if you experience any of the following:  redness, tenderness, or signs of infection (pain, swelling, redness, odor or green/yellow discharge around incision site)     Notify your health care provider if you experience any of the following:  difficulty breathing or increased cough     Notify your health care provider if you experience any of the following:  severe persistent headache     Notify your health care provider if you experience any of the following:  worsening rash     Notify your health care provider if you experience any of the following:  persistent dizziness, light-headedness, or visual disturbances     Notify your health care provider if you experience any of the following:  increased confusion or weakness     Activity as tolerated       Significant Diagnostic Studies: Labs:   CMP No results for input(s): NA, K, CL, CO2, GLU, BUN, CREATININE, CALCIUM, PROT, ALBUMIN, BILITOT, ALKPHOS, AST, ALT, ANIONGAP, ESTGFRAFRICA, EGFRNONAA in the last 48 hours. and CBC   Recent Labs   Lab 05/12/20  0415 05/13/20  0825   WBC 2.77* 3.78*   HGB 8.8* 8.5*   HCT 29.8* 28.8*    225       Pending Diagnostic Studies:     None         Medications:  Reconciled Home Medications:      Medication List      START taking these medications    * apixaban 5 mg (74 tabs) Dspk  Commonly known  as:  ELIQUIS DVT-PE TREAT 30D START  For the first 2 days take two tablets (10 mg) twice daily.  After 2 days take one tablet (5 mg) twice daily.     * apixaban 5 mg Tab  Commonly known as:  ELIQUIS  Take 1 tablet (5 mg total) by mouth 2 (two) times daily. Begin after finishing starter pack     dextrose 5 % SolP 500 mL with ceFAZolin 1 gram SolR 6 g  Inject 6 g into the vein continuous. Administer 6 grams over 24 hours every 24 hours     oxyCODONE-acetaminophen  mg per tablet  Commonly known as:  PERCOCET  Take 1 tablet by mouth every 4 (four) hours as needed for Pain.     polyethylene glycol 17 gram Pwpk  Commonly known as:  GLYCOLAX  Take 17 g by mouth daily as needed.         * This list has 2 medication(s) that are the same as other medications prescribed for you. Read the directions carefully, and ask your doctor or other care provider to review them with you.            CONTINUE taking these medications    clopidogreL 75 mg tablet  Commonly known as:  PLAVIX  Take 1 tablet (75 mg total) by mouth once daily.     diazePAM 5 MG tablet  Commonly known as:  VALIUM  TAKE 1 TABLET (5 MG TOTAL) BY MOUTH EVERY 12 (TWELVE) HOURS AS NEEDED FOR ANXIETY.     EPINEPHrine 0.3 mg/0.3 mL Atin  Commonly known as:  EPIPEN 2-VAN  Inject 0.3 mLs (0.3 mg total) into the muscle once. for 1 dose        STOP taking these medications    prothrombin time/INR test metr Misc     rivaroxaban 15 mg (42)- 20 mg (9) tablet dose pack  Commonly known as:  XARELTO            Indwelling Lines/Drains at time of discharge:   Lines/Drains/Airways     Peripherally Inserted Central Catheter Line            PICC Double Lumen 05/13/20 0940 right basilic less than 1 day                Time spent on the discharge of patient: 45 minutes  Patient was seen and examined on the date of discharge and determined to be suitable for discharge.         Ben Martines MD  Department of Hospital Medicine  [facility]

## 2020-05-13 NOTE — PLAN OF CARE
SW received a call from pt reporting she will need assistance with transportation home.    SW arranged wheelchair transport via [facility]. Requested  time is 5:00pm.  Requested  time does not guarantee arrival time.    Krysten Cervantes LMSW  [facility]  Ext. [phone]

## 2020-05-14 ENCOUNTER — PATIENT OUTREACH (OUTPATIENT)
Dept: ADMINISTRATIVE | Facility: CLINIC | Age: 30
End: 2020-05-14

## 2020-05-14 PROCEDURE — G0180 MD CERTIFICATION HHA PATIENT: HCPCS | Mod: ,,, | Performed by: HOSPITALIST

## 2020-05-14 PROCEDURE — G0180 PR HOME HEALTH MD CERTIFICATION: ICD-10-PCS | Mod: ,,, | Performed by: HOSPITALIST

## 2020-05-14 NOTE — PROGRESS NOTES
C3 nurse attempted to contact patient. The following occurred:   C3 nurse attempted to contact Antoinette Angeles Ivan for a TCC post hospital discharge follow up call. The patient is unable to conduct the call @ this time. The patient requested a callback.    The patient does not have a scheduled HOSFU appointment within 7-14 days post hospital discharge date 05/13/2020. Message sent to Physician staff to assist with HOSFU appointment scheduling.

## 2020-05-14 NOTE — PLAN OF CARE
05/14/20 0732   Final Note   Assessment Type Final Discharge Note   Anticipated Discharge Disposition IV Therapy   Hospital Follow Up  Appt(s) scheduled? Yes

## 2020-05-18 ENCOUNTER — LAB VISIT (OUTPATIENT)
Dept: LAB | Facility: HOSPITAL | Age: 30
End: 2020-05-18
Attending: INTERNAL MEDICINE
Payer: COMMERCIAL

## 2020-05-18 ENCOUNTER — NURSE TRIAGE (OUTPATIENT)
Dept: ADMINISTRATIVE | Facility: CLINIC | Age: 30
End: 2020-05-18

## 2020-05-18 DIAGNOSIS — D72.819 LEUCOPENIA: ICD-10-CM

## 2020-05-18 DIAGNOSIS — R78.81 BACTEREMIA: ICD-10-CM

## 2020-05-18 DIAGNOSIS — D63.8 CHRONIC DISEASE ANEMIA: ICD-10-CM

## 2020-05-18 DIAGNOSIS — I82.4Y3: Primary | ICD-10-CM

## 2020-05-18 LAB
ALBUMIN SERPL BCP-MCNC: 2.5 G/DL (ref 3.5–5.2)
ALP SERPL-CCNC: 120 U/L (ref 55–135)
ALT SERPL W/O P-5'-P-CCNC: <5 U/L (ref 10–44)
ANION GAP SERPL CALC-SCNC: 9 MMOL/L (ref 8–16)
AST SERPL-CCNC: 7 U/L (ref 10–40)
BASOPHILS # BLD AUTO: 0.01 K/UL (ref 0–0.2)
BASOPHILS NFR BLD: 0.3 % (ref 0–1.9)
BILIRUB SERPL-MCNC: 0.2 MG/DL (ref 0.1–1)
BUN SERPL-MCNC: 8 MG/DL (ref 6–20)
CALCIUM SERPL-MCNC: 9 MG/DL (ref 8.7–10.5)
CHLORIDE SERPL-SCNC: 104 MMOL/L (ref 95–110)
CO2 SERPL-SCNC: 28 MMOL/L (ref 23–29)
CREAT SERPL-MCNC: 0.7 MG/DL (ref 0.5–1.4)
CRP SERPL-MCNC: 22.9 MG/L (ref 0–8.2)
DIFFERENTIAL METHOD: ABNORMAL
EOSINOPHIL # BLD AUTO: 0.1 K/UL (ref 0–0.5)
EOSINOPHIL NFR BLD: 1.8 % (ref 0–8)
ERYTHROCYTE [DISTWIDTH] IN BLOOD BY AUTOMATED COUNT: 17.2 % (ref 11.5–14.5)
ERYTHROCYTE [SEDIMENTATION RATE] IN BLOOD BY WESTERGREN METHOD: 127 MM/HR (ref 0–20)
EST. GFR  (AFRICAN AMERICAN): >60 ML/MIN/1.73 M^2
EST. GFR  (NON AFRICAN AMERICAN): >60 ML/MIN/1.73 M^2
GLUCOSE SERPL-MCNC: 75 MG/DL (ref 70–110)
HCT VFR BLD AUTO: 29.4 % (ref 37–48.5)
HGB BLD-MCNC: 8.8 G/DL (ref 12–16)
IMM GRANULOCYTES # BLD AUTO: 0.02 K/UL (ref 0–0.04)
IMM GRANULOCYTES NFR BLD AUTO: 0.6 % (ref 0–0.5)
LYMPHOCYTES # BLD AUTO: 1.2 K/UL (ref 1–4.8)
LYMPHOCYTES NFR BLD: 35.4 % (ref 18–48)
MCH RBC QN AUTO: 26.7 PG (ref 27–31)
MCHC RBC AUTO-ENTMCNC: 29.9 G/DL (ref 32–36)
MCV RBC AUTO: 89 FL (ref 82–98)
MONOCYTES # BLD AUTO: 0.2 K/UL (ref 0.3–1)
MONOCYTES NFR BLD: 6.4 % (ref 4–15)
NEUTROPHILS # BLD AUTO: 1.9 K/UL (ref 1.8–7.7)
NEUTROPHILS NFR BLD: 55.5 % (ref 38–73)
NRBC BLD-RTO: 0 /100 WBC
PLATELET # BLD AUTO: 211 K/UL (ref 150–350)
PMV BLD AUTO: 8.7 FL (ref 9.2–12.9)
POTASSIUM SERPL-SCNC: 4 MMOL/L (ref 3.5–5.1)
PROT SERPL-MCNC: 7.5 G/DL (ref 6–8.4)
RBC # BLD AUTO: 3.3 M/UL (ref 4–5.4)
SODIUM SERPL-SCNC: 141 MMOL/L (ref 136–145)
WBC # BLD AUTO: 3.42 K/UL (ref 3.9–12.7)

## 2020-05-18 PROCEDURE — 85652 RBC SED RATE AUTOMATED: CPT

## 2020-05-18 PROCEDURE — 80053 COMPREHEN METABOLIC PANEL: CPT

## 2020-05-18 PROCEDURE — 85025 COMPLETE CBC W/AUTO DIFF WBC: CPT

## 2020-05-18 PROCEDURE — 86140 C-REACTIVE PROTEIN: CPT

## 2020-05-18 NOTE — TELEPHONE ENCOUNTER
Additional Information   Negative: Caller is not with the child and is reporting urgent symptoms   Negative: Refusing to take medications, questions about   Negative: Medication or pharmacy questions   Negative: Health or general information question, no triage required and triager able to answer question   Negative: Behavior or development information question, no triage required and triager able to answer question   Negative: Question about upcoming scheduled test, no triage required and triager able to answer question   Negative: Caller is not with the child and probable non-urgent symptoms and unable to complete triage (Note: parent to call back with triage info)   Negative: Follow-up call to recent contact and information only call, no triage required   Negative: Requesting regular office appointment and child is well   Negative: Caller requesting lab results and child stable   Negative: Caller has questions about durable medical equipment ordered and triager unable to answer    Protocols used: INFORMATION ONLY CALL - NO TRIAGE-P-[place]

## 2020-05-18 NOTE — TELEPHONE ENCOUNTER
Pt called today to assess for post procedural symptoms part of the [facility], pt states that she  is not experiencing any symptoms of cough, fever, or shortness of breath since the procedure. Pt reports no other symptoms of concern.

## 2020-05-20 ENCOUNTER — NURSE TRIAGE (OUTPATIENT)
Dept: ADMINISTRATIVE | Facility: CLINIC | Age: 30
End: 2020-05-20

## 2020-05-20 NOTE — TELEPHONE ENCOUNTER
Called patient on behalf of [facility]. No answer. Left message to let patient know someone will call back tomorrow.       Reason for Disposition   Second attempt to contact family AND no contact made. Phone number verified.    Protocols used: NO CONTACT OR DUPLICATE CONTACT CALL-A-[place]

## 2020-05-21 ENCOUNTER — TELEPHONE (OUTPATIENT)
Dept: PRIMARY CARE CLINIC | Facility: CLINIC | Age: 30
End: 2020-05-21

## 2020-05-21 ENCOUNTER — TELEPHONE (OUTPATIENT)
Dept: HEMATOLOGY/ONCOLOGY | Facility: CLINIC | Age: 30
End: 2020-05-21

## 2020-05-21 NOTE — TELEPHONE ENCOUNTER
Patient did not check in to today's virtual visit 5/21, attempted to call patient, left voice message instructing patient to call or message to reschedule her missed appointment.

## 2020-05-25 ENCOUNTER — LAB VISIT (OUTPATIENT)
Dept: LAB | Facility: HOSPITAL | Age: 30
End: 2020-05-25
Attending: PHYSICIAN ASSISTANT
Payer: COMMERCIAL

## 2020-05-25 ENCOUNTER — EXTERNAL HOME HEALTH (OUTPATIENT)
Dept: HOME HEALTH SERVICES | Facility: HOSPITAL | Age: 30
End: 2020-05-25
Payer: COMMERCIAL

## 2020-05-25 DIAGNOSIS — I82.4Y3: Primary | ICD-10-CM

## 2020-05-25 DIAGNOSIS — B95.61 METHICILLIN SUSCEPTIBLE STAPHYLOCOCCUS AUREUS INFECTION AS THE CAUSE OF DISEASES CLASSIFIED ELSEWHERE: ICD-10-CM

## 2020-05-25 LAB
ALBUMIN SERPL BCP-MCNC: 3.3 G/DL (ref 3.5–5.2)
ALP SERPL-CCNC: 156 U/L (ref 55–135)
ALT SERPL W/O P-5'-P-CCNC: 7 U/L (ref 10–44)
ANION GAP SERPL CALC-SCNC: 12 MMOL/L (ref 8–16)
AST SERPL-CCNC: 21 U/L (ref 10–40)
BASOPHILS # BLD AUTO: 0.03 K/UL (ref 0–0.2)
BASOPHILS NFR BLD: 0.7 % (ref 0–1.9)
BILIRUB SERPL-MCNC: 0.3 MG/DL (ref 0.1–1)
BUN SERPL-MCNC: 13 MG/DL (ref 6–20)
CALCIUM SERPL-MCNC: 9.8 MG/DL (ref 8.7–10.5)
CHLORIDE SERPL-SCNC: 98 MMOL/L (ref 95–110)
CO2 SERPL-SCNC: 27 MMOL/L (ref 23–29)
CREAT SERPL-MCNC: 0.8 MG/DL (ref 0.5–1.4)
CRP SERPL-MCNC: 16.2 MG/L (ref 0–8.2)
DIFFERENTIAL METHOD: ABNORMAL
EOSINOPHIL # BLD AUTO: 0.1 K/UL (ref 0–0.5)
EOSINOPHIL NFR BLD: 1.7 % (ref 0–8)
ERYTHROCYTE [DISTWIDTH] IN BLOOD BY AUTOMATED COUNT: 18 % (ref 11.5–14.5)
ERYTHROCYTE [SEDIMENTATION RATE] IN BLOOD BY WESTERGREN METHOD: 120 MM/HR (ref 0–20)
EST. GFR  (AFRICAN AMERICAN): >60 ML/MIN/1.73 M^2
EST. GFR  (NON AFRICAN AMERICAN): >60 ML/MIN/1.73 M^2
GLUCOSE SERPL-MCNC: 81 MG/DL (ref 70–110)
HCT VFR BLD AUTO: 35.6 % (ref 37–48.5)
HGB BLD-MCNC: 10.8 G/DL (ref 12–16)
IMM GRANULOCYTES # BLD AUTO: 0.01 K/UL (ref 0–0.04)
IMM GRANULOCYTES NFR BLD AUTO: 0.2 % (ref 0–0.5)
LYMPHOCYTES # BLD AUTO: 2 K/UL (ref 1–4.8)
LYMPHOCYTES NFR BLD: 43.5 % (ref 18–48)
MCH RBC QN AUTO: 27.1 PG (ref 27–31)
MCHC RBC AUTO-ENTMCNC: 30.3 G/DL (ref 32–36)
MCV RBC AUTO: 89 FL (ref 82–98)
MONOCYTES # BLD AUTO: 0.3 K/UL (ref 0.3–1)
MONOCYTES NFR BLD: 6.5 % (ref 4–15)
NEUTROPHILS # BLD AUTO: 2.2 K/UL (ref 1.8–7.7)
NEUTROPHILS NFR BLD: 47.4 % (ref 38–73)
NRBC BLD-RTO: 0 /100 WBC
PLATELET # BLD AUTO: 217 K/UL (ref 150–350)
PMV BLD AUTO: 8.7 FL (ref 9.2–12.9)
POTASSIUM SERPL-SCNC: 4.5 MMOL/L (ref 3.5–5.1)
PROT SERPL-MCNC: 9 G/DL (ref 6–8.4)
RBC # BLD AUTO: 3.99 M/UL (ref 4–5.4)
SODIUM SERPL-SCNC: 137 MMOL/L (ref 136–145)
WBC # BLD AUTO: 4.6 K/UL (ref 3.9–12.7)

## 2020-05-25 PROCEDURE — 85025 COMPLETE CBC W/AUTO DIFF WBC: CPT

## 2020-05-25 PROCEDURE — 85652 RBC SED RATE AUTOMATED: CPT

## 2020-05-25 PROCEDURE — 86140 C-REACTIVE PROTEIN: CPT

## 2020-05-25 PROCEDURE — 36415 COLL VENOUS BLD VENIPUNCTURE: CPT

## 2020-05-25 PROCEDURE — 80053 COMPREHEN METABOLIC PANEL: CPT

## 2020-05-28 ENCOUNTER — PATIENT OUTREACH (OUTPATIENT)
Dept: ADMINISTRATIVE | Facility: OTHER | Age: 30
End: 2020-05-28

## 2020-06-02 ENCOUNTER — DOCUMENT SCAN (OUTPATIENT)
Dept: HOME HEALTH SERVICES | Facility: HOSPITAL | Age: 30
End: 2020-06-02
Payer: COMMERCIAL

## 2020-06-03 ENCOUNTER — TELEPHONE (OUTPATIENT)
Dept: HEMATOLOGY/ONCOLOGY | Facility: CLINIC | Age: 30
End: 2020-06-03

## 2020-06-04 ENCOUNTER — TELEPHONE (OUTPATIENT)
Dept: HEMATOLOGY/ONCOLOGY | Facility: CLINIC | Age: 30
End: 2020-06-04

## 2020-06-04 NOTE — TELEPHONE ENCOUNTER
Patient has missed multiple visits, did not check in to today's virtual visit 6/4, attempted to call patient, left voice message instructing patient to call or message to reschedule her missed appointment.

## 2021-01-05 ENCOUNTER — PATIENT MESSAGE (OUTPATIENT)
Dept: ADMINISTRATIVE | Facility: HOSPITAL | Age: 31
End: 2021-01-05

## 2021-04-05 ENCOUNTER — PATIENT MESSAGE (OUTPATIENT)
Dept: ADMINISTRATIVE | Facility: HOSPITAL | Age: 31
End: 2021-04-05

## 2021-04-16 ENCOUNTER — PATIENT MESSAGE (OUTPATIENT)
Dept: RESEARCH | Facility: HOSPITAL | Age: 31
End: 2021-04-16

## 2021-05-11 NOTE — NURSING
1835- patient returned back from procedure . Heparin restarted . Denies needs at this time. Head of bed up rails up x 3 call light in reach bed alarm on .    Phone call to patient.  No answer.  Left vm stating insulin order was sent to [facility] pharmacy and to call [facility] clinic if any questions or concerns with the prescription.

## 2021-10-04 ENCOUNTER — PATIENT MESSAGE (OUTPATIENT)
Dept: ADMINISTRATIVE | Facility: HOSPITAL | Age: 31
End: 2021-10-04

## 2022-05-30 ENCOUNTER — PATIENT MESSAGE (OUTPATIENT)
Dept: ADMINISTRATIVE | Facility: HOSPITAL | Age: 32
End: 2022-05-30
Payer: COMMERCIAL

## 2022-10-24 NOTE — [employer] PATIENT INSTRUCTIONS
Discharge Summary/Instructions after an Endoscopic Procedure  Patient Name: Antoinette Love  Patient MRN: [id number]  Patient YOB: 1990  Tuesday, May 5, 2020  Lamin Powers MD  RESTRICTIONS:  During your procedure today, you received medications for sedation.  These   medications may affect your judgment, balance and coordination.  Therefore,   for 24 hours, you have the following restrictions:   - DO NOT drive a car, operate machinery, make legal/financial decisions,   sign important papers or drink alcohol.    ACTIVITY:  Today: no heavy lifting, straining or running due to procedural   sedation/anesthesia.  The following day: return to full activity including work.  DIET:  Eat and drink normally unless instructed otherwise.     TREATMENT FOR COMMON SIDE EFFECTS:  - Mild abdominal pain, nausea, belching, bloating or excessive gas:  rest,   eat lightly and use a heating pad.  - Sore Throat: treat with throat lozenges and/or gargle with warm salt   water.  - Because air was used during the procedure, expelling large amounts of air   from your rectum or belching is normal.  - If a bowel prep was taken, you may not have a bowel movement for 1-3 days.    This is normal.  SYMPTOMS TO WATCH FOR AND REPORT TO YOUR PHYSICIAN:  1. Abdominal pain or bloating, other than gas cramps.  2. Chest pain.  3. Back pain.  4. Signs of infection such as: chills or fever occurring within 24 hours   after the procedure.  5. Rectal bleeding, which would show as bright red, maroon, or black stools.   (A tablespoon of blood from the rectum is not serious, especially if   hemorrhoids are present.)  6. Vomiting.  7. Weakness or dizziness.  GO DIRECTLY TO THE NEAREST EMERGENCY ROOM IF YOU HAVE ANY OF THE FOLLOWING:      Difficulty breathing              Chills and/or fever over 101 F   Persistent vomiting and/or vomiting blood   Severe abdominal pain   Severe chest pain   Black, tarry stools   Bleeding- more than one  tablespoon   Any other symptom or condition that you feel may need urgent attention  Your doctor recommends these additional instructions:  If any biopsies were taken, your doctors clinic will contact you in 1 to 2   weeks with any results.  - Return patient to hospital servin for ongoing care.   - Resume previous diet.   - Continue present medications.   - Repeat colonoscopy at age 48 for regular screening purposes.  for   screening purposes.   - Return to GI office at appointment to be scheduled.   - The findings and recommendations were discussed with the patient.   - The findings and recommendations were discussed with the referring   physician.  For questions, problems or results please call your physician - Lamin Powers MD at Work:  [phone].  [facility], EMERGENCY ROOM PHONE NUMBER: [phone]  IF A COMPLICATION OR EMERGENCY SITUATION ARISES AND YOU ARE UNABLE TO REACH   YOUR PHYSICIAN - GO DIRECTLY TO THE EMERGENCY ROOM.  Lamin Powers MD  5/5/2020 3:08:47 PM  This report has been verified and signed electronically.  [employer]ATION   Family hx of substance use. Denied hx of SI/Psychiatric illnes

## 2024-06-06 NOTE — ED NOTES
Bed: 04  Expected date:   Expected time:   Means of arrival:   Comments:   Received request via: Pharmacy    Was the patient seen in the last year in this department? Yes    Does the patient have an active prescription (recently filled or refills available) for medication(s) requested? No    [facility]    Does the patient have FPC Plus and need 100 day supply (blood pressure, diabetes and cholesterol meds only)? Patient does not have SCP

## 2024-11-07 NOTE — TELEPHONE ENCOUNTER
Patient sent a [employer] message. Please advise if you would like patient to follow up in clinic.   
normal (ped)...

## 2025-04-09 NOTE — PROGRESS NOTES
[facility]  Hematology/Oncology  Progress Note    Patient Name: Antoinette Love  Admission Date: 11/1/2019  Hospital Length of Stay: 2 days  Code Status: Full Code     Subjective:     HPI:  Antoinette Love is a 29-year-old female with Homozygous Factor V Leiden mutation, recurrent DVTs, IVC filter placement, miscarriage in 2013 at 11 weeks, TIA in 2013, May-Thurner Syndrome who presents to the hospital with complaint of swelling in her legs for the last 3-4 days.  The patient recently had a procedure done at [facility] under the care of IR.  On 9/23/19 the patient underwent left popliteal access with a venogram showing thrombosis of the popliteal, femoral, common femoral vein, as well as significant stenosis of the left common iliac vein.   She underwent rheolytic thrombectomy with follow-up venogram demonstrating improved patency of the LLE.   She then underwent right common femoral access and right internal jugular access with attempted retrieval of an IVC filter placed in 2014.  The filter could not be retrived so a venoplasty of the IVC was performed to displace the filter and allow stent placement.  Bilateral 18mm x 90mm Wall-Stents were placed in the infrarenal IVC followed bilateral 16mm x 90mm Wall-Stents.  Venoplasty  was performed with 16mm and 14mm balloons.  Repeat venogram demonstrated improved antegrade flow without collateral vessels seen.  Since before the procedure and after the procedure, she complains of pain in the right lower abdomen and groin with the sensation of something sticking her from the inside.  She denies N/V, constipation, diarrhea.  3-4 days ago she developed swelling in her legs and came to the hospital when the pain was unbearable and swelling had gotten worse.  Currently she complains of redness in her right leg with difficulty moving her foot due to swelling.  She denies CP, SOB.      Hematologic History The patient has had at least 11 DVTs and 3-4 PEs.   "She was on Coumadin for years and stated she came off when she had a DVT despite a therapeutic INR.  She was then placed on Xarelto buts states she only took 1 dose and shortly after developed gallbladder pain. When she was evaluated she was told she had gallbladder inflammation.  An IVC filter was placed in 2014. She was told that it was sideways, and it has caused her significant discomfort  The patient was on Eliquis in 2015 and developed a DVT in 2016 while on Eliquis  She was followed by a Hematologist in [place], and he opted to take her off of anti-coagulation in 2018 because stated that she had an IVC filter and did not need to be anti-coagulated.  The patient was subsequently started on lovenox 1mg/kg BID on 8/22/19.    Interval History:   - she notes slightly worsened swelling in right lower extremity. She is still able to move her toes. She notes "an unusual sensation" when her leg is palpated, but she still has feeling in her leg.    Oncology Treatment Plan:   [No treatment plan]    Medications:  Continuous Infusions:   sodium chloride 0.9% 125 mL/hr at 11/03/19 1244    heparin (porcine) in D5W 18 Units/kg/hr (11/02/19 1733)     Scheduled Meds:   alteplase (ACTIVASE) 10 mg in 0.9% NaCl 100 mL  10 mg Intra-Catheter Once    alteplase (ACTIVASE) 10 mg in 0.9% NaCl 100 mL  10 mg Intra-Catheter Once    alteplase  4 mg Intra-Catheter Once    ibuprofen  800 mg Oral Once    polyethylene glycol  17 g Oral Daily     PRN Meds:acetaminophen, diazePAM, heparin (PORCINE), heparin (PORCINE), HYDROmorphone, ondansetron, oxyCODONE-acetaminophen, sodium chloride 0.9%     Review of Systems   Constitutional: Negative for chills, fatigue and fever.   HENT: Negative for sore throat and trouble swallowing.    Eyes: Negative for photophobia, pain and visual disturbance.   Respiratory: Negative for chest tightness and shortness of breath.    Cardiovascular: Positive for leg swelling (right and left leg). Negative for " (DESOWEN) 0.05 % cream; Apply topically 2 times daily.  Dispense: 15 g; Refill: 1  - External Referral To Dermatology    2. Seborrheic dermatitis  - ketoconazole (NIZORAL) 2 % shampoo; Apply topically daily as needed.  Dispense: 120 mL; Refill: 5  - External Referral To Dermatology    3. History of gestational diabetes  -Continue to monitor blood glucose at home intermittently      Educational materials and/or home exercises printed for patient's review and were included in patient instructions on his/her After Visit Summary and given to patient at the end of visit.        Counseled regarding above diagnosis, including possible risks and complications,  especially if left uncontrolled.     Counseled regarding the possible side effects, risks, benefits and alternatives to treatment; patient and/or guardian verbalizes understanding, agrees, feels comfortable with and wishes to proceed with above treatment plan.     Advised patient to call with any new medication issues, and read all Rx info from pharmacy to assure aware of all possible risks and side effects of medication before taking.     Reviewed age and gender appropriate health screening exams and vaccinations.  Advised patient regarding importance of keeping up with recommended health maintenance and to schedule as soon as possible if overdue, as this is important in assessing for undiagnosed pathology, especially cancer, as well as protecting against potentially harmful/life threatening disease.       Patient and/or guardian verbalizes understanding and agrees with above counseling, assessment and plan.     All questions answered    Additional plan and future considerations:       Return to Office: No follow-ups on file.    Electronically signed by Jahaira Avila MD on 4/9/2025 at 4:29 PM     chest pain and palpitations.   Gastrointestinal: Positive for abdominal pain (RLQ). Negative for constipation, diarrhea, nausea and vomiting.   Genitourinary: Negative for difficulty urinating and dysuria.   Musculoskeletal: Negative for arthralgias and back pain.   Skin: Negative for color change and rash.   Neurological: Negative for weakness, numbness and headaches.   Hematological: Negative for adenopathy. Does not bruise/bleed easily.     Objective:     Vital Signs (Most Recent):  Temp: 98.5 °F (36.9 °C) (11/03/19 1106)  Pulse: 79 (11/03/19 1106)  Resp: 18 (11/03/19 1106)  BP: (!) 97/54 (11/03/19 1106)  SpO2: 100 % (11/03/19 1106) Vital Signs (24h Range):  Temp:  [97.5 °F (36.4 °C)-100.6 °F (38.1 °C)] 98.5 °F (36.9 °C)  Pulse:  [] 79  Resp:  [18] 18  SpO2:  [97 %-100 %] 100 %  BP: ()/(49-56) 97/54     Weight: 76.4 kg (168 lb 6.9 oz)  Body mass index is 32.89 kg/m².  Body surface area is 1.8 meters squared.      Intake/Output Summary (Last 24 hours) at 11/3/2019 1433  Last data filed at 11/3/2019 0427  Gross per 24 hour   Intake 920.4 ml   Output --   Net 920.4 ml       Physical Exam   Constitutional: She is oriented to person, place, and time. She appears well-developed and well-nourished. No distress.   HENT:   Head: Normocephalic and atraumatic.   Mouth/Throat: No oropharyngeal exudate.   Eyes: EOM are normal. Right eye exhibits no discharge. Left eye exhibits no discharge. No scleral icterus.   Cardiovascular: Normal rate, regular rhythm, normal heart sounds and intact distal pulses. Exam reveals no gallop and no friction rub.   No murmur heard.  Pulmonary/Chest: Effort normal and breath sounds normal. No respiratory distress. She has no wheezes. She has no rales. She exhibits no tenderness.   Abdominal: Soft. Bowel sounds are normal. She exhibits no distension and no mass. There is no tenderness. There is no rebound and no guarding.   Musculoskeletal: Normal range of motion. She exhibits edema  "(right and left leg). She exhibits no tenderness.   Neurological: She is alert and oriented to person, place, and time.   Skin: No rash noted. She is not diaphoretic. No erythema.   Psychiatric: She has a normal mood and affect. Her behavior is normal.       Significant Labs:   Labs have been reviewed.    Lab Results   Component Value Date    WBC 6.23 11/03/2019    HGB 7.7 (L) 11/03/2019    HCT 25.6 (L) 11/03/2019    MCV 94 11/03/2019     11/03/2019     Assessment/Plan:     * DVT, recurrent, lower extremity, acute, right  - The patient has a history of recurrent DVT's and PE even while on anticoagulation  - The pt is reportedly homozygous for factor V leiden  - She has been on Xarelto in the past with subsequent cholecystitis  - Currently the patient has developed a new almost entirely occlusive thrombus in the common right femoral vein  - Concern is for failure of lovenox and/or provoked thrombosis from her recent procedure with LLE thrombectomy and stents placed in IVC after failed filter retrieval    - Agree with heparin drip  - follow up antithrombin III level, factor V leiden, lupus anticoagulant, anticardiolipin antibodies, and beta-2 glycoprotein levels.  - per patient history, she states that she did the "best" with apixaban (Eliquis). She states she never had clots while on apixaban, but it was stopped by the hematologist in [place]. Perhaps once the interventional-radiology procedure has been performed (scheduled for 11/4/19), and perioperative heparin has been given, she can be transitioned back to apixaban.    Leukopenia  - WBC count today is 6.23 k/uL  - continue to monitor.    Normocytic anemia  - Pt with worsening normocytic anemia since 8/30/19 since starting lovenox  - Pt with macrocytosis in the past as well  - reticulocyte count is decreased, suggestive of a hypoproliferative marrow.  - follow up further once procedure is performed (11/4/19) and patient is transitioned from heparin to " anticoagulation.    Factor V Leiden mutation  - Dr. Wright had ordered factor V leiden testing. Follow up results.      Owen Hays MD  Hematology/Oncology  [facility]